# Patient Record
Sex: MALE | Race: WHITE | Employment: FULL TIME | URBAN - METROPOLITAN AREA
[De-identification: names, ages, dates, MRNs, and addresses within clinical notes are randomized per-mention and may not be internally consistent; named-entity substitution may affect disease eponyms.]

---

## 2020-01-16 ENCOUNTER — HOSPITAL ENCOUNTER (OUTPATIENT)
Dept: LAB | Age: 75
Discharge: HOME | End: 2020-01-16

## 2020-01-16 PROCEDURE — 88305 TISSUE EXAM BY PATHOLOGIST: CPT

## 2022-06-15 DIAGNOSIS — E78.5 HYPERLIPIDEMIA, UNSPECIFIED HYPERLIPIDEMIA TYPE: Primary | ICD-10-CM

## 2022-06-16 ENCOUNTER — TELEPHONE (OUTPATIENT)
Dept: ORTHOPEDIC SURGERY | Age: 77
End: 2022-06-16

## 2022-06-16 RX ORDER — ROSUVASTATIN CALCIUM 10 MG/1
10 TABLET, COATED ORAL EVERY OTHER DAY
Qty: 90 TABLET | Refills: 0 | Status: SHIPPED | OUTPATIENT
Start: 2022-06-16 | End: 2022-09-14 | Stop reason: SDUPTHER

## 2022-06-16 NOTE — TELEPHONE ENCOUNTER
This pt says he saw dr Ibeth Norris a long  Time  Ago and has called  Requesting  Him now. He  Felt a  Pop in his  Left shoulder. I have him scheduled for the end of July and on a wait list.  He will be  Out of town 6/25 thru 7/2.   Please let me know if you can see him sooner  Or you can add him to your  Wait list   Thank you

## 2022-06-20 ENCOUNTER — TELEPHONE (OUTPATIENT)
Dept: ORTHOPEDIC SURGERY | Age: 77
End: 2022-06-20

## 2022-07-05 ENCOUNTER — OFFICE VISIT (OUTPATIENT)
Dept: ORTHOPEDIC SURGERY | Age: 77
End: 2022-07-05
Payer: MEDICARE

## 2022-07-05 VITALS — BODY MASS INDEX: 24.92 KG/M2 | WEIGHT: 178 LBS | HEIGHT: 71 IN

## 2022-07-05 DIAGNOSIS — M25.512 LEFT SHOULDER PAIN, UNSPECIFIED CHRONICITY: Primary | ICD-10-CM

## 2022-07-05 DIAGNOSIS — M75.42 IMPINGEMENT SYNDROME OF LEFT SHOULDER: ICD-10-CM

## 2022-07-05 DIAGNOSIS — M19.012 ARTHRITIS OF LEFT ACROMIOCLAVICULAR JOINT: ICD-10-CM

## 2022-07-05 PROCEDURE — G8427 DOCREV CUR MEDS BY ELIG CLIN: HCPCS | Performed by: ORTHOPAEDIC SURGERY

## 2022-07-05 PROCEDURE — 99203 OFFICE O/P NEW LOW 30 MIN: CPT | Performed by: ORTHOPAEDIC SURGERY

## 2022-07-05 PROCEDURE — G8420 CALC BMI NORM PARAMETERS: HCPCS | Performed by: ORTHOPAEDIC SURGERY

## 2022-07-05 PROCEDURE — 1036F TOBACCO NON-USER: CPT | Performed by: ORTHOPAEDIC SURGERY

## 2022-07-05 PROCEDURE — 20610 DRAIN/INJ JOINT/BURSA W/O US: CPT | Performed by: ORTHOPAEDIC SURGERY

## 2022-07-05 PROCEDURE — 1123F ACP DISCUSS/DSCN MKR DOCD: CPT | Performed by: ORTHOPAEDIC SURGERY

## 2022-07-05 RX ORDER — METHYLPREDNISOLONE ACETATE 40 MG/ML
40 INJECTION, SUSPENSION INTRA-ARTICULAR; INTRALESIONAL; INTRAMUSCULAR; SOFT TISSUE ONCE
Status: COMPLETED | OUTPATIENT
Start: 2022-07-05 | End: 2022-07-05

## 2022-07-05 RX ORDER — OMEPRAZOLE 40 MG/1
40 CAPSULE, DELAYED RELEASE ORAL DAILY
COMMUNITY

## 2022-07-05 RX ORDER — LISINOPRIL 40 MG/1
40 TABLET ORAL DAILY
COMMUNITY
End: 2022-09-14 | Stop reason: SDUPTHER

## 2022-07-05 RX ADMIN — METHYLPREDNISOLONE ACETATE 120 MG: 40 INJECTION, SUSPENSION INTRA-ARTICULAR; INTRALESIONAL; INTRAMUSCULAR; SOFT TISSUE at 10:02

## 2022-07-05 NOTE — LETTER
[employer]  Arthritis oral choices: Individual Turmeric-Curcumin; Guanakito Speak; Boswellia                           -or-   Combination [employer] Turmeric strength for joints that includes all 3 listed above     Magne topical Sports:   Balm or liquid Spray with frankincense/myrrh      Nerve medication options:   CBD, Alpha Lipoic acid, Lion's brian and any other recommended neuropathic medication            Immune/healing possibilities:  Echinacea, Elderberry    As Needed: Dead sea bath salts, Essential Oils, scar cream, Gout and cramping medications    The above list of recommended medications is only a starting point. Please allow the experts at [facility] to make the final recommendations. Before using any of these medications, please make sure that you have no concerns, senstivities or allergies to the listed ingredients in each bottle/container. Also, please check with your pharmacist or your primary care physician regarding any and all possible interactions with your other daily medications. [employer] Locations:   [facility]  [address] [place] [address], [address] [place] [address]            Sincerely,      Ebony Trevino MD

## 2022-07-05 NOTE — PROGRESS NOTES
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 His shoulder pain is becoming more problematic because he uses crutches for polio and his shoulder pain has limited his ability to use crutches    ROS/Meds/PSH/PMH/FH/SH: reviewed today    Tobacco:  reports that he quit smoking about 32 years ago. He smoked 1.00 pack per day. He has quit using smokeless tobacco.     Physical Examination:  Patient appears to be alert and oriented with acceptable appearance.   No obvious distress or SOB  CV: appears to have acceptable vascular color and capillary refill  Neuro: appears to have mostly intact light touch sensation, but possibly some loss in his left hand  Skin: No soft tissue swelling  MS: Standing: Polio dependent use of crutches and brace  Left shoulder = impingement signs with active abduction and forward flexion  Left = hard to truly assess biceps tension and strength because he reports having changes in his left upper extremity after his tongue reconstruction 11 years ago    XR: Left side: AP Grashey and axillary views shoulder taken today with acromioclavicular arthritis; high riding humeral head; glenohumeral arthritis  XR Impression:  As above      Injection: We discussed the risks and complications of the procedure and the decision was made to proceed; after sterile prep, the left shoulder joint was injected with 3 cc Xylocaine: 120 mg Depo-Medrol; appeared to do well with better motion and less pain     Assessment:    Left shoulder pain, AC arthritis shoulder impingement, rotator cuff and possible biceps tear    Plan:   The patient and I discussed the above assessment. We explored treatment options. We discussed potential biceps tendon tear, but at this time, I feel like the majority of his pain relates to his rotator cuff and arthritis   He has an appointment to see Dr. Brain Thomas next week so I will hold ordering MRI scan today pending his response to the injection  Advanced medical imaging: Potential future: Left shoulder MRI scan    We discussed shoulder care and protection  PT: Hold PT at: [facility] per Dr. Brain Thomas   Orthotic/prosthetic: He has crutches and braces for his polio       Medication - OTC meds prn: Prescribed: Boswellia, Devil's claw, Turmeric-curcumin   Magne sports topical rub with frankincense and myrrh      Surgical discussion: Any surgery will be up to Dr. Brain Thomas  Follow up: Dr. Brain Thomas  Work status: Regular    This note was created using Dragon voice recognition software which may result in errors of speech and spelling recognition and word/phrase syntax errors.

## 2022-07-22 ENCOUNTER — NURSE ONLY (OUTPATIENT)
Dept: INTERNAL MEDICINE CLINIC | Facility: CLINIC | Age: 77
End: 2022-07-22

## 2022-07-22 DIAGNOSIS — R20.0 NUMBNESS IN FEET: ICD-10-CM

## 2022-07-22 DIAGNOSIS — I10 ESSENTIAL HYPERTENSION: Primary | ICD-10-CM

## 2022-07-22 LAB
BASOPHILS # BLD: 0.1 K/UL (ref 0–0.2)
BASOPHILS NFR BLD: 1 % (ref 0–2)
DIFFERENTIAL METHOD BLD: NORMAL
EOSINOPHIL # BLD: 0.1 K/UL (ref 0–0.8)
EOSINOPHIL NFR BLD: 2 % (ref 0.5–7.8)
ERYTHROCYTE [DISTWIDTH] IN BLOOD BY AUTOMATED COUNT: 13.2 % (ref 11.9–14.6)
HCT VFR BLD AUTO: 42.7 % (ref 41.1–50.3)
HGB BLD-MCNC: 13.7 G/DL (ref 13.6–17.2)
IMM GRANULOCYTES # BLD AUTO: 0 K/UL (ref 0–0.5)
IMM GRANULOCYTES NFR BLD AUTO: 0 % (ref 0–5)
LYMPHOCYTES # BLD: 1.1 K/UL (ref 0.5–4.6)
LYMPHOCYTES NFR BLD: 19 % (ref 13–44)
MCH RBC QN AUTO: 30.9 PG (ref 26.1–32.9)
MCHC RBC AUTO-ENTMCNC: 32.1 G/DL (ref 31.4–35)
MCV RBC AUTO: 96.4 FL (ref 79.6–97.8)
MONOCYTES # BLD: 0.7 K/UL (ref 0.1–1.3)
MONOCYTES NFR BLD: 11 % (ref 4–12)
NEUTS SEG # BLD: 4.2 K/UL (ref 1.7–8.2)
NEUTS SEG NFR BLD: 67 % (ref 43–78)
NRBC # BLD: 0 K/UL (ref 0–0.2)
PLATELET # BLD AUTO: 308 K/UL (ref 150–450)
PMV BLD AUTO: 10.6 FL (ref 9.4–12.3)
RBC # BLD AUTO: 4.43 M/UL (ref 4.23–5.6)
WBC # BLD AUTO: 6.1 K/UL (ref 4.3–11.1)

## 2022-07-23 LAB
ALBUMIN SERPL-MCNC: 3.9 G/DL (ref 3.2–4.6)
ALBUMIN/GLOB SERPL: 1.3 {RATIO} (ref 1.2–3.5)
ALP SERPL-CCNC: 59 U/L (ref 50–136)
ALT SERPL-CCNC: 44 U/L (ref 12–65)
ANION GAP SERPL CALC-SCNC: 9 MMOL/L (ref 7–16)
AST SERPL-CCNC: 28 U/L (ref 15–37)
BILIRUB SERPL-MCNC: 0.4 MG/DL (ref 0.2–1.1)
BUN SERPL-MCNC: 20 MG/DL (ref 8–23)
CALCIUM SERPL-MCNC: 9.1 MG/DL (ref 8.3–10.4)
CHLORIDE SERPL-SCNC: 106 MMOL/L (ref 98–107)
CHOLEST SERPL-MCNC: 172 MG/DL
CO2 SERPL-SCNC: 21 MMOL/L (ref 21–32)
CREAT SERPL-MCNC: 0.7 MG/DL (ref 0.8–1.5)
GLOBULIN SER CALC-MCNC: 3 G/DL (ref 2.3–3.5)
GLUCOSE SERPL-MCNC: 94 MG/DL (ref 65–100)
HDLC SERPL-MCNC: 57 MG/DL (ref 40–60)
HDLC SERPL: 3 {RATIO}
LDLC SERPL CALC-MCNC: 103.4 MG/DL
POTASSIUM SERPL-SCNC: 4.7 MMOL/L (ref 3.5–5.1)
PROT SERPL-MCNC: 6.9 G/DL (ref 6.3–8.2)
SODIUM SERPL-SCNC: 136 MMOL/L (ref 136–145)
TRIGL SERPL-MCNC: 58 MG/DL (ref 35–150)
TSH, 3RD GENERATION: 0.75 UIU/ML (ref 0.36–3.74)
VIT B12 SERPL-MCNC: 344 PG/ML (ref 193–986)
VLDLC SERPL CALC-MCNC: 11.6 MG/DL (ref 6–23)

## 2022-07-25 ENCOUNTER — APPOINTMENT (RX ONLY)
Dept: URBAN - METROPOLITAN AREA CLINIC 329 | Facility: CLINIC | Age: 77
Setting detail: DERMATOLOGY
End: 2022-07-25

## 2022-07-25 DIAGNOSIS — L57.0 ACTINIC KERATOSIS: ICD-10-CM

## 2022-07-25 DIAGNOSIS — L71.8 OTHER ROSACEA: ICD-10-CM | Status: INADEQUATELY CONTROLLED

## 2022-07-25 DIAGNOSIS — L20.89 OTHER ATOPIC DERMATITIS: ICD-10-CM | Status: INADEQUATELY CONTROLLED

## 2022-07-25 PROBLEM — L20.84 INTRINSIC (ALLERGIC) ECZEMA: Status: ACTIVE | Noted: 2022-07-25

## 2022-07-25 PROCEDURE — ? PRESCRIPTION

## 2022-07-25 PROCEDURE — 17000 DESTRUCT PREMALG LESION: CPT

## 2022-07-25 PROCEDURE — ? LIQUID NITROGEN

## 2022-07-25 PROCEDURE — ? PRESCRIPTION MEDICATION MANAGEMENT

## 2022-07-25 PROCEDURE — 99204 OFFICE O/P NEW MOD 45 MIN: CPT | Mod: 25

## 2022-07-25 PROCEDURE — ? COUNSELING

## 2022-07-25 PROCEDURE — ? ADDITIONAL NOTES

## 2022-07-25 PROCEDURE — ? FULL BODY SKIN EXAM - DECLINED

## 2022-07-25 PROCEDURE — 17003 DESTRUCT PREMALG LES 2-14: CPT

## 2022-07-25 RX ORDER — FLUTICASONE PROPIONATE 0.05 MG/G
OINTMENT TOPICAL
Qty: 60 | Refills: 5 | Status: ERX | COMMUNITY
Start: 2022-07-25

## 2022-07-25 RX ORDER — MINOCYCLINE HYDROCHLORIDE 100 MG/1
CAPSULE ORAL
Qty: 30 | Refills: 3 | Status: ERX | COMMUNITY
Start: 2022-07-25

## 2022-07-25 RX ORDER — METRONIDAZOLE 7.5 MG/G
CREAM TOPICAL
Qty: 45 | Refills: 3 | Status: ERX | COMMUNITY
Start: 2022-07-25

## 2022-07-25 RX ADMIN — FLUTICASONE PROPIONATE: 0.05 OINTMENT TOPICAL at 00:00

## 2022-07-25 RX ADMIN — MINOCYCLINE HYDROCHLORIDE: 100 CAPSULE ORAL at 00:00

## 2022-07-25 RX ADMIN — METRONIDAZOLE: 7.5 CREAM TOPICAL at 00:00

## 2022-07-25 ASSESSMENT — LOCATION DETAILED DESCRIPTION DERM
LOCATION DETAILED: RIGHT SUPERIOR PARIETAL SCALP
LOCATION DETAILED: LEFT FOREHEAD
LOCATION DETAILED: LEFT SUPERIOR PARIETAL SCALP
LOCATION DETAILED: NASAL DORSUM
LOCATION DETAILED: 1ST WEB SPACE RIGHT HAND

## 2022-07-25 ASSESSMENT — LOCATION ZONE DERM
LOCATION ZONE: FACE
LOCATION ZONE: NOSE
LOCATION ZONE: SCALP
LOCATION ZONE: FINGER

## 2022-07-25 ASSESSMENT — LOCATION SIMPLE DESCRIPTION DERM
LOCATION SIMPLE: LEFT FOREHEAD
LOCATION SIMPLE: RIGHT THUMB
LOCATION SIMPLE: SCALP
LOCATION SIMPLE: NOSE

## 2022-07-25 ASSESSMENT — SEVERITY ASSESSMENT OVERALL AMONG ALL PATIENTS
IN YOUR EXPERIENCE, AMONG ALL PATIENTS YOU HAVE SEEN WITH THIS CONDITION, HOW SEVERE IS THIS PATIENT'S CONDITION?: MILD TO MODERATE

## 2022-07-25 ASSESSMENT — SEVERITY ASSESSMENT 2020: SEVERITY 2020: MODERATE

## 2022-07-25 NOTE — PROCEDURE: MIPS QUALITY
Quality 47: Advance Care Plan: Advance Care Planning discussed and documented in the medical record; patient did not wish or was not able to name a surrogate decision maker or provide an advance care plan.
Quality 130: Documentation Of Current Medications In The Medical Record: Current Medications Documented
Quality 111:Pneumonia Vaccination Status For Older Adults: Pneumococcal vaccine administered on or after patient’s 60th birthday and before the end of the measurement period
Detail Level: Detailed

## 2022-09-11 NOTE — PROGRESS NOTES
Daya Sullivan (:  1945) is a 69 y.o. male,Established patient, here for evaluation of the following chief complaint(s):  Follow-up (3 mth fu), Hypertension, and Leg Pain         ASSESSMENT/PLAN:  1. Essential hypertension  BP levels consistently elevated in office but better controlled at home   Continue lisinopril and home BP monitoring  Follow-up in 2 weeks for nursing visit at which point patient is to bring his machine to the office so comparison can be made    - lisinopril (PRINIVIL;ZESTRIL) 40 MG tablet; Take 1 tablet by mouth daily  Dispense: 90 tablet; Refill: 2    2. Dyslipidemia  Lipid panel from 2022 reviewed  Continue rosuvastatin    - rosuvastatin (CRESTOR) 10 MG tablet; Take 1 tablet by mouth every other day  Dispense: 90 tablet; Refill: 0    3. Chronic pain of right lower extremity  4. Degenerative disc disease, lumbar  5. Right hip pain  Known degenerative disc disease with possible sciatic involvement in the past  Chronic right lower extremity pain differential diagnosis includes lumbar radiculopathy versus femoral acetabular osteoarthritis  Obtain radiograph of right hip as well as up-to-date radiographs of lumbar spine  Given chronicity and known degenerative disc disease with possible sciatic involvement obtain MRI of the lumbar spine given patient has failed to improve with conservative management  Patient requesting referral to orthopedic specialist for further evaluation, referral order placed  Stop over-the-counter anti-inflammatories, start trial of once a day meloxicam    - Willa Delacruz Dr  - XR HIP RIGHT (2-3 VIEWS); Future  - MRI LUMBAR SPINE WO CONTRAST; Future  - meloxicam (MOBIC) 15 MG tablet; Take 1/2-1 whole tablet once daily as needed for pain. Avoid other anti-inflammatories while on this medication. Acetaminophen is okay. Dispense: 30 tablet; Refill: 2  - meloxicam (MOBIC) 15 MG tablet;  Take 1/2-1 whole tablet once daily as needed for pain. Avoid other anti-inflammatories while on this medication. Acetaminophen is okay. Dispense: 30 tablet; Refill: 2    6. Erectile dysfunction, unspecified erectile dysfunction type  Chronic issue improved with Viagra in the past  Given erectile dysfunction and decreased muscle strength check testosterone level    - sildenafil (VIAGRA) 100 MG tablet; Take 1/2-1 whole tablet 30 to 60 minutes prior to intercourse. Maximum of 1 tablet/day. Dispense: 20 tablet; Refill: 5  - Testosterone, free, total; Future      Return in about 8 weeks (around 11/9/2022) for EOV; imaging studies ordered; needs fasting 8 am testosterone check prior . Subjective   SUBJECTIVE/OBJECTIVE:  Patient is a 81-year-old  male who presents to the office today for follow-up. Does check her blood pressure regularly at home with an automated arm cuff approximately 3years old with values typically in the 130s with rare excursions up to the 224 systolic. Remains compliant with his medication. Continues to have chronic low back pain described as pain originating in the right gluteal region extending down the hamstring. Of note patient does have history of polio since infancy with chronic weakness in the left leg. Does take 6 Advil a day which seems to help with the pain but overall has progressed over time. Does have tingling in the toes of both feet but no saddle anesthesia or bowel/bladder incontinence. No chest pain, shortness of breath, abdominal pain, nausea, vomiting. Requesting refill of Viagra for erectile dysfunction noting improvement in acquiring an erection with use. Denies history of major head trauma or use of anabolic steroids or exogenous testosterone. Does report loss of muscle strength generalized. Review of Systems   Respiratory:  Negative for shortness of breath. Cardiovascular:  Negative for chest pain and palpitations.    Gastrointestinal:  Negative for abdominal pain, nausea and vomiting. Genitourinary:         Positive for erectile dysfunction   Musculoskeletal:  Positive for back pain and myalgias. Neurological:  Positive for weakness (Chronic particularly in left leg). Positive for paresthesias of the toes  Denies saddle anesthesia or bowel/bladder incontinence        Objective   Physical Exam  Vitals reviewed. Constitutional:       General: He is not in acute distress. Appearance: Normal appearance. He is not ill-appearing, toxic-appearing or diaphoretic. HENT:      Head: Normocephalic and atraumatic. Eyes:      General:         Right eye: No discharge. Left eye: No discharge. Extraocular Movements: Extraocular movements intact. Neck:      Vascular: No carotid bruit. Cardiovascular:      Rate and Rhythm: Normal rate and regular rhythm. Heart sounds: No murmur heard. No friction rub. No gallop. Pulmonary:      Effort: Pulmonary effort is normal. No respiratory distress. Breath sounds: No wheezing, rhonchi or rales. Abdominal:      General: Bowel sounds are normal.      Palpations: Abdomen is soft. Tenderness: There is no abdominal tenderness. There is no guarding. Musculoskeletal:      Comments: Atrophy of bilateral leg muscles  Negative seated straight leg raise test  Passive range of motion of right hip preserved in flexion, internal and external rotation without pain  No tenderness to palpation over greater trochanteric bursa or along lumbar spinous processes   Skin:     General: Skin is warm and dry. Coloration: Skin is not jaundiced. Neurological:      Mental Status: He is alert. Mental status is at baseline. Psychiatric:         Attention and Perception: Attention normal.         Mood and Affect: Mood and affect normal. Mood is not anxious or depressed. Affect is not tearful. Speech: Speech normal. Speech is not rapid and pressured or slurred.          Behavior: Behavior normal. Behavior is not agitated, aggressive or combative. Behavior is cooperative. Thought Content: Thought content normal.                An electronic signature was used to authenticate this note.     --Lanie Crimes, DO

## 2022-09-14 ENCOUNTER — OFFICE VISIT (OUTPATIENT)
Dept: INTERNAL MEDICINE CLINIC | Facility: CLINIC | Age: 77
End: 2022-09-14
Payer: MEDICARE

## 2022-09-14 VITALS
OXYGEN SATURATION: 98 % | HEIGHT: 71 IN | HEART RATE: 82 BPM | BODY MASS INDEX: 24.83 KG/M2 | SYSTOLIC BLOOD PRESSURE: 170 MMHG | TEMPERATURE: 97.5 F | DIASTOLIC BLOOD PRESSURE: 80 MMHG

## 2022-09-14 DIAGNOSIS — G89.29 CHRONIC PAIN OF RIGHT LOWER EXTREMITY: ICD-10-CM

## 2022-09-14 DIAGNOSIS — I10 ESSENTIAL HYPERTENSION: Primary | ICD-10-CM

## 2022-09-14 DIAGNOSIS — E78.5 DYSLIPIDEMIA: ICD-10-CM

## 2022-09-14 DIAGNOSIS — M79.604 CHRONIC PAIN OF RIGHT LOWER EXTREMITY: ICD-10-CM

## 2022-09-14 DIAGNOSIS — M51.36 DEGENERATIVE DISC DISEASE, LUMBAR: ICD-10-CM

## 2022-09-14 DIAGNOSIS — M25.551 RIGHT HIP PAIN: ICD-10-CM

## 2022-09-14 DIAGNOSIS — N52.9 ERECTILE DYSFUNCTION, UNSPECIFIED ERECTILE DYSFUNCTION TYPE: ICD-10-CM

## 2022-09-14 PROCEDURE — G8420 CALC BMI NORM PARAMETERS: HCPCS | Performed by: STUDENT IN AN ORGANIZED HEALTH CARE EDUCATION/TRAINING PROGRAM

## 2022-09-14 PROCEDURE — 1036F TOBACCO NON-USER: CPT | Performed by: STUDENT IN AN ORGANIZED HEALTH CARE EDUCATION/TRAINING PROGRAM

## 2022-09-14 PROCEDURE — G8427 DOCREV CUR MEDS BY ELIG CLIN: HCPCS | Performed by: STUDENT IN AN ORGANIZED HEALTH CARE EDUCATION/TRAINING PROGRAM

## 2022-09-14 PROCEDURE — 99214 OFFICE O/P EST MOD 30 MIN: CPT | Performed by: STUDENT IN AN ORGANIZED HEALTH CARE EDUCATION/TRAINING PROGRAM

## 2022-09-14 PROCEDURE — 1123F ACP DISCUSS/DSCN MKR DOCD: CPT | Performed by: STUDENT IN AN ORGANIZED HEALTH CARE EDUCATION/TRAINING PROGRAM

## 2022-09-14 RX ORDER — ROSUVASTATIN CALCIUM 10 MG/1
10 TABLET, COATED ORAL EVERY OTHER DAY
Qty: 90 TABLET | Refills: 0 | Status: SHIPPED | OUTPATIENT
Start: 2022-09-14

## 2022-09-14 RX ORDER — SILDENAFIL 100 MG/1
TABLET, FILM COATED ORAL
Qty: 20 TABLET | Refills: 5 | Status: SHIPPED | OUTPATIENT
Start: 2022-09-14

## 2022-09-14 RX ORDER — LISINOPRIL 40 MG/1
40 TABLET ORAL DAILY
Qty: 90 TABLET | Refills: 2 | Status: SHIPPED | OUTPATIENT
Start: 2022-09-14

## 2022-09-14 RX ORDER — MELOXICAM 15 MG/1
TABLET ORAL
Qty: 30 TABLET | Refills: 2 | Status: SHIPPED | OUTPATIENT
Start: 2022-09-14

## 2022-09-14 ASSESSMENT — ENCOUNTER SYMPTOMS
NAUSEA: 0
SHORTNESS OF BREATH: 0
VOMITING: 0
BACK PAIN: 1
ABDOMINAL PAIN: 0

## 2022-09-14 ASSESSMENT — PATIENT HEALTH QUESTIONNAIRE - PHQ9
SUM OF ALL RESPONSES TO PHQ9 QUESTIONS 1 & 2: 0
2. FEELING DOWN, DEPRESSED OR HOPELESS: 0
1. LITTLE INTEREST OR PLEASURE IN DOING THINGS: 0
SUM OF ALL RESPONSES TO PHQ QUESTIONS 1-9: 0

## 2022-09-23 ENCOUNTER — OFFICE VISIT (OUTPATIENT)
Dept: ORTHOPEDIC SURGERY | Age: 77
End: 2022-09-23
Payer: MEDICARE

## 2022-09-23 VITALS — BODY MASS INDEX: 24.83 KG/M2 | WEIGHT: 178 LBS

## 2022-09-23 DIAGNOSIS — M54.31 SCIATICA OF RIGHT SIDE: ICD-10-CM

## 2022-09-23 DIAGNOSIS — M51.36 DDD (DEGENERATIVE DISC DISEASE), LUMBAR: ICD-10-CM

## 2022-09-23 DIAGNOSIS — M47.816 FACET ARTHROPATHY, LUMBAR: ICD-10-CM

## 2022-09-23 DIAGNOSIS — M54.50 LOW BACK PAIN, UNSPECIFIED BACK PAIN LATERALITY, UNSPECIFIED CHRONICITY, UNSPECIFIED WHETHER SCIATICA PRESENT: Primary | ICD-10-CM

## 2022-09-23 PROCEDURE — 1036F TOBACCO NON-USER: CPT | Performed by: PHYSICIAN ASSISTANT

## 2022-09-23 PROCEDURE — 1123F ACP DISCUSS/DSCN MKR DOCD: CPT | Performed by: PHYSICIAN ASSISTANT

## 2022-09-23 PROCEDURE — G8420 CALC BMI NORM PARAMETERS: HCPCS | Performed by: PHYSICIAN ASSISTANT

## 2022-09-23 PROCEDURE — 99204 OFFICE O/P NEW MOD 45 MIN: CPT | Performed by: PHYSICIAN ASSISTANT

## 2022-09-23 PROCEDURE — G8427 DOCREV CUR MEDS BY ELIG CLIN: HCPCS | Performed by: PHYSICIAN ASSISTANT

## 2022-09-23 RX ORDER — METHYLPREDNISOLONE 4 MG/1
TABLET ORAL
Qty: 1 KIT | Refills: 0 | Status: SHIPPED | OUTPATIENT
Start: 2022-09-23

## 2022-09-23 NOTE — LETTER
Gabriela Burrell                                                     MIKE AVILA  1945  MRN [id number]                                              ROOM NUMBER______      Radiographic Studies:    Cervical MRI      Thoracic MRI         Lumbar MRI          Pelvis MRI        CONTRAST    CT Myelogram: _______________   NCS/EMG ________________ ( UE  /  LE )     MRI of ___________________          Other: ____________________      Injections:    KNEE    HIP  Depomedrol _____ mg Euflexxa _____    _______________ TFESI/SNRB  _______________ SI Joint  _______________ TRENTON    _______________ Facet  _______________Piriformis/ Sciatica      Medications:    Oral Steroids _______________  NSAIDS _______________    Muscle Relaxers _______________  Neurontin/Lyrica _______________    Pain Medicine _______________  Other _______________                       Physical Therapy:    Lumbar     Thoracic      Cervical     Hip       Knee       Shoulder               Traction          Ultrasound          Dry Needling      Referral:    Pain referral:  [facility]   [facility]   Other: ______________________________    Follow-up/ Refer__________________________________________________    Authorization to hold blood thinners:___________________________________

## 2022-09-23 NOTE — PROGRESS NOTES
Name: Tra Faith  YOB: 1945  Gender: male  MRN: [id number]    CC: Back Pain (Low back pain into right buttock, thigh and behind right knee)       HPI: This is a 69y.o. year old male who has history of polio. 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 He has weakness of bilateral lower extremities from polio left leg has always worn a brace. We saw him in 2018 for lower back right buttock pain. At that time he had an MRI scan that showed some moderate stenosis at L2-3 and L3-4 but there was no significant foraminal stenosis of the lower levels or stenosis L4-5 or L5-S1. We referred him to physical therapy. At that time he said he was doing a lot of flexion extension twisting exercises and it seemed to aggravate the pain. He was doing okay for a while but over the past 3 months he has had more pain in the right posterior leg does not radiate below the knee is all in the right buttock right posterior leg. There is really not a lot of back pain. He does feel that his balance is is worse. He is tried meloxicam and Advil. He tries to ride his bike regularly, sitting aggravates it. History was obtained by patient    The patient denies any change in bowel or bladder function since the onset of the symptoms. he  has not had lumbar surgery in the past.     AMB PAIN ASSESSMENT 9/23/2022   Location of Pain Back   Location Modifiers Right   Severity of Pain 5   Quality of Pain Aching   Duration of Pain Persistent   Frequency of Pain Intermittent   Date Pain First Started 6/14/2022   Aggravating Factors Standing;Walking   Limiting Behavior Yes   Relieving Factors Other (Comment); Heat;Rest   Result of Injury No   Work-Related Injury No   Are there other pain locations you wish to document? No            ROS/Meds/PSH/PMH/FH/SH: I personally reviewed the patient's collected intake data.   Below are the pertinents:    Allergies   Allergen Reactions    Zolpidem Other (See Comments)     Hallucinations            Current Outpatient Medications:     methylPREDNISolone (MEDROL DOSEPACK) 4 MG tablet, Take by mouth as directed., Disp: 1 kit, Rfl: 0    sildenafil (VIAGRA) 100 MG tablet, Take 1/2-1 whole tablet 30 to 60 minutes prior to intercourse. Maximum of 1 tablet/day., Disp: 20 tablet, Rfl: 5    rosuvastatin (CRESTOR) 10 MG tablet, Take 1 tablet by mouth every other day, Disp: 90 tablet, Rfl: 0    lisinopril (PRINIVIL;ZESTRIL) 40 MG tablet, Take 1 tablet by mouth daily, Disp: 90 tablet, Rfl: 2    meloxicam (MOBIC) 15 MG tablet, Take 1/2-1 whole tablet once daily as needed for pain. Avoid other anti-inflammatories while on this medication. Acetaminophen is okay. , Disp: 30 tablet, Rfl: 2    omeprazole (PRILOSEC) 40 MG delayed release capsule, Take 40 mg by mouth daily, Disp: , Rfl:     Multiple Vitamin (MULTIVITAMIN PO), Take 1 tablet by mouth daily, Disp: , Rfl:     metroNIDAZOLE (METROCREAM) 0.75 % cream, APPLY TO NOSE ONE TO TWO TIMES PER DAY, Disp: , Rfl:     ascorbic acid (VITAMIN C) 500 MG tablet, Take 500 mg by mouth daily, Disp: , Rfl:     Coenzyme Q10 10 MG CAPS, Take 30 mg by mouth daily (Patient not taking: Reported on 9/23/2022), Disp: , Rfl:     Past Surgical History:   Procedure Laterality Date    CATARACT REMOVAL Bilateral     COLONOSCOPY      [facility]     HEENT      1/3rd tongue removed in 8/2010 and arterial graft from right wrist to tongue and second graft from right thigh to right wrist.    HEENT      precautionary trach placed in 8/2010 when having tongue removal surgery and closed one week later. HERNIA REPAIR      ORTHOPEDIC SURGERY Right     pins in right leg to prevent asymmetric growth    VASECTOMY         There is no problem list on file for this patient. Tobacco:  reports that he quit smoking about 32 years ago. His smoking use included cigarettes. He smoked an average of 1 pack per day.  He has quit using smokeless tobacco.  Alcohol: Social History     Substance and Sexual Activity   Alcohol Use Yes        Physical Exam:   BMI: Body mass index is 24.83 kg/m². GENERAL:  Adult in no acute distress, well developed, well nourished Patient is appropriately conversant  MSK:  Examination of the lumbar spine reveals normal alignment of the spine. Pelvis is unlevel. No viviana tenderness to palpation of the lumbar spine. No tenderness of the right buttock. He is able to single-leg stance on the right leg. Gait is certainly altered he uses crutches, he does have a left leg brace on. ROM of bilateral hip(s) reveals no irritability. NEURO:  Cranial nerves grossly intact. No change in motor deficits. He certainly has weakness of the right lower extremity with knee extension, dorsiflexion plantarflexion but these are unchanged. .    Straight leg testing is negative   sensory testing reveals intact sensation to light touch and in the distribution of the L3-S1 dermatomes bilaterally  PSYCH:  Alert and oriented X 3. Appropriate affect. Intact judgment and insight. Radiographic Studies:     AP, lateral and spot views of the lumbar spine:  9/23/22  AP of the lumbar spine shows normal alignment. There are multilevel degenerative disc changes and facet arthropathy and lateral osteophytes. Sagittal view of the lumbar spine multilevel degenerative disc changes. No anterior listhesis. Large bulky facet arthropathy is prominent. X-ray impression: Advanced degenerative changes lumbar spine no acute abnormality no significant change from x-rays 2018. I also independently reviewed the MRI scan from 2018. He did have some disc bulging L1-2, L2-3 and L34. There is moderate stenosis L2-3 and L34 but no significant stenosis at L4-5 or L5-S1 and there was no viviana compression on 5 or S1 nerve roots particularly on the right. I also independently reviewed the MRI scan from 2018      Assessment/Plan:         Diagnosis Orders   1.  Low back pain, unspecified back pain laterality, unspecified chronicity, unspecified whether sciatica present  XR LUMBAR SPINE (2-3 VIEWS)    Amb External Referral To Physical Therapy      2. Facet arthropathy, lumbar  Amb External Referral To Physical Therapy      3. DDD (degenerative disc disease), lumbar  Amb External Referral To Physical Therapy      4. Sciatica of right side  Amb External Referral To Physical Therapy          We reviewed his MRI scan from 2018 we reviewed his x-rays today. We did discuss that he does have stenosis at L2-3 and L3-4 however his symptoms do not really sound like neurogenic claudication at this time. It seems that he has more of a sciatica or piriformis syndrome and this may be due to his unlevel pelvis and could also be some sacroiliac pain. What I currently recommend is physical therapy for piriformis and SI joint with dry needling to see if we can calm down the pain in the right buttock and posterior leg. I do not want to therapy to do a lot of flexion extension or rotation of the spine as that may exacerbate stenosis and spondylitic back pain. If his symptoms do not improve, we may need new MRI scan of the lumbar spine to further evaluate. We will also prescribe oral steroids.    - Physical Therapy was prescribed and will include stretching, strengthening and modalities to promote blood flow, flexibility and core strengthening.  - Oral Steroids: A short course of oral steroids was prescribed in an attempt to bring the acute radicular symptoms under control. The patient understands the risks and side effects of oral steroids including immunosuppression, hypertension, mood swings, increased blood sugar, including glaucoma. The steroid taper can be followed by NSAIDs once completed. 4 This is a chronic illness/condition with exacerbation and progression    Orders Placed This Encounter   Medications    methylPREDNISolone (MEDROL DOSEPACK) 4 MG tablet     Sig: Take by mouth as directed. Dispense:  1 kit     Refill:  0        Orders Placed This Encounter   Procedures    XR LUMBAR SPINE (2-3 VIEWS)    Amb External Referral To Physical Therapy            Return for after PT with HARSHAD. Mary Madsen PA-C  09/23/22      Elements of this note were created using speech recognition software. As such, errors of speech recognition may be present.

## 2022-09-28 ENCOUNTER — NURSE ONLY (OUTPATIENT)
Dept: INTERNAL MEDICINE CLINIC | Facility: CLINIC | Age: 77
End: 2022-09-28

## 2022-09-28 VITALS — SYSTOLIC BLOOD PRESSURE: 210 MMHG | DIASTOLIC BLOOD PRESSURE: 90 MMHG

## 2022-09-28 NOTE — PROGRESS NOTES
Manual BP reading on L arm is 220/100 and manual on R arm is 210/90. Pt brought his automatic BP cuff from home and it read on L arm a BP of 198/95. His last home reading was on 9/15/22 of 134/76. He is currently on Prednisone for acute sciatica and has 2 days left. Pt did take his BP med this morning. Dr. Sabrina Dyson informed and he recommended administering 0.1 mg of Clonidine. He also advised that pt start checking his BP at home twice daily and keep records and also call us with his at home BP reading this afternoon. Pt notified and verbalized understanding. Medication administration- Clonidine Hydrochloride 0.1 mg tablet @ 0900.

## 2022-10-20 ENCOUNTER — PATIENT MESSAGE (OUTPATIENT)
Dept: ORTHOPEDIC SURGERY | Age: 77
End: 2022-10-20

## 2022-10-24 NOTE — TELEPHONE ENCOUNTER
From: Kerrie Izquierdo  To: Tom Ozuna  Sent: 10/20/2022 12:22 PM EDT  Subject: Prednesdone, physical therapy    I start PT next Wednesday,   The prednisone relived my pain for several weeks but has worn off and my pain is back.  Would it be possible to have one more round of prednisone to get me started with PT.

## 2022-10-24 NOTE — TELEPHONE ENCOUNTER
Mr Forest Mcleod,   I am glad to hear the steroids helped. Unfortunately, repeated oral steroid use is not advisable as it can damage your bone quality. If you are not able to get relief with PT, we may need to consider an new MRI of your back or refer you for a steroid injections.    Tom Ozuna PA-C

## 2022-11-02 DIAGNOSIS — N52.9 ERECTILE DYSFUNCTION, UNSPECIFIED ERECTILE DYSFUNCTION TYPE: ICD-10-CM

## 2022-11-04 ENCOUNTER — OFFICE VISIT (OUTPATIENT)
Dept: ORTHOPEDIC SURGERY | Age: 77
End: 2022-11-04
Payer: MEDICARE

## 2022-11-04 DIAGNOSIS — M54.16 LUMBAR RADICULOPATHY: ICD-10-CM

## 2022-11-04 DIAGNOSIS — M47.816 FACET ARTHROPATHY, LUMBAR: ICD-10-CM

## 2022-11-04 DIAGNOSIS — M51.36 DDD (DEGENERATIVE DISC DISEASE), LUMBAR: Primary | ICD-10-CM

## 2022-11-04 DIAGNOSIS — M48.062 LUMBAR STENOSIS WITH NEUROGENIC CLAUDICATION: ICD-10-CM

## 2022-11-04 LAB
TESTOST FREE SERPL-MCNC: 10.5 PG/ML (ref 6.6–18.1)
TESTOST SERPL-MCNC: 422 NG/DL (ref 264–916)

## 2022-11-04 PROCEDURE — 99213 OFFICE O/P EST LOW 20 MIN: CPT | Performed by: PHYSICIAN ASSISTANT

## 2022-11-04 PROCEDURE — 1123F ACP DISCUSS/DSCN MKR DOCD: CPT | Performed by: PHYSICIAN ASSISTANT

## 2022-11-04 PROCEDURE — 1036F TOBACCO NON-USER: CPT | Performed by: PHYSICIAN ASSISTANT

## 2022-11-04 PROCEDURE — G8420 CALC BMI NORM PARAMETERS: HCPCS | Performed by: PHYSICIAN ASSISTANT

## 2022-11-04 PROCEDURE — G8484 FLU IMMUNIZE NO ADMIN: HCPCS | Performed by: PHYSICIAN ASSISTANT

## 2022-11-04 PROCEDURE — G8428 CUR MEDS NOT DOCUMENT: HCPCS | Performed by: PHYSICIAN ASSISTANT

## 2022-11-04 NOTE — LETTER
Saul AVILA  1945  MRN [id number]                                              ROOM NUMBER______      Radiographic Studies:    Cervical MRI      Thoracic MRI         Lumbar MRI          Pelvis MRI        CONTRAST    CT Myelogram: _______________   NCS/EMG ________________ ( UE  /  LE )     MRI of ___________________          Other: ____________________      Injections:    KNEE    HIP  Depomedrol _____ mg Euflexxa _____    _______________ TFESI/SNRB  _______________ SI Joint  _______________ TRENTON    _______________ Facet  _______________Piriformis/ Sciatica      Medications:    Oral Steroids _______________  NSAIDS _______________    Muscle Relaxers _______________  Neurontin/Lyrica _______________    Pain Medicine _______________  Other _______________                       Physical Therapy:    Lumbar     Thoracic      Cervical     Hip       Knee       Shoulder               Traction          Ultrasound          Dry Needling      Referral:    Pain referral:  [facility]   [facility]   Other: ______________________________    Follow-up/ Refer__________________________________________________    Authorization to hold blood thinners:___________________________________

## 2022-11-04 NOTE — PROGRESS NOTES
Name: Miguel Bowman  YOB: 1945  Gender: male  MRN: [id number]    CC: Back Pain (Follow up after PT)       HPI: This is a 69y.o. year old male who has history of polio. 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 He has weakness of bilateral lower extremities from polio left leg has always worn a brace. We saw him in 2018 for lower back right buttock pain. At that time he had an MRI scan that showed some moderate stenosis at L2-3 and L3-4 but there was no significant foraminal stenosis of the lower levels or stenosis L4-5 or L5-S1. We referred him to physical therapy. At that time he said he was doing a lot of flexion extension twisting exercises and it seemed to aggravate the pain. He was doing okay for a while but over the past 3 -4 months he has had more pain in the right posterior leg does not radiate below the knee is all in the right buttock right posterior leg. There is really not a lot of back pain. He does feel that his balance is is worse. He is tried meloxicam and Advil. He tries to ride his bike regularly, sitting aggravates it. We prescribed oral steroids. The symptoms did improve with oral steroids for short period of time. He actually called back to have another course. We recommended further follow-up. Began physical therapy and the physical therapy is helping with his flexibility and the pain has improved approximately 40% but he is still having right radicular pain. This point he would be interested in injection to help improve the symptoms even more. He has had over 6 weeks of physical therapy. AMB PAIN ASSESSMENT 9/23/2022   Location of Pain Back   Location Modifiers Right   Severity of Pain 5   Quality of Pain Aching   Duration of Pain Persistent   Frequency of Pain Intermittent   Date Pain First Started 6/14/2022   Aggravating Factors Standing;Walking   Limiting Behavior Yes   Relieving Factors Other (Comment); Heat;Rest   Result of Injury No   Work-Related Injury No   Are there other pain locations you wish to document? No            ROS/Meds/PSH/PMH/FH/SH: I personally reviewed the patient's collected intake data. Below are the pertinents:    Allergies   Allergen Reactions    Zolpidem Other (See Comments)     Hallucinations            Current Outpatient Medications:     metroNIDAZOLE (METROCREAM) 0.75 % cream, APPLY TO NOSE ONE TO TWO TIMES PER DAY, Disp: , Rfl:     methylPREDNISolone (MEDROL DOSEPACK) 4 MG tablet, Take by mouth as directed., Disp: 1 kit, Rfl: 0    sildenafil (VIAGRA) 100 MG tablet, Take 1/2-1 whole tablet 30 to 60 minutes prior to intercourse. Maximum of 1 tablet/day., Disp: 20 tablet, Rfl: 5    rosuvastatin (CRESTOR) 10 MG tablet, Take 1 tablet by mouth every other day, Disp: 90 tablet, Rfl: 0    lisinopril (PRINIVIL;ZESTRIL) 40 MG tablet, Take 1 tablet by mouth daily, Disp: 90 tablet, Rfl: 2    meloxicam (MOBIC) 15 MG tablet, Take 1/2-1 whole tablet once daily as needed for pain. Avoid other anti-inflammatories while on this medication. Acetaminophen is okay. 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    HEENT      precautionary trach placed in 8/2010 when having tongue removal surgery and closed one week later.     HERNIA REPAIR      ORTHOPEDIC SURGERY Right     pins in right leg to prevent asymmetric growth    VASECTOMY         There is no problem list on file for this patient. Tobacco:  reports that he quit smoking about 32 years ago. His smoking use included cigarettes. He smoked an average of 1 pack per day. He has quit using smokeless tobacco.  Alcohol:   Social History     Substance and Sexual Activity   Alcohol Use Yes        Physical Exam:   BMI: There is no height or weight on file to calculate BMI. GENERAL:  Adult in no acute distress, well developed, well nourished Patient is appropriately conversant  MSK:  Examination of the lumbar spine reveals normal alignment of the spine. Pelvis is unlevel. No viviana tenderness to palpation of the lumbar spine. No tenderness of the right buttock. He is able to single-leg stance on the right leg. Gait is certainly altered he uses crutches, he does have a left leg brace on. ROM of bilateral hip(s) reveals no irritability. NEURO:  Cranial nerves grossly intact. No change in motor deficits. He certainly has weakness of the right lower extremity with knee extension, dorsiflexion plantarflexion but these are unchanged. .    Straight leg testing is negative   sensory testing reveals intact sensation to light touch and in the distribution of the L3-S1 dermatomes bilaterally  PSYCH:  Alert and oriented X 3. Appropriate affect. Intact judgment and insight. Radiographic Studies:     AP, lateral and spot views of the lumbar spine:  9/23/22  AP of the lumbar spine shows normal alignment. There are multilevel degenerative disc changes and facet arthropathy and lateral osteophytes. Sagittal view of the lumbar spine multilevel degenerative disc changes. No anterior listhesis. Large bulky facet arthropathy is prominent. X-ray impression: Advanced degenerative changes lumbar spine no acute abnormality no significant change from x-rays 2018. I also independently reviewed the MRI scan from 2018. He did have some disc bulging L1-2, L2-3 and L34. There is moderate stenosis L2-3 and L34 but no significant stenosis at L4-5 or L5-S1 and there was no viviana compression on 5 or S1 nerve roots particularly on the right. I also independently reviewed the MRI scan from 2018      Assessment/Plan:         Diagnosis Orders   1. DDD (degenerative disc disease), lumbar        2. Lumbar stenosis with neurogenic claudication        3. Facet arthropathy, lumbar        4. Lumbar radiculopathy              We reviewed his MRI scan from 2018 we reviewed his x-rays today. We did discuss that he does have stenosis at L2-3 and L3-4 however his symptoms do not really sound like neurogenic claudication at this time. He has more of a right L5 distribution radiculopathy or viviana sciatica. Physical therapy is improving however not completely alleviating symptoms. He still has pain when he walks and he can get up to 5-6 out of 10. He would be interested in injection. I recommend a new MRI scan to further delineate anatomy.     - A MRI was ordered to delineate anatomy, confirm the diagnosis and assess the severity. 4 This is a chronic illness/condition with exacerbation and progression    No orders of the defined types were placed in this encounter. No orders of the defined types were placed in this encounter. Return for MRI results [facility]. Lauro Wright PA-C  11/04/22      Elements of this note were created using speech recognition software. As such, errors of speech recognition may be present.

## 2022-11-05 NOTE — PROGRESS NOTES
Nafisa Yu (:  1945) is a 69 y.o. male,Established patient, here for evaluation of the following chief complaint(s):  Follow-up (8 week fu), Leg Pain, and Hypertension         ASSESSMENT/PLAN:  1. Essential hypertension  Continue lisinopril and home blood pressure monitoring  Patient to alert provider if home blood pressure readings consistently in the high 013Q or 411T systolic at which point we may need to consider adjusting antihypertensive therapy  Counseled on minimizing salt intake    2. Degenerative disc disease, lumbar  3. Spinal stenosis of lumbar region, unspecified whether neurogenic claudication present  MRI lumbar spine on 2022 with interval progression of disease at L2-3 now with significant subarticular reactive endplate change, larger disc bulge than seen previously resulting in severe central stenosis and severe bilateral neural foraminal narrowing; interval development of moderate central stenosis at L1-2; stable multilevel lumbar spondylosis  Currently undergoing physical therapy. Scheduled for follow-up with orthopedic specialist tomorrow for consideration of injections    4. Need for immunization against influenza  Up-to-date influenza vaccination administered today with potential side effects discussed    - Influenza, FLUAD, (age 72 y+), IM, Preservative Free, 0.5 mL      Return in about 3 months (around 2/10/2023) for EOV . Subjective   SUBJECTIVE/OBJECTIVE:  Patient is a 29-year-old  male who presents to the office today for follow-up. Patient still doing physical therapy for right leg pain due to follow-up with orthopedic spine specialist tomorrow given MRI showing evidence of spinal stenosis. Is considering doing epidural steroid injections. Still has occasional tingling in both feet transiently in the morning. Of note patient does have history of polio which has caused loss of muscle tone and strength in his left leg.   Does feel like for the past few years he has lost muscle tone and strength in his right leg. Does ambulate with crutches at baseline. Denies anorexia, chest pain, shortness of breath or abdominal pain, no saddle anesthesia or loss of bowel or bladder control. Has been checking blood pressure intermittently at home with systolic values in the 380T up to the 768 systolic and 95I to 91L diastolic with heart rate in the 70s up to 100. Does admit to added salt intake. Review of Systems   Constitutional:  Negative for appetite change. Respiratory:  Negative for cough and shortness of breath. Cardiovascular:  Negative for chest pain. Gastrointestinal:  Negative for abdominal pain. Musculoskeletal:  Positive for myalgias. Negative for back pain. Neurological:  Positive for weakness (Chronic involving both legs left greater than right from history of polio). Denies saddle anesthesia or loss of bowel/bladder control  Occasional paresthesias in the toes of both feet          Objective   Physical Exam  Vitals reviewed. Constitutional:       General: He is not in acute distress. Appearance: Normal appearance. He is not ill-appearing, toxic-appearing or diaphoretic. HENT:      Head: Normocephalic and atraumatic. Eyes:      General:         Right eye: No discharge. Left eye: No discharge. Extraocular Movements: Extraocular movements intact. Neck:      Vascular: No carotid bruit. Cardiovascular:      Rate and Rhythm: Normal rate and regular rhythm. Pulses:           Radial pulses are 2+ on the left side. Heart sounds: No murmur heard. No friction rub. No gallop. Comments: Capillary refill 2 to 3 seconds on right hand, 2 seconds on left hand  Pulmonary:      Effort: Pulmonary effort is normal. No respiratory distress. Breath sounds: Rales (Mild right lower lobe) present. No wheezing or rhonchi. Abdominal:      Tenderness: There is no abdominal tenderness.  There is no guarding. Skin:     General: Skin is warm and dry. Coloration: Skin is not jaundiced. Neurological:      Mental Status: He is alert. Mental status is at baseline. Comments: Muscle strength 2/5 in left hip flexion, 4/5 in right hip flexion, right knee flexion and extension  Chronic inability to dorsiflex left ankle with minimal ability in right ankle  Orthotic brace along left lower extremity  Patient ambulates with use of crutches   Psychiatric:         Attention and Perception: Attention normal.         Mood and Affect: Mood and affect normal. Mood is not anxious or depressed. Affect is not tearful. Speech: Speech normal. Speech is not rapid and pressured or slurred. Behavior: Behavior normal. Behavior is not agitated, aggressive or combative. Behavior is cooperative. Thought Content: Thought content normal.                An electronic signature was used to authenticate this note.     --Cricket Barcenas, DO

## 2022-11-10 ENCOUNTER — OFFICE VISIT (OUTPATIENT)
Dept: INTERNAL MEDICINE CLINIC | Facility: CLINIC | Age: 77
End: 2022-11-10
Payer: MEDICARE

## 2022-11-10 VITALS
TEMPERATURE: 97.8 F | HEART RATE: 99 BPM | WEIGHT: 173 LBS | BODY MASS INDEX: 24.22 KG/M2 | OXYGEN SATURATION: 98 % | DIASTOLIC BLOOD PRESSURE: 82 MMHG | HEIGHT: 71 IN | SYSTOLIC BLOOD PRESSURE: 150 MMHG

## 2022-11-10 DIAGNOSIS — M48.061 SPINAL STENOSIS OF LUMBAR REGION, UNSPECIFIED WHETHER NEUROGENIC CLAUDICATION PRESENT: ICD-10-CM

## 2022-11-10 DIAGNOSIS — I10 ESSENTIAL HYPERTENSION: Primary | ICD-10-CM

## 2022-11-10 DIAGNOSIS — M51.36 DEGENERATIVE DISC DISEASE, LUMBAR: ICD-10-CM

## 2022-11-10 DIAGNOSIS — Z23 NEED FOR IMMUNIZATION AGAINST INFLUENZA: ICD-10-CM

## 2022-11-10 PROCEDURE — 3079F DIAST BP 80-89 MM HG: CPT | Performed by: STUDENT IN AN ORGANIZED HEALTH CARE EDUCATION/TRAINING PROGRAM

## 2022-11-10 PROCEDURE — G8427 DOCREV CUR MEDS BY ELIG CLIN: HCPCS | Performed by: STUDENT IN AN ORGANIZED HEALTH CARE EDUCATION/TRAINING PROGRAM

## 2022-11-10 PROCEDURE — 90694 VACC AIIV4 NO PRSRV 0.5ML IM: CPT | Performed by: STUDENT IN AN ORGANIZED HEALTH CARE EDUCATION/TRAINING PROGRAM

## 2022-11-10 PROCEDURE — G8484 FLU IMMUNIZE NO ADMIN: HCPCS | Performed by: STUDENT IN AN ORGANIZED HEALTH CARE EDUCATION/TRAINING PROGRAM

## 2022-11-10 PROCEDURE — 1036F TOBACCO NON-USER: CPT | Performed by: STUDENT IN AN ORGANIZED HEALTH CARE EDUCATION/TRAINING PROGRAM

## 2022-11-10 PROCEDURE — 1123F ACP DISCUSS/DSCN MKR DOCD: CPT | Performed by: STUDENT IN AN ORGANIZED HEALTH CARE EDUCATION/TRAINING PROGRAM

## 2022-11-10 PROCEDURE — G8420 CALC BMI NORM PARAMETERS: HCPCS | Performed by: STUDENT IN AN ORGANIZED HEALTH CARE EDUCATION/TRAINING PROGRAM

## 2022-11-10 PROCEDURE — 99214 OFFICE O/P EST MOD 30 MIN: CPT | Performed by: STUDENT IN AN ORGANIZED HEALTH CARE EDUCATION/TRAINING PROGRAM

## 2022-11-10 PROCEDURE — 3077F SYST BP >= 140 MM HG: CPT | Performed by: STUDENT IN AN ORGANIZED HEALTH CARE EDUCATION/TRAINING PROGRAM

## 2022-11-10 PROCEDURE — G0008 ADMIN INFLUENZA VIRUS VAC: HCPCS | Performed by: STUDENT IN AN ORGANIZED HEALTH CARE EDUCATION/TRAINING PROGRAM

## 2022-11-10 ASSESSMENT — ENCOUNTER SYMPTOMS
ABDOMINAL PAIN: 0
BACK PAIN: 0
COUGH: 0
SHORTNESS OF BREATH: 0

## 2022-11-11 ENCOUNTER — OFFICE VISIT (OUTPATIENT)
Dept: ORTHOPEDIC SURGERY | Age: 77
End: 2022-11-11
Payer: MEDICARE

## 2022-11-11 DIAGNOSIS — M51.36 DDD (DEGENERATIVE DISC DISEASE), LUMBAR: ICD-10-CM

## 2022-11-11 DIAGNOSIS — M48.061 FORAMINAL STENOSIS OF LUMBAR REGION: ICD-10-CM

## 2022-11-11 DIAGNOSIS — M48.062 LUMBAR STENOSIS WITH NEUROGENIC CLAUDICATION: Primary | ICD-10-CM

## 2022-11-11 PROCEDURE — G8420 CALC BMI NORM PARAMETERS: HCPCS | Performed by: PHYSICIAN ASSISTANT

## 2022-11-11 PROCEDURE — 1036F TOBACCO NON-USER: CPT | Performed by: PHYSICIAN ASSISTANT

## 2022-11-11 PROCEDURE — G8428 CUR MEDS NOT DOCUMENT: HCPCS | Performed by: PHYSICIAN ASSISTANT

## 2022-11-11 PROCEDURE — 99214 OFFICE O/P EST MOD 30 MIN: CPT | Performed by: PHYSICIAN ASSISTANT

## 2022-11-11 PROCEDURE — G8484 FLU IMMUNIZE NO ADMIN: HCPCS | Performed by: PHYSICIAN ASSISTANT

## 2022-11-11 PROCEDURE — 1123F ACP DISCUSS/DSCN MKR DOCD: CPT | Performed by: PHYSICIAN ASSISTANT

## 2022-11-11 NOTE — PROGRESS NOTES
Name: Gi Kaur  YOB: 1945  Gender: male  MRN: [id number]    CC: Back Pain (MRI results)       HPI: This is a 69y.o. year old male who has history of polio. 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 He has weakness of bilateral lower extremities from polio left leg has always worn a brace. We saw him in 2018 for lower back right buttock pain. At that time he had an MRI scan that showed some moderate stenosis at L2-3 and L3-4 but there was no significant foraminal stenosis of the lower levels or stenosis L4-5 or L5-S1. We referred him to physical therapy. At that time he said he was doing a lot of flexion extension twisting exercises and it seemed to aggravate the pain. He was doing okay for a while but over the past 4 months he has had more pain in the right posterior leg does not radiate below the knee is all in the right buttock right posterior leg. There is really not a lot of back pain. He does feel that his balance is is worse. He is tried meloxicam and Advil. He tries to ride his bike regularly, sitting aggravates it. We prescribed oral steroids. The symptoms did improve with oral steroids for short period of time. He actually called back to have another course. We recommended further follow-up. He began physical therapy and the physical therapy is helping with his flexibility and the pain has improved approximately 40% but he is still having right radicular pain and weakness fo both legs. He certainly feels his legs are weaker than their norm. We ordered a new MRI scan to evaluate progression of stenosis. ROS/Meds/PSH/PMH/FH/SH: I personally reviewed the patient's collected intake data.   Below are the pertinents:    Allergies   Allergen Reactions    Zolpidem Other (See Comments)     Hallucinations            Current Outpatient Medications:     metroNIDAZOLE (METROCREAM) 0.75 % cream, APPLY TO NOSE ONE TO TWO TIMES PER DAY, Disp: , Rfl: methylPREDNISolone (MEDROL DOSEPACK) 4 MG tablet, Take by mouth as directed., Disp: 1 kit, Rfl: 0    sildenafil (VIAGRA) 100 MG tablet, Take 1/2-1 whole tablet 30 to 60 minutes prior to intercourse. Maximum of 1 tablet/day., Disp: 20 tablet, Rfl: 5    rosuvastatin (CRESTOR) 10 MG tablet, Take 1 tablet by mouth every other day, Disp: 90 tablet, Rfl: 0    lisinopril (PRINIVIL;ZESTRIL) 40 MG tablet, Take 1 tablet by mouth daily, Disp: 90 tablet, Rfl: 2    meloxicam (MOBIC) 15 MG tablet, Take 1/2-1 whole tablet once daily as needed for pain. Avoid other anti-inflammatories while on this medication. Acetaminophen is okay. 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    HEENT      precautionary trach placed in 8/2010 when having tongue removal surgery and closed one week later. HERNIA REPAIR      ORTHOPEDIC SURGERY Right     pins in right leg to prevent asymmetric growth    VASECTOMY         There is no problem list on file for this patient. Tobacco:  reports that he quit smoking about 32 years ago. His smoking use included cigarettes. He smoked an average of 1 pack per day. He has quit using smokeless tobacco.  Alcohol:   Social History     Substance and Sexual Activity   Alcohol Use Yes        Physical Exam:   BMI: There is no height or weight on file to calculate BMI.     GENERAL:  Adult in no acute distress, well developed, well nourished Patient is appropriately conversant  MSK:  Examination of the lumbar spine reveals normal alignment of the spine. Pelvis is unlevel. No viviana tenderness to palpation of the lumbar spine. No tenderness of the right buttock. He is able to single-leg stance on the right leg. Gait is certainly altered he uses crutches, he does have a left leg brace on. ROM of bilateral hip(s) reveals no irritability. NEURO:  Cranial nerves grossly intact. No change in motor deficits. He certainly has weakness of the right lower extremity with knee extension, dorsiflexion plantarflexion but these are unchanged. .    Straight leg testing is negative   sensory testing reveals intact sensation to light touch and in the distribution of the L3-S1 dermatomes bilaterally  PSYCH:  Alert and oriented X 3. Appropriate affect. Intact judgment and insight. Radiographic Studies:     AP, lateral and spot views of the lumbar spine:  9/23/22  AP of the lumbar spine shows normal alignment. There are multilevel degenerative disc changes and facet arthropathy and lateral osteophytes. Sagittal view of the lumbar spine multilevel degenerative disc changes. No anterior listhesis. Large bulky facet arthropathy is prominent. X-ray impression: Advanced degenerative changes lumbar spine no acute abnormality no significant change from x-rays 2018. MRI Result (most recent): MRI LUMBAR SPINE WO CONTRAST 11/07/2022    Narrative  History: Other intervertebral disc degeneration, lumbar region; Spinal stenosis,  lumbar region with neurogenic claudication; Spondylosis without myelopathy or  radiculopathy, lumbar region; Radiculopathy, lumbar region. Low back and right  leg pain, several months duration. History of polio    Exam: MRI lumbar spine without contrast    Technique: Axial and sagittal T1 and T2-weighted sequences are available for  review.   A sagittal STIR sequence is also available for review. Comparison: 9/26/2018    Findings: There is a remote L5 compression fracture versus prominent Schmorl's node  involving the superior endplate, stable when compared with the prior exam.  Degenerative disc signal present L1 to through L3-4 with varying degrees of disc  height loss. The conus is normal in configuration and signal intensity  throughout. Subarticular reactive endplate change present L2-3, increased when  compared with the prior study. L1-2: There is a disc bulge with bilateral facet arthropathy. There is moderate  central stenosis. No neural foraminal narrowing. L2-3: There is a disc bulge with bilateral facet arthropathy. There is severe  central stenosis, with interval progression compared to the prior exam. There is  severe bilateral neural foraminal narrowing, also increased in. L3-L4: There is a disc bulge with bilateral facet arthropathy. There is stable  mild right neural foraminal narrowing. No central stenosis or left neural  foraminal narrowing. L4-L5: There is bilateral facet arthropathy. There is no central or neural  foraminal stenosis. L5-S1: No central or neural foraminal narrowing. No change when compared with  the prior study. Impression  Impression:  1. Interval progression of disease at L2-3. There is now significant  subarticular reactive endplate change. There is also a larger disc bulge than  seen previously resulting in severe central stenosis and severe bilateral neural  foraminal narrowing. 2. Interval development of moderate central stenosis at L1-2.  3. At the remaining levels, there is multilevel lumbar spondylosis, stable. There has been significant progression of his degenerative change at L2-3 which now creates severe central stenosis and severe bilateral foraminal narrowing. There is also moderate stenosis at L1 to other levels without significant stenosis. Assessment/Plan:         Diagnosis Orders   1.  Lumbar stenosis with neurogenic claudication  Ambulatory referral to Orthopedic Surgery      2. Foraminal stenosis of lumbar region  Ambulatory referral to Orthopedic Surgery      3. DDD (degenerative disc disease), lumbar  Ambulatory referral to Orthopedic Surgery          Progression of stenosis at L2-3 is quite severe. I think this is contributing to the increased weakness and pain in bilateral legs. Due to the significance of the stenosis, I do not feel he would benefit long-term for lumbar injections. He is still quite active and would be interested in surgical treatment. I will refer him to Dr. Kelly Gallagher. - Referral to spine surgeon for surgical consultation. 5 This is a chronic illness with severe exacerbation and progression    No orders of the defined types were placed in this encounter. Orders Placed This Encounter   Procedures    Ambulatory referral to Orthopedic Surgery              Return for referral to spine surgeon, follow up Dr. Kelly Gallagher. Marcia Avila PA-C  11/11/22      Elements of this note were created using speech recognition software. As such, errors of speech recognition may be present.

## 2022-11-11 NOTE — LETTER
Yuan Hayward                                                     MIKE AVILA  1945  MRN [id number]                                              ROOM NUMBER______      Radiographic Studies:    Cervical MRI      Thoracic MRI         Lumbar MRI          Pelvis MRI        CONTRAST    CT Myelogram: _______________   NCS/EMG ________________ ( UE  /  LE )     MRI of ___________________          Other: ____________________      Injections:    KNEE    HIP  Depomedrol _____ mg Euflexxa _____    _______________ TFESI/SNRB  _______________ SI Joint  _______________ TRENTON    _______________ Facet  _______________Piriformis/ Sciatica      Medications:    Oral Steroids _______________  NSAIDS _______________    Muscle Relaxers _______________  Neurontin/Lyrica _______________    Pain Medicine _______________  Other _______________                       Physical Therapy:    Lumbar     Thoracic      Cervical     Hip       Knee       Shoulder               Traction          Ultrasound          Dry Needling      Referral:    Pain referral:  [facility]   [facility]   Other: ______________________________    Follow-up/ Refer__________________________________________________    Authorization to hold blood thinners:___________________________________

## 2022-11-11 NOTE — PATIENT INSTRUCTIONS
Lumbar Stenosis    What is lumbar stenosis? Stenosis is defined as the narrowing of the spinal canal. The term lumbar simply refers to the lowest 5 vertebrae in the spine. Externally this corresponds to the small of the back just above your buttocks. Each lumbar vertebra has 3 tunnels which can be affected by stenosis. The large tunnel in the middle of the vertebra is where the spinal cord and all of the spinal nerves are contained. Also, a much smaller tunnel is on each side of the vertebra. This is where the individual nerves exit the spine. Narrowing of any of these tunnels can result in pressure on the spinal cord or spinal nerves. Spinal stenosis may involve multiple levels of the spine or may be localized to a single level. What causes spinal stenosis? Typically the tunnels in vertebrae are quite spacious and much larger than the spinal cord and nerves that pass through them. However, some patients are genetically programmed to have smaller size tunnels in the spine, predisposing them to develop symptoms from spinal stenosis. There are several other potential causes of spinal stenosis. A single event, such as a disc herniation or a fracture can cause symptoms of stenosis. But more often, it is caused by arthritic changes, such as bone spurs, thickened ligaments, joint laxity, and disc bulges. This results in pinched spinal nerves which gives symptoms of buttock and leg pain and weakness. What are the symptoms of spinal stenosis? Patients will typically have low back pain along with pain in the buttocks and legs. This usually affects patients more when they are standing or walking, and their pain is often relieved with sitting or lying. Many patients report that the decrease in their ability to walk is the most bothersome part of the condition.   And, some have found that leaning forward (such as using a cart while shopping) has enabled them to go further with less pain. Are there other conditions that can cause similar symptoms? The condition which most closely mimics spinal stenosis is poor blood circulation to the legs (vascular claudication). Other conditions such as diabetic neuropathy and hip or knee arthritis also have very similar symptoms to spinal stenosis. What is the prognosis? The natural history of degenerative spinal stenosis is usually a slow progression, gradually reducing the ability to stand or walk for extended periods. What treatments are available? The use of anti-inflammatory medications may give partial relief of symptoms. Physical therapy is often prescribed initially, which may improve lumbar range of motion and strength. Chiropractic care may help ease early symptoms in some patients. A series of steroid injections into the spine may calm the inflammation of the nerves and give temporary relief of the buttock and leg symptoms. Though, these treatments may help the patient cope with the symptoms for several years, they have little effect on the natural history of the disease which is slow progression. When should I consider consulting a spine surgeon? When you are no longer able to tolerate the symptoms or it is interfering with your everyday activities despite the use of conservative treatments, you may be a good candidate for a decompression type of spine surgery. The procedure typically performed is called a laminectomy. Some patients may require a fusion in addition to the laminectomy if there is too much joint laxity from the arthritic changes in the spine. A decompression operation for spinal stenosis is about 80% effective in reducing your buttock and leg symptoms, including your ability to stand and walk. Though there may be some reduction in low back pain, a laminectomy is much less predictable for the treatment of isolated back pain without symptoms in the buttocks or legs.       [facility]    MD Jomar Allison MD Amy Fritz Posey, EARNEST Collins, JOHNNIE    Main Office  [address] [facility],[address], [address] [address] [address]        For Appointments at either location, please call [phone]

## 2022-12-02 ENCOUNTER — OFFICE VISIT (OUTPATIENT)
Dept: ORTHOPEDIC SURGERY | Age: 77
End: 2022-12-02
Payer: MEDICARE

## 2022-12-02 DIAGNOSIS — M51.26 LUMBAR DISC HERNIATION: ICD-10-CM

## 2022-12-02 DIAGNOSIS — M48.062 LUMBAR STENOSIS WITH NEUROGENIC CLAUDICATION: Primary | ICD-10-CM

## 2022-12-02 DIAGNOSIS — M51.36 DDD (DEGENERATIVE DISC DISEASE), LUMBAR: ICD-10-CM

## 2022-12-02 PROCEDURE — G8484 FLU IMMUNIZE NO ADMIN: HCPCS | Performed by: ORTHOPAEDIC SURGERY

## 2022-12-02 PROCEDURE — G8427 DOCREV CUR MEDS BY ELIG CLIN: HCPCS | Performed by: ORTHOPAEDIC SURGERY

## 2022-12-02 PROCEDURE — 1036F TOBACCO NON-USER: CPT | Performed by: ORTHOPAEDIC SURGERY

## 2022-12-02 PROCEDURE — 1123F ACP DISCUSS/DSCN MKR DOCD: CPT | Performed by: ORTHOPAEDIC SURGERY

## 2022-12-02 PROCEDURE — G8420 CALC BMI NORM PARAMETERS: HCPCS | Performed by: ORTHOPAEDIC SURGERY

## 2022-12-02 PROCEDURE — 99214 OFFICE O/P EST MOD 30 MIN: CPT | Performed by: ORTHOPAEDIC SURGERY

## 2022-12-02 NOTE — PROGRESS NOTES
Name: Yehuda Jones  YOB: 1945  Gender: male  MRN: [id number]  Age: 69 y.o. Chief Complaint:  Radiating back and leg pain    History of Present Illness: This is a 67-year-old gentleman who has a history of polio resulting in weakness of both lower extremities but worse on the left side. He has required a full leg hinged knee and ankle brace for most of his life. Now, he has persistent and increasing levels of low back pain particularly on the right side. He has tried regimens of NSAIDs and a home exercise program including a lot of stationary bike. He has tried oral steroids and physical therapy. Ultimately, it is becoming more more difficult to ambulate. He is here for follow-up on his more recent MRI. At this point  he is ready to proceed with surgical intervention. Medications:       Current Outpatient Medications:     metroNIDAZOLE (METROCREAM) 0.75 % cream, APPLY TO NOSE ONE TO TWO TIMES PER DAY, Disp: , Rfl:     methylPREDNISolone (MEDROL DOSEPACK) 4 MG tablet, Take by mouth as directed., Disp: 1 kit, Rfl: 0    sildenafil (VIAGRA) 100 MG tablet, Take 1/2-1 whole tablet 30 to 60 minutes prior to intercourse. Maximum of 1 tablet/day., Disp: 20 tablet, Rfl: 5    rosuvastatin (CRESTOR) 10 MG tablet, Take 1 tablet by mouth every other day, Disp: 90 tablet, Rfl: 0    lisinopril (PRINIVIL;ZESTRIL) 40 MG tablet, Take 1 tablet by mouth daily, Disp: 90 tablet, Rfl: 2    meloxicam (MOBIC) 15 MG tablet, Take 1/2-1 whole tablet once daily as needed for pain. Avoid other anti-inflammatories while on this medication. Acetaminophen is okay. , Disp: 30 tablet, Rfl: 2    omeprazole (PRILOSEC) 40 MG delayed release capsule, Take 40 mg by mouth daily, Disp: , Rfl:     Multiple Vitamin (MULTIVITAMIN PO), Take 1 tablet by mouth daily, Disp: , Rfl:     ascorbic acid (VITAMIN C) 500 MG tablet, Take 500 mg by mouth daily, Disp: , Rfl:     Coenzyme Q10 10 MG CAPS, Take 30 mg by mouth daily (Patient not taking: No sig reported), Disp: , Rfl:     Allergies: Allergies   Allergen Reactions    Zolpidem Other (See Comments)     Hallucinations            Physical Exam:     This is a well developed well nourished male adult. Mood and affect are appropriate. Oriented to person, place, and time. Chest is clear to auscultation. Heart is regular rate and rhythm. The patient ambulates with a full leg brace on the left side and crutches. Strength testing shows dramatic weakness in the left lower extremity consistent with his history of polio. He also has weakness in the right lower extremity but not as severe. Radiographic Studies:   MRI of the lumbar spine images were reviewed and interpreted: He has significant interval loss of disc height at L2-L3 resulting in severe foraminal and central stenosis in comparison to his previous MRI. Diagnosis:      ICD-10-CM    1. Lumbar stenosis with neurogenic claudication  M48.062       2. DDD (degenerative disc disease), lumbar  M51.36       3. Lumbar disc herniation  M51.26           Assessment/Plan: This patient's clinical history and physical exam is consistent with neurogenic claudication and bilateral L2-L3 radiculopathy. The imaging studies are concordant with the patient's symptoms. Conservative efforts have been reasonably exhausted and the patient feels like he cannot go on with the symptoms as they are. We have discussed surgical options and now he is ready to proceed. -Lumbar TLIF: We discussed the details of surgery including a midline incision in over the low back followed by dissection to the area of stenosis. The nerves would be freed up by trimming any impinging structures including ligaments and bone. Then any segments that are deemed to be unstable will be fused together with cadaver bone and screws and rods will supplement the fusion.   A drain may be inserted and the wound would be closed with suture and covered with sterile dressings. The patient would expect to stay in the hospital 1-2 days or until he can get about safely with minimal assistance. A short stay in a rehabilitation facility could also be considered depending on how quickly he recovers. Follow-up would be scheduled for 2-3 weeks and he would have restrictions including no driving, and no lifting greater than 15 lbs until follow up with me. He was encouraged to walk as much as possible before and after the operation to facilitate an expeditious recovery. We also discussed the potential risks of the surgery including, but not limited to infection, spinal fluid leak and potential headaches requiring him to remain supine post-operatively; injury to the cauda equina or peripheral nerve root resulting in weakness, numbness, or very rarely bowel or bladder dysfunction; persistent back or leg symptoms, recurrence of stenosis or the development of instability or hardware failure possibly needing additional surgery;  blood loss needing transfusion; postoperative hematoma; and the risks of anesthesia. The patient voiced an understanding of these issues as outlined. The procedure that may prove to be beneficial here is a L2-L3 laminectomy and fusion with allograft, and instrumentation, transforaminal lumbar interbody fusion.            Electronically Signed By Pravin Samuel MD     10:24 AM

## 2022-12-05 ENCOUNTER — PREP FOR PROCEDURE (OUTPATIENT)
Dept: ORTHOPEDIC SURGERY | Age: 77
End: 2022-12-05

## 2022-12-05 DIAGNOSIS — M51.36 DDD (DEGENERATIVE DISC DISEASE), LUMBAR: ICD-10-CM

## 2022-12-05 DIAGNOSIS — M48.062 LUMBAR STENOSIS WITH NEUROGENIC CLAUDICATION: Primary | ICD-10-CM

## 2022-12-05 DIAGNOSIS — M54.16 LUMBAR RADICULOPATHY: ICD-10-CM

## 2022-12-05 PROBLEM — M51.369 DDD (DEGENERATIVE DISC DISEASE), LUMBAR: Status: ACTIVE | Noted: 2022-12-05

## 2022-12-17 DIAGNOSIS — M25.551 RIGHT HIP PAIN: ICD-10-CM

## 2022-12-17 DIAGNOSIS — G89.29 CHRONIC PAIN OF RIGHT LOWER EXTREMITY: ICD-10-CM

## 2022-12-17 DIAGNOSIS — M79.604 CHRONIC PAIN OF RIGHT LOWER EXTREMITY: ICD-10-CM

## 2022-12-19 DIAGNOSIS — M25.551 RIGHT HIP PAIN: ICD-10-CM

## 2022-12-19 DIAGNOSIS — G89.29 CHRONIC PAIN OF RIGHT LOWER EXTREMITY: ICD-10-CM

## 2022-12-19 DIAGNOSIS — M79.604 CHRONIC PAIN OF RIGHT LOWER EXTREMITY: ICD-10-CM

## 2022-12-19 RX ORDER — MELOXICAM 15 MG/1
TABLET ORAL
Qty: 30 TABLET | Refills: 2 | Status: SHIPPED | OUTPATIENT
Start: 2022-12-19

## 2022-12-19 RX ORDER — MELOXICAM 15 MG/1
TABLET ORAL
Qty: 30 TABLET | Refills: 2 | OUTPATIENT
Start: 2022-12-19

## 2023-01-04 ENCOUNTER — TELEPHONE (OUTPATIENT)
Dept: ORTHOPEDIC SURGERY | Age: 78
End: 2023-01-04

## 2023-01-30 ENCOUNTER — TELEPHONE (OUTPATIENT)
Dept: INTERNAL MEDICINE CLINIC | Facility: CLINIC | Age: 78
End: 2023-01-30

## 2023-01-30 DIAGNOSIS — L85.3 DRY SKIN: Primary | ICD-10-CM

## 2023-01-30 DIAGNOSIS — Z12.83 SKIN CANCER SCREENING: ICD-10-CM

## 2023-01-30 NOTE — TELEPHONE ENCOUNTER
Per wife sts pt Derm had given him Hydro Cortisone 2.5% in a bottle for a dry patch off and on over his eyebrow and his Neche Downer has retired. Pt want's to know if you can give him cream for this until he can get in with another Derm.

## 2023-01-31 NOTE — TELEPHONE ENCOUNTER
Prescription for hydrocortisone cream and referral to dermatology placed    Orders Placed This Encounter    WILLARD Matthews MD, PA     Referral Priority:   Routine     Referral Type:   Eval and Treat     Referral Reason:   Specialty Services Required     Referred to Provider:   Diana Wu MD     Requested Specialty:   Dermatology     Number of Visits Requested:   1    hydrocortisone 2.5 % cream     Sig: Apply topically 2 times daily as needed for rash     Dispense:  30 g     Refill:  1

## 2023-02-10 ENCOUNTER — TELEPHONE (OUTPATIENT)
Dept: INTERNAL MEDICINE CLINIC | Facility: CLINIC | Age: 78
End: 2023-02-10

## 2023-04-24 DIAGNOSIS — N52.9 ERECTILE DYSFUNCTION, UNSPECIFIED ERECTILE DYSFUNCTION TYPE: ICD-10-CM

## 2023-04-24 DIAGNOSIS — E78.5 DYSLIPIDEMIA: ICD-10-CM

## 2023-04-24 RX ORDER — ROSUVASTATIN CALCIUM 10 MG/1
10 TABLET, COATED ORAL EVERY OTHER DAY
Qty: 90 TABLET | Refills: 2 | Status: SHIPPED | OUTPATIENT
Start: 2023-04-24

## 2023-04-24 RX ORDER — SILDENAFIL 100 MG/1
TABLET, FILM COATED ORAL
Qty: 20 TABLET | Refills: 5 | Status: SHIPPED | OUTPATIENT
Start: 2023-04-24 | End: 2023-06-09

## 2023-05-10 ENCOUNTER — TELEMEDICINE (OUTPATIENT)
Dept: INTERNAL MEDICINE CLINIC | Facility: CLINIC | Age: 78
End: 2023-05-10
Payer: MEDICARE

## 2023-05-10 DIAGNOSIS — J01.00 ACUTE NON-RECURRENT MAXILLARY SINUSITIS: Primary | ICD-10-CM

## 2023-05-10 DIAGNOSIS — H92.01 ACUTE OTALGIA, RIGHT: ICD-10-CM

## 2023-05-10 DIAGNOSIS — R05.1 ACUTE COUGH: ICD-10-CM

## 2023-05-10 PROCEDURE — 99213 OFFICE O/P EST LOW 20 MIN: CPT | Performed by: NURSE PRACTITIONER

## 2023-05-10 PROCEDURE — 1123F ACP DISCUSS/DSCN MKR DOCD: CPT | Performed by: NURSE PRACTITIONER

## 2023-05-10 PROCEDURE — G8420 CALC BMI NORM PARAMETERS: HCPCS | Performed by: NURSE PRACTITIONER

## 2023-05-10 PROCEDURE — G8428 CUR MEDS NOT DOCUMENT: HCPCS | Performed by: NURSE PRACTITIONER

## 2023-05-10 PROCEDURE — 1036F TOBACCO NON-USER: CPT | Performed by: NURSE PRACTITIONER

## 2023-05-10 RX ORDER — DOXYCYCLINE HYCLATE 100 MG
100 TABLET ORAL 2 TIMES DAILY
Qty: 20 TABLET | Refills: 0 | Status: SHIPPED | OUTPATIENT
Start: 2023-05-10 | End: 2023-05-20

## 2023-05-10 ASSESSMENT — ENCOUNTER SYMPTOMS
COLOR CHANGE: 0
ABDOMINAL PAIN: 0
EYE REDNESS: 0
EYE PAIN: 0
APNEA: 0
SINUS PAIN: 0
NAUSEA: 0
CONSTIPATION: 0
BACK PAIN: 0
COUGH: 1
EYE DISCHARGE: 0
EYE ITCHING: 0
DIARRHEA: 0
ABDOMINAL DISTENTION: 0
SHORTNESS OF BREATH: 0

## 2023-05-10 NOTE — PROGRESS NOTES
[unfilled]  [address] [place] [id number]      PROGRESS NOTE    SUBJECTIVE:   Yasmin Monsalve is a 69 y.o. male seen for a follow up visit regarding the following chief complaint:     Chief Complaint   Patient presents with    Sinus Problem       HPI  Patient presents with concerns of sinus pain and pressure for several days. He has associated right ear pain, dry cough and sore throat. Afebrile. Appetite fair. Current Outpatient Medications   Medication Sig Dispense Refill    doxycycline hyclate (VIBRA-TABS) 100 MG tablet Take 1 tablet by mouth 2 times daily for 10 days 20 tablet 0    sildenafil (VIAGRA) 100 MG tablet Take 1/2-1 whole tablet 30 to 60 minutes prior to intercourse. Maximum of 1 tablet/day. 20 tablet 5    rosuvastatin (CRESTOR) 10 MG tablet Take 1 tablet by mouth every other day 90 tablet 2    hydrocortisone 2.5 % cream Apply topically 2 times daily as needed for rash 30 g 1    meloxicam (MOBIC) 15 MG tablet Take 1/2-1 whole tablet once daily as needed for pain. Avoid other anti-inflammatories while on this medication. Acetaminophen is okay. 30 tablet 2    metroNIDAZOLE (METROCREAM) 0.75 % cream APPLY TO NOSE ONE TO TWO TIMES PER DAY      methylPREDNISolone (MEDROL DOSEPACK) 4 MG tablet Take by mouth as directed. 1 kit 0    lisinopril (PRINIVIL;ZESTRIL) 40 MG tablet Take 1 tablet by mouth daily 90 tablet 2    omeprazole (PRILOSEC) 40 MG delayed release capsule Take 40 mg by mouth daily      Multiple Vitamin (MULTIVITAMIN PO) Take 1 tablet by mouth daily      ascorbic acid (VITAMIN C) 500 MG tablet Take 500 mg by mouth daily      Coenzyme Q10 10 MG CAPS Take 30 mg by mouth daily (Patient not taking: No sig reported)       No current facility-administered medications for this visit.      Allergies   Allergen Reactions    Zolpidem Other (See Comments)     Hallucinations          Past Medical History:   Diagnosis Date    Back problem     Cancer ([address]     cancerous

## 2023-06-09 ENCOUNTER — OFFICE VISIT (OUTPATIENT)
Dept: INTERNAL MEDICINE CLINIC | Facility: CLINIC | Age: 78
End: 2023-06-09
Payer: MEDICARE

## 2023-06-09 VITALS
HEIGHT: 71 IN | HEART RATE: 98 BPM | OXYGEN SATURATION: 99 % | BODY MASS INDEX: 24.22 KG/M2 | DIASTOLIC BLOOD PRESSURE: 92 MMHG | TEMPERATURE: 98.4 F | SYSTOLIC BLOOD PRESSURE: 146 MMHG | WEIGHT: 173 LBS

## 2023-06-09 DIAGNOSIS — M48.061 SPINAL STENOSIS OF LUMBAR REGION, UNSPECIFIED WHETHER NEUROGENIC CLAUDICATION PRESENT: ICD-10-CM

## 2023-06-09 DIAGNOSIS — Z12.5 PROSTATE CANCER SCREENING: ICD-10-CM

## 2023-06-09 DIAGNOSIS — M51.36 DEGENERATIVE DISC DISEASE, LUMBAR: ICD-10-CM

## 2023-06-09 DIAGNOSIS — H69.81 DYSFUNCTION OF RIGHT EUSTACHIAN TUBE: ICD-10-CM

## 2023-06-09 DIAGNOSIS — E78.5 DYSLIPIDEMIA: ICD-10-CM

## 2023-06-09 DIAGNOSIS — Z00.00 MEDICARE ANNUAL WELLNESS VISIT, SUBSEQUENT: ICD-10-CM

## 2023-06-09 DIAGNOSIS — I10 ESSENTIAL HYPERTENSION: Primary | ICD-10-CM

## 2023-06-09 DIAGNOSIS — N52.9 ERECTILE DYSFUNCTION, UNSPECIFIED ERECTILE DYSFUNCTION TYPE: ICD-10-CM

## 2023-06-09 PROCEDURE — 3080F DIAST BP >= 90 MM HG: CPT | Performed by: STUDENT IN AN ORGANIZED HEALTH CARE EDUCATION/TRAINING PROGRAM

## 2023-06-09 PROCEDURE — 99213 OFFICE O/P EST LOW 20 MIN: CPT | Performed by: STUDENT IN AN ORGANIZED HEALTH CARE EDUCATION/TRAINING PROGRAM

## 2023-06-09 PROCEDURE — 3077F SYST BP >= 140 MM HG: CPT | Performed by: STUDENT IN AN ORGANIZED HEALTH CARE EDUCATION/TRAINING PROGRAM

## 2023-06-09 PROCEDURE — G0439 PPPS, SUBSEQ VISIT: HCPCS | Performed by: STUDENT IN AN ORGANIZED HEALTH CARE EDUCATION/TRAINING PROGRAM

## 2023-06-09 PROCEDURE — G8427 DOCREV CUR MEDS BY ELIG CLIN: HCPCS | Performed by: STUDENT IN AN ORGANIZED HEALTH CARE EDUCATION/TRAINING PROGRAM

## 2023-06-09 PROCEDURE — 1123F ACP DISCUSS/DSCN MKR DOCD: CPT | Performed by: STUDENT IN AN ORGANIZED HEALTH CARE EDUCATION/TRAINING PROGRAM

## 2023-06-09 PROCEDURE — G8420 CALC BMI NORM PARAMETERS: HCPCS | Performed by: STUDENT IN AN ORGANIZED HEALTH CARE EDUCATION/TRAINING PROGRAM

## 2023-06-09 PROCEDURE — 1036F TOBACCO NON-USER: CPT | Performed by: STUDENT IN AN ORGANIZED HEALTH CARE EDUCATION/TRAINING PROGRAM

## 2023-06-09 RX ORDER — TADALAFIL 20 MG/1
TABLET ORAL
Qty: 10 TABLET | Refills: 5 | Status: SHIPPED | OUTPATIENT
Start: 2023-06-09

## 2023-06-09 RX ORDER — FLUTICASONE PROPIONATE 50 MCG
2 SPRAY, SUSPENSION (ML) NASAL DAILY
Qty: 48 G | Refills: 1 | Status: SHIPPED | OUTPATIENT
Start: 2023-06-09

## 2023-06-09 ASSESSMENT — PATIENT HEALTH QUESTIONNAIRE - PHQ9
SUM OF ALL RESPONSES TO PHQ9 QUESTIONS 1 & 2: 0
2. FEELING DOWN, DEPRESSED OR HOPELESS: 0
SUM OF ALL RESPONSES TO PHQ QUESTIONS 1-9: 0
1. LITTLE INTEREST OR PLEASURE IN DOING THINGS: 0
SUM OF ALL RESPONSES TO PHQ QUESTIONS 1-9: 0

## 2023-06-09 ASSESSMENT — LIFESTYLE VARIABLES
HOW MANY STANDARD DRINKS CONTAINING ALCOHOL DO YOU HAVE ON A TYPICAL DAY: 1 OR 2
HOW OFTEN DO YOU HAVE A DRINK CONTAINING ALCOHOL: 2-3 TIMES A WEEK

## 2023-06-09 ASSESSMENT — ENCOUNTER SYMPTOMS
ANAL BLEEDING: 0
DIARRHEA: 0
NAUSEA: 0
ABDOMINAL PAIN: 0
CONSTIPATION: 0
BACK PAIN: 1
TROUBLE SWALLOWING: 0
SINUS PAIN: 0
SHORTNESS OF BREATH: 0
VOMITING: 0
SINUS PRESSURE: 0
BLOOD IN STOOL: 0

## 2023-06-09 NOTE — PATIENT INSTRUCTIONS
you.  It may help to think of an advance directive as a gift to the people who care for you. If you have one, they won't have to make tough decisions by themselves. For more information, including forms for your state, see the [address] website (www.caringinfo.org/planning/advance-directives/). Follow-up care is a key part of your treatment and safety. Be sure to make and go to all appointments, and call your doctor if you are having problems. It's also a good idea to know your test results and keep a list of the medicines you take. What should you include in an advance directive? Many states have a unique advance directive form. (It may ask you to address specific issues.) Or you might use a universal form that's approved by many states. If your form doesn't tell you what to address, it may be hard to know what to include in your advance directive. Use the questions below to help you get started. Who do you want to make decisions about your medical care if you are not able to? What life-support measures do you want if you have a serious illness that gets worse over time or can't be cured? What are you most afraid of that might happen? (Maybe you're afraid of having pain, losing your independence, or being kept alive by machines.)  Where would you prefer to die? (Your home? A hospital? A nursing home?)  Do you want to donate your organs when you die? Do you want certain Gnosticism practices performed before you die? When should you call for help? Be sure to contact your doctor if you have any questions. Where can you learn more? Go to http://www.sarah.com/ and enter R264 to learn more about \"Advance Directives: Care Instructions. \"  Current as of: June 16, 2022               Content Version: 13.6  © 9839-3515 [employer]wise, Incorporated. Care instructions adapted under license by [facility] ([facility]).  If you have questions about a medical condition or this instruction, always ask your healthcare

## 2023-06-10 DIAGNOSIS — I10 ESSENTIAL HYPERTENSION: ICD-10-CM

## 2023-06-12 RX ORDER — LISINOPRIL 40 MG/1
TABLET ORAL
Qty: 90 TABLET | Refills: 2 | OUTPATIENT
Start: 2023-06-12

## 2023-06-21 ENCOUNTER — TELEMEDICINE (OUTPATIENT)
Dept: INTERNAL MEDICINE CLINIC | Facility: CLINIC | Age: 78
End: 2023-06-21
Payer: MEDICARE

## 2023-06-21 DIAGNOSIS — J06.9 UPPER RESPIRATORY TRACT INFECTION, UNSPECIFIED TYPE: Primary | ICD-10-CM

## 2023-06-21 PROCEDURE — 99213 OFFICE O/P EST LOW 20 MIN: CPT | Performed by: STUDENT IN AN ORGANIZED HEALTH CARE EDUCATION/TRAINING PROGRAM

## 2023-06-21 PROCEDURE — G8427 DOCREV CUR MEDS BY ELIG CLIN: HCPCS | Performed by: STUDENT IN AN ORGANIZED HEALTH CARE EDUCATION/TRAINING PROGRAM

## 2023-06-21 PROCEDURE — 1123F ACP DISCUSS/DSCN MKR DOCD: CPT | Performed by: STUDENT IN AN ORGANIZED HEALTH CARE EDUCATION/TRAINING PROGRAM

## 2023-06-21 RX ORDER — AMOXICILLIN AND CLAVULANATE POTASSIUM 875; 125 MG/1; MG/1
1 TABLET, FILM COATED ORAL 2 TIMES DAILY
Qty: 20 TABLET | Refills: 0 | Status: SHIPPED | OUTPATIENT
Start: 2023-06-21 | End: 2023-07-01

## 2023-06-21 SDOH — ECONOMIC STABILITY: FOOD INSECURITY: WITHIN THE PAST 12 MONTHS, THE FOOD YOU BOUGHT JUST DIDN'T LAST AND YOU DIDN'T HAVE MONEY TO GET MORE.: NEVER TRUE

## 2023-06-21 SDOH — ECONOMIC STABILITY: INCOME INSECURITY: HOW HARD IS IT FOR YOU TO PAY FOR THE VERY BASICS LIKE FOOD, HOUSING, MEDICAL CARE, AND HEATING?: NOT HARD AT ALL

## 2023-06-21 SDOH — ECONOMIC STABILITY: FOOD INSECURITY: WITHIN THE PAST 12 MONTHS, YOU WORRIED THAT YOUR FOOD WOULD RUN OUT BEFORE YOU GOT MONEY TO BUY MORE.: NEVER TRUE

## 2023-06-21 SDOH — ECONOMIC STABILITY: HOUSING INSECURITY
IN THE LAST 12 MONTHS, WAS THERE A TIME WHEN YOU DID NOT HAVE A STEADY PLACE TO SLEEP OR SLEPT IN A SHELTER (INCLUDING NOW)?: NO

## 2023-06-21 ASSESSMENT — PATIENT HEALTH QUESTIONNAIRE - PHQ9
SUM OF ALL RESPONSES TO PHQ QUESTIONS 1-9: 0
1. LITTLE INTEREST OR PLEASURE IN DOING THINGS: 0
SUM OF ALL RESPONSES TO PHQ9 QUESTIONS 1 & 2: 0
SUM OF ALL RESPONSES TO PHQ QUESTIONS 1-9: 0
2. FEELING DOWN, DEPRESSED OR HOPELESS: 0

## 2023-06-21 ASSESSMENT — ENCOUNTER SYMPTOMS
SORE THROAT: 1
COUGH: 1
WHEEZING: 0
SINUS PRESSURE: 1
SINUS PAIN: 1
SHORTNESS OF BREATH: 0
RHINORRHEA: 1

## 2023-06-21 NOTE — PROGRESS NOTES
Positive for rhinorrhea, sinus pressure, sinus pain and sore throat. Negative for congestion. Positive for right ear fullness  Denies purulent nasal discharge   Respiratory:  Positive for cough (Nonproductive). Negative for shortness of breath and wheezing. Cardiovascular:  Negative for chest pain. Musculoskeletal:  Negative for myalgias.         Objective   Patient-Reported Vitals  No data recorded     Physical Exam  [INSTRUCTIONS:  \"[x]\" Indicates a positive item  \"[]\" Indicates a negative item  -- DELETE ALL ITEMS NOT EXAMINED]    Constitutional: [x] Appears well-developed and well-nourished [x] No apparent distress      [] Abnormal -     Mental status: [x] Alert and awake  [x] Oriented to person/place/time [x] Able to follow commands    [] Abnormal -     Eyes:   EOM    [x]  Normal    [] Abnormal -   Sclera  [x]  Normal    [] Abnormal -          Discharge []  None visible   [] Abnormal -     HENT: [x] Normocephalic, atraumatic  [] Abnormal -   [] Mouth/Throat: Mucous membranes are moist    External Ears [] Normal  [] Abnormal -    Neck: [] No visualized mass [] Abnormal -     Pulmonary/Chest: [x] Respiratory effort normal   [x] No visualized signs of difficulty breathing or respiratory distress        [] Abnormal -      Musculoskeletal:   [] Normal gait with no signs of ataxia         [] Normal range of motion of neck        [] Abnormal -     Neurological:        [x] No Facial Asymmetry (Cranial nerve 7 motor function) (limited exam due to video visit)          [x] No gaze palsy        [x] Abnormal -chronic slurring of speech secondary to prior oral cancer treatments        Skin:        [x] No significant exanthematous lesions or discoloration noted on facial skin         [] Abnormal -            Psychiatric:       [x] Normal Affect [] Abnormal -        [x] No Hallucinations    Other pertinent observable physical exam findings:-             Marsha Robins, was evaluated through a

## 2023-06-22 ENCOUNTER — TELEPHONE (OUTPATIENT)
Dept: INTERNAL MEDICINE CLINIC | Facility: CLINIC | Age: 78
End: 2023-06-22

## 2023-06-22 NOTE — TELEPHONE ENCOUNTER
----- Message from Rohith Perera sent at 6/22/2023  9:52 AM EDT -----  Subject: Message to Provider    QUESTIONS  Information for Provider? Pt needds to cancel his appt for lab work June 23,2023 . And wants to reshedule appt. Please contact PT.  ---------------------------------------------------------------------------  --------------  Enoc MENDOZA  [phone]; OK to leave message on voicemail  ---------------------------------------------------------------------------  --------------  SCRIPT ANSWERS  Relationship to Patient?  Self

## 2023-06-30 ENCOUNTER — HOSPITAL ENCOUNTER (OUTPATIENT)
Dept: LAB | Age: 78
Discharge: HOME | End: 2023-07-03

## 2023-06-30 DIAGNOSIS — Z12.5 PROSTATE CANCER SCREENING: ICD-10-CM

## 2023-06-30 DIAGNOSIS — E78.5 DYSLIPIDEMIA: ICD-10-CM

## 2023-06-30 DIAGNOSIS — I10 ESSENTIAL HYPERTENSION: ICD-10-CM

## 2023-06-30 LAB
ALBUMIN SERPL-MCNC: 3.4 G/DL (ref 3.2–4.6)
ALBUMIN/GLOB SERPL: 0.9 (ref 0.4–1.6)
ALP SERPL-CCNC: 78 U/L (ref 50–136)
ALT SERPL-CCNC: 22 U/L (ref 12–65)
ANION GAP SERPL CALC-SCNC: 7 MMOL/L (ref 2–11)
AST SERPL-CCNC: 17 U/L (ref 15–37)
BASOPHILS # BLD: 0 K/UL (ref 0–0.2)
BASOPHILS NFR BLD: 1 % (ref 0–2)
BILIRUB SERPL-MCNC: 0.6 MG/DL (ref 0.2–1.1)
BUN SERPL-MCNC: 13 MG/DL (ref 8–23)
CALCIUM SERPL-MCNC: 9.2 MG/DL (ref 8.3–10.4)
CHLORIDE SERPL-SCNC: 103 MMOL/L (ref 101–110)
CHOLEST SERPL-MCNC: 151 MG/DL
CO2 SERPL-SCNC: 23 MMOL/L (ref 21–32)
CREAT SERPL-MCNC: 0.6 MG/DL (ref 0.8–1.5)
DIFFERENTIAL METHOD BLD: NORMAL
EOSINOPHIL # BLD: 0.1 K/UL (ref 0–0.8)
EOSINOPHIL NFR BLD: 1 % (ref 0.5–7.8)
ERYTHROCYTE [DISTWIDTH] IN BLOOD BY AUTOMATED COUNT: 12.4 % (ref 11.9–14.6)
GLOBULIN SER CALC-MCNC: 3.7 G/DL (ref 2.8–4.5)
GLUCOSE SERPL-MCNC: 114 MG/DL (ref 65–100)
HCT VFR BLD AUTO: 41.8 % (ref 41.1–50.3)
HDLC SERPL-MCNC: 49 MG/DL (ref 40–60)
HDLC SERPL: 3.1
HGB BLD-MCNC: 13.8 G/DL (ref 13.6–17.2)
IMM GRANULOCYTES # BLD AUTO: 0 K/UL (ref 0–0.5)
IMM GRANULOCYTES NFR BLD AUTO: 1 % (ref 0–5)
LDLC SERPL CALC-MCNC: 87.6 MG/DL
LYMPHOCYTES # BLD: 1.4 K/UL (ref 0.5–4.6)
LYMPHOCYTES NFR BLD: 17 % (ref 13–44)
MCH RBC QN AUTO: 30.1 PG (ref 26.1–32.9)
MCHC RBC AUTO-ENTMCNC: 33 G/DL (ref 31.4–35)
MCV RBC AUTO: 91.1 FL (ref 82–102)
MONOCYTES # BLD: 0.8 K/UL (ref 0.1–1.3)
MONOCYTES NFR BLD: 10 % (ref 4–12)
NEUTS SEG # BLD: 5.9 K/UL (ref 1.7–8.2)
NEUTS SEG NFR BLD: 70 % (ref 43–78)
NRBC # BLD: 0 K/UL (ref 0–0.2)
PLATELET # BLD AUTO: 349 K/UL (ref 150–450)
PMV BLD AUTO: 10 FL (ref 9.4–12.3)
POTASSIUM SERPL-SCNC: 4.5 MMOL/L (ref 3.5–5.1)
PROT SERPL-MCNC: 7.1 G/DL (ref 6.3–8.2)
PSA SERPL-MCNC: 1.4 NG/ML
RBC # BLD AUTO: 4.59 M/UL (ref 4.23–5.6)
SODIUM SERPL-SCNC: 133 MMOL/L (ref 133–143)
T4 FREE SERPL-MCNC: 1 NG/DL (ref 0.78–1.46)
TRIGL SERPL-MCNC: 72 MG/DL (ref 35–150)
TSH, 3RD GENERATION: 1.21 UIU/ML (ref 0.36–3.74)
VLDLC SERPL CALC-MCNC: 14.4 MG/DL (ref 6–23)
WBC # BLD AUTO: 8.3 K/UL (ref 4.3–11.1)

## 2023-07-05 DIAGNOSIS — I10 ESSENTIAL HYPERTENSION: ICD-10-CM

## 2023-07-05 RX ORDER — LISINOPRIL 40 MG/1
40 TABLET ORAL DAILY
Qty: 90 TABLET | Refills: 2 | Status: SHIPPED | OUTPATIENT
Start: 2023-07-05

## 2023-07-07 ENCOUNTER — OFFICE VISIT (OUTPATIENT)
Dept: INTERNAL MEDICINE CLINIC | Facility: CLINIC | Age: 78
End: 2023-07-07
Payer: MEDICARE

## 2023-07-07 VITALS
DIASTOLIC BLOOD PRESSURE: 98 MMHG | HEART RATE: 94 BPM | BODY MASS INDEX: 23.52 KG/M2 | SYSTOLIC BLOOD PRESSURE: 148 MMHG | HEIGHT: 71 IN | WEIGHT: 168 LBS | OXYGEN SATURATION: 97 % | TEMPERATURE: 97.8 F

## 2023-07-07 DIAGNOSIS — H60.591 ACUTE IRRITANT OTITIS EXTERNA, RIGHT: ICD-10-CM

## 2023-07-07 DIAGNOSIS — K11.7 EXCESSIVE SALIVATION: ICD-10-CM

## 2023-07-07 DIAGNOSIS — Z85.810 HISTORY OF TONGUE CANCER: ICD-10-CM

## 2023-07-07 DIAGNOSIS — H69.83 EUSTACHIAN TUBE DYSFUNCTION, BILATERAL: Primary | ICD-10-CM

## 2023-07-07 PROCEDURE — 1123F ACP DISCUSS/DSCN MKR DOCD: CPT | Performed by: NURSE PRACTITIONER

## 2023-07-07 PROCEDURE — 1036F TOBACCO NON-USER: CPT | Performed by: NURSE PRACTITIONER

## 2023-07-07 PROCEDURE — G8420 CALC BMI NORM PARAMETERS: HCPCS | Performed by: NURSE PRACTITIONER

## 2023-07-07 PROCEDURE — G8427 DOCREV CUR MEDS BY ELIG CLIN: HCPCS | Performed by: NURSE PRACTITIONER

## 2023-07-07 PROCEDURE — 99214 OFFICE O/P EST MOD 30 MIN: CPT | Performed by: NURSE PRACTITIONER

## 2023-07-07 PROCEDURE — 4130F TOPICAL PREP RX AOE: CPT | Performed by: NURSE PRACTITIONER

## 2023-07-07 RX ORDER — FLUOCINOLONE ACETONIDE 0.11 MG/ML
4 OIL AURICULAR (OTIC) 2 TIMES DAILY
Qty: 20 ML | Refills: 0 | Status: SHIPPED | OUTPATIENT
Start: 2023-07-07

## 2023-07-07 RX ORDER — CHLORPHENIRAMINE MALEATE 4 MG/1
4 TABLET ORAL EVERY 8 HOURS PRN
Qty: 40 TABLET | Refills: 0 | Status: SHIPPED | OUTPATIENT
Start: 2023-07-07 | End: 2023-08-06

## 2023-07-07 ASSESSMENT — ENCOUNTER SYMPTOMS
APNEA: 0
BACK PAIN: 0
COLOR CHANGE: 0
ABDOMINAL PAIN: 0
ABDOMINAL DISTENTION: 0
SHORTNESS OF BREATH: 0
COUGH: 0
EYE DISCHARGE: 0
CONSTIPATION: 0
EYE REDNESS: 0
NAUSEA: 0
SINUS PAIN: 0
DIARRHEA: 0
SORE THROAT: 1
EYE ITCHING: 0
EYE PAIN: 0

## 2023-07-07 NOTE — PROGRESS NOTES
Rhythm: Normal rate. Pulmonary:      Effort: Pulmonary effort is normal. No respiratory distress. Skin:     General: Skin is warm and dry. Neurological:      Mental Status: He is alert. Mental status is at baseline. Psychiatric:         Mood and Affect: Mood normal.         Behavior: Behavior normal.         Thought Content: Thought content normal.         ASSESSMENT and PLAN  Susanna Christiansen was seen today for pharyngitis. Diagnoses and all orders for this visit:    Eustachian tube dysfunction, bilateral  -     chlorpheniramine (CHLOR-TRIMETON) 4 MG tablet; Take 1 tablet by mouth every 8 hours as needed for Allergies  -     External Referral To ENT  -     fluocinolone (DERMOTIC) 0.01 % OIL oil; Place 4 drops into the right ear 2 times daily    Excessive salivation  -     chlorpheniramine (CHLOR-TRIMETON) 4 MG tablet; Take 1 tablet by mouth every 8 hours as needed for Allergies  -     External Referral To ENT    Acute irritant otitis externa, right  -     fluocinolone (DERMOTIC) 0.01 % OIL oil; Place 4 drops into the right ear 2 times daily    History of tongue cancer      We discussed trial of chlorpheniramine since he has tried zyrtec and claritin without changes. He was instructed on SE including risk for fall and advised to take in the evening with precaution to assess how he tolerates. Start on dermotic drops to right ear. Follow with ENT for consult of ongoing eustachian tube dysfunction and excess saliva production given history of tongue cancer. Follow-up and Dispositions    Return for follow up after ENT with PCP.          Bernabe Gatica, PACO - CNP

## 2023-08-01 DIAGNOSIS — K21.9 GASTROESOPHAGEAL REFLUX DISEASE, UNSPECIFIED WHETHER ESOPHAGITIS PRESENT: Primary | ICD-10-CM

## 2023-08-01 RX ORDER — OMEPRAZOLE 40 MG/1
40 CAPSULE, DELAYED RELEASE ORAL DAILY
Qty: 90 CAPSULE | Refills: 2 | Status: SHIPPED | OUTPATIENT
Start: 2023-08-01

## 2023-08-02 DIAGNOSIS — K21.9 GASTROESOPHAGEAL REFLUX DISEASE, UNSPECIFIED WHETHER ESOPHAGITIS PRESENT: ICD-10-CM

## 2023-08-02 RX ORDER — OMEPRAZOLE 40 MG/1
40 CAPSULE, DELAYED RELEASE ORAL DAILY
Qty: 90 CAPSULE | Refills: 2 | OUTPATIENT
Start: 2023-08-02

## 2023-08-02 NOTE — TELEPHONE ENCOUNTER
At her appointment today patient's wife stated patient needed refill of omeprazole. New prescription sent to pharmacy.     Orders Placed This Encounter    omeprazole (PRILOSEC) 40 MG delayed release capsule     Sig: Take 1 capsule by mouth daily     Dispense:  90 capsule     Refill:  2

## 2023-08-09 ENCOUNTER — HOSPITAL ENCOUNTER (OUTPATIENT)
Dept: PET IMAGING | Age: 78
Discharge: HOME | End: 2023-08-12
Payer: MEDICARE

## 2023-08-09 DIAGNOSIS — C76.0 HEAD AND NECK CANCER (HCC): ICD-10-CM

## 2023-08-09 DIAGNOSIS — D49.0 NEOPLASM OF BASE OF TONGUE: ICD-10-CM

## 2023-08-09 LAB
GLUCOSE BLD STRIP.AUTO-MCNC: 124 MG/DL (ref 65–100)
SERVICE CMNT-IMP: ABNORMAL

## 2023-08-09 PROCEDURE — 2580000003 HC RX 258: Performed by: OTOLARYNGOLOGY

## 2023-08-09 PROCEDURE — 3430000000 HC RX DIAGNOSTIC RADIOPHARMACEUTICAL: Performed by: OTOLARYNGOLOGY

## 2023-08-09 PROCEDURE — 78815 PET IMAGE W/CT SKULL-THIGH: CPT

## 2023-08-09 PROCEDURE — A9552 F18 FDG: HCPCS | Performed by: OTOLARYNGOLOGY

## 2023-08-09 PROCEDURE — 82962 GLUCOSE BLOOD TEST: CPT

## 2023-08-09 PROCEDURE — 6360000004 HC RX CONTRAST MEDICATION: Performed by: OTOLARYNGOLOGY

## 2023-08-09 RX ORDER — SODIUM CHLORIDE 0.9 % (FLUSH) 0.9 %
20 SYRINGE (ML) INJECTION AS NEEDED
Status: DISCONTINUED | OUTPATIENT
Start: 2023-08-09 | End: 2023-08-13 | Stop reason: HOSPADM

## 2023-08-09 RX ORDER — FLUDEOXYGLUCOSE F 18 200 MCI/ML
13.1 INJECTION, SOLUTION INTRAVENOUS
Status: COMPLETED | OUTPATIENT
Start: 2023-08-09 | End: 2023-08-09

## 2023-08-09 RX ADMIN — DIATRIZOATE MEGLUMINE AND DIATRIZOATE SODIUM 10 ML: 660; 100 LIQUID ORAL; RECTAL at 10:50

## 2023-08-09 RX ADMIN — FLUDEOXYGLUCOSE F 18 13.1 MILLICURIE: 200 INJECTION, SOLUTION INTRAVENOUS at 10:50

## 2023-08-09 RX ADMIN — SODIUM CHLORIDE, PRESERVATIVE FREE 20 ML: 5 INJECTION INTRAVENOUS at 10:50

## 2023-08-10 ENCOUNTER — TELEPHONE (OUTPATIENT)
Dept: INTERNAL MEDICINE CLINIC | Facility: CLINIC | Age: 78
End: 2023-08-10

## 2023-08-10 NOTE — TELEPHONE ENCOUNTER
Patient's wife called requesting medication to assist with the patient's pain in their throat due to cancer. The patient's epiglottis is swollen and painful, will be scheduling a biopsy and an appt with Dr Artis after the biopsy results come in. Patient is requesting medication until he can be seen by Dr Artis.  Please send rx to [facility]

## 2023-08-11 ENCOUNTER — OFFICE VISIT (OUTPATIENT)
Dept: INTERNAL MEDICINE CLINIC | Facility: CLINIC | Age: 78
End: 2023-08-11

## 2023-08-11 VITALS
OXYGEN SATURATION: 99 % | BODY MASS INDEX: 23.43 KG/M2 | RESPIRATION RATE: 16 BRPM | SYSTOLIC BLOOD PRESSURE: 170 MMHG | DIASTOLIC BLOOD PRESSURE: 96 MMHG | HEIGHT: 71 IN | HEART RATE: 57 BPM

## 2023-08-11 DIAGNOSIS — Z85.810 HISTORY OF TONGUE CANCER: ICD-10-CM

## 2023-08-11 DIAGNOSIS — R07.0 THROAT PAIN: Primary | ICD-10-CM

## 2023-08-11 RX ORDER — NALOXONE HYDROCHLORIDE 4 MG/.1ML
SPRAY NASAL
Qty: 2 EACH | Refills: 5 | Status: SHIPPED | OUTPATIENT
Start: 2023-08-11

## 2023-08-11 RX ORDER — TRAMADOL HYDROCHLORIDE 50 MG/1
50 TABLET ORAL EVERY 6 HOURS PRN
Qty: 28 TABLET | Refills: 0 | Status: SHIPPED | OUTPATIENT
Start: 2023-08-11 | End: 2023-08-18

## 2023-08-11 ASSESSMENT — ENCOUNTER SYMPTOMS
COUGH: 1
SORE THROAT: 1
SHORTNESS OF BREATH: 0
TROUBLE SWALLOWING: 0

## 2023-08-11 NOTE — PROGRESS NOTES
Jasmyn Lazaro (:  1945) is a 69 y.o. male,Established patient, here for evaluation of the following chief complaint(s): Other (Throat pain - after he eats. Having a hard time swallowing due to swelling, then experiences pain )         ASSESSMENT/PLAN:  1. Throat pain  2. History of tongue cancer  Status post partial glossectomy with tonsillectomy, neck dissection and radial forearm free flap to left oral cavity on 8/3/2010  Completed postoperative radiation in late /early   [facility] imaging on 2023 with radiolucent area and #19 site, caries present throughout the oral cavity, bony destruction present and #19 site  Underwent recent PET/CT scan however not currently available  Per patient and wife evaluation by head and neck surgeon showed new throat cancer with reported swelling of epiglottis  Okay to use acetaminophen 1000 mg every 6 hours. Avoid NSAIDs given potential for blood thinning and upcoming surgery. Prescription for tramadol sent to pharmacy with counseling on potential sedating effects along with Narcan reversal agent with patient and wife instructed on how to use  PDMP reviewed    - traMADol (ULTRAM) 50 MG tablet; Take 1 tablet by mouth every 6 hours as needed for Pain for up to 7 days. Do not drink alcohol, drive or operate heavy machinery after use of this medication as it may cause drowsiness Max Daily Amount: 200 mg  Dispense: 28 tablet; Refill: 0  - naloxone (NARCAN) 4 MG/0.1ML LIQD nasal spray; Use 1 spray intranasally at onset of opioid overdose. May repeat dose using alternate nostril every 2 minutes as needed should symptoms persist or recur. Dispense: 2 each; Refill: 5          No follow-ups on file. Subjective   SUBJECTIVE/OBJECTIVE:  Patient is a 80-year-old  male who presents to the office today accompanied by his wife for increasing throat pain.   Patient was seen virtually in  of this year for suspected sinus infection with

## 2023-08-30 NOTE — PROGRESS NOTES
NEW PATIENT INTAKE    Referral Diagnosis: Squamous cell carcinoma of base of tongue    Referring Provider: Rosendo Mccollum, [address]    Primary Care Provider: Kelsie Casey DO    Presenting Symptoms: Sore throat with difficulty swallowing, weight loss, and dysphonia    Social/ Medical/ Surgical History: Updated in Epic    Family History of Cancer/ Hematology Disorders: Mother with breast cancer and father and brother with PE    Chronological History of Pertinent Events: Mr. 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 He underwent a partial glossectomy with tonsillectomy, neck dissection with a radial forearm free flap to left oral cavity on 8/3/2010. According to the referral the margins were close but negative with evidence of lymphovascular invasion as well as perineural invasion and extracapsular spread. He underwent adjuvant radiation treatment which was completed at the end of 2010/early 2011. Patient followed up with ENT, Dr. Sade Sandoval, at [facility], for oncologic surveillance. Patient developed osteoradionecrosis of the left jaw and was treated by Dr. Rubio Grace. He underwent HBO after oral surgery intervention. The last PET scan was December 2012 which demonstrated some uptake in the left jaw which was felt to be inflammatory. Patient was released from Dr. Enmanuel Blackmon clinic at the [facility] in 2015 with no signs of recurrence. Two years after his treatment he had several teeth removed on the left lower jaw. He underwent 10 treatments of HBO due to nonhealing wound at the surgical site where the teeth were removed. The mucosa healed after treatment. Patient did well and was asymptomatic until June of 2023 when he started experiencing increased congestion and hypersalivation. He saw his PCP and was started on a round of antibiotics for what was thought to be a sinus infection. No improvement with antibiotics.  Symptoms continued and he

## 2023-08-31 ENCOUNTER — OFFICE VISIT (OUTPATIENT)
Dept: ONCOLOGY | Age: 78
End: 2023-08-31
Payer: MEDICARE

## 2023-08-31 ENCOUNTER — HOSPITAL ENCOUNTER (OUTPATIENT)
Dept: LAB | Age: 78
Discharge: HOME | End: 2023-08-31
Payer: MEDICARE

## 2023-08-31 ENCOUNTER — OFFICE VISIT (OUTPATIENT)
Dept: ONCOLOGY | Age: 78
End: 2023-08-31

## 2023-08-31 VITALS
HEIGHT: 71 IN | OXYGEN SATURATION: 98 % | TEMPERATURE: 98 F | RESPIRATION RATE: 17 BRPM | WEIGHT: 154.8 LBS | HEART RATE: 100 BPM | SYSTOLIC BLOOD PRESSURE: 104 MMHG | BODY MASS INDEX: 21.67 KG/M2 | DIASTOLIC BLOOD PRESSURE: 53 MMHG

## 2023-08-31 DIAGNOSIS — Z00.8 NUTRITIONAL ASSESSMENT: Primary | ICD-10-CM

## 2023-08-31 DIAGNOSIS — C32.9 KERATINIZING SQUAMOUS CELL CARCINOMA OF LARYNX (HCC): Primary | ICD-10-CM

## 2023-08-31 DIAGNOSIS — R13.12 DYSPHAGIA, OROPHARYNGEAL PHASE: ICD-10-CM

## 2023-08-31 DIAGNOSIS — T17.908S ASPIRATION INTO AIRWAY, SEQUELA: ICD-10-CM

## 2023-08-31 DIAGNOSIS — C32.9 KERATINIZING SQUAMOUS CELL CARCINOMA OF LARYNX (HCC): ICD-10-CM

## 2023-08-31 LAB
ALBUMIN SERPL-MCNC: 3.2 G/DL (ref 3.2–4.6)
ALBUMIN/GLOB SERPL: 0.7 (ref 0.4–1.6)
ALP SERPL-CCNC: 67 U/L (ref 50–136)
ALT SERPL-CCNC: 26 U/L (ref 12–65)
ANION GAP SERPL CALC-SCNC: 5 MMOL/L (ref 2–11)
AST SERPL-CCNC: 17 U/L (ref 15–37)
BASOPHILS # BLD: 0 K/UL (ref 0–0.2)
BASOPHILS NFR BLD: 0 % (ref 0–2)
BILIRUB SERPL-MCNC: 0.4 MG/DL (ref 0.2–1.1)
BUN SERPL-MCNC: 19 MG/DL (ref 8–23)
CALCIUM SERPL-MCNC: 9.2 MG/DL (ref 8.3–10.4)
CHLORIDE SERPL-SCNC: 101 MMOL/L (ref 101–110)
CO2 SERPL-SCNC: 27 MMOL/L (ref 21–32)
CREAT SERPL-MCNC: 0.8 MG/DL (ref 0.8–1.5)
DIFFERENTIAL METHOD BLD: ABNORMAL
EOSINOPHIL # BLD: 0.1 K/UL (ref 0–0.8)
EOSINOPHIL NFR BLD: 1 % (ref 0.5–7.8)
ERYTHROCYTE [DISTWIDTH] IN BLOOD BY AUTOMATED COUNT: 13.2 % (ref 11.9–14.6)
GLOBULIN SER CALC-MCNC: 4.3 G/DL (ref 2.8–4.5)
GLUCOSE SERPL-MCNC: 114 MG/DL (ref 65–100)
HCT VFR BLD AUTO: 37.4 % (ref 41.1–50.3)
HGB BLD-MCNC: 12.4 G/DL (ref 13.6–17.2)
IMM GRANULOCYTES # BLD AUTO: 0.1 K/UL (ref 0–0.5)
IMM GRANULOCYTES NFR BLD AUTO: 1 % (ref 0–5)
LYMPHOCYTES # BLD: 0.9 K/UL (ref 0.5–4.6)
LYMPHOCYTES NFR BLD: 9 % (ref 13–44)
MCH RBC QN AUTO: 28.9 PG (ref 26.1–32.9)
MCHC RBC AUTO-ENTMCNC: 33.2 G/DL (ref 31.4–35)
MCV RBC AUTO: 87.2 FL (ref 82–102)
MONOCYTES # BLD: 1 K/UL (ref 0.1–1.3)
MONOCYTES NFR BLD: 9 % (ref 4–12)
NEUTS SEG # BLD: 8.6 K/UL (ref 1.7–8.2)
NEUTS SEG NFR BLD: 81 % (ref 43–78)
NRBC # BLD: 0 K/UL (ref 0–0.2)
PLATELET # BLD AUTO: 374 K/UL (ref 150–450)
PMV BLD AUTO: 9.2 FL (ref 9.4–12.3)
POTASSIUM SERPL-SCNC: 4.1 MMOL/L (ref 3.5–5.1)
PROT SERPL-MCNC: 7.5 G/DL (ref 6.3–8.2)
RBC # BLD AUTO: 4.29 M/UL (ref 4.23–5.6)
SODIUM SERPL-SCNC: 133 MMOL/L (ref 133–143)
WBC # BLD AUTO: 10.7 K/UL (ref 4.3–11.1)

## 2023-08-31 PROCEDURE — 1123F ACP DISCUSS/DSCN MKR DOCD: CPT | Performed by: INTERNAL MEDICINE

## 2023-08-31 PROCEDURE — G8427 DOCREV CUR MEDS BY ELIG CLIN: HCPCS | Performed by: INTERNAL MEDICINE

## 2023-08-31 PROCEDURE — 80053 COMPREHEN METABOLIC PANEL: CPT

## 2023-08-31 PROCEDURE — 36415 COLL VENOUS BLD VENIPUNCTURE: CPT

## 2023-08-31 PROCEDURE — 99205 OFFICE O/P NEW HI 60 MIN: CPT | Performed by: INTERNAL MEDICINE

## 2023-08-31 PROCEDURE — 85025 COMPLETE CBC W/AUTO DIFF WBC: CPT

## 2023-08-31 PROCEDURE — 1036F TOBACCO NON-USER: CPT | Performed by: INTERNAL MEDICINE

## 2023-08-31 PROCEDURE — G8420 CALC BMI NORM PARAMETERS: HCPCS | Performed by: INTERNAL MEDICINE

## 2023-08-31 RX ORDER — TRAMADOL HYDROCHLORIDE 50 MG/1
50 TABLET ORAL EVERY 6 HOURS PRN
Qty: 56 TABLET | Refills: 0 | Status: SHIPPED | OUTPATIENT
Start: 2023-08-31 | End: 2023-09-14

## 2023-08-31 RX ORDER — PROCHLORPERAZINE MALEATE 10 MG
10 TABLET ORAL EVERY 6 HOURS PRN
Qty: 120 TABLET | Refills: 3 | Status: SHIPPED | OUTPATIENT
Start: 2023-08-31

## 2023-08-31 RX ORDER — ONDANSETRON HYDROCHLORIDE 8 MG/1
8 TABLET, FILM COATED ORAL EVERY 8 HOURS PRN
Qty: 90 TABLET | Refills: 3 | Status: SHIPPED | OUTPATIENT
Start: 2023-08-31

## 2023-08-31 RX ORDER — TRAMADOL HYDROCHLORIDE 50 MG/1
TABLET ORAL
COMMUNITY
Start: 2023-08-23 | End: 2023-08-31 | Stop reason: SDUPTHER

## 2023-08-31 RX ORDER — SCOLOPAMINE TRANSDERMAL SYSTEM 1 MG/1
1 PATCH, EXTENDED RELEASE TRANSDERMAL
Qty: 4 PATCH | Refills: 3 | Status: SHIPPED | OUTPATIENT
Start: 2023-08-31

## 2023-08-31 RX ORDER — LIDOCAINE AND PRILOCAINE 25; 25 MG/G; MG/G
CREAM TOPICAL
Qty: 30 G | Refills: 3 | Status: SHIPPED | OUTPATIENT
Start: 2023-08-31

## 2023-08-31 ASSESSMENT — PATIENT HEALTH QUESTIONNAIRE - PHQ9
SUM OF ALL RESPONSES TO PHQ QUESTIONS 1-9: 0
1. LITTLE INTEREST OR PLEASURE IN DOING THINGS: 0
2. FEELING DOWN, DEPRESSED OR HOPELESS: 0
SUM OF ALL RESPONSES TO PHQ QUESTIONS 1-9: 0
SUM OF ALL RESPONSES TO PHQ9 QUESTIONS 1 & 2: 0

## 2023-08-31 NOTE — PROGRESS NOTES
Direct laryngoscopy with biopsy with pathology of larynx/epiglottis revealing invasive moderately differentiated squamous cell cancer. P16 stain for HPV was negative. Patient now referred to [facility] oncology service for consideration of systemic therapy. Patient seen today in company of wife. Sitting in chair with crutches on his side, reports movement limited related to polio as a child. 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 Given prolonged projected rehabilitation from extensive surgery, patient has made an educated decision to forego surgical options and will like to choose palliative systemic therapy moving forward. He is also open to radiation however unsure if prior XRT and radionecrosis rules him out. We will move forward as below:    PLAN:  - Routine labs. Tissue to be checked for PDL1 along with NGS. Will stratify with radiation oncology if region can be re-radiated. Otherwise systemic therapy alone with pembrolizumab with or without carboplatin/5-FU (pembrolizumab alone if tumor expresses PD-L1 with CPS more than equal to 1)  - Speech and swallow evaluation  - Palliative care evaluation. Continue using tramadol as needed  - Port placement  - Feeding tube placement    RTC in 2 weeks with above. Thank you Dr Artis for allowing us to paticipate in care of this pleasant patient. Please call w/ any questions    Total time 60 min 50% in direct consultation about the patient's diagnosis and management. All questions answered.   Whitney Nation MD  [id number]ematology Oncology  [address]  [address] [place] [address] Road  Office : [phone]  Fax : [phone]

## 2023-08-31 NOTE — PROGRESS NOTES
appointment (9/1). Referral to IR for PORT placement. 3. Referral to Palliative Care for pain/symptom management, referral to OP SLP and MBSS ordered. PDL-1 staining ordered - tissue in [place]; send for Caris testing once PDL-1 completed. Chemo Ed scheduled (9/12) - will have RN Navigator complete. 4. Zofran (8 mg) q 8 hrs, Compazine (10 mg) q 6 hrs prn, Emla Cream for port, Scopolamine patches (1 patch q 72 hrs), refill for Tramadol (50 mg) q 6 hrs, and MMW sent today. 5. RD to write TF orders and make referral to Prateek Chiu once PEG placed. Monitoring/Evaluation:  1. RD to follow up during next office visit - follow up wt status, tolerance/intake of po diet and TF, symptom management.       [address] [place] [address]

## 2023-08-31 NOTE — ONCOLOGY
START ON PATHWAY REGIMEN - Head and Neck    [id number]        Carboplatin       Fluorouracil       Pembrolizumab Graciela Matute)     **Always confirm dose/schedule in your pharmacy ordering system**    Patient Characteristics:  Oropharynx, HPV Negative/Unknown, Local Recurrence, Not a Candidate for Radiation Therapy, No Prior Platinum-Based Chemoradiation within 6 Months, PD-L1 Expression Negative (CPS < 1) / Unknown  Disease Classification: Oropharynx  HPV Status: Negative (-)  Therapeutic Status: Local Recurrence  Is patient a candidate for radiation therapy<= No  Prior Platinum Status: No Prior Platinum-Based Chemoradiation within 6 Months  PD-L1 Expression Status: Awaiting Test Results  Intent of Therapy:  Non-Curative / Palliative Intent, Discussed with Patient

## 2023-09-01 ENCOUNTER — TELEPHONE (OUTPATIENT)
Dept: ONCOLOGY | Age: 78
End: 2023-09-01

## 2023-09-01 NOTE — TELEPHONE ENCOUNTER
Physician provider: Bonnie Rico MD  Reason for today's call: Peg site concern  Last office visit: Arianna Leavitt w/[facility] called asking to rule out concern of \"metastasis of the peg site with a pull through peg.\" She can be reached on her cell at: [phone]. Pt in office now.

## 2023-09-01 NOTE — TELEPHONE ENCOUNTER
Marisol Harrison with [facility] called to make sure Dr. Junior Casper was okay with pull through PEG related to possible seeding of mets when done through EGD or if he would want it to be put in percutaneously with IR. Advised them to go ahead and set up PEG placement with them and will address with Dr. Junior Casper on Tuesday if he prefers different method. We will only contact [facility] if we need to cancel their PEG placement.

## 2023-09-05 ENCOUNTER — PREP FOR PROCEDURE (OUTPATIENT)
Dept: ENT CLINIC | Age: 78
End: 2023-09-05

## 2023-09-05 ENCOUNTER — CLINICAL DOCUMENTATION (OUTPATIENT)
Dept: ONCOLOGY | Age: 78
End: 2023-09-05

## 2023-09-05 ENCOUNTER — APPOINTMENT (OUTPATIENT)
Dept: GENERAL RADIOLOGY | Age: 78
DRG: 004 | End: 2023-09-05
Attending: PHYSICAL MEDICINE & REHABILITATION
Payer: MEDICARE

## 2023-09-05 ENCOUNTER — HOSPITAL ENCOUNTER (INPATIENT)
Age: 78
LOS: 15 days | Discharge: NURSING, REHAB OR LONG-TERM CARE | DRG: 004 | End: 2023-09-20
Attending: EMERGENCY MEDICINE | Admitting: INTERNAL MEDICINE
Payer: MEDICARE

## 2023-09-05 ENCOUNTER — OFFICE VISIT (OUTPATIENT)
Dept: ENT CLINIC | Age: 78
End: 2023-09-05
Payer: MEDICARE

## 2023-09-05 VITALS — BODY MASS INDEX: 21.56 KG/M2 | HEIGHT: 71 IN | HEART RATE: 121 BPM | OXYGEN SATURATION: 96 % | WEIGHT: 154 LBS

## 2023-09-05 DIAGNOSIS — C10.9 SQUAMOUS CELL CARCINOMA OF OROPHARYNX (HCC): Primary | ICD-10-CM

## 2023-09-05 DIAGNOSIS — T17.908S ASPIRATION INTO AIRWAY, SEQUELA: ICD-10-CM

## 2023-09-05 DIAGNOSIS — E86.0 DEHYDRATION: ICD-10-CM

## 2023-09-05 DIAGNOSIS — C15.9 MALIGNANT NEOPLASM OF ESOPHAGUS, UNSPECIFIED LOCATION (HCC): ICD-10-CM

## 2023-09-05 DIAGNOSIS — C10.9 SQUAMOUS CELL CARCINOMA OF OROPHARYNX (HCC): ICD-10-CM

## 2023-09-05 DIAGNOSIS — R13.12 DYSPHAGIA, OROPHARYNGEAL PHASE: ICD-10-CM

## 2023-09-05 DIAGNOSIS — R13.14 PHARYNGOESOPHAGEAL DYSPHAGIA: ICD-10-CM

## 2023-09-05 DIAGNOSIS — C32.9 KERATINIZING SQUAMOUS CELL CARCINOMA OF LARYNX (HCC): ICD-10-CM

## 2023-09-05 DIAGNOSIS — R79.89 ELEVATED TROPONIN: ICD-10-CM

## 2023-09-05 DIAGNOSIS — R55 SYNCOPE AND COLLAPSE: Primary | ICD-10-CM

## 2023-09-05 PROBLEM — E44.0 MODERATE PROTEIN-CALORIE MALNUTRITION (HCC): Status: ACTIVE | Noted: 2023-09-05

## 2023-09-05 PROBLEM — A41.9 SEPSIS (HCC): Status: ACTIVE | Noted: 2023-09-05

## 2023-09-05 LAB
ALBUMIN SERPL-MCNC: 3.1 G/DL (ref 3.2–4.6)
ALBUMIN/GLOB SERPL: 0.8 (ref 0.4–1.6)
ALP SERPL-CCNC: 64 U/L (ref 50–136)
ALT SERPL-CCNC: 27 U/L (ref 12–65)
ANION GAP SERPL CALC-SCNC: 7 MMOL/L (ref 2–11)
APPEARANCE UR: CLEAR
AST SERPL-CCNC: 27 U/L (ref 15–37)
BACTERIA URNS QL MICRO: 0 /HPF
BASOPHILS # BLD: 0 K/UL (ref 0–0.2)
BASOPHILS NFR BLD: 0 % (ref 0–2)
BILIRUB SERPL-MCNC: 0.6 MG/DL (ref 0.2–1.1)
BILIRUB UR QL: NEGATIVE
BUN SERPL-MCNC: 33 MG/DL (ref 8–23)
CALCIUM SERPL-MCNC: 8.9 MG/DL (ref 8.3–10.4)
CASTS URNS QL MICRO: ABNORMAL /LPF
CHLORIDE SERPL-SCNC: 108 MMOL/L (ref 101–110)
CO2 SERPL-SCNC: 23 MMOL/L (ref 21–32)
COLOR UR: ABNORMAL
CREAT SERPL-MCNC: 0.8 MG/DL (ref 0.8–1.5)
CRYSTALS URNS QL MICRO: 0 /LPF
DIFFERENTIAL METHOD BLD: ABNORMAL
EKG ATRIAL RATE: 111 BPM
EKG DIAGNOSIS: NORMAL
EKG P AXIS: 54 DEGREES
EKG P-R INTERVAL: 156 MS
EKG Q-T INTERVAL: 323 MS
EKG QRS DURATION: 93 MS
EKG QTC CALCULATION (BAZETT): 441 MS
EKG R AXIS: 26 DEGREES
EKG T AXIS: 42 DEGREES
EKG VENTRICULAR RATE: 112 BPM
EOSINOPHIL # BLD: 0 K/UL (ref 0–0.8)
EOSINOPHIL NFR BLD: 0 % (ref 0.5–7.8)
EPI CELLS #/AREA URNS HPF: 0 /HPF
ERYTHROCYTE [DISTWIDTH] IN BLOOD BY AUTOMATED COUNT: 13.2 % (ref 11.9–14.6)
GLOBULIN SER CALC-MCNC: 4 G/DL (ref 2.8–4.5)
GLUCOSE SERPL-MCNC: 104 MG/DL (ref 65–100)
GLUCOSE UR STRIP.AUTO-MCNC: NEGATIVE MG/DL
HCT VFR BLD AUTO: 33.3 % (ref 41.1–50.3)
HGB BLD-MCNC: 11.2 G/DL (ref 13.6–17.2)
HGB UR QL STRIP: NEGATIVE
IMM GRANULOCYTES # BLD AUTO: 0.1 K/UL (ref 0–0.5)
IMM GRANULOCYTES NFR BLD AUTO: 0 % (ref 0–5)
KETONES UR QL STRIP.AUTO: 15 MG/DL
LACTATE SERPL-SCNC: 1.1 MMOL/L (ref 0.4–2)
LEUKOCYTE ESTERASE UR QL STRIP.AUTO: NEGATIVE
LIPASE SERPL-CCNC: 98 U/L (ref 73–393)
LYMPHOCYTES # BLD: 0.5 K/UL (ref 0.5–4.6)
LYMPHOCYTES NFR BLD: 3 % (ref 13–44)
MCH RBC QN AUTO: 29.3 PG (ref 26.1–32.9)
MCHC RBC AUTO-ENTMCNC: 33.6 G/DL (ref 31.4–35)
MCV RBC AUTO: 87.2 FL (ref 82–102)
MONOCYTES # BLD: 1.1 K/UL (ref 0.1–1.3)
MONOCYTES NFR BLD: 7 % (ref 4–12)
MUCOUS THREADS URNS QL MICRO: ABNORMAL /LPF
NEUTS SEG # BLD: 13 K/UL (ref 1.7–8.2)
NEUTS SEG NFR BLD: 90 % (ref 43–78)
NITRITE UR QL STRIP.AUTO: NEGATIVE
NRBC # BLD: 0 K/UL (ref 0–0.2)
OTHER OBSERVATIONS: ABNORMAL
PH UR STRIP: 5.5 (ref 5–9)
PLATELET # BLD AUTO: 325 K/UL (ref 150–450)
PMV BLD AUTO: 9 FL (ref 9.4–12.3)
POTASSIUM SERPL-SCNC: 4 MMOL/L (ref 3.5–5.1)
PROCALCITONIN SERPL-MCNC: <0.05 NG/ML (ref 0–0.49)
PROT SERPL-MCNC: 7.1 G/DL (ref 6.3–8.2)
PROT UR STRIP-MCNC: ABNORMAL MG/DL
RBC # BLD AUTO: 3.82 M/UL (ref 4.23–5.6)
RBC #/AREA URNS HPF: ABNORMAL /HPF
SODIUM SERPL-SCNC: 138 MMOL/L (ref 133–143)
SP GR UR REFRACTOMETRY: 1.02 (ref 1–1.02)
TROPONIN I SERPL HS-MCNC: 139.4 PG/ML (ref 0–14)
TROPONIN I SERPL HS-MCNC: 163.2 PG/ML (ref 0–14)
TROPONIN I SERPL HS-MCNC: 79.8 PG/ML (ref 0–14)
URINE CULTURE IF INDICATED: ABNORMAL
UROBILINOGEN UR QL STRIP.AUTO: 0.2 EU/DL (ref 0.2–1)
WBC # BLD AUTO: 14.6 K/UL (ref 4.3–11.1)
WBC URNS QL MICRO: ABNORMAL /HPF

## 2023-09-05 PROCEDURE — 81001 URINALYSIS AUTO W/SCOPE: CPT

## 2023-09-05 PROCEDURE — 93010 ELECTROCARDIOGRAM REPORT: CPT | Performed by: INTERNAL MEDICINE

## 2023-09-05 PROCEDURE — 1100000000 HC RM PRIVATE

## 2023-09-05 PROCEDURE — 83690 ASSAY OF LIPASE: CPT

## 2023-09-05 PROCEDURE — 71045 X-RAY EXAM CHEST 1 VIEW: CPT

## 2023-09-05 PROCEDURE — 2580000003 HC RX 258: Performed by: INTERNAL MEDICINE

## 2023-09-05 PROCEDURE — 1036F TOBACCO NON-USER: CPT | Performed by: STUDENT IN AN ORGANIZED HEALTH CARE EDUCATION/TRAINING PROGRAM

## 2023-09-05 PROCEDURE — 99285 EMERGENCY DEPT VISIT HI MDM: CPT

## 2023-09-05 PROCEDURE — 31575 DIAGNOSTIC LARYNGOSCOPY: CPT | Performed by: STUDENT IN AN ORGANIZED HEALTH CARE EDUCATION/TRAINING PROGRAM

## 2023-09-05 PROCEDURE — 96360 HYDRATION IV INFUSION INIT: CPT

## 2023-09-05 PROCEDURE — 2580000003 HC RX 258: Performed by: EMERGENCY MEDICINE

## 2023-09-05 PROCEDURE — 84145 PROCALCITONIN (PCT): CPT

## 2023-09-05 PROCEDURE — 83605 ASSAY OF LACTIC ACID: CPT

## 2023-09-05 PROCEDURE — 99204 OFFICE O/P NEW MOD 45 MIN: CPT | Performed by: STUDENT IN AN ORGANIZED HEALTH CARE EDUCATION/TRAINING PROGRAM

## 2023-09-05 PROCEDURE — 84484 ASSAY OF TROPONIN QUANT: CPT

## 2023-09-05 PROCEDURE — 85025 COMPLETE CBC W/AUTO DIFF WBC: CPT

## 2023-09-05 PROCEDURE — 93005 ELECTROCARDIOGRAM TRACING: CPT | Performed by: EMERGENCY MEDICINE

## 2023-09-05 PROCEDURE — 96361 HYDRATE IV INFUSION ADD-ON: CPT

## 2023-09-05 PROCEDURE — 80053 COMPREHEN METABOLIC PANEL: CPT

## 2023-09-05 PROCEDURE — G8420 CALC BMI NORM PARAMETERS: HCPCS | Performed by: STUDENT IN AN ORGANIZED HEALTH CARE EDUCATION/TRAINING PROGRAM

## 2023-09-05 PROCEDURE — 1123F ACP DISCUSS/DSCN MKR DOCD: CPT | Performed by: STUDENT IN AN ORGANIZED HEALTH CARE EDUCATION/TRAINING PROGRAM

## 2023-09-05 PROCEDURE — G8427 DOCREV CUR MEDS BY ELIG CLIN: HCPCS | Performed by: STUDENT IN AN ORGANIZED HEALTH CARE EDUCATION/TRAINING PROGRAM

## 2023-09-05 RX ORDER — SODIUM CHLORIDE 9 MG/ML
5-250 INJECTION, SOLUTION INTRAVENOUS PRN
OUTPATIENT
Start: 2023-09-05

## 2023-09-05 RX ORDER — POTASSIUM CHLORIDE 7.45 MG/ML
10 INJECTION INTRAVENOUS PRN
Status: DISCONTINUED | OUTPATIENT
Start: 2023-09-05 | End: 2023-09-20 | Stop reason: HOSPADM

## 2023-09-05 RX ORDER — ONDANSETRON 2 MG/ML
4 INJECTION INTRAMUSCULAR; INTRAVENOUS EVERY 6 HOURS PRN
Status: DISCONTINUED | OUTPATIENT
Start: 2023-09-05 | End: 2023-09-20 | Stop reason: HOSPADM

## 2023-09-05 RX ORDER — 0.9 % SODIUM CHLORIDE 0.9 %
1000 INTRAVENOUS SOLUTION INTRAVENOUS ONCE
OUTPATIENT
Start: 2023-09-05 | End: 2023-09-05

## 2023-09-05 RX ORDER — POLYETHYLENE GLYCOL 3350 17 G/17G
17 POWDER, FOR SOLUTION ORAL DAILY PRN
Status: DISCONTINUED | OUTPATIENT
Start: 2023-09-05 | End: 2023-09-20 | Stop reason: HOSPADM

## 2023-09-05 RX ORDER — TRAMADOL HYDROCHLORIDE 50 MG/1
50 TABLET ORAL EVERY 6 HOURS PRN
Status: DISCONTINUED | OUTPATIENT
Start: 2023-09-05 | End: 2023-09-20 | Stop reason: HOSPADM

## 2023-09-05 RX ORDER — LISINOPRIL 20 MG/1
40 TABLET ORAL DAILY
Status: DISCONTINUED | OUTPATIENT
Start: 2023-09-06 | End: 2023-09-20 | Stop reason: HOSPADM

## 2023-09-05 RX ORDER — SODIUM CHLORIDE 0.9 % (FLUSH) 0.9 %
5-40 SYRINGE (ML) INJECTION PRN
Status: DISCONTINUED | OUTPATIENT
Start: 2023-09-05 | End: 2023-09-20 | Stop reason: HOSPADM

## 2023-09-05 RX ORDER — ONDANSETRON 4 MG/1
4 TABLET, ORALLY DISINTEGRATING ORAL EVERY 8 HOURS PRN
Status: DISCONTINUED | OUTPATIENT
Start: 2023-09-05 | End: 2023-09-20 | Stop reason: HOSPADM

## 2023-09-05 RX ORDER — SODIUM CHLORIDE 0.9 % (FLUSH) 0.9 %
5-40 SYRINGE (ML) INJECTION PRN
OUTPATIENT
Start: 2023-09-05

## 2023-09-05 RX ORDER — LIDOCAINE HYDROCHLORIDE 20 MG/ML
SOLUTION OROPHARYNGEAL
Status: ON HOLD | COMMUNITY
Start: 2023-08-31

## 2023-09-05 RX ORDER — SODIUM CHLORIDE 9 MG/ML
INJECTION, SOLUTION INTRAVENOUS PRN
Status: DISCONTINUED | OUTPATIENT
Start: 2023-09-05 | End: 2023-09-20 | Stop reason: HOSPADM

## 2023-09-05 RX ORDER — ACETAMINOPHEN 325 MG/1
650 TABLET ORAL EVERY 6 HOURS PRN
Status: DISCONTINUED | OUTPATIENT
Start: 2023-09-05 | End: 2023-09-20 | Stop reason: HOSPADM

## 2023-09-05 RX ORDER — MAGNESIUM HYDROXIDE/ALUMINUM HYDROXICE/SIMETHICONE 120; 1200; 1200 MG/30ML; MG/30ML; MG/30ML
30 SUSPENSION ORAL EVERY 6 HOURS PRN
Status: DISCONTINUED | OUTPATIENT
Start: 2023-09-05 | End: 2023-09-20 | Stop reason: HOSPADM

## 2023-09-05 RX ORDER — 0.9 % SODIUM CHLORIDE 0.9 %
1000 INTRAVENOUS SOLUTION INTRAVENOUS
Status: COMPLETED | OUTPATIENT
Start: 2023-09-05 | End: 2023-09-05

## 2023-09-05 RX ORDER — ROSUVASTATIN CALCIUM 10 MG/1
10 TABLET, COATED ORAL EVERY OTHER DAY
Status: DISCONTINUED | OUTPATIENT
Start: 2023-09-06 | End: 2023-09-09

## 2023-09-05 RX ORDER — SODIUM CHLORIDE 0.9 % (FLUSH) 0.9 %
5-40 SYRINGE (ML) INJECTION EVERY 12 HOURS SCHEDULED
Status: DISCONTINUED | OUTPATIENT
Start: 2023-09-06 | End: 2023-09-20 | Stop reason: HOSPADM

## 2023-09-05 RX ORDER — ENOXAPARIN SODIUM 100 MG/ML
40 INJECTION SUBCUTANEOUS DAILY
Status: DISCONTINUED | OUTPATIENT
Start: 2023-09-06 | End: 2023-09-20 | Stop reason: HOSPADM

## 2023-09-05 RX ORDER — BISACODYL 10 MG
10 SUPPOSITORY, RECTAL RECTAL DAILY PRN
Status: DISCONTINUED | OUTPATIENT
Start: 2023-09-05 | End: 2023-09-20 | Stop reason: HOSPADM

## 2023-09-05 RX ORDER — HEPARIN SODIUM (PORCINE) LOCK FLUSH IV SOLN 100 UNIT/ML 100 UNIT/ML
500 SOLUTION INTRAVENOUS PRN
OUTPATIENT
Start: 2023-09-05

## 2023-09-05 RX ORDER — LIDOCAINE HYDROCHLORIDE 20 MG/ML
5 SOLUTION OROPHARYNGEAL
Status: DISCONTINUED | OUTPATIENT
Start: 2023-09-05 | End: 2023-09-20 | Stop reason: HOSPADM

## 2023-09-05 RX ORDER — ONDANSETRON 2 MG/ML
4 INJECTION INTRAMUSCULAR; INTRAVENOUS
Status: ACTIVE | OUTPATIENT
Start: 2023-09-05 | End: 2023-09-06

## 2023-09-05 RX ORDER — POTASSIUM CHLORIDE 20 MEQ/1
40 TABLET, EXTENDED RELEASE ORAL PRN
Status: DISCONTINUED | OUTPATIENT
Start: 2023-09-05 | End: 2023-09-20 | Stop reason: HOSPADM

## 2023-09-05 RX ORDER — SODIUM CHLORIDE 9 MG/ML
INJECTION, SOLUTION INTRAVENOUS CONTINUOUS
Status: DISCONTINUED | OUTPATIENT
Start: 2023-09-05 | End: 2023-09-07

## 2023-09-05 RX ORDER — PANTOPRAZOLE SODIUM 40 MG/1
40 TABLET, DELAYED RELEASE ORAL
Status: DISCONTINUED | OUTPATIENT
Start: 2023-09-06 | End: 2023-09-11

## 2023-09-05 RX ORDER — FAMOTIDINE 20 MG/1
10 TABLET, FILM COATED ORAL DAILY PRN
Status: DISCONTINUED | OUTPATIENT
Start: 2023-09-05 | End: 2023-09-20 | Stop reason: HOSPADM

## 2023-09-05 RX ORDER — ACETAMINOPHEN 650 MG/1
650 SUPPOSITORY RECTAL EVERY 6 HOURS PRN
Status: DISCONTINUED | OUTPATIENT
Start: 2023-09-05 | End: 2023-09-20 | Stop reason: HOSPADM

## 2023-09-05 RX ORDER — MAGNESIUM SULFATE IN WATER 40 MG/ML
2000 INJECTION, SOLUTION INTRAVENOUS PRN
Status: DISCONTINUED | OUTPATIENT
Start: 2023-09-05 | End: 2023-09-20 | Stop reason: HOSPADM

## 2023-09-05 RX ADMIN — SODIUM CHLORIDE: 9 INJECTION, SOLUTION INTRAVENOUS at 21:55

## 2023-09-05 RX ADMIN — SODIUM CHLORIDE 1000 ML: 9 INJECTION, SOLUTION INTRAVENOUS at 17:04

## 2023-09-05 ASSESSMENT — PAIN - FUNCTIONAL ASSESSMENT: PAIN_FUNCTIONAL_ASSESSMENT: NONE - DENIES PAIN

## 2023-09-05 ASSESSMENT — ENCOUNTER SYMPTOMS
CONSTIPATION: 0
VOMITING: 0
COLOR CHANGE: 0
VOMITING: 0
ABDOMINAL PAIN: 0
EYE ITCHING: 0
SHORTNESS OF BREATH: 0
SORE THROAT: 0
VOICE CHANGE: 1
EYE REDNESS: 0
SHORTNESS OF BREATH: 0
RHINORRHEA: 0
DIARRHEA: 0
SORE THROAT: 1
NAUSEA: 0
COUGH: 0
BACK PAIN: 0
TROUBLE SWALLOWING: 1

## 2023-09-05 ASSESSMENT — LIFESTYLE VARIABLES
HOW OFTEN DO YOU HAVE A DRINK CONTAINING ALCOHOL: NEVER
HOW MANY STANDARD DRINKS CONTAINING ALCOHOL DO YOU HAVE ON A TYPICAL DAY: PATIENT DOES NOT DRINK

## 2023-09-05 NOTE — ED TRIAGE NOTES
Pt presents to the ED via [facility] c/o weakness, dizziness, and hypotension that started 2 days ago. Has had multiple falls. BP drops when patient stands up. To have port, trach, and Gtube placed this week. Was seen at ENT this morning and had a fall. Patient was discharged home. Wife believes patient is dehydrated and increased weakness. Denies LOC or hitting head during falls. Hx esophageal cancer. EMS placed 18G left forearm. 1L bolus given in route. HR initially 120s. After bolus was low 100s.

## 2023-09-05 NOTE — ED PROVIDER NOTES
Emergency Department Provider Note       PCP: Yady Panda DO   Age: 69 y.o. Sex: male     DISPOSITION Decision To Admit 09/05/2023 07:46:27 PM       ICD-10-CM    1. Syncope and collapse  R55       2. Dehydration  E86.0       3. Malignant neoplasm of esophagus, unspecified location ([address])  C15.9       4. Elevated troponin  R77.8           Medical Decision Making     Complexity of Problems Addressed:  1 or more acute illnesses that pose a threat to life or bodily function. Data Reviewed and Analyzed:   I independently ordered and reviewed each unique test.  I reviewed external records: provider visit note from outside specialist.   The patients assessment required an independent historian: EMS. The reason they were needed is important historical information not provided by the patient. I independently ordered and interpreted the ED EKG in the absence of a Cardiologist.    Rate: 112  EKG Interpretation: EKG Interpretation: sinus rhythm, no evidence of arrhythmia  ST Segments: Nonspecific ST segments - NO STEMI  I interpreted the X-rays no infiltrate. Discussion of management or test interpretation. Patient with esophageal cancer being followed by ENT for a G-tube placement. Having decreased p.o. intake with increased weakness dizziness and hypotension over the past couple days. Has had multiple near syncopal episodes with one today at the ENT office. Sent here for evaluation. Patient is tachycardic and weak. He has an elevated troponin of 79 trending up to 139. No ST changes on his EKG. Will admit for further treatment evaluation. The patient was admitted and I have discussed patient management with the admitting provider. Risk of Complications and/or Morbidity of Patient Management:  Prescription drug management performed. Shared medical decision making was utilized in creating the patients health plan today.          Is this patient to be included in the SEP-1 core measure due to severe

## 2023-09-05 NOTE — PROGRESS NOTES
for  attenuation correction and anatomical localization imaging; skull base to  mid-thigh. Blood glucose at the time of tracer administration is 124 mg/dl, which is  adequate for imaging. Approximately 60 minutes after administration of the  radiopharmaceutical imaging was initiated. SUV max calculated from patient body  wt. Noncaloric dilute oral contrast is given. For this CT scanner at least one of the following techniques is utilized to  decrease patient radiation exposure: Automatic exposure control, modulation of  MA and KVP based on patient weight, and iterative reconstruction. FINDINGS:    Head/Neck:  Hypermetabolic mass along the base of the tongue and vallecula (max SUV 18.5). Mild metabolic activity within a nonenlarged right level 2 cervical lymph node  (max SUV 2.1), measuring 1.1 x 0.7 cm. No abnormal radiotracer uptake within the brain, however the normal intense  uptake limits evaluation. Chest:  No hypermetabolic pulmonary nodule. Mild emphysematous changes. No  hypermetabolic hilar or mediastinal lymphadenopathy. Coronary artery  calcifications. Abdomen/Pelvis:  Moderate prostatomegaly without focal metabolic activity. No hypermetabolic  lymphadenopathy. Physiologic uptake within the gastrointestinal and  genitourinary tracts. Bones:  No hypermetabolic osseous focus to suggest osseous metastatic disease. Endplate  sclerosis and degenerative uptake along L2-L3. IMPRESSION:  1. Hypermetabolic mass along the base of the tongue, most compatible with  recurrent malignancy. 2.  Mildly metabolic nonenlarged right level 3 cervical lymph node, suspicious  for iveth metastatic disease. 3.  No evidence of distant metastatic disease.      Lab Results   Component Value Date    WBC 10.7 08/31/2023    HGB 12.4 (L) 08/31/2023    HCT 37.4 (L) 08/31/2023    MCV 87.2 08/31/2023     08/31/2023     Lab Results   Component Value Date/Time     08/31/2023 10:58 AM    K

## 2023-09-06 ENCOUNTER — APPOINTMENT (OUTPATIENT)
Dept: NON INVASIVE DIAGNOSTICS | Age: 78
DRG: 004 | End: 2023-09-06
Attending: PHYSICAL MEDICINE & REHABILITATION
Payer: MEDICARE

## 2023-09-06 ENCOUNTER — APPOINTMENT (OUTPATIENT)
Dept: NON INVASIVE DIAGNOSTICS | Age: 78
DRG: 004 | End: 2023-09-06
Payer: MEDICARE

## 2023-09-06 PROBLEM — Z01.810 PRE-OPERATIVE CARDIOVASCULAR EXAMINATION: Status: ACTIVE | Noted: 2023-09-06

## 2023-09-06 PROBLEM — R55 SYNCOPE AND COLLAPSE: Status: ACTIVE | Noted: 2023-09-06

## 2023-09-06 PROBLEM — C15.9 MALIGNANT NEOPLASM OF ESOPHAGUS (HCC): Status: ACTIVE | Noted: 2023-09-06

## 2023-09-06 PROBLEM — R00.0 SINUS TACHYCARDIA: Status: ACTIVE | Noted: 2023-09-06

## 2023-09-06 PROBLEM — R77.8 ELEVATED TROPONIN: Status: ACTIVE | Noted: 2023-09-06

## 2023-09-06 PROBLEM — R63.0 APPETITE LOSS: Status: ACTIVE | Noted: 2023-09-06

## 2023-09-06 PROBLEM — K11.7 HYPERSALIVATION: Status: ACTIVE | Noted: 2023-09-06

## 2023-09-06 PROBLEM — R79.89 ELEVATED TROPONIN: Status: ACTIVE | Noted: 2023-09-06

## 2023-09-06 LAB
25(OH)D3 SERPL-MCNC: 20.3 NG/ML (ref 30–100)
ANION GAP SERPL CALC-SCNC: 11 MMOL/L (ref 2–11)
BASOPHILS # BLD: 0 K/UL (ref 0–0.2)
BASOPHILS NFR BLD: 0 % (ref 0–2)
BUN SERPL-MCNC: 20 MG/DL (ref 8–23)
CALCIUM SERPL-MCNC: 8.7 MG/DL (ref 8.3–10.4)
CHLORIDE SERPL-SCNC: 111 MMOL/L (ref 101–110)
CO2 SERPL-SCNC: 19 MMOL/L (ref 21–32)
CREAT SERPL-MCNC: 0.5 MG/DL (ref 0.8–1.5)
DIFFERENTIAL METHOD BLD: ABNORMAL
ECHO AO ROOT DIAM: 3 CM
ECHO AO ROOT INDEX: 1.6 CM/M2
ECHO AV AREA PEAK VELOCITY: 2.9 CM2
ECHO AV AREA VTI: 2.7 CM2
ECHO AV AREA/BSA PEAK VELOCITY: 1.6 CM2/M2
ECHO AV AREA/BSA VTI: 1.4 CM2/M2
ECHO AV MEAN GRADIENT: 2 MMHG
ECHO AV MEAN VELOCITY: 0.7 M/S
ECHO AV PEAK GRADIENT: 4 MMHG
ECHO AV PEAK VELOCITY: 1.1 M/S
ECHO AV VELOCITY RATIO: 0.91
ECHO AV VTI: 21.7 CM
ECHO BSA: 1.85 M2
ECHO EST RA PRESSURE: 3 MMHG
ECHO LA AREA 2C: 18.3 CM2
ECHO LA AREA 4C: 18.3 CM2
ECHO LA MAJOR AXIS: 5.7 CM
ECHO LA MINOR AXIS: 5.1 CM
ECHO LA VOL 2C: 52 ML (ref 18–58)
ECHO LA VOL 4C: 47 ML (ref 18–58)
ECHO LA VOL BP: 52 ML (ref 18–58)
ECHO LA VOL/BSA BIPLANE: 28 ML/M2 (ref 16–34)
ECHO LA VOLUME INDEX A2C: 28 ML/M2 (ref 16–34)
ECHO LA VOLUME INDEX A4C: 25 ML/M2 (ref 16–34)
ECHO LV E' LATERAL VELOCITY: 9 CM/S
ECHO LV E' SEPTAL VELOCITY: 10 CM/S
ECHO LV EDV A2C: 86 ML
ECHO LV EDV A4C: 83 ML
ECHO LV EDV INDEX A4C: 44 ML/M2
ECHO LV EDV NDEX A2C: 46 ML/M2
ECHO LV EJECTION FRACTION A2C: 61 %
ECHO LV EJECTION FRACTION A4C: 65 %
ECHO LV EJECTION FRACTION BIPLANE: 63 % (ref 55–100)
ECHO LV ESV A2C: 34 ML
ECHO LV ESV A4C: 29 ML
ECHO LV ESV INDEX A2C: 18 ML/M2
ECHO LV ESV INDEX A4C: 16 ML/M2
ECHO LVOT AREA: 3.1 CM2
ECHO LVOT AV VTI INDEX: 0.86
ECHO LVOT DIAM: 2 CM
ECHO LVOT MEAN GRADIENT: 2 MMHG
ECHO LVOT PEAK GRADIENT: 4 MMHG
ECHO LVOT PEAK VELOCITY: 1 M/S
ECHO LVOT STROKE VOLUME INDEX: 31.2 ML/M2
ECHO LVOT SV: 58.4 ML
ECHO LVOT VTI: 18.6 CM
ECHO MV A VELOCITY: 0.92 M/S
ECHO MV E DECELERATION TIME (DT): 211 MS
ECHO MV E VELOCITY: 0.71 M/S
ECHO MV E/A RATIO: 0.77
ECHO MV E/E' LATERAL: 7.89
ECHO MV E/E' RATIO (AVERAGED): 7.49
ECHO MV E/E' SEPTAL: 7.1
ECHO RIGHT VENTRICULAR SYSTOLIC PRESSURE (RVSP): 17 MMHG
ECHO RV BASAL DIMENSION: 3.4 CM
ECHO RV FREE WALL PEAK S': 15 CM/S
ECHO RV TAPSE: 1.8 CM (ref 1.7–?)
ECHO TV REGURGITANT MAX VELOCITY: 1.89 M/S
ECHO TV REGURGITANT PEAK GRADIENT: 14 MMHG
EOSINOPHIL # BLD: 0 K/UL (ref 0–0.8)
EOSINOPHIL NFR BLD: 0 % (ref 0.5–7.8)
ERYTHROCYTE [DISTWIDTH] IN BLOOD BY AUTOMATED COUNT: 13.2 % (ref 11.9–14.6)
FERRITIN SERPL-MCNC: 436 NG/ML (ref 8–388)
FOLATE SERPL-MCNC: 17.3 NG/ML (ref 3.1–17.5)
GLUCOSE SERPL-MCNC: 89 MG/DL (ref 65–100)
HCT VFR BLD AUTO: 33.1 % (ref 41.1–50.3)
HGB BLD-MCNC: 10.9 G/DL (ref 13.6–17.2)
IMM GRANULOCYTES # BLD AUTO: 0.1 K/UL (ref 0–0.5)
IMM GRANULOCYTES NFR BLD AUTO: 1 % (ref 0–5)
IRON SATN MFR SERPL: 20 %
IRON SERPL-MCNC: 46 UG/DL (ref 35–150)
LYMPHOCYTES # BLD: 0.7 K/UL (ref 0.5–4.6)
LYMPHOCYTES NFR BLD: 7 % (ref 13–44)
MAGNESIUM SERPL-MCNC: 1.7 MG/DL (ref 1.8–2.4)
MCH RBC QN AUTO: 29.2 PG (ref 26.1–32.9)
MCHC RBC AUTO-ENTMCNC: 32.9 G/DL (ref 31.4–35)
MCV RBC AUTO: 88.7 FL (ref 82–102)
MONOCYTES # BLD: 1.1 K/UL (ref 0.1–1.3)
MONOCYTES NFR BLD: 11 % (ref 4–12)
NEUTS SEG # BLD: 8.2 K/UL (ref 1.7–8.2)
NEUTS SEG NFR BLD: 81 % (ref 43–78)
NRBC # BLD: 0 K/UL (ref 0–0.2)
PLATELET # BLD AUTO: 322 K/UL (ref 150–450)
PMV BLD AUTO: 9.6 FL (ref 9.4–12.3)
POTASSIUM SERPL-SCNC: 3.5 MMOL/L (ref 3.5–5.1)
RBC # BLD AUTO: 3.73 M/UL (ref 4.23–5.6)
SODIUM SERPL-SCNC: 141 MMOL/L (ref 133–143)
TIBC SERPL-MCNC: 230 UG/DL (ref 250–450)
TROPONIN I SERPL HS-MCNC: 70.6 PG/ML (ref 0–14)
TROPONIN I SERPL HS-MCNC: 72.7 PG/ML (ref 0–14)
TROPONIN I SERPL HS-MCNC: 87.2 PG/ML (ref 0–14)
VIT B12 SERPL-MCNC: 514 PG/ML (ref 193–986)
WBC # BLD AUTO: 10.2 K/UL (ref 4.3–11.1)

## 2023-09-06 PROCEDURE — 84484 ASSAY OF TROPONIN QUANT: CPT

## 2023-09-06 PROCEDURE — 99223 1ST HOSP IP/OBS HIGH 75: CPT | Performed by: INTERNAL MEDICINE

## 2023-09-06 PROCEDURE — 82746 ASSAY OF FOLIC ACID SERUM: CPT

## 2023-09-06 PROCEDURE — 6370000000 HC RX 637 (ALT 250 FOR IP): Performed by: INTERNAL MEDICINE

## 2023-09-06 PROCEDURE — 2580000003 HC RX 258: Performed by: INTERNAL MEDICINE

## 2023-09-06 PROCEDURE — 6370000000 HC RX 637 (ALT 250 FOR IP): Performed by: NURSE PRACTITIONER

## 2023-09-06 PROCEDURE — 83735 ASSAY OF MAGNESIUM: CPT

## 2023-09-06 PROCEDURE — 82728 ASSAY OF FERRITIN: CPT

## 2023-09-06 PROCEDURE — 99222 1ST HOSP IP/OBS MODERATE 55: CPT | Performed by: INTERNAL MEDICINE

## 2023-09-06 PROCEDURE — 93306 TTE W/DOPPLER COMPLETE: CPT

## 2023-09-06 PROCEDURE — 93306 TTE W/DOPPLER COMPLETE: CPT | Performed by: INTERNAL MEDICINE

## 2023-09-06 PROCEDURE — 36415 COLL VENOUS BLD VENIPUNCTURE: CPT

## 2023-09-06 PROCEDURE — 6370000000 HC RX 637 (ALT 250 FOR IP)

## 2023-09-06 PROCEDURE — 80048 BASIC METABOLIC PNL TOTAL CA: CPT

## 2023-09-06 PROCEDURE — 1100000003 HC PRIVATE W/ TELEMETRY

## 2023-09-06 PROCEDURE — 85025 COMPLETE CBC W/AUTO DIFF WBC: CPT

## 2023-09-06 PROCEDURE — 83550 IRON BINDING TEST: CPT

## 2023-09-06 PROCEDURE — 2580000003 HC RX 258: Performed by: FAMILY MEDICINE

## 2023-09-06 PROCEDURE — 6360000002 HC RX W HCPCS: Performed by: INTERNAL MEDICINE

## 2023-09-06 PROCEDURE — 82306 VITAMIN D 25 HYDROXY: CPT

## 2023-09-06 PROCEDURE — APPSS45 APP SPLIT SHARED TIME 31-45 MINUTES

## 2023-09-06 PROCEDURE — 6360000002 HC RX W HCPCS: Performed by: FAMILY MEDICINE

## 2023-09-06 PROCEDURE — 82607 VITAMIN B-12: CPT

## 2023-09-06 PROCEDURE — 83540 ASSAY OF IRON: CPT

## 2023-09-06 RX ORDER — TRAZODONE HYDROCHLORIDE 50 MG/1
50 TABLET ORAL NIGHTLY
Status: DISCONTINUED | OUTPATIENT
Start: 2023-09-06 | End: 2023-09-20 | Stop reason: HOSPADM

## 2023-09-06 RX ORDER — LOPERAMIDE HYDROCHLORIDE 2 MG/1
2 CAPSULE ORAL 4 TIMES DAILY PRN
Status: DISCONTINUED | OUTPATIENT
Start: 2023-09-06 | End: 2023-09-20 | Stop reason: HOSPADM

## 2023-09-06 RX ORDER — MIRTAZAPINE 15 MG/1
7.5 TABLET, FILM COATED ORAL NIGHTLY
Status: DISCONTINUED | OUTPATIENT
Start: 2023-09-06 | End: 2023-09-10

## 2023-09-06 RX ORDER — LANOLIN ALCOHOL/MO/W.PET/CERES
3 CREAM (GRAM) TOPICAL NIGHTLY PRN
Status: DISCONTINUED | OUTPATIENT
Start: 2023-09-06 | End: 2023-09-20 | Stop reason: HOSPADM

## 2023-09-06 RX ADMIN — LOPERAMIDE HYDROCHLORIDE 2 MG: 2 CAPSULE ORAL at 11:55

## 2023-09-06 RX ADMIN — SODIUM CHLORIDE: 9 INJECTION, SOLUTION INTRAVENOUS at 09:46

## 2023-09-06 RX ADMIN — PANTOPRAZOLE SODIUM 40 MG: 40 TABLET, DELAYED RELEASE ORAL at 06:36

## 2023-09-06 RX ADMIN — ENOXAPARIN SODIUM 40 MG: 100 INJECTION SUBCUTANEOUS at 09:36

## 2023-09-06 RX ADMIN — MIRTAZAPINE 7.5 MG: 15 TABLET, FILM COATED ORAL at 20:21

## 2023-09-06 RX ADMIN — SODIUM CHLORIDE, PRESERVATIVE FREE 10 ML: 5 INJECTION INTRAVENOUS at 20:22

## 2023-09-06 RX ADMIN — SODIUM CHLORIDE, PRESERVATIVE FREE 10 ML: 5 INJECTION INTRAVENOUS at 09:37

## 2023-09-06 RX ADMIN — SODIUM CHLORIDE: 9 INJECTION, SOLUTION INTRAVENOUS at 21:34

## 2023-09-06 RX ADMIN — Medication 3 MG: at 21:38

## 2023-09-06 RX ADMIN — ROSUVASTATIN CALCIUM 10 MG: 10 TABLET, FILM COATED ORAL at 09:36

## 2023-09-06 RX ADMIN — WATER 10 MG: 1 INJECTION INTRAMUSCULAR; INTRAVENOUS; SUBCUTANEOUS at 23:55

## 2023-09-06 RX ADMIN — TRAZODONE HYDROCHLORIDE 50 MG: 50 TABLET ORAL at 21:38

## 2023-09-06 ASSESSMENT — PAIN SCALES - GENERAL: PAINLEVEL_OUTOF10: 0

## 2023-09-06 NOTE — PROGRESS NOTES
Hourly rounds performed this shift. Bed lowered and locked. Call light within reach. All needs met at this time. Pt has periods of intermittent confusion but still compliant with care. Pt ambulates with crutches. Bedside shift report will be given to oncoming nurse.

## 2023-09-06 NOTE — PROGRESS NOTES
Hospitalist Progress Note   Admit Date:  2023  4:32 PM   Name:  Yumiko Amos   Age:  69 y.o. Sex:  male  :  1945   MRN:  [id number]   Room:  [facility]/    Presenting/Chief Complaint: Fatigue, Fall, and Dizziness     Reason(s) for Admission: Syncope and collapse [R55]  Dehydration [E86.0]  Elevated troponin [R77.8]  Sepsis ([address]) [A41.9]  Malignant neoplasm of esophagus, unspecified location [facility]) [C15.9]     Hospital Course: Yumiko Amos is a 69 y.o. 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 Patient was seen at his ENT office today and plan for tracheostomy as well as PEG placement. Patient has been following up with oncology with plan for immunotherapy and chemotherapy. Patient presents emergency room due to having a fall after leaving his in the office today. Per family, patient was walking with his crutches when he got his crutch tangled with the door and subsequently falling. Patient denies any head injury or dizziness prior to the fall. However per family, patient had had syncopal episode last week and has been dehydrated due to his poor p.o. intake from his esophageal cancer. Patient was noted to be tachycardic with low normal blood pressure per EMS. Heart rate improved after 1 L bolus. In the emergency room, patient remained tachycardic into the low 100s with low normal blood pressure. Laboratory work-up is remarkable for WBC of 14.6, hemoglobin 11.2, troponin of 79.8 with repeat of 139.4. EKG with sinus tachycardia without any ischemic changes. Subjective & 24hr Events:   Patient seen and examined at bedside. Apparent today he started having episodes of loose stool.   Wife present during exam is concerned about patient receiving his port and trach with PEG      Assessment & Plan:     Principal Problem:    Sepsis ([address])  Plan: Meets criteria with tachycardia, tachypnea and elevated white blood cell count. However this has been ruled out as patient has no fever or chills. Also his procalcitonin is not indicative of bacterial infection. Likely SIRS    Active Problems:    Elevated troponin  Plan: Cardiology has evaluated felt secondary to demand ischemia. Squamous cell carcinoma of oropharynx ([address])  Plan: Allergy and ENT consulted. Plan is for port placement with trach and PEG this admission. Moderate protein-calorie malnutrition ([address])  Plan: Supplements and increased p.o. intake. Syncope and collapse  Plan: Vaso-vagal due to dehydration. Pre-operative cardiovascular examination  Plan: Allergy has cleared for port and PEG with trach. Sinus tachycardia  Plan: Solved, due to dehydration      PT/OT evals and PPD needed/ordered? 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  *      Objective:   Patient Vitals for the past 24 hrs:   Temp Pulse

## 2023-09-06 NOTE — CONSULTS
[facility] Hematology & Oncology        Inpatient Hematology / Oncology Consult    Reason for Consult:  Syncope and collapse [R55]  Dehydration [E86.0]  Elevated troponin [R77.8]  Sepsis ([address]) [A41.9]  Malignant neoplasm of esophagus, unspecified location [facility]) [C15.9]  Referring Physician:  Brionna Saini MD    History of Present Illness:  Mr. Joana Harris is a 69 y.o. male admitted on 9/5/2023. The primary encounter diagnosis was Syncope and collapse. Diagnoses of Dehydration, Malignant neoplasm of esophagus, unspecified location ([address]), and Elevated troponin were also pertinent to this visit. Clint Number 60-year-old  male patient, stopped smoking about 25 years ago, history of polio as a baby (uses crutches), squamous cell cancer of left lateral tongue status post partial glossectomy with tonsillectomy, neck dissection with radial forearm free flap to left oral cavity 8/3/2010. Per records margins were close but negative with evidence of LVI as well as PNI and extracapsular spread. He completed adjuvant radiation therapy and end of 2010 to early 2011. He followed with Dr. Brigida Cortes ENT up till the 5-year carlee in 2015 with no evidence of recurrence. 2 years post therapy he had multiple teeth removed of his left lower jaw followed by HBO therapy x10 sessions due to poor healing. More recently patient presented with symptoms of congestion and hypersalivation, completed a couple courses of antibiotics without resolution. Had minimal weight loss. He was seen by Dr. Juan Akers ENT with the scope and abnormal CT neck which was abnormal.  He was thereafter referred to Dr. Casimiro douglas at ENT surgeons at [facility]. Patient underwent direct laryngoscopy 8/17/2023 as well as PET scan 8/9/2023. PET scan demonstrated hypermetabolic mass at base of tongue, mildly metabolic nonenlarged right level 3 cervical lymph node, and no evidence of distant metastasis.   Direct laryngoscopy neck dissection with radial forearm free flap to left oral cavity 8/3/2010. Per records margins were close but negative with evidence of LVI as well as PNI and extracapsular spread. He completed adjuvant radiation therapy and end of 2010 to early 2011. He followed with Dr. Santhosh Connell ENT up till the 5-year carlee in 2015 with no evidence of recurrence. 2 years post therapy he had multiple teeth removed of his left lower jaw followed by HBO therapy x10 sessions due to poor healing. More recently patient presented with symptoms of congestion and hypersalivation, completed a couple courses of antibiotics without resolution. Had minimal weight loss. He was seen by Dr. Lambert Meredith ENT with the scope and abnormal CT neck which was abnormal.  He was thereafter referred to Dr. Jorge douglas at ENT surgeons at [facility]. Patient underwent direct laryngoscopy 8/17/2023 as well as PET scan 8/9/2023. PET scan demonstrated hypermetabolic mass at base of tongue, mildly metabolic nonenlarged right level 3 cervical lymph node, and no evidence of distant metastasis. Direct laryngoscopy with biopsy with pathology of larynx and epiglottis revealing invasive moderately differentiated squamous cell cancer. P16 stain for HPV was negative. Patient now recently referred to [facility] and saw Dr. Moira Vance at the [address] [place] [address] 8/31. He is having increased salivation and dysphagia. He was admitted after a fall and c/o dizziness/weakness. He was initially tachycardic but this was improved with IVF. He was scheduled for port, trach and PEG OP but will now arranging to be done inpatient. Oncology was asked to see patient as he is a patient of Dr. Moira Vance with plans to start Carbo/5FU/Pembro ~10 days after trach/peg are placed.        RECOMMENDATIONS:  Squamous cell CA of tongue  -Direct laryngoscopy by ENT with invasive moderately differentiated squamous cell  -Saw Dr. Moira Vance OP 8/31 with plans for port

## 2023-09-06 NOTE — CONSULTS
09/06/2023    HCT 33.1 (L) 09/06/2023     09/06/2023    CHOL 151 06/30/2023    TRIG 72 06/30/2023    HDL 49 06/30/2023    ALT 27 09/05/2023    AST 27 09/05/2023     09/06/2023    K 3.5 09/06/2023     (H) 09/06/2023    CREATININE 0.50 (L) 09/06/2023    BUN 20 09/06/2023    CO2 19 (L) 09/06/2023    PSA 1.4 06/30/2023        Pt has been seen and examined by Dr. Baljit Mak. They agree with the following assessment and plan. Assessment/Plan:       Sepsis  - Pt w/ WBC 14.6, HR 110s, elevated RR on arrival, unclear source, UA w/ protein, CXR WNL. ? Of possible lagging aspiration on CXR per primary  - s/p abx  in ED, now primary monitoring off but continuing on abx. Mgmt per them      Elevated troponin  - Hstrop 79 then 139 then 163 yesterday and now 70.6 this AM. EKG in sinus tachycardia on arrival.   - Pt still in sinus tachycardia on telemetry, could be contributing  - Given his lack of CP, SOB, clear anginal sx or anginal equivalents feel this is likely related to acute fall and tachycardia however will check Echo for completeness      Squamous cell carcinoma of oropharynx/ Keratinizing squamous cell carcinoma of larynx ([address])  - Follows with Oncology and ENT. Planning on starting immunotherapy/chemo soon, port placement scheduled for 9/7 and tracheostomy with G-tube placement  - Oncology, ENT consulted by primary      Moderate protein-calorie malnutrition ([address])  - related to above, mgmt per primary      Thank you for requesting cardiac evaluation and allowing us to participate in the care of this patient. We will continue to follow along with you.       Kathia Underwood PA-C  Supervising Physician: Dr. Baljit Mak

## 2023-09-06 NOTE — PROGRESS NOTES
Occupational Therapy Note:    Attempted to see patient this PM for occupational therapy evaluation session. Discussed with pt about his PLOF for almost 10 minutes--lives w/ wife, independent all ADLs, uses crutches for ambulation due to polio, has all needed DME at home. After conversation, pt refused to get up despite encouragement then MD walked in room and requested deferred treatment until later. 2nd attempt for mobility: pt with Echo. Will check back tomorrow to complete formal evaluation.      Thank you,    Idalmis Hale

## 2023-09-06 NOTE — PROGRESS NOTES
Physical Therapy Note:    Attempted to see patient this PM for physical therapy evaluation session. Discussed with pt about his PLOF for almost 10 minutes--lives w/ wife, independent all ADLs, uses crutches for ambulation due to polio, has all needed DME at home. After conversation, pt refused to get up despite encouragement then MD walked in room and requested deferred treatment until later. 2nd attempt for mobility: pt with Echo. Will check back tomorrow to complete formal evaluation.  Thank you,    Mino Nagy, PT     Rehab Caseload Tracker

## 2023-09-06 NOTE — PROGRESS NOTES
Hourly rounds complete this shift, no new complaints at this time,  bed in low, locked position, call light and bedside table within reach,  all needs met. Report to day shift nurse.

## 2023-09-06 NOTE — H&P
Hospitalist History and Physical   Admit Date:  2023  4:32 PM   Name:  Estuardo Vogt   Age:  69 y.o. Sex:  male  :  1945   MRN:  [id number]   Room:  [facility]29/    Presenting/Chief Complaint: Fatigue, Fall, and Dizziness     Reason(s) for Admission: Sepsis ([address]) [A41.9]     History of Present Illness:   Estuardo Vogt is a 69 y.o. 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 Patient was seen at his ENT office today and plan for tracheostomy as well as PEG placement. Patient has been following up with oncology with plan for immunotherapy and chemotherapy. Patient presents emergency room due to having a fall after leaving his in the office today. Per family, patient was walking with his crutches when he got his crutch tangled with the door and subsequently falling. Patient denies any head injury or dizziness prior to the fall. However per family, patient had had syncopal episode last week and has been dehydrated due to his poor p.o. intake from his esophageal cancer. Patient was noted to be tachycardic with low normal blood pressure per EMS. Heart rate improved after 1 L bolus. In the emergency room, patient remained tachycardic into the low 100s with low normal blood pressure. Laboratory work-up is remarkable for WBC of 14.6, hemoglobin 11.2, troponin of 79.8 with repeat of 139.4. EKG with sinus tachycardia without any ischemic changes. Assessment & Plan:   Sepsis   Meets criteria for sepsis with tachycardia, tachypnea and elevated WBC. Source unclear at this time. Urine analysis negative and chest x-ray without any signs of infection.   Possible aspiration with lagging presentation in the chest

## 2023-09-06 NOTE — ED NOTES
TRANSFER - OUT REPORT:    Verbal report given to Valerio Poe on [address]  being transferred to [address] [place] [address] for routine progression of patient care       Report consisted of patient's Situation, Background, Assessment and   Recommendations(SBAR). Information from the following report(s) Nurse Handoff Report, ED Encounter Summary, ED SBAR, Adult Overview, MAR, Recent Results, Cardiac Rhythm SinusRhythm, Neuro Assessment, and Event Log was reviewed with the receiving nurse. [place] Fall Assessment:    Presents to emergency department  because of falls (Syncope, seizure, or loss of consciousness): Yes  Age > 79: Yes  Altered Mental Status, Intoxication with alcohol or substance confusion (Disorientation, impaired judgment, poor safety awaremess, or inability to follow instructions): No  Impaired Mobility: Ambulates or transfers with assistive devices or assistance; Unable to ambulate or transer.: Yes  Nursing Judgement: Yes          Lines:   Peripheral IV 09/05/23 Left;Proximal;Anterior Forearm (Active)       Peripheral IV 09/05/23 Right Antecubital (Active)   Site Assessment Clean, dry & intact 09/05/23 1710   Line Status Blood return noted;Normal saline locked; Flushed 09/05/23 1710   Phlebitis Assessment No symptoms 09/05/23 1710   Infiltration Assessment 0 09/05/23 1710   Dressing Status Clean, dry & intact 09/05/23 1710   Dressing Type Transparent 09/05/23 1710        Opportunity for questions and clarification was provided.       Patient transported with:  Severo Jenkins, RN  09/06/23 6940

## 2023-09-07 ENCOUNTER — ANESTHESIA EVENT (OUTPATIENT)
Dept: SURGERY | Age: 78
End: 2023-09-07
Payer: MEDICARE

## 2023-09-07 ENCOUNTER — APPOINTMENT (OUTPATIENT)
Dept: MRI IMAGING | Age: 78
DRG: 004 | End: 2023-09-07
Attending: PHYSICAL MEDICINE & REHABILITATION
Payer: MEDICARE

## 2023-09-07 ENCOUNTER — APPOINTMENT (OUTPATIENT)
Dept: CT IMAGING | Age: 78
DRG: 004 | End: 2023-09-07
Attending: PHYSICAL MEDICINE & REHABILITATION
Payer: MEDICARE

## 2023-09-07 LAB
ANION GAP SERPL CALC-SCNC: 9 MMOL/L (ref 2–11)
BASOPHILS # BLD: 0 K/UL (ref 0–0.2)
BASOPHILS NFR BLD: 1 % (ref 0–2)
BUN SERPL-MCNC: 10 MG/DL (ref 8–23)
CALCIUM SERPL-MCNC: 8.6 MG/DL (ref 8.3–10.4)
CHLORIDE SERPL-SCNC: 109 MMOL/L (ref 101–110)
CO2 SERPL-SCNC: 23 MMOL/L (ref 21–32)
CREAT SERPL-MCNC: 0.5 MG/DL (ref 0.8–1.5)
DIFFERENTIAL METHOD BLD: ABNORMAL
EOSINOPHIL # BLD: 0.1 K/UL (ref 0–0.8)
EOSINOPHIL NFR BLD: 1 % (ref 0.5–7.8)
ERYTHROCYTE [DISTWIDTH] IN BLOOD BY AUTOMATED COUNT: 13 % (ref 11.9–14.6)
GLUCOSE SERPL-MCNC: 99 MG/DL (ref 65–100)
HCT VFR BLD AUTO: 33.1 % (ref 41.1–50.3)
HGB BLD-MCNC: 10.9 G/DL (ref 13.6–17.2)
IMM GRANULOCYTES # BLD AUTO: 0 K/UL (ref 0–0.5)
IMM GRANULOCYTES NFR BLD AUTO: 1 % (ref 0–5)
LYMPHOCYTES # BLD: 0.7 K/UL (ref 0.5–4.6)
LYMPHOCYTES NFR BLD: 8 % (ref 13–44)
MAGNESIUM SERPL-MCNC: 1.7 MG/DL (ref 1.8–2.4)
MCH RBC QN AUTO: 29.1 PG (ref 26.1–32.9)
MCHC RBC AUTO-ENTMCNC: 32.9 G/DL (ref 31.4–35)
MCV RBC AUTO: 88.3 FL (ref 82–102)
MONOCYTES # BLD: 0.9 K/UL (ref 0.1–1.3)
MONOCYTES NFR BLD: 10 % (ref 4–12)
NEUTS SEG # BLD: 6.9 K/UL (ref 1.7–8.2)
NEUTS SEG NFR BLD: 79 % (ref 43–78)
NRBC # BLD: 0 K/UL (ref 0–0.2)
PLATELET # BLD AUTO: 281 K/UL (ref 150–450)
PMV BLD AUTO: 9.1 FL (ref 9.4–12.3)
POTASSIUM SERPL-SCNC: 2.9 MMOL/L (ref 3.5–5.1)
RBC # BLD AUTO: 3.75 M/UL (ref 4.23–5.6)
SODIUM SERPL-SCNC: 141 MMOL/L (ref 133–143)
WBC # BLD AUTO: 8.6 K/UL (ref 4.3–11.1)

## 2023-09-07 PROCEDURE — 6370000000 HC RX 637 (ALT 250 FOR IP): Performed by: INTERNAL MEDICINE

## 2023-09-07 PROCEDURE — 70498 CT ANGIOGRAPHY NECK: CPT

## 2023-09-07 PROCEDURE — 6360000004 HC RX CONTRAST MEDICATION: Performed by: INTERNAL MEDICINE

## 2023-09-07 PROCEDURE — 2580000003 HC RX 258: Performed by: INTERNAL MEDICINE

## 2023-09-07 PROCEDURE — 6360000002 HC RX W HCPCS: Performed by: INTERNAL MEDICINE

## 2023-09-07 PROCEDURE — 1100000003 HC PRIVATE W/ TELEMETRY

## 2023-09-07 PROCEDURE — 99232 SBSQ HOSP IP/OBS MODERATE 35: CPT

## 2023-09-07 PROCEDURE — 83735 ASSAY OF MAGNESIUM: CPT

## 2023-09-07 PROCEDURE — 80048 BASIC METABOLIC PNL TOTAL CA: CPT

## 2023-09-07 PROCEDURE — 70553 MRI BRAIN STEM W/O & W/DYE: CPT

## 2023-09-07 PROCEDURE — 36415 COLL VENOUS BLD VENIPUNCTURE: CPT

## 2023-09-07 PROCEDURE — 6360000004 HC RX CONTRAST MEDICATION

## 2023-09-07 PROCEDURE — 85025 COMPLETE CBC W/AUTO DIFF WBC: CPT

## 2023-09-07 PROCEDURE — 6370000000 HC RX 637 (ALT 250 FOR IP): Performed by: NURSE PRACTITIONER

## 2023-09-07 PROCEDURE — 2580000003 HC RX 258: Performed by: FAMILY MEDICINE

## 2023-09-07 PROCEDURE — A9579 GAD-BASE MR CONTRAST NOS,1ML: HCPCS

## 2023-09-07 PROCEDURE — 6360000002 HC RX W HCPCS: Performed by: FAMILY MEDICINE

## 2023-09-07 PROCEDURE — 97530 THERAPEUTIC ACTIVITIES: CPT

## 2023-09-07 PROCEDURE — 97162 PT EVAL MOD COMPLEX 30 MIN: CPT

## 2023-09-07 PROCEDURE — 6370000000 HC RX 637 (ALT 250 FOR IP)

## 2023-09-07 RX ORDER — DOXYCYCLINE HYCLATE 50 MG/1
324 CAPSULE, GELATIN COATED ORAL
Status: DISCONTINUED | OUTPATIENT
Start: 2023-09-08 | End: 2023-09-20 | Stop reason: HOSPADM

## 2023-09-07 RX ORDER — MAGNESIUM SULFATE IN WATER 40 MG/ML
4000 INJECTION, SOLUTION INTRAVENOUS ONCE
Status: COMPLETED | OUTPATIENT
Start: 2023-09-07 | End: 2023-09-07

## 2023-09-07 RX ORDER — MULTIVIT WITH MINERALS/LUTEIN
250 TABLET ORAL
Status: DISCONTINUED | OUTPATIENT
Start: 2023-09-08 | End: 2023-09-20 | Stop reason: HOSPADM

## 2023-09-07 RX ORDER — ASPIRIN 81 MG/1
81 TABLET, CHEWABLE ORAL DAILY
Status: DISCONTINUED | OUTPATIENT
Start: 2023-09-07 | End: 2023-09-20 | Stop reason: HOSPADM

## 2023-09-07 RX ORDER — SODIUM CHLORIDE 0.9 % (FLUSH) 0.9 %
10 SYRINGE (ML) INJECTION
Status: COMPLETED | OUTPATIENT
Start: 2023-09-07 | End: 2023-09-07

## 2023-09-07 RX ORDER — 0.9 % SODIUM CHLORIDE 0.9 %
100 INTRAVENOUS SOLUTION INTRAVENOUS
Status: COMPLETED | OUTPATIENT
Start: 2023-09-07 | End: 2023-09-07

## 2023-09-07 RX ADMIN — POTASSIUM BICARBONATE 20 MEQ: 391 TABLET, EFFERVESCENT ORAL at 20:26

## 2023-09-07 RX ADMIN — SODIUM CHLORIDE, PRESERVATIVE FREE 10 ML: 5 INJECTION INTRAVENOUS at 17:12

## 2023-09-07 RX ADMIN — POTASSIUM BICARBONATE 20 MEQ: 391 TABLET, EFFERVESCENT ORAL at 09:20

## 2023-09-07 RX ADMIN — GADOTERIDOL 14 ML: 279.3 INJECTION, SOLUTION INTRAVENOUS at 14:26

## 2023-09-07 RX ADMIN — SODIUM CHLORIDE 100 ML: 9 INJECTION, SOLUTION INTRAVENOUS at 17:12

## 2023-09-07 RX ADMIN — POTASSIUM CHLORIDE 10 MEQ: 7.46 INJECTION, SOLUTION INTRAVENOUS at 22:29

## 2023-09-07 RX ADMIN — ENOXAPARIN SODIUM 40 MG: 100 INJECTION SUBCUTANEOUS at 09:20

## 2023-09-07 RX ADMIN — SODIUM CHLORIDE, PRESERVATIVE FREE 10 ML: 5 INJECTION INTRAVENOUS at 09:21

## 2023-09-07 RX ADMIN — POTASSIUM CHLORIDE 10 MEQ: 7.46 INJECTION, SOLUTION INTRAVENOUS at 16:58

## 2023-09-07 RX ADMIN — POTASSIUM CHLORIDE 10 MEQ: 7.46 INJECTION, SOLUTION INTRAVENOUS at 21:01

## 2023-09-07 RX ADMIN — TRAZODONE HYDROCHLORIDE 50 MG: 50 TABLET ORAL at 20:26

## 2023-09-07 RX ADMIN — MIRTAZAPINE 7.5 MG: 15 TABLET, FILM COATED ORAL at 20:26

## 2023-09-07 RX ADMIN — ASPIRIN 81 MG 81 MG: 81 TABLET ORAL at 16:31

## 2023-09-07 RX ADMIN — SODIUM CHLORIDE, PRESERVATIVE FREE 10 ML: 5 INJECTION INTRAVENOUS at 20:27

## 2023-09-07 RX ADMIN — MAGNESIUM SULFATE HEPTAHYDRATE 4000 MG: 40 INJECTION, SOLUTION INTRAVENOUS at 09:20

## 2023-09-07 RX ADMIN — WATER 10 MG: 1 INJECTION INTRAMUSCULAR; INTRAVENOUS; SUBCUTANEOUS at 01:13

## 2023-09-07 RX ADMIN — LISINOPRIL 40 MG: 20 TABLET ORAL at 09:20

## 2023-09-07 RX ADMIN — POTASSIUM BICARBONATE 20 MEQ: 391 TABLET, EFFERVESCENT ORAL at 16:06

## 2023-09-07 RX ADMIN — POTASSIUM CHLORIDE 10 MEQ: 7.46 INJECTION, SOLUTION INTRAVENOUS at 19:56

## 2023-09-07 RX ADMIN — POTASSIUM CHLORIDE 10 MEQ: 7.46 INJECTION, SOLUTION INTRAVENOUS at 23:33

## 2023-09-07 RX ADMIN — IOHEXOL 60 ML: 350 INJECTION, SOLUTION INTRAVENOUS at 17:12

## 2023-09-07 RX ADMIN — POTASSIUM CHLORIDE 10 MEQ: 7.46 INJECTION, SOLUTION INTRAVENOUS at 18:38

## 2023-09-07 NOTE — PROGRESS NOTES
[facility]ology [facility]        Inpatient Hematology / Oncology Progress Note      24 Hour Events:  Afebrile, some tachycardia and HTN. Room air. MRI Brain + acute/aubacute lacunar infarct L cerebellar. Trach/PEG tomorrow. Port postponed so trach site can heal.    ROS:  Constitutional: Positive for weakness in legs (hx polio); negative for fever, chills, malaise, fatigue. CV: Negative for chest pain, palpitations, edema. Respiratory: Negative for dyspnea, cough, wheezing. GI: Negative for nausea, abdominal pain, diarrhea. 10 point review of systems is otherwise negative with the exception of the elements mentioned above in the HPI. 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 Followed with radiation treatment. DDD (degenerative disc disease), lumbar 12/05/2022    Dehydration 9/5/2023    Hearing difficulty of both ears     Hypercholesterolemia     Hypertension     pt. states only takes his BP med twice weekly due to decrease BP after weight loss. Polio     as a child, affected left leg/ wears brace    Tongue cancer ([address])      Past Surgical History:   Procedure Laterality Date    CATARACT REMOVAL Bilateral     COLONOSCOPY      [facility]     HEENT      1/3rd tongue removed in 8/2010 and arterial graft from right wrist to tongue and second graft from right thigh to right wrist.    HEENT      precautionary trach placed in 8/2010 when having tongue removal surgery and closed one week later.     HERNIA REPAIR      MOUTH SURGERY  08/03/2010    Partial glossectomy with tonsillectomy, neck dissection with a radial forearm free flat to left oral cavity    ORTHOPEDIC SURGERY Right     pins in right leg to prevent asymmetric growth    VASECTOMY Facility-Administered Medications   Medication Dose Route Frequency Provider Last Rate Last Admin    potassium bicarb-citric acid (EFFER-K) effervescent tablet 20 mEq  20 mEq Oral TID Tran Aguilar MD   20 mEq at 09/07/23 1606    aspirin chewable tablet 81 mg  81 mg Oral Daily Tran Aguilar MD   81 mg at 09/07/23 1631    sodium chloride 0.9 % bolus 100 mL  100 mL IntraVENous ONCE PRN Tran Aguilar MD 49.6 mL/hr at 09/07/23 1712 100 mL at 09/07/23 1712    [START ON 9/8/2023] ferrous gluconate (FERGON) tablet 324 mg  324 mg Oral Daily with breakfast Lonell Walnut, APRN - CNP        [START ON 9/8/2023] ascorbic acid (VITAMIN C) tablet 250 mg  250 mg Oral Daily Lonell Aubree, APRN - CNP        loperamide (IMODIUM) capsule 2 mg  2 mg Oral 4x Daily PRN Tran Aguilar MD   2 mg at 09/06/23 1155    bismuth subsalicylate (PEPTO BISMOL) 262 MG/15ML suspension 30 mL  30 mL Oral Q6H PRN Tran Aguilar MD        mirtazapine (REMERON) tablet 7.5 mg  7.5 mg Oral Nightly Lonell Aubree, APRN - CNP   7.5 mg at 09/06/23 2021    traZODone (DESYREL) tablet 50 mg  50 mg Oral Nightly PACO Mojica - CNP   50 mg at 09/06/23 2138    melatonin tablet 3 mg  3 mg Oral Nightly PRN Sam Sanford APRN - CNP   3 mg at 09/06/23 2138    sodium chloride flush 0.9 % injection 5-40 mL  5-40 mL IntraVENous 2 times per day Dakota Peña MD   10 mL at 09/07/23 0921    sodium chloride flush 0.9 % injection 5-40 mL  5-40 mL IntraVENous PRN Dakota Peña MD        0.9 % sodium chloride infusion   IntraVENous PRN Dakota Peña MD        potassium chloride (KLOR-CON M) extended release tablet 40 mEq  40 mEq Oral PRN Dakota Peña MD        Or    potassium bicarb-citric acid (EFFER-K) effervescent tablet 40 mEq  40 mEq Oral PRN Dakota Peña MD        Or    potassium chloride 10 mEq/100 mL IVPB (Peripheral Line)  10 mEq IntraVENous PRN Dakota Peña  mL/hr at 09/07/23 1658 10 mEq at 09/07/23 1658    potassium chloride 10 mEq/100 mL IVPB

## 2023-09-07 NOTE — PROGRESS NOTES
Great drug. Only covered for diabetes though. I am ordering an A1c to see if he has diabetes, but based on his latest blood sugars, he does not.      Occupational Therapy Note:    Orders received and chart reviewed. Attempted to see patient this AM for occupational therapy evaluation session. Per nursing, patient very confused and agitated this am, requested we try again later. Will follow and re-attempt as schedule permits/patient available.      Attempt x 2, PM, JEM MRI    Thank you,    Brandin German, OT, [address]

## 2023-09-07 NOTE — PROGRESS NOTES
Hospitalist Progress Note   Admit Date:  2023  4:32 PM   Name:  Salty Collier   Age:  69 y.o. Sex:  male  :  1945   MRN:  [id number]   Room:  [facility]    Presenting/Chief Complaint: Fatigue, Fall, and Dizziness     Reason(s) for Admission: Syncope and collapse [R55]  Dehydration [E86.0]  Elevated troponin [R77.8]  Sepsis ([address]) [A41.9]  Malignant neoplasm of esophagus, unspecified location [facility]) [C15.9]     Hospital Course: Salty Collier is a 69 y.o. 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 Patient was seen at his ENT office today and plan for tracheostomy as well as PEG placement. Patient has been following up with oncology with plan for immunotherapy and chemotherapy. Patient presents emergency room due to having a fall after leaving his in the office today. Per family, patient was walking with his crutches when he got his crutch tangled with the door and subsequently falling. Patient denies any head injury or dizziness prior to the fall. However per family, patient had had syncopal episode last week and has been dehydrated due to his poor p.o. intake from his esophageal cancer. Patient was noted to be tachycardic with low normal blood pressure per EMS. Heart rate improved after 1 L bolus. In the emergency room, patient remained tachycardic into the low 100s with low normal blood pressure. Laboratory work-up is remarkable for WBC of 14.6, hemoglobin 11.2, troponin of 79.8 with repeat of 139.4. EKG with sinus tachycardia without any ischemic changes. Subjective & 24hr Events:   Patient seen and examined at bedside. Overnight patient became agitated.   No other acute events potassium bicarb-citric acid (EFFER-K) effervescent tablet 40 mEq  40 mEq Oral PRN    Or    potassium chloride 10 mEq/100 mL IVPB (Peripheral Line)  10 mEq IntraVENous PRN    potassium chloride 10 mEq/100 mL IVPB (Peripheral Line)  10 mEq IntraVENous PRN    magnesium sulfate 2000 mg in 50 mL IVPB premix  2,000 mg IntraVENous PRN    ondansetron (ZOFRAN-ODT) disintegrating tablet 4 mg  4 mg Oral Q8H PRN    Or    ondansetron (ZOFRAN) injection 4 mg  4 mg IntraVENous Q6H PRN    polyethylene glycol (GLYCOLAX) packet 17 g  17 g Oral Daily PRN    bisacodyl (DULCOLAX) suppository 10 mg  10 mg Rectal Daily PRN    famotidine (PEPCID) tablet 10 mg  10 mg Oral Daily PRN    aluminum & magnesium hydroxide-simethicone (MAALOX) 200-200-20 MG/5ML suspension 30 mL  30 mL Oral Q6H PRN    acetaminophen (TYLENOL) tablet 650 mg  650 mg Oral Q6H PRN    Or    acetaminophen (TYLENOL) suppository 650 mg  650 mg Rectal Q6H PRN    enoxaparin (LOVENOX) injection 40 mg  40 mg SubCUTAneous Daily    lisinopril (PRINIVIL;ZESTRIL) tablet 40 mg  40 mg Oral Daily    pantoprazole (PROTONIX) tablet 40 mg  40 mg Oral QAM AC    lidocaine viscous hcl (XYLOCAINE) 2 % solution 5 mL  5 mL Mouth/Throat Q3H PRN    rosuvastatin (CRESTOR) tablet 10 mg  10 mg Oral Every Other Day    traMADol (ULTRAM) tablet 50 mg  50 mg Oral Q6H PRN       Signed:  Dawn England MD    Part of this note may have been written by using a voice dictation software. The note has been proof read but may still contain some grammatical/other typographical errors.

## 2023-09-07 NOTE — PROGRESS NOTES
Comprehensive Nutrition Assessment    Type and Reason for Visit: Initial, Positive Nutrition Screen  Malnutrition Screening Tool: Malnutrition Screen  Have you recently lost weight without trying?: 2 to 13 pounds (1 point)  Have you been eating poorly because of a decreased appetite?: Yes (1 point)  Malnutrition Screening Tool Score: 2    Nutrition Recommendations/Plan:   Meals and Snacks:  Diet: Continue current order  Nutrition Supplement Therapy:  Medical food supplement therapy:  Continue Ensure Enlive three times per day (this provides 350 kcal and 20 grams protein per bottle)  Consult RD for tube feeding management once enteral access established. Malnutrition Assessment:  Malnutrition Status: Insufficient data  11% wt loss since June based on IM office # and ENT office wt on day of admission 154#  Pt off unit at MRI, unable to complete NFPE     Nutrition Assessment:  Nutrition History: Brief hx per wife at bedside, unable to consume any significant amount of food secondary to increased salivation and dysphagia. Wt hx per EMR review as below. Do You Have Any Cultural, Muslim, or Ethnic Food Preferences?: No   Nutrition Background:       PMH remarkable for squamous cell caner of left lateral tongue s/p partial glossectomy w tonsillectomy, recurrence of squamous cell cancer, polio as a child. Presented from outpt office s/p fall - plan for trach, PEG and port were pending outpt. Admitted with sepsis, syncope and collapse, elevated troponin, squamous cell carcinoma of oropharynx, hypokalemia, moderate malnutrition. IR consulted for port and PEG placement. Nutrition Interval:  Pt off unit at MRI at attempted RD visit. Wife at bedside. Discussed with Donte Valderrama RN. Current Nutrition Therapies:  ADULT ORAL NUTRITION SUPPLEMENT; Breakfast, Lunch, Dinner; Standard High Calorie/High Protein Oral Supplement  ADULT DIET;  Full Liquid  ADULT ORAL NUTRITION SUPPLEMENT; Breakfast, Lunch, Dinner; Standard High Calorie/High Protein Oral Supplement    Current Intake:   Average Meal Intake:  (ate soup for lunch) Average Supplements Intake: Unable to assess      Anthropometric Measures:  Height: 5' 11\" (180.3 cm)  Current Body Wt: 150 lb (68 kg), Weight source: Stated  BMI: 20.9, Underweight (BMI less than 22) age over 72  Admission Body Weight: 150 lb (68 kg) (stated)  Ideal Body Weight (Kg) (Calculated): 78 kg (172 lbs), 87.2 %  Usual Body Wt: 173 lb (78.5 kg) (IM office records Nov 2022 and June 2023, 198# 7/7/23, 154# ENT office 9/5/23), Percent weight change: -13.3       BMI Category Underweight (BMI less than 22) age over 72  Estimated Daily Nutrient Needs:  Energy (kcal/day): 0264-7871 (25-30 kcal/kg) (Kcal/kg Weight used: 70 kg (wt at ENT office 9/5 prior to admisison) Other (Comment)  Protein (g/day):  (1.2-1.5 g/kg) Weight Used: (Other (Comment) (as above)) 70 kg  Fluid (ml/day):   (1 ml/kcal)    Nutrition Diagnosis:   Inadequate oral intake related to swallowing difficulty as evidenced by  (H&N squamous cell ca)  Nutrition Interventions:   Food and/or Nutrient Delivery: Continue Current Diet, Continue Oral Nutrition Supplement     Coordination of Nutrition Care: Continue to monitor while inpatient, Interdisciplinary Rounds      Goals: Active Goal: Meet at least 75% of estimated needs, by next RD assessment       Nutrition Monitoring and Evaluation:      Food/Nutrient Intake Outcomes: Food and Nutrient Intake, Supplement Intake, Other (Comment) (EN access)  Physical Signs/Symptoms Outcomes: Biochemical Data, GI Status, Fluid Status or Edema, Meal Time Behavior, Weight    Discharge Planning:     Too soon to determine    GIO JARVIS RD

## 2023-09-07 NOTE — PROGRESS NOTES
ACUTE PHYSICAL THERAPY GOALS:   (Developed with and agreed upon by patient and/or caregiver.)  Pt will perform supine to/from sit with mod I in 7 treatment days. Pt will perform sit to/from stand with min A and LRAD in 7 treatment days. Pt will ambulate 48' with min A and LRAD in 7 treatment days. Pt will negotiate 2 stairs with min A  and LRAD in 7 treatment days. Pt will be independent with HEP in 7 days. PHYSICAL THERAPY Initial Assessment, Daily Note, and AM  (Link to Caseload Tracking: PT Visit Days : 1  Acknowledge Orders  Time In/Out  PT Charge Capture  Rehab Caseload Tracker    Tigre Howard is a 69 y.o. male   PRIMARY DIAGNOSIS: Sepsis ([address])  Syncope and collapse [R55]  Dehydration [E86.0]  Elevated troponin [R77.8]  Sepsis ([address]) [A41.9]  Malignant neoplasm of esophagus, unspecified location ([address]) [C15.9]       Reason for Referral: Generalized Muscle Weakness (M62.81)  Other lack of cordination (R27.8)  Difficulty in walking, Not elsewhere classified (R26.2)  Other abnormalities of gait and mobility (R26.89)  History of falling (Z91.81)  Inpatient: Payor: MEDICARE / Plan: MEDICARE PART A AND B / Product Type: *No Product type* /     ASSESSMENT:     REHAB RECOMMENDATIONS:   Recommendation to date pending progress:  Setting:  Inpatient Rehab Facility    Equipment:     Manual WC     ASSESSMENT:  Mr. Nick Rodriges is a 69 y.o. male with hx of polio and residual LLE weakness admitted with sepsis and progressive weakness over past few days. Upon PT evaluation, pt exhibits significant weakness (LLE>RLE) and impaired sitting/standing balance resulting in limited independence with functional mobility. At baseline, pt is mod I for all mobility with crutches and KAFO. Pt is now requiring min A for bed mobility, mod A for squat pivot transfers, and is unable to stand with max A and RW. Pt is highly motivated to return to his mod I PLOF and was very active at his baseline 5 days ago.   He Oxygen      IV    RESTRICTIONS/PRECAUTIONS:  Restrictions/Precautions: Fall Risk                 GROSS EVALUATION:  Intact Impaired (Comments):   AROM []     PROM [x]    Strength []  L ankle/knee 0/5, L hip 2/5.   R ankle and knee 2/5, R hip flexor 3/5   Balance []  Sitting balance fair, unable to stand   Posture [] N/A  Forward Head  Rounded Shoulders   Sensation [x]     Coordination [x]      Tone []  LLE flaccid, RLE hypotonic   Edema [x]    Activity Tolerance []  Limited due to weakness    []      COGNITION/  PERCEPTION: Intact Impaired (Comments):   Orientation [x]  Oriented x 4   Vision [x]  Intact   Hearing [x]     Cognition  []  Poor safety awareness at times     MOBILITY: I Mod I S SBA CGA Min Mod Max Total  NT x2 Comments:   Bed Mobility    Rolling [] [] [] [x] [] [] [] [] [] [] []    Supine to Sit [] [] [] [] [] [x] [] [] [] [] []    Scooting [] [] [] [] [] [x] [] [] [] [] []    Sit to Supine [] [] [] [] [] [x] [] [] [] [] []    Transfers    Sit to Stand [] [] [] [] [] [x] [] [] [] [] []    Bed to Chair [] [] [] [] [] [x] [] [] [] [] []    Stand to Sit [] [] [] [] [] [x] [] [] [] [] []     [] [] [] [] [] [] [] [] [] [] []    I=Independent, Mod I=Modified Independent, S=Supervision, SBA=Standby Assistance, CGA=Contact Guard Assistance,   Min=Minimal Assistance, Mod=Moderate Assistance, Max=Maximal Assistance, Total=Total Assistance, NT=Not Tested    GAIT: I Mod I S SBA CGA Min Mod Max Total  NT x2 Comments:   Level of Assistance [] [] [] [] [] [] [] [] [] [x] []    Distance   feet    DME N/A    Gait Quality N/A    Weightbearing Status Restrictions/Precautions  Restrictions/Precautions: Fall Risk    Stairs      I=Independent, Mod I=Modified Independent, S=Supervision, SBA=Standby Assistance, CGA=Contact Guard Assistance,   Min=Minimal Assistance, Mod=Moderate Assistance, Max=Maximal Assistance, Total=Total Assistance, NT=Not Tested    PLAN:   FREQUENCY AND DURATION: 3 times/week for duration of hospital

## 2023-09-07 NOTE — PROGRESS NOTES
Physical Therapy Attempt Note    Attempted to see pt this AM for physical therapy session. RN states that pt is confused and agitated and to try later. Attempted again this afternoon, but pt off floor at MRI. Will attempt again as able.     Renetta Sanches, PT

## 2023-09-07 NOTE — PROGRESS NOTES
Hourly rounds performed this shift. Bed lowered and locked. Call light within reach. Pt receiving IV potassium at this time. MRI and CT scan performed today. Provider discussed results of MRI with family at the bedside. Pt will be NPO tonight at midnight for surgery scheduled for tomorrow around lunch. Redness noted to the pt's coccyx. Dressing applied to area. Pt has been alert and cooperative with care most of the shift starting around 0900 when family arrived. Telesitter at the bedside. Pt is aware of procedure scheduled for tomorrow and NPO status that starts at midnight. Lovenox to be held in the morning. All needs met at this time. Bedside shift report will be given to oncoming nurse.

## 2023-09-07 NOTE — PROGRESS NOTES
Interventional Radiology Inpatient Communication       Interventional Radiology has received a consult for G-tube placement. Any diagnostic labs to be performed on collected specimen must be entered into [facility] prior to transport to Interventional Radiology. We anticipate this procedure will be completed on 09/08/23. We will defer placing a port on this patient until the g-tube and trach placement are healing to reduce the risk of infection        Primary Care Nurse: Q, RN        Please ensure the following is completed:     Patient is NPO: Yes    Blood thinners held: Yes    Patient must have working IV: Yes       Patient must be in a hospital gown with snaps and be transported via stretcher to Interventional Radiology. Please send hospital chart and labels. If the patient is unable to provide consent for the procedure, please contact Interventional Radiology at [phone].

## 2023-09-07 NOTE — PLAN OF CARE
Problem: Discharge Planning  Goal: Discharge to home or other facility with appropriate resources  9/7/2023 0048 by Francis Maxwell RN  Outcome: Progressing  9/6/2023 1127 by Lina Isaacs RN  Outcome: Progressing     Problem: Pain  Goal: Verbalizes/displays adequate comfort level or baseline comfort level  9/7/2023 0048 by Francis Maxewll RN  Outcome: Progressing  9/6/2023 1127 by Lina Isaacs RN  Outcome: Progressing     Problem: ABCDS Injury Assessment  Goal: Absence of physical injury  9/7/2023 0048 by Francis Maxwell RN  Outcome: Progressing  9/6/2023 1127 by Lina Isaacs RN  Outcome: Progressing     Problem: Safety - Adult  Goal: Free from fall injury  9/7/2023 0048 by Francis Maxwell RN  Outcome: Progressing  9/6/2023 1127 by Lina Isaacs RN  Outcome: Progressing

## 2023-09-07 NOTE — PROGRESS NOTES
Pt is irritated and confused. He is refusing any nursing care at this time. Telesitter is in place. Pt is not combative or pulling at lines. Charge nurse and provider aware.

## 2023-09-08 ENCOUNTER — APPOINTMENT (OUTPATIENT)
Dept: INTERVENTIONAL RADIOLOGY/VASCULAR | Age: 78
DRG: 004 | End: 2023-09-08
Attending: INTERNAL MEDICINE
Payer: MEDICARE

## 2023-09-08 ENCOUNTER — ANESTHESIA (OUTPATIENT)
Dept: SURGERY | Age: 78
End: 2023-09-08
Payer: MEDICARE

## 2023-09-08 LAB
ABO + RH BLD: NORMAL
ANION GAP SERPL CALC-SCNC: 5 MMOL/L (ref 2–11)
BASOPHILS # BLD: 0 K/UL (ref 0–0.2)
BASOPHILS NFR BLD: 0 % (ref 0–2)
BLOOD GROUP ANTIBODIES SERPL: NORMAL
BUN SERPL-MCNC: 7 MG/DL (ref 8–23)
CALCIUM SERPL-MCNC: 8.2 MG/DL (ref 8.3–10.4)
CHLORIDE SERPL-SCNC: 110 MMOL/L (ref 101–110)
CHOLEST SERPL-MCNC: 111 MG/DL
CO2 SERPL-SCNC: 25 MMOL/L (ref 21–32)
CREAT SERPL-MCNC: 0.4 MG/DL (ref 0.8–1.5)
DIFFERENTIAL METHOD BLD: ABNORMAL
EOSINOPHIL # BLD: 0.1 K/UL (ref 0–0.8)
EOSINOPHIL NFR BLD: 2 % (ref 0.5–7.8)
ERYTHROCYTE [DISTWIDTH] IN BLOOD BY AUTOMATED COUNT: 13.1 % (ref 11.9–14.6)
GLUCOSE SERPL-MCNC: 104 MG/DL (ref 65–100)
HCT VFR BLD AUTO: 30.8 % (ref 41.1–50.3)
HDLC SERPL-MCNC: 30 MG/DL (ref 40–60)
HDLC SERPL: 3.7
HGB BLD-MCNC: 10.3 G/DL (ref 13.6–17.2)
IMM GRANULOCYTES # BLD AUTO: 0 K/UL (ref 0–0.5)
IMM GRANULOCYTES NFR BLD AUTO: 1 % (ref 0–5)
LDLC SERPL CALC-MCNC: 66.4 MG/DL
LYMPHOCYTES # BLD: 0.8 K/UL (ref 0.5–4.6)
LYMPHOCYTES NFR BLD: 9 % (ref 13–44)
MCH RBC QN AUTO: 29 PG (ref 26.1–32.9)
MCHC RBC AUTO-ENTMCNC: 33.4 G/DL (ref 31.4–35)
MCV RBC AUTO: 86.8 FL (ref 82–102)
MONOCYTES # BLD: 0.9 K/UL (ref 0.1–1.3)
MONOCYTES NFR BLD: 11 % (ref 4–12)
NEUTS SEG # BLD: 6.4 K/UL (ref 1.7–8.2)
NEUTS SEG NFR BLD: 77 % (ref 43–78)
NRBC # BLD: 0 K/UL (ref 0–0.2)
PLATELET # BLD AUTO: 283 K/UL (ref 150–450)
PMV BLD AUTO: 9.7 FL (ref 9.4–12.3)
POTASSIUM SERPL-SCNC: 3.9 MMOL/L (ref 3.5–5.1)
POTASSIUM SERPL-SCNC: 4.4 MMOL/L (ref 3.5–5.1)
RBC # BLD AUTO: 3.55 M/UL (ref 4.23–5.6)
SODIUM SERPL-SCNC: 140 MMOL/L (ref 133–143)
SPECIMEN EXP DATE BLD: NORMAL
TRIGL SERPL-MCNC: 73 MG/DL (ref 35–150)
VLDLC SERPL CALC-MCNC: 14.6 MG/DL (ref 6–23)
WBC # BLD AUTO: 8.3 K/UL (ref 4.3–11.1)

## 2023-09-08 PROCEDURE — 2709999900 HC NON-CHARGEABLE SUPPLY: Performed by: STUDENT IN AN ORGANIZED HEALTH CARE EDUCATION/TRAINING PROGRAM

## 2023-09-08 PROCEDURE — 6360000002 HC RX W HCPCS: Performed by: NURSE ANESTHETIST, CERTIFIED REGISTERED

## 2023-09-08 PROCEDURE — 85025 COMPLETE CBC W/AUTO DIFF WBC: CPT

## 2023-09-08 PROCEDURE — 2580000003 HC RX 258: Performed by: NURSE PRACTITIONER

## 2023-09-08 PROCEDURE — 2580000003 HC RX 258: Performed by: FAMILY MEDICINE

## 2023-09-08 PROCEDURE — 6360000002 HC RX W HCPCS: Performed by: RADIOLOGY

## 2023-09-08 PROCEDURE — C1769 GUIDE WIRE: HCPCS

## 2023-09-08 PROCEDURE — 86850 RBC ANTIBODY SCREEN: CPT

## 2023-09-08 PROCEDURE — 2700000000 HC OXYGEN THERAPY PER DAY

## 2023-09-08 PROCEDURE — 31600 PLANNED TRACHEOSTOMY: CPT | Performed by: STUDENT IN AN ORGANIZED HEALTH CARE EDUCATION/TRAINING PROGRAM

## 2023-09-08 PROCEDURE — 0DH63UZ INSERTION OF FEEDING DEVICE INTO STOMACH, PERCUTANEOUS APPROACH: ICD-10-PCS | Performed by: RADIOLOGY

## 2023-09-08 PROCEDURE — 36415 COLL VENOUS BLD VENIPUNCTURE: CPT

## 2023-09-08 PROCEDURE — 6370000000 HC RX 637 (ALT 250 FOR IP): Performed by: INTERNAL MEDICINE

## 2023-09-08 PROCEDURE — 86900 BLOOD TYPING SEROLOGIC ABO: CPT

## 2023-09-08 PROCEDURE — 3700000001 HC ADD 15 MINUTES (ANESTHESIA): Performed by: STUDENT IN AN ORGANIZED HEALTH CARE EDUCATION/TRAINING PROGRAM

## 2023-09-08 PROCEDURE — 2580000003 HC RX 258: Performed by: INTERNAL MEDICINE

## 2023-09-08 PROCEDURE — 2500000003 HC RX 250 WO HCPCS: Performed by: RADIOLOGY

## 2023-09-08 PROCEDURE — 2580000003 HC RX 258: Performed by: STUDENT IN AN ORGANIZED HEALTH CARE EDUCATION/TRAINING PROGRAM

## 2023-09-08 PROCEDURE — 6360000004 HC RX CONTRAST MEDICATION: Performed by: RADIOLOGY

## 2023-09-08 PROCEDURE — 0B110F4 BYPASS TRACHEA TO CUTANEOUS WITH TRACHEOSTOMY DEVICE, OPEN APPROACH: ICD-10-PCS | Performed by: STUDENT IN AN ORGANIZED HEALTH CARE EDUCATION/TRAINING PROGRAM

## 2023-09-08 PROCEDURE — 3600000012 HC SURGERY LEVEL 2 ADDTL 15MIN: Performed by: STUDENT IN AN ORGANIZED HEALTH CARE EDUCATION/TRAINING PROGRAM

## 2023-09-08 PROCEDURE — 2500000003 HC RX 250 WO HCPCS: Performed by: STUDENT IN AN ORGANIZED HEALTH CARE EDUCATION/TRAINING PROGRAM

## 2023-09-08 PROCEDURE — 99221 1ST HOSP IP/OBS SF/LOW 40: CPT | Performed by: PSYCHIATRY & NEUROLOGY

## 2023-09-08 PROCEDURE — 6370000000 HC RX 637 (ALT 250 FOR IP): Performed by: RADIOLOGY

## 2023-09-08 PROCEDURE — 2580000003 HC RX 258: Performed by: RADIOLOGY

## 2023-09-08 PROCEDURE — 84132 ASSAY OF SERUM POTASSIUM: CPT

## 2023-09-08 PROCEDURE — 80048 BASIC METABOLIC PNL TOTAL CA: CPT

## 2023-09-08 PROCEDURE — 80061 LIPID PANEL: CPT

## 2023-09-08 PROCEDURE — 86901 BLOOD TYPING SEROLOGIC RH(D): CPT

## 2023-09-08 PROCEDURE — 6370000000 HC RX 637 (ALT 250 FOR IP)

## 2023-09-08 PROCEDURE — 2500000003 HC RX 250 WO HCPCS: Performed by: NURSE ANESTHETIST, CERTIFIED REGISTERED

## 2023-09-08 PROCEDURE — 3600000002 HC SURGERY LEVEL 2 BASE: Performed by: STUDENT IN AN ORGANIZED HEALTH CARE EDUCATION/TRAINING PROGRAM

## 2023-09-08 PROCEDURE — 2500000003 HC RX 250 WO HCPCS: Performed by: NURSE PRACTITIONER

## 2023-09-08 PROCEDURE — 2580000003 HC RX 258: Performed by: NURSE ANESTHETIST, CERTIFIED REGISTERED

## 2023-09-08 PROCEDURE — 6370000000 HC RX 637 (ALT 250 FOR IP): Performed by: NURSE PRACTITIONER

## 2023-09-08 PROCEDURE — 2000000000 HC ICU R&B

## 2023-09-08 PROCEDURE — 3700000000 HC ANESTHESIA ATTENDED CARE: Performed by: STUDENT IN AN ORGANIZED HEALTH CARE EDUCATION/TRAINING PROGRAM

## 2023-09-08 PROCEDURE — 99152 MOD SED SAME PHYS/QHP 5/>YRS: CPT

## 2023-09-08 PROCEDURE — 2500000003 HC RX 250 WO HCPCS: Performed by: FAMILY MEDICINE

## 2023-09-08 RX ORDER — DEXMEDETOMIDINE HYDROCHLORIDE 4 UG/ML
.1-1.5 INJECTION, SOLUTION INTRAVENOUS CONTINUOUS
Status: DISCONTINUED | OUTPATIENT
Start: 2023-09-08 | End: 2023-09-09

## 2023-09-08 RX ORDER — KETAMINE HYDROCHLORIDE 50 MG/ML
INJECTION, SOLUTION, CONCENTRATE INTRAMUSCULAR; INTRAVENOUS PRN
Status: DISCONTINUED | OUTPATIENT
Start: 2023-09-08 | End: 2023-09-08 | Stop reason: SDUPTHER

## 2023-09-08 RX ORDER — SODIUM CHLORIDE 9 MG/ML
INJECTION, SOLUTION INTRAVENOUS PRN
Status: DISCONTINUED | OUTPATIENT
Start: 2023-09-08 | End: 2023-09-08 | Stop reason: HOSPADM

## 2023-09-08 RX ORDER — DEXMEDETOMIDINE HYDROCHLORIDE 4 UG/ML
INJECTION, SOLUTION INTRAVENOUS CONTINUOUS PRN
Status: DISCONTINUED | OUTPATIENT
Start: 2023-09-08 | End: 2023-09-08 | Stop reason: SDUPTHER

## 2023-09-08 RX ORDER — PROPOFOL 10 MG/ML
INJECTION, EMULSION INTRAVENOUS PRN
Status: DISCONTINUED | OUTPATIENT
Start: 2023-09-08 | End: 2023-09-08 | Stop reason: SDUPTHER

## 2023-09-08 RX ORDER — CEFAZOLIN SODIUM 1 G/3ML
INJECTION, POWDER, FOR SOLUTION INTRAMUSCULAR; INTRAVENOUS PRN
Status: DISCONTINUED | OUTPATIENT
Start: 2023-09-08 | End: 2023-09-08 | Stop reason: SDUPTHER

## 2023-09-08 RX ORDER — LIDOCAINE HYDROCHLORIDE 20 MG/ML
SOLUTION OROPHARYNGEAL PRN
Status: COMPLETED | OUTPATIENT
Start: 2023-09-08 | End: 2023-09-08

## 2023-09-08 RX ORDER — SODIUM CHLORIDE 0.9 % (FLUSH) 0.9 %
5-40 SYRINGE (ML) INJECTION PRN
Status: DISCONTINUED | OUTPATIENT
Start: 2023-09-08 | End: 2023-09-08 | Stop reason: HOSPADM

## 2023-09-08 RX ORDER — FENTANYL CITRATE 50 UG/ML
INJECTION, SOLUTION INTRAMUSCULAR; INTRAVENOUS PRN
Status: COMPLETED | OUTPATIENT
Start: 2023-09-08 | End: 2023-09-08

## 2023-09-08 RX ORDER — SODIUM CHLORIDE, SODIUM LACTATE, POTASSIUM CHLORIDE, CALCIUM CHLORIDE 600; 310; 30; 20 MG/100ML; MG/100ML; MG/100ML; MG/100ML
INJECTION, SOLUTION INTRAVENOUS CONTINUOUS
Status: DISCONTINUED | OUTPATIENT
Start: 2023-09-08 | End: 2023-09-11

## 2023-09-08 RX ORDER — SODIUM CHLORIDE 0.9 % (FLUSH) 0.9 %
5-40 SYRINGE (ML) INJECTION EVERY 12 HOURS SCHEDULED
Status: DISCONTINUED | OUTPATIENT
Start: 2023-09-08 | End: 2023-09-08 | Stop reason: HOSPADM

## 2023-09-08 RX ORDER — FENTANYL CITRATE 50 UG/ML
100 INJECTION, SOLUTION INTRAMUSCULAR; INTRAVENOUS
Status: DISCONTINUED | OUTPATIENT
Start: 2023-09-08 | End: 2023-09-08 | Stop reason: HOSPADM

## 2023-09-08 RX ORDER — LIDOCAINE HYDROCHLORIDE 20 MG/ML
INJECTION, SOLUTION INFILTRATION; PERINEURAL PRN
Status: COMPLETED | OUTPATIENT
Start: 2023-09-08 | End: 2023-09-08

## 2023-09-08 RX ORDER — MIDAZOLAM HYDROCHLORIDE 1 MG/ML
INJECTION INTRAMUSCULAR; INTRAVENOUS PRN
Status: COMPLETED | OUTPATIENT
Start: 2023-09-08 | End: 2023-09-08

## 2023-09-08 RX ORDER — MIDAZOLAM HYDROCHLORIDE 2 MG/2ML
2 INJECTION, SOLUTION INTRAMUSCULAR; INTRAVENOUS
Status: DISCONTINUED | OUTPATIENT
Start: 2023-09-08 | End: 2023-09-08 | Stop reason: HOSPADM

## 2023-09-08 RX ORDER — LIDOCAINE HYDROCHLORIDE 10 MG/ML
1 INJECTION, SOLUTION INFILTRATION; PERINEURAL
Status: DISCONTINUED | OUTPATIENT
Start: 2023-09-08 | End: 2023-09-08 | Stop reason: HOSPADM

## 2023-09-08 RX ADMIN — GLUCAGON HYDROCHLORIDE 1 MG: KIT at 15:02

## 2023-09-08 RX ADMIN — CEFAZOLIN SODIUM 2 G: 1 INJECTION, POWDER, FOR SOLUTION INTRAMUSCULAR; INTRAVENOUS at 17:59

## 2023-09-08 RX ADMIN — WATER 5 MG: 1 INJECTION INTRAMUSCULAR; INTRAVENOUS; SUBCUTANEOUS at 21:35

## 2023-09-08 RX ADMIN — WATER 2000 MG: 1 INJECTION INTRAMUSCULAR; INTRAVENOUS; SUBCUTANEOUS at 14:57

## 2023-09-08 RX ADMIN — FENTANYL CITRATE 25 MCG: 50 INJECTION INTRAMUSCULAR; INTRAVENOUS at 17:41

## 2023-09-08 RX ADMIN — PROPOFOL 20 MG: 10 INJECTION, EMULSION INTRAVENOUS at 17:43

## 2023-09-08 RX ADMIN — FENTANYL CITRATE 25 MCG: 50 INJECTION INTRAMUSCULAR; INTRAVENOUS at 18:26

## 2023-09-08 RX ADMIN — SODIUM CHLORIDE, PRESERVATIVE FREE 10 ML: 5 INJECTION INTRAVENOUS at 09:54

## 2023-09-08 RX ADMIN — PROPOFOL 40 MG: 10 INJECTION, EMULSION INTRAVENOUS at 17:50

## 2023-09-08 RX ADMIN — KETAMINE HYDROCHLORIDE 20 MG: 50 INJECTION, SOLUTION INTRAMUSCULAR; INTRAVENOUS at 17:45

## 2023-09-08 RX ADMIN — LIDOCAINE HYDROCHLORIDE 5 ML: 20 INJECTION, SOLUTION INFILTRATION; PERINEURAL at 14:58

## 2023-09-08 RX ADMIN — PANTOPRAZOLE SODIUM 40 MG: 40 TABLET, DELAYED RELEASE ORAL at 05:26

## 2023-09-08 RX ADMIN — PHENYLEPHRINE HYDROCHLORIDE 200 MCG: 10 INJECTION INTRAVENOUS at 18:01

## 2023-09-08 RX ADMIN — TRAZODONE HYDROCHLORIDE 50 MG: 50 TABLET ORAL at 20:50

## 2023-09-08 RX ADMIN — SODIUM CHLORIDE, POTASSIUM CHLORIDE, SODIUM LACTATE AND CALCIUM CHLORIDE: 600; 310; 30; 20 INJECTION, SOLUTION INTRAVENOUS at 16:34

## 2023-09-08 RX ADMIN — LIDOCAINE HYDROCHLORIDE 10 ML: 20 SOLUTION ORAL at 14:49

## 2023-09-08 RX ADMIN — BENZOCAINE 0.5 PACKAGE: 220 SPRAY, METERED PERIODONTAL at 14:49

## 2023-09-08 RX ADMIN — PHENYLEPHRINE HYDROCHLORIDE 200 MCG: 10 INJECTION INTRAVENOUS at 18:05

## 2023-09-08 RX ADMIN — MIDAZOLAM 1 MG: 1 INJECTION INTRAMUSCULAR; INTRAVENOUS at 14:53

## 2023-09-08 RX ADMIN — FENTANYL CITRATE 50 MCG: 50 INJECTION, SOLUTION INTRAMUSCULAR; INTRAVENOUS at 14:53

## 2023-09-08 RX ADMIN — FENTANYL CITRATE 50 MCG: 50 INJECTION INTRAMUSCULAR; INTRAVENOUS at 18:22

## 2023-09-08 RX ADMIN — SODIUM CHLORIDE, POTASSIUM CHLORIDE, SODIUM LACTATE AND CALCIUM CHLORIDE: 600; 310; 30; 20 INJECTION, SOLUTION INTRAVENOUS at 18:57

## 2023-09-08 RX ADMIN — PHENYLEPHRINE HYDROCHLORIDE 200 MCG: 10 INJECTION INTRAVENOUS at 18:26

## 2023-09-08 RX ADMIN — MIRTAZAPINE 7.5 MG: 15 TABLET, FILM COATED ORAL at 20:50

## 2023-09-08 RX ADMIN — IOHEXOL 10 ML: 300 INJECTION, SOLUTION INTRAVENOUS at 14:59

## 2023-09-08 RX ADMIN — POTASSIUM BICARBONATE 20 MEQ: 391 TABLET, EFFERVESCENT ORAL at 20:49

## 2023-09-08 RX ADMIN — PROPOFOL 20 MG: 10 INJECTION, EMULSION INTRAVENOUS at 17:47

## 2023-09-08 RX ADMIN — KETAMINE HYDROCHLORIDE 20 MG: 50 INJECTION, SOLUTION INTRAMUSCULAR; INTRAVENOUS at 17:42

## 2023-09-08 RX ADMIN — DEXMEDETOMIDINE HYDROCHLORIDE 0.5 MCG/KG/HR: 4 INJECTION, SOLUTION INTRAVENOUS at 17:39

## 2023-09-08 RX ADMIN — SODIUM CHLORIDE, PRESERVATIVE FREE 10 ML: 5 INJECTION INTRAVENOUS at 21:02

## 2023-09-08 RX ADMIN — DEXMEDETOMIDINE 0.2 MCG/KG/HR: 100 INJECTION, SOLUTION INTRAVENOUS at 23:37

## 2023-09-08 ASSESSMENT — PAIN - FUNCTIONAL ASSESSMENT
PAIN_FUNCTIONAL_ASSESSMENT: NONE - DENIES PAIN
PAIN_FUNCTIONAL_ASSESSMENT: 0-10

## 2023-09-08 ASSESSMENT — PAIN SCALES - GENERAL: PAINLEVEL_OUTOF10: 0

## 2023-09-08 NOTE — BRIEF OP NOTE
Surgical Assist Brief Postoperative Note      Patient: Jacklyn Rico  YOB: 1945  MRN: [id number]    Date of Procedure: 9/8/2023    Pre-Op Diagnosis Codes:     * Squamous cell carcinoma of oropharynx ([address]) [C10.9]    Post-Op Diagnosis: Same       Procedure(s):  TRACHEOTOMY    Surgeon(s):  Meghan Carrera MD    Assistant:  Mathieu Abarca PA-C    Anesthesia: General    Estimated Blood Loss (mL): Minimal    Complications: None    Specimens:   * No specimens in log *    Implants:  Shiley 6 trach with cuff      Drains:   Gastrostomy/Enterostomy/Jejunostomy Tube LUQ 1 18 fr (Active)       My assistance was required in the identification and protection of the airway during the tracheotomy. Furthermore I assisted in securing the trach in place with sutures and applied dressing to the surgical site at conclusion of the case.        Electronically signed by Mathieu Abarca [address] [place] [address] on 9/8/2023 at 6:29 PM

## 2023-09-08 NOTE — PERIOP NOTE
Pt. To go to IR for placement of G-tube and then will  come to preop. Tiffany Hdez at [facility] notified.

## 2023-09-08 NOTE — PROGRESS NOTES
Occupational Therapy Hold Note    Pt currently on hold for occupational therapy per RN due to awaiting transport to OR for trach/PEG placement. Will plan to provide OT treatment once pt is medically appropriate to resume OT services.     Cecelia Garvin, OT

## 2023-09-08 NOTE — CONSULTS
[facility] [place] [id number] [address]            Meeta New is a 69 y.o. male who presents on referral from the in-hospital service. We are asked to see him with reference to syncope and falls. History obtained from his wife today it is quite clear that the patient did not have syncope he tripped and fell. The patient was being actively prepared for a tracheostomy and surgical procedure at the time consultation was received. Extensive review of the medical records reveals that this gentleman had squamous cell carcinoma of the tongue 15 years ago which was treated with surgery neck dissection and radial forearm free flap subsequently had radiation therapy. He was followed until 2015 at which time he was discharged from routine ENT care and did well until recent symptomatology initially felt to be sinusitis and subsequently seen by ENT oncology in [place] with invasive squamous cell malignancy to be started with chemotherapy by Dr. Sydnee Ling  The patient did have polio as a child. During his high school years he regained functional usage of the lower extremities he did not develop so-called postpolio syndrome but has experienced as is not uncommon some secondary deterioration in late life and has required a walker for the past 5 or 6 years and his balance is clearly deteriorated in the last year  MRI brain demonstrates presence of an acute cerebellar punctate lesion-it is sufficiently tiny that I would not frankly call with an infarction      Past Medical History:   Diagnosis Date    Back problem     Cancer ([address])     cancerous tumor to tongue, 1/3rd tongue removed in Aug. 2010. Followed with radiation treatment.     DDD (degenerative disc disease), lumbar 12/05/2022    Dehydration 9/5/2023    Hearing difficulty of both ears     Hypercholesterolemia     Hypertension     pt. states only takes his BP med twice weekly due to decrease BP after weight hemisphere. 2. Cerebral volume loss. 3. White matter findings compatible with mild chronic small vessel ischemic  disease. Most recent MRA   No results found for this or any previous visit. Most recent CTA  Results for orders placed during the hospital encounter of 09/05/23    CTA HEAD NECK W CONTRAST    Narrative  CTA HEAD WITH CONTRAST. CTA NECK WITH CONTRAST. DATE: 9/7/2023 4:15 PM    INDICATION: CVA left cerebellar infarct. COMPARISON: MRI brain from earlier today. TECHNIQUE:   Thin section axial images through the head and neck after IV  contrast. Two dimensional and three dimensional reformations. NASCET criteria  used to determine stenosis. 50 mL Isovue 370 given IV. Low-dose CT acquisition  technique included one of the following options: 1. Automated exposure control,  2. Adjustment of mA and/or KV according to patient's size and/or 3. Use of  iterative reconstruction. FINDINGS:        CTA NECK:    Aortic arch: Normal triple vessel anatomy. Atherosclerosis. Right common carotid artery: Normal origin. Calcified and noncalcified plaque  distally. No significant stenosis, occlusion. Right internal carotid artery: Calcified and noncalcified plaque proximally. Retropharyngeal course. Pseudoaneurysms in the proximal and mid cervical segment  ranging up to 2 x 4 mm. Calcifications distally. No significant stenosis,  occlusion. Right vertebral artery: Normal origin. No significant stenosis, occlusion. Left common carotid artery: Normal origin. Calcified and noncalcified plaque  distally. No significant stenosis, occlusion. Left internal carotid artery: Calcified and noncalcified plaque proximally. Calcifications distally. No significant stenosis, occlusion. Left vertebral artery: Calcified and noncalcified plaque at the origin with  moderate to severe stenosis . Demarco Running No  other significant stenosis, occlusion. Parotid  glands are intact.  Posterior nasopharynx,

## 2023-09-08 NOTE — PROGRESS NOTES
Physician Progress Note      PATIENT:               Earvin Lanes  CSN #:                  [id number]  :                       1945  ADMIT DATE:       2023 4:32 PM  [facility] DATE:  RESPONDING  PROVIDER #:        Tran Aguilar MD          QUERY TEXT:    Patient admitted s/p fall, noted to have weakness, falls and syncopal episode. 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 - Weakness & falls  -MRI brain ordered   Brain MRI completed and showed acute to early subacute lacunar infarct in   the L cerebellar hemisphere. Hospitalist has consulted Neurology & CTA   head/neck. 81 ASA started. Overnight patient confused/agitated. Treatment: Neuro/cards/onc consults, MRI/CTA  Options provided:  -- Syncope due to Stroke  -- Syncope due to dehydration  -- Syncope due to Vaso-vagal  -- Other - I will add my own diagnosis  -- Disagree - Not applicable / Not valid  -- Disagree - Clinically unable to determine / Unknown  -- Refer to Clinical Documentation Reviewer    PROVIDER RESPONSE TEXT:    The syncope is due to dehydration.     Query created by: Karina Gilliam on 2023 2:25 PM      Electronically signed by:  Tran Aguilar MD 2023 3:07 PM

## 2023-09-08 NOTE — OP NOTE
Department of Interventional Radiology  [phone]        Interventional Radiology Brief Procedure Note    Patient: Kay Jay MRN: [id number]  SSN: [id number]    YOB: 1945  Age: 69 y.o.   Sex: male      Date of Procedure: 9/8/2023    Pre-Procedure Diagnosis: malnutrition    Post-Procedure Diagnosis: SAME    Procedure(s): Gastrostomy    Brief Description of Procedure: as above    Performed By: Kim Lentz MD     Assistants: None    Anesthesia:Moderate Sedation    Estimated Blood Loss: Less than 10ml    Specimens:  None    Implants:  18 F g tube    Findings: tube into the body of the stomach      Complications: None    Recommendations: ok to use      Follow Up: 2 weeks for pexy removal    Signed By: Kim Lentz MD     September 8, 2023

## 2023-09-08 NOTE — PLAN OF CARE
Problem: Discharge Planning  Goal: Discharge to home or other facility with appropriate resources  9/8/2023 0735 by Sue Corrales RN  Outcome: Progressing  9/8/2023 0107 by Ebenezer Murray RN  Outcome: Progressing     Problem: Pain  Goal: Verbalizes/displays adequate comfort level or baseline comfort level  9/8/2023 0735 by Sue Corrales RN  Outcome: Progressing  9/8/2023 0107 by Ebenezer Murray RN  Outcome: Progressing

## 2023-09-08 NOTE — PROGRESS NOTES
TRANSFER - OUT REPORT:    Verbal report given to [facility] on Jasmyn Lazaro  being transferred to [place] for ordered procedure       Report consisted of patient's Situation, Background, Assessment and   Recommendations(SBAR). Information from the following report(s) Index, Intake/Output, MAR, Recent Results, and Neuro Assessment was reviewed with the receiving nurse. Lines:   Peripheral IV 09/05/23 Right Antecubital (Active)   Site Assessment Clean, dry & intact 09/08/23 0730   Line Status Infusing 09/08/23 0730   Line Care Connections checked and tightened 09/08/23 0730   Phlebitis Assessment No symptoms 09/08/23 0730   Infiltration Assessment 0 09/08/23 0730   Alcohol Cap Used Yes 09/08/23 0730   Dressing Status Clean, dry & intact 09/08/23 0730   Dressing Type Transparent 09/08/23 0730        Opportunity for questions and clarification was provided.       Patient transported with:  Monitor and Tech

## 2023-09-08 NOTE — PROGRESS NOTES
TRANSFER - OUT REPORT:    Verbal report given to Benitez Cuenca RN on Aleksandra Monroy being transferred to  Recovery 1 for routine progression of patient care       Report consisted of patient's Situation, Background, Assessment and   Recommendations(SBAR). Information from the following report(s) Nurse Handoff Report was reviewed with the receiving nurse. Opportunity for questions and clarification was provided.       Patient transported with:   Registered Nurse

## 2023-09-08 NOTE — CARE COORDINATION
09/08/23 1775   Service Assessment   Patient Orientation Alert and Oriented   Cognition Alert   History Provided By Patient;Significant Other   Primary Caregiver Spouse   Accompanied By/Relationship spouse and family   Support Systems Children;Spouse/Significant Other   Patient's [address] is: Legal Next of [address] [facility]   PCP Verified by CM Yes   Last Visit to PCP Within last 3 months   Prior Functional Level Assistance with the following:   Current Functional Level Assistance with the following:   Can patient return to prior living arrangement Yes   Ability to make needs known: Good   Family able to assist with home care needs: Yes   Would you like for me to discuss the discharge plan with any other family members/significant others, and if so, who? Yes  (spouse)   Financial Resources Medicare   Community Resources None   Social/Functional History   Lives With Spouse   Type of [address] [place] [address]  One level   Home Access Stairs to enter with rails   Entrance Stairs - Number of Steps 2   Home Equipment Crutches;Walker, rolling   Receives Help From Family   ADL Assistance Independent   Ambulation Assistance Independent   Transfer Assistance Independent   Active  No   Condition of Participation: Discharge Planning   The Plan for Transition of Care is related to the following treatment goals: return home   The Patient and/or Patient Representative was provided with a Choice of Provider? Patient   The Patient and/Or Patient Representative agree with the Discharge Plan? Yes   Freedom of Choice list was provided with basic dialogue that supports the patient's individualized plan of care/goals, treatment preferences, and shares the quality data associated with the providers? Yes     Assessment completed with patient at bedside. Patient alert and oriented and accompanied by children and spouse patient lives with spouse. Patient reports he uses crutches at baseline for mobility.  Demographics and pcp

## 2023-09-08 NOTE — OP NOTE
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 Informed consent was obtained. The patient did not the operative suite laid supine on the operating table. Upper lower extremity pressure points were padded. Anesthesia was induced and the patient was intubated orally with a glide scope. Patient's planned incision site was cleaned, marked and then injected with 1% lidocaine with 1 200,000 epinephrine. Jayashree JASSO was present and assisted with retraction, trach placement, and suturing. Next a preoperative timeout was performed. The patient was prepped and draped in the usual sterile fashion. Next Bovie was used to make a 2 cm horizontal skin incision below the cricoid and then the Bovie was used to dissect down to the strap muscles. The strap muscles were retracted and then bipolar was used to dissect down onto the trachea. Next the cuff was let down and a 15 blade was used to enter the trachea between the second and third tracheal rings. A trach  was used. Next a 6 Shiley cuffed trach was inserted, the cuff was inflated, and it was hooked up to the anesthesia circuit. There was return of CO2.   The trach was then secured with 2-0 silk sutures of

## 2023-09-08 NOTE — ACP (ADVANCE CARE PLANNING)
Advance Care Planning   Healthcare Decision Maker:    Primary Decision Maker: Grover Anger - [phone]    Today we documented Decision Maker(s) consistent with Legal Next of Kin hierarchy.          Joby SANTIAGO, ACM  Cristofer Forte

## 2023-09-08 NOTE — PROGRESS NOTES
g/dL    Hematocrit 30.8 (L) 41.1 - 50.3 %    MCV 86.8 82 - 102 FL    MCH 29.0 26.1 - 32.9 PG    MCHC 33.4 31.4 - 35.0 g/dL    RDW 13.1 11.9 - 14.6 %    Platelets 605 394 - 193 K/uL    MPV 9.7 9.4 - 12.3 FL    nRBC 0.00 0.0 - 0.2 K/uL    Differential Type AUTOMATED      Neutrophils % 77 43 - 78 %    Lymphocytes % 9 (L) 13 - 44 %    Monocytes % 11 4.0 - 12.0 %    Eosinophils % 2 0.5 - 7.8 %    Basophils % 0 0.0 - 2.0 %    Immature Granulocytes 1 0.0 - 5.0 %    Neutrophils Absolute 6.4 1.7 - 8.2 K/UL    Lymphocytes Absolute 0.8 0.5 - 4.6 K/UL    Monocytes Absolute 0.9 0.1 - 1.3 K/UL    Eosinophils Absolute 0.1 0.0 - 0.8 K/UL    Basophils Absolute 0.0 0.0 - 0.2 K/UL    Absolute Immature Granulocyte 0.0 0.0 - 0.5 K/UL   Lipid Panel    Collection Time: 09/08/23  7:09 AM   Result Value Ref Range    Cholesterol, Total 111 <200 MG/DL    Triglycerides 73 35 - 150 MG/DL    HDL 30 (L) 40 - 60 MG/DL    LDL Calculated 66.4 <100 MG/DL    VLDL Cholesterol Calculated 14.6 6.0 - 23.0 MG/DL    Chol/HDL Ratio 3.7         Current Meds:  Current Facility-Administered Medications   Medication Dose Route Frequency    lidocaine viscous hcl (XYLOCAINE) 2 % solution    PRN    benzocaine (HURRICAINE) 20 % oral spray    PRN    fentaNYL (SUBLIMAZE) injection    PRN    midazolam (VERSED) injection    PRN    ceFAZolin (ANCEF) 2,000 mg in sterile water 10 mL IV syringe    PRN    lidocaine 2 % injection    PRN    iohexol (OMNIPAQUE) injection    PRN    glucagon (rDNA) injection    PRN    potassium bicarb-citric acid (EFFER-K) effervescent tablet 20 mEq  20 mEq Oral TID    aspirin chewable tablet 81 mg  81 mg Oral Daily    ferrous gluconate (FERGON) tablet 324 mg  324 mg Oral Daily with breakfast    ascorbic acid (VITAMIN C) tablet 250 mg  250 mg Oral Daily with breakfast    loperamide (IMODIUM) capsule 2 mg  2 mg Oral 4x Daily PRN    bismuth subsalicylate (PEPTO BISMOL) 262 MG/15ML suspension 30 mL  30 mL Oral Q6H PRN    mirtazapine (REMERON) tablet 7.5 mg  7.5 mg Oral Nightly    traZODone (DESYREL) tablet 50 mg  50 mg Oral Nightly    melatonin tablet 3 mg  3 mg Oral Nightly PRN    sodium chloride flush 0.9 % injection 5-40 mL  5-40 mL IntraVENous 2 times per day    sodium chloride flush 0.9 % injection 5-40 mL  5-40 mL IntraVENous PRN    0.9 % sodium chloride infusion   IntraVENous PRN    potassium chloride (KLOR-CON M) extended release tablet 40 mEq  40 mEq Oral PRN    Or    potassium bicarb-citric acid (EFFER-K) effervescent tablet 40 mEq  40 mEq Oral PRN    Or    potassium chloride 10 mEq/100 mL IVPB (Peripheral Line)  10 mEq IntraVENous PRN    potassium chloride 10 mEq/100 mL IVPB (Peripheral Line)  10 mEq IntraVENous PRN    magnesium sulfate 2000 mg in 50 mL IVPB premix  2,000 mg IntraVENous PRN    ondansetron (ZOFRAN-ODT) disintegrating tablet 4 mg  4 mg Oral Q8H PRN    Or    ondansetron (ZOFRAN) injection 4 mg  4 mg IntraVENous Q6H PRN    polyethylene glycol (GLYCOLAX) packet 17 g  17 g Oral Daily PRN    bisacodyl (DULCOLAX) suppository 10 mg  10 mg Rectal Daily PRN    famotidine (PEPCID) tablet 10 mg  10 mg Oral Daily PRN    aluminum & magnesium hydroxide-simethicone (MAALOX) 200-200-20 MG/5ML suspension 30 mL  30 mL Oral Q6H PRN    acetaminophen (TYLENOL) tablet 650 mg  650 mg Oral Q6H PRN    Or    acetaminophen (TYLENOL) suppository 650 mg  650 mg Rectal Q6H PRN    enoxaparin (LOVENOX) injection 40 mg  40 mg SubCUTAneous Daily    lisinopril (PRINIVIL;ZESTRIL) tablet 40 mg  40 mg Oral Daily    pantoprazole (PROTONIX) tablet 40 mg  40 mg Oral QAM AC    lidocaine viscous hcl (XYLOCAINE) 2 % solution 5 mL  5 mL Mouth/Throat Q3H PRN    rosuvastatin (CRESTOR) tablet 10 mg  10 mg Oral Every Other Day    traMADol (ULTRAM) tablet 50 mg  50 mg Oral Q6H PRN       Signed:  Evangelina Chamberlain MD    Part of this note may have been written by using a voice dictation software.   The note has been proof read but may still contain some grammatical/other typographical

## 2023-09-08 NOTE — ANESTHESIA PROCEDURE NOTES
Airway  Date/Time: 9/8/2023 5:50 PM  Urgency: elective    Difficult airway    General Information and Staff    Patient location during procedure: OR  Resident/CRNA: PACO Cespedes - CRNA  Performed: resident/CRNA     Indications and Patient Condition  Indications for airway management: anesthesia  Spontaneous ventilation: present  Sedation level: deep  Preoxygenated: yes  Patient position: sniffing  MILS not maintained throughout      Final Airway Details  Final airway type: endotracheal airway      Successful airway: ETT  Cuffed: yes   Successful intubation technique: video laryngoscopy  Facilitating devices/methods: intubating stylet  Endotracheal tube insertion site: oral  Blade: Yana  Blade size: #3  ETT size (mm): 7.0  Cormack-Lehane Classification: grade IIb - view of arytenoids or posterior of glottis only  Placement verified by: chest auscultation and capnometry   Measured from: lips  ETT to lips (cm): 24  Number of attempts at approach: 1  Ventilation between attempts: bag mask  Number of other approaches attempted: 0

## 2023-09-08 NOTE — MANAGEMENT PLAN
Attempted to see patient; gone for procedure. Initially consulted for elevated troponin; no chest discomfort. Mildly elevated and downtrending and likely demand related. Not consistent with ACS. Echo with preserved EF and no noted wall motion abnormalities. No further cardiac work-up needed at this time. We will sign off. Please call with questions.

## 2023-09-08 NOTE — PROGRESS NOTES
Physical Therapy Hold Note    Pt currently on hold for physical therapy per RN due to awaiting transport to OR for trach/PEG placement. Will plan to provide PT treatment once pt is medically appropriate to resume PT services.     Talita Ro, PT

## 2023-09-09 ENCOUNTER — APPOINTMENT (OUTPATIENT)
Dept: GENERAL RADIOLOGY | Age: 78
DRG: 004 | End: 2023-09-09
Attending: PHYSICAL MEDICINE & REHABILITATION
Payer: MEDICARE

## 2023-09-09 PROBLEM — Z43.0 TRACHEOSTOMY CARE (HCC): Status: ACTIVE | Noted: 2023-09-09

## 2023-09-09 LAB
ANION GAP SERPL CALC-SCNC: 9 MMOL/L (ref 2–11)
APPEARANCE UR: CLEAR
B PERT DNA SPEC QL NAA+PROBE: NOT DETECTED
BACTERIA URNS QL MICRO: NEGATIVE /HPF
BASOPHILS # BLD: 0 K/UL (ref 0–0.2)
BASOPHILS NFR BLD: 0 % (ref 0–2)
BILIRUB UR QL: NEGATIVE
BORDETELLA PARAPERTUSSIS BY PCR: NOT DETECTED
BUN SERPL-MCNC: 9 MG/DL (ref 8–23)
C PNEUM DNA SPEC QL NAA+PROBE: NOT DETECTED
CALCIUM SERPL-MCNC: 8 MG/DL (ref 8.3–10.4)
CASTS URNS QL MICRO: ABNORMAL /LPF
CHLORIDE SERPL-SCNC: 106 MMOL/L (ref 101–110)
CO2 SERPL-SCNC: 23 MMOL/L (ref 21–32)
COLOR UR: ABNORMAL
CREAT SERPL-MCNC: 0.5 MG/DL (ref 0.8–1.5)
DIFFERENTIAL METHOD BLD: ABNORMAL
EOSINOPHIL # BLD: 0 K/UL (ref 0–0.8)
EOSINOPHIL NFR BLD: 0 % (ref 0.5–7.8)
EPI CELLS #/AREA URNS HPF: ABNORMAL /HPF
ERYTHROCYTE [DISTWIDTH] IN BLOOD BY AUTOMATED COUNT: 12.9 % (ref 11.9–14.6)
FLUAV SUBTYP SPEC NAA+PROBE: NOT DETECTED
FLUBV RNA SPEC QL NAA+PROBE: NOT DETECTED
GLUCOSE SERPL-MCNC: 110 MG/DL (ref 65–100)
GLUCOSE UR STRIP.AUTO-MCNC: NEGATIVE MG/DL
HADV DNA SPEC QL NAA+PROBE: NOT DETECTED
HCOV 229E RNA SPEC QL NAA+PROBE: NOT DETECTED
HCOV HKU1 RNA SPEC QL NAA+PROBE: NOT DETECTED
HCOV NL63 RNA SPEC QL NAA+PROBE: NOT DETECTED
HCOV OC43 RNA SPEC QL NAA+PROBE: NOT DETECTED
HCT VFR BLD AUTO: 28.3 % (ref 41.1–50.3)
HGB BLD-MCNC: 9.4 G/DL (ref 13.6–17.2)
HGB UR QL STRIP: ABNORMAL
HMPV RNA SPEC QL NAA+PROBE: NOT DETECTED
HPIV1 RNA SPEC QL NAA+PROBE: NOT DETECTED
HPIV2 RNA SPEC QL NAA+PROBE: NOT DETECTED
HPIV3 RNA SPEC QL NAA+PROBE: NOT DETECTED
HPIV4 RNA SPEC QL NAA+PROBE: NOT DETECTED
IMM GRANULOCYTES # BLD AUTO: 0.1 K/UL (ref 0–0.5)
IMM GRANULOCYTES NFR BLD AUTO: 0 % (ref 0–5)
KETONES UR QL STRIP.AUTO: 80 MG/DL
LACTATE SERPL-SCNC: 0.9 MMOL/L (ref 0.4–2)
LEUKOCYTE ESTERASE UR QL STRIP.AUTO: ABNORMAL
LYMPHOCYTES # BLD: 0.5 K/UL (ref 0.5–4.6)
LYMPHOCYTES NFR BLD: 3 % (ref 13–44)
M PNEUMO DNA SPEC QL NAA+PROBE: NOT DETECTED
MCH RBC QN AUTO: 29.1 PG (ref 26.1–32.9)
MCHC RBC AUTO-ENTMCNC: 33.2 G/DL (ref 31.4–35)
MCV RBC AUTO: 87.6 FL (ref 82–102)
MONOCYTES # BLD: 0.8 K/UL (ref 0.1–1.3)
MONOCYTES NFR BLD: 6 % (ref 4–12)
NEUTS SEG # BLD: 13 K/UL (ref 1.7–8.2)
NEUTS SEG NFR BLD: 91 % (ref 43–78)
NITRITE UR QL STRIP.AUTO: NEGATIVE
NRBC # BLD: 0 K/UL (ref 0–0.2)
PH UR STRIP: 6 (ref 5–9)
PLATELET # BLD AUTO: 260 K/UL (ref 150–450)
PMV BLD AUTO: 9.7 FL (ref 9.4–12.3)
POTASSIUM SERPL-SCNC: 3.6 MMOL/L (ref 3.5–5.1)
PROCALCITONIN SERPL-MCNC: 0.43 NG/ML (ref 0–0.49)
PROT UR STRIP-MCNC: NEGATIVE MG/DL
RBC # BLD AUTO: 3.23 M/UL (ref 4.23–5.6)
RBC #/AREA URNS HPF: ABNORMAL /HPF
RSV RNA SPEC QL NAA+PROBE: NOT DETECTED
RV+EV RNA SPEC QL NAA+PROBE: DETECTED
SARS-COV-2 RNA RESP QL NAA+PROBE: NOT DETECTED
SODIUM SERPL-SCNC: 138 MMOL/L (ref 133–143)
SP GR UR REFRACTOMETRY: 1.01 (ref 1–1.02)
UROBILINOGEN UR QL STRIP.AUTO: 0.2 EU/DL (ref 0.2–1)
WBC # BLD AUTO: 14.3 K/UL (ref 4.3–11.1)
WBC URNS QL MICRO: ABNORMAL /HPF

## 2023-09-09 PROCEDURE — 97535 SELF CARE MNGMENT TRAINING: CPT

## 2023-09-09 PROCEDURE — 2580000003 HC RX 258: Performed by: INTERNAL MEDICINE

## 2023-09-09 PROCEDURE — 84145 PROCALCITONIN (PCT): CPT

## 2023-09-09 PROCEDURE — 71045 X-RAY EXAM CHEST 1 VIEW: CPT

## 2023-09-09 PROCEDURE — 6370000000 HC RX 637 (ALT 250 FOR IP): Performed by: INTERNAL MEDICINE

## 2023-09-09 PROCEDURE — 80048 BASIC METABOLIC PNL TOTAL CA: CPT

## 2023-09-09 PROCEDURE — 83605 ASSAY OF LACTIC ACID: CPT

## 2023-09-09 PROCEDURE — 51798 US URINE CAPACITY MEASURE: CPT

## 2023-09-09 PROCEDURE — 87040 BLOOD CULTURE FOR BACTERIA: CPT

## 2023-09-09 PROCEDURE — 87086 URINE CULTURE/COLONY COUNT: CPT

## 2023-09-09 PROCEDURE — 6360000002 HC RX W HCPCS: Performed by: INTERNAL MEDICINE

## 2023-09-09 PROCEDURE — 94761 N-INVAS EAR/PLS OXIMETRY MLT: CPT

## 2023-09-09 PROCEDURE — 0202U NFCT DS 22 TRGT SARS-COV-2: CPT

## 2023-09-09 PROCEDURE — 2500000003 HC RX 250 WO HCPCS: Performed by: INTERNAL MEDICINE

## 2023-09-09 PROCEDURE — 81001 URINALYSIS AUTO W/SCOPE: CPT

## 2023-09-09 PROCEDURE — 6370000000 HC RX 637 (ALT 250 FOR IP)

## 2023-09-09 PROCEDURE — 97112 NEUROMUSCULAR REEDUCATION: CPT

## 2023-09-09 PROCEDURE — 2580000003 HC RX 258: Performed by: STUDENT IN AN ORGANIZED HEALTH CARE EDUCATION/TRAINING PROGRAM

## 2023-09-09 PROCEDURE — 97166 OT EVAL MOD COMPLEX 45 MIN: CPT

## 2023-09-09 PROCEDURE — 36415 COLL VENOUS BLD VENIPUNCTURE: CPT

## 2023-09-09 PROCEDURE — 87205 SMEAR GRAM STAIN: CPT

## 2023-09-09 PROCEDURE — 6370000000 HC RX 637 (ALT 250 FOR IP): Performed by: NURSE PRACTITIONER

## 2023-09-09 PROCEDURE — 2580000003 HC RX 258: Performed by: FAMILY MEDICINE

## 2023-09-09 PROCEDURE — 99233 SBSQ HOSP IP/OBS HIGH 50: CPT | Performed by: STUDENT IN AN ORGANIZED HEALTH CARE EDUCATION/TRAINING PROGRAM

## 2023-09-09 PROCEDURE — 1100000000 HC RM PRIVATE

## 2023-09-09 PROCEDURE — 85025 COMPLETE CBC W/AUTO DIFF WBC: CPT

## 2023-09-09 PROCEDURE — 2700000000 HC OXYGEN THERAPY PER DAY

## 2023-09-09 PROCEDURE — 87070 CULTURE OTHR SPECIMN AEROBIC: CPT

## 2023-09-09 RX ORDER — ROSUVASTATIN CALCIUM 20 MG/1
20 TABLET, COATED ORAL NIGHTLY
Status: DISCONTINUED | OUTPATIENT
Start: 2023-09-09 | End: 2023-09-20 | Stop reason: HOSPADM

## 2023-09-09 RX ORDER — QUETIAPINE FUMARATE 25 MG/1
25 TABLET, FILM COATED ORAL NIGHTLY PRN
Status: DISCONTINUED | OUTPATIENT
Start: 2023-09-09 | End: 2023-09-20 | Stop reason: HOSPADM

## 2023-09-09 RX ORDER — 0.9 % SODIUM CHLORIDE 0.9 %
1000 INTRAVENOUS SOLUTION INTRAVENOUS ONCE
Status: COMPLETED | OUTPATIENT
Start: 2023-09-09 | End: 2023-09-09

## 2023-09-09 RX ORDER — LANOLIN ALCOHOL/MO/W.PET/CERES
100 CREAM (GRAM) TOPICAL DAILY
Status: COMPLETED | OUTPATIENT
Start: 2023-09-09 | End: 2023-09-15

## 2023-09-09 RX ORDER — SODIUM CHLORIDE, SODIUM LACTATE, POTASSIUM CHLORIDE, AND CALCIUM CHLORIDE .6; .31; .03; .02 G/100ML; G/100ML; G/100ML; G/100ML
500 INJECTION, SOLUTION INTRAVENOUS ONCE
Status: COMPLETED | OUTPATIENT
Start: 2023-09-09 | End: 2023-09-09

## 2023-09-09 RX ADMIN — SODIUM CHLORIDE, PRESERVATIVE FREE 10 ML: 5 INJECTION INTRAVENOUS at 08:44

## 2023-09-09 RX ADMIN — Medication 250 MG: at 08:43

## 2023-09-09 RX ADMIN — ACETAMINOPHEN 650 MG: 325 TABLET ORAL at 16:32

## 2023-09-09 RX ADMIN — MIRTAZAPINE 7.5 MG: 15 TABLET, FILM COATED ORAL at 22:16

## 2023-09-09 RX ADMIN — Medication 100 MG: at 16:32

## 2023-09-09 RX ADMIN — SODIUM CHLORIDE, POTASSIUM CHLORIDE, SODIUM LACTATE AND CALCIUM CHLORIDE: 600; 310; 30; 20 INJECTION, SOLUTION INTRAVENOUS at 10:08

## 2023-09-09 RX ADMIN — POTASSIUM BICARBONATE 20 MEQ: 391 TABLET, EFFERVESCENT ORAL at 08:44

## 2023-09-09 RX ADMIN — FERROUS GLUCONATE 324 MG: 324 TABLET ORAL at 08:43

## 2023-09-09 RX ADMIN — SODIUM CHLORIDE, POTASSIUM CHLORIDE, SODIUM LACTATE AND CALCIUM CHLORIDE 500 ML: 600; 310; 30; 20 INJECTION, SOLUTION INTRAVENOUS at 02:00

## 2023-09-09 RX ADMIN — POTASSIUM BICARBONATE 20 MEQ: 391 TABLET, EFFERVESCENT ORAL at 16:00

## 2023-09-09 RX ADMIN — SODIUM CHLORIDE 1000 ML: 9 INJECTION, SOLUTION INTRAVENOUS at 10:10

## 2023-09-09 RX ADMIN — SODIUM CHLORIDE, PRESERVATIVE FREE 10 ML: 5 INJECTION INTRAVENOUS at 22:15

## 2023-09-09 RX ADMIN — TUBERCULIN PURIFIED PROTEIN DERIVATIVE 5 UNITS: 5 INJECTION, SOLUTION INTRADERMAL at 10:08

## 2023-09-09 RX ADMIN — ROSUVASTATIN CALCIUM 20 MG: 20 TABLET, FILM COATED ORAL at 22:16

## 2023-09-09 RX ADMIN — POTASSIUM BICARBONATE 20 MEQ: 391 TABLET, EFFERVESCENT ORAL at 22:16

## 2023-09-09 RX ADMIN — ENOXAPARIN SODIUM 40 MG: 100 INJECTION SUBCUTANEOUS at 08:43

## 2023-09-09 RX ADMIN — ASPIRIN 81 MG 81 MG: 81 TABLET ORAL at 08:43

## 2023-09-09 RX ADMIN — ACETAMINOPHEN 650 MG: 325 TABLET ORAL at 00:55

## 2023-09-09 RX ADMIN — PANTOPRAZOLE SODIUM 40 MG: 40 TABLET, DELAYED RELEASE ORAL at 07:06

## 2023-09-09 RX ADMIN — LISINOPRIL 40 MG: 20 TABLET ORAL at 08:43

## 2023-09-09 RX ADMIN — TRAZODONE HYDROCHLORIDE 50 MG: 50 TABLET ORAL at 22:16

## 2023-09-09 ASSESSMENT — PAIN SCALES - GENERAL
PAINLEVEL_OUTOF10: 0

## 2023-09-09 ASSESSMENT — ENCOUNTER SYMPTOMS
EYE REDNESS: 0
SINUS PAIN: 0
EYE DISCHARGE: 0
ABDOMINAL DISTENTION: 0
COLOR CHANGE: 0
COUGH: 0
CHOKING: 0
EYE PAIN: 0
STRIDOR: 0
CONSTIPATION: 0
ABDOMINAL PAIN: 0

## 2023-09-09 NOTE — PROGRESS NOTES
Hospitalist Progress Note   Admit Date:  2023  4:32 PM   Name:  Mariano Garcia   Age:  69 y.o. Sex:  male  :  1945   MRN:  [id number]   Room:  [facility]/    Presenting/Chief Complaint: Fatigue, Fall, and Dizziness     Reason(s) for Admission: Syncope and collapse [R55]  Dehydration [E86.0]  Elevated troponin [R77.8]  Sepsis ([address]) [A41.9]  Malignant neoplasm of esophagus, unspecified location [facility]) [C15.9]     Hospital Course: Mariano Garcia is a 69 y.o. 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 He met criteria with tachycardia, tachypnea and leukocytosis. UA and CXR were clear. There was suspicion for possible aspiration, however abx were held initially. Cardiology was consulted for elevated troponin which was felt secondary to demand ischemia. Oncology and ENT were also consulted. Brain MRI  showed acute to early subacute lacunar infarct L cerebellar hemisphere; cerebral volume loss; chronic small vessel disease. 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 G-tube was placed by IR on  and tracheostomy was done by ENT same day. Subjective & 24hr Events:   Resting comfortably in bed, wakes up easily, NAD. Trach, PEG noted. Thin and weak appearing. Tm 102. 6F overnight. No cough or SOB. Moved to ICU overnight for Precedex due to agitation after Zyprexa x1 dose did not help. Assessment & Plan:   # SIRS criteria met vs sepsis   - Initially met criteria with tachycardia + leukocytosis on admission. CXR and UA were clear. CTA head and neck didn't show any suspicious appearing areas. -  -- Tm 102. 6F overnight; leukocytosis with neutrophilia.  Check lactic acid, procal, blood cultures x2 sets, mL Oral Q6H PRN    mirtazapine (REMERON) tablet 7.5 mg  7.5 mg Oral Nightly    traZODone (DESYREL) tablet 50 mg  50 mg Oral Nightly    melatonin tablet 3 mg  3 mg Oral Nightly PRN    sodium chloride flush 0.9 % injection 5-40 mL  5-40 mL IntraVENous 2 times per day    sodium chloride flush 0.9 % injection 5-40 mL  5-40 mL IntraVENous PRN    0.9 % sodium chloride infusion   IntraVENous PRN    potassium chloride (KLOR-CON M) extended release tablet 40 mEq  40 mEq Oral PRN    Or    potassium bicarb-citric acid (EFFER-K) effervescent tablet 40 mEq  40 mEq Oral PRN    Or    potassium chloride 10 mEq/100 mL IVPB (Peripheral Line)  10 mEq IntraVENous PRN    potassium chloride 10 mEq/100 mL IVPB (Peripheral Line)  10 mEq IntraVENous PRN    magnesium sulfate 2000 mg in 50 mL IVPB premix  2,000 mg IntraVENous PRN    ondansetron (ZOFRAN-ODT) disintegrating tablet 4 mg  4 mg Oral Q8H PRN    Or    ondansetron (ZOFRAN) injection 4 mg  4 mg IntraVENous Q6H PRN    polyethylene glycol (GLYCOLAX) packet 17 g  17 g Oral Daily PRN    bisacodyl (DULCOLAX) suppository 10 mg  10 mg Rectal Daily PRN    famotidine (PEPCID) tablet 10 mg  10 mg Oral Daily PRN    aluminum & magnesium hydroxide-simethicone (MAALOX) 200-200-20 MG/5ML suspension 30 mL  30 mL Oral Q6H PRN    acetaminophen (TYLENOL) tablet 650 mg  650 mg Oral Q6H PRN    Or    acetaminophen (TYLENOL) suppository 650 mg  650 mg Rectal Q6H PRN    enoxaparin (LOVENOX) injection 40 mg  40 mg SubCUTAneous Daily    lisinopril (PRINIVIL;ZESTRIL) tablet 40 mg  40 mg Oral Daily    pantoprazole (PROTONIX) tablet 40 mg  40 mg Oral QAM AC    lidocaine viscous hcl (XYLOCAINE) 2 % solution 5 mL  5 mL Mouth/Throat Q3H PRN    rosuvastatin (CRESTOR) tablet 10 mg  10 mg Oral Every Other Day    traMADol (ULTRAM) tablet 50 mg  50 mg Oral Q6H PRN       Signed:  Tricia Carvalho MD    Part of this note may have been written by using a voice dictation software.   The note has been proof read but may

## 2023-09-09 NOTE — PROGRESS NOTES
ACUTE OCCUPATIONAL THERAPY GOALS:   (Developed with and agreed upon by patient and/or caregiver.)  1. Patient will complete lower body bathing and dressing with Min A and adaptive equipment as   needed. 2. Patient will completed upper body bathing and dressing with Mod I and adaptive equipment as needed. 3. Patient will complete grooming seated at EOB with SBA and adaptive equipment as needed. 4. Patient will complete toileting with Min A and adaptive equipment as needed. 5. Patient will tolerate 30 minutes of OT treatment with 1-2 rest breaks to increase activity tolerance for ADLs. 6. Patient will complete functional transfers with SBA and adaptive equipment as needed. Timeframe: 7 visits       OCCUPATIONAL THERAPY Initial Assessment, Daily Note, and PM       OT Visit Days: 1  Acknowledge Orders  Time  OT Charge Capture  Rehab Caseload Tracker      Juanjose Eden is a 69 y.o. male   PRIMARY DIAGNOSIS: Sepsis ([address])  Syncope and collapse [R55]  Dehydration [E86.0]  Elevated troponin [R77.8]  Sepsis ([address]) [A41.9]  Malignant neoplasm of esophagus, unspecified location ([address]) [C15.9]  Procedure(s) (LRB):  TRACHEOTOMY (N/A)  1 Day Post-Op  Reason for Referral: Generalized Muscle Weakness (M62.81)  Other abnormalities of gait and mobility (R26.89)  History of falling (Z91.81)  Inpatient: Payor: MEDICARE / Plan: MEDICARE PART A AND B / Product Type: *No Product type* /     ASSESSMENT:     REHAB RECOMMENDATIONS:   Recommendation to date pending progress:  Setting:  Inpatient Rehab Facility     Equipment:    To Be Determined     ASSESSMENT:  Mr. Venessa Vera is a 68 y/o M presenting with sepsis s/p PEG and trach 9/8. Pt supine on entry on RA A/O x 3. Applicable PMHx: Polio, supraglottic squamous cell carcinoma. Pt denied light headedness, dizziness or SOB. Pt reports 1 fall(s) in past 6 months. PLOF Mod I living with spouse. DME present in home; w/c.  Pt currently Min A - CGA with UB ADLs, Max A with

## 2023-09-09 NOTE — CONSULTS
Comprehensive Nutrition Assessment    Type and Reason for Visit: Consult  Malnutrition Screening Tool: Malnutrition Screen  Have you recently lost weight without trying?: 2 to 13 pounds (1 point)  Have you been eating poorly because of a decreased appetite?: Yes (1 point)  Malnutrition Screening Tool Score: 2    Nutrition Recommendations/Plan:   Enteral Nutrition:   Enteral Access: PEG  Initiate  Formula: Standard with Fiber (Jevity 1.5 Kilo)  Goal Rate: Continuous 60 ml/hr  Initiate  Water flush  55 ml every hour  Modulars: None not indicated at this time   Enteral regimen at above goal to provide per 24 hours:  1980 calories, 84 grams protein and 2213 ml free fluid. Above regimen: Intended to meet macronutrient goals  IV Fluids:  Decrease to half rate with TF start and discontinue once TF infusing at 30 ml/hr  Labs:   EN labs: BMP daily, Mg daily x 3 days at initiation then MWF and Phos daily x 3 days at initiation then MWF. POC Glucoses/SSI Not indicated  Nutrition Related Medication Management:  Electrolyte Replacement Protocol PRN Initiate for Potassium, Phosphorus, and Magnesium  Thiamine 100 mg daily x 7 days (EOT 9/15)  Bowel Regimen Active prn  Meals and Snacks:  Continue current diet. NPO     Malnutrition Assessment:  Malnutrition Status: Insufficient data  11% wt loss since June based on IM office # and ENT office wt on day of admission 154#  Pt off unit at MRI, unable to complete NFPE 9/7  Working with OT 9/9     Nutrition Assessment:  Nutrition History: Brief hx per wife at bedside, unable to consume any significant amount of food secondary to increased salivation and dysphagia. Wt hx per EMR review as below. Do You Have Any Cultural, Bahai, or Ethnic Food Preferences?: No   Nutrition Background:       PMH remarkable for squamous cell caner of left lateral tongue s/p partial glossectomy w tonsillectomy, recurrence of squamous cell cancer, polio as a child.   Presented from outpt office s/p fall - plan for trach, PEG and port were pending outpt. Admitted with sepsis, syncope and collapse, elevated troponin, squamous cell carcinoma of oropharynx, hypokalemia, moderate malnutrition. IR consulted for port and PEG placement. Nutrition Interval:  RD met w/pt at bedside. Explained start of TF via PEG today. Pt cannot speak but nods his head when asked if he still eats po. Pt writes on note pad that he currently is not eating at all po. RD explained plan to meet ~100% of estimated nutrition needs via TF for now. Discussed start of TF with GENI Howard and educated on need to cut IVF in half with start of TF and fully stop IVF when TF reaches 30 ml/hr. Slow TF progression d/t risk of refeeding.       Abdominal Status (last documented by nursing):   Last BM (including prior to admit): 09/07/23,     Pertinent Medications: vitamin C, FERGON, remeron, protonix, EFFER-K 20 mEq TID  Continuous: none  IVF: LR @ 100 ml/hr  Electrolyte Replacement:  prn per protocol 9/7: 10 mEq Kcl x6  Pertinent administered PRN: imodium (9/6)  Pertinent Labs:   Lab Results   Component Value Date/Time     09/09/2023 03:35 AM    K 3.6 09/09/2023 03:35 AM     09/09/2023 03:35 AM    CO2 23 09/09/2023 03:35 AM    BUN 9 09/09/2023 03:35 AM    CREATININE 0.50 09/09/2023 03:35 AM    GLUCOSE 110 09/09/2023 03:35 AM    CALCIUM 8.0 09/09/2023 03:35 AM    MG 1.7 09/07/2023 07:01 AM      Lab Results   Component Value Date/Time    POCGLU 124 08/09/2023 10:48 AM     Remarkable for: Largely WNL   Mild hyperglycemia - no SSI warranted, will monitor    Current Nutrition Therapies:  Diet NPO Exceptions are: Sips of Water with Meds    Current Intake:   Average Meal Intake: NPO Average Supplements Intake: Unable to assess      Anthropometric Measures:  Height: 5' 11\" (180.3 cm)  Current Body Wt: 150 lb (68 kg), Weight source: Stated  BMI: 20.9, Underweight (BMI less than 22) age over 72  Admission Body Weight: 150 lb (68 kg)

## 2023-09-09 NOTE — PROGRESS NOTES
TRANSFER - OUT REPORT:    Verbal report given to [facility] RN  on Luisa Evans  being transferred to [address] [place] [address] for routine progression of patient care       Report consisted of patient's Situation, Background, Assessment and   Recommendations(SBAR). Information from the following report(s) Nurse Handoff Report, Index, ED Encounter Summary, ED SBAR, Adult Overview, Surgery Report, Intake/Output, MAR, Recent Results, Med Rec Status, Cardiac Rhythm nsr, Alarm Parameters, Quality Measures, and Neuro Assessment was reviewed with the receiving nurse. Lines:   Peripheral IV 09/08/23 Posterior;Right Hand (Active)   Site Assessment Clean, dry & intact 09/09/23 0700   Line Status Infusing 09/09/23 0700   Line Care Connections checked and tightened 09/09/23 0700   Phlebitis Assessment No symptoms 09/09/23 0700   Infiltration Assessment 0 09/09/23 0700   Alcohol Cap Used Yes 09/09/23 0700   Dressing Status Clean, dry & intact 09/09/23 0700   Dressing Type Transparent 09/09/23 0700        Opportunity for questions and clarification was provided.       Patient transported with:    PERSONAL BELONGINGS

## 2023-09-09 NOTE — SIGNIFICANT EVENT
Message from nursing, patient agitated, pulling leads, attempting to get OOB. Chart reviewed, surgery today for tracheotomy. 1 dose Zyprexa ordered.

## 2023-09-09 NOTE — PROGRESS NOTES
SPEECH PATHOLOGY NOTE:    Speech therapy consult received and appreciated. Patient with new Tracheostomy placed yesterday (9/8/23) PM. Jamee Aver placement recommended when the following criterion are met: \"Minimum 48 hours post-tracheostomy placement, Patient is alert and responsive, Patient has stable vital signs. (HR, RR, BP, SpO2), Patient is able to tolerate cuff deflation. \" Will defer to treatment team on Monday to assess for Jamee Aver Valve tolerance and possible PO trials. Per documentation, G-tube was placed yesterday (9/8/23) to support nutrition. Nilay Cuba.  Betty Levy MS, CCC-SLP         Speech Language Pathologist          Acute Rehabilitation Services                   Contact: Rosanna

## 2023-09-10 PROBLEM — R27.0 ATAXIA: Status: ACTIVE | Noted: 2023-09-10

## 2023-09-10 LAB
ANION GAP SERPL CALC-SCNC: 10 MMOL/L (ref 2–11)
BASOPHILS # BLD: 0 K/UL (ref 0–0.2)
BASOPHILS NFR BLD: 0 % (ref 0–2)
BUN SERPL-MCNC: 6 MG/DL (ref 8–23)
CALCIUM SERPL-MCNC: 8.5 MG/DL (ref 8.3–10.4)
CHLORIDE SERPL-SCNC: 102 MMOL/L (ref 101–110)
CO2 SERPL-SCNC: 24 MMOL/L (ref 21–32)
CREAT SERPL-MCNC: 0.4 MG/DL (ref 0.8–1.5)
DIFFERENTIAL METHOD BLD: ABNORMAL
EOSINOPHIL # BLD: 0 K/UL (ref 0–0.8)
EOSINOPHIL NFR BLD: 0 % (ref 0.5–7.8)
ERYTHROCYTE [DISTWIDTH] IN BLOOD BY AUTOMATED COUNT: 13.4 % (ref 11.9–14.6)
GLUCOSE SERPL-MCNC: 130 MG/DL (ref 65–100)
HCT VFR BLD AUTO: 33 % (ref 41.1–50.3)
HGB BLD-MCNC: 10.8 G/DL (ref 13.6–17.2)
IMM GRANULOCYTES # BLD AUTO: 0.1 K/UL (ref 0–0.5)
IMM GRANULOCYTES NFR BLD AUTO: 0 % (ref 0–5)
LYMPHOCYTES # BLD: 0.4 K/UL (ref 0.5–4.6)
LYMPHOCYTES NFR BLD: 3 % (ref 13–44)
MAGNESIUM SERPL-MCNC: 1.5 MG/DL (ref 1.8–2.4)
MCH RBC QN AUTO: 29 PG (ref 26.1–32.9)
MCHC RBC AUTO-ENTMCNC: 32.7 G/DL (ref 31.4–35)
MCV RBC AUTO: 88.7 FL (ref 82–102)
MM INDURATION, POC: 0 MM (ref 0–5)
MONOCYTES # BLD: 0.6 K/UL (ref 0.1–1.3)
MONOCYTES NFR BLD: 5 % (ref 4–12)
NEUTS SEG # BLD: 11.8 K/UL (ref 1.7–8.2)
NEUTS SEG NFR BLD: 92 % (ref 43–78)
NRBC # BLD: 0 K/UL (ref 0–0.2)
PHOSPHATE SERPL-MCNC: 2.2 MG/DL (ref 2.3–3.7)
PLATELET # BLD AUTO: 285 K/UL (ref 150–450)
PMV BLD AUTO: 10 FL (ref 9.4–12.3)
POTASSIUM SERPL-SCNC: 3.7 MMOL/L (ref 3.5–5.1)
PPD, POC: NEGATIVE
RBC # BLD AUTO: 3.72 M/UL (ref 4.23–5.6)
SODIUM SERPL-SCNC: 136 MMOL/L (ref 133–143)
WBC # BLD AUTO: 12.9 K/UL (ref 4.3–11.1)

## 2023-09-10 PROCEDURE — 83735 ASSAY OF MAGNESIUM: CPT

## 2023-09-10 PROCEDURE — 2700000000 HC OXYGEN THERAPY PER DAY

## 2023-09-10 PROCEDURE — 6370000000 HC RX 637 (ALT 250 FOR IP): Performed by: STUDENT IN AN ORGANIZED HEALTH CARE EDUCATION/TRAINING PROGRAM

## 2023-09-10 PROCEDURE — 36415 COLL VENOUS BLD VENIPUNCTURE: CPT

## 2023-09-10 PROCEDURE — 2580000003 HC RX 258: Performed by: INTERNAL MEDICINE

## 2023-09-10 PROCEDURE — 99231 SBSQ HOSP IP/OBS SF/LOW 25: CPT | Performed by: PSYCHIATRY & NEUROLOGY

## 2023-09-10 PROCEDURE — 6360000002 HC RX W HCPCS: Performed by: STUDENT IN AN ORGANIZED HEALTH CARE EDUCATION/TRAINING PROGRAM

## 2023-09-10 PROCEDURE — 6370000000 HC RX 637 (ALT 250 FOR IP): Performed by: FAMILY MEDICINE

## 2023-09-10 PROCEDURE — 6370000000 HC RX 637 (ALT 250 FOR IP): Performed by: INTERNAL MEDICINE

## 2023-09-10 PROCEDURE — 2580000003 HC RX 258: Performed by: STUDENT IN AN ORGANIZED HEALTH CARE EDUCATION/TRAINING PROGRAM

## 2023-09-10 PROCEDURE — 80048 BASIC METABOLIC PNL TOTAL CA: CPT

## 2023-09-10 PROCEDURE — 6360000002 HC RX W HCPCS: Performed by: INTERNAL MEDICINE

## 2023-09-10 PROCEDURE — 92610 EVALUATE SWALLOWING FUNCTION: CPT

## 2023-09-10 PROCEDURE — 85025 COMPLETE CBC W/AUTO DIFF WBC: CPT

## 2023-09-10 PROCEDURE — 6370000000 HC RX 637 (ALT 250 FOR IP)

## 2023-09-10 PROCEDURE — 2500000003 HC RX 250 WO HCPCS: Performed by: STUDENT IN AN ORGANIZED HEALTH CARE EDUCATION/TRAINING PROGRAM

## 2023-09-10 PROCEDURE — 99233 SBSQ HOSP IP/OBS HIGH 50: CPT | Performed by: STUDENT IN AN ORGANIZED HEALTH CARE EDUCATION/TRAINING PROGRAM

## 2023-09-10 PROCEDURE — 94761 N-INVAS EAR/PLS OXIMETRY MLT: CPT

## 2023-09-10 PROCEDURE — 1100000000 HC RM PRIVATE

## 2023-09-10 PROCEDURE — 84100 ASSAY OF PHOSPHORUS: CPT

## 2023-09-10 RX ORDER — MAGNESIUM SULFATE IN WATER 40 MG/ML
2000 INJECTION, SOLUTION INTRAVENOUS ONCE
Status: COMPLETED | OUTPATIENT
Start: 2023-09-10 | End: 2023-09-10

## 2023-09-10 RX ORDER — SCOLOPAMINE TRANSDERMAL SYSTEM 1 MG/1
1 PATCH, EXTENDED RELEASE TRANSDERMAL
Status: DISCONTINUED | OUTPATIENT
Start: 2023-09-10 | End: 2023-09-20 | Stop reason: HOSPADM

## 2023-09-10 RX ORDER — MIRTAZAPINE 15 MG/1
7.5 TABLET, FILM COATED ORAL NIGHTLY
Status: DISCONTINUED | OUTPATIENT
Start: 2023-09-10 | End: 2023-09-20 | Stop reason: HOSPADM

## 2023-09-10 RX ADMIN — LISINOPRIL 40 MG: 20 TABLET ORAL at 08:59

## 2023-09-10 RX ADMIN — AZITHROMYCIN MONOHYDRATE 500 MG: 500 INJECTION, POWDER, LYOPHILIZED, FOR SOLUTION INTRAVENOUS at 10:07

## 2023-09-10 RX ADMIN — SODIUM CHLORIDE, PRESERVATIVE FREE 10 ML: 5 INJECTION INTRAVENOUS at 09:01

## 2023-09-10 RX ADMIN — Medication 100 MG: at 08:59

## 2023-09-10 RX ADMIN — ROSUVASTATIN CALCIUM 20 MG: 20 TABLET, FILM COATED ORAL at 22:23

## 2023-09-10 RX ADMIN — MIRTAZAPINE 7.5 MG: 15 TABLET, FILM COATED ORAL at 22:22

## 2023-09-10 RX ADMIN — Medication 250 MG: at 08:58

## 2023-09-10 RX ADMIN — SODIUM CHLORIDE, PRESERVATIVE FREE 10 ML: 5 INJECTION INTRAVENOUS at 22:23

## 2023-09-10 RX ADMIN — POTASSIUM PHOSPHATE, MONOBASIC POTASSIUM PHOSPHATE, DIBASIC 10 MMOL: 224; 236 INJECTION, SOLUTION, CONCENTRATE INTRAVENOUS at 12:34

## 2023-09-10 RX ADMIN — ENOXAPARIN SODIUM 40 MG: 100 INJECTION SUBCUTANEOUS at 08:58

## 2023-09-10 RX ADMIN — SODIUM CHLORIDE, POTASSIUM CHLORIDE, SODIUM LACTATE AND CALCIUM CHLORIDE: 600; 310; 30; 20 INJECTION, SOLUTION INTRAVENOUS at 06:22

## 2023-09-10 RX ADMIN — MAGNESIUM SULFATE IN WATER 2000 MG: 40 INJECTION, SOLUTION INTRAVENOUS at 08:58

## 2023-09-10 RX ADMIN — ASPIRIN 81 MG 81 MG: 81 TABLET ORAL at 08:59

## 2023-09-10 RX ADMIN — FERROUS GLUCONATE 324 MG: 324 TABLET ORAL at 08:59

## 2023-09-10 ASSESSMENT — ENCOUNTER SYMPTOMS
STRIDOR: 0
SINUS PAIN: 0
EYE PAIN: 0
CHOKING: 0
EYE REDNESS: 0
COLOR CHANGE: 0
ABDOMINAL DISTENTION: 0
CONSTIPATION: 0
EYE DISCHARGE: 0
COUGH: 0
ABDOMINAL PAIN: 0

## 2023-09-10 ASSESSMENT — PAIN SCALES - GENERAL
PAINLEVEL_OUTOF10: 0
PAINLEVEL_OUTOF10: 0

## 2023-09-10 NOTE — ICUWATCH
RRT Clinical Rounding Nurse Progress Report      SUBJECTIVE: Patient assessed secondary to transfer from critical care. Vitals:    09/09/23 1730 09/09/23 1745 09/09/23 1900 09/09/23 2000   BP: (!) 126/58  (!) 140/63 139/66   Pulse: 93 85 93 78   Resp: 20 18 18 20   Temp:       TempSrc:       SpO2: 100% 98% 97% 100%   Weight:       Height:            DETERIORATION INDEX SCORE: 40    ASSESSMENT:  Pt is alert and nodding appropriately to questions. Pt calm and pleasant, sitting up in bed. He appears to be in NAD at this time. O2 sat 100% on 10L NRB for transport to 8th floor. Pt transferred to 802 in bed by 2 RN's. All belongings sent with pt. Pt has cell phone and  in bed with him. Pt denies any pain and voices no complaints. Notified RT that pt needs to be placed back on trach collar. Primary RN at bedside. PLAN:  Will follow per RRT Clinical Rounding Program protocol.     Sonja Doshi, [address]: [address] [place] [address] [address]: [phone]

## 2023-09-10 NOTE — PROGRESS NOTES
forearm free flap to left oral cavity on 8/3/2010. \" \"Patient has done well and has been asymptomatic until about 7 weeks ago when he started experiencing increased congestion and hypersalivation. He saw his PCP and was started on a round of antibiotics for what was thought to be a sinus infection. No improvement with antibiotics. Symptoms continued and he was put on a 2nd course of antibiotics with no improvement in symptoms. On July 12th patient woke up with left facial swelling and went to his dentist who thought he had a tooth abscess and was put on Flagyl improvement in facial swelling. Patient reports a sore throat with swallowing that has continue to progress and now is eating only soft foods. He reports a 5-6 lb weight loss over the past month. Patient also endorses dysphonia. Patient saw Dr. Al Pozo at [facility] who scoped the patient and ordered a CT neck with contrast to Dr. Mady Joiner with concerns of a new of a base of tongue mass verses ORN of the left mandible. 8/17/23: Underwent a direct laryngoscopy with biopsy by Dr. Mady Joiner. 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 P 16 negative. 8/28/23: Presents with his wife to discuss treatment options related to new diagnosis of squamous cell carcinoma. He is accompanied by his wife. He reports that the pain with swallowing has progressively getting worse and has transition to a soft/full liquid diet. He has been having progressively worsening dysphagia.    Prior instrumental assessment: none noted    Current Diet : NPO  Current Liquid Diet : NPO  Patient Complaint: initially stating he wanted to eat    Respiratory Status: Trach cuff (cuff deflated)    O2 Device: T-Piece        Pain: Patient does not c/o pain                  OBJECTIVE           Patient Positioning: Upright in bed   Oral Motor   Labial: Decreased rate  Dentition: Limited  Oral Hygiene: Moist  Lingual: Partial glossectomy (flap reconstruction)  Dentition: Adequate     Volitional Cough: Strong  Baseline Vocal Quality: Aphonic (trach)    Oropharyngeal Phase: Ice Chips  Assessment Method(s): Observation  Vocal Quality: Aphonic; Wet (trach in place)  Consistency Presented: Ice Chips  How Presented: SLP-fed/Presented;Spoon  How much presented: 3  Bolus Acceptance: No impairment  Bolus Formation/Control: Impaired  Type of Impairment: Oral holding  Propulsion: Delayed (# of seconds)  Aspiration Signs/Symptoms: Strong cough (with each presentation)  Pharyngeal Phase Characteristics: Painful swallow; Poor endurance; Suspected pharyngeal residue        Oral Phase - Comment: little assessment d/t +s/sx and high risk  Pharyngeal Phase: Exceptions    Dysphagia Outcome and Severity Scale (LIUDMILA)  Dysphagia Outcome Severity Scale: Level 2: Moderate Severe dysphagia- Maximum assistance or maximum use of strategies with partial PO only  Interpretation of Tool: The Dysphagia Outcome and Severity Scale (LIUDMILA) is a simple, easy-to-use, 7-point scale developed to systematically rate the functional severity of dysphagia based on objective assessment and make recommendations for diet level, independence level, and type of nutrition. Normal(7), Functional(6), Mild(5), Mild-Moderate(4), Moderate(3), Moderate-Severe(2), Severe(1)    PLAN    Duration/Frequency: Continue to follow patient 3x/week for duration of hospitalization and/or until goals met    Education: Patient, Family member, RN  Patient Education: SLP role, passey jason valve use/function  Patient Education Response: Verbalizes understanding    PRECAUTIONS/ALLERGIES: Zolpidem and Sulfamethoxazole-trimethoprim        Therapy Time  SLP Individual Minutes  Time In: 9734  Time Out: 1108  Minutes: [facility].  Daniel Bond MS, CCC-SLP         Speech Language Pathologist          Acute Rehabilitation Services                   Contact: Rosanna Alvarez, [facility]  9/10/2023 11:46 AM

## 2023-09-10 NOTE — PROGRESS NOTES
Patient resting in bed, alert and oriented, cooperative with care. Tracheostomy in place,patent dry and clean. Mariano catheter in place, patent and draining yellow color urine. IV infusing LR at 50 ml/hr. PEG in place infusing 10 ml/hr of Jevity, and water flush at 55 ml/hr. Patient denies pain or distress, safety measures in place, call light within reach.

## 2023-09-10 NOTE — PROGRESS NOTES
Neurology follow-up  Patient seen in follow-up  Continues to be drowsy but no focal abnormalities  Lengthy discussion this morning with reference to multiple issues with his spouse in terms of issues related to balance coordination etc. with regards current illness and remote polio.   Will need extensive rehabilitation  Call neurology as needed  Time spent 25 minutes

## 2023-09-10 NOTE — PROGRESS NOTES
Hospitalist Progress Note   Admit Date:  2023  4:32 PM   Name:  Gissel Ding   Age:  69 y.o. Sex:  male  :  1945   MRN:  [id number]   Room:  802/    Presenting/Chief Complaint: Fatigue, Fall, and Dizziness     Reason(s) for Admission: Syncope and collapse [R55]  Dehydration [E86.0]  Elevated troponin [R77.8]  Sepsis ([address]) [A41.9]  Malignant neoplasm of esophagus, unspecified location [facility]) [C15.9]     Hospital Course: Gissel Ding is a 69 y.o. 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 He met criteria with tachycardia, tachypnea and leukocytosis. UA and CXR were clear. There was suspicion for possible aspiration, however abx were held initially. Cardiology was consulted for elevated troponin which was felt secondary to demand ischemia. Oncology and ENT were also consulted. Brain MRI  showed acute to early subacute lacunar infarct L cerebellar hemisphere; cerebral volume loss; chronic small vessel disease. 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 G-tube was placed by IR on  and tracheostomy was done by ENT same day. Initially met criteria with tachycardia + leukocytosis on admission. CXR and UA were clear. CTA head and neck didn't show any suspicious appearing areas. - Brain MRI and CTA reviewed. Echo showed preserved EF, normal diastolic function, normal RV function, no valvular abnormalities, no shunt. Subjective & 24hr Events:   Resting comfortably in bed, hard of hearing. Trach, PEG noted. Febrile 100.8F overnight. Patient is saturating well on 6 LNC. Family was at the bedside. No cough or SOB.      Assessment & Plan:   # SIRS criteria met vs sepsis   -leukocytosis of chloride 0.9 % 250 mL IVPB ([id number])  500 mg IntraVENous Q24H    scopolamine (TRANSDERM-SCOP) transdermal patch 1 patch  1 patch TransDERmal Q72H    rosuvastatin (CRESTOR) tablet 20 mg  20 mg Per G Tube Nightly    sodium phosphate 10 mmol in sodium chloride 0.9 % 250 mL IVPB  10 mmol IntraVENous PRN    Or    sodium phosphate 15 mmol in sodium chloride 0.9 % 250 mL IVPB  15 mmol IntraVENous PRN    Or    sodium phosphate 20 mmol in sodium chloride 0.9 % 500 mL IVPB  20 mmol IntraVENous PRN    thiamine tablet 100 mg  100 mg Per G Tube Daily    QUEtiapine (SEROQUEL) tablet 25 mg  25 mg Oral Nightly PRN    lactated ringers IV soln infusion   IntraVENous Continuous    aspirin chewable tablet 81 mg  81 mg Oral Daily    ferrous gluconate (FERGON) tablet 324 mg  324 mg Oral Daily with breakfast    ascorbic acid (VITAMIN C) tablet 250 mg  250 mg Oral Daily with breakfast    loperamide (IMODIUM) capsule 2 mg  2 mg Oral 4x Daily PRN    bismuth subsalicylate (PEPTO BISMOL) 262 MG/15ML suspension 30 mL  30 mL Oral Q6H PRN    mirtazapine (REMERON) tablet 7.5 mg  7.5 mg Oral Nightly    traZODone (DESYREL) tablet 50 mg  50 mg Oral Nightly    melatonin tablet 3 mg  3 mg Oral Nightly PRN    sodium chloride flush 0.9 % injection 5-40 mL  5-40 mL IntraVENous 2 times per day    sodium chloride flush 0.9 % injection 5-40 mL  5-40 mL IntraVENous PRN    0.9 % sodium chloride infusion   IntraVENous PRN    potassium chloride (KLOR-CON M) extended release tablet 40 mEq  40 mEq Oral PRN    Or    potassium bicarb-citric acid (EFFER-K) effervescent tablet 40 mEq  40 mEq Oral PRN    Or    potassium chloride 10 mEq/100 mL IVPB (Peripheral Line)  10 mEq IntraVENous PRN    potassium chloride 10 mEq/100 mL IVPB (Peripheral Line)  10 mEq IntraVENous PRN    magnesium sulfate 2000 mg in 50 mL IVPB premix  2,000 mg IntraVENous PRN    ondansetron (ZOFRAN-ODT) disintegrating tablet 4 mg  4 mg Oral Q8H PRN    Or    ondansetron (ZOFRAN) injection 4 mg  4 mg

## 2023-09-11 ENCOUNTER — TELEPHONE (OUTPATIENT)
Dept: ONCOLOGY | Age: 78
End: 2023-09-11

## 2023-09-11 LAB
ANION GAP SERPL CALC-SCNC: 5 MMOL/L (ref 2–11)
BASOPHILS # BLD: 0 K/UL (ref 0–0.2)
BASOPHILS NFR BLD: 0 % (ref 0–2)
BUN SERPL-MCNC: 7 MG/DL (ref 8–23)
CALCIUM SERPL-MCNC: 8.2 MG/DL (ref 8.3–10.4)
CHLORIDE SERPL-SCNC: 102 MMOL/L (ref 101–110)
CO2 SERPL-SCNC: 30 MMOL/L (ref 21–32)
CREAT SERPL-MCNC: 0.4 MG/DL (ref 0.8–1.5)
DIFFERENTIAL METHOD BLD: ABNORMAL
EOSINOPHIL # BLD: 0.1 K/UL (ref 0–0.8)
EOSINOPHIL NFR BLD: 1 % (ref 0.5–7.8)
ERYTHROCYTE [DISTWIDTH] IN BLOOD BY AUTOMATED COUNT: 13.3 % (ref 11.9–14.6)
GLUCOSE SERPL-MCNC: 153 MG/DL (ref 65–100)
HCT VFR BLD AUTO: 26.9 % (ref 41.1–50.3)
HGB BLD-MCNC: 8.9 G/DL (ref 13.6–17.2)
IMM GRANULOCYTES # BLD AUTO: 0 K/UL (ref 0–0.5)
IMM GRANULOCYTES NFR BLD AUTO: 0 % (ref 0–5)
LYMPHOCYTES # BLD: 0.6 K/UL (ref 0.5–4.6)
LYMPHOCYTES NFR BLD: 6 % (ref 13–44)
MAGNESIUM SERPL-MCNC: 1.4 MG/DL (ref 1.8–2.4)
MCH RBC QN AUTO: 28.8 PG (ref 26.1–32.9)
MCHC RBC AUTO-ENTMCNC: 33.1 G/DL (ref 31.4–35)
MCV RBC AUTO: 87.1 FL (ref 82–102)
MM INDURATION, POC: 0 MM (ref 0–5)
MONOCYTES # BLD: 0.8 K/UL (ref 0.1–1.3)
MONOCYTES NFR BLD: 8 % (ref 4–12)
NEUTS SEG # BLD: 8.2 K/UL (ref 1.7–8.2)
NEUTS SEG NFR BLD: 85 % (ref 43–78)
NRBC # BLD: 0 K/UL (ref 0–0.2)
PHOSPHATE SERPL-MCNC: 2.2 MG/DL (ref 2.3–3.7)
PLATELET # BLD AUTO: 266 K/UL (ref 150–450)
PMV BLD AUTO: 10.1 FL (ref 9.4–12.3)
POTASSIUM SERPL-SCNC: 3.4 MMOL/L (ref 3.5–5.1)
PPD, POC: NEGATIVE
RBC # BLD AUTO: 3.09 M/UL (ref 4.23–5.6)
SODIUM SERPL-SCNC: 137 MMOL/L (ref 133–143)
WBC # BLD AUTO: 9.6 K/UL (ref 4.3–11.1)

## 2023-09-11 PROCEDURE — 97535 SELF CARE MNGMENT TRAINING: CPT

## 2023-09-11 PROCEDURE — 2700000000 HC OXYGEN THERAPY PER DAY

## 2023-09-11 PROCEDURE — 97112 NEUROMUSCULAR REEDUCATION: CPT

## 2023-09-11 PROCEDURE — 92526 ORAL FUNCTION THERAPY: CPT

## 2023-09-11 PROCEDURE — 6370000000 HC RX 637 (ALT 250 FOR IP): Performed by: STUDENT IN AN ORGANIZED HEALTH CARE EDUCATION/TRAINING PROGRAM

## 2023-09-11 PROCEDURE — 6360000002 HC RX W HCPCS: Performed by: FAMILY MEDICINE

## 2023-09-11 PROCEDURE — A4216 STERILE WATER/SALINE, 10 ML: HCPCS | Performed by: FAMILY MEDICINE

## 2023-09-11 PROCEDURE — 99223 1ST HOSP IP/OBS HIGH 75: CPT | Performed by: INTERNAL MEDICINE

## 2023-09-11 PROCEDURE — C9113 INJ PANTOPRAZOLE SODIUM, VIA: HCPCS | Performed by: FAMILY MEDICINE

## 2023-09-11 PROCEDURE — 83735 ASSAY OF MAGNESIUM: CPT

## 2023-09-11 PROCEDURE — 94761 N-INVAS EAR/PLS OXIMETRY MLT: CPT

## 2023-09-11 PROCEDURE — 6370000000 HC RX 637 (ALT 250 FOR IP)

## 2023-09-11 PROCEDURE — 97530 THERAPEUTIC ACTIVITIES: CPT

## 2023-09-11 PROCEDURE — 6370000000 HC RX 637 (ALT 250 FOR IP): Performed by: INTERNAL MEDICINE

## 2023-09-11 PROCEDURE — 2580000003 HC RX 258: Performed by: FAMILY MEDICINE

## 2023-09-11 PROCEDURE — 36415 COLL VENOUS BLD VENIPUNCTURE: CPT

## 2023-09-11 PROCEDURE — 2580000003 HC RX 258: Performed by: STUDENT IN AN ORGANIZED HEALTH CARE EDUCATION/TRAINING PROGRAM

## 2023-09-11 PROCEDURE — 99233 SBSQ HOSP IP/OBS HIGH 50: CPT | Performed by: STUDENT IN AN ORGANIZED HEALTH CARE EDUCATION/TRAINING PROGRAM

## 2023-09-11 PROCEDURE — 2580000003 HC RX 258: Performed by: INTERNAL MEDICINE

## 2023-09-11 PROCEDURE — 31502 CHANGE OF WINDPIPE AIRWAY: CPT | Performed by: STUDENT IN AN ORGANIZED HEALTH CARE EDUCATION/TRAINING PROGRAM

## 2023-09-11 PROCEDURE — 2500000003 HC RX 250 WO HCPCS: Performed by: INTERNAL MEDICINE

## 2023-09-11 PROCEDURE — 6360000002 HC RX W HCPCS: Performed by: STUDENT IN AN ORGANIZED HEALTH CARE EDUCATION/TRAINING PROGRAM

## 2023-09-11 PROCEDURE — 1100000000 HC RM PRIVATE

## 2023-09-11 PROCEDURE — 6360000002 HC RX W HCPCS: Performed by: INTERNAL MEDICINE

## 2023-09-11 PROCEDURE — 80048 BASIC METABOLIC PNL TOTAL CA: CPT

## 2023-09-11 PROCEDURE — 94640 AIRWAY INHALATION TREATMENT: CPT

## 2023-09-11 PROCEDURE — 85025 COMPLETE CBC W/AUTO DIFF WBC: CPT

## 2023-09-11 PROCEDURE — 92597 ORAL SPEECH DEVICE EVAL: CPT

## 2023-09-11 PROCEDURE — 84100 ASSAY OF PHOSPHORUS: CPT

## 2023-09-11 RX ORDER — SODIUM CHLORIDE FOR INHALATION 3 %
4 VIAL, NEBULIZER (ML) INHALATION PRN
Status: DISCONTINUED | OUTPATIENT
Start: 2023-09-11 | End: 2023-09-20 | Stop reason: HOSPADM

## 2023-09-11 RX ORDER — POTASSIUM CHLORIDE 20 MEQ/1
40 TABLET, EXTENDED RELEASE ORAL 2 TIMES DAILY WITH MEALS
Status: COMPLETED | OUTPATIENT
Start: 2023-09-11 | End: 2023-09-11

## 2023-09-11 RX ORDER — LANOLIN ALCOHOL/MO/W.PET/CERES
400 CREAM (GRAM) TOPICAL DAILY
Status: COMPLETED | OUTPATIENT
Start: 2023-09-11 | End: 2023-09-13

## 2023-09-11 RX ORDER — ALBUTEROL SULFATE 2.5 MG/3ML
2.5 SOLUTION RESPIRATORY (INHALATION)
Status: DISCONTINUED | OUTPATIENT
Start: 2023-09-11 | End: 2023-09-20 | Stop reason: HOSPADM

## 2023-09-11 RX ORDER — MAGNESIUM SULFATE IN WATER 40 MG/ML
2000 INJECTION, SOLUTION INTRAVENOUS ONCE
Status: COMPLETED | OUTPATIENT
Start: 2023-09-11 | End: 2023-09-11

## 2023-09-11 RX ORDER — SODIUM CHLORIDE FOR INHALATION 3 %
4 VIAL, NEBULIZER (ML) INHALATION
Status: DISCONTINUED | OUTPATIENT
Start: 2023-09-11 | End: 2023-09-20 | Stop reason: HOSPADM

## 2023-09-11 RX ADMIN — AZITHROMYCIN MONOHYDRATE 500 MG: 500 INJECTION, POWDER, LYOPHILIZED, FOR SOLUTION INTRAVENOUS at 09:15

## 2023-09-11 RX ADMIN — Medication 250 MG: at 09:17

## 2023-09-11 RX ADMIN — ROSUVASTATIN CALCIUM 20 MG: 20 TABLET, FILM COATED ORAL at 21:50

## 2023-09-11 RX ADMIN — Medication 4 ML: at 20:21

## 2023-09-11 RX ADMIN — SODIUM PHOSPHATE, MONOBASIC, MONOHYDRATE AND SODIUM PHOSPHATE, DIBASIC, ANHYDROUS 15 MMOL: 142; 276 INJECTION, SOLUTION INTRAVENOUS at 13:35

## 2023-09-11 RX ADMIN — POTASSIUM CHLORIDE 40 MEQ: 1500 TABLET, EXTENDED RELEASE ORAL at 09:17

## 2023-09-11 RX ADMIN — WATER 40 MG: 1 INJECTION INTRAMUSCULAR; INTRAVENOUS; SUBCUTANEOUS at 12:47

## 2023-09-11 RX ADMIN — MAGNESIUM GLUCONATE 500 MG ORAL TABLET 400 MG: 500 TABLET ORAL at 09:17

## 2023-09-11 RX ADMIN — Medication 4 ML: at 14:34

## 2023-09-11 RX ADMIN — SODIUM CHLORIDE, PRESERVATIVE FREE 10 ML: 5 INJECTION INTRAVENOUS at 21:50

## 2023-09-11 RX ADMIN — CEFTRIAXONE 1000 MG: 1 INJECTION, POWDER, FOR SOLUTION INTRAMUSCULAR; INTRAVENOUS at 09:16

## 2023-09-11 RX ADMIN — LISINOPRIL 40 MG: 20 TABLET ORAL at 09:17

## 2023-09-11 RX ADMIN — MAGNESIUM SULFATE HEPTAHYDRATE 2000 MG: 40 INJECTION, SOLUTION INTRAVENOUS at 10:18

## 2023-09-11 RX ADMIN — FERROUS GLUCONATE 324 MG: 324 TABLET ORAL at 09:17

## 2023-09-11 RX ADMIN — ALBUTEROL SULFATE 2.5 MG: 2.5 SOLUTION RESPIRATORY (INHALATION) at 14:34

## 2023-09-11 RX ADMIN — Medication 100 MG: at 09:17

## 2023-09-11 RX ADMIN — ASPIRIN 81 MG 81 MG: 81 TABLET ORAL at 09:17

## 2023-09-11 RX ADMIN — ALBUTEROL SULFATE 2.5 MG: 2.5 SOLUTION RESPIRATORY (INHALATION) at 20:20

## 2023-09-11 RX ADMIN — ENOXAPARIN SODIUM 40 MG: 100 INJECTION SUBCUTANEOUS at 09:18

## 2023-09-11 RX ADMIN — POTASSIUM CHLORIDE 40 MEQ: 1500 TABLET, EXTENDED RELEASE ORAL at 17:26

## 2023-09-11 RX ADMIN — SODIUM CHLORIDE, PRESERVATIVE FREE 5 ML: 5 INJECTION INTRAVENOUS at 10:50

## 2023-09-11 RX ADMIN — SODIUM CHLORIDE, PRESERVATIVE FREE 40 MG: 5 INJECTION INTRAVENOUS at 09:16

## 2023-09-11 ASSESSMENT — ENCOUNTER SYMPTOMS
SINUS PAIN: 0
EYE REDNESS: 0
EYE DISCHARGE: 0
ABDOMINAL DISTENTION: 0
CONSTIPATION: 0
STRIDOR: 0
EYE PAIN: 0
ABDOMINAL PAIN: 0
CHOKING: 0
COUGH: 0
COLOR CHANGE: 0

## 2023-09-11 ASSESSMENT — PAIN SCALES - GENERAL
PAINLEVEL_OUTOF10: 0
PAINLEVEL_OUTOF10: 0

## 2023-09-11 NOTE — PROGRESS NOTES
Patient is asleep, Respirations present. Trach intact, sating at 98%. Bed in lowest position, call light and bedside table within reach, will continue to monitor.

## 2023-09-11 NOTE — PROGRESS NOTES
Patient's continuous IV infusion of LR stopped at this time due to tube feeding reaching 30 mL/hour per protocol.

## 2023-09-11 NOTE — CONSULTS
History and Physical Initial Visit NOTE           9/11/2023    Kelsie Taylor                        Date of Admission:  9/5/2023    The patient's chart is reviewed and the patient is discussed with the staff. Subjective:     Patient is a 69 y.o.  male seen and evaluated at the request of Dr. Norman Shaffer for increased secretions. 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 Former smoker quit 50 years ago. Patient presented after syncopal episode. Per review patient was walking with crutches when crutch got tangled and patient fell. Patient was admitted for SIRS with tachycardia, WBC 14.6 and low normal BP. Sepsis was ruled out. Cardiology evaluated patient due to elevated trop and ?syncope. Trop felt to be demand, syncope felt to be dehydration and more vaso-vagal. Underwent trach and PEG placement on 9/8. Began having fevers, resp viral panel + for rhino virus, mildly positive UA. Sputum culture NRF so far. Review of Systems: Comprehensive ROS negative except in HPI    Current Outpatient Medications   Medication Instructions    lidocaine viscous hcl (XYLOCAINE) 2 % SOLN solution No dose, route, or frequency recorded. lidocaine-prilocaine (EMLA) 2.5-2.5 % cream Apply dime size amount to port site ~30 min prior to accessing, DO NOT RUB IN, cover in plastic wrap to protect clothes    lisinopril (PRINIVIL;ZESTRIL) 40 mg, Oral, DAILY    Magic Mouthwash (MIRACLE MOUTHWASH) 5 mLs, Swish & Spit, 4 TIMES DAILY PRN, Pharmacy to mix equal parts Benadryl, Maalox, Viscous Lidocaine. Take 5 mL 4 times daily as needed, gargle/swish/spit. naloxone (NARCAN) 4 MG/0.1ML LIQD nasal spray Use 1 spray intranasally at onset of opioid overdose. May repeat dose using alternate nostril every 2 minutes as needed should symptoms persist or recur.     omeprazole (PRILOSEC) 40 mg, Oral, DAILY    ondansetron (ZOFRAN) 8 mg, Oral, EVERY 8 HOURS PRN, Take every 8 CALCIUM 8.2 09/11/2023 03:37 AM      No results found for: \"BNP\"    ECHO: 09/05/23    ECHO (TTE) COMPLETE (CONTRAST/BUBBLE/3D PRN) 09/06/2023  4:37 PM (Final)    Interpretation Summary    Left Ventricle: Normal left ventricular systolic function with a visually estimated EF of 65 - 70%. Left ventricle size is normal. Normal wall thickness. Normal wall motion. Normal diastolic function. Average E/e' ratio is 7.49. Pericardium: No pericardial effusion. 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 Presented to ER after syncopal event- felt to be related to dehydration. Admitted for SIRS. Underwent Trach/PEG on 9/8. Began having fevers, +rhinovirus and mild positive UA. Consulted for increased secretions. Principal Problem:    Sepsis ([address])  Plan: +SIRS, cultures NGTD. 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 Oncology and ENT following. Moderate protein-calorie malnutrition ([address])  Plan: PEG, TF    Syncope and collapse  Plan: felt to be dehydration. Neuro has seen     Tracheostomy care [facility])  Plan: increased secretions likely related to rhinovirus, sputum culture NRF so far. Continue suctioning PRN, can add 3% saline nebs PRN. Has scopolamine patch. Secretions will take time to decrease. Already on abx. Full Code    Thank you very much for this referral.  We appreciate the opportunity to participate in this patient's care. Will follow along with above stated plan.     In this split/shared evaluation I performed performed a

## 2023-09-11 NOTE — PROGRESS NOTES
ACUTE OCCUPATIONAL THERAPY GOALS:   (Developed with and agreed upon by patient and/or caregiver.)  1. Patient will complete lower body bathing and dressing with Min A and adaptive equipment as   needed. 2. Patient will completed upper body bathing and dressing with Mod I and adaptive equipment as needed. 3. Patient will complete grooming seated at EOB with SBA and adaptive equipment as needed. 4. Patient will complete toileting with Min A and adaptive equipment as needed. 5. Patient will tolerate 30 minutes of OT treatment with 1-2 rest breaks to increase activity tolerance for ADLs. 6. Patient will complete functional transfers with SBA and adaptive equipment as needed. Timeframe: 7 visits     OCCUPATIONAL THERAPY: Daily Note AM   OT Visit Days: 2   Time In/Out  OT Charge Capture  Rehab Caseload Tracker  OT Orders    Danyel Freitas is a 69 y.o. male   PRIMARY DIAGNOSIS: Sepsis ([address])  Syncope and collapse [R55]  Dehydration [E86.0]  Elevated troponin [R77.8]  Sepsis ([address]) [A41.9]  Malignant neoplasm of esophagus, unspecified location ([address]) [C15.9]  Procedure(s) (LRB):  TRACHEOTOMY (N/A)  3 Days Post-Op  Inpatient: Payor: [address] [place] [address] Avenue / Plan: MEDICARE PART A AND B / Product Type: *No Product type* /     ASSESSMENT:     REHAB RECOMMENDATIONS: CURRENT LEVEL OF FUNCTION:  (Most Recently Demonstrated)   Recommendation to date pending progress:  Setting:  Inpatient Rehab Facility     Equipment:    To Be Determined--pt has crutches, BSC, RW and TTB at home Bathing:  Not Tested  Dressing:  Minimal Assist  Feeding/Grooming:  Supervision/Setup  Toileting:  Not Tested  Functional Mobility:  Moderate Assist x2     ASSESSMENT:  Mr. Mona Blunt is a 68 y/o male presenting with sepsis s/p PEG and trach 9/8. Pt with hx of polio and supraglottic squamous cell carcinoma. Pt wears full leg brace on LLE and uses crutches for ambulation--crutches not present today, wife stated she would bring them next session.

## 2023-09-11 NOTE — PROGRESS NOTES
Patient asleep in bed, trach on 6 L, respirations present. No signs of distress present, no signs of pain present. Bed in lowest position, call light and bedside table within reach. Will begin preparing for bedside shift report.

## 2023-09-11 NOTE — PROGRESS NOTES
ACUTE PHYSICAL THERAPY GOALS:   (Developed with and agreed upon by patient and/or caregiver.)  Pt will perform supine to/from sit with mod I in 7 treatment days. Pt will perform sit to/from stand with min A and LRAD in 7 treatment days. Pt will ambulate 48' with min A and LRAD in 7 treatment days. Pt will negotiate 2 stairs with min A  and LRAD in 7 treatment days. Pt will be independent with HEP in 7 days. PHYSICAL THERAPY: Daily Note AM   (Link to Caseload Tracking: PT Visit Days : 2  Time In/Out PT Charge Capture  Rehab Caseload Tracker  Orders    Sindi Fleming is a 69 y.o. male   PRIMARY DIAGNOSIS: Sepsis ([address])  Syncope and collapse [R55]  Dehydration [E86.0]  Elevated troponin [R77.8]  Sepsis ([address]) [A41.9]  Malignant neoplasm of esophagus, unspecified location ([address]) [C15.9]  Procedure(s) (LRB):  TRACHEOTOMY (N/A)  3 Days Post-Op  Inpatient: Payor: Rekha Briones / Plan: MEDICARE PART A AND B / Product Type: *No Product type* /     ASSESSMENT:     REHAB RECOMMENDATIONS:   Recommendation to date pending progress:  Setting:  Inpatient Rehab Facility    Equipment:    To Be Determined     ASSESSMENT:  Mr. Joana Harris was supine upon contact and agreeable to PT. Patient presents with t-piece via trach, PEG tube, IV, and pulse oximeter. Of note, patient also has a history of polio with brace at bedside. Patient typically utilizes standard crutches for mobility; wife to bring crutches in. Patient is impulsive throughout session with no regards to lines requiring frequent redirection and cues for safety. Patient performed supine to sit with min assist and cues for technique. Once seated EOB patient demonstrated good static sitting balance. Patient impulsively attempted to transfer into recliner chair requiring cues for improved technique. Patient performed squat pivot transfer to recliner chair with mod assist x 2. Patient was then positioned for comfort in recliner chair with needs in reach.  Slow progress towards PT goals. Will continue PT Efforts.      SUBJECTIVE:   Mr. Ana Robertson states, \"What?\"     Social/Functional Lives With: Spouse  Type of Home: House  Home Layout: One level  Home Access: Stairs to enter with rails  Entrance Stairs - Number of Steps: 2  Home Equipment: Vanessa Lorton, rolling  Has the patient had two or more falls in the past year or any fall with injury in the past year?: Yes  Receives Help From: Family  ADL Assistance: Independent  Ambulation Assistance: Independent  Transfer Assistance: Independent  Active : No  Occupation: Part time employment  OBJECTIVE:     PAIN: VITALS / O2: PRECAUTION / Denece Liming / Isis Lipps:   Pre Treatment:   Pain Assessment: None - Denies Pain      Post Treatment: 0 Vitals        Oxygen    Continuous Pulse Oximetry, IV, PEG, and Tracheostomy    RESTRICTIONS/PRECAUTIONS:  Restrictions/Precautions  Restrictions/Precautions: Fall Risk  Restrictions/Precautions: Fall Risk     MOBILITY: I Mod I S SBA CGA Min Mod Max Total  NT x2 Comments:   Bed Mobility    Rolling [] [] [] [] [] [] [] [] [] [] []    Supine to Sit [] [] [] [] [] [x] [] [] [] [] []    Scooting [] [] [] [] [] [] [] [] [] [] []    Sit to Supine [] [] [] [] [] [] [] [] [] [] []    Transfers    Sit to Stand [] [] [] [] [] [] [] [] [] [] []    Bed to Chair [] [] [] [] [] [] [x] [] [] [] [x]    Stand to Sit [] [] [] [] [] [] [] [] [] [] []     [] [] [] [] [] [] [] [] [] [] []    I=Independent, Mod I=Modified Independent, S=Supervision, SBA=Standby Assistance, CGA=Contact Guard Assistance,   Min=Minimal Assistance, Mod=Moderate Assistance, Max=Maximal Assistance, Total=Total Assistance, NT=Not Tested    BALANCE: Good Fair+ Fair Fair- Poor NT Comments   Sitting Static [x] [] [] [] [] []    Sitting Dynamic [] [x] [] [] [] []              Standing Static [] [] [] [] [] []    Standing Dynamic [] [] [] [] [] []      GAIT: I Mod I S SBA CGA Min Mod Max Total  NT x2 Comments:   Level of Assistance [] [] [] [] []

## 2023-09-11 NOTE — CARE COORDINATION
MSN, CM:  patient has been referred to [facility] for rehab services. Patient continues on 6L NC and Mariano. Patient also with new PEG and Trach. Case Management will continue to follow.

## 2023-09-11 NOTE — PROGRESS NOTES
GOALS: LTG: patient will tolerate safest, least restrictive oral diet without s/sx airway compromise  STG: Patient will participate in passey jason valve tolerance assessment. STG: Patient will tolerate ongoing po trials in efforts to advance diet  STG: Patient will participate in instrumental swallowing assessment to objectively assess oropharyngeal swallow if clinically indicated  STG: Patient will tolerate a PMV for a minimum of 60 minutes without changes in vitals. SPEECH LANGUAGE PATHOLOGY: PMV Initial Assessment and Dysphagia Treatment Note    MRN: [id number]    ADMISSION DATE: 9/5/2023  PRIMARY DIAGNOSIS: Sepsis ([address])  Syncope and collapse [R55]  Dehydration [E86.0]  Elevated troponin [R77.8]  Sepsis ([address]) [A41.9]  Malignant neoplasm of esophagus, unspecified location ([address]) [C15.9]    ICD-10: Treatment Diagnosis: R13.12 Dysphagia, Oropharyngeal Phase  R49.0 Dysphonia; Hoarseness    RECOMMENDATIONS:   Recommendations: NPO; PMV for communication. Doff PMV while sleeping. Patient continues to require skilled intervention: Yes. Recommend ongoing speech therapy services during this hospitalization and next level of care        ASSESSMENT      Impressions  Diagnosis: Pt tolerated PMV without changes in vitals. Moderate dysphonia characterized by moderate hoarseness and breathiness. Pt reported he feels his speech is baseline. His spouse reported she feels it is \"a little hoarse\" compared to baseline. Pt given one trial ice and one trial thin liquids via cup with PMV donned.  + s/sx aspiration observed with both consistencies evidenced by immediate coughing. Significant coughing episode with thin via cup. Therefore, no further trials administered. Recommend strict NPO until MBS can be completed. Do not feel a FEES is indicated given presence of tumor which may impede visualization. GENERAL   Subjective: Pt seated upright in chair. Comments: Pt with hx of SCC in 2010.   He had a partial 1  Interpretation of Tool: The Modified Parker Scale is a scale used to quantify level of disability as it relates to a patient's functional abilities. No Symptoms(0); No significant disability despite symptoms; able to carry out all usual duties and activities(1); Slight disability; unable to carry out all previous activities but able to look after own affairs without assistance(2); Moderate disability; requiring some help but able to walk without assistance(3); Moderately severe disability; unable to walk without assistance and unable to attend to own bodily needs without assistance(4);  Severe disability; bedridden, incontinent, and requiring constant nursing care and attention(5)      Education: Patient, Family member, RN  Patient Education: PMV use, dysphagia, s/sx aspiration; role of SLP  Patient Education Response: Verbalizes understanding    PRECAUTIONS/ALLERGIES: Zolpidem and Sulfamethoxazole-trimethoprim     Safety Devices in place: Yes  Type of devices: Call light within reach  Restraints Initially in Place: No    Therapy Time  SLP Individual Minutes  Time In: 1354  Time Out: 5937  Minutes: [address]IS[address] Vogt  9/11/2023 2:40 PM

## 2023-09-11 NOTE — PROGRESS NOTES
Patient suctioned with in-line suction at this time. No signs of distress noted. Patient tolerated well and had no further needs or wants expressed at this time.

## 2023-09-11 NOTE — PROGRESS NOTES
Physician Progress Note      PATIENT:               Gina Montes De Oca  CSN #:                  [id number]  :                       1945  ADMIT DATE:       2023 4:32 PM  [address] [facility] DATE:  RESPONDING  PROVIDER #:        Jose Pinto MD          QUERY TEXT:    Patient admitted with dehydration. Noted documentation of SIRS and sepsis. If possible, please document in progress notes and discharge summary if you   are evaluating and /or treating any of the following: The medical record reflects the following:  Risk Factors: rhinovirus  Clinical Indicators:  Consult- Patient was admitted for SIRS with   tachycardia, WBC 14.6 and low normal BP. Sepsis was ruled out.     PN- Admitted with sepsis, syncope and collapse, elevated troponin,   squamous cell carcinoma of oropharynx, hypokalemia, moderate malnutrition. Sepsis ([address])  Plan:  Meets criteria with tachycardia, tachypnea and elevated white blood   cell count. However this has been ruled out as patient has no fever or   chills. Also his procalcitonin is not indicative of bacterial infection. Likely SIRS  Treatment: UA, CXR, blood cultures, abx  Options provided:  -- SIRS confirmed and sepsis ruled out  -- sepsis confirmed and SIRS ruled out  -- SIRS and sepsis confirmed  -- SIRS and sepsis ruled out  -- Other - I will add my own diagnosis  -- Disagree - Not applicable / Not valid  -- Disagree - Clinically unable to determine / Unknown  -- Refer to Clinical Documentation Reviewer    PROVIDER RESPONSE TEXT:    After study, sepsis confirmed and SIRS ruled out.     Query created by: Jael Rodrigues on 2023 1:39 PM      Electronically signed by:  Jose Pinto MD 2023 1:41 PM

## 2023-09-12 ENCOUNTER — APPOINTMENT (OUTPATIENT)
Dept: GENERAL RADIOLOGY | Age: 78
DRG: 004 | End: 2023-09-12
Attending: PHYSICAL MEDICINE & REHABILITATION
Payer: MEDICARE

## 2023-09-12 PROBLEM — J39.8 INCREASED TRACHEAL SECRETIONS: Status: ACTIVE | Noted: 2023-09-12

## 2023-09-12 LAB
ANION GAP SERPL CALC-SCNC: 6 MMOL/L (ref 2–11)
BACTERIA SPEC CULT: NORMAL
BACTERIA SPEC CULT: NORMAL
BASOPHILS # BLD: 0 K/UL (ref 0–0.2)
BASOPHILS NFR BLD: 0 % (ref 0–2)
BUN SERPL-MCNC: 8 MG/DL (ref 8–23)
CALCIUM SERPL-MCNC: 8.8 MG/DL (ref 8.3–10.4)
CHLORIDE SERPL-SCNC: 103 MMOL/L (ref 101–110)
CO2 SERPL-SCNC: 28 MMOL/L (ref 21–32)
CREAT SERPL-MCNC: 0.5 MG/DL (ref 0.8–1.5)
DIFFERENTIAL METHOD BLD: ABNORMAL
EOSINOPHIL # BLD: 0 K/UL (ref 0–0.8)
EOSINOPHIL NFR BLD: 0 % (ref 0.5–7.8)
ERYTHROCYTE [DISTWIDTH] IN BLOOD BY AUTOMATED COUNT: 13.5 % (ref 11.9–14.6)
GLUCOSE SERPL-MCNC: 210 MG/DL (ref 65–100)
GRAM STN SPEC: NORMAL
HCT VFR BLD AUTO: 30.6 % (ref 41.1–50.3)
HGB BLD-MCNC: 10.1 G/DL (ref 13.6–17.2)
IMM GRANULOCYTES # BLD AUTO: 0 K/UL (ref 0–0.5)
IMM GRANULOCYTES NFR BLD AUTO: 0 % (ref 0–5)
LYMPHOCYTES # BLD: 0.4 K/UL (ref 0.5–4.6)
LYMPHOCYTES NFR BLD: 4 % (ref 13–44)
MAGNESIUM SERPL-MCNC: 2.1 MG/DL (ref 1.8–2.4)
MCH RBC QN AUTO: 28.9 PG (ref 26.1–32.9)
MCHC RBC AUTO-ENTMCNC: 33 G/DL (ref 31.4–35)
MCV RBC AUTO: 87.4 FL (ref 82–102)
MONOCYTES # BLD: 0.8 K/UL (ref 0.1–1.3)
MONOCYTES NFR BLD: 9 % (ref 4–12)
NEUTS SEG # BLD: 7.6 K/UL (ref 1.7–8.2)
NEUTS SEG NFR BLD: 87 % (ref 43–78)
NRBC # BLD: 0 K/UL (ref 0–0.2)
PLATELET # BLD AUTO: 302 K/UL (ref 150–450)
PMV BLD AUTO: 10.1 FL (ref 9.4–12.3)
POTASSIUM SERPL-SCNC: 3.5 MMOL/L (ref 3.5–5.1)
RBC # BLD AUTO: 3.5 M/UL (ref 4.23–5.6)
SERVICE CMNT-IMP: NORMAL
SERVICE CMNT-IMP: NORMAL
SODIUM SERPL-SCNC: 137 MMOL/L (ref 133–143)
WBC # BLD AUTO: 8.7 K/UL (ref 4.3–11.1)

## 2023-09-12 PROCEDURE — 6360000002 HC RX W HCPCS: Performed by: INTERNAL MEDICINE

## 2023-09-12 PROCEDURE — 6370000000 HC RX 637 (ALT 250 FOR IP): Performed by: NURSE PRACTITIONER

## 2023-09-12 PROCEDURE — 6370000000 HC RX 637 (ALT 250 FOR IP): Performed by: STUDENT IN AN ORGANIZED HEALTH CARE EDUCATION/TRAINING PROGRAM

## 2023-09-12 PROCEDURE — 83735 ASSAY OF MAGNESIUM: CPT

## 2023-09-12 PROCEDURE — 2700000000 HC OXYGEN THERAPY PER DAY

## 2023-09-12 PROCEDURE — 36415 COLL VENOUS BLD VENIPUNCTURE: CPT

## 2023-09-12 PROCEDURE — 92507 TX SP LANG VOICE COMM INDIV: CPT

## 2023-09-12 PROCEDURE — 6360000002 HC RX W HCPCS: Performed by: FAMILY MEDICINE

## 2023-09-12 PROCEDURE — 80048 BASIC METABOLIC PNL TOTAL CA: CPT

## 2023-09-12 PROCEDURE — 2580000003 HC RX 258: Performed by: STUDENT IN AN ORGANIZED HEALTH CARE EDUCATION/TRAINING PROGRAM

## 2023-09-12 PROCEDURE — 6370000000 HC RX 637 (ALT 250 FOR IP): Performed by: INTERNAL MEDICINE

## 2023-09-12 PROCEDURE — 94761 N-INVAS EAR/PLS OXIMETRY MLT: CPT

## 2023-09-12 PROCEDURE — 85025 COMPLETE CBC W/AUTO DIFF WBC: CPT

## 2023-09-12 PROCEDURE — 2580000003 HC RX 258: Performed by: INTERNAL MEDICINE

## 2023-09-12 PROCEDURE — 99232 SBSQ HOSP IP/OBS MODERATE 35: CPT | Performed by: INTERNAL MEDICINE

## 2023-09-12 PROCEDURE — 6370000000 HC RX 637 (ALT 250 FOR IP)

## 2023-09-12 PROCEDURE — 94640 AIRWAY INHALATION TREATMENT: CPT

## 2023-09-12 PROCEDURE — A4216 STERILE WATER/SALINE, 10 ML: HCPCS | Performed by: FAMILY MEDICINE

## 2023-09-12 PROCEDURE — 97530 THERAPEUTIC ACTIVITIES: CPT

## 2023-09-12 PROCEDURE — 6370000000 HC RX 637 (ALT 250 FOR IP): Performed by: FAMILY MEDICINE

## 2023-09-12 PROCEDURE — 74230 X-RAY XM SWLNG FUNCJ C+: CPT

## 2023-09-12 PROCEDURE — 6360000002 HC RX W HCPCS: Performed by: STUDENT IN AN ORGANIZED HEALTH CARE EDUCATION/TRAINING PROGRAM

## 2023-09-12 PROCEDURE — 2500000003 HC RX 250 WO HCPCS: Performed by: STUDENT IN AN ORGANIZED HEALTH CARE EDUCATION/TRAINING PROGRAM

## 2023-09-12 PROCEDURE — 2580000003 HC RX 258: Performed by: FAMILY MEDICINE

## 2023-09-12 PROCEDURE — 71045 X-RAY EXAM CHEST 1 VIEW: CPT

## 2023-09-12 PROCEDURE — 1100000000 HC RM PRIVATE

## 2023-09-12 PROCEDURE — 2500000003 HC RX 250 WO HCPCS: Performed by: INTERNAL MEDICINE

## 2023-09-12 PROCEDURE — C9113 INJ PANTOPRAZOLE SODIUM, VIA: HCPCS | Performed by: FAMILY MEDICINE

## 2023-09-12 PROCEDURE — 92611 MOTION FLUOROSCOPY/SWALLOW: CPT

## 2023-09-12 RX ORDER — GLYCOPYRROLATE 0.2 MG/ML
0.1 INJECTION INTRAMUSCULAR; INTRAVENOUS EVERY 4 HOURS PRN
Status: DISCONTINUED | OUTPATIENT
Start: 2023-09-12 | End: 2023-09-20 | Stop reason: HOSPADM

## 2023-09-12 RX ADMIN — ENOXAPARIN SODIUM 40 MG: 100 INJECTION SUBCUTANEOUS at 08:12

## 2023-09-12 RX ADMIN — Medication 4 ML: at 19:25

## 2023-09-12 RX ADMIN — SODIUM CHLORIDE, PRESERVATIVE FREE 10 ML: 5 INJECTION INTRAVENOUS at 22:23

## 2023-09-12 RX ADMIN — ROSUVASTATIN CALCIUM 20 MG: 20 TABLET, FILM COATED ORAL at 22:22

## 2023-09-12 RX ADMIN — ALBUTEROL SULFATE 2.5 MG: 2.5 SOLUTION RESPIRATORY (INHALATION) at 13:07

## 2023-09-12 RX ADMIN — LISINOPRIL 40 MG: 20 TABLET ORAL at 08:10

## 2023-09-12 RX ADMIN — ASPIRIN 81 MG 81 MG: 81 TABLET ORAL at 08:10

## 2023-09-12 RX ADMIN — TRAMADOL HYDROCHLORIDE 50 MG: 50 TABLET ORAL at 17:42

## 2023-09-12 RX ADMIN — CEFTRIAXONE 1000 MG: 1 INJECTION, POWDER, FOR SOLUTION INTRAMUSCULAR; INTRAVENOUS at 08:11

## 2023-09-12 RX ADMIN — TRAZODONE HYDROCHLORIDE 50 MG: 50 TABLET ORAL at 22:22

## 2023-09-12 RX ADMIN — WATER 40 MG: 1 INJECTION INTRAMUSCULAR; INTRAVENOUS; SUBCUTANEOUS at 05:35

## 2023-09-12 RX ADMIN — SODIUM CHLORIDE, PRESERVATIVE FREE 40 MG: 5 INJECTION INTRAVENOUS at 08:11

## 2023-09-12 RX ADMIN — MIRTAZAPINE 7.5 MG: 15 TABLET, FILM COATED ORAL at 22:21

## 2023-09-12 RX ADMIN — BARIUM SULFATE 15 ML: 0.81 POWDER, FOR SUSPENSION ORAL at 11:01

## 2023-09-12 RX ADMIN — SODIUM CHLORIDE, PRESERVATIVE FREE 10 ML: 5 INJECTION INTRAVENOUS at 08:13

## 2023-09-12 RX ADMIN — BARIUM SULFATE 15 ML: 400 SUSPENSION ORAL at 11:03

## 2023-09-12 RX ADMIN — TRAMADOL HYDROCHLORIDE 50 MG: 50 TABLET ORAL at 12:04

## 2023-09-12 RX ADMIN — FERROUS GLUCONATE 324 MG: 324 TABLET ORAL at 08:10

## 2023-09-12 RX ADMIN — ALBUTEROL SULFATE 2.5 MG: 2.5 SOLUTION RESPIRATORY (INHALATION) at 07:20

## 2023-09-12 RX ADMIN — Medication 250 MG: at 08:09

## 2023-09-12 RX ADMIN — MAGNESIUM GLUCONATE 500 MG ORAL TABLET 400 MG: 500 TABLET ORAL at 08:09

## 2023-09-12 RX ADMIN — WATER 40 MG: 1 INJECTION INTRAMUSCULAR; INTRAVENOUS; SUBCUTANEOUS at 17:38

## 2023-09-12 RX ADMIN — Medication 100 MG: at 08:09

## 2023-09-12 RX ADMIN — AZITHROMYCIN MONOHYDRATE 500 MG: 500 INJECTION, POWDER, LYOPHILIZED, FOR SOLUTION INTRAVENOUS at 08:12

## 2023-09-12 RX ADMIN — Medication 4 ML: at 07:21

## 2023-09-12 RX ADMIN — ALBUTEROL SULFATE 2.5 MG: 2.5 SOLUTION RESPIRATORY (INHALATION) at 19:25

## 2023-09-12 RX ADMIN — BARIUM SULFATE 15 ML: 400 SUSPENSION ORAL at 11:02

## 2023-09-12 RX ADMIN — GLYCOPYRROLATE 0.1 MG: 0.2 INJECTION INTRAMUSCULAR; INTRAVENOUS at 12:05

## 2023-09-12 RX ADMIN — ALBUTEROL SULFATE 2.5 MG: 2.5 SOLUTION RESPIRATORY (INHALATION) at 03:18

## 2023-09-12 ASSESSMENT — PAIN SCALES - GENERAL
PAINLEVEL_OUTOF10: 6
PAINLEVEL_OUTOF10: 4

## 2023-09-12 ASSESSMENT — PAIN DESCRIPTION - LOCATION: LOCATION: GENERALIZED

## 2023-09-12 NOTE — PROGRESS NOTES
GOALS: LTG: patient will tolerate safest, least restrictive oral diet without s/sx airway compromise  STG: Patient will participate in passey jason valve tolerance assessment. Met 9/11/23  STG: Patient will tolerate ongoing po trials in efforts to advance diet  STG: Patient will participate in instrumental swallowing assessment to objectively assess oropharyngeal swallow if clinically indicated. Met 9/12/23  STG: Patient will perform laryngeal exercises x10 each with 80% accuracy. Added 9/12/23  STG: Patient will tolerate a PMV for a minimum of 60 minutes without changes in vitals. SPEECH LANGUAGE PATHOLOGY: PMV Daily Note #1  and Dysphagia Treatment Note    MRN: [id number]    ADMISSION DATE: 9/5/2023  PRIMARY DIAGNOSIS: Sepsis ([address])  Syncope and collapse [R55]  Dehydration [E86.0]  Elevated troponin [R77.8]  Sepsis ([address]) [A41.9]  Malignant neoplasm of esophagus, unspecified location [facility]) [C15.9]    ICD-10: Treatment Diagnosis: R13.12 Dysphagia, Oropharyngeal Phase  R49.0 Dysphonia; Hoarseness    RECOMMENDATIONS:   Recommendations:   Diet:   Strict NPO/PEG  2-3 ice chips per hour after aggressive oral care onlyk    PMV placed for all waking hours when staff/visitors present for communication. Doff PMV while sleeping. Compensatory Swallowing Strategies/Modifications:  Fully awake/alert  Upright for all PO  Meticulous oral care  Suction set-up   Interventions/Recommendations/Referrals:  Further assessment of esophageal phase of swallow  Training in laryngeal strengthening and coordination exercises  Patient/family education  Follow-up MBS as deemed necessary following therapy      Patient continues to require skilled intervention: Yes. Recommend ongoing speech therapy services during this hospitalization and next level of care        ASSESSMENT         Patient tolerating placement of PMV for 30 min. Patient/wife/RN trained in donning/doffing and safe wearing and cleaning/storage.    Recommendations written in significant disability despite symptoms; able to carry out all usual duties and activities(1); Slight disability; unable to carry out all previous activities but able to look after own affairs without assistance(2); Moderate disability; requiring some help but able to walk without assistance(3); Moderately severe disability; unable to walk without assistance and unable to attend to own bodily needs without assistance(4);  Severe disability; bedridden, incontinent, and requiring constant nursing care and attention(5)      Education: Patient, Family member, RN  Patient Education: PMV use, dysphagia, s/sx aspiration; role of SLP  Patient Education Response: Verbalizes understanding    PRECAUTIONS/ALLERGIES: Zolpidem and Sulfamethoxazole-trimethoprim     Safety Devices in place: Yes  Type of devices: Call light within reach  Restraints Initially in Place: No    Therapy Time  SLP Individual Minutes  Time In: 0783  Time Out: [phone] [id number]  Minutes: [address], [place] [facility]

## 2023-09-12 NOTE — PROGRESS NOTES
09/12/23 1311   Additional Respiratoray Assessments   Ambu Bag With Mask At Bedside Yes   Backup Trachs Available (Size) 4.0;6.0   Airway Clearance   Suction Trach   Subglottic Suction Done No   Suction Device Suction catheter   Suction Catheter Size 14 Fr   Sputum Method Obtained Tracheal   Sputum Amount Small   Sputum Color/Odor Tan   Sputum Consistency Thick   Surgical Airway (Trach) 09/11/23 Tank Uncuffed   Placement Date/Time: 09/11/23 1727   Placed By: (c) Other (comment)  Surgical Airway Type: Tracheostomy  Brand: Tank  Style: Uncuffed  Size (mm): 6   Status Secured;Speaking valve   Site Assessment Clean;Dry; No drainage; No Bleeding   Site Care Dressing applied;Cleansed   Inner Cannula Care Changed/new   Ties Assessment Intact; Secure;Clean;Dry   [place] Overall at Bedside Yes   Spare Trach Tube One Size Smaller at Bedside Yes   Ambu Bag With Mask at Bedside Yes

## 2023-09-12 NOTE — PROGRESS NOTES
ACUTE PHYSICAL THERAPY GOALS:   (Developed with and agreed upon by patient and/or caregiver.)  Pt will perform supine to/from sit with mod I in 7 treatment days. Pt will perform sit to/from stand with min A and LRAD in 7 treatment days. Pt will ambulate 48' with min A and LRAD in 7 treatment days. Pt will negotiate 2 stairs with min A  and LRAD in 7 treatment days. Pt will be independent with HEP in 7 days. PHYSICAL THERAPY: Daily Note AM   (Link to Caseload Tracking: PT Visit Days : 3  Time In/Out PT Charge Capture  Rehab Caseload Tracker  Orders    Estuardo Vogt is a 69 y.o. male   PRIMARY DIAGNOSIS: Sepsis ([address])  Syncope and collapse [R55]  Dehydration [E86.0]  Elevated troponin [R77.8]  Sepsis ([address]) [A41.9]  Malignant neoplasm of esophagus, unspecified location ([address]) [C15.9]  Procedure(s) (LRB):  TRACHEOTOMY (N/A)  4 Days Post-Op  Inpatient: Payor: Dyan Burns / Plan: MEDICARE PART A AND B / Product Type: *No Product type* /     ASSESSMENT:     REHAB RECOMMENDATIONS:   Recommendation to date pending progress:  Setting:  Inpatient Rehab Facility    Equipment:    To Be Determined     ASSESSMENT:  Mr. Gemini Zuniga was supine upon contact and agreeable to PT. Patient presents with O2 via trach collar, PEG, continuous pulse ox, and IV. Of note, patient also has a history of polio with brace and crutches at bedside. Patient donned brace to LLE supine with assistance from therapist. Patient then performed supine to sit with SBA. Once seated EOB patient had difficulty with sit to stand requiring several trails before achieving upright posture. Patient required mod assist x 2 for sit to stand. Once standing patient ambulated 4' to recliner chair with crutches, min-mod assist x 2 and cues for sequencing.g Patient declined to attempt further ambulation due to feeling \"wobbly\". Slow progress towards PT goals. Will continue PT Efforts.      SUBJECTIVE:   Mr. Gemini Zuniga states, \"I feel wobbly\"

## 2023-09-12 NOTE — PROGRESS NOTES
Patient handoff received from [address]. Patient is in bed, A&Ox4. Mendez Older is on 6L via TC, in place, patent, and clean. Continuous pulse ox ongoing. Patient o2 sat 95% at rest. Mariano catheter in place and intact. PEG tube is infusing at 50 ml/hr, water flush at 55 ml/hr . No signs of distress noted at this time. No needs expressed. Standard safety measures in place. Call light is within reach.

## 2023-09-12 NOTE — PROGRESS NOTES
Brendan Talavera  Admission Date: 9/5/2023         Daily Progress Note: 9/12/2023    The patient's chart is reviewed and the patient is discussed with the staff. Background:    Patient is a 69 y.o.  male seen and evaluated at the request of Dr. Yolanda Middleton for increased secretions. 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 Former smoker quit 50 years ago. Patient presented after syncopal episode. Per review patient was walking with crutches when crutch got tangled and patient fell. Patient was admitted for SIRS with tachycardia, WBC 14.6 and low normal BP. Sepsis was ruled out. Cardiology evaluated patient due to elevated trop and ?syncope. Trop felt to be demand, syncope felt to be dehydration and more vaso-vagal. Underwent trach and PEG placement on 9/8. Began having fevers, resp viral panel + for rhino virus, mildly positive UA. Sputum culture NRF so far. Subjective:     Cough improving per patient. Still has scopolamine patch. Denies any SOB.   On TC 28%    Current Facility-Administered Medications   Medication Dose Route Frequency    pantoprazole (PROTONIX) 40 mg in sodium chloride (PF) 0.9 % 10 mL injection  40 mg IntraVENous Daily    magnesium oxide (MAG-OX) tablet 400 mg  400 mg Oral Daily    sodium chloride (Inhalant) 3 % nebulizer solution 4 mL  4 mL Nebulization PRN    albuterol (PROVENTIL) (2.5 MG/3ML) 0.083% nebulizer solution 2.5 mg  2.5 mg Nebulization Q6H RT    sodium chloride (Inhalant) 3 % nebulizer solution 4 mL  4 mL Nebulization BID RT    methylPREDNISolone sodium succ (SOLU-MEDROL) 40 mg in sterile water 1 mL injection  40 mg IntraVENous 2 times per day    cefTRIAXone (ROCEPHIN) 1,000 mg in sodium chloride 0.9 % 50 mL IVPB (mini-bag)  1,000 mg IntraVENous Q24H    azithromycin (ZITHROMAX) 500 mg in sodium chloride 0.9 % 250 mL IVPB ([id number])  500 mg IntraVENous Q24H    scopolamine (TRANSDERM-SCOP) transdermal patch

## 2023-09-12 NOTE — PROGRESS NOTES
GOALS: LTG: patient will tolerate safest, least restrictive oral diet without s/sx airway compromise  STG: Patient will participate in passey jason valve tolerance assessment. STG: Patient will tolerate ongoing po trials in efforts to advance diet  STG: Patient will participate in instrumental swallowing assessment to objectively assess oropharyngeal swallow if clinically indicated. Met 23  STG: Patient will perform laryngeal exercises x10 each with 80% accuracy. Added 23  STG: Patient will tolerate a PMV for a minimum of 60 minutes without changes in vitals. SPEECH LANGUAGE PATHOLOGY: MODIFIED BARIUM SWALLOW STUDY  Initial Study    NAME: Elder Samuel  : 1945  MRN: [id number]    ADMISSION DATE: 2023  PRIMARY DIAGNOSIS: Sepsis ([address])  Syncope and collapse [R55]  Dehydration [E86.0]  Elevated troponin [R77.8]  Sepsis ([address]) [A41.9]  Malignant neoplasm of esophagus, unspecified location [facility]) [C15.9]  Date of Eval: 2023    ICD-10: Treatment Diagnosis: R13.12 Dysphagia, Oropharyngeal Phase    RECOMMENDATIONS   Diet:  Strict NPO/PEG  2-3 ice chips per hour after aggressive oral care only      Medications: non-oral     Compensatory Swallowing Strategies/Modifications:  Fully awake/alert  Upright for all PO  Meticulous oral care  Suction set-up   Interventions/Recommendations/Referrals:  Further assessment of esophageal phase of swallow  Training in laryngeal strengthening and coordination exercises  Patient/family education  Follow-up MBS as deemed necessary following therapy   Patient continues to require skilled intervention:   Yes. Recommend ongoing speech therapy services during this hospitalization and next level of care      ASSESSMENT    Patient presents with severe oropharyngeal dysphagia.  There was no hyolaryngeal or epiglottal movement and minimal opening of upper esophageal sphincter resulting in silent aspiration before during and after the swallow with tsp trial

## 2023-09-13 LAB
ANION GAP SERPL CALC-SCNC: 8 MMOL/L (ref 2–11)
BASOPHILS # BLD: 0 K/UL (ref 0–0.2)
BASOPHILS NFR BLD: 0 % (ref 0–2)
BUN SERPL-MCNC: 13 MG/DL (ref 8–23)
CALCIUM SERPL-MCNC: 8.4 MG/DL (ref 8.3–10.4)
CHLORIDE SERPL-SCNC: 106 MMOL/L (ref 101–110)
CO2 SERPL-SCNC: 27 MMOL/L (ref 21–32)
CREAT SERPL-MCNC: 0.5 MG/DL (ref 0.8–1.5)
DIFFERENTIAL METHOD BLD: ABNORMAL
EOSINOPHIL # BLD: 0 K/UL (ref 0–0.8)
EOSINOPHIL NFR BLD: 0 % (ref 0.5–7.8)
ERYTHROCYTE [DISTWIDTH] IN BLOOD BY AUTOMATED COUNT: 13.5 % (ref 11.9–14.6)
GLUCOSE BLD STRIP.AUTO-MCNC: 136 MG/DL (ref 65–100)
GLUCOSE BLD STRIP.AUTO-MCNC: 172 MG/DL (ref 65–100)
GLUCOSE SERPL-MCNC: 219 MG/DL (ref 65–100)
HCT VFR BLD AUTO: 32.1 % (ref 41.1–50.3)
HGB BLD-MCNC: 10.3 G/DL (ref 13.6–17.2)
IMM GRANULOCYTES # BLD AUTO: 0.1 K/UL (ref 0–0.5)
IMM GRANULOCYTES NFR BLD AUTO: 1 % (ref 0–5)
LYMPHOCYTES # BLD: 0.5 K/UL (ref 0.5–4.6)
LYMPHOCYTES NFR BLD: 4 % (ref 13–44)
MAGNESIUM SERPL-MCNC: 2.1 MG/DL (ref 1.8–2.4)
MCH RBC QN AUTO: 28.6 PG (ref 26.1–32.9)
MCHC RBC AUTO-ENTMCNC: 32.1 G/DL (ref 31.4–35)
MCV RBC AUTO: 89.2 FL (ref 82–102)
MONOCYTES # BLD: 1 K/UL (ref 0.1–1.3)
MONOCYTES NFR BLD: 8 % (ref 4–12)
NEUTS SEG # BLD: 10 K/UL (ref 1.7–8.2)
NEUTS SEG NFR BLD: 87 % (ref 43–78)
NRBC # BLD: 0 K/UL (ref 0–0.2)
PHOSPHATE SERPL-MCNC: 3 MG/DL (ref 2.3–3.7)
PLATELET # BLD AUTO: 376 K/UL (ref 150–450)
PMV BLD AUTO: 10.1 FL (ref 9.4–12.3)
POTASSIUM SERPL-SCNC: 3.9 MMOL/L (ref 3.5–5.1)
RBC # BLD AUTO: 3.6 M/UL (ref 4.23–5.6)
SERVICE CMNT-IMP: ABNORMAL
SERVICE CMNT-IMP: ABNORMAL
SODIUM SERPL-SCNC: 141 MMOL/L (ref 133–143)
WBC # BLD AUTO: 11.5 K/UL (ref 4.3–11.1)

## 2023-09-13 PROCEDURE — 84100 ASSAY OF PHOSPHORUS: CPT

## 2023-09-13 PROCEDURE — 2580000003 HC RX 258: Performed by: FAMILY MEDICINE

## 2023-09-13 PROCEDURE — 2580000003 HC RX 258: Performed by: INTERNAL MEDICINE

## 2023-09-13 PROCEDURE — 6360000002 HC RX W HCPCS: Performed by: INTERNAL MEDICINE

## 2023-09-13 PROCEDURE — 6360000002 HC RX W HCPCS: Performed by: FAMILY MEDICINE

## 2023-09-13 PROCEDURE — 6370000000 HC RX 637 (ALT 250 FOR IP): Performed by: INTERNAL MEDICINE

## 2023-09-13 PROCEDURE — 80048 BASIC METABOLIC PNL TOTAL CA: CPT

## 2023-09-13 PROCEDURE — 92507 TX SP LANG VOICE COMM INDIV: CPT

## 2023-09-13 PROCEDURE — C9113 INJ PANTOPRAZOLE SODIUM, VIA: HCPCS | Performed by: FAMILY MEDICINE

## 2023-09-13 PROCEDURE — 94640 AIRWAY INHALATION TREATMENT: CPT

## 2023-09-13 PROCEDURE — 6360000002 HC RX W HCPCS: Performed by: STUDENT IN AN ORGANIZED HEALTH CARE EDUCATION/TRAINING PROGRAM

## 2023-09-13 PROCEDURE — 82962 GLUCOSE BLOOD TEST: CPT

## 2023-09-13 PROCEDURE — 36415 COLL VENOUS BLD VENIPUNCTURE: CPT

## 2023-09-13 PROCEDURE — 94761 N-INVAS EAR/PLS OXIMETRY MLT: CPT

## 2023-09-13 PROCEDURE — 97112 NEUROMUSCULAR REEDUCATION: CPT

## 2023-09-13 PROCEDURE — 6370000000 HC RX 637 (ALT 250 FOR IP): Performed by: STUDENT IN AN ORGANIZED HEALTH CARE EDUCATION/TRAINING PROGRAM

## 2023-09-13 PROCEDURE — 83735 ASSAY OF MAGNESIUM: CPT

## 2023-09-13 PROCEDURE — 97530 THERAPEUTIC ACTIVITIES: CPT

## 2023-09-13 PROCEDURE — 1100000000 HC RM PRIVATE

## 2023-09-13 PROCEDURE — 6370000000 HC RX 637 (ALT 250 FOR IP)

## 2023-09-13 PROCEDURE — 2580000003 HC RX 258: Performed by: STUDENT IN AN ORGANIZED HEALTH CARE EDUCATION/TRAINING PROGRAM

## 2023-09-13 PROCEDURE — 85025 COMPLETE CBC W/AUTO DIFF WBC: CPT

## 2023-09-13 PROCEDURE — 6370000000 HC RX 637 (ALT 250 FOR IP): Performed by: FAMILY MEDICINE

## 2023-09-13 PROCEDURE — 2700000000 HC OXYGEN THERAPY PER DAY

## 2023-09-13 PROCEDURE — 6370000000 HC RX 637 (ALT 250 FOR IP): Performed by: NURSE PRACTITIONER

## 2023-09-13 RX ORDER — INSULIN LISPRO 100 [IU]/ML
0-4 INJECTION, SOLUTION INTRAVENOUS; SUBCUTANEOUS NIGHTLY
Status: DISCONTINUED | OUTPATIENT
Start: 2023-09-13 | End: 2023-09-20 | Stop reason: HOSPADM

## 2023-09-13 RX ORDER — INSULIN LISPRO 100 [IU]/ML
0-4 INJECTION, SOLUTION INTRAVENOUS; SUBCUTANEOUS
Status: DISCONTINUED | OUTPATIENT
Start: 2023-09-13 | End: 2023-09-20 | Stop reason: HOSPADM

## 2023-09-13 RX ADMIN — ALBUTEROL SULFATE 2.5 MG: 2.5 SOLUTION RESPIRATORY (INHALATION) at 07:37

## 2023-09-13 RX ADMIN — AZITHROMYCIN MONOHYDRATE 500 MG: 500 INJECTION, POWDER, LYOPHILIZED, FOR SOLUTION INTRAVENOUS at 07:51

## 2023-09-13 RX ADMIN — MIRTAZAPINE 7.5 MG: 15 TABLET, FILM COATED ORAL at 21:29

## 2023-09-13 RX ADMIN — LISINOPRIL 40 MG: 20 TABLET ORAL at 07:52

## 2023-09-13 RX ADMIN — ENOXAPARIN SODIUM 40 MG: 100 INJECTION SUBCUTANEOUS at 07:49

## 2023-09-13 RX ADMIN — TRAZODONE HYDROCHLORIDE 50 MG: 50 TABLET ORAL at 21:29

## 2023-09-13 RX ADMIN — SODIUM CHLORIDE, PRESERVATIVE FREE 10 ML: 5 INJECTION INTRAVENOUS at 23:15

## 2023-09-13 RX ADMIN — SODIUM CHLORIDE, PRESERVATIVE FREE 10 ML: 5 INJECTION INTRAVENOUS at 08:59

## 2023-09-13 RX ADMIN — ALBUTEROL SULFATE 2.5 MG: 2.5 SOLUTION RESPIRATORY (INHALATION) at 13:27

## 2023-09-13 RX ADMIN — WATER 40 MG: 1 INJECTION INTRAMUSCULAR; INTRAVENOUS; SUBCUTANEOUS at 17:57

## 2023-09-13 RX ADMIN — SODIUM CHLORIDE, PRESERVATIVE FREE 40 MG: 5 INJECTION INTRAVENOUS at 07:50

## 2023-09-13 RX ADMIN — CEFTRIAXONE 1000 MG: 1 INJECTION, POWDER, FOR SOLUTION INTRAMUSCULAR; INTRAVENOUS at 07:50

## 2023-09-13 RX ADMIN — TRAMADOL HYDROCHLORIDE 50 MG: 50 TABLET ORAL at 17:55

## 2023-09-13 RX ADMIN — FERROUS GLUCONATE 324 MG: 324 TABLET ORAL at 07:52

## 2023-09-13 RX ADMIN — Medication 100 MG: at 07:51

## 2023-09-13 RX ADMIN — ROSUVASTATIN CALCIUM 20 MG: 20 TABLET, FILM COATED ORAL at 21:29

## 2023-09-13 RX ADMIN — ALBUTEROL SULFATE 2.5 MG: 2.5 SOLUTION RESPIRATORY (INHALATION) at 01:01

## 2023-09-13 RX ADMIN — TRAMADOL HYDROCHLORIDE 50 MG: 50 TABLET ORAL at 07:49

## 2023-09-13 RX ADMIN — ASPIRIN 81 MG 81 MG: 81 TABLET ORAL at 07:51

## 2023-09-13 RX ADMIN — WATER 40 MG: 1 INJECTION INTRAMUSCULAR; INTRAVENOUS; SUBCUTANEOUS at 06:03

## 2023-09-13 RX ADMIN — Medication 4 ML: at 07:36

## 2023-09-13 RX ADMIN — MAGNESIUM GLUCONATE 500 MG ORAL TABLET 400 MG: 500 TABLET ORAL at 07:52

## 2023-09-13 RX ADMIN — Medication 4 ML: at 20:02

## 2023-09-13 RX ADMIN — ALBUTEROL SULFATE 2.5 MG: 2.5 SOLUTION RESPIRATORY (INHALATION) at 20:02

## 2023-09-13 RX ADMIN — Medication 250 MG: at 07:51

## 2023-09-13 ASSESSMENT — PAIN DESCRIPTION - LOCATION
LOCATION: PENIS
LOCATION: BACK
LOCATION: LEG

## 2023-09-13 ASSESSMENT — PAIN SCALES - GENERAL
PAINLEVEL_OUTOF10: 4
PAINLEVEL_OUTOF10: 0
PAINLEVEL_OUTOF10: 4
PAINLEVEL_OUTOF10: 1

## 2023-09-13 ASSESSMENT — PAIN SCALES - WONG BAKER: WONGBAKER_NUMERICALRESPONSE: 0

## 2023-09-13 ASSESSMENT — PAIN DESCRIPTION - DESCRIPTORS: DESCRIPTORS: SORE

## 2023-09-13 NOTE — PROGRESS NOTES
ACUTE PHYSICAL THERAPY GOALS:   (Developed with and agreed upon by patient and/or caregiver.)  Pt will perform supine to/from sit with mod I in 7 treatment days. Pt will perform sit to/from stand with min A and LRAD in 7 treatment days. Pt will ambulate 48' with min A and LRAD in 7 treatment days. Pt will negotiate 2 stairs with min A  and LRAD in 7 treatment days. Pt will be independent with HEP in 7 days. PHYSICAL THERAPY: Daily Note AM   (Link to Caseload Tracking: PT Visit Days : 4  Time In/Out PT Charge Capture  Rehab Caseload Tracker  Orders    Melani Kemp is a 69 y.o. male   PRIMARY DIAGNOSIS: Sepsis ([address])  Syncope and collapse [R55]  Dehydration [E86.0]  Elevated troponin [R77.8]  Sepsis ([address]) [A41.9]  Malignant neoplasm of esophagus, unspecified location ([address]) [C15.9]  Procedure(s) (LRB):  TRACHEOTOMY (N/A)  5 Days Post-Op  Inpatient: Payor: Alexandrea Dears / Plan: MEDICARE PART A AND B / Product Type: *No Product type* /     ASSESSMENT:     REHAB RECOMMENDATIONS:   Recommendation to date pending progress:  Setting:  Inpatient Rehab Facility    Equipment:    To Be Determined     ASSESSMENT:  Mr. Blanca Owens was supine upon contact and agreeable to PT. Patient presents with O2 via trach collar, PEG, continuous pulse ox, pearson catheter, and IV. Of note, patient also has a history of polio with brace to LLE and crutches at bedside. Patient verbalized frustration due to catheter causing discomfort. RN explained reasoning for leaving catheter in. Patient performed supine to sit with SBA and transfer to recliner chair via squat pivot transfer with mod assist x 2. Patient then agreed to attempt sit to stand into crutches. Patient \"Locked\" LLE brace into knee extension in preparation for sit to stand. Patient pulled on grab bar in room transferring to standing with mod assist x 2. Once standing patient demonstrated fair standing balance.  Patient declined to attempt ambulation but did statically stand for approximately 1 minute. Patient returned to recliner chair where he was left sitting up with needs in reach. Slow progress towards PT goals. Will continue PT Efforts.      SUBJECTIVE:   Mr. Jamey Tinoco states, \"I'm not trying to be rude but I'm frustrated\"     Social/Functional Lives With: Spouse  Type of Home: House  Home Layout: One level  Home Access: Stairs to enter with rails  Entrance Stairs - Number of Steps: 2  Home Equipment: VideoStepse, rolling  Has the patient had two or more falls in the past year or any fall with injury in the past year?: Yes  Receives Help From: Family  ADL Assistance: Independent  Ambulation Assistance: Independent  Transfer Assistance: Independent  Active : No  Occupation: Part time employment  OBJECTIVE:     PAIN: VITALS / O2: PRECAUTION / Nader Little / Adrienne Claude:   Pre Treatment:   Pain Assessment: None - Denies Pain      Post Treatment: 0 Vitals        Oxygen    Continuous Pulse Oximetry, Mariano Catheter, IV, PEG, and Tracheostomy    RESTRICTIONS/PRECAUTIONS:  Restrictions/Precautions  Restrictions/Precautions: Fall Risk  Restrictions/Precautions: Fall Risk     MOBILITY: I Mod I S SBA CGA Min Mod Max Total  NT x2 Comments:   Bed Mobility    Rolling [] [] [] [] [] [] [] [] [] [] []    Supine to Sit [] [] [] [x] [] [] [] [] [] [] []    Scooting [] [] [] [] [] [] [] [] [] [] []    Sit to Supine [] [] [] [] [] [] [] [] [] [] []    Transfers    Sit to Stand [] [] [] [] [] [] [x] [] [] [] [x]    Bed to Chair [] [] [] [] [] [] [x] [] [] [] [x]    Stand to Sit [] [] [] [] [] [] [x] [] [] [] [x]     [] [] [] [] [] [] [] [] [] [] []    I=Independent, Mod I=Modified Independent, S=Supervision, SBA=Standby Assistance, CGA=Contact Guard Assistance,   Min=Minimal Assistance, Mod=Moderate Assistance, Max=Maximal Assistance, Total=Total Assistance, NT=Not Tested    BALANCE: Good Fair+ Fair Fair- Poor NT Comments   Sitting Static [x] [] [] [] [] []    Sitting Dynamic [] [x] [] [] [] []

## 2023-09-13 NOTE — CARE COORDINATION
MSN, CM;  patient continues on 5L NC via trach. Patient is being evaluated by [facility] for acceptance. Case Management will continue to follow.

## 2023-09-13 NOTE — CARE COORDINATION
MSN, CM:  patient remains on PEG and Trach. Patient continues to require 5L via trach. PT/OT recommend IRC. Awaiting acceptance to 9th floor. Case Management will continue to follow.

## 2023-09-13 NOTE — PROGRESS NOTES
Pt is wanting his pearson out and is wanting to go up stairs to the 9th floor to tour. This nurse made MD aware.

## 2023-09-13 NOTE — PROGRESS NOTES
bed, wearing speaking valve and talking with visitors. 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 \" \"Patient has done well and has been asymptomatic until about 7 weeks ago when he started experiencing increased congestion and hypersalivation. He saw his PCP and was started on a round of antibiotics for what was thought to be a sinus infection. No improvement with antibiotics. Symptoms continued and he was put on a 2nd course of antibiotics with no improvement in symptoms. On July 12th patient woke up with left facial swelling and went to his dentist who thought he had a tooth abscess and was put on Flagyl improvement in facial swelling. Patient reports a sore throat with swallowing that has continue to progress and now is eating only soft foods. He reports a 5-6 lb weight loss over the past month. Patient also endorses dysphonia. Patient saw Dr. Ga Jade at [facility] who scoped the patient and ordered a CT neck with contrast to Dr. Osmar Redd with concerns of a new of a base of tongue mass verses ORN of the left mandible. 8/17/23: Underwent a direct laryngoscopy with biopsy by Dr. Osmar Redd. 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 P 16 negative. 8/28/23: Presents with his wife to discuss treatment options related to new diagnosis of squamous cell carcinoma. He is accompanied by his wife. He reports that the pain with swallowing has progressively getting worse and has transition to a soft/full liquid diet. He has been having progressively worsening dysphagia.   Patient Complaint: initially stating he wanted to eat    Pain:   Patient does not c/o pain

## 2023-09-13 NOTE — PROGRESS NOTES
Comprehensive Nutrition Assessment    Type and Reason for Visit: Reassess  Malnutrition Screening Tool: Malnutrition Screen  Have you recently lost weight without trying?: 2 to 13 pounds (1 point)  Have you been eating poorly because of a decreased appetite?: Yes (1 point)  Malnutrition Screening Tool Score: 2    Nutrition Recommendations/Plan:   Enteral Nutrition:   Enteral Access: PEG  Continue  Formula: Standard with Fiber (Jevity 1.5 Kilo)  Goal Rate: Continuous 60 ml/hr  Continue  Water flush  55 ml every hour  Modulars: None not indicated at this time   Enteral regimen at above goal to provide per 24 hours:  1980 calories, 84 grams protein and 2213 ml free fluid. Above regimen: Intended to meet macronutrient goals  Labs:   EN labs: BMP daily, Mg daily  x 1 week and Phos daily x 1 week d/t electrolyte abnormalities. POC Glucoses/SSI Not indicated  Nutrition Related Medication Management:  Electrolyte Replacement Protocol PRN Continue for Potassium, Phosphorus, and Magnesium  Thiamine 100 mg daily x 7 days (EOT 9/15)  Bowel Regimen Active prn  Meals and Snacks:  Continue current diet. NPO     Malnutrition Assessment:  Malnutrition Status: No malnutrition  11% wt loss since June based on IM office # and ENT office wt on day of admission 154#  Pt off unit at MRI, unable to complete NFPE 9/7  Working with OT 9/9     Nutrition Assessment:  Nutrition History: Brief hx per wife at bedside, unable to consume any significant amount of food secondary to increased salivation and dysphagia. Wt hx per EMR review as below. Do You Have Any Cultural, Cheondoism, or Ethnic Food Preferences?: No   Nutrition Background:       PMH remarkable for squamous cell caner of left lateral tongue s/p partial glossectomy w tonsillectomy, recurrence of squamous cell cancer, polio as a child. Presented from outpt office s/p fall - plan for trach, PEG and port were pending outpt.   Admitted with sepsis, syncope and collapse,

## 2023-09-13 NOTE — PROGRESS NOTES
K/UL    Eosinophils Absolute 0.0 0.0 - 0.8 K/UL    Basophils Absolute 0.0 0.0 - 0.2 K/UL    Absolute Immature Granulocyte 0.0 0.0 - 0.5 K/UL   Basic Metabolic Panel    Collection Time: 09/13/23  4:32 AM   Result Value Ref Range    Sodium 141 133 - 143 mmol/L    Potassium 3.9 3.5 - 5.1 mmol/L    Chloride 106 101 - 110 mmol/L    CO2 27 21 - 32 mmol/L    Anion Gap 8 2 - 11 mmol/L    Glucose 219 (H) 65 - 100 mg/dL    BUN 13 8 - 23 MG/DL    Creatinine 0.50 (L) 0.8 - 1.5 MG/DL    Est, Glom Filt Rate >60 >60 ml/min/1.73m2    Calcium 8.4 8.3 - 10.4 MG/DL   Phosphorus    Collection Time: 09/13/23  4:32 AM   Result Value Ref Range    Phosphorus 3.0 2.3 - 3.7 MG/DL   CBC with Auto Differential    Collection Time: 09/13/23  4:32 AM   Result Value Ref Range    WBC 11.5 (H) 4.3 - 11.1 K/uL    RBC 3.60 (L) 4.23 - 5.6 M/uL    Hemoglobin 10.3 (L) 13.6 - 17.2 g/dL    Hematocrit 32.1 (L) 41.1 - 50.3 %    MCV 89.2 82 - 102 FL    MCH 28.6 26.1 - 32.9 PG    MCHC 32.1 31.4 - 35.0 g/dL    RDW 13.5 11.9 - 14.6 %    Platelets 667 033 - 900 K/uL    MPV 10.1 9.4 - 12.3 FL    nRBC 0.00 0.0 - 0.2 K/uL    Differential Type AUTOMATED      Neutrophils % 87 (H) 43 - 78 %    Lymphocytes % 4 (L) 13 - 44 %    Monocytes % 8 4.0 - 12.0 %    Eosinophils % 0 (L) 0.5 - 7.8 %    Basophils % 0 0.0 - 2.0 %    Immature Granulocytes 1 0.0 - 5.0 %    Neutrophils Absolute 10.0 (H) 1.7 - 8.2 K/UL    Lymphocytes Absolute 0.5 0.5 - 4.6 K/UL    Monocytes Absolute 1.0 0.1 - 1.3 K/UL    Eosinophils Absolute 0.0 0.0 - 0.8 K/UL    Basophils Absolute 0.0 0.0 - 0.2 K/UL    Absolute Immature Granulocyte 0.1 0.0 - 0.5 K/UL   Magnesium    Collection Time: 09/13/23  4:32 AM   Result Value Ref Range    Magnesium 2.1 1.8 - 2.4 mg/dL       Current Meds:  Current Facility-Administered Medications   Medication Dose Route Frequency    insulin lispro (HUMALOG) injection vial 0-4 Units  0-4 Units SubCUTAneous TID WC    insulin lispro (HUMALOG) injection vial 0-4 Units  0-4 Units SubCUTAneous Nightly    glycopyrrolate (ROBINUL) injection 0.1 mg  0.1 mg IntraVENous Q4H PRN    pantoprazole (PROTONIX) 40 mg in sodium chloride (PF) 0.9 % 10 mL injection  40 mg IntraVENous Daily    sodium chloride (Inhalant) 3 % nebulizer solution 4 mL  4 mL Nebulization PRN    albuterol (PROVENTIL) (2.5 MG/3ML) 0.083% nebulizer solution 2.5 mg  2.5 mg Nebulization Q6H RT    sodium chloride (Inhalant) 3 % nebulizer solution 4 mL  4 mL Nebulization BID RT    methylPREDNISolone sodium succ (SOLU-MEDROL) 40 mg in sterile water 1 mL injection  40 mg IntraVENous 2 times per day    cefTRIAXone (ROCEPHIN) 1,000 mg in sodium chloride 0.9 % 50 mL IVPB (mini-bag)  1,000 mg IntraVENous Q24H    azithromycin (ZITHROMAX) 500 mg in sodium chloride 0.9 % 250 mL IVPB (Hzif5Bdf)  500 mg IntraVENous Q24H    scopolamine (TRANSDERM-SCOP) transdermal patch 1 patch  1 patch TransDERmal Q72H    mirtazapine (REMERON) tablet 7.5 mg  7.5 mg Per G Tube Nightly    rosuvastatin (CRESTOR) tablet 20 mg  20 mg Per G Tube Nightly    sodium phosphate 10 mmol in sodium chloride 0.9 % 250 mL IVPB  10 mmol IntraVENous PRN    Or    sodium phosphate 15 mmol in sodium chloride 0.9 % 250 mL IVPB  15 mmol IntraVENous PRN    Or    sodium phosphate 20 mmol in sodium chloride 0.9 % 500 mL IVPB  20 mmol IntraVENous PRN    thiamine tablet 100 mg  100 mg Per G Tube Daily    QUEtiapine (SEROQUEL) tablet 25 mg  25 mg Oral Nightly PRN    aspirin chewable tablet 81 mg  81 mg Oral Daily    ferrous gluconate (FERGON) tablet 324 mg  324 mg Oral Daily with breakfast    ascorbic acid (VITAMIN C) tablet 250 mg  250 mg Oral Daily with breakfast    loperamide (IMODIUM) capsule 2 mg  2 mg Oral 4x Daily PRN    bismuth subsalicylate (PEPTO BISMOL) 262 MG/15ML suspension 30 mL  30 mL Oral Q6H PRN    traZODone (DESYREL) tablet 50 mg  50 mg Oral Nightly    melatonin tablet 3 mg  3 mg Oral Nightly PRN    sodium chloride flush 0.9 % injection 5-40 mL  5-40 mL IntraVENous 2 times

## 2023-09-13 NOTE — PROGRESS NOTES
Patient handoff received from Com[facility]. Patient is in bed, A&Ox4. Nicole To is on 5L via [employer]. TC in place, clean, and patent. Continuous pulse ox ongoing. Patient o2 sat on 96% at rest. PEG tube infusing at 60 ml/hr. No signs of distress noted at this time. No needs expressed. Call light and patient belongings within reach. Standard safety precautions in place.

## 2023-09-13 NOTE — PROGRESS NOTES
Orders received to remove pearson. Pearson removed , instructed pt to call nursing staff once he voids. MD also verbalized that he didn't feel comfortable with pt leaving the floor with a fresh trach. Pt and wife both verbalized understnading.

## 2023-09-13 NOTE — PROGRESS NOTES
ACUTE OCCUPATIONAL THERAPY GOALS:   (Developed with and agreed upon by patient and/or caregiver.)  1. Patient will complete lower body bathing and dressing with Min A and adaptive equipment as   needed. 2. Patient will completed upper body bathing and dressing with Mod I and adaptive equipment as needed. 3. Patient will complete grooming seated at EOB with SBA and adaptive equipment as needed. 4. Patient will complete toileting with Min A and adaptive equipment as needed. 5. Patient will tolerate 30 minutes of OT treatment with 1-2 rest breaks to increase activity tolerance for ADLs. 6. Patient will complete functional transfers with SBA and adaptive equipment as needed. Timeframe: 7 visits     OCCUPATIONAL THERAPY: Daily Note AM   OT Visit Days: 3   Time In/Out  OT Charge Capture  Rehab Caseload Tracker  OT Orders    Candace Graham is a 69 y.o. male   PRIMARY DIAGNOSIS: Sepsis ([address])  Syncope and collapse [R55]  Dehydration [E86.0]  Elevated troponin [R77.8]  Sepsis ([address]) [A41.9]  Malignant neoplasm of esophagus, unspecified location ([address]) [C15.9]  Procedure(s) (LRB):  TRACHEOTOMY (N/A)  5 Days Post-Op  Inpatient: Payor: Medardo Pacheco / Plan: MEDICARE PART A AND B / Product Type: *No Product type* /     ASSESSMENT:     REHAB RECOMMENDATIONS: CURRENT LEVEL OF FUNCTION:  (Most Recently Demonstrated)   Recommendation to date pending progress:  Setting:  Inpatient Rehab Facility     Equipment:    To Be Determined--pt has crutches, BSC, RW and TTB at home Bathing:  Not Tested  Dressing:  Not Tested  Feeding/Grooming:  Not Tested  Toileting:  Not Tested  Functional Mobility:  Moderate Assist x2     ASSESSMENT:  Mr. Rocael Murcia is a 70 y/o male s/p PEG and trach 9/8. Pt with hx of polio and supraglottic squamous cell carcinoma. Pt wears full leg brace on LLE and uses crutches for ambulation--crutches. Pt supine on entry on 6L o2 via T-piece. Pt frustrated over catheter.  With education and minimal levels, as well as need for high level skilled assistance to complete functional transfers/mobility and functional tasks  Neuromuscular Re-education (16 Minutes): Patient participated in neuromuscular re-education including functional reaching, weight shifting, postural training, midline training, standing tolerance activity , and sitting balance activity   with moderate assistance, verbal cues, tactile cues, and adaptive equipment to improve sitting balance, standing balance, proprioception, posture, coordination, static balance, and dynamic balance in order to prepare for functional task, prepare for seated ADLs, prepare for standing ADLs, prepare for functional transfer, increase safety awareness, and prepare for self care. .     TREATMENT GRID:  N/A    AFTER TREATMENT PRECAUTIONS: Alarm Activated, Call light within reach, Chair, Needs within reach, RN notified, and Visitors at bedside    INTERDISCIPLINARY COLLABORATION:  RN/ PCT, PT/ PTA, and OT/ ALLEN    EDUCATION:       TOTAL TREATMENT DURATION AND TIME:  Time In: 1406  Time Out: 2200 [facility]  Minutes: OSVALDO De La Garza

## 2023-09-14 PROBLEM — E83.42 HYPOMAGNESEMIA: Status: ACTIVE | Noted: 2023-09-14

## 2023-09-14 LAB
ANION GAP SERPL CALC-SCNC: 6 MMOL/L (ref 2–11)
BUN SERPL-MCNC: 14 MG/DL (ref 8–23)
CALCIUM SERPL-MCNC: 8.4 MG/DL (ref 8.3–10.4)
CHLORIDE SERPL-SCNC: 105 MMOL/L (ref 101–110)
CO2 SERPL-SCNC: 29 MMOL/L (ref 21–32)
CREAT SERPL-MCNC: 0.4 MG/DL (ref 0.8–1.5)
EST. AVERAGE GLUCOSE BLD GHB EST-MCNC: 134 MG/DL
GLUCOSE BLD STRIP.AUTO-MCNC: 165 MG/DL (ref 65–100)
GLUCOSE BLD STRIP.AUTO-MCNC: 174 MG/DL (ref 65–100)
GLUCOSE SERPL-MCNC: 149 MG/DL (ref 65–100)
HBA1C MFR BLD: 6.3 % (ref 4.8–5.6)
MAGNESIUM SERPL-MCNC: 1.7 MG/DL (ref 1.8–2.4)
POTASSIUM SERPL-SCNC: 3.5 MMOL/L (ref 3.5–5.1)
SERVICE CMNT-IMP: ABNORMAL
SERVICE CMNT-IMP: ABNORMAL
SODIUM SERPL-SCNC: 140 MMOL/L (ref 133–143)

## 2023-09-14 PROCEDURE — 6360000002 HC RX W HCPCS: Performed by: INTERNAL MEDICINE

## 2023-09-14 PROCEDURE — 6360000002 HC RX W HCPCS: Performed by: STUDENT IN AN ORGANIZED HEALTH CARE EDUCATION/TRAINING PROGRAM

## 2023-09-14 PROCEDURE — 02HV33Z INSERTION OF INFUSION DEVICE INTO SUPERIOR VENA CAVA, PERCUTANEOUS APPROACH: ICD-10-PCS | Performed by: INTERNAL MEDICINE

## 2023-09-14 PROCEDURE — 2580000003 HC RX 258: Performed by: INTERNAL MEDICINE

## 2023-09-14 PROCEDURE — 36573 INSJ PICC RS&I 5 YR+: CPT

## 2023-09-14 PROCEDURE — 6370000000 HC RX 637 (ALT 250 FOR IP): Performed by: INTERNAL MEDICINE

## 2023-09-14 PROCEDURE — 2700000000 HC OXYGEN THERAPY PER DAY

## 2023-09-14 PROCEDURE — 6370000000 HC RX 637 (ALT 250 FOR IP): Performed by: FAMILY MEDICINE

## 2023-09-14 PROCEDURE — 2580000003 HC RX 258: Performed by: FAMILY MEDICINE

## 2023-09-14 PROCEDURE — 97530 THERAPEUTIC ACTIVITIES: CPT

## 2023-09-14 PROCEDURE — 2580000003 HC RX 258: Performed by: STUDENT IN AN ORGANIZED HEALTH CARE EDUCATION/TRAINING PROGRAM

## 2023-09-14 PROCEDURE — 36415 COLL VENOUS BLD VENIPUNCTURE: CPT

## 2023-09-14 PROCEDURE — 83036 HEMOGLOBIN GLYCOSYLATED A1C: CPT

## 2023-09-14 PROCEDURE — 6370000000 HC RX 637 (ALT 250 FOR IP): Performed by: NURSE PRACTITIONER

## 2023-09-14 PROCEDURE — 1100000000 HC RM PRIVATE

## 2023-09-14 PROCEDURE — 94640 AIRWAY INHALATION TREATMENT: CPT

## 2023-09-14 PROCEDURE — 94761 N-INVAS EAR/PLS OXIMETRY MLT: CPT

## 2023-09-14 PROCEDURE — A4216 STERILE WATER/SALINE, 10 ML: HCPCS | Performed by: FAMILY MEDICINE

## 2023-09-14 PROCEDURE — 82962 GLUCOSE BLOOD TEST: CPT

## 2023-09-14 PROCEDURE — 2580000003 HC RX 258: Performed by: NURSE PRACTITIONER

## 2023-09-14 PROCEDURE — 83735 ASSAY OF MAGNESIUM: CPT

## 2023-09-14 PROCEDURE — APPSS30 APP SPLIT SHARED TIME 16-30 MINUTES: Performed by: NURSE PRACTITIONER

## 2023-09-14 PROCEDURE — 6370000000 HC RX 637 (ALT 250 FOR IP)

## 2023-09-14 PROCEDURE — 80048 BASIC METABOLIC PNL TOTAL CA: CPT

## 2023-09-14 PROCEDURE — 6360000002 HC RX W HCPCS: Performed by: FAMILY MEDICINE

## 2023-09-14 PROCEDURE — 99233 SBSQ HOSP IP/OBS HIGH 50: CPT | Performed by: INTERNAL MEDICINE

## 2023-09-14 PROCEDURE — C9113 INJ PANTOPRAZOLE SODIUM, VIA: HCPCS | Performed by: FAMILY MEDICINE

## 2023-09-14 PROCEDURE — C1751 CATH, INF, PER/CENT/MIDLINE: HCPCS

## 2023-09-14 RX ORDER — MAGNESIUM SULFATE IN WATER 40 MG/ML
2000 INJECTION, SOLUTION INTRAVENOUS ONCE
Status: COMPLETED | OUTPATIENT
Start: 2023-09-14 | End: 2023-09-14

## 2023-09-14 RX ORDER — SODIUM CHLORIDE 0.9 % (FLUSH) 0.9 %
5-40 SYRINGE (ML) INJECTION EVERY 12 HOURS SCHEDULED
Status: DISCONTINUED | OUTPATIENT
Start: 2023-09-14 | End: 2023-09-20 | Stop reason: HOSPADM

## 2023-09-14 RX ORDER — SODIUM CHLORIDE 9 MG/ML
25 INJECTION, SOLUTION INTRAVENOUS PRN
Status: DISCONTINUED | OUTPATIENT
Start: 2023-09-14 | End: 2023-09-20 | Stop reason: HOSPADM

## 2023-09-14 RX ORDER — LIDOCAINE HYDROCHLORIDE 10 MG/ML
5 INJECTION, SOLUTION EPIDURAL; INFILTRATION; INTRACAUDAL; PERINEURAL ONCE
Status: DISCONTINUED | OUTPATIENT
Start: 2023-09-14 | End: 2023-09-20 | Stop reason: HOSPADM

## 2023-09-14 RX ORDER — SODIUM CHLORIDE 0.9 % (FLUSH) 0.9 %
5-40 SYRINGE (ML) INJECTION PRN
Status: DISCONTINUED | OUTPATIENT
Start: 2023-09-14 | End: 2023-09-20 | Stop reason: HOSPADM

## 2023-09-14 RX ADMIN — ALBUTEROL SULFATE 2.5 MG: 2.5 SOLUTION RESPIRATORY (INHALATION) at 01:01

## 2023-09-14 RX ADMIN — MIRTAZAPINE 7.5 MG: 15 TABLET, FILM COATED ORAL at 21:03

## 2023-09-14 RX ADMIN — TRAZODONE HYDROCHLORIDE 50 MG: 50 TABLET ORAL at 21:03

## 2023-09-14 RX ADMIN — LISINOPRIL 40 MG: 20 TABLET ORAL at 08:47

## 2023-09-14 RX ADMIN — Medication 4 ML: at 19:15

## 2023-09-14 RX ADMIN — POLYETHYLENE GLYCOL 3350 17 G: 17 POWDER, FOR SOLUTION ORAL at 09:10

## 2023-09-14 RX ADMIN — SODIUM CHLORIDE, PRESERVATIVE FREE 10 ML: 5 INJECTION INTRAVENOUS at 21:12

## 2023-09-14 RX ADMIN — ALBUTEROL SULFATE 2.5 MG: 2.5 SOLUTION RESPIRATORY (INHALATION) at 19:14

## 2023-09-14 RX ADMIN — ROSUVASTATIN CALCIUM 20 MG: 20 TABLET, FILM COATED ORAL at 21:04

## 2023-09-14 RX ADMIN — Medication 100 MG: at 08:47

## 2023-09-14 RX ADMIN — Medication 4 ML: at 08:30

## 2023-09-14 RX ADMIN — SODIUM CHLORIDE, PRESERVATIVE FREE 40 MG: 5 INJECTION INTRAVENOUS at 08:47

## 2023-09-14 RX ADMIN — WATER 40 MG: 1 INJECTION INTRAMUSCULAR; INTRAVENOUS; SUBCUTANEOUS at 06:16

## 2023-09-14 RX ADMIN — CEFTRIAXONE 1000 MG: 1 INJECTION, POWDER, FOR SOLUTION INTRAMUSCULAR; INTRAVENOUS at 08:47

## 2023-09-14 RX ADMIN — Medication 250 MG: at 08:47

## 2023-09-14 RX ADMIN — AZITHROMYCIN MONOHYDRATE 500 MG: 500 INJECTION, POWDER, LYOPHILIZED, FOR SOLUTION INTRAVENOUS at 08:47

## 2023-09-14 RX ADMIN — SODIUM CHLORIDE, PRESERVATIVE FREE 10 ML: 5 INJECTION INTRAVENOUS at 09:21

## 2023-09-14 RX ADMIN — ASPIRIN 81 MG 81 MG: 81 TABLET ORAL at 08:47

## 2023-09-14 RX ADMIN — POTASSIUM BICARBONATE 40 MEQ: 391 TABLET, EFFERVESCENT ORAL at 08:47

## 2023-09-14 RX ADMIN — WATER 40 MG: 1 INJECTION INTRAMUSCULAR; INTRAVENOUS; SUBCUTANEOUS at 17:19

## 2023-09-14 RX ADMIN — ALBUTEROL SULFATE 2.5 MG: 2.5 SOLUTION RESPIRATORY (INHALATION) at 14:18

## 2023-09-14 RX ADMIN — MAGNESIUM SULFATE HEPTAHYDRATE 2000 MG: 40 INJECTION, SOLUTION INTRAVENOUS at 08:48

## 2023-09-14 RX ADMIN — ALBUTEROL SULFATE 2.5 MG: 2.5 SOLUTION RESPIRATORY (INHALATION) at 08:30

## 2023-09-14 RX ADMIN — ENOXAPARIN SODIUM 40 MG: 100 INJECTION SUBCUTANEOUS at 08:46

## 2023-09-14 RX ADMIN — FERROUS GLUCONATE 324 MG: 324 TABLET ORAL at 08:47

## 2023-09-14 ASSESSMENT — PAIN SCALES - GENERAL: PAINLEVEL_OUTOF10: 0

## 2023-09-14 NOTE — PROGRESS NOTES
TRANSFER - OUT REPORT:    Verbal report given to 5th floor on Real Terri  being transferred to [phone] for routine progression of patient care       Report consisted of patient's Situation, Background, Assessment and   Recommendations(SBAR). Information from the following report(s) Nurse Handoff Report and Intake/Output was reviewed with the receiving nurse. Lines:   Peripheral IV 09/08/23 Posterior;Right Hand (Active)   Site Assessment Clean, dry & intact 09/14/23 0715   Line Status Capped;Flushed 09/14/23 0715   Line Care Cap changed 09/14/23 0715   Phlebitis Assessment No symptoms 09/14/23 0715   Infiltration Assessment 0 09/14/23 0715   Alcohol Cap Used Yes 09/14/23 0715   Dressing Status Clean, dry & intact 09/14/23 0715   Dressing Type Transparent 09/14/23 0715       Peripheral IV 09/11/23 Posterior;Right Forearm (Active)   Site Assessment Clean, dry & intact 09/14/23 0715   Line Status Capped;Flushed 09/14/23 0715   Line Care Cap changed 09/14/23 0715   Phlebitis Assessment No symptoms 09/14/23 0715   Infiltration Assessment 0 09/14/23 0715   Alcohol Cap Used Yes 09/14/23 0715   Dressing Status Clean, dry & intact 09/14/23 0715   Dressing Type Transparent 09/14/23 0715   Dressing Intervention New 09/13/23 0109        Opportunity for questions and clarification was provided.       Patient transported with:  O2 @ 5lpm

## 2023-09-14 NOTE — PROGRESS NOTES
[facility] Hematology & Oncology        Inpatient Hematology / Oncology Progress Note      Interval history:  VSS, afebrile. On 5L NC  S/p trach and PEG on 9/8  Recent RVP +rhinovirus  Completing course of abx for pneumonia    ROS:  Constitutional: Positive for weakness in legs (hx polio); negative for fever, chills, malaise, fatigue. CV: Negative for chest pain, palpitations, edema. Respiratory: Negative for dyspnea, cough, wheezing. GI: Negative for nausea, abdominal pain, diarrhea. 10 point review of systems is otherwise negative with the exception of the elements mentioned above in the HPI. 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 Followed with radiation treatment. DDD (degenerative disc disease), lumbar 12/05/2022    Dehydration 9/5/2023    Hearing difficulty of both ears     Hypercholesterolemia     Hypertension     pt. states only takes his BP med twice weekly due to decrease BP after weight loss. Polio     as a child, affected left leg/ wears brace    Tongue cancer ([address])      Past Surgical History:   Procedure Laterality Date    CATARACT REMOVAL Bilateral     COLONOSCOPY      [facility]     HEENT      1/3rd tongue removed in 8/2010 and arterial graft from right wrist to tongue and second graft from right thigh to right wrist.    HEENT      precautionary trach placed in 8/2010 when having tongue removal surgery and closed one week later.     HERNIA REPAIR      IR GASTROSTOMY TUBE PLACEMENT PERCUTANEOUS  9/8/2023    IR GASTROSTOMY TUBE PLACEMENT PERCUTANEOUS 9/8/2023 [facility]CI[facility]    MOUTH SURGERY  08/03/2010    Partial glossectomy with tonsillectomy, neck dissection with a radial forearm free flat to left oral cavity    [place] [facility] [place] is having increased salivation and dysphagia. He was admitted after a fall and c/o dizziness/weakness. He was initially tachycardic but this was improved with IVF. He was scheduled for port, trach and PEG OP but will now arranging to be done inpatient. Oncology was asked to see patient as he is a patient of Dr. Gris Brooks with plans to start Carbo/5FU/Pembro ~10 days after trach/peg are placed. RECOMMENDATIONS:  Squamous cell CA of tongue  -Direct laryngoscopy by ENT with invasive moderately differentiated squamous cell  -Saw Dr. Gris Brooks OP 8/31 with plans for port placement, trach and peg. Will hopefully have this done OP to start carbo/5FU/pembro. 9/7 Port will have to be postponed since close proximity to trach site. 9/14 Dr Gris Brooks discussed with patient. Given need for rehab and overdue to start treatment, will proceed with carbo/5FU while IP. Will get Keytruda as OP. Discussed with Dr Brandy Welch and Dr Debra Boland. Ok to proceed with chemo from trach/ENT standpoint per Dr Karlo Isabel. Patient has completed course of abx for pneumonia. Will have PICC placed and proceed with chemotherapy tomorrow. Weakness & falls  -MRI brain ordered  9/7 Brain MRI completed and showed acut to early subacute lacunar infarct in the L cerebellar hemisphere. Hospitalist has consulted Neuorology & CTA head/neck. 81 ASA started. Overnight patient confused/agitated. 9/14 Has been seen by neurology - very small infarct. PT/OT recommending STR. On hold for above. Tracheostomy status / respiratory failure / rhinovirus  9/14 Completed 9/8 by Dr Brandy Welch. On 5L trach collar. Has completed course of abx for pneumonia. On solumedrol 40mg BID. Supportive care for rhinovirus. Low appetite/Dysphagia  -Will order Remeron. 9/7 Trach/PEG tomorrow. 9/14 s/p PEG. On continuous TF. Anemia  9/7 Tsat 20; will start PO Iron/Vitamin C.      Goals and plan of care reviewed with the patient.   All questions

## 2023-09-14 NOTE — PROGRESS NOTES
Hospitalist Progress Note   Admit Date:  2023  4:32 PM   Name:  Yumiko Amos   Age:  69 y.o. Sex:  male  :  1945   MRN:  [id number]   Room:  802/    Presenting/Chief Complaint: Fatigue, Fall, and Dizziness     Reason(s) for Admission: Syncope and collapse [R55]  Dehydration [E86.0]  Elevated troponin [R77.8]  Sepsis ([address]) [A41.9]  Malignant neoplasm of esophagus, unspecified location [facility]) [C15.9]     Hospital Course: Yumiko Amos is a 69 y.o. 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 He met criteria with tachycardia, tachypnea and leukocytosis. UA and CXR were clear. There was suspicion for possible aspiration, however abx were held initially. Cardiology was consulted for elevated troponin which was felt secondary to demand ischemia. Oncology and ENT were also consulted. Brain MRI  showed acute to early subacute lacunar infarct L cerebellar hemisphere; cerebral volume loss; chronic small vessel disease. 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 G-tube was placed by IR on  and tracheostomy was done by ENT same day. Initially met criteria with tachycardia + leukocytosis on admission. CXR and UA were clear. CTA head and neck didn't show any suspicious appearing areas. - Brain MRI and CTA reviewed. Echo showed preserved EF, normal diastolic function, normal RV function, no valvular abnormalities, no shunt. chest x-ray showed no pneumonia. Rhinovirus positive, COVID negative. -Leukocytosis resolved  Pulmonary consulted. : Reviewed case at multidisciplinary rounds. Patient not yet ready to leave. Case management working on a plan for the ninth floor IRC.       Subjective & 24hr

## 2023-09-14 NOTE — PROGRESS NOTES
ACUTE PHYSICAL THERAPY GOALS:   (Developed with and agreed upon by patient and/or caregiver.)  Pt will perform supine to/from sit with mod I in 7 treatment days. Pt will perform sit to/from stand with min A and LRAD in 7 treatment days. Pt will ambulate 48' with min A and LRAD in 7 treatment days. Pt will negotiate 2 stairs with min A  and LRAD in 7 treatment days. Pt will be independent with HEP in 7 days. PHYSICAL THERAPY: Daily Note AM   (Link to Caseload Tracking: PT Visit Days : 5  Time In/Out PT Charge Capture  Rehab Caseload Tracker  Orders    Meeta New is a 69 y.o. male   PRIMARY DIAGNOSIS: Sepsis ([address])  Syncope and collapse [R55]  Dehydration [E86.0]  Elevated troponin [R77.8]  Sepsis ([address]) [A41.9]  Malignant neoplasm of esophagus, unspecified location ([address]) [C15.9]  Procedure(s) (LRB):  TRACHEOTOMY (N/A)  6 Days Post-Op  Inpatient: Payor: Hailey Postal / Plan: MEDICARE PART A AND B / Product Type: *No Product type* /     ASSESSMENT:     REHAB RECOMMENDATIONS:   Recommendation to date pending progress:  Setting:  Inpatient Rehab Facility    Equipment:    To Be Determined     ASSESSMENT:  Mr. Ángel Helton was supine upon contact and agreeable to PT with encouragement. Patient admits to being frustrated over waiting for chemo to start and transfer to the 9th floor. Discussed with patient the importance of participating with PT/OT every opportunity to stay strong for chemo and to prepare for the 9th floor. Patient and wife verbalized understanding. Patient is somewhat impulsive throughout session but responds well to redirection. Patient presents with O2 via trach collar, PEG, continuous pulse ox, pearson catheter, and IV. Of note, patient also has a history of polio with brace to LLE and crutches at bedside.  Patient performed supine to sit with SBA and transfer to recliner chair via low squat pivot transfer with min assist. Patient then \"Locked\" LLE brace into knee extension in preparation

## 2023-09-15 PROBLEM — D84.9 IMMUNOCOMPROMISED (HCC): Status: ACTIVE | Noted: 2023-09-15

## 2023-09-15 LAB
ALBUMIN SERPL-MCNC: 2.1 G/DL (ref 3.2–4.6)
ALBUMIN/GLOB SERPL: 0.6 (ref 0.4–1.6)
ALP SERPL-CCNC: 78 U/L (ref 50–136)
ALT SERPL-CCNC: 87 U/L (ref 12–65)
ANION GAP SERPL CALC-SCNC: 6 MMOL/L (ref 2–11)
ANION GAP SERPL CALC-SCNC: 8 MMOL/L (ref 2–11)
AST SERPL-CCNC: 38 U/L (ref 15–37)
BACTERIA SPEC CULT: NORMAL
BACTERIA SPEC CULT: NORMAL
BASOPHILS # BLD: 0 K/UL (ref 0–0.2)
BASOPHILS NFR BLD: 0 % (ref 0–2)
BILIRUB SERPL-MCNC: 0.2 MG/DL (ref 0.2–1.1)
BUN SERPL-MCNC: 15 MG/DL (ref 8–23)
BUN SERPL-MCNC: 16 MG/DL (ref 8–23)
CALCIUM SERPL-MCNC: 8.3 MG/DL (ref 8.3–10.4)
CALCIUM SERPL-MCNC: 8.5 MG/DL (ref 8.3–10.4)
CHLORIDE SERPL-SCNC: 103 MMOL/L (ref 101–110)
CHLORIDE SERPL-SCNC: 104 MMOL/L (ref 101–110)
CO2 SERPL-SCNC: 27 MMOL/L (ref 21–32)
CO2 SERPL-SCNC: 29 MMOL/L (ref 21–32)
CREAT SERPL-MCNC: 0.5 MG/DL (ref 0.8–1.5)
CREAT SERPL-MCNC: 0.5 MG/DL (ref 0.8–1.5)
DIFFERENTIAL METHOD BLD: ABNORMAL
EOSINOPHIL # BLD: 0 K/UL (ref 0–0.8)
EOSINOPHIL NFR BLD: 0 % (ref 0.5–7.8)
ERYTHROCYTE [DISTWIDTH] IN BLOOD BY AUTOMATED COUNT: 13.7 % (ref 11.9–14.6)
GLOBULIN SER CALC-MCNC: 3.7 G/DL (ref 2.8–4.5)
GLUCOSE BLD STRIP.AUTO-MCNC: 127 MG/DL (ref 65–100)
GLUCOSE BLD STRIP.AUTO-MCNC: 149 MG/DL (ref 65–100)
GLUCOSE BLD STRIP.AUTO-MCNC: 167 MG/DL (ref 65–100)
GLUCOSE BLD STRIP.AUTO-MCNC: 173 MG/DL (ref 65–100)
GLUCOSE SERPL-MCNC: 159 MG/DL (ref 65–100)
GLUCOSE SERPL-MCNC: 166 MG/DL (ref 65–100)
HCT VFR BLD AUTO: 34.1 % (ref 41.1–50.3)
HGB BLD-MCNC: 10.9 G/DL (ref 13.6–17.2)
IMM GRANULOCYTES # BLD AUTO: 0.3 K/UL (ref 0–0.5)
IMM GRANULOCYTES NFR BLD AUTO: 2 % (ref 0–5)
LYMPHOCYTES # BLD: 0.9 K/UL (ref 0.5–4.6)
LYMPHOCYTES NFR BLD: 5 % (ref 13–44)
MAGNESIUM SERPL-MCNC: 2.3 MG/DL (ref 1.8–2.4)
MAGNESIUM SERPL-MCNC: 2.3 MG/DL (ref 1.8–2.4)
MCH RBC QN AUTO: 28.8 PG (ref 26.1–32.9)
MCHC RBC AUTO-ENTMCNC: 32 G/DL (ref 31.4–35)
MCV RBC AUTO: 90.2 FL (ref 82–102)
MM INDURATION, POC: 0 MM (ref 0–5)
MONOCYTES # BLD: 1.4 K/UL (ref 0.1–1.3)
MONOCYTES NFR BLD: 8 % (ref 4–12)
NEUTS SEG # BLD: 14.8 K/UL (ref 1.7–8.2)
NEUTS SEG NFR BLD: 85 % (ref 43–78)
NRBC # BLD: 0 K/UL (ref 0–0.2)
PHOSPHATE SERPL-MCNC: 4.4 MG/DL (ref 2.3–3.7)
PLATELET # BLD AUTO: 455 K/UL (ref 150–450)
PMV BLD AUTO: 9.7 FL (ref 9.4–12.3)
POTASSIUM SERPL-SCNC: 4.6 MMOL/L (ref 3.5–5.1)
POTASSIUM SERPL-SCNC: 4.7 MMOL/L (ref 3.5–5.1)
PPD, POC: NEGATIVE
PROT SERPL-MCNC: 5.8 G/DL (ref 6.3–8.2)
RBC # BLD AUTO: 3.78 M/UL (ref 4.23–5.6)
SERVICE CMNT-IMP: ABNORMAL
SERVICE CMNT-IMP: NORMAL
SERVICE CMNT-IMP: NORMAL
SODIUM SERPL-SCNC: 138 MMOL/L (ref 133–143)
SODIUM SERPL-SCNC: 139 MMOL/L (ref 133–143)
WBC # BLD AUTO: 17.4 K/UL (ref 4.3–11.1)

## 2023-09-15 PROCEDURE — 82962 GLUCOSE BLOOD TEST: CPT

## 2023-09-15 PROCEDURE — 94761 N-INVAS EAR/PLS OXIMETRY MLT: CPT

## 2023-09-15 PROCEDURE — 97530 THERAPEUTIC ACTIVITIES: CPT

## 2023-09-15 PROCEDURE — 6370000000 HC RX 637 (ALT 250 FOR IP)

## 2023-09-15 PROCEDURE — 94640 AIRWAY INHALATION TREATMENT: CPT

## 2023-09-15 PROCEDURE — 84100 ASSAY OF PHOSPHORUS: CPT

## 2023-09-15 PROCEDURE — 2580000003 HC RX 258: Performed by: FAMILY MEDICINE

## 2023-09-15 PROCEDURE — 6360000002 HC RX W HCPCS: Performed by: INTERNAL MEDICINE

## 2023-09-15 PROCEDURE — 2580000003 HC RX 258: Performed by: INTERNAL MEDICINE

## 2023-09-15 PROCEDURE — A4216 STERILE WATER/SALINE, 10 ML: HCPCS | Performed by: NURSE PRACTITIONER

## 2023-09-15 PROCEDURE — A4216 STERILE WATER/SALINE, 10 ML: HCPCS | Performed by: INTERNAL MEDICINE

## 2023-09-15 PROCEDURE — 6370000000 HC RX 637 (ALT 250 FOR IP): Performed by: INTERNAL MEDICINE

## 2023-09-15 PROCEDURE — 31502 CHANGE OF WINDPIPE AIRWAY: CPT

## 2023-09-15 PROCEDURE — 80053 COMPREHEN METABOLIC PANEL: CPT

## 2023-09-15 PROCEDURE — 2700000000 HC OXYGEN THERAPY PER DAY

## 2023-09-15 PROCEDURE — 85025 COMPLETE CBC W/AUTO DIFF WBC: CPT

## 2023-09-15 PROCEDURE — 2580000003 HC RX 258: Performed by: NURSE PRACTITIONER

## 2023-09-15 PROCEDURE — A4216 STERILE WATER/SALINE, 10 ML: HCPCS | Performed by: FAMILY MEDICINE

## 2023-09-15 PROCEDURE — APPSS30 APP SPLIT SHARED TIME 16-30 MINUTES: Performed by: NURSE PRACTITIONER

## 2023-09-15 PROCEDURE — 6360000002 HC RX W HCPCS: Performed by: FAMILY MEDICINE

## 2023-09-15 PROCEDURE — 97110 THERAPEUTIC EXERCISES: CPT

## 2023-09-15 PROCEDURE — 1100000000 HC RM PRIVATE

## 2023-09-15 PROCEDURE — C9113 INJ PANTOPRAZOLE SODIUM, VIA: HCPCS | Performed by: FAMILY MEDICINE

## 2023-09-15 PROCEDURE — 99233 SBSQ HOSP IP/OBS HIGH 50: CPT | Performed by: INTERNAL MEDICINE

## 2023-09-15 PROCEDURE — 6370000000 HC RX 637 (ALT 250 FOR IP): Performed by: NURSE PRACTITIONER

## 2023-09-15 PROCEDURE — 36592 COLLECT BLOOD FROM PICC: CPT

## 2023-09-15 PROCEDURE — 83735 ASSAY OF MAGNESIUM: CPT

## 2023-09-15 PROCEDURE — 6370000000 HC RX 637 (ALT 250 FOR IP): Performed by: FAMILY MEDICINE

## 2023-09-15 RX ORDER — SODIUM CHLORIDE 9 MG/ML
5-250 INJECTION, SOLUTION INTRAVENOUS PRN
Status: CANCELLED | OUTPATIENT
Start: 2023-09-15

## 2023-09-15 RX ORDER — HEPARIN 100 UNIT/ML
500 SYRINGE INTRAVENOUS PRN
Status: CANCELLED | OUTPATIENT
Start: 2023-09-19

## 2023-09-15 RX ORDER — DIPHENHYDRAMINE HYDROCHLORIDE 50 MG/ML
50 INJECTION INTRAMUSCULAR; INTRAVENOUS
Status: CANCELLED | OUTPATIENT
Start: 2023-09-15

## 2023-09-15 RX ORDER — SODIUM CHLORIDE 9 MG/ML
5-250 INJECTION, SOLUTION INTRAVENOUS PRN
Status: CANCELLED | OUTPATIENT
Start: 2023-09-19

## 2023-09-15 RX ORDER — MEPERIDINE HYDROCHLORIDE 25 MG/ML
12.5 INJECTION INTRAMUSCULAR; INTRAVENOUS; SUBCUTANEOUS PRN
Status: CANCELLED | OUTPATIENT
Start: 2023-09-15

## 2023-09-15 RX ORDER — SODIUM CHLORIDE 9 MG/ML
INJECTION, SOLUTION INTRAVENOUS CONTINUOUS
Status: CANCELLED | OUTPATIENT
Start: 2023-09-15

## 2023-09-15 RX ORDER — HEPARIN 100 UNIT/ML
500 SYRINGE INTRAVENOUS PRN
Status: CANCELLED | OUTPATIENT
Start: 2023-09-15

## 2023-09-15 RX ORDER — ONDANSETRON 2 MG/ML
8 INJECTION INTRAMUSCULAR; INTRAVENOUS ONCE
Status: COMPLETED | OUTPATIENT
Start: 2023-09-15 | End: 2023-09-15

## 2023-09-15 RX ORDER — OLANZAPINE 5 MG/1
5 TABLET ORAL NIGHTLY
Status: COMPLETED | OUTPATIENT
Start: 2023-09-15 | End: 2023-09-18

## 2023-09-15 RX ORDER — PREDNISONE 20 MG/1
40 TABLET ORAL DAILY
Status: COMPLETED | OUTPATIENT
Start: 2023-09-16 | End: 2023-09-20

## 2023-09-15 RX ORDER — SODIUM CHLORIDE 0.9 % (FLUSH) 0.9 %
5-40 SYRINGE (ML) INJECTION PRN
Status: CANCELLED | OUTPATIENT
Start: 2023-09-19

## 2023-09-15 RX ORDER — ONDANSETRON 2 MG/ML
8 INJECTION INTRAMUSCULAR; INTRAVENOUS
Status: CANCELLED | OUTPATIENT
Start: 2023-09-15

## 2023-09-15 RX ORDER — SODIUM CHLORIDE 0.9 % (FLUSH) 0.9 %
5-40 SYRINGE (ML) INJECTION PRN
Status: CANCELLED | OUTPATIENT
Start: 2023-09-15

## 2023-09-15 RX ORDER — ACETAMINOPHEN 325 MG/1
650 TABLET ORAL
Status: CANCELLED | OUTPATIENT
Start: 2023-09-15

## 2023-09-15 RX ORDER — ALBUTEROL SULFATE 90 UG/1
4 AEROSOL, METERED RESPIRATORY (INHALATION) PRN
Status: CANCELLED | OUTPATIENT
Start: 2023-09-15

## 2023-09-15 RX ORDER — EPINEPHRINE 1 MG/ML
0.3 INJECTION, SOLUTION, CONCENTRATE INTRAVENOUS PRN
Status: CANCELLED | OUTPATIENT
Start: 2023-09-15

## 2023-09-15 RX ADMIN — CARBOPLATIN 573 MG: 10 INJECTION, SOLUTION INTRAVENOUS at 15:29

## 2023-09-15 RX ADMIN — DEXAMETHASONE SODIUM PHOSPHATE 12 MG: 4 INJECTION, SOLUTION INTRAMUSCULAR; INTRAVENOUS at 14:37

## 2023-09-15 RX ADMIN — TRAZODONE HYDROCHLORIDE 50 MG: 50 TABLET ORAL at 20:28

## 2023-09-15 RX ADMIN — FLUOROURACIL 1875 MG: 50 INJECTION, SOLUTION INTRAVENOUS at 16:18

## 2023-09-15 RX ADMIN — SODIUM CHLORIDE, PRESERVATIVE FREE 10 ML: 5 INJECTION INTRAVENOUS at 09:07

## 2023-09-15 RX ADMIN — SODIUM CHLORIDE, PRESERVATIVE FREE 10 ML: 5 INJECTION INTRAVENOUS at 20:30

## 2023-09-15 RX ADMIN — ONDANSETRON 8 MG: 2 INJECTION INTRAMUSCULAR; INTRAVENOUS at 14:35

## 2023-09-15 RX ADMIN — OLANZAPINE 5 MG: 5 TABLET, FILM COATED ORAL at 20:29

## 2023-09-15 RX ADMIN — Medication 250 MG: at 09:07

## 2023-09-15 RX ADMIN — Medication 100 MG: at 09:07

## 2023-09-15 RX ADMIN — ROSUVASTATIN CALCIUM 20 MG: 20 TABLET, FILM COATED ORAL at 20:29

## 2023-09-15 RX ADMIN — WATER 40 MG: 1 INJECTION INTRAMUSCULAR; INTRAVENOUS; SUBCUTANEOUS at 05:22

## 2023-09-15 RX ADMIN — ENOXAPARIN SODIUM 40 MG: 100 INJECTION SUBCUTANEOUS at 09:06

## 2023-09-15 RX ADMIN — ALBUTEROL SULFATE 2.5 MG: 2.5 SOLUTION RESPIRATORY (INHALATION) at 15:30

## 2023-09-15 RX ADMIN — Medication 4 ML: at 19:54

## 2023-09-15 RX ADMIN — ALBUTEROL SULFATE 2.5 MG: 2.5 SOLUTION RESPIRATORY (INHALATION) at 19:54

## 2023-09-15 RX ADMIN — FERROUS GLUCONATE 324 MG: 324 TABLET ORAL at 09:07

## 2023-09-15 RX ADMIN — ALBUTEROL SULFATE 2.5 MG: 2.5 SOLUTION RESPIRATORY (INHALATION) at 01:33

## 2023-09-15 RX ADMIN — SODIUM CHLORIDE, PRESERVATIVE FREE 40 MG: 5 INJECTION INTRAVENOUS at 09:06

## 2023-09-15 RX ADMIN — ALBUTEROL SULFATE 2.5 MG: 2.5 SOLUTION RESPIRATORY (INHALATION) at 07:20

## 2023-09-15 RX ADMIN — LISINOPRIL 40 MG: 20 TABLET ORAL at 09:07

## 2023-09-15 RX ADMIN — ASPIRIN 81 MG 81 MG: 81 TABLET ORAL at 09:07

## 2023-09-15 RX ADMIN — MIRTAZAPINE 7.5 MG: 15 TABLET, FILM COATED ORAL at 20:29

## 2023-09-15 RX ADMIN — FOSAPREPITANT 150 MG: 150 INJECTION, POWDER, LYOPHILIZED, FOR SOLUTION INTRAVENOUS at 14:37

## 2023-09-15 RX ADMIN — Medication 4 ML: at 07:20

## 2023-09-15 ASSESSMENT — PAIN SCALES - GENERAL
PAINLEVEL_OUTOF10: 0

## 2023-09-15 NOTE — PROGRESS NOTES
[facility] Hematology & Oncology        Inpatient Hematology / Oncology Progress Note      Interval history:  VSS, afebrile. On 5L NC  S/p trach and PEG on 9/8  Recent RVP +rhinovirus  Proceeding with chemo today - 5FU/carboplatin  Wife at the bedside    ROS:  Constitutional: Positive for weakness in legs (hx polio); negative for fever, chills, malaise, fatigue. CV: Negative for chest pain, palpitations, edema. Respiratory: Negative for dyspnea, cough, wheezing. GI: Negative for nausea, abdominal pain, diarrhea. 10 point review of systems is otherwise negative with the exception of the elements mentioned above in the HPI. 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 Followed with radiation treatment. DDD (degenerative disc disease), lumbar 12/05/2022    Dehydration 9/5/2023    Hearing difficulty of both ears     Hypercholesterolemia     Hypertension     pt. states only takes his BP med twice weekly due to decrease BP after weight loss. Polio     as a child, affected left leg/ wears brace    Tongue cancer ([address])      Past Surgical History:   Procedure Laterality Date    CATARACT REMOVAL Bilateral     [facility]OSCOPY      [facility]     HEENT      1/3rd tongue removed in 8/2010 and arterial graft from right wrist to tongue and second graft from right thigh to right wrist.    HEENT      precautionary trach placed in 8/2010 when having tongue removal surgery and closed one week later.     HERNIA REPAIR      IR GASTROSTOMY TUBE PLACEMENT PERCUTANEOUS  9/8/2023    IR GASTROSTOMY TUBE PLACEMENT PERCUTANEOUS 9/8/2023 [facility]IOLOGY SPECIALS    MOUTH SURGERY  08/03/2010    Partial glossectomy with tonsillectomy, neck dissection with a radial forearm free flat to

## 2023-09-15 NOTE — CARE COORDINATION
Chart screened by CM for d/c planning. On 8/14 pt was transferred from room 802 to 518 for progression of care. Pt starting C1 carbo/5FU today. PT/OT recommend IRC at d/c. This is on hold until completion of chemo. Referral to [facility] was made on 9/11. CM will continue to follow.   LOS = 10 days

## 2023-09-15 NOTE — PROGRESS NOTES
SPEECH PATHOLOGY NOTE:  Attempted to see patient for therapy session, but he declined at this time. Visitors present and chemo initiated today. Patient tolerating PMV. Will continue to follow.   Marina Ramírez MA, CCC-SLP

## 2023-09-15 NOTE — PROGRESS NOTES
ACUTE OCCUPATIONAL THERAPY GOALS:   (Developed with and agreed upon by patient and/or caregiver.)  1. Patient will complete lower body bathing and dressing with Min A and adaptive equipment as   needed. 2. Patient will completed upper body bathing and dressing with Mod I and adaptive equipment as needed. 3. Patient will complete grooming seated at EOB with SBA and adaptive equipment as needed. 4. Patient will complete toileting with Min A and adaptive equipment as needed. 5. Patient will tolerate 30 minutes of OT treatment with 1-2 rest breaks to increase activity tolerance for ADLs. 6. Patient will complete functional transfers with SBA and adaptive equipment as needed. Timeframe: 7 visits     OCCUPATIONAL THERAPY: Daily Note AM   OT Visit Days: 4   Time In/Out  OT Charge Capture  Rehab Caseload Tracker  OT Orders    Kelsie Taylor is a 69 y.o. male   PRIMARY DIAGNOSIS: Sepsis ([address])  Syncope and collapse [R55]  Dehydration [E86.0]  Elevated troponin [R77.8]  Sepsis ([address]) [A41.9]  Malignant neoplasm of esophagus, unspecified location ([address]) [C15.9]  Procedure(s) (LRB):  TRACHEOTOMY (N/A)  7 Days Post-Op  Inpatient: Payor: Lindsey Marcos / Plan: MEDICARE PART A AND B / Product Type: *No Product type* /     ASSESSMENT:     REHAB RECOMMENDATIONS: CURRENT LEVEL OF FUNCTION:  (Most Recently Demonstrated)   Recommendation to date pending progress:  Setting:  Inpatient Rehab Facility     Equipment:    To Be Determined Bathing:  Not Tested  Dressing:  Minimal Assist for shoes and brace  Feeding/Grooming:  Not Tested  Toileting:  Not Tested  Functional Mobility:  Contact Guard Assist      ASSESSMENT:  Mr. James Velasquez is doing well today. Pt presents supine upon arrival. Pt was able to don brace and shoes with min A. Pt performed bed mobility with SBA. Fair sitting balance noted at edge of bed. Pt was able to stand and transfer over to chair with CGA.  Pt instructed in UE exercises to increase strength and Upper Body   Bathing [] [] [] [] [] [] [] [] [] [x]     Lower Body Bathing [] [] [] [] [] [] [] [] [] [x]     Toileting [] [] [] [] [] [] [] [] [] [x]    Upper Body Dressing [] [] [] [] [] [] [] [] [] [x]    Lower Body Dressing [] [] [] [] [] [x] [] [] [] []    Feeding [] [] [] [] [] [] [] [] [] [x]    Grooming [] [] [] [] [] [] [] [] [] [x]    Personal Device Care [] [] [] [] [] [] [] [] [] [x]    Functional Mobility [] [] [] [] [x] [] [] [] [] []    I=Independent, Mod I=Modified Independent, S=Supervision/Setup, SBA=Standby Assistance, CGA=Contact Guard Assistance, Min=Minimal Assistance, Mod=Moderate Assistance, Max=Maximal Assistance, Total=Total Assistance, NT=Not Tested    BALANCE: Good Fair+ Fair Fair- Poor NT Comments   Sitting Static [x] [] [] [] [] []    Sitting Dynamic [] [x] [] [] [] []              Standing Static [] [x] [] [] [] []    Standing Dynamic [] [] [x] [] [] []        PLAN:     FREQUENCY/DURATION   OT Plan of Care: 3 times/week for duration of hospital stay or until stated goals are met, whichever comes first.    TREATMENT:     TREATMENT:   Therapeutic Activity (15 Minutes): Patient participated in therapeutic activities including bed mobility training, functional transfer training, functional mobility of household distances, sitting tolerance activity, and standing tolerance activity with minimal assistance, verbal cues, and tactile cues in order to increase independence, increase safety awareness, increase activity tolerance, and increase coordination. Therapeutic Exercise (12 Minutes): Therapeutic exercises noted below to improve functional activity tolerance, strength, and mobility.      TREATMENT GRID:   Date:  9/15/23 Date:   Date:     Activity/Exercise Parameters Parameters Parameters   Shoulder Abd/Adduction 10 reps     Elbow Flexion  10 reps     Punches 10 reps     Shoulder Flexion 7 reps                           AFTER TREATMENT PRECAUTIONS: Bed/Chair Locked, Call light within

## 2023-09-15 NOTE — PROGRESS NOTES
EOS summary: 7p-7a  Pt A&O x4. No c/o pain.  VSS.  5 L via trach collar  Pt c/o intermittent cough  Continuous tube feed via PEG tube  Pt to start chemo today    Bedside shift report given to oncoming GENI Kellogg, RN

## 2023-09-15 NOTE — PROGRESS NOTES
If you need to see a   -  just ask the nurse to call us. We look forward to serving your family!!         Sally Mccord

## 2023-09-16 LAB
ALBUMIN SERPL-MCNC: 2.1 G/DL (ref 3.2–4.6)
ALBUMIN/GLOB SERPL: 0.5 (ref 0.4–1.6)
ALP SERPL-CCNC: 80 U/L (ref 50–136)
ALT SERPL-CCNC: 78 U/L (ref 12–65)
ANION GAP SERPL CALC-SCNC: 5 MMOL/L (ref 2–11)
AST SERPL-CCNC: 27 U/L (ref 15–37)
BASOPHILS # BLD: 0 K/UL (ref 0–0.2)
BASOPHILS NFR BLD: 0 % (ref 0–2)
BILIRUB SERPL-MCNC: <0.1 MG/DL (ref 0.2–1.1)
BUN SERPL-MCNC: 18 MG/DL (ref 8–23)
CALCIUM SERPL-MCNC: 8.2 MG/DL (ref 8.3–10.4)
CHLORIDE SERPL-SCNC: 104 MMOL/L (ref 101–110)
CO2 SERPL-SCNC: 28 MMOL/L (ref 21–32)
CREAT SERPL-MCNC: 0.5 MG/DL (ref 0.8–1.5)
DIFFERENTIAL METHOD BLD: ABNORMAL
EOSINOPHIL # BLD: 0 K/UL (ref 0–0.8)
EOSINOPHIL NFR BLD: 0 % (ref 0.5–7.8)
ERYTHROCYTE [DISTWIDTH] IN BLOOD BY AUTOMATED COUNT: 13.7 % (ref 11.9–14.6)
GLOBULIN SER CALC-MCNC: 3.9 G/DL (ref 2.8–4.5)
GLUCOSE BLD STRIP.AUTO-MCNC: 159 MG/DL (ref 65–100)
GLUCOSE BLD STRIP.AUTO-MCNC: 187 MG/DL (ref 65–100)
GLUCOSE BLD STRIP.AUTO-MCNC: 195 MG/DL (ref 65–100)
GLUCOSE BLD STRIP.AUTO-MCNC: 204 MG/DL (ref 65–100)
GLUCOSE SERPL-MCNC: 221 MG/DL (ref 65–100)
HCT VFR BLD AUTO: 32.5 % (ref 41.1–50.3)
HGB BLD-MCNC: 10.5 G/DL (ref 13.6–17.2)
IMM GRANULOCYTES # BLD AUTO: 0.3 K/UL (ref 0–0.5)
IMM GRANULOCYTES NFR BLD AUTO: 2 % (ref 0–5)
LYMPHOCYTES # BLD: 0.6 K/UL (ref 0.5–4.6)
LYMPHOCYTES NFR BLD: 4 % (ref 13–44)
MAGNESIUM SERPL-MCNC: 2 MG/DL (ref 1.8–2.4)
MCH RBC QN AUTO: 29.4 PG (ref 26.1–32.9)
MCHC RBC AUTO-ENTMCNC: 32.3 G/DL (ref 31.4–35)
MCV RBC AUTO: 91 FL (ref 82–102)
MONOCYTES # BLD: 0.6 K/UL (ref 0.1–1.3)
MONOCYTES NFR BLD: 4 % (ref 4–12)
NEUTS SEG # BLD: 12.2 K/UL (ref 1.7–8.2)
NEUTS SEG NFR BLD: 90 % (ref 43–78)
NRBC # BLD: 0 K/UL (ref 0–0.2)
PLATELET # BLD AUTO: 413 K/UL (ref 150–450)
PMV BLD AUTO: 9.7 FL (ref 9.4–12.3)
POTASSIUM SERPL-SCNC: 4.4 MMOL/L (ref 3.5–5.1)
PROT SERPL-MCNC: 6 G/DL (ref 6.3–8.2)
RBC # BLD AUTO: 3.57 M/UL (ref 4.23–5.6)
SERVICE CMNT-IMP: ABNORMAL
SODIUM SERPL-SCNC: 137 MMOL/L (ref 133–143)
WBC # BLD AUTO: 13.6 K/UL (ref 4.3–11.1)

## 2023-09-16 PROCEDURE — 6370000000 HC RX 637 (ALT 250 FOR IP): Performed by: INTERNAL MEDICINE

## 2023-09-16 PROCEDURE — 94640 AIRWAY INHALATION TREATMENT: CPT

## 2023-09-16 PROCEDURE — 2580000003 HC RX 258: Performed by: INTERNAL MEDICINE

## 2023-09-16 PROCEDURE — 2580000003 HC RX 258: Performed by: FAMILY MEDICINE

## 2023-09-16 PROCEDURE — 6360000002 HC RX W HCPCS: Performed by: INTERNAL MEDICINE

## 2023-09-16 PROCEDURE — 83735 ASSAY OF MAGNESIUM: CPT

## 2023-09-16 PROCEDURE — 6370000000 HC RX 637 (ALT 250 FOR IP): Performed by: NURSE PRACTITIONER

## 2023-09-16 PROCEDURE — C9113 INJ PANTOPRAZOLE SODIUM, VIA: HCPCS | Performed by: FAMILY MEDICINE

## 2023-09-16 PROCEDURE — 6370000000 HC RX 637 (ALT 250 FOR IP): Performed by: STUDENT IN AN ORGANIZED HEALTH CARE EDUCATION/TRAINING PROGRAM

## 2023-09-16 PROCEDURE — 31502 CHANGE OF WINDPIPE AIRWAY: CPT

## 2023-09-16 PROCEDURE — 82962 GLUCOSE BLOOD TEST: CPT

## 2023-09-16 PROCEDURE — 80053 COMPREHEN METABOLIC PANEL: CPT

## 2023-09-16 PROCEDURE — 6370000000 HC RX 637 (ALT 250 FOR IP): Performed by: HOSPITALIST

## 2023-09-16 PROCEDURE — 6370000000 HC RX 637 (ALT 250 FOR IP)

## 2023-09-16 PROCEDURE — 2580000003 HC RX 258: Performed by: NURSE PRACTITIONER

## 2023-09-16 PROCEDURE — 6370000000 HC RX 637 (ALT 250 FOR IP): Performed by: FAMILY MEDICINE

## 2023-09-16 PROCEDURE — 94761 N-INVAS EAR/PLS OXIMETRY MLT: CPT

## 2023-09-16 PROCEDURE — 6360000002 HC RX W HCPCS: Performed by: FAMILY MEDICINE

## 2023-09-16 PROCEDURE — 1100000000 HC RM PRIVATE

## 2023-09-16 PROCEDURE — 99233 SBSQ HOSP IP/OBS HIGH 50: CPT | Performed by: INTERNAL MEDICINE

## 2023-09-16 PROCEDURE — 85025 COMPLETE CBC W/AUTO DIFF WBC: CPT

## 2023-09-16 PROCEDURE — A4216 STERILE WATER/SALINE, 10 ML: HCPCS | Performed by: FAMILY MEDICINE

## 2023-09-16 PROCEDURE — 2700000000 HC OXYGEN THERAPY PER DAY

## 2023-09-16 PROCEDURE — 36592 COLLECT BLOOD FROM PICC: CPT

## 2023-09-16 RX ADMIN — ALBUTEROL SULFATE 2.5 MG: 2.5 SOLUTION RESPIRATORY (INHALATION) at 19:09

## 2023-09-16 RX ADMIN — SODIUM CHLORIDE, PRESERVATIVE FREE 10 ML: 5 INJECTION INTRAVENOUS at 08:14

## 2023-09-16 RX ADMIN — TRAZODONE HYDROCHLORIDE 50 MG: 50 TABLET ORAL at 20:28

## 2023-09-16 RX ADMIN — FLUOROURACIL 1875 MG: 50 INJECTION, SOLUTION INTRAVENOUS at 16:01

## 2023-09-16 RX ADMIN — PREDNISONE 40 MG: 20 TABLET ORAL at 07:59

## 2023-09-16 RX ADMIN — SODIUM CHLORIDE, PRESERVATIVE FREE 20 ML: 5 INJECTION INTRAVENOUS at 20:30

## 2023-09-16 RX ADMIN — ALBUTEROL SULFATE 2.5 MG: 2.5 SOLUTION RESPIRATORY (INHALATION) at 02:40

## 2023-09-16 RX ADMIN — ALBUTEROL SULFATE 2.5 MG: 2.5 SOLUTION RESPIRATORY (INHALATION) at 15:36

## 2023-09-16 RX ADMIN — LISINOPRIL 40 MG: 20 TABLET ORAL at 07:59

## 2023-09-16 RX ADMIN — ENOXAPARIN SODIUM 40 MG: 100 INJECTION SUBCUTANEOUS at 08:13

## 2023-09-16 RX ADMIN — Medication 4 ML: at 07:29

## 2023-09-16 RX ADMIN — Medication 250 MG: at 07:59

## 2023-09-16 RX ADMIN — INSULIN LISPRO 1 UNITS: 100 INJECTION, SOLUTION INTRAVENOUS; SUBCUTANEOUS at 18:03

## 2023-09-16 RX ADMIN — SODIUM CHLORIDE, PRESERVATIVE FREE 10 ML: 5 INJECTION INTRAVENOUS at 20:29

## 2023-09-16 RX ADMIN — ROSUVASTATIN CALCIUM 20 MG: 20 TABLET, FILM COATED ORAL at 20:28

## 2023-09-16 RX ADMIN — OLANZAPINE 5 MG: 5 TABLET, FILM COATED ORAL at 20:28

## 2023-09-16 RX ADMIN — Medication 4 ML: at 19:09

## 2023-09-16 RX ADMIN — ASPIRIN 81 MG 81 MG: 81 TABLET ORAL at 07:59

## 2023-09-16 RX ADMIN — FERROUS GLUCONATE 324 MG: 324 TABLET ORAL at 07:59

## 2023-09-16 RX ADMIN — SODIUM CHLORIDE, PRESERVATIVE FREE 40 MG: 5 INJECTION INTRAVENOUS at 08:13

## 2023-09-16 RX ADMIN — ALBUTEROL SULFATE 2.5 MG: 2.5 SOLUTION RESPIRATORY (INHALATION) at 07:28

## 2023-09-16 RX ADMIN — MIRTAZAPINE 7.5 MG: 15 TABLET, FILM COATED ORAL at 20:28

## 2023-09-16 ASSESSMENT — PAIN SCALES - GENERAL: PAINLEVEL_OUTOF10: 0

## 2023-09-16 NOTE — PROGRESS NOTES
flat to left oral cavity    ORTHOPEDIC SURGERY Right     pins in right leg to prevent asymmetric growth    TRACHEOSTOMY N/A 2023    TRACHEOTOMY performed by Dora Traore MD at [facility] MAIN OR    VASECTOMY       Family History   Problem Relation Age of Onset    Malig Hypertherm Neg Hx     Pseudochol. Deficiency Neg Hx     Delayed Awakening Neg Hx     Post-op Nausea/Vomiting Neg Hx     Emergence Delirium Neg Hx     Deep Vein Thrombosis Brother         PE     Other Neg Hx     Cancer Mother         breast    Deep Vein Thrombosis Father         with PE; provoked     Lupus Sister     Post-op Cognitive Dysfunction Neg Hx      Social History     Socioeconomic History    Marital status:      Spouse name: Not on file    Number of children: Not on file    Years of education: Not on file    Highest education level: Not on file   Occupational History    Not on file   Tobacco Use    Smoking status: Former     Packs/day: 1.00     Years: 15.00     Additional pack years: 0.00     Total pack years: 15.00     Types: Cigarettes     Quit date: 1990     Years since quittin.7    Smokeless tobacco: Former    Tobacco comments:     Quit smoking: age 26 to 37   Substance and Sexual Activity    Alcohol use: Not Currently    Drug use: Never    Sexual activity: Not on file   Other Topics Concern    Not on file   Social History Narrative    Not on file     Social Determinants of Health     Financial Resource Strain: Low Risk  (2023)    Overall Financial Resource Strain (CARDIA)     Difficulty of Paying Living Expenses: Not hard at all   Food Insecurity: No Food Insecurity (2023)    Hunger Vital Sign     Worried About Running Out of Food in the Last Year: Never true     Ran Out of Food in the Last Year: Never true   Transportation Needs: Unknown (2023)    PRAPARE - Transportation     Lack of Transportation (Medical): Not on file     Lack of Transportation (Non-Medical):  No   Physical Activity: Insufficiently 4 mL Nebulization PRN PACO Bowman CNP        albuterol (PROVENTIL) (2.5 MG/3ML) 0.083% nebulizer solution 2.5 mg  2.5 mg Nebulization Q6H RT Jr Caban MD   2.5 mg at 09/16/23 0728    sodium chloride (Inhalant) 3 % nebulizer solution 4 mL  4 mL Nebulization BID RT Jr Caban MD   4 mL at 09/16/23 0729    scopolamine (TRANSDERM-SCOP) transdermal patch 1 patch  1 patch TransDERmal Q72H Dana Cuevas MD   1 patch at 09/16/23 1148    mirtazapine (REMERON) tablet 7.5 mg  7.5 mg Per G Tube Nightly Erika Duran MD   7.5 mg at 09/15/23 2029    rosuvastatin (CRESTOR) tablet 20 mg  20 mg Per G Tube Nightly Libbie Boeck, MD   20 mg at 09/15/23 2029    sodium phosphate 10 mmol in sodium chloride 0.9 % 250 mL IVPB  10 mmol IntraVENous PRN Libbie Boeck, MD        Or    sodium phosphate 15 mmol in sodium chloride 0.9 % 250 mL IVPB  15 mmol IntraVENous PRN Libbie Boeck, MD   Stopped at 09/11/23 1750    Or    sodium phosphate 20 mmol in sodium chloride 0.9 % 500 mL IVPB  20 mmol IntraVENous PRN Libbie Boeck, MD        QUEtiapine (SEROQUEL) tablet 25 mg  25 mg Oral Nightly PRN Libbie Boeck, MD        aspirin chewable tablet 81 mg  81 mg Oral Daily Guillermo Alfred MD   81 mg at 09/16/23 0759    ferrous gluconate (FERGON) tablet 324 mg  324 mg Oral Daily with breakfast PACO Mejias CNP   324 mg at 09/16/23 0759    vitamin C tablet 250 mg  250 mg Oral Daily with breakfast PACO Mejias CNP   250 mg at 09/16/23 0759    loperamide (IMODIUM) capsule 2 mg  2 mg Oral 4x Daily PRN Guillermo Alfred MD   2 mg at 09/06/23 1155    bismuth subsalicylate (PEPTO BISMOL) 262 MG/15ML suspension 30 mL  30 mL Oral Q6H PRN Guillermo Alfred MD        traZODone (DESYREL) tablet 50 mg  50 mg Oral Nightly PACO Ochoa CNP   50 mg at 09/15/23 2028    melatonin tablet 3 mg  3 mg Oral Nightly PRN PACO Ochoa CNP   3 mg at 09/06/23 2138    sodium chloride flush 0.9 %

## 2023-09-16 NOTE — PROGRESS NOTES
END OF SHIFT SUMMARY:    Additional events this shift:   -Tube feeds at goal rate of 60 mL/hr  -No complaints of pain or nausea  -Chemo running     Martin Chauhan RN

## 2023-09-17 LAB
ALBUMIN SERPL-MCNC: 2.1 G/DL (ref 3.2–4.6)
ALBUMIN/GLOB SERPL: 0.6 (ref 0.4–1.6)
ALP SERPL-CCNC: 75 U/L (ref 50–136)
ALT SERPL-CCNC: 63 U/L (ref 12–65)
ANION GAP SERPL CALC-SCNC: 6 MMOL/L (ref 2–11)
AST SERPL-CCNC: 20 U/L (ref 15–37)
BASOPHILS # BLD: 0 K/UL (ref 0–0.2)
BASOPHILS NFR BLD: 0 % (ref 0–2)
BILIRUB SERPL-MCNC: 0.2 MG/DL (ref 0.2–1.1)
BUN SERPL-MCNC: 21 MG/DL (ref 8–23)
CALCIUM SERPL-MCNC: 8.5 MG/DL (ref 8.3–10.4)
CHLORIDE SERPL-SCNC: 105 MMOL/L (ref 101–110)
CO2 SERPL-SCNC: 29 MMOL/L (ref 21–32)
CREAT SERPL-MCNC: 0.5 MG/DL (ref 0.8–1.5)
DIFFERENTIAL METHOD BLD: ABNORMAL
EOSINOPHIL # BLD: 0 K/UL (ref 0–0.8)
EOSINOPHIL NFR BLD: 0 % (ref 0.5–7.8)
ERYTHROCYTE [DISTWIDTH] IN BLOOD BY AUTOMATED COUNT: 13.9 % (ref 11.9–14.6)
GLOBULIN SER CALC-MCNC: 3.8 G/DL (ref 2.8–4.5)
GLUCOSE BLD STRIP.AUTO-MCNC: 127 MG/DL (ref 65–100)
GLUCOSE BLD STRIP.AUTO-MCNC: 149 MG/DL (ref 65–100)
GLUCOSE BLD STRIP.AUTO-MCNC: 169 MG/DL (ref 65–100)
GLUCOSE BLD STRIP.AUTO-MCNC: 183 MG/DL (ref 65–100)
GLUCOSE SERPL-MCNC: 134 MG/DL (ref 65–100)
HCT VFR BLD AUTO: 35.2 % (ref 41.1–50.3)
HGB BLD-MCNC: 11.2 G/DL (ref 13.6–17.2)
IMM GRANULOCYTES # BLD AUTO: 0.1 K/UL (ref 0–0.5)
IMM GRANULOCYTES NFR BLD AUTO: 1 % (ref 0–5)
LYMPHOCYTES # BLD: 1.3 K/UL (ref 0.5–4.6)
LYMPHOCYTES NFR BLD: 12 % (ref 13–44)
MAGNESIUM SERPL-MCNC: 2.3 MG/DL (ref 1.8–2.4)
MCH RBC QN AUTO: 28.6 PG (ref 26.1–32.9)
MCHC RBC AUTO-ENTMCNC: 31.8 G/DL (ref 31.4–35)
MCV RBC AUTO: 89.8 FL (ref 82–102)
MONOCYTES # BLD: 0.9 K/UL (ref 0.1–1.3)
MONOCYTES NFR BLD: 8 % (ref 4–12)
NEUTS SEG # BLD: 8.5 K/UL (ref 1.7–8.2)
NEUTS SEG NFR BLD: 79 % (ref 43–78)
NRBC # BLD: 0 K/UL (ref 0–0.2)
PLATELET # BLD AUTO: 441 K/UL (ref 150–450)
PMV BLD AUTO: 9.1 FL (ref 9.4–12.3)
POTASSIUM SERPL-SCNC: 4 MMOL/L (ref 3.5–5.1)
PROT SERPL-MCNC: 5.9 G/DL (ref 6.3–8.2)
RBC # BLD AUTO: 3.92 M/UL (ref 4.23–5.6)
SERVICE CMNT-IMP: ABNORMAL
SODIUM SERPL-SCNC: 140 MMOL/L (ref 133–143)
WBC # BLD AUTO: 10.8 K/UL (ref 4.3–11.1)

## 2023-09-17 PROCEDURE — 6360000002 HC RX W HCPCS: Performed by: INTERNAL MEDICINE

## 2023-09-17 PROCEDURE — 6370000000 HC RX 637 (ALT 250 FOR IP): Performed by: NURSE PRACTITIONER

## 2023-09-17 PROCEDURE — 82962 GLUCOSE BLOOD TEST: CPT

## 2023-09-17 PROCEDURE — 2580000003 HC RX 258: Performed by: INTERNAL MEDICINE

## 2023-09-17 PROCEDURE — 6370000000 HC RX 637 (ALT 250 FOR IP): Performed by: INTERNAL MEDICINE

## 2023-09-17 PROCEDURE — 2580000003 HC RX 258: Performed by: NURSE PRACTITIONER

## 2023-09-17 PROCEDURE — 85025 COMPLETE CBC W/AUTO DIFF WBC: CPT

## 2023-09-17 PROCEDURE — 6360000002 HC RX W HCPCS: Performed by: FAMILY MEDICINE

## 2023-09-17 PROCEDURE — 83735 ASSAY OF MAGNESIUM: CPT

## 2023-09-17 PROCEDURE — A4216 STERILE WATER/SALINE, 10 ML: HCPCS | Performed by: FAMILY MEDICINE

## 2023-09-17 PROCEDURE — C9113 INJ PANTOPRAZOLE SODIUM, VIA: HCPCS | Performed by: FAMILY MEDICINE

## 2023-09-17 PROCEDURE — 2580000003 HC RX 258: Performed by: FAMILY MEDICINE

## 2023-09-17 PROCEDURE — 2700000000 HC OXYGEN THERAPY PER DAY

## 2023-09-17 PROCEDURE — 1100000000 HC RM PRIVATE

## 2023-09-17 PROCEDURE — 6370000000 HC RX 637 (ALT 250 FOR IP): Performed by: FAMILY MEDICINE

## 2023-09-17 PROCEDURE — 94761 N-INVAS EAR/PLS OXIMETRY MLT: CPT

## 2023-09-17 PROCEDURE — 80053 COMPREHEN METABOLIC PANEL: CPT

## 2023-09-17 PROCEDURE — 99233 SBSQ HOSP IP/OBS HIGH 50: CPT | Performed by: INTERNAL MEDICINE

## 2023-09-17 PROCEDURE — 94640 AIRWAY INHALATION TREATMENT: CPT

## 2023-09-17 PROCEDURE — 6370000000 HC RX 637 (ALT 250 FOR IP)

## 2023-09-17 RX ADMIN — LISINOPRIL 40 MG: 20 TABLET ORAL at 09:01

## 2023-09-17 RX ADMIN — MIRTAZAPINE 7.5 MG: 15 TABLET, FILM COATED ORAL at 20:38

## 2023-09-17 RX ADMIN — Medication 4 ML: at 20:02

## 2023-09-17 RX ADMIN — ALBUTEROL SULFATE 2.5 MG: 2.5 SOLUTION RESPIRATORY (INHALATION) at 20:02

## 2023-09-17 RX ADMIN — TRAZODONE HYDROCHLORIDE 50 MG: 50 TABLET ORAL at 20:38

## 2023-09-17 RX ADMIN — SODIUM CHLORIDE, PRESERVATIVE FREE 10 ML: 5 INJECTION INTRAVENOUS at 09:03

## 2023-09-17 RX ADMIN — Medication 4 ML: at 08:03

## 2023-09-17 RX ADMIN — OLANZAPINE 5 MG: 5 TABLET, FILM COATED ORAL at 20:38

## 2023-09-17 RX ADMIN — FLUOROURACIL 1875 MG: 50 INJECTION, SOLUTION INTRAVENOUS at 15:09

## 2023-09-17 RX ADMIN — ASPIRIN 81 MG 81 MG: 81 TABLET ORAL at 09:02

## 2023-09-17 RX ADMIN — ALBUTEROL SULFATE 2.5 MG: 2.5 SOLUTION RESPIRATORY (INHALATION) at 13:52

## 2023-09-17 RX ADMIN — Medication 250 MG: at 09:01

## 2023-09-17 RX ADMIN — ROSUVASTATIN CALCIUM 20 MG: 20 TABLET, FILM COATED ORAL at 20:38

## 2023-09-17 RX ADMIN — POLYETHYLENE GLYCOL 3350 17 G: 17 POWDER, FOR SOLUTION ORAL at 09:08

## 2023-09-17 RX ADMIN — ALBUTEROL SULFATE 2.5 MG: 2.5 SOLUTION RESPIRATORY (INHALATION) at 03:16

## 2023-09-17 RX ADMIN — SODIUM CHLORIDE, PRESERVATIVE FREE 40 MG: 5 INJECTION INTRAVENOUS at 09:02

## 2023-09-17 RX ADMIN — ENOXAPARIN SODIUM 40 MG: 100 INJECTION SUBCUTANEOUS at 09:01

## 2023-09-17 RX ADMIN — SODIUM CHLORIDE, PRESERVATIVE FREE 10 ML: 5 INJECTION INTRAVENOUS at 20:50

## 2023-09-17 RX ADMIN — PREDNISONE 40 MG: 20 TABLET ORAL at 09:01

## 2023-09-17 RX ADMIN — ALBUTEROL SULFATE 2.5 MG: 2.5 SOLUTION RESPIRATORY (INHALATION) at 08:03

## 2023-09-17 RX ADMIN — FERROUS GLUCONATE 324 MG: 324 TABLET ORAL at 09:02

## 2023-09-17 ASSESSMENT — PAIN SCALES - GENERAL
PAINLEVEL_OUTOF10: 0

## 2023-09-17 NOTE — PROGRESS NOTES
END OF SHIFT SUMMARY:    Significant vitals this shift:  vss  Significant labs this shift:  no  Tests performed this shift:  no  Orders to be followed up on:  no  Blood products given this shift:  no  Additional events this shift:   Vss pt A&O X's 4 1 small BM during shift no complaints of pain or discomfort resting in bed call light in reach pt called rN into room to suction throat RN  performed suction pt ok in bed     I/Os:  +/- this shift: yes  09/17 0701 - 09/17 1900  In: 6117 [I.V.:1436]  Out: 400 [Urine:400]  Unmeasured Occurrences this Shift:  Urine yes, BM yes, Emesis no      Bedside shift report given to GENI Douglass

## 2023-09-17 NOTE — PROGRESS NOTES
[facility] Hematology & Oncology        Inpatient Hematology / Oncology Progress Note      Interval history:  VSS, afebrile. On 5L NC  S/p trach and PEG on 9/8  Tolerating chemo very well D3/4 5FU  Wife at the bedside    ROS:  Constitutional: Positive for weakness in legs (hx polio); negative for fever, chills, malaise, fatigue. CV: Negative for chest pain, palpitations, edema. Respiratory: Negative for dyspnea, cough, wheezing. GI: Negative for nausea, abdominal pain, diarrhea. 10 point review of systems is otherwise negative with the exception of the elements mentioned above in the HPI. 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 Followed with radiation treatment. DDD (degenerative disc disease), lumbar 12/05/2022    Dehydration 9/5/2023    Hearing difficulty of both ears     Hypercholesterolemia     Hypertension     pt. states only takes his BP med twice weekly due to decrease BP after weight loss. Polio     as a child, affected left leg/ wears brace    Tongue cancer ([address])      Past Surgical History:   Procedure Laterality Date    CATARACT REMOVAL Bilateral     COLONOSCOPY      [facility]     HEENT      1/3rd tongue removed in 8/2010 and arterial graft from right wrist to tongue and second graft from right thigh to right wrist.    HEENT      precautionary trach placed in 8/2010 when having tongue removal surgery and closed one week later.     HERNIA REPAIR      IR GASTROSTOMY TUBE PLACEMENT PERCUTANEOUS  9/8/2023    IR GASTROSTOMY TUBE PLACEMENT PERCUTANEOUS 9/8/2023 [facility]    MOUTH SURGERY  08/03/2010    Partial glossectomy with tonsillectomy, neck dissection with a radial forearm free flat to left oral cavity    ORTHOPEDIC

## 2023-09-17 NOTE — PLAN OF CARE
Problem: Discharge Planning  Goal: Discharge to home or other facility with appropriate resources  9/17/2023 0956 by Jeannie Goodpasture, RN  Outcome: Progressing  9/16/2023 1959 by Elaine Gaspar RN  Outcome: Progressing  Flowsheets (Taken 9/16/2023 1903)  Discharge to home or other facility with appropriate resources: Identify barriers to discharge with patient and caregiver     Problem: Pain  Goal: Verbalizes/displays adequate comfort level or baseline comfort level  9/17/2023 0956 by Jeannie Goodpasture, RN  Outcome: Progressing  9/16/2023 1959 by Elaine Gaspar RN  Outcome: Progressing  Flowsheets (Taken 9/16/2023 1909)  Verbalizes/displays adequate comfort level or baseline comfort level: Encourage patient to monitor pain and request assistance     Problem: ABCDS Injury Assessment  Goal: Absence of physical injury  9/17/2023 0956 by Jeannie Goodpasture, RN  Outcome: Progressing  Flowsheets (Taken 9/17/2023 0316 by Elaine Gaspar RN)  Absence of Physical Injury: Implement safety measures based on patient assessment  9/16/2023 1959 by Elaine Gaspar RN  Outcome: Progressing  Flowsheets (Taken 9/16/2023 1903)  Absence of Physical Injury: Implement safety measures based on patient assessment     Problem: Safety - Adult  Goal: Free from fall injury  Recent Flowsheet Documentation  Taken 9/17/2023 0316 by Elaine Gaspar RN  Free From Fall Injury: Instruct family/caregiver on patient safety  9/16/2023 1959 by Elaine Gaspar RN  Outcome: Progressing  Flowsheets (Taken 9/16/2023 1903)  Free From Fall Injury: Instruct family/caregiver on patient safety

## 2023-09-18 ENCOUNTER — HOSPITAL ENCOUNTER (OUTPATIENT)
Dept: INFUSION THERAPY | Age: 78
End: 2023-09-18

## 2023-09-18 LAB
ALBUMIN SERPL-MCNC: 2.2 G/DL (ref 3.2–4.6)
ALBUMIN/GLOB SERPL: 0.6 (ref 0.4–1.6)
ALP SERPL-CCNC: 73 U/L (ref 50–136)
ALT SERPL-CCNC: 49 U/L (ref 12–65)
ANION GAP SERPL CALC-SCNC: 7 MMOL/L (ref 2–11)
AST SERPL-CCNC: 17 U/L (ref 15–37)
BASOPHILS # BLD: 0 K/UL (ref 0–0.2)
BASOPHILS NFR BLD: 0 % (ref 0–2)
BILIRUB SERPL-MCNC: 0.2 MG/DL (ref 0.2–1.1)
BUN SERPL-MCNC: 24 MG/DL (ref 8–23)
CALCIUM SERPL-MCNC: 8.3 MG/DL (ref 8.3–10.4)
CHLORIDE SERPL-SCNC: 102 MMOL/L (ref 101–110)
CO2 SERPL-SCNC: 29 MMOL/L (ref 21–32)
CREAT SERPL-MCNC: 0.6 MG/DL (ref 0.8–1.5)
DIFFERENTIAL METHOD BLD: ABNORMAL
EOSINOPHIL # BLD: 0 K/UL (ref 0–0.8)
EOSINOPHIL NFR BLD: 0 % (ref 0.5–7.8)
ERYTHROCYTE [DISTWIDTH] IN BLOOD BY AUTOMATED COUNT: 13.8 % (ref 11.9–14.6)
GLOBULIN SER CALC-MCNC: 3.6 G/DL (ref 2.8–4.5)
GLUCOSE BLD STRIP.AUTO-MCNC: 124 MG/DL (ref 65–100)
GLUCOSE BLD STRIP.AUTO-MCNC: 137 MG/DL (ref 65–100)
GLUCOSE BLD STRIP.AUTO-MCNC: 160 MG/DL (ref 65–100)
GLUCOSE BLD STRIP.AUTO-MCNC: 179 MG/DL (ref 65–100)
GLUCOSE SERPL-MCNC: 149 MG/DL (ref 65–100)
HCT VFR BLD AUTO: 34.3 % (ref 41.1–50.3)
HGB BLD-MCNC: 11.4 G/DL (ref 13.6–17.2)
IMM GRANULOCYTES # BLD AUTO: 0.1 K/UL (ref 0–0.5)
IMM GRANULOCYTES NFR BLD AUTO: 1 % (ref 0–5)
LYMPHOCYTES # BLD: 1 K/UL (ref 0.5–4.6)
LYMPHOCYTES NFR BLD: 10 % (ref 13–44)
MAGNESIUM SERPL-MCNC: 2 MG/DL (ref 1.8–2.4)
MCH RBC QN AUTO: 29.2 PG (ref 26.1–32.9)
MCHC RBC AUTO-ENTMCNC: 33.2 G/DL (ref 31.4–35)
MCV RBC AUTO: 87.7 FL (ref 82–102)
MONOCYTES # BLD: 0.4 K/UL (ref 0.1–1.3)
MONOCYTES NFR BLD: 4 % (ref 4–12)
NEUTS SEG # BLD: 8.8 K/UL (ref 1.7–8.2)
NEUTS SEG NFR BLD: 85 % (ref 43–78)
NRBC # BLD: 0 K/UL (ref 0–0.2)
PHOSPHATE SERPL-MCNC: 4.2 MG/DL (ref 2.3–3.7)
PLATELET # BLD AUTO: 356 K/UL (ref 150–450)
PMV BLD AUTO: 9.3 FL (ref 9.4–12.3)
POTASSIUM SERPL-SCNC: 3.9 MMOL/L (ref 3.5–5.1)
PROT SERPL-MCNC: 5.8 G/DL (ref 6.3–8.2)
RBC # BLD AUTO: 3.91 M/UL (ref 4.23–5.6)
SERVICE CMNT-IMP: ABNORMAL
SODIUM SERPL-SCNC: 138 MMOL/L (ref 133–143)
WBC # BLD AUTO: 10.2 K/UL (ref 4.3–11.1)

## 2023-09-18 PROCEDURE — 2580000003 HC RX 258: Performed by: FAMILY MEDICINE

## 2023-09-18 PROCEDURE — 6360000002 HC RX W HCPCS: Performed by: FAMILY MEDICINE

## 2023-09-18 PROCEDURE — 1100000000 HC RM PRIVATE

## 2023-09-18 PROCEDURE — 97530 THERAPEUTIC ACTIVITIES: CPT

## 2023-09-18 PROCEDURE — 80053 COMPREHEN METABOLIC PANEL: CPT

## 2023-09-18 PROCEDURE — 6370000000 HC RX 637 (ALT 250 FOR IP): Performed by: INTERNAL MEDICINE

## 2023-09-18 PROCEDURE — 94640 AIRWAY INHALATION TREATMENT: CPT

## 2023-09-18 PROCEDURE — A4216 STERILE WATER/SALINE, 10 ML: HCPCS | Performed by: FAMILY MEDICINE

## 2023-09-18 PROCEDURE — 2580000003 HC RX 258: Performed by: INTERNAL MEDICINE

## 2023-09-18 PROCEDURE — 6370000000 HC RX 637 (ALT 250 FOR IP): Performed by: NURSE PRACTITIONER

## 2023-09-18 PROCEDURE — 6370000000 HC RX 637 (ALT 250 FOR IP): Performed by: FAMILY MEDICINE

## 2023-09-18 PROCEDURE — 6370000000 HC RX 637 (ALT 250 FOR IP)

## 2023-09-18 PROCEDURE — 6360000002 HC RX W HCPCS: Performed by: INTERNAL MEDICINE

## 2023-09-18 PROCEDURE — 99233 SBSQ HOSP IP/OBS HIGH 50: CPT | Performed by: INTERNAL MEDICINE

## 2023-09-18 PROCEDURE — 82962 GLUCOSE BLOOD TEST: CPT

## 2023-09-18 PROCEDURE — 94761 N-INVAS EAR/PLS OXIMETRY MLT: CPT

## 2023-09-18 PROCEDURE — 2700000000 HC OXYGEN THERAPY PER DAY

## 2023-09-18 PROCEDURE — C9113 INJ PANTOPRAZOLE SODIUM, VIA: HCPCS | Performed by: FAMILY MEDICINE

## 2023-09-18 PROCEDURE — 85025 COMPLETE CBC W/AUTO DIFF WBC: CPT

## 2023-09-18 PROCEDURE — 2580000003 HC RX 258: Performed by: NURSE PRACTITIONER

## 2023-09-18 PROCEDURE — 97112 NEUROMUSCULAR REEDUCATION: CPT

## 2023-09-18 PROCEDURE — 83735 ASSAY OF MAGNESIUM: CPT

## 2023-09-18 PROCEDURE — APPSS45 APP SPLIT SHARED TIME 31-45 MINUTES

## 2023-09-18 PROCEDURE — 2580000003 HC RX 258

## 2023-09-18 PROCEDURE — 84100 ASSAY OF PHOSPHORUS: CPT

## 2023-09-18 RX ORDER — 0.9 % SODIUM CHLORIDE 0.9 %
500 INTRAVENOUS SOLUTION INTRAVENOUS ONCE
Status: COMPLETED | OUTPATIENT
Start: 2023-09-19 | End: 2023-09-19

## 2023-09-18 RX ADMIN — ALBUTEROL SULFATE 2.5 MG: 2.5 SOLUTION RESPIRATORY (INHALATION) at 02:31

## 2023-09-18 RX ADMIN — ENOXAPARIN SODIUM 40 MG: 100 INJECTION SUBCUTANEOUS at 08:52

## 2023-09-18 RX ADMIN — Medication 4 ML: at 20:47

## 2023-09-18 RX ADMIN — SODIUM CHLORIDE 500 ML: 9 INJECTION, SOLUTION INTRAVENOUS at 23:42

## 2023-09-18 RX ADMIN — PREDNISONE 40 MG: 20 TABLET ORAL at 08:51

## 2023-09-18 RX ADMIN — OLANZAPINE 5 MG: 5 TABLET, FILM COATED ORAL at 20:55

## 2023-09-18 RX ADMIN — SODIUM CHLORIDE, PRESERVATIVE FREE 10 ML: 5 INJECTION INTRAVENOUS at 09:10

## 2023-09-18 RX ADMIN — FLUOROURACIL 1875 MG: 50 INJECTION, SOLUTION INTRAVENOUS at 13:49

## 2023-09-18 RX ADMIN — Medication 4 ML: at 07:42

## 2023-09-18 RX ADMIN — SODIUM CHLORIDE, PRESERVATIVE FREE 10 ML: 5 INJECTION INTRAVENOUS at 21:07

## 2023-09-18 RX ADMIN — TRAZODONE HYDROCHLORIDE 50 MG: 50 TABLET ORAL at 20:55

## 2023-09-18 RX ADMIN — Medication 250 MG: at 08:52

## 2023-09-18 RX ADMIN — SODIUM CHLORIDE, PRESERVATIVE FREE 40 MG: 5 INJECTION INTRAVENOUS at 08:52

## 2023-09-18 RX ADMIN — ASPIRIN 81 MG 81 MG: 81 TABLET ORAL at 08:52

## 2023-09-18 RX ADMIN — MIRTAZAPINE 7.5 MG: 15 TABLET, FILM COATED ORAL at 20:55

## 2023-09-18 RX ADMIN — ROSUVASTATIN CALCIUM 20 MG: 20 TABLET, FILM COATED ORAL at 20:55

## 2023-09-18 RX ADMIN — ALBUTEROL SULFATE 2.5 MG: 2.5 SOLUTION RESPIRATORY (INHALATION) at 20:47

## 2023-09-18 RX ADMIN — ALBUTEROL SULFATE 2.5 MG: 2.5 SOLUTION RESPIRATORY (INHALATION) at 07:43

## 2023-09-18 RX ADMIN — ALBUTEROL SULFATE 2.5 MG: 2.5 SOLUTION RESPIRATORY (INHALATION) at 13:02

## 2023-09-18 RX ADMIN — FERROUS GLUCONATE 324 MG: 324 TABLET ORAL at 08:52

## 2023-09-18 ASSESSMENT — PAIN SCALES - GENERAL
PAINLEVEL_OUTOF10: 0

## 2023-09-18 NOTE — PROGRESS NOTES
END OF SHIFT SUMMARY:    Significant vitals this shift:  low BP  Significant labs this shift:  elevated phosphorus   Additional events this shift:   -tube feed set and tubing changed  -No complaints of pain  -Pt one BM 9/17    I/Os:  09/17 1901 - 09/18 0700  In: 2324   Out: [id number] Rissa Zhou RN

## 2023-09-18 NOTE — PROGRESS NOTES
oropharynx, hypokalemia, moderate malnutrition. S/p trach and 18 fr PEG 9/8. Transferred to 5th floor for inpatient chemo, initiated 9/15. Nutrition Interval:  TF initiated 9/9 with slow advance due to refeeding risk. TF achieved goal 9/12. Patient seen reclined in bed with wife at bedside. When asked he does report occasional hunger. Noted weight has been trending down. Discussed increasing TF rate for more kcal and protein. Patient agrees. Discussed with RN, Elaine Byrne. Also discussed with Cat, JOEY, regarding  d/c plan as today is last day of chemo. Likely d/c to [facility]. Can transition to bolus feeds prior to d/c home. Abdominal Status (last documented by nursing):   Last BM (including prior to admit): 09/17/23, GI Symptoms: Constipation   Pertinent Medications: ferrous gluconate, SSI and HS insulin (minimal coverage required),  vitamin C, remeron, protonix, pepcid, deltasone, scopolamine  Continuous: none  IVF: none  Electrolyte Replacement:  9/7: 10 mEq Kcl x6 9/10: 10 mmol Kphos, 2000 mg mag sulfate 9/11: 2000 mg mag sulfate x1, 15 mmol NaPhos  Pertinent administered PRN: imodium (9/6), glycolax (last admin 9/17)  Pertinent Labs:   Lab Results   Component Value Date/Time     09/18/2023 03:06 AM    K 3.9 09/18/2023 03:06 AM     09/18/2023 03:06 AM    CO2 29 09/18/2023 03:06 AM    BUN 24 09/18/2023 03:06 AM    CREATININE 0.60 09/18/2023 03:06 AM    GLUCOSE 149 09/18/2023 03:06 AM    CALCIUM 8.3 09/18/2023 03:06 AM    PHOS 4.2 09/18/2023 03:06 AM    MG 2.0 09/18/2023 03:06 AM      Lab Results   Component Value Date/Time    POCGLU 179 09/18/2023 11:25 AM    POCGLU 137 09/18/2023 08:21 AM    POCGLU 149 09/17/2023 09:02 PM    POCGLU 183 09/17/2023 04:36 PM    POCGLU 169 09/17/2023 11:29 AM    POCGLU 127 09/17/2023 07:05 AM     Remarkable for:  mildly elevated glucose - on steroid and minimal coverage required, mildly elevated Phos ?  Related to constipation    Current Nutrition Biochemical Data, GI Status, Fluid Status or Edema, Weight    Discharge Planning:    Enteral Nutrition    FRANNIE MICHELLE, RD

## 2023-09-18 NOTE — PROGRESS NOTES
Speech Pathology  ST attempted. However, pt working with OT. Will continue to follow.     Kelly Mancia, [facility], CCC-SLP

## 2023-09-18 NOTE — PROGRESS NOTES
ACUTE OCCUPATIONAL THERAPY GOALS:   (Developed with and agreed upon by patient and/or caregiver.)  1. Patient will complete lower body bathing and dressing with Min A and adaptive equipment as   needed. 2. Patient will completed upper body bathing and dressing with Mod I and adaptive equipment as needed. 3. Patient will complete grooming seated at EOB with SBA and adaptive equipment as needed. 4. Patient will complete toileting with Min A and adaptive equipment as needed. 5. Patient will tolerate 30 minutes of OT treatment with 1-2 rest breaks to increase activity tolerance for ADLs. 6. Patient will complete functional transfers with SBA and adaptive equipment as needed. Timeframe: 7 visits     OCCUPATIONAL THERAPY: Daily Note PM   OT Visit Days: 5   Time In/Out  OT Charge Capture  Rehab Caseload Tracker  OT Orders    Opal Dowell is a 69 y.o. male   PRIMARY DIAGNOSIS: Sepsis ([address])  Syncope and collapse [R55]  Dehydration [E86.0]  Elevated troponin [R77.8]  Sepsis ([address]) [A41.9]  Malignant neoplasm of esophagus, unspecified location ([address]) [C15.9]  Procedure(s) (LRB):  TRACHEOTOMY (N/A)  10 Days Post-Op  Inpatient: Payor: Dorothy Locket / Plan: MEDICARE PART A AND B / Product Type: *No Product type* /     ASSESSMENT:     REHAB RECOMMENDATIONS: CURRENT LEVEL OF FUNCTION:  (Most Recently Demonstrated)   Recommendation to date pending progress:  Setting:  Inpatient Rehab Facility     Equipment:    To Be Determined Bathing:  Not Tested  Dressing:  Not Tested for shoes and brace  Feeding/Grooming:  Not Tested  Toileting:  Not Tested  Functional Mobility:  Minimal Assist x2      ASSESSMENT:  Mr. Evy Reyna is doing fair today. Pt presents supine upon arrival. Pt noted to be a little weaker today. Pt was able to perform bed to chair transfer with min Ax2. Later attempted to perform functional mobility in hallway with crutches. Pt was only able to take a few steps and needed to sit.  Stated he would do [] [] [] [] [] [] [] [] [x]     Toileting [] [] [] [] [] [] [] [] [] [x]    Upper Body Dressing [] [] [] [] [] [] [] [] [] [x]    Lower Body Dressing [] [] [] [] [] [] [] [] [] [x]    Feeding [] [] [] [] [] [] [] [] [] [x]    Grooming [] [] [] [] [] [] [] [] [] [x]    Personal Device Care [] [] [] [] [] [] [] [] [] [x]    Functional Mobility [] [] [] [] [] [x] [] [] [] [] x2   I=Independent, Mod I=Modified Independent, S=Supervision/Setup, SBA=Standby Assistance, CGA=Contact Guard Assistance, Min=Minimal Assistance, Mod=Moderate Assistance, Max=Maximal Assistance, Total=Total Assistance, NT=Not Tested    BALANCE: Good Fair+ Fair Fair- Poor NT Comments   Sitting Static [x] [] [] [] [] []    Sitting Dynamic [] [x] [] [] [] []              Standing Static [] [] [] [x] [] []    Standing Dynamic [] [] [] [] [x] []        PLAN:     FREQUENCY/DURATION   OT Plan of Care: 3 times/week for duration of hospital stay or until stated goals are met, whichever comes first.    TREATMENT:     TREATMENT:   Co-Treatment PT/OT necessary due to patient's decreased overall endurance/tolerance levels, as well as need for high level skilled assistance to complete functional transfers/mobility and functional tasks  Neuromuscular Re-education (23 Minutes): Patient participated in neuromuscular re-education including functional reaching, weight shifting, postural training, midline training, standing tolerance activity , and sitting balance activity   with minimal assistance, verbal cues, and tactile cues to improve sitting balance, standing balance, posture, coordination, static balance, and dynamic balance in order to prepare for functional task, prepare for seated ADLs, prepare for standing ADLs, prepare for functional transfer, and increase safety awareness.      TREATMENT GRID:   Date:  9/15/23 Date:   Date:     Activity/Exercise Parameters Parameters Parameters   Shoulder Abd/Adduction 10 reps     Elbow Flexion  10 reps     Punches 10

## 2023-09-18 NOTE — PROGRESS NOTES
ACUTE PHYSICAL THERAPY GOALS:   (Developed with and agreed upon by patient and/or caregiver.)  Pt will perform supine to/from sit with mod I in 7 treatment days. Pt will perform sit to/from stand with min A and LRAD in 7 treatment days. Pt will ambulate 48' with min A and LRAD in 7 treatment days. Pt will negotiate 2 stairs with min A  and LRAD in 7 treatment days. Pt will be independent with HEP in 7 days. PHYSICAL THERAPY: Daily Note AM   (Link to Caseload Tracking: PT Visit Days : 6  Time In/Out PT Charge Capture  Rehab Caseload Tracker  Orders    Nai Colvin is a 69 y.o. male   PRIMARY DIAGNOSIS: Sepsis ([address])  Syncope and collapse [R55]  Dehydration [E86.0]  Elevated troponin [R77.8]  Sepsis ([address]) [A41.9]  Malignant neoplasm of esophagus, unspecified location ([address]) [C15.9]  Procedure(s) (LRB):  TRACHEOTOMY (N/A)  10 Days Post-Op  Inpatient: Payor: Sudha Fraser / Plan: MEDICARE PART A AND B / Product Type: *No Product type* /     ASSESSMENT:     REHAB RECOMMENDATIONS:   Recommendation to date pending progress:  Setting:  Inpatient Rehab Facility    Equipment:    To Be Determined     ASSESSMENT:  Mr. Dennis Ham was sitting at EOB with OT present, agreeable to therapy. Pt is somewhat impulsive throughout session but responds well to redirection, cueing for safety. Patient presents with O2 via trach collar, PEG, continuous pulse ox, pearson catheter, and IV. Of note, patient also has a history of polio with brace to LLE and crutches at bedside. Pt locks LLE brace into knee extension in preparation for sit to stand. Patient pulled on grab bar in room transferring to standing with Haim of 2, practiced this transfer x 4 today. He was also able to perform stand pivot transfers to chair x 2 with Haim of 2. Pt more fatigued today and requests to defer ambulation until tomorrow. Slow progress towards PT goals. Will continue PT Efforts.      SUBJECTIVE:   Mr. Dennis Ham states, \"let's try to walk tomorrow\"

## 2023-09-18 NOTE — PROGRESS NOTES
[facility] Hematology & Oncology        Inpatient Hematology / Oncology Progress Note      Interval history:  Afebrile, soft Bps. 5L to trach collar. S/p trach and PEG on 9/8  Tolerating chemo very well D4/4 5FU  Wife at the bedside    ROS:  Constitutional: Positive for weakness in legs (hx polio); negative for fever, chills, malaise, fatigue. CV: Negative for chest pain, palpitations, edema. Respiratory: Negative for dyspnea, cough, wheezing. GI: Negative for nausea, abdominal pain, diarrhea. 10 point review of systems is otherwise negative with the exception of the elements mentioned above in the HPI. 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 Followed with radiation treatment. DDD (degenerative disc disease), lumbar 12/05/2022    Dehydration 9/5/2023    Hearing difficulty of both ears     Hypercholesterolemia     Hypertension     pt. states only takes his BP med twice weekly due to decrease BP after weight loss. Polio     as a child, affected left leg/ wears brace    Tongue cancer ([address])      Past Surgical History:   Procedure Laterality Date    CATARACT REMOVAL Bilateral     [facility]OSCOPY      [facility]     HEENT      1/3rd tongue removed in 8/2010 and arterial graft from right wrist to tongue and second graft from right thigh to right wrist.    HEENT      precautionary trach placed in 8/2010 when having tongue removal surgery and closed one week later.     HERNIA REPAIR      IR GASTROSTOMY TUBE PLACEMENT PERCUTANEOUS  9/8/2023    IR GASTROSTOMY TUBE PLACEMENT PERCUTANEOUS 9/8/2023 [facility]    MOUTH SURGERY  08/03/2010    Partial glossectomy with tonsillectomy, neck dissection with a radial forearm free flat to left oral cavity fall and c/o dizziness/weakness. He was initially tachycardic but this was improved with IVF. He was scheduled for port, trach and PEG OP but will now arranging to be done inpatient. Oncology was asked to see patient as he is a patient of Dr. Akin Shoemaker with plans to start Carbo/5FU/Pembro ~10 days after trach/peg are placed. RECOMMENDATIONS:  Squamous cell CA of tongue  -Direct laryngoscopy by ENT with invasive moderately differentiated squamous cell  -Saw Dr. Akin Shoemaker OP 8/31 with plans for port placement, trach and peg. Will hopefully have this done OP to start carbo/5FU/pembro. 9/7 Port will have to be postponed since close proximity to trach site. 9/14 Dr Akin Shoemaker discussed with patient. Given need for rehab and overdue to start treatment, will proceed with carbo/5FU while IP. Will get Keytruda as OP. Discussed with Dr Flavio Rios and Dr Cynthia Parker. Ok to proceed with chemo from trach/ENT standpoint per Dr Sarita Berrios. Patient has completed course of abx for pneumonia. Will have PICC placed and proceed with chemotherapy tomorrow. 9/15 PICC placed. Proceed with C1 carbo/5FU (4 day infusion). Will get Mulugeta Santos as OP.  9/16 D2/4 5FU, tolerated carbo well yesterday  9/17 Continues to tolerate tx, D3/4 5FU  9/18 D4/4. Counts stable. Weakness & falls  -MRI brain ordered  9/7 Brain MRI completed and showed acut to early subacute lacunar infarct in the L cerebellar hemisphere. Hospitalist has consulted Neuorology & CTA head/neck. 81 ASA started. Overnight patient confused/agitated. 9/14 Has been seen by neurology - very small infarct. PT/OT recommending STR. On hold for above. Tracheostomy status / respiratory failure / rhinovirus  9/14 Completed 9/8 by Dr Flavio Rios. On 5L trach collar. Has completed course of abx for pneumonia. On solumedrol 40mg BID. Supportive care for rhinovirus. 9/15 On steroids with some leukocytosis. Discussing with pulmonology - ok for prednisone x5 days.    9/16 WBC 13.6 r/t

## 2023-09-18 NOTE — CARE COORDINATION
Chart screened by CM for d/c planning. Pt to complete C1 carbo/5FU today. PT/OT recommend IRC at d/c. This is on hold until completion of chemo. CM spoke with Cindy Monsalve at [facility] and she stated that PT/OT will have to see pt the day after completion of chemo to ensure he can withstand IRC. CM will continue to follow.   LOS = 13 days

## 2023-09-18 NOTE — PROGRESS NOTES
Patient with #6.0 cuffless trach. Secure clean and dry. Trach collar with 28% FiO2. Patient being monitored with continuous monitor plugged into call bell system.

## 2023-09-19 PROBLEM — J95.00 COMPLICATION OF TRACHEOSTOMY TUBE (HCC): Status: ACTIVE | Noted: 2023-09-19

## 2023-09-19 PROBLEM — C32.1 PRIMARY SQUAMOUS CELL CARCINOMA OF SUPRAGLOTTIS (HCC): Status: ACTIVE | Noted: 2023-09-19

## 2023-09-19 LAB
ALBUMIN SERPL-MCNC: 2.1 G/DL (ref 3.2–4.6)
ALBUMIN/GLOB SERPL: 0.6 (ref 0.4–1.6)
ALP SERPL-CCNC: 65 U/L (ref 50–136)
ALT SERPL-CCNC: 37 U/L (ref 12–65)
ANION GAP SERPL CALC-SCNC: 8 MMOL/L (ref 2–11)
AST SERPL-CCNC: 17 U/L (ref 15–37)
BASOPHILS # BLD: 0 K/UL (ref 0–0.2)
BASOPHILS NFR BLD: 0 % (ref 0–2)
BILIRUB SERPL-MCNC: 0.2 MG/DL (ref 0.2–1.1)
BUN SERPL-MCNC: 28 MG/DL (ref 8–23)
CALCIUM SERPL-MCNC: 8.2 MG/DL (ref 8.3–10.4)
CHLORIDE SERPL-SCNC: 102 MMOL/L (ref 101–110)
CO2 SERPL-SCNC: 26 MMOL/L (ref 21–32)
CREAT SERPL-MCNC: 0.6 MG/DL (ref 0.8–1.5)
DIFFERENTIAL METHOD BLD: ABNORMAL
EOSINOPHIL # BLD: 0 K/UL (ref 0–0.8)
EOSINOPHIL NFR BLD: 0 % (ref 0.5–7.8)
ERYTHROCYTE [DISTWIDTH] IN BLOOD BY AUTOMATED COUNT: 13.5 % (ref 11.9–14.6)
GLOBULIN SER CALC-MCNC: 3.4 G/DL (ref 2.8–4.5)
GLUCOSE BLD STRIP.AUTO-MCNC: 130 MG/DL (ref 65–100)
GLUCOSE BLD STRIP.AUTO-MCNC: 155 MG/DL (ref 65–100)
GLUCOSE BLD STRIP.AUTO-MCNC: 175 MG/DL (ref 65–100)
GLUCOSE BLD STRIP.AUTO-MCNC: 191 MG/DL (ref 65–100)
GLUCOSE SERPL-MCNC: 157 MG/DL (ref 65–100)
HCT VFR BLD AUTO: 32.3 % (ref 41.1–50.3)
HGB BLD-MCNC: 10.7 G/DL (ref 13.6–17.2)
IMM GRANULOCYTES # BLD AUTO: 0 K/UL (ref 0–0.5)
IMM GRANULOCYTES NFR BLD AUTO: 0 % (ref 0–5)
LYMPHOCYTES # BLD: 0.9 K/UL (ref 0.5–4.6)
LYMPHOCYTES NFR BLD: 9 % (ref 13–44)
MAGNESIUM SERPL-MCNC: 2.2 MG/DL (ref 1.8–2.4)
MCH RBC QN AUTO: 28.6 PG (ref 26.1–32.9)
MCHC RBC AUTO-ENTMCNC: 33.1 G/DL (ref 31.4–35)
MCV RBC AUTO: 86.4 FL (ref 82–102)
MONOCYTES # BLD: 0.3 K/UL (ref 0.1–1.3)
MONOCYTES NFR BLD: 3 % (ref 4–12)
NEUTS SEG # BLD: 8.4 K/UL (ref 1.7–8.2)
NEUTS SEG NFR BLD: 88 % (ref 43–78)
NRBC # BLD: 0 K/UL (ref 0–0.2)
PLATELET # BLD AUTO: 309 K/UL (ref 150–450)
PMV BLD AUTO: 9.1 FL (ref 9.4–12.3)
POTASSIUM SERPL-SCNC: 3.5 MMOL/L (ref 3.5–5.1)
PROT SERPL-MCNC: 5.5 G/DL (ref 6.3–8.2)
RBC # BLD AUTO: 3.74 M/UL (ref 4.23–5.6)
SERVICE CMNT-IMP: ABNORMAL
SODIUM SERPL-SCNC: 136 MMOL/L (ref 133–143)
WBC # BLD AUTO: 9.6 K/UL (ref 4.3–11.1)

## 2023-09-19 PROCEDURE — 6360000002 HC RX W HCPCS: Performed by: FAMILY MEDICINE

## 2023-09-19 PROCEDURE — 6370000000 HC RX 637 (ALT 250 FOR IP): Performed by: NURSE PRACTITIONER

## 2023-09-19 PROCEDURE — 31502 CHANGE OF WINDPIPE AIRWAY: CPT

## 2023-09-19 PROCEDURE — 36592 COLLECT BLOOD FROM PICC: CPT

## 2023-09-19 PROCEDURE — 99232 SBSQ HOSP IP/OBS MODERATE 35: CPT | Performed by: INTERNAL MEDICINE

## 2023-09-19 PROCEDURE — 6370000000 HC RX 637 (ALT 250 FOR IP)

## 2023-09-19 PROCEDURE — 82962 GLUCOSE BLOOD TEST: CPT

## 2023-09-19 PROCEDURE — A4216 STERILE WATER/SALINE, 10 ML: HCPCS | Performed by: NURSE PRACTITIONER

## 2023-09-19 PROCEDURE — 97530 THERAPEUTIC ACTIVITIES: CPT

## 2023-09-19 PROCEDURE — 85025 COMPLETE CBC W/AUTO DIFF WBC: CPT

## 2023-09-19 PROCEDURE — 94640 AIRWAY INHALATION TREATMENT: CPT

## 2023-09-19 PROCEDURE — 6360000002 HC RX W HCPCS: Performed by: INTERNAL MEDICINE

## 2023-09-19 PROCEDURE — 6370000000 HC RX 637 (ALT 250 FOR IP): Performed by: INTERNAL MEDICINE

## 2023-09-19 PROCEDURE — 2580000003 HC RX 258

## 2023-09-19 PROCEDURE — 6370000000 HC RX 637 (ALT 250 FOR IP): Performed by: STUDENT IN AN ORGANIZED HEALTH CARE EDUCATION/TRAINING PROGRAM

## 2023-09-19 PROCEDURE — 2580000003 HC RX 258: Performed by: NURSE PRACTITIONER

## 2023-09-19 PROCEDURE — 2580000003 HC RX 258: Performed by: INTERNAL MEDICINE

## 2023-09-19 PROCEDURE — 1100000000 HC RM PRIVATE

## 2023-09-19 PROCEDURE — 31615 TRCHEOBRNCHSC EST TRACHS INC: CPT | Performed by: STUDENT IN AN ORGANIZED HEALTH CARE EDUCATION/TRAINING PROGRAM

## 2023-09-19 PROCEDURE — 6370000000 HC RX 637 (ALT 250 FOR IP): Performed by: FAMILY MEDICINE

## 2023-09-19 PROCEDURE — C9113 INJ PANTOPRAZOLE SODIUM, VIA: HCPCS | Performed by: FAMILY MEDICINE

## 2023-09-19 PROCEDURE — 2700000000 HC OXYGEN THERAPY PER DAY

## 2023-09-19 PROCEDURE — 99232 SBSQ HOSP IP/OBS MODERATE 35: CPT | Performed by: STUDENT IN AN ORGANIZED HEALTH CARE EDUCATION/TRAINING PROGRAM

## 2023-09-19 PROCEDURE — 97112 NEUROMUSCULAR REEDUCATION: CPT

## 2023-09-19 PROCEDURE — APPSS45 APP SPLIT SHARED TIME 31-45 MINUTES

## 2023-09-19 PROCEDURE — 83735 ASSAY OF MAGNESIUM: CPT

## 2023-09-19 PROCEDURE — 2580000003 HC RX 258: Performed by: FAMILY MEDICINE

## 2023-09-19 PROCEDURE — 80053 COMPREHEN METABOLIC PANEL: CPT

## 2023-09-19 PROCEDURE — 97164 PT RE-EVAL EST PLAN CARE: CPT

## 2023-09-19 PROCEDURE — A4216 STERILE WATER/SALINE, 10 ML: HCPCS | Performed by: FAMILY MEDICINE

## 2023-09-19 RX ORDER — 0.9 % SODIUM CHLORIDE 0.9 %
500 INTRAVENOUS SOLUTION INTRAVENOUS ONCE
Status: COMPLETED | OUTPATIENT
Start: 2023-09-19 | End: 2023-09-19

## 2023-09-19 RX ADMIN — FERROUS GLUCONATE 324 MG: 324 TABLET ORAL at 08:33

## 2023-09-19 RX ADMIN — TRAZODONE HYDROCHLORIDE 50 MG: 50 TABLET ORAL at 19:42

## 2023-09-19 RX ADMIN — PREDNISONE 40 MG: 20 TABLET ORAL at 08:33

## 2023-09-19 RX ADMIN — Medication 4 ML: at 07:38

## 2023-09-19 RX ADMIN — ALBUTEROL SULFATE 2.5 MG: 2.5 SOLUTION RESPIRATORY (INHALATION) at 07:38

## 2023-09-19 RX ADMIN — ALBUTEROL SULFATE 2.5 MG: 2.5 SOLUTION RESPIRATORY (INHALATION) at 14:50

## 2023-09-19 RX ADMIN — Medication 4 ML: at 19:58

## 2023-09-19 RX ADMIN — Medication 250 MG: at 08:33

## 2023-09-19 RX ADMIN — ASPIRIN 81 MG 81 MG: 81 TABLET ORAL at 08:33

## 2023-09-19 RX ADMIN — SODIUM CHLORIDE 500 ML: 9 INJECTION, SOLUTION INTRAVENOUS at 11:57

## 2023-09-19 RX ADMIN — ALBUTEROL SULFATE 2.5 MG: 2.5 SOLUTION RESPIRATORY (INHALATION) at 03:11

## 2023-09-19 RX ADMIN — ALBUTEROL SULFATE 2.5 MG: 2.5 SOLUTION RESPIRATORY (INHALATION) at 19:58

## 2023-09-19 RX ADMIN — ROSUVASTATIN CALCIUM 20 MG: 20 TABLET, FILM COATED ORAL at 19:42

## 2023-09-19 RX ADMIN — MIRTAZAPINE 7.5 MG: 15 TABLET, FILM COATED ORAL at 19:42

## 2023-09-19 RX ADMIN — SODIUM CHLORIDE, PRESERVATIVE FREE 10 ML: 5 INJECTION INTRAVENOUS at 19:43

## 2023-09-19 RX ADMIN — SODIUM CHLORIDE, PRESERVATIVE FREE 10 ML: 5 INJECTION INTRAVENOUS at 08:40

## 2023-09-19 RX ADMIN — SODIUM CHLORIDE, PRESERVATIVE FREE 40 MG: 5 INJECTION INTRAVENOUS at 08:37

## 2023-09-19 RX ADMIN — ENOXAPARIN SODIUM 40 MG: 100 INJECTION SUBCUTANEOUS at 08:41

## 2023-09-19 ASSESSMENT — PAIN SCALES - GENERAL
PAINLEVEL_OUTOF10: 0

## 2023-09-19 ASSESSMENT — ENCOUNTER SYMPTOMS
NAUSEA: 0
DIARRHEA: 0
COUGH: 0
FACIAL SWELLING: 0
WHEEZING: 0
SINUS PRESSURE: 0
STRIDOR: 0
APNEA: 0
CHOKING: 0
SINUS PAIN: 0
SHORTNESS OF BREATH: 0
EYE DISCHARGE: 0
EYE ITCHING: 0
CONSTIPATION: 0
EYE PAIN: 0

## 2023-09-19 NOTE — PROGRESS NOTES
She    [facility] Hematology & Oncology        Inpatient Hematology / Oncology Progress Note      Interval history:  Afebrile, soft Bps. 5L to trach collar. Hypotension responds to 500cc bolus. S/p trach and PEG on 9/8; inner cannula not able to pass today. ENT to see. Completing chemo today. Tolerating well. Wife at the bedside    ROS:  Constitutional: Positive for weakness in legs (hx polio); negative for fever, chills, malaise, fatigue. CV: Negative for chest pain, palpitations, edema. Respiratory: Negative for dyspnea, cough, wheezing. GI: Negative for nausea, abdominal pain, diarrhea. 10 point review of systems is otherwise negative with the exception of the elements mentioned above in the HPI. 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 Followed with radiation treatment. DDD (degenerative disc disease), lumbar 12/05/2022    Dehydration 9/5/2023    Hearing difficulty of both ears     Hypercholesterolemia     Hypertension     pt. states only takes his BP med twice weekly due to decrease BP after weight loss. Polio     as a child, affected left leg/ wears brace    Tongue cancer ([address])      Past Surgical History:   Procedure Laterality Date    CATARACT REMOVAL Bilateral     COLONOSCOPY      [facility]     HEENT      1/3rd tongue removed in 8/2010 and arterial graft from right wrist to tongue and second graft from right thigh to right wrist.    HEENT      precautionary trach placed in 8/2010 when having tongue removal surgery and closed one week later.     HERNIA REPAIR      IR GASTROSTOMY TUBE PLACEMENT PERCUTANEOUS  9/8/2023    IR GASTROSTOMY TUBE PLACEMENT PERCUTANEOUS 9/8/2023 [facility]    MOUTH SURGERY  08/03/2010    Partial see patient. Patient denies SOB. Low appetite/Dysphagia  -Will order Remeron. 9/7 Trach/PEG tomorrow. 9/14 s/p PEG. On continuous TF - at goal of 60 ml/hr. 9/16 continues to tolerate TH at 60 ml/hr    Anemia  9/7 Tsat 20; will start PO Iron/Vitamin C. Hypotension  9/19 Hypotension responded to 500cc bolus. Goals and plan of care reviewed with the patient. All questions answered to the best of our ability. Lab studies and imaging studies were personally reviewed. Disposition: Referral to [facility] made 9/11 per  note. PACO [facility] Hematology and Oncology  [facility], [address]  Office : [phone]  Fax : [phone]    I personally saw, exammed and counselled the patient, and discussed with NP, agree with above history/assessment/plan. Exam: No acute distress, normal breathing effort and breath sounds, regular heart rate and heart sound, benign abd, normal ROM of limbs. 69 y. o.male history of tongue squamous cell carcinoma in 2010 status post surgery and radiation not developed relapse, we will plan treatment he was admitted for dizziness and fall, MRI reported brain infarct, neurological care ongoing pending rehab, started 1 cycle of carboplatin 5-FU in-house, completed chemo without complication, hypotension likely due to hypovolemia and adjust tube feeding, check to be evaluated by ENT pending, will follow. Thony Dominguez M.D.   [facility], [address]  Office : [phone]  Fax : [phone]

## 2023-09-19 NOTE — PROGRESS NOTES
RT unable to pass suction catheter. Lorre Argue changed, still unable to pass catheter. RN and team aware.

## 2023-09-19 NOTE — PROGRESS NOTES
ACUTE OCCUPATIONAL THERAPY GOALS:   (Developed with and agreed upon by patient and/or caregiver.)  1. Patient will complete lower body bathing and dressing with Min A and adaptive equipment as   needed. 2. Patient will completed upper body bathing and dressing with Mod I and adaptive equipment as needed. 3. Patient will complete grooming seated at EOB with SBA and adaptive equipment as needed. 4. Patient will complete toileting with Min A and adaptive equipment as needed. 5. Patient will tolerate 30 minutes of OT treatment with 1-2 rest breaks to increase activity tolerance for ADLs. 6. Patient will complete functional transfers with SBA and adaptive equipment as needed. Timeframe: 7 visits     OCCUPATIONAL THERAPY: Daily Note PM   OT Visit Days: 6   Time In/Out  OT Charge Capture  Rehab Caseload Tracker  OT Orders    Melani Kemp is a 69 y.o. male   PRIMARY DIAGNOSIS: Sepsis ([address])  Syncope and collapse [R55]  Dehydration [E86.0]  Elevated troponin [R77.8]  Sepsis ([address]) [A41.9]  Malignant neoplasm of esophagus, unspecified location ([address]) [C15.9]  Procedure(s) (LRB):  TRACHEOTOMY (N/A)  11 Days Post-Op  Inpatient: Payor: Alexandrea Dears / Plan: MEDICARE PART A AND B / Product Type: *No Product type* /     ASSESSMENT:     REHAB RECOMMENDATIONS: CURRENT LEVEL OF FUNCTION:  (Most Recently Demonstrated)   Recommendation to date pending progress:  Setting:  Inpatient Rehab Facility     Equipment:    To Be Determined Bathing:  Not Tested  Dressing:  Not Tested   Feeding/Grooming:  Not Tested  Toileting:  Not Tested  Functional Mobility:  Moderate Assist/Max A x2      ASSESSMENT:  Mr. Blanca Owens is doing fair today. Pt presents supine upon arrival. Pt continue to be very motivated to get back on his feet. Pt does well with bed mobility. Fair sitting balance noted at edge of bed. Pt was able to perform bed to chair transfer with mod/max Ax2.  Pt took out in the hallway to use railing to assist with standing. Had his 3 wheel rollator present today for mobility. Pt did complete a lot of sit to stand transfers but could not get steps in due to being unsteady/weak/anxious. Returned to room and back to supine. Max A x2 for chair to bed transfer. Pt would benefit from intense therapy and the equipment they may have at [facility]. Will continue to benefit from skilled OT during stay.        SUBJECTIVE:     Mr. Noemi Brennan states, \"One more time\"     Social/Functional Lives With: Spouse  Type of Home: House  Home Layout: One level  Home Access: Stairs to enter with rails  Entrance Stairs - Number of Steps: 2  Home Equipment: [employer]  Has the patient had two or more falls in the past year or any fall with injury in the past year?: Yes  Receives Help From: Family  ADL Assistance: Independent  Ambulation Assistance: Independent  Transfer Assistance: Independent  Active : No  Occupation: Part time employment    OBJECTIVE:     JOSE / Bill Caller / AIRWAY: PEG    RESTRICTIONS/PRECAUTIONS:  Restrictions/Precautions  Restrictions/Precautions: Fall Risk        PAIN: VITALS / O2:   Pre Treatment:    0        Post Treatment: None Vitals          Oxygen   Stable on 6L via T-piece     MOBILITY: I Mod I S SBA CGA Min Mod Max Total  NT x2 Comments:   Bed Mobility    Rolling [] [] [] [] [] [] [] [] [] [x] []    Supine to Sit [] [] [] [x] [] [] [] [] [] [] []    Scooting [] [] [] [x] [] [] [] [] [] [] []    Sit to Supine [] [] [] [] [] [x] [x] [] [] [] []    Transfers    Sit to Stand [] [] [] [] [] [] [x] [x] [] [] [x]    Bed to Chair [] [] [] [] [] [] [x] [x] [] [] [x]    Stand to Sit [] [] [] [] [x] [] [] [] [] [] [x]    Tub/Shower [] [] [] [] [] [] [] [] [] [x] []     Toilet [] [] [] [] [] [] [] [] [] [x] []      [] [] [] [] [] [] [] [] [] [] []    I=Independent, Mod I=Modified Independent, S=Supervision/Setup, SBA=Standby Assistance, CGA=Contact Guard Assistance, Min=Minimal Assistance, Mod=Moderate Assistance,

## 2023-09-19 NOTE — PLAN OF CARE
Patient has remained A&O x 4. Patient denies pain, N/V/D.     -BP 82/56 with 1100 VS, pt up to 110/76 after 500 cc bolus  -respiratory having trouble suctioning(hitting a wall/mass) evn after exchanging trach. ENT made aware and came to bedside and adjusted trach as it was not in proper position. -PICC line dressing changed  -sat up in chair 30-45 mins after working with PT  -Kumar Camper reapplied to open area on lower back/sacrum and red area on mid back.   -1 soft medium BM    Patient rounded on hourly by myself or patient assistant and encouraged to call with any needs. No signs of distress noted. Bed low, locked, call bell within reach. BSSR given to [address] [place] [address]  Zoey Jay RN    Problem: Discharge Planning  Goal: Discharge to home or other facility with appropriate resources  Outcome: Progressing     Problem: Pain  Goal: Verbalizes/displays adequate comfort level or baseline comfort level  Outcome: Progressing     Problem: ABCDS Injury Assessment  Goal: Absence of physical injury  Outcome: Progressing     Problem: Safety - Adult  Goal: Free from fall injury  Outcome: Progressing     Problem: Skin/Tissue Integrity  Goal: Absence of new skin breakdown  Description: 1. Monitor for areas of redness and/or skin breakdown  2. Assess vascular access sites hourly  3. Every 4-6 hours minimum:  Change oxygen saturation probe site  4. Every 4-6 hours:  If on nasal continuous positive airway pressure, respiratory therapy assess nares and determine need for appliance change or resting period.   Outcome: Progressing     Problem: Skin/Tissue Integrity - Adult  Goal: Skin integrity remains intact  Outcome: Progressing     Problem: Gastrointestinal - Adult  Goal: Minimal or absence of nausea and vomiting  Outcome: Progressing     Problem: Infection - Adult  Goal: Absence of infection during hospitalization  Outcome: Progressing  Goal: Absence of fever/infection during anticipated neutropenic period  Outcome: Progressing

## 2023-09-19 NOTE — PROGRESS NOTES
ACUTE PHYSICAL THERAPY GOALS:   (Developed with and agreed upon by patient and/or caregiver.) Assessed 9/19/23    Pt will perform supine to/from sit with mod I in 7 treatment days. Pt will perform sit to/from stand with min A and LRAD in 7 treatment days. Pt will ambulate 48' with min A and LRAD in 7 treatment days. Pt will be independent with HEP in 7 days. New Goal:  Pt will perform standing static and dynamic balance activities x 10 mins with Haim to improve safety in 7 treatment days    PHYSICAL THERAPY Daily Note, Re-evaluation, and PM  (Link to Caseload Tracking: PT Visit Days : 1  Acknowledge Orders  Time In/Out  PT Charge Capture  Rehab Caseload Tracker    Veto Choi is a 69 y.o. male   PRIMARY DIAGNOSIS: Sepsis ([address])  Syncope and collapse [R55]  Dehydration [E86.0]  Elevated troponin [R77.8]  Sepsis ([address]) [A41.9]  Malignant neoplasm of esophagus, unspecified location ([address]) [C15.9]  Procedure(s) (LRB):  TRACHEOTOMY (N/A)  11 Days Post-Op  Reason for Referral: Generalized Muscle Weakness (M62.81)  Other lack of cordination (R27.8)  Difficulty in walking, Not elsewhere classified (R26.2)  Other abnormalities of gait and mobility (R26.89)  History of falling (Z91.81)  Inpatient: Payor: MEDICARE / Plan: MEDICARE PART A AND B / Product Type: *No Product type* /     ASSESSMENT:     REHAB RECOMMENDATIONS:   Recommendation to date pending progress:  Setting:  Inpatient Rehab Facility    Equipment:     Manual WC     ASSESSMENT:  Mr. Brenda Garcia is a 69 y.o. male with hx of polio and residual LLE weakness admitted with sepsis and progressive weakness. Seen for PT re-evaluation today due to seventh visit. At baseline, pt is mod I for all mobility with crutches and KAFO. Pt was able to perform bed mobility today with SBA. Sit to stand transfers practiced x 5 and squat pivot transfers practiced x 2- pt required mod-maxA of 2 today.  Unable to take any steps today due to weakness as well as

## 2023-09-19 NOTE — CARE COORDINATION
CM met with PT/OT to discuss pt's progress. He completed chemo today. Pt is weak but extremely motivated to work with therapy. He very much wants to d/c to [facility]. TITI spoke with Treva Vernon who stated she will have Dr. Grijalva Silence review pt's records and speak with pt either this afternoon or in the morning. CM gave pt's spouse a brochure of [facility] and explained that if [facility] declines pt this would be an excellent back up plan. CM sent the referral to [facility] for review. CM will continue to follow.   LOS = 14 days

## 2023-09-19 NOTE — PROGRESS NOTES
END OF SHIFT SUMMARY:    Significant vitals this shift:  low BP  Additional events this shift:   -Pt BP 86/55 Nash NP notified, 500 mL bolus ordered  -Post bolus /70    I/Os:  09/18 1901 - 09/19 0700  In: 1937   Out: [address],

## 2023-09-20 ENCOUNTER — HOSPITAL ENCOUNTER (INPATIENT)
Age: 78
LOS: 9 days | Discharge: HOME | DRG: 065 | End: 2023-09-29
Attending: PHYSICAL MEDICINE & REHABILITATION | Admitting: PHYSICAL MEDICINE & REHABILITATION
Payer: MEDICARE

## 2023-09-20 VITALS
HEIGHT: 71 IN | HEART RATE: 106 BPM | OXYGEN SATURATION: 98 % | RESPIRATION RATE: 18 BRPM | WEIGHT: 151 LBS | DIASTOLIC BLOOD PRESSURE: 67 MMHG | TEMPERATURE: 97.7 F | SYSTOLIC BLOOD PRESSURE: 92 MMHG | BODY MASS INDEX: 21.14 KG/M2

## 2023-09-20 DIAGNOSIS — R05.9 COUGH, UNSPECIFIED TYPE: ICD-10-CM

## 2023-09-20 DIAGNOSIS — C10.9 SQUAMOUS CELL CARCINOMA OF OROPHARYNX (HCC): ICD-10-CM

## 2023-09-20 LAB
ALBUMIN SERPL-MCNC: 2 G/DL (ref 3.2–4.6)
ALBUMIN/GLOB SERPL: 0.5 (ref 0.4–1.6)
ALP SERPL-CCNC: 62 U/L (ref 50–136)
ALT SERPL-CCNC: 33 U/L (ref 12–65)
ANION GAP SERPL CALC-SCNC: 9 MMOL/L (ref 2–11)
AST SERPL-CCNC: 15 U/L (ref 15–37)
BASOPHILS # BLD: 0 K/UL (ref 0–0.2)
BASOPHILS NFR BLD: 0 % (ref 0–2)
BILIRUB SERPL-MCNC: 0.3 MG/DL (ref 0.2–1.1)
BUN SERPL-MCNC: 26 MG/DL (ref 8–23)
CALCIUM SERPL-MCNC: 7.9 MG/DL (ref 8.3–10.4)
CHLORIDE SERPL-SCNC: 100 MMOL/L (ref 101–110)
CO2 SERPL-SCNC: 24 MMOL/L (ref 21–32)
CREAT SERPL-MCNC: 0.7 MG/DL (ref 0.8–1.5)
DIFFERENTIAL METHOD BLD: ABNORMAL
EKG ATRIAL RATE: 90 BPM
EKG DIAGNOSIS: NORMAL
EKG P AXIS: 83 DEGREES
EKG P-R INTERVAL: 164 MS
EKG Q-T INTERVAL: 322 MS
EKG QRS DURATION: 70 MS
EKG QTC CALCULATION (BAZETT): 393 MS
EKG R AXIS: 18 DEGREES
EKG T AXIS: 66 DEGREES
EKG VENTRICULAR RATE: 90 BPM
EOSINOPHIL # BLD: 0 K/UL (ref 0–0.8)
EOSINOPHIL NFR BLD: 0 % (ref 0.5–7.8)
ERYTHROCYTE [DISTWIDTH] IN BLOOD BY AUTOMATED COUNT: 13.5 % (ref 11.9–14.6)
GLOBULIN SER CALC-MCNC: 4 G/DL (ref 2.8–4.5)
GLUCOSE BLD STRIP.AUTO-MCNC: 122 MG/DL (ref 65–100)
GLUCOSE BLD STRIP.AUTO-MCNC: 147 MG/DL (ref 65–100)
GLUCOSE BLD STRIP.AUTO-MCNC: 151 MG/DL (ref 65–100)
GLUCOSE SERPL-MCNC: 136 MG/DL (ref 65–100)
HCT VFR BLD AUTO: 32.3 % (ref 41.1–50.3)
HGB BLD-MCNC: 10.9 G/DL (ref 13.6–17.2)
IMM GRANULOCYTES # BLD AUTO: 0.1 K/UL (ref 0–0.5)
IMM GRANULOCYTES NFR BLD AUTO: 1 % (ref 0–5)
LYMPHOCYTES # BLD: 1.2 K/UL (ref 0.5–4.6)
LYMPHOCYTES NFR BLD: 11 % (ref 13–44)
MAGNESIUM SERPL-MCNC: 2.2 MG/DL (ref 1.8–2.4)
MCH RBC QN AUTO: 29 PG (ref 26.1–32.9)
MCHC RBC AUTO-ENTMCNC: 33.7 G/DL (ref 31.4–35)
MCV RBC AUTO: 85.9 FL (ref 82–102)
MONOCYTES # BLD: 0.2 K/UL (ref 0.1–1.3)
MONOCYTES NFR BLD: 2 % (ref 4–12)
NEUTS SEG # BLD: 9.6 K/UL (ref 1.7–8.2)
NEUTS SEG NFR BLD: 86 % (ref 43–78)
NRBC # BLD: 0 K/UL (ref 0–0.2)
PHOSPHATE SERPL-MCNC: 3.4 MG/DL (ref 2.3–3.7)
PLATELET # BLD AUTO: 307 K/UL (ref 150–450)
PMV BLD AUTO: 9.4 FL (ref 9.4–12.3)
POTASSIUM SERPL-SCNC: 3.3 MMOL/L (ref 3.5–5.1)
PROT SERPL-MCNC: 6 G/DL (ref 6.3–8.2)
RBC # BLD AUTO: 3.76 M/UL (ref 4.23–5.6)
SERVICE CMNT-IMP: ABNORMAL
SODIUM SERPL-SCNC: 133 MMOL/L (ref 133–143)
WBC # BLD AUTO: 11.1 K/UL (ref 4.3–11.1)

## 2023-09-20 PROCEDURE — 83735 ASSAY OF MAGNESIUM: CPT

## 2023-09-20 PROCEDURE — 2580000003 HC RX 258: Performed by: FAMILY MEDICINE

## 2023-09-20 PROCEDURE — 6370000000 HC RX 637 (ALT 250 FOR IP): Performed by: INTERNAL MEDICINE

## 2023-09-20 PROCEDURE — 93005 ELECTROCARDIOGRAM TRACING: CPT

## 2023-09-20 PROCEDURE — 93010 ELECTROCARDIOGRAM REPORT: CPT | Performed by: INTERNAL MEDICINE

## 2023-09-20 PROCEDURE — 2580000003 HC RX 258: Performed by: NURSE PRACTITIONER

## 2023-09-20 PROCEDURE — C9113 INJ PANTOPRAZOLE SODIUM, VIA: HCPCS | Performed by: FAMILY MEDICINE

## 2023-09-20 PROCEDURE — 82962 GLUCOSE BLOOD TEST: CPT

## 2023-09-20 PROCEDURE — 1180000000 HC REHAB R&B

## 2023-09-20 PROCEDURE — 6360000002 HC RX W HCPCS: Performed by: INTERNAL MEDICINE

## 2023-09-20 PROCEDURE — 92526 ORAL FUNCTION THERAPY: CPT

## 2023-09-20 PROCEDURE — APPSS45 APP SPLIT SHARED TIME 31-45 MINUTES

## 2023-09-20 PROCEDURE — 6370000000 HC RX 637 (ALT 250 FOR IP): Performed by: PHYSICAL MEDICINE & REHABILITATION

## 2023-09-20 PROCEDURE — 94761 N-INVAS EAR/PLS OXIMETRY MLT: CPT

## 2023-09-20 PROCEDURE — 84100 ASSAY OF PHOSPHORUS: CPT

## 2023-09-20 PROCEDURE — 36592 COLLECT BLOOD FROM PICC: CPT

## 2023-09-20 PROCEDURE — A4216 STERILE WATER/SALINE, 10 ML: HCPCS | Performed by: FAMILY MEDICINE

## 2023-09-20 PROCEDURE — 0B21XFZ CHANGE TRACHEOSTOMY DEVICE IN TRACHEA, EXTERNAL APPROACH: ICD-10-PCS | Performed by: STUDENT IN AN ORGANIZED HEALTH CARE EDUCATION/TRAINING PROGRAM

## 2023-09-20 PROCEDURE — 94640 AIRWAY INHALATION TREATMENT: CPT

## 2023-09-20 PROCEDURE — 80053 COMPREHEN METABOLIC PANEL: CPT

## 2023-09-20 PROCEDURE — 92507 TX SP LANG VOICE COMM INDIV: CPT

## 2023-09-20 PROCEDURE — 2580000003 HC RX 258: Performed by: PHYSICAL MEDICINE & REHABILITATION

## 2023-09-20 PROCEDURE — 2580000003 HC RX 258: Performed by: INTERNAL MEDICINE

## 2023-09-20 PROCEDURE — 85025 COMPLETE CBC W/AUTO DIFF WBC: CPT

## 2023-09-20 PROCEDURE — 6370000000 HC RX 637 (ALT 250 FOR IP): Performed by: NURSE PRACTITIONER

## 2023-09-20 PROCEDURE — 6360000002 HC RX W HCPCS: Performed by: PHYSICAL MEDICINE & REHABILITATION

## 2023-09-20 PROCEDURE — 6360000002 HC RX W HCPCS: Performed by: FAMILY MEDICINE

## 2023-09-20 PROCEDURE — 0BP1XFZ REMOVAL OF TRACHEOSTOMY DEVICE FROM TRACHEA, EXTERNAL APPROACH: ICD-10-PCS | Performed by: STUDENT IN AN ORGANIZED HEALTH CARE EDUCATION/TRAINING PROGRAM

## 2023-09-20 PROCEDURE — 2700000000 HC OXYGEN THERAPY PER DAY

## 2023-09-20 PROCEDURE — 2580000003 HC RX 258

## 2023-09-20 PROCEDURE — 6370000000 HC RX 637 (ALT 250 FOR IP)

## 2023-09-20 PROCEDURE — 99239 HOSP IP/OBS DSCHRG MGMT >30: CPT | Performed by: INTERNAL MEDICINE

## 2023-09-20 RX ORDER — ACETAMINOPHEN 650 MG/1
650 SUPPOSITORY RECTAL EVERY 6 HOURS PRN
Status: CANCELLED | OUTPATIENT
Start: 2023-09-20

## 2023-09-20 RX ORDER — SODIUM CHLORIDE 1 G/1
1 TABLET ORAL
Qty: 90 TABLET | Refills: 3 | Status: ON HOLD | OUTPATIENT
Start: 2023-09-20 | End: 2023-09-28 | Stop reason: HOSPADM

## 2023-09-20 RX ORDER — SODIUM CHLORIDE FOR INHALATION 3 %
4 VIAL, NEBULIZER (ML) INHALATION PRN
Qty: 4 ML | Refills: 0 | Status: ON HOLD | OUTPATIENT
Start: 2023-09-20 | End: 2023-09-28 | Stop reason: HOSPADM

## 2023-09-20 RX ORDER — POTASSIUM CHLORIDE 7.45 MG/ML
10 INJECTION INTRAVENOUS PRN
Status: CANCELLED | OUTPATIENT
Start: 2023-09-20

## 2023-09-20 RX ORDER — INSULIN LISPRO 100 [IU]/ML
0-4 INJECTION, SOLUTION INTRAVENOUS; SUBCUTANEOUS NIGHTLY
Status: CANCELLED | OUTPATIENT
Start: 2023-09-20

## 2023-09-20 RX ORDER — SODIUM CHLORIDE FOR INHALATION 3 %
4 VIAL, NEBULIZER (ML) INHALATION
Qty: 240 ML | Refills: 0 | Status: ON HOLD | OUTPATIENT
Start: 2023-09-20 | End: 2023-09-21

## 2023-09-20 RX ORDER — SCOLOPAMINE TRANSDERMAL SYSTEM 1 MG/1
1 PATCH, EXTENDED RELEASE TRANSDERMAL
Status: CANCELLED | OUTPATIENT
Start: 2023-09-22

## 2023-09-20 RX ORDER — 0.9 % SODIUM CHLORIDE 0.9 %
500 INTRAVENOUS SOLUTION INTRAVENOUS ONCE
Status: COMPLETED | OUTPATIENT
Start: 2023-09-20 | End: 2023-09-20

## 2023-09-20 RX ORDER — SODIUM CHLORIDE 1 G/1
1 TABLET ORAL
Status: DISCONTINUED | OUTPATIENT
Start: 2023-09-20 | End: 2023-09-20 | Stop reason: HOSPADM

## 2023-09-20 RX ORDER — POTASSIUM CHLORIDE 20 MEQ/1
40 TABLET, EXTENDED RELEASE ORAL PRN
Status: CANCELLED | OUTPATIENT
Start: 2023-09-20

## 2023-09-20 RX ORDER — SODIUM CHLORIDE FOR INHALATION 3 %
4 VIAL, NEBULIZER (ML) INHALATION
Status: CANCELLED | OUTPATIENT
Start: 2023-09-20

## 2023-09-20 RX ORDER — TRAMADOL HYDROCHLORIDE 50 MG/1
50 TABLET ORAL EVERY 6 HOURS PRN
Qty: 20 TABLET | Refills: 0 | Status: ON HOLD | OUTPATIENT
Start: 2023-09-20 | End: 2023-09-28 | Stop reason: HOSPADM

## 2023-09-20 RX ORDER — MIRTAZAPINE 7.5 MG/1
7.5 TABLET, FILM COATED ORAL NIGHTLY
Qty: 30 TABLET | Refills: 3 | Status: SHIPPED | OUTPATIENT
Start: 2023-09-20

## 2023-09-20 RX ORDER — ASPIRIN 81 MG/1
81 TABLET, CHEWABLE ORAL DAILY
Status: CANCELLED | OUTPATIENT
Start: 2023-09-21

## 2023-09-20 RX ORDER — SODIUM CHLORIDE 0.9 % (FLUSH) 0.9 %
5-40 SYRINGE (ML) INJECTION EVERY 12 HOURS SCHEDULED
Status: DISCONTINUED | OUTPATIENT
Start: 2023-09-20 | End: 2023-09-26

## 2023-09-20 RX ORDER — MULTIVIT WITH MINERALS/LUTEIN
250 TABLET ORAL
Status: CANCELLED | OUTPATIENT
Start: 2023-09-21

## 2023-09-20 RX ORDER — POLYETHYLENE GLYCOL 3350 17 G/17G
17 POWDER, FOR SOLUTION ORAL DAILY PRN
Status: DISCONTINUED | OUTPATIENT
Start: 2023-09-20 | End: 2023-09-28

## 2023-09-20 RX ORDER — LOPERAMIDE HYDROCHLORIDE 2 MG/1
2 CAPSULE ORAL 4 TIMES DAILY PRN
Status: DISCONTINUED | OUTPATIENT
Start: 2023-09-20 | End: 2023-09-28

## 2023-09-20 RX ORDER — ASPIRIN 81 MG/1
81 TABLET, CHEWABLE ORAL DAILY
Qty: 30 TABLET | Refills: 3 | Status: SHIPPED | OUTPATIENT
Start: 2023-09-21

## 2023-09-20 RX ORDER — GLYCOPYRROLATE 0.2 MG/ML
0.1 INJECTION INTRAMUSCULAR; INTRAVENOUS EVERY 4 HOURS PRN
Status: DISCONTINUED | OUTPATIENT
Start: 2023-09-20 | End: 2023-09-29 | Stop reason: HOSPADM

## 2023-09-20 RX ORDER — BISACODYL 10 MG
10 SUPPOSITORY, RECTAL RECTAL DAILY PRN
Status: CANCELLED | OUTPATIENT
Start: 2023-09-20

## 2023-09-20 RX ORDER — ROSUVASTATIN CALCIUM 20 MG/1
20 TABLET, COATED ORAL NIGHTLY
Status: CANCELLED | OUTPATIENT
Start: 2023-09-20

## 2023-09-20 RX ORDER — ENOXAPARIN SODIUM 100 MG/ML
40 INJECTION SUBCUTANEOUS DAILY
Status: CANCELLED | OUTPATIENT
Start: 2023-09-21

## 2023-09-20 RX ORDER — MIRTAZAPINE 15 MG/1
7.5 TABLET, FILM COATED ORAL NIGHTLY
Status: CANCELLED | OUTPATIENT
Start: 2023-09-20

## 2023-09-20 RX ORDER — LANOLIN ALCOHOL/MO/W.PET/CERES
3 CREAM (GRAM) TOPICAL NIGHTLY PRN
Refills: 3 | COMMUNITY
Start: 2023-09-20

## 2023-09-20 RX ORDER — QUETIAPINE FUMARATE 25 MG/1
25 TABLET, FILM COATED ORAL NIGHTLY PRN
Status: CANCELLED | OUTPATIENT
Start: 2023-09-20

## 2023-09-20 RX ORDER — GLYCOPYRROLATE 0.2 MG/ML
0.1 INJECTION INTRAMUSCULAR; INTRAVENOUS EVERY 4 HOURS PRN
Status: CANCELLED | OUTPATIENT
Start: 2023-09-20

## 2023-09-20 RX ORDER — DOXYCYCLINE HYCLATE 50 MG/1
324 CAPSULE, GELATIN COATED ORAL
Status: DISCONTINUED | OUTPATIENT
Start: 2023-09-21 | End: 2023-09-23

## 2023-09-20 RX ORDER — TRAZODONE HYDROCHLORIDE 50 MG/1
50 TABLET ORAL NIGHTLY
Qty: 30 TABLET | Refills: 0 | Status: SHIPPED | OUTPATIENT
Start: 2023-09-20 | End: 2023-10-20

## 2023-09-20 RX ORDER — SODIUM CHLORIDE FOR INHALATION 3 %
4 VIAL, NEBULIZER (ML) INHALATION PRN
Status: CANCELLED | OUTPATIENT
Start: 2023-09-20

## 2023-09-20 RX ORDER — ASCORBIC ACID 250 MG
250 TABLET ORAL
Qty: 30 TABLET | Refills: 3 | Status: SHIPPED | OUTPATIENT
Start: 2023-09-21

## 2023-09-20 RX ORDER — POLYETHYLENE GLYCOL 3350 17 G/17G
17 POWDER, FOR SOLUTION ORAL DAILY PRN
Qty: 527 G | Refills: 1 | COMMUNITY
Start: 2023-09-20 | End: 2023-10-20

## 2023-09-20 RX ORDER — TRAMADOL HYDROCHLORIDE 50 MG/1
50 TABLET ORAL EVERY 6 HOURS PRN
Qty: 56 TABLET | Refills: 0 | Status: SHIPPED | OUTPATIENT
Start: 2023-09-20 | End: 2023-09-20 | Stop reason: SDUPTHER

## 2023-09-20 RX ORDER — ROSUVASTATIN CALCIUM 20 MG/1
20 TABLET, COATED ORAL NIGHTLY
Status: DISCONTINUED | OUTPATIENT
Start: 2023-09-20 | End: 2023-09-29 | Stop reason: HOSPADM

## 2023-09-20 RX ORDER — ACETAMINOPHEN 325 MG/1
650 TABLET ORAL EVERY 6 HOURS PRN
Status: CANCELLED | OUTPATIENT
Start: 2023-09-20

## 2023-09-20 RX ORDER — TRAMADOL HYDROCHLORIDE 50 MG/1
50 TABLET ORAL EVERY 6 HOURS PRN
Status: DISCONTINUED | OUTPATIENT
Start: 2023-09-20 | End: 2023-09-29 | Stop reason: HOSPADM

## 2023-09-20 RX ORDER — ONDANSETRON 4 MG/1
4 TABLET, ORALLY DISINTEGRATING ORAL EVERY 8 HOURS PRN
Status: CANCELLED | OUTPATIENT
Start: 2023-09-20

## 2023-09-20 RX ORDER — POLYETHYLENE GLYCOL 3350 17 G/17G
17 POWDER, FOR SOLUTION ORAL DAILY PRN
Status: CANCELLED | OUTPATIENT
Start: 2023-09-20

## 2023-09-20 RX ORDER — ALBUTEROL SULFATE 2.5 MG/3ML
2.5 SOLUTION RESPIRATORY (INHALATION)
Status: CANCELLED | OUTPATIENT
Start: 2023-09-20

## 2023-09-20 RX ORDER — LOPERAMIDE HYDROCHLORIDE 2 MG/1
2 CAPSULE ORAL 4 TIMES DAILY PRN
COMMUNITY
Start: 2023-09-20 | End: 2023-09-30

## 2023-09-20 RX ORDER — ONDANSETRON 2 MG/ML
4 INJECTION INTRAMUSCULAR; INTRAVENOUS EVERY 6 HOURS PRN
Status: CANCELLED | OUTPATIENT
Start: 2023-09-20

## 2023-09-20 RX ORDER — TRAZODONE HYDROCHLORIDE 50 MG/1
50 TABLET ORAL NIGHTLY
Status: CANCELLED | OUTPATIENT
Start: 2023-09-20

## 2023-09-20 RX ORDER — FAMOTIDINE 20 MG/1
10 TABLET, FILM COATED ORAL DAILY PRN
Status: DISCONTINUED | OUTPATIENT
Start: 2023-09-20 | End: 2023-09-29 | Stop reason: HOSPADM

## 2023-09-20 RX ORDER — ALBUTEROL SULFATE 2.5 MG/3ML
2.5 SOLUTION RESPIRATORY (INHALATION)
Qty: 120 EACH | Refills: 3 | Status: SHIPPED | OUTPATIENT
Start: 2023-09-20

## 2023-09-20 RX ORDER — MAGNESIUM HYDROXIDE/ALUMINUM HYDROXICE/SIMETHICONE 120; 1200; 1200 MG/30ML; MG/30ML; MG/30ML
30 SUSPENSION ORAL EVERY 6 HOURS PRN
Status: DISCONTINUED | OUTPATIENT
Start: 2023-09-20 | End: 2023-09-28

## 2023-09-20 RX ORDER — LIDOCAINE HYDROCHLORIDE 20 MG/ML
5 SOLUTION OROPHARYNGEAL
Status: DISCONTINUED | OUTPATIENT
Start: 2023-09-20 | End: 2023-09-28

## 2023-09-20 RX ORDER — MULTIVIT WITH MINERALS/LUTEIN
250 TABLET ORAL
Status: DISCONTINUED | OUTPATIENT
Start: 2023-09-21 | End: 2023-09-23

## 2023-09-20 RX ORDER — ENOXAPARIN SODIUM 100 MG/ML
40 INJECTION SUBCUTANEOUS DAILY
Status: DISCONTINUED | OUTPATIENT
Start: 2023-09-21 | End: 2023-09-28

## 2023-09-20 RX ORDER — MIRTAZAPINE 15 MG/1
7.5 TABLET, FILM COATED ORAL NIGHTLY
Status: DISCONTINUED | OUTPATIENT
Start: 2023-09-20 | End: 2023-09-29 | Stop reason: HOSPADM

## 2023-09-20 RX ORDER — INSULIN LISPRO 100 [IU]/ML
0-4 INJECTION, SOLUTION INTRAVENOUS; SUBCUTANEOUS
Status: CANCELLED | OUTPATIENT
Start: 2023-09-20

## 2023-09-20 RX ORDER — LOPERAMIDE HYDROCHLORIDE 2 MG/1
2 CAPSULE ORAL 4 TIMES DAILY PRN
Status: CANCELLED | OUTPATIENT
Start: 2023-09-20

## 2023-09-20 RX ORDER — TRAZODONE HYDROCHLORIDE 50 MG/1
50 TABLET ORAL NIGHTLY
Status: DISCONTINUED | OUTPATIENT
Start: 2023-09-20 | End: 2023-09-29 | Stop reason: HOSPADM

## 2023-09-20 RX ORDER — BISACODYL 10 MG
10 SUPPOSITORY, RECTAL RECTAL DAILY PRN
Status: DISCONTINUED | OUTPATIENT
Start: 2023-09-20 | End: 2023-09-28

## 2023-09-20 RX ORDER — ACETAMINOPHEN 650 MG/1
650 SUPPOSITORY RECTAL EVERY 6 HOURS PRN
Status: DISCONTINUED | OUTPATIENT
Start: 2023-09-20 | End: 2023-09-26

## 2023-09-20 RX ORDER — ONDANSETRON 4 MG/1
4 TABLET, ORALLY DISINTEGRATING ORAL EVERY 8 HOURS PRN
Status: DISCONTINUED | OUTPATIENT
Start: 2023-09-20 | End: 2023-09-28

## 2023-09-20 RX ORDER — FAMOTIDINE 20 MG/1
10 TABLET, FILM COATED ORAL DAILY PRN
Status: CANCELLED | OUTPATIENT
Start: 2023-09-20

## 2023-09-20 RX ORDER — SCOLOPAMINE TRANSDERMAL SYSTEM 1 MG/1
1 PATCH, EXTENDED RELEASE TRANSDERMAL
Status: DISCONTINUED | OUTPATIENT
Start: 2023-09-22 | End: 2023-09-29 | Stop reason: HOSPADM

## 2023-09-20 RX ORDER — DOXYCYCLINE HYCLATE 50 MG/1
324 CAPSULE, GELATIN COATED ORAL
Qty: 30 TABLET | Refills: 3 | Status: SHIPPED | OUTPATIENT
Start: 2023-09-21

## 2023-09-20 RX ORDER — ACETAMINOPHEN 325 MG/1
650 TABLET ORAL EVERY 6 HOURS PRN
Status: DISCONTINUED | OUTPATIENT
Start: 2023-09-20 | End: 2023-09-29 | Stop reason: HOSPADM

## 2023-09-20 RX ORDER — SODIUM CHLORIDE FOR INHALATION 3 %
4 VIAL, NEBULIZER (ML) INHALATION PRN
Status: DISCONTINUED | OUTPATIENT
Start: 2023-09-20 | End: 2023-09-29 | Stop reason: HOSPADM

## 2023-09-20 RX ORDER — SODIUM CHLORIDE 0.9 % (FLUSH) 0.9 %
5-40 SYRINGE (ML) INJECTION PRN
Status: DISCONTINUED | OUTPATIENT
Start: 2023-09-20 | End: 2023-09-29 | Stop reason: HOSPADM

## 2023-09-20 RX ORDER — SODIUM CHLORIDE 0.9 % (FLUSH) 0.9 %
5-40 SYRINGE (ML) INJECTION EVERY 12 HOURS SCHEDULED
Status: CANCELLED | OUTPATIENT
Start: 2023-09-20

## 2023-09-20 RX ORDER — POTASSIUM CHLORIDE 20 MEQ/1
40 TABLET, EXTENDED RELEASE ORAL PRN
Status: DISCONTINUED | OUTPATIENT
Start: 2023-09-20 | End: 2023-09-21

## 2023-09-20 RX ORDER — SODIUM CHLORIDE 0.9 % (FLUSH) 0.9 %
5-40 SYRINGE (ML) INJECTION PRN
Status: CANCELLED | OUTPATIENT
Start: 2023-09-20

## 2023-09-20 RX ORDER — INSULIN LISPRO 100 [IU]/ML
0-4 INJECTION, SOLUTION INTRAVENOUS; SUBCUTANEOUS NIGHTLY
Status: DISCONTINUED | OUTPATIENT
Start: 2023-09-20 | End: 2023-09-22

## 2023-09-20 RX ORDER — LIDOCAINE HYDROCHLORIDE 20 MG/ML
5 SOLUTION OROPHARYNGEAL
Status: CANCELLED | OUTPATIENT
Start: 2023-09-20

## 2023-09-20 RX ORDER — DOXYCYCLINE HYCLATE 50 MG/1
324 CAPSULE, GELATIN COATED ORAL
Status: CANCELLED | OUTPATIENT
Start: 2023-09-21

## 2023-09-20 RX ORDER — ASPIRIN 81 MG/1
81 TABLET, CHEWABLE ORAL DAILY
Status: DISCONTINUED | OUTPATIENT
Start: 2023-09-21 | End: 2023-09-29 | Stop reason: HOSPADM

## 2023-09-20 RX ORDER — INSULIN LISPRO 100 [IU]/ML
0-4 INJECTION, SOLUTION INTRAVENOUS; SUBCUTANEOUS
Status: DISCONTINUED | OUTPATIENT
Start: 2023-09-20 | End: 2023-09-26

## 2023-09-20 RX ORDER — SODIUM CHLORIDE FOR INHALATION 3 %
4 VIAL, NEBULIZER (ML) INHALATION
Status: DISCONTINUED | OUTPATIENT
Start: 2023-09-20 | End: 2023-09-29 | Stop reason: HOSPADM

## 2023-09-20 RX ORDER — ONDANSETRON 2 MG/ML
4 INJECTION INTRAMUSCULAR; INTRAVENOUS EVERY 6 HOURS PRN
Status: DISCONTINUED | OUTPATIENT
Start: 2023-09-20 | End: 2023-09-26

## 2023-09-20 RX ORDER — MAGNESIUM HYDROXIDE/ALUMINUM HYDROXICE/SIMETHICONE 120; 1200; 1200 MG/30ML; MG/30ML; MG/30ML
30 SUSPENSION ORAL EVERY 6 HOURS PRN
Status: CANCELLED | OUTPATIENT
Start: 2023-09-20

## 2023-09-20 RX ORDER — QUETIAPINE FUMARATE 25 MG/1
25 TABLET, FILM COATED ORAL NIGHTLY PRN
Status: DISCONTINUED | OUTPATIENT
Start: 2023-09-20 | End: 2023-09-28

## 2023-09-20 RX ORDER — TRAMADOL HYDROCHLORIDE 50 MG/1
50 TABLET ORAL EVERY 6 HOURS PRN
Status: CANCELLED | OUTPATIENT
Start: 2023-09-20

## 2023-09-20 RX ORDER — ALBUTEROL SULFATE 2.5 MG/3ML
2.5 SOLUTION RESPIRATORY (INHALATION)
Status: DISCONTINUED | OUTPATIENT
Start: 2023-09-20 | End: 2023-09-23

## 2023-09-20 RX ORDER — POTASSIUM CHLORIDE 7.45 MG/ML
10 INJECTION INTRAVENOUS PRN
Status: DISCONTINUED | OUTPATIENT
Start: 2023-09-20 | End: 2023-09-21

## 2023-09-20 RX ORDER — ROSUVASTATIN CALCIUM 20 MG/1
20 TABLET, COATED ORAL NIGHTLY
Qty: 30 TABLET | Refills: 3 | Status: SHIPPED | OUTPATIENT
Start: 2023-09-20

## 2023-09-20 RX ADMIN — ASPIRIN 81 MG 81 MG: 81 TABLET ORAL at 09:05

## 2023-09-20 RX ADMIN — PREDNISONE 40 MG: 20 TABLET ORAL at 09:05

## 2023-09-20 RX ADMIN — ALBUTEROL SULFATE 2.5 MG: 2.5 SOLUTION RESPIRATORY (INHALATION) at 03:06

## 2023-09-20 RX ADMIN — SODIUM CHLORIDE 500 ML: 9 INJECTION, SOLUTION INTRAVENOUS at 11:59

## 2023-09-20 RX ADMIN — Medication 250 MG: at 09:04

## 2023-09-20 RX ADMIN — SODIUM CHLORIDE, PRESERVATIVE FREE 10 ML: 5 INJECTION INTRAVENOUS at 09:05

## 2023-09-20 RX ADMIN — Medication 1 G: at 14:55

## 2023-09-20 RX ADMIN — ALBUTEROL SULFATE 2.5 MG: 2.5 SOLUTION RESPIRATORY (INHALATION) at 13:34

## 2023-09-20 RX ADMIN — ENOXAPARIN SODIUM 40 MG: 100 INJECTION SUBCUTANEOUS at 09:05

## 2023-09-20 RX ADMIN — Medication 4 ML: at 20:34

## 2023-09-20 RX ADMIN — ROSUVASTATIN CALCIUM 20 MG: 20 TABLET, FILM COATED ORAL at 20:27

## 2023-09-20 RX ADMIN — ALBUTEROL SULFATE 2.5 MG: 2.5 SOLUTION RESPIRATORY (INHALATION) at 20:33

## 2023-09-20 RX ADMIN — FERROUS GLUCONATE 324 MG: 324 TABLET ORAL at 09:04

## 2023-09-20 RX ADMIN — POTASSIUM BICARBONATE 40 MEQ: 782 TABLET, EFFERVESCENT ORAL at 09:04

## 2023-09-20 RX ADMIN — MIRTAZAPINE 7.5 MG: 15 TABLET, FILM COATED ORAL at 20:27

## 2023-09-20 RX ADMIN — SODIUM CHLORIDE, PRESERVATIVE FREE 40 MG: 5 INJECTION INTRAVENOUS at 09:05

## 2023-09-20 RX ADMIN — SODIUM CHLORIDE, PRESERVATIVE FREE 10 ML: 5 INJECTION INTRAVENOUS at 20:28

## 2023-09-20 RX ADMIN — Medication 4 ML: at 07:19

## 2023-09-20 RX ADMIN — TRAZODONE HYDROCHLORIDE 50 MG: 50 TABLET ORAL at 20:27

## 2023-09-20 RX ADMIN — ALBUTEROL SULFATE 2.5 MG: 2.5 SOLUTION RESPIRATORY (INHALATION) at 07:19

## 2023-09-20 ASSESSMENT — PAIN SCALES - WONG BAKER: WONGBAKER_NUMERICALRESPONSE: 0

## 2023-09-20 ASSESSMENT — PAIN SCALES - GENERAL: PAINLEVEL_OUTOF10: 0

## 2023-09-20 NOTE — PROGRESS NOTES
EOS 7p-7a    BSSR received from off going GENI Mccarty     No complaints of pain or nausea this shift. Tolerating tube feeds at a rate of 65 ml/hr per order. Soft BP's - 97/61 - 100/67 - 99/49 with a recheck of 100/57     Labs:   K 3.3 prn replacements in MAR  Ca 7.9 - corrected 9.5    Patient rounded on hourly by myself and patient assistant. Bed locked, low, and call light within reach. No new needs voiced at this time.      BSSR given to oncoming GENI Powers

## 2023-09-20 NOTE — PLAN OF CARE
Problem: Safety - Adult  Goal: Free from fall injury  Outcome: Progressing Orientation to room/Bed in low position, brakes on/Side rails x 2 or 4 up, assess large gaps, such that a patient could get extremity or other body part entrapped, use additional safety procedures

## 2023-09-20 NOTE — CARE COORDINATION
CM is awaiting notification from [facility] whether pt has been accepted for rehab. CM received an update from [facility] that pt has been approved for their rehab if [facility] denies pt. CM will continue to follow. Update: 1115 - pt has been accepted to [facility] and can be admitted today if cleared by oncology.   CM notified Shaan Curiel NP.

## 2023-09-20 NOTE — PROGRESS NOTES
TRANSFER - OUT REPORT:    Verbal report given to Kit Mejia RN on Nai Colvin  being transferred to 9th floor/IRC for routine progression of patient care/Rehab    Report consisted of patient's Situation, Background, Assessment and   Recommendations(SBAR). Information from the following report(s) Nurse Handoff Report, MAR, Recent Results, and Neuro Assessment was reviewed with the receiving nurse. Lines:   PICC Double Lumen 76/89/78 Right Basilic (Active)   Central Line Being Utilized Yes 09/20/23 0731   Criteria for Appropriate Use Irritant/vesicant medication 09/20/23 0731   Site Assessment Clean, dry & intact 09/20/23 0731   Phlebitis Assessment No symptoms 09/20/23 0731   Infiltration Assessment 0 09/20/23 0731   Extremity Circumference (cm) 26.5 cm 09/19/23 1630   External Catheter Length (cm) 2.5 cm 09/19/23 1630   Proximal Lumen Color/Status Capped;Normal saline locked 09/20/23 0731   Distal Lumen Color/Status Purple; Alcohol cap applied;Brisk blood return;Flushed; Intermittent infusions 09/20/23 0731   Line Care Connections checked and tightened 09/20/23 0731   Alcohol Cap Used Yes 09/20/23 0731   Date of Last Dressing Change 09/19/23 09/20/23 0731   Dressing Type Transparent w/CHG gel 09/20/23 0731   Dressing Status Clean, dry & intact 09/20/23 0731        Opportunity for questions and clarification was provided.       Patient to be transported with:  O2 @ 10lpm

## 2023-09-20 NOTE — PROGRESS NOTES
HPI:  Eloise Shafer is a 69 y.o. male seen Established   Chief Complaint   Patient presents with    Fatigue    Fall    Dizziness         80-year-old male history of supraglottic squamous cell carcinoma undergoing tracheostomy tube placement by Dr. Karlo Isabel 9/8/2023. Has had somewhat complication of ongoing pneumonia and purulent tracheal secretions. Asked for a reconsult visit due to tracheostomy tube dysfunction as the respiratory therapist was having difficulty with passing flexible suction this morning. Patient's spouse is present and states that the secretions have been less intense over the last 24 to 48 hours. He has had no shortness of breath. Past Medical History, Past Surgical History, Family history, Social History, and Medications were all reviewed with the patient today and updated as necessary.      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)    Squamous cell carcinoma of oropharynx (HCC)    Dehydration    Sepsis (HCC)    Moderate protein-calorie malnutrition (HCC)    Elevated troponin    Syncope and collapse    Pre-operative cardiovascular examination    Malignant neoplasm of esophagus (HCC)    Sinus tachycardia    Appetite loss    Hypersalivation    Tracheostomy care (HCC)    Ataxia    Increased tracheal secretions    Hypomagnesemia    Immunocompromised (HCC)       Current Facility-Administered Medications   Medication Dose Route Frequency    predniSONE (DELTASONE) tablet 40 mg  40 mg Oral Daily    lidocaine PF 1 % injection 5 mL  5 mL IntraDERmal Once    sodium chloride flush 0.9 % injection 5-40 mL  5-40 mL IntraVENous 2 times per day    sodium chloride DR EMANUEL

## 2023-09-20 NOTE — DISCHARGE SUMMARY
[facility] Hematology & Oncology: Inpatient Hematology / Oncology Discharge Summary Note    Patient ID:  Cheryl Rabago  [id number]  12 y.o.  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)    Squamous cell carcinoma of oropharynx (HCC)    Moderate protein-calorie malnutrition ([address])    Syncope and collapse    Pre-operative cardiovascular examination    Malignant neoplasm of esophagus ([address])    Sinus tachycardia    Appetite loss    Hypersalivation    Tracheostomy care ([address])    Ataxia    Increased tracheal secretions    Hypomagnesemia    Immunocompromised ([address])    Complication of tracheostomy tube ([address])    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 *      Hospital Course:  76-year-old  male patient, stopped smoking about 25 years ago, history of polio as a baby (uses crutches), squamous cell cancer of left lateral tongue status post partial glossectomy with tonsillectomy, neck dissection with radial forearm free flap to left oral cavity 8/3/2010. Per records margins were close but negative with evidence of LVI as well as PNI and extracapsular spread. He completed adjuvant radiation therapy and end of 2010 to early 2011. He followed with Dr. Deyanira Donohue ENT up till the 5-year carlee in 2015 with no evidence of recurrence. 2 years post therapy he had multiple teeth removed of his left lower jaw followed by HBO therapy x10 sessions due to poor healing.   More recently patient presented with symptoms of congestion and hypersalivation, lidocaine viscous hcl 2 % Soln solution  Commonly known as: XYLOCAINE     Magic Mouthwash  Commonly known as: Miracle Mouthwash  Swish and spit 5 mLs 4 times daily as needed for Irritation Pharmacy to mix equal parts Benadryl, Maalox, Viscous Lidocaine. Take 5 mL 4 times daily as needed, gargle/swish/spit. prochlorperazine 10 MG tablet  Commonly known as: COMPAZINE  Take 1 tablet by mouth every 6 hours as needed (N/V) Take as needed if Zofran is not controlling nausea     scopolamine transdermal patch  Commonly known as: TRANSDERM-SCOP  Place 1 patch onto the skin every 72 hours     traMADol 50 MG tablet  Commonly known as: ULTRAM  Take 1 tablet by mouth every 6 hours as needed for Pain for up to 5 days. Max Daily Amount: 200 mg            STOP taking these medications      lisinopril 40 MG tablet  Commonly known as: PRINIVIL;ZESTRIL     tadalafil 20 MG tablet  Commonly known as: CIALIS            ASK your doctor about these medications      lidocaine-prilocaine 2.5-2.5 % cream  Commonly known as: EMLA  Apply dime size amount to port site ~30 min prior to accessing, DO NOT RUB IN, cover in plastic wrap to protect clothes     naloxone 4 MG/0.1ML Liqd nasal spray  Commonly known as: Narcan  Use 1 spray intranasally at onset of opioid overdose. May repeat dose using alternate nostril every 2 minutes as needed should symptoms persist or recur.      omeprazole 40 MG delayed release capsule  Commonly known as: PRILOSEC  Take 1 capsule by mouth daily     ondansetron 8 MG tablet  Commonly known as: Zofran  Take 1 tablet by mouth every 8 hours as needed for Nausea or Vomiting Take every 8 hours for 3-5 days post chemo               Where to Get Your Medications        These medications were sent to [id number] [place] - [address] [address]      Phone: [phone]   albuterol (2.5 MG/3ML) 0.083% nebulizer solution  ascorbic acid 250 MG

## 2023-09-20 NOTE — CARE COORDINATION
Pt to d/c today to [facility]. Transport via in-house pt transport. Report: ext [phone]. Room number will be provided to pt's nurse upon calling report. CM asked for room number but this information was not provided. No other supportive care needs identified. Pt and spouse agree with d/c plan. Milestones met. LOS = 15 days       09/08/23 1457   Service Assessment   Patient Orientation Alert and Oriented;Person;Place; Self   Cognition Alert   History Provided By Patient;Significant Other;Medical Record   Primary Caregiver Spouse   Accompanied By/Relationship spouse and family   Support Systems Children;Spouse/Significant Other;Family Members   Patient's Healthcare Decision Maker is: Legal Next of [facility]   PCP Verified by CM Yes  Mega Bryant DO)   Last Visit to PCP Within last 3 months   Prior Functional Level Assistance with the following:;Mobility   Current Functional Level Assistance with the following:;Mobility   Can patient return to prior living arrangement Yes   Ability to make needs known: Good   Family able to assist with home care needs: Yes   Would you like for me to discuss the discharge plan with any other family members/significant others, and if so, who? Yes  (spouse)   Financial Resources Medicare; Other (Comment)  (Secondary: BCBS)   Community Resources None   CM/SW Referral Other (see comment)  (N/A)   Social/Functional History   Lives With Spouse   Type of [facility] One level   Home Access Stairs to enter with rails   Entrance Stairs - Number of Steps 2   [address]   Receives Help From Family   ADL Assistance Independent   Ambulation Assistance Needs assistance   Transfer Assistance Independent   Active  No   Patient's  Info Family   Mode of Transportation Family   Occupation Retired   Discharge Planning   Type of Residence Acute Rehab  [facility])   Living Arrangements Spouse/Significant Other   Current Services

## 2023-09-20 NOTE — PROGRESS NOTES
Patient has remained A&O x 4. Patient denies pain, N/V/D.     -BP soft with 1100 vitals- pt given 500 cc NS bolus  -started on 1Gram salt tablets     Patient educated on new medication. Patient rounded on hourly by myself or patient assistant and encouraged to call with any needs. No signs of distress noted. Bed low, locked, call bell within reach.      Patient is be discharged shortly to [facility]/9th floor  Rocky Gill RN

## 2023-09-21 ENCOUNTER — CARE COORDINATION (OUTPATIENT)
Dept: CARE COORDINATION | Facility: CLINIC | Age: 78
End: 2023-09-21

## 2023-09-21 DIAGNOSIS — R05.9 COUGH, UNSPECIFIED TYPE: Primary | ICD-10-CM

## 2023-09-21 PROBLEM — I61.9 CVA (CEREBROVASCULAR ACCIDENT DUE TO INTRACEREBRAL HEMORRHAGE) (HCC): Status: ACTIVE | Noted: 2023-09-21

## 2023-09-21 LAB
ALBUMIN SERPL-MCNC: 2.2 G/DL (ref 3.2–4.6)
ALBUMIN/GLOB SERPL: 0.6 (ref 0.4–1.6)
ALP SERPL-CCNC: 63 U/L (ref 50–136)
ALT SERPL-CCNC: 26 U/L (ref 12–65)
ANION GAP SERPL CALC-SCNC: 7 MMOL/L (ref 2–11)
AST SERPL-CCNC: 15 U/L (ref 15–37)
BASOPHILS # BLD: 0 K/UL (ref 0–0.2)
BASOPHILS NFR BLD: 0 % (ref 0–2)
BILIRUB SERPL-MCNC: 0.3 MG/DL (ref 0.2–1.1)
BUN SERPL-MCNC: 28 MG/DL (ref 8–23)
CALCIUM SERPL-MCNC: 8.1 MG/DL (ref 8.3–10.4)
CHLORIDE SERPL-SCNC: 101 MMOL/L (ref 101–110)
CO2 SERPL-SCNC: 27 MMOL/L (ref 21–32)
CREAT SERPL-MCNC: 0.6 MG/DL (ref 0.8–1.5)
DIFFERENTIAL METHOD BLD: ABNORMAL
EOSINOPHIL # BLD: 0.1 K/UL (ref 0–0.8)
EOSINOPHIL NFR BLD: 1 % (ref 0.5–7.8)
ERYTHROCYTE [DISTWIDTH] IN BLOOD BY AUTOMATED COUNT: 13.2 % (ref 11.9–14.6)
GLOBULIN SER CALC-MCNC: 3.5 G/DL (ref 2.8–4.5)
GLUCOSE BLD STRIP.AUTO-MCNC: 106 MG/DL (ref 65–100)
GLUCOSE BLD STRIP.AUTO-MCNC: 125 MG/DL (ref 65–100)
GLUCOSE BLD STRIP.AUTO-MCNC: 146 MG/DL (ref 65–100)
GLUCOSE SERPL-MCNC: 140 MG/DL (ref 65–100)
HCT VFR BLD AUTO: 30.5 % (ref 41.1–50.3)
HGB BLD-MCNC: 10.2 G/DL (ref 13.6–17.2)
IMM GRANULOCYTES # BLD AUTO: 0 K/UL (ref 0–0.5)
IMM GRANULOCYTES NFR BLD AUTO: 1 % (ref 0–5)
LYMPHOCYTES # BLD: 1.2 K/UL (ref 0.5–4.6)
LYMPHOCYTES NFR BLD: 17 % (ref 13–44)
MAGNESIUM SERPL-MCNC: 2.1 MG/DL (ref 1.8–2.4)
MCH RBC QN AUTO: 28.8 PG (ref 26.1–32.9)
MCHC RBC AUTO-ENTMCNC: 33.4 G/DL (ref 31.4–35)
MCV RBC AUTO: 86.2 FL (ref 82–102)
MONOCYTES # BLD: 0.2 K/UL (ref 0.1–1.3)
MONOCYTES NFR BLD: 3 % (ref 4–12)
NEUTS SEG # BLD: 5.7 K/UL (ref 1.7–8.2)
NEUTS SEG NFR BLD: 78 % (ref 43–78)
NRBC # BLD: 0 K/UL (ref 0–0.2)
PLATELET # BLD AUTO: 264 K/UL (ref 150–450)
PMV BLD AUTO: 9.2 FL (ref 9.4–12.3)
POTASSIUM SERPL-SCNC: 3.6 MMOL/L (ref 3.5–5.1)
PROT SERPL-MCNC: 5.7 G/DL (ref 6.3–8.2)
RBC # BLD AUTO: 3.54 M/UL (ref 4.23–5.6)
SERVICE CMNT-IMP: ABNORMAL
SODIUM SERPL-SCNC: 135 MMOL/L (ref 133–143)
WBC # BLD AUTO: 7.2 K/UL (ref 4.3–11.1)

## 2023-09-21 PROCEDURE — 6360000002 HC RX W HCPCS: Performed by: PHYSICAL MEDICINE & REHABILITATION

## 2023-09-21 PROCEDURE — 97116 GAIT TRAINING THERAPY: CPT

## 2023-09-21 PROCEDURE — A4216 STERILE WATER/SALINE, 10 ML: HCPCS | Performed by: PHYSICAL MEDICINE & REHABILITATION

## 2023-09-21 PROCEDURE — 99223 1ST HOSP IP/OBS HIGH 75: CPT | Performed by: PHYSICAL MEDICINE & REHABILITATION

## 2023-09-21 PROCEDURE — 97530 THERAPEUTIC ACTIVITIES: CPT

## 2023-09-21 PROCEDURE — 97163 PT EVAL HIGH COMPLEX 45 MIN: CPT

## 2023-09-21 PROCEDURE — 80053 COMPREHEN METABOLIC PANEL: CPT

## 2023-09-21 PROCEDURE — 1180000000 HC REHAB R&B

## 2023-09-21 PROCEDURE — 83735 ASSAY OF MAGNESIUM: CPT

## 2023-09-21 PROCEDURE — 85025 COMPLETE CBC W/AUTO DIFF WBC: CPT

## 2023-09-21 PROCEDURE — 92523 SPEECH SOUND LANG COMPREHEN: CPT

## 2023-09-21 PROCEDURE — 2580000003 HC RX 258: Performed by: PHYSICAL MEDICINE & REHABILITATION

## 2023-09-21 PROCEDURE — 97166 OT EVAL MOD COMPLEX 45 MIN: CPT

## 2023-09-21 PROCEDURE — 36592 COLLECT BLOOD FROM PICC: CPT

## 2023-09-21 PROCEDURE — 92610 EVALUATE SWALLOWING FUNCTION: CPT

## 2023-09-21 PROCEDURE — 82962 GLUCOSE BLOOD TEST: CPT

## 2023-09-21 PROCEDURE — 94640 AIRWAY INHALATION TREATMENT: CPT

## 2023-09-21 PROCEDURE — C9113 INJ PANTOPRAZOLE SODIUM, VIA: HCPCS | Performed by: PHYSICAL MEDICINE & REHABILITATION

## 2023-09-21 PROCEDURE — 6370000000 HC RX 637 (ALT 250 FOR IP): Performed by: PHYSICAL MEDICINE & REHABILITATION

## 2023-09-21 RX ORDER — SODIUM CHLORIDE FOR INHALATION 3 %
4 VIAL, NEBULIZER (ML) INHALATION 3 TIMES DAILY
Qty: 240 ML | Refills: 0 | Status: SHIPPED | OUTPATIENT
Start: 2023-09-21

## 2023-09-21 RX ORDER — POTASSIUM CHLORIDE 7.45 MG/ML
10 INJECTION INTRAVENOUS PRN
Status: DISCONTINUED | OUTPATIENT
Start: 2023-09-21 | End: 2023-09-26

## 2023-09-21 RX ORDER — MAGNESIUM SULFATE IN WATER 40 MG/ML
2000 INJECTION, SOLUTION INTRAVENOUS PRN
Status: DISCONTINUED | OUTPATIENT
Start: 2023-09-21 | End: 2023-09-26

## 2023-09-21 RX ADMIN — ALBUTEROL SULFATE 2.5 MG: 2.5 SOLUTION RESPIRATORY (INHALATION) at 14:35

## 2023-09-21 RX ADMIN — MIRTAZAPINE 7.5 MG: 15 TABLET, FILM COATED ORAL at 21:24

## 2023-09-21 RX ADMIN — ROSUVASTATIN CALCIUM 20 MG: 20 TABLET, FILM COATED ORAL at 21:24

## 2023-09-21 RX ADMIN — ENOXAPARIN SODIUM 40 MG: 100 INJECTION SUBCUTANEOUS at 08:21

## 2023-09-21 RX ADMIN — ALBUTEROL SULFATE 2.5 MG: 2.5 SOLUTION RESPIRATORY (INHALATION) at 07:05

## 2023-09-21 RX ADMIN — TRAZODONE HYDROCHLORIDE 50 MG: 50 TABLET ORAL at 21:24

## 2023-09-21 RX ADMIN — ALBUTEROL SULFATE 2.5 MG: 2.5 SOLUTION RESPIRATORY (INHALATION) at 01:55

## 2023-09-21 RX ADMIN — SODIUM CHLORIDE, PRESERVATIVE FREE 10 ML: 5 INJECTION INTRAVENOUS at 21:24

## 2023-09-21 RX ADMIN — FERROUS GLUCONATE 324 MG: 324 TABLET ORAL at 08:20

## 2023-09-21 RX ADMIN — ALBUTEROL SULFATE 2.5 MG: 2.5 SOLUTION RESPIRATORY (INHALATION) at 21:00

## 2023-09-21 RX ADMIN — SODIUM CHLORIDE, PRESERVATIVE FREE 10 ML: 5 INJECTION INTRAVENOUS at 08:21

## 2023-09-21 RX ADMIN — Medication 4 ML: at 21:00

## 2023-09-21 RX ADMIN — SODIUM CHLORIDE 40 MG: 9 INJECTION, SOLUTION INTRAMUSCULAR; INTRAVENOUS; SUBCUTANEOUS at 08:20

## 2023-09-21 RX ADMIN — ASPIRIN 81 MG: 81 TABLET, CHEWABLE ORAL at 08:20

## 2023-09-21 RX ADMIN — Medication 250 MG: at 08:20

## 2023-09-21 ASSESSMENT — PAIN SCALES - GENERAL: PAINLEVEL_OUTOF10: 0

## 2023-09-21 NOTE — PROGRESS NOTES
PHYSICAL THERAPY DAILY NOTE  Time In:  8004  Time Out:  26 cotreat with OT  Total Treatment Time:  54 min  Pt. Seen for: PM, Balance Training, Patient Education, Transfer Training, gait and Wheelchair mobility     Subjective: Pt reports that he uses crutches to walk at baseline, denies fall but noted in chart that he had fall PTA, Pt wanting to transfer and amb his own way. Demanding to use crutches or 3WW         Objective:  Precautions: Falls, PEG, Poor Safety Awareness, Impulsive , and KAFO L LE , Contact plus precautions    GROSS ASSESSMENT Daily Assessment    Pt resting in bed. Assisted with donning PM valve but air leaking and not very helpful. Pt also writing on clip board to communicate. ST insured PM valve was placed correctly. Corset placed on pt to protect PEG and gait belt donned. Pt on 5L O2 via t-collar, 4L for portable O2. Sats % and HR in mid 90s t/o. COGNITION Daily Assessment    Orientation:A&O x 3  Communication:able to express needs via clipboard, limited ability with following one step commands due to resistance and impulsivity  Social Interaction:cooperative but frustrated at times, appropriate with PT, participating, motivated to improve        BED/MAT MOBILITY Daily Assessment   From bed Rolling Right: NT  Rolling Left: NT  Supine to Sit: Supervision/Standby Assist  Sit to Supine: Supervision/Standby Assist       TRANSFERS Daily Assessment   Cues to slow down but pt still impulsive. Wanted to demonstrate how he would prefer to get up. Used bed rail on L with L knee brace locked in ext and twisted upon rising to L side with minimal pivot of R foot. Pt instructed to sit back onto EOB for safety due to tubing. Pt demonstrated unsafe sit/stand with crutches--sitting back into w/c with crutches still under his arm. Inc assist with standing with RW but pt more safe Sit to Stand:  Mod A of 2  Stand to Sit: Mod A of 2  Transfer Type: lateral Pivot  Transfer Assistance: Min A of 2 Continue with POC and progress as tolerated.       KATERINA POSADAS, PT  9/21/2023

## 2023-09-21 NOTE — CONSULTS
Comprehensive Nutrition Assessment    Type and Reason for Visit: Initial, Consult  Tube Feeding Management (rehab physician)    Nutrition Recommendations/Plan:   Enteral Nutrition:   Enteral Access: PEG  Initiate  Formula: Standard with Fiber (Jevity 1.5 Kilo)  Goal Rate: Bolus 237 ml times 6/day 0600, 0900, 1200, 1500, 1800, 2100  Initiate  Water flush  85 ml before and after each bolus  Modulars: None not indicated at this time   Enteral regimen at above goal to provide per 24 hours:  2133 calories, 91 grams protein and 2101ml free fluid. Above regimen: Intended to meet macronutrient goals  Labs:   EN labs: BMP daily, Mg daily x 3 days at initiation then MWF and Phos daily x 3 days at initiation then MWF. POC Glucoses/SSI Active  Nutrition Related Medication Management:  Electrolyte Replacement Protocol PRN Initiate for Phosphorus and Magnesium  K replacement protocol active  Thiamine Not indicated  Bowel Regimen Active prn  Meals and Snacks:  Continue current diet. NPO per SLP     Malnutrition Assessment:  Malnutrition Status: No malnutrition     Nutrition Assessment:  Nutrition History: history per previous RD note 9/7 \"Brief hx per wife at bedside, unable to consume any significant amount of food secondary to increased salivation and dysphagia. \" EMR wt history review 11/10/202 173lb, 3/2 171lb, 6/9 173lb, 8/31 155lb. 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 Pt was previously admitted to [facility] 9/6-9/20 for sepsis, syncope and colaspse, elevated troponin, squamous cell carcinoma of oropharynx, hypokalemia, and moderate malnutrition. During this admission he underwent a procedure for trach and peg on 9/8. He was started on TF 9/9 and was advanced to goal 9/12.  He was admitted to [facility] inpatient rehab on 9/20. Nutrition Interval:  Pt seen sitting in bed with wife and son present. TF infusing at goal. He reports no issues thus far. RD discussed bolus feeds with pt and family. Pt is eager for bolus feeds. All of their questions answered. Discussed with Sandra Gaspar. Abdominal Status (last documented by nursing):   Last BM (including prior to admit): 09/19/23,     Pertinent Medications: fergon, SSI (none given thus far), remeron, protonix, vitamin C  Continuous: none  IVF: none  Electrolyte Replacement:  none  Pertinent administered PRN: none  Pertinent Labs:   Lab Results   Component Value Date/Time     09/21/2023 04:05 AM    K 3.6 09/21/2023 04:05 AM     09/21/2023 04:05 AM    CO2 27 09/21/2023 04:05 AM    BUN 28 09/21/2023 04:05 AM    CREATININE 0.60 09/21/2023 04:05 AM    GLUCOSE 140 09/21/2023 04:05 AM    CALCIUM 8.1 09/21/2023 04:05 AM    PHOS 3.4 09/20/2023 03:13 AM    MG 2.1 09/21/2023 04:05 AM      Lab Results   Component Value Date/Time    POCGLU 146 09/21/2023 12:42 PM    POCGLU 122 09/20/2023 08:24 PM    POCGLU 151 09/20/2023 11:33 AM    POCGLU 147 09/20/2023 07:15 AM    POCGLU 130 09/19/2023 09:15 PM    POCGLU 191 09/19/2023 04:11 PM     Remarkable for: Labs WNL, BG mildly elevated    Current Nutrition Therapies:  ADULT TUBE FEEDING; PEG; Standard with Fiber; Continuous; 65; No; 50; Q 1 hour  Current Tube Feeding (TF) Orders:  Feeding Route: PEG  Formula: Standard with Fiber  Schedule: Continuous  Feeding Regimen: 65ml/hr  Additives/Modulars: None  Water Flushes: 50 q1hr  Current TF & Flush Orders Provides: 2145 calories, 91 grams protein and 2186 ml free fluid.     Current Intake:   Average Meal Intake: NPO Average Supplements Intake: NPO      Anthropometric Measures:  Height:    Current Body Wt: 151 lb (68.5 kg) (9/18), Weight source: Bed Scale  BMI: 21.1, Normal Weight (BMI 22.0 to 24.9) age over 72  Admission Body Weight: 151 lb (68.5 kg) (9/18)    ( ),    Usual Body

## 2023-09-21 NOTE — H&P
Physical Medicine and Rehabilitation History and Physical       Admit Date: 9/20/2023  Primary Care Physician: Ayla Haq DO  Chief Complaint:   Admission Diagnosis: Acute left cerebellar CVA  Recurrent squamous cell tongue CA  Post polio syndrome  PEG  Trach  Anemia  Remote h/o of smoking, quit 25 years ago  Pain  DVT prophylaxis    Rehabilitation Diagnosis:  Mobility Impairments  Self Care Impairments  Dysphagia    History of Present Illness/ Hospital course: This is a 69year old with past medical history of polio as a child, mobility with crutches, squamous cell carcinoma of the tongue 15 years ago with left lateral tongue resection,  partial glossectomy with tonsillectomy, neck dissection with radial forearm free flap to left oral cavity in 2010. S/p radiation therapy, multiple teeth removed from his left lower jaw followed by HBO therapy. Recently, PET scan was   positive for mass at base of tongue with lymph node involvement and biopsy of larynx and epiglottis positive for   squamous cell cancer. He is now admitted on 9/5 after a fall with dizziness, weakness and septic. MRI was positive for an acute left cerebellar CVA. Trach and PEG placed on 9/7. Completed chemotherapy on 9/19. Acute therapies were initiated and the patient was starting to mobilize. He has significant mobility and self care impairments. It was felt he would benefit from comprehensive acute inpatient rehabilitation, continued close medical supervision and management prior to returning home. The medical stability and these recent medical events are not expected to interfere with the patient's ability to tolerate the intensity of services in acute rehab.     Current Level of Function: (evaluated by acute therapy staff, with bed mobility, balance, personally observed post-admission in the IRF setting minutes prior to submission of document) eating - dependent, bathing, LB dressing - max A, bed mobility - min A, transfers - max A, Interval 322 ms    QTc Calculation (Bazett) 393 ms    P Axis 83 degrees    R Axis 18 degrees    T Axis 66 degrees    Diagnosis       Normal sinus rhythm  Normal ECG    Confirmed by Prem Emerson ([id number]) on 9/20/2023 1:17:34 PM     POCT Glucose    Collection Time: 09/20/23  8:24 PM   Result Value Ref Range    POC Glucose 122 (H) 65 - 100 mg/dL    Performed by: Obi    CBC with Auto Differential    Collection Time: 09/21/23  4:05 AM   Result Value Ref Range    WBC 7.2 4.3 - 11.1 K/uL    RBC 3.54 (L) 4.23 - 5.6 M/uL    Hemoglobin 10.2 (L) 13.6 - 17.2 g/dL    Hematocrit 30.5 (L) 41.1 - 50.3 %    MCV 86.2 82 - 102 FL    MCH 28.8 26.1 - 32.9 PG    MCHC 33.4 31.4 - 35.0 g/dL    RDW 13.2 11.9 - 14.6 %    Platelets 809 272 - 535 K/uL    MPV 9.2 (L) 9.4 - 12.3 FL    nRBC 0.00 0.0 - 0.2 K/uL    Differential Type AUTOMATED      Neutrophils % 78 43 - 78 %    Lymphocytes % 17 13 - 44 %    Monocytes % 3 (L) 4.0 - 12.0 %    Eosinophils % 1 0.5 - 7.8 %    Basophils % 0 0.0 - 2.0 %    Immature Granulocytes 1 0.0 - 5.0 %    Neutrophils Absolute 5.7 1.7 - 8.2 K/UL    Lymphocytes Absolute 1.2 0.5 - 4.6 K/UL    Monocytes Absolute 0.2 0.1 - 1.3 K/UL    Eosinophils Absolute 0.1 0.0 - 0.8 K/UL    Basophils Absolute 0.0 0.0 - 0.2 K/UL    Absolute Immature Granulocyte 0.0 0.0 - 0.5 K/UL   Comprehensive Metabolic Panel    Collection Time: 09/21/23  4:05 AM   Result Value Ref Range    Sodium 135 133 - 143 mmol/L    Potassium 3.6 3.5 - 5.1 mmol/L    Chloride 101 101 - 110 mmol/L    CO2 27 21 - 32 mmol/L    Anion Gap 7 2 - 11 mmol/L    Glucose 140 (H) 65 - 100 mg/dL    BUN 28 (H) 8 - 23 MG/DL    Creatinine 0.60 (L) 0.8 - 1.5 MG/DL    Est, Glom Filt Rate >60 >60 ml/min/1.73m2    Calcium 8.1 (L) 8.3 - 10.4 MG/DL    Total Bilirubin 0.3 0.2 - 1.1 MG/DL    ALT 26 12 - 65 U/L    AST 15 15 - 37 U/L    Alk Phosphatase 63 50 - 136 U/L    Total Protein 5.7 (L) 6.3 - 8.2 g/dL    Albumin 2.2 (L) 3.2 - 4.6 g/dL    Globulin 3.5 2.8 - 4.5 g/dL and he is expected to return home with spouse and continued rehabilitation with home health therapy. Given the patient's complex neurologic/medical condition and the risk of further medical complications including: DVT, PE, skin breakdown, pneumonia due to decreased mobility and hypoxia secondary to laryngeal / epiglottic CA. Increased energy expenditure in a patient with known   head / neck CA , now s/p trach, PEG, chemo and CVA could potentially impact the IRF program with decreased cardiovascular efficiency, postural hypotension, cardiac arrhythmias, aspiration pneumonia and respiratory failure. For these ongoing medical issues,rehabilitation services could not be safely provided at a lower level of care such as a skilled nursing facility or nursing home.     Signed By: José Peterson MD     September 21, 2023

## 2023-09-21 NOTE — THERAPY EVALUATION
PHYSICAL THERAPY EXAMINATION    Time In: 0832  Time Out: 3183 cotreat with OT  Total Treatment Time:  46         HPI:  This is a 69year old with past medical history of polio as a child, mobility with crutches, squamous cell carcinoma of the tongue 15 years ago with left lateral tongue resection,  partial glossectomy with tonsillectomy, neck dissection with radial forearm free flap to left oral cavity in 2010. S/p radiation therapy, multiple teeth removed from his left lower jaw followed by HBO therapy. Recently, PET scan was   positive for mass at base of tongue with lymph node involvement and biopsy of larynx and epiglottis positive for   squamous cell cancer. He is now admitted on 9/5 after a fall with dizziness, weakness and septic. MRI was positive for an acute left cerebellar CVA. Trach and PEG placed on 9/7. Completed chemotherapy on 9/19. Acute therapies were initiated and the patient was starting to mobilize. He has significant mobility and self care impairments. It was felt he would benefit from comprehensive acute inpatient rehabilitation, continued close medical supervision and management prior to returning home. The medical stability and these recent medical events are not expected to interfere with the patient's ability to tolerate the intensity of services in acute rehab. Past Medical History:   Past Medical History:   Diagnosis Date    Back problem     Cancer ([address])     cancerous tumor to tongue, 1/3rd tongue removed in Aug. 2010. Followed with radiation treatment. DDD (degenerative disc disease), lumbar 12/05/2022    Dehydration 9/5/2023    Hearing difficulty of both ears     Hypercholesterolemia     Hypertension     pt. states only takes his BP med twice weekly due to decrease BP after weight loss.     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

## 2023-09-21 NOTE — PROGRESS NOTES
LTG: patient will tolerate safest, least restrictive oral diet without s/sx airway compromise  STG: Patient will perform laryngeal exercises x10 each with 80% accuracy. STG: Patient will perform diaphragmatic breathing exercises with 75% accuracy with moderate verbal cues. STG: Patient will communicate at the single word level using breath support strategies with 60% intelligibility. [facility]   SPEECH LANGUAGE PATHOLOGY: COMBINED Voice AND Dysphagia Initial Assessment    MRN: [id number]    ADMISSION DATE: 9/20/2023  ADMITTING DIAGNOSIS: CVA (cerebrovascular accident due to intracerebral hemorrhage) ([address])  Debility [R53.81]  CVA (cerebrovascular accident due to intracerebral hemorrhage) ([address]) [I61.9]  Date of Eval: 9/21/2023    ICD-10: Treatment Diagnosis: R13.12 Dysphagia, Oropharyngeal Phase  R49.0 Dysphonia; Hoarseness    RECOMMENDATIONS   Diet Recommendation: NPO     Medications: non-oral     Additional Recommendations: All po via PEG. Frequent oral care. Patient continues to require skilled intervention: to address dysphagia and speech production  Recommend speech therapy services during acute inpatient rehab stay and at next level of care       ASSESSMENT       Dysphagia: Severe dysphagia. Recommend strict NPO with frequent oral care including suctioning. Dysphagia linked to squamous cell carcinoma recurrence. Plan to introduce dysphagia exercises in attempt preserve/improve swallow function. Voice: Severe impairment in expressive communication. He is tolerating speaking valve well, but significant air loss from around trach. Impaired breath support also impacting functional communication. Will address breath support and speech production strategies are part of treatment plan. GENERAL   Subjective:  Patient cooperative with evaluation. Wife at bedside during session    Prior level of functioning: Patient s/p trach and PEG.   Dysphagia History: Per ENT H&P \"squamous cell carcinoma of the left skin every 72 hours 4 patch 3    Magic Mouthwash (MIRACLE MOUTHWASH) Swish and spit 5 mLs 4 times daily as needed for [facility] to mix equal parts Benadryl, Maalox, Viscous Lidocaine. Take 5 mL 4 times daily as needed, gargle/swish/spit. 480 mL 5    naloxone (NARCAN) 4 MG/0.1ML LIQD nasal spray Use 1 spray intranasally at onset of opioid overdose. May repeat dose using alternate nostril every 2 minutes as needed should symptoms persist or recur.  (Patient not taking: Reported on 9/6/2023) 2 each 5    omeprazole (PRILOSEC) 40 MG delayed release capsule Take 1 capsule by mouth daily (Patient not taking: Reported on 9/6/2023) 90 capsule 2       PRECAUTIONS/ALLERGIES: Zolpidem and Sulfamethoxazole-trimethoprim           Therapy Time  SLP Individual Minutes  Time In: 1218  Time Out: 6324  Minutes: [address] AISHA Prince  9/21/2023 2:12 PM

## 2023-09-21 NOTE — CARE COORDINATION
Transition of care outreach postponed for 7 days due to patient's discharge to 9th floor/[facility] inpatient rehab facility.

## 2023-09-21 NOTE — PROGRESS NOTES
[facility] OCCUPATIONAL THERAPY INITIAL EVALUATION    Time In    9917; 5780   Time Out    7590; 1008     HPI (per MD report): \"This is a 69year old with past medical history of polio as a child, mobility with crutches, squamous cell carcinoma of the tongue 15 years ago with left lateral tongue resection,  partial glossectomy with tonsillectomy, neck dissection with radial forearm free flap to left oral cavity in 2010. S/p radiation therapy, multiple teeth removed from his left lower jaw followed by HBO therapy. Recently, PET scan was   positive for mass at base of tongue with lymph node involvement and biopsy of larynx and epiglottis positive for   squamous cell cancer. He is now admitted on 9/5 after a fall with dizziness, weakness and septic. MRI was positive for an acute left cerebellar CVA. Trach and PEG placed on 9/7. Completed chemotherapy on 9/19. Acute therapies were initiated and the patient was starting to mobilize. He has significant mobility and self care impairments. It was felt he would benefit from comprehensive acute inpatient rehabilitation, continued close medical supervision and management prior to returning home. The medical stability and these recent medical events are not expected to interfere with the patient's ability to tolerate the intensity of services in acute rehab. \"  Past Medical History:   Diagnosis Date    Back problem     Cancer ([address])     cancerous tumor to tongue, 1/3rd tongue removed in Aug. 2010. Followed with radiation treatment. DDD (degenerative disc disease), lumbar 12/05/2022    Dehydration 9/5/2023    Hearing difficulty of both ears     Hypercholesterolemia     Hypertension     pt. states only takes his BP med twice weekly due to decrease BP after weight loss. Polio     as a child, affected left leg/ wears brace    Tongue cancer ([address])      Patient's Goal: Pt writes, \"To get back to where I was. \"  Pain: No pain expressed.   Precautions: Falls, Impulsive, Poor Safety Awareness, and Trach, PEG , NPO, Droplet, Bed/Chair Alarm    Prior Level of Function: Pt reports he was R[place] with ambulation, ADLs, and some IADLs using L KAFO and a pair of crutches. 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 Added 9/22/23: Pt reports he does not shower at home, he only takes tub baths. Pt reports he sits on the wall of the tub and lowers himself down with BUE, then climbs up into sitting on the wall to get back out. LIVING SITUATION:    Environment   Living Alone No   Support System Spouse/Significant Other    Home Set Up 3 Level Home with needs on ground level   Home Entrance 2 Step(s) to Enter   Current DME Crutches, 3 wheeled rollator   Bathroom S/U Pt takes tub baths. Denies grab bars.       UPPER EXTREMITY ASSESSMENT:   RUE LUE   General Evaluation Generally Decreased, Functional Generally Decreased, Functional   FMC Generally Decreased, Functional Generally Decreased, Functional   GMC Generally Decreased, Functional Generally Decreased, Functional   Light Touch NT NT   Proprioception Intact Intact   Muscle Tone Normal Normal     STRENGTH: ANA MISHRA   Elbow Extension  4-/5 Completes full range of motion against gravity with minimal-moderate resistance 3/5 Able to complete full range of motion against gravity     BALANCE:   Static Dynamic   Sitting Good-/Fair+: Accepts minimal resistance  Good-/Fair+: Able to sit unsupported and weight shift across midline minimally   Standing Fair-: Requires min A or UE support in order to stand without a LOB Poor +: Able to stand with min A and reach ipsilaterally, unable to weight shift      FUNCTIONAL MOBILITY:    Score Comments   Rolling Supervision or touching assistance HOB raised >30 degrees   Supine to Sit Supervision or touching assistance Cues for safety, assist to manage lines   Sit to Supine Supervision or touching assistance HOB raised   Sit to Stand Dependent Assist x2 for safety   Transfer Assist Dependent ModA x2 for safety

## 2023-09-22 LAB
ANION GAP SERPL CALC-SCNC: 11 MMOL/L (ref 2–11)
BUN SERPL-MCNC: 27 MG/DL (ref 8–23)
CALCIUM SERPL-MCNC: 8.4 MG/DL (ref 8.3–10.4)
CHLORIDE SERPL-SCNC: 101 MMOL/L (ref 101–110)
CO2 SERPL-SCNC: 21 MMOL/L (ref 21–32)
CREAT SERPL-MCNC: 0.6 MG/DL (ref 0.8–1.5)
GLUCOSE BLD STRIP.AUTO-MCNC: 111 MG/DL (ref 65–100)
GLUCOSE BLD STRIP.AUTO-MCNC: 115 MG/DL (ref 65–100)
GLUCOSE BLD STRIP.AUTO-MCNC: 116 MG/DL (ref 65–100)
GLUCOSE BLD STRIP.AUTO-MCNC: 143 MG/DL (ref 65–100)
GLUCOSE SERPL-MCNC: 110 MG/DL (ref 65–100)
MAGNESIUM SERPL-MCNC: 2 MG/DL (ref 1.8–2.4)
PHOSPHATE SERPL-MCNC: 3.4 MG/DL (ref 2.3–3.7)
POTASSIUM SERPL-SCNC: 3.5 MMOL/L (ref 3.5–5.1)
SERVICE CMNT-IMP: ABNORMAL
SODIUM SERPL-SCNC: 133 MMOL/L (ref 133–143)

## 2023-09-22 PROCEDURE — 2580000003 HC RX 258: Performed by: PHYSICAL MEDICINE & REHABILITATION

## 2023-09-22 PROCEDURE — 97530 THERAPEUTIC ACTIVITIES: CPT

## 2023-09-22 PROCEDURE — 84100 ASSAY OF PHOSPHORUS: CPT

## 2023-09-22 PROCEDURE — 6370000000 HC RX 637 (ALT 250 FOR IP): Performed by: PHYSICAL MEDICINE & REHABILITATION

## 2023-09-22 PROCEDURE — C9113 INJ PANTOPRAZOLE SODIUM, VIA: HCPCS | Performed by: PHYSICAL MEDICINE & REHABILITATION

## 2023-09-22 PROCEDURE — 97116 GAIT TRAINING THERAPY: CPT

## 2023-09-22 PROCEDURE — 97110 THERAPEUTIC EXERCISES: CPT

## 2023-09-22 PROCEDURE — 6360000002 HC RX W HCPCS: Performed by: PHYSICAL MEDICINE & REHABILITATION

## 2023-09-22 PROCEDURE — 80048 BASIC METABOLIC PNL TOTAL CA: CPT

## 2023-09-22 PROCEDURE — 99232 SBSQ HOSP IP/OBS MODERATE 35: CPT | Performed by: PHYSICAL MEDICINE & REHABILITATION

## 2023-09-22 PROCEDURE — 83735 ASSAY OF MAGNESIUM: CPT

## 2023-09-22 PROCEDURE — 94760 N-INVAS EAR/PLS OXIMETRY 1: CPT

## 2023-09-22 PROCEDURE — 2700000000 HC OXYGEN THERAPY PER DAY

## 2023-09-22 PROCEDURE — 82962 GLUCOSE BLOOD TEST: CPT

## 2023-09-22 PROCEDURE — 94640 AIRWAY INHALATION TREATMENT: CPT

## 2023-09-22 PROCEDURE — 94761 N-INVAS EAR/PLS OXIMETRY MLT: CPT

## 2023-09-22 PROCEDURE — A4216 STERILE WATER/SALINE, 10 ML: HCPCS | Performed by: PHYSICAL MEDICINE & REHABILITATION

## 2023-09-22 PROCEDURE — 92507 TX SP LANG VOICE COMM INDIV: CPT

## 2023-09-22 PROCEDURE — 1180000000 HC REHAB R&B

## 2023-09-22 RX ADMIN — Medication 4 ML: at 08:36

## 2023-09-22 RX ADMIN — ENOXAPARIN SODIUM 40 MG: 100 INJECTION SUBCUTANEOUS at 08:26

## 2023-09-22 RX ADMIN — SODIUM CHLORIDE 40 MG: 9 INJECTION, SOLUTION INTRAMUSCULAR; INTRAVENOUS; SUBCUTANEOUS at 08:39

## 2023-09-22 RX ADMIN — ASPIRIN 81 MG: 81 TABLET, CHEWABLE ORAL at 08:26

## 2023-09-22 RX ADMIN — SODIUM CHLORIDE 500 ML: 4.5 INJECTION, SOLUTION INTRAVENOUS at 17:12

## 2023-09-22 RX ADMIN — ALBUTEROL SULFATE 2.5 MG: 2.5 SOLUTION RESPIRATORY (INHALATION) at 15:03

## 2023-09-22 RX ADMIN — ALBUTEROL SULFATE 2.5 MG: 2.5 SOLUTION RESPIRATORY (INHALATION) at 19:08

## 2023-09-22 RX ADMIN — ALBUTEROL SULFATE 2.5 MG: 2.5 SOLUTION RESPIRATORY (INHALATION) at 08:36

## 2023-09-22 RX ADMIN — ROSUVASTATIN CALCIUM 20 MG: 20 TABLET, FILM COATED ORAL at 21:48

## 2023-09-22 RX ADMIN — TRAZODONE HYDROCHLORIDE 50 MG: 50 TABLET ORAL at 21:48

## 2023-09-22 RX ADMIN — ALBUTEROL SULFATE 2.5 MG: 2.5 SOLUTION RESPIRATORY (INHALATION) at 01:23

## 2023-09-22 RX ADMIN — FERROUS GLUCONATE 324 MG: 324 TABLET ORAL at 08:25

## 2023-09-22 RX ADMIN — Medication 250 MG: at 08:26

## 2023-09-22 RX ADMIN — MIRTAZAPINE 7.5 MG: 15 TABLET, FILM COATED ORAL at 21:48

## 2023-09-22 RX ADMIN — Medication 4 ML: at 19:08

## 2023-09-22 NOTE — PROGRESS NOTES
If you need to see a   -  just ask the nurse to call us. We look forward to serving your family!!         Betty Brown

## 2023-09-22 NOTE — PROGRESS NOTES
Occupational Therapy  OT Daily Note  Time In 1301   Time Out 1349     Subjective: \"That really worked! \" [Regarding arm exercises]  Pain: No complaint of pain. Education: transfer training, benefits of rehab, importance of being open to new ways of doing things such as bathing in shower/tub   Interdisciplinary Communication: with primary treating OT regarding functional status and home bathroom situation    Mobility   Score Comments   Rolling       Supine to Sit       Sit to Supine       Sit to Stand       Transfer Assist  CGA  CGA for LPT from edge of bed to WC to R      Activity Tolerance   Patient completed BUE strengthening and activity tolerance task using BUE to transfer rings x 12 rings x 10 sets with 4# clothespin. Required short rest breaks between sets and reported activity was a good challenge for shoulder strength. Coordination   Patient completed fine motor coordination task using medium density theraputty to remove 10 small beads with BUE. Completed with good quality and within appropriate timeframe. Utilized rolling pin to flatten putty into approx 1/4\" depth [place] and then replaced beads with good quality. WC management   Patient propelled self in  from room to gym with SBA. Patient maneuvered self in [facility] in room with extra time. Assessment: Making steady gains. Patient tolerated session well and was left seated up in [facility] in room with call light/all needs in reach. Wife present and instructed to notify staff when she left so chair alarm could be placed. Wife verbalized understanding. Plan: Continue OT POC with focus on ongoing deficits.      MALLORY Zarco/KAREN   9/22/2023

## 2023-09-22 NOTE — PROGRESS NOTES
LTG: patient will tolerate safest, least restrictive oral diet without s/sx airway compromise  STG: Patient will perform laryngeal exercises x10 each with 80% accuracy. STG: Patient will perform diaphragmatic breathing exercises with 75% accuracy with moderate verbal cues. STG: Patient will communicate at the single word level using breath support strategies with 60% intelligibility. [facility]   SPEECH LANGUAGE PATHOLOGY: COMBINED Voice AND Dysphagia Daily Note #1    MRN: [id number]    ADMISSION DATE: 9/20/2023  ADMITTING DIAGNOSIS: CVA (cerebrovascular accident due to intracerebral hemorrhage) ([address])  Debility [R53.81]  CVA (cerebrovascular accident due to intracerebral hemorrhage) ([address]) [I61.9]  Date of Eval: 9/22/2023    ICD-10: Treatment Diagnosis: R13.12 Dysphagia, Oropharyngeal Phase  R49.0 Dysphonia; Hoarseness    RECOMMENDATIONS   Diet Recommendation: NPO     Medications: non-oral     Additional Recommendations: All po via PEG. Frequent oral care. Speech Strategies;  - Use diaphragmatic breathing strategies  - Over articulation- especially with lingual movements  - Speak loudly     Patient continues to require skilled intervention: to address dysphagia and speech production  Recommend speech therapy services during acute inpatient rehab stay and at next level of care       ASSESSMENT       Dysphagia: Not addressed today    Voice: Slight improvement speech intelligibility at the single word level today. He implements strategies for breath support, volume, and over articulation with minimal-moderate assistance to communicate single syllable words with 80% intelligibility. Increased difficulty with 2 syllable words or short phrases due to decreased breath support. Prior glossectomy also impacted articulation. Air loss from around trach continues to limit breath support and intelligibility. Will address breath support and speech production strategies are part of treatment plan.      GENERAL   Subjective: of upper esophageal sphincter resulting in silent aspiration before during and after the swallow with tsp trial of thin liquids and deep nonclearing laryngeal penetration of tsp of mildly thick liquid prior to the swallow with silent aspiration during the swallow. While there was no penetration of tsp trial of moderately thick liquid, there only trace amount entering esophagus with the remainder pooled in valleculae and pyriform sinuses that patient was unable to clear and will eventually enter airway. No further trials attempted and patient was orally suctioned. Pain:   Patient does not c/o pain (Frequent repositioning in bed due to wound)                     OBJECTIVE   Increased intelligibility with spontaneous speech today- judged at 50-60% intelligibility. He independently reduces speech to single word utterances and takes additional breath before speaking additional words. Speech intelligibility strategies introduced. Discussed using large labial movements to assess with articulation, especially given prior glossectomy. Also reviewed breath support strategies from prior session. He implemented strategies with min-mod assistance and demonstration count 1-10 with 85% intelligibility. Stated days of week with 60% intelligibility. He often needed additional breath to state second syllable due to poor breath support. Attempted to use strategies in unknown context (reading scripture). Speech 0% intelligible in unknown context. Dysphagia: Not addressed     PLAN    Duration/Frequency: Continue to follow patient 2-3 x/week for duration of hospitalization to address goals listed or until goals met. Medical Necessity/Other Comments:   Patient is expected to demonstrate progress in swallow strength to decrease aspiration risk.   Patient demonstrates fair rehab potential due to higher previous functional level., Patient is expected to demonstrate progress in phonation/respiration coordination to improve

## 2023-09-22 NOTE — PROGRESS NOTES
Inpatient [address] [place], [address]  [facility]  Tel: [phone]     Physical Medicine and Rehabilitation Progress Note      Mariano Garcia  Admit Date: 9/20/2023  Admit Diagnosis: Debility [R53.81]  CVA (cerebrovascular accident due to intracerebral hemorrhage) ([address]) [I61.9]    Subjective     Patient seen face-to-face. Reports loose stools and lightheadedness this am. BP - 97/54. Denies nausea or SOB. No O2 desats during therapy.     Objective:     Current Facility-Administered Medications   Medication Dose Route Frequency    sodium phosphate 10 mmol in sodium chloride 0.9 % 250 mL IVPB  10 mmol IntraVENous PRN    Or    sodium phosphate 15 mmol in sodium chloride 0.9 % 250 mL IVPB  15 mmol IntraVENous PRN    Or    sodium phosphate 20 mmol in sodium chloride 0.9 % 500 mL IVPB  20 mmol IntraVENous PRN    magnesium sulfate 2000 mg in 50 mL IVPB premix  2,000 mg IntraVENous PRN    potassium bicarb-citric acid (EFFER-K) effervescent tablet 40 mEq  40 mEq Per G Tube PRN    Or    potassium chloride 10 mEq/100 mL IVPB (Peripheral Line)  10 mEq IntraVENous PRN    acetaminophen (TYLENOL) tablet 650 mg  650 mg Oral Q6H PRN    Or    acetaminophen (TYLENOL) suppository 650 mg  650 mg Rectal Q6H PRN    albuterol (PROVENTIL) (2.5 MG/3ML) 0.083% nebulizer solution 2.5 mg  2.5 mg Nebulization Q6H RT    aluminum & magnesium hydroxide-simethicone (MAALOX) 200-200-20 MG/5ML suspension 30 mL  30 mL Oral Q6H PRN    aspirin chewable tablet 81 mg  81 mg Oral Daily    bisacodyl (DULCOLAX) suppository 10 mg  10 mg Rectal Daily PRN    bismuth subsalicylate (PEPTO BISMOL) 262 MG/15ML suspension 30 mL  30 mL Oral Q6H PRN    enoxaparin (LOVENOX) injection 40 mg  40 mg SubCUTAneous Daily    famotidine (PEPCID) tablet 10 mg  10 mg Oral Daily PRN    ferrous gluconate (FERGON) tablet 324 mg  324 mg Oral Daily with breakfast    glycopyrrolate (ROBINUL) injection 0.1 mg  0.1 mg IntraVENous Q4H PRN - 0.2 K/uL    Differential Type AUTOMATED      Neutrophils % 78 43 - 78 %    Lymphocytes % 17 13 - 44 %    Monocytes % 3 (L) 4.0 - 12.0 %    Eosinophils % 1 0.5 - 7.8 %    Basophils % 0 0.0 - 2.0 %    Immature Granulocytes 1 0.0 - 5.0 %    Neutrophils Absolute 5.7 1.7 - 8.2 K/UL    Lymphocytes Absolute 1.2 0.5 - 4.6 K/UL    Monocytes Absolute 0.2 0.1 - 1.3 K/UL    Eosinophils Absolute 0.1 0.0 - 0.8 K/UL    Basophils Absolute 0.0 0.0 - 0.2 K/UL    Absolute Immature Granulocyte 0.0 0.0 - 0.5 K/UL   Comprehensive Metabolic Panel    Collection Time: 09/21/23  4:05 AM   Result Value Ref Range    Sodium 135 133 - 143 mmol/L    Potassium 3.6 3.5 - 5.1 mmol/L    Chloride 101 101 - 110 mmol/L    CO2 27 21 - 32 mmol/L    Anion Gap 7 2 - 11 mmol/L    Glucose 140 (H) 65 - 100 mg/dL    BUN 28 (H) 8 - 23 MG/DL    Creatinine 0.60 (L) 0.8 - 1.5 MG/DL    Est, Glom Filt Rate >60 >60 ml/min/1.73m2    Calcium 8.1 (L) 8.3 - 10.4 MG/DL    Total Bilirubin 0.3 0.2 - 1.1 MG/DL    ALT 26 12 - 65 U/L    AST 15 15 - 37 U/L    Alk Phosphatase 63 50 - 136 U/L    Total Protein 5.7 (L) 6.3 - 8.2 g/dL    Albumin 2.2 (L) 3.2 - 4.6 g/dL    Globulin 3.5 2.8 - 4.5 g/dL    Albumin/Globulin Ratio 0.6 0.4 - 1.6     Magnesium    Collection Time: 09/21/23  4:05 AM   Result Value Ref Range    Magnesium 2.1 1.8 - 2.4 mg/dL   POCT Glucose    Collection Time: 09/21/23 12:42 PM   Result Value Ref Range    POC Glucose 146 (H) 65 - 100 mg/dL    Performed by: Reji    POCT Glucose    Collection Time: 09/21/23  4:40 PM   Result Value Ref Range    POC Glucose 125 (H) 65 - 100 mg/dL    Performed by: Reji    POCT Glucose    Collection Time: 09/21/23  8:29 PM   Result Value Ref Range    POC Glucose 106 (H) 65 - 100 mg/dL    Performed by: Tori Lee    Basic Metabolic Panel    Collection Time: 09/22/23  5:51 AM   Result Value Ref Range    Sodium 133 133 - 143 mmol/L    Potassium 3.5 3.5 - 5.1 mmol/L    Chloride 101 101 - 110 mmol/L    CO2 21

## 2023-09-22 NOTE — CARE COORDINATION
CM met with patient / spouse at bedside and discussed discharge plan and needs. Patient spouse completed  the CMA. Spouse verified demographic / 09/22/23 [id number]   Service Assessment   Patient Orientation Alert and Oriented;Person;Place;Situation;Self   Cognition Alert   History Provided By Patient;Significant Other;Medical Record   Primary Caregiver Spouse   Support Systems Spouse/Significant Other;Children;Family Members   Patient's Healthcare Decision Maker is: Legal Next of Kin   PCP Verified by CM Yes   Last Visit to PCP Within last 3 months   Prior Functional Level Assistance with the following:;Mobility   Current Functional Level Assistance with the following:;Mobility   Can patient return to prior living arrangement Yes   Ability to make needs known: Good   Family able to assist with home care needs: Yes   Would you like for me to discuss the discharge plan with any other family members/significant others, and if so, who? Yes   Financial Resources  Resources None   Social/Functional History   Lives With Spouse   Type of [address] [place] [address] One level   Home Access Stairs to enter with rails   Entrance Stairs - Number of Steps 2   Entrance Stairs - Rails Both   Bathroom Shower/Tub None   Bathroom Toilet Standard   Bathroom Equipment None   Bathroom Accessibility Accessible   Home Equipment Crutches;Walker, rolling   Receives Help From Family   ADL Assistance Needs assistance   Homemaking Responsibilities No   Ambulation Assistance Needs assistance   Transfer Assistance Needs assistance   Active  No   Patient's  Info Family   Mode of Transportation Family   Occupation Retired   Discharge Planning   Living Arrangements Spouse/Significant Other   Current Services Prior To Admission Durable Medical Equipment   Current DME Prior to [address]   DME Ordered?  No   Potential Assistance Purchasing Medications No   Type of

## 2023-09-22 NOTE — PROGRESS NOTES
PHYSICAL THERAPY DAILY NOTE  Time In:  2195; 1115  Time Out:  6143;7350  Total Treatment Time: 41 min; 39 Minutes co-treat with OT both sessions  Pt. Seen for: AM, PM, Balance Training, Gait Training, Patient Education, Therapeutic Exercise, Transfer Training, and Wheelchair mobility     Subjective: Pt reports that he has a confession. He got up to the bathroom as he had diarrhea. Unsure if pt went alone or with nursing staff during the night. Pt asking why his stamina is so low. Objective:  Precautions: Falls, PEG, Impulsive , and hinged KAFO on L, trach    GROSS ASSESSMENT Daily Assessment    Pt resting in bed--prefers to w/c. Agreeable to PT/OT and accepting ideas for treatment. Pt only on tcollar for humidified air, removed tcollar for therapy. Sats 98-99% t/o, HR in first session was 106-120 t/o and noted to have BP of 89/59 upon returning to room at end of session. Denies any dizziness. --RN notified. BP assessed t/o 2nd session and was 96/63 initially and 109/73 with activity.  after activity.   MD notified       COGNITION Daily Assessment    Orientation:A&O x 3  Communication:able to express needs--improved communication with note pad, able to follow one step commands without assistance  Social Interaction:cooperative, appropriate with PT, participating, motivated to improve, cues for impulsivity  Memory:appears intact        BED/MAT MOBILITY Daily Assessment   Sup for all bed mob        TRANSFERS Daily Assessment   Shower transfer with vertical grab bar were easier for pt--he has 2 on each side of him at home for toileting, stand pivot to shower seat but pt says he only baths in tub at home  Pt had difficulty rising to stand with rail, used table to pull himself up with less assistance, sit/stand with 1 hand on RW and one on armrest of w/c with increased assistance needed Sit to Stand: Min A and Mod A-2 for safety  Stand to Sit: Min A and Mod A-2 for safety  Transfer Type: Stand Pivot  Transfer Assistance: Min A--2 for safety  Car Transfers: NT         GAIT Daily Assessment   Pt asking why he gets tired so quickly Amount of Assistance: Min A-2 for safety + w/c follow  Distance (ft): 2 x 40ft  Assistive Device: RW  Surface: Level Surface       STEPS/STAIRS Daily Assessment   N/a        BALANCE Daily Assessment   Stood at tall table for reaching activities. Initially, balancing self with trunk against table, able to maintain balance with staggered stance and 1 hand on table for support Static Sitting: Normal:  Pt. able to maintain balance w/o UE support  Dynamic Sitting: Fair - accepts minimal challenge;  can maintain balance while turning head/trunk  Static Standing: Fair:  Pt. requires UE support;  may need occasional min A  Dynamic Standing: Poor - unable to accept challenge or move without LOB        WHEELCHAIR MOBILITY Daily Assessment   Instruction provided in w/c brakes, legrests Able to Propel (ft): 2 x 150  Assistance: Supervision/Standby Assist  Surface: Level Surface  Wheelchair Set-up: Dependent       LOWER EXTREMITY EXERCISES Daily Assessment    Motomed for arms/legs x 5 min with several short rest breaks on level 3     Pain level: none reported      Vital Signs:  as above    Education:  Pt instructed in bed mobility, transfers, gait, balance training,  w/c mobility including parts management. Interdisciplinary Communication: Collaborated w/ OT and care team regarding pt's progress. Pt returned to bed at end of session with call light and tray table in reach. Assessment: Pt demonstrates improved cooperation with therapy. Recognizing endurance deficit. Will likely need a w/c at d/c but have not addressed yet. Plan of Care: Continue with POC and progress as tolerated.       KATERINA POSADAS, PT  9/22/2023

## 2023-09-22 NOTE — CARE COORDINATION
Patient needs are continue to be followed by Dr. Layla Oliveira.  Patient has no discharge date / plan at this time scheduled. Patient new to the floor. CM will continue to follow / monitor for any needs, concerns or questions that may arise.

## 2023-09-23 LAB
ANION GAP SERPL CALC-SCNC: 7 MMOL/L (ref 2–11)
BUN SERPL-MCNC: 24 MG/DL (ref 8–23)
CALCIUM SERPL-MCNC: 8 MG/DL (ref 8.3–10.4)
CHLORIDE SERPL-SCNC: 103 MMOL/L (ref 101–110)
CO2 SERPL-SCNC: 26 MMOL/L (ref 21–32)
CREAT SERPL-MCNC: 0.5 MG/DL (ref 0.8–1.5)
GLUCOSE BLD STRIP.AUTO-MCNC: 126 MG/DL (ref 65–100)
GLUCOSE BLD STRIP.AUTO-MCNC: 137 MG/DL (ref 65–100)
GLUCOSE BLD STRIP.AUTO-MCNC: 156 MG/DL (ref 65–100)
GLUCOSE BLD STRIP.AUTO-MCNC: 176 MG/DL (ref 65–100)
GLUCOSE SERPL-MCNC: 114 MG/DL (ref 65–100)
MAGNESIUM SERPL-MCNC: 1.9 MG/DL (ref 1.8–2.4)
PHOSPHATE SERPL-MCNC: 2.7 MG/DL (ref 2.3–3.7)
POTASSIUM SERPL-SCNC: 3.9 MMOL/L (ref 3.5–5.1)
SERVICE CMNT-IMP: ABNORMAL
SODIUM SERPL-SCNC: 136 MMOL/L (ref 133–143)

## 2023-09-23 PROCEDURE — A4216 STERILE WATER/SALINE, 10 ML: HCPCS | Performed by: PHYSICAL MEDICINE & REHABILITATION

## 2023-09-23 PROCEDURE — 2580000003 HC RX 258: Performed by: PHYSICAL MEDICINE & REHABILITATION

## 2023-09-23 PROCEDURE — 99232 SBSQ HOSP IP/OBS MODERATE 35: CPT | Performed by: PHYSICAL MEDICINE & REHABILITATION

## 2023-09-23 PROCEDURE — C9113 INJ PANTOPRAZOLE SODIUM, VIA: HCPCS | Performed by: PHYSICAL MEDICINE & REHABILITATION

## 2023-09-23 PROCEDURE — 84100 ASSAY OF PHOSPHORUS: CPT

## 2023-09-23 PROCEDURE — 2700000000 HC OXYGEN THERAPY PER DAY

## 2023-09-23 PROCEDURE — 6360000002 HC RX W HCPCS

## 2023-09-23 PROCEDURE — 94640 AIRWAY INHALATION TREATMENT: CPT

## 2023-09-23 PROCEDURE — 80048 BASIC METABOLIC PNL TOTAL CA: CPT

## 2023-09-23 PROCEDURE — 97530 THERAPEUTIC ACTIVITIES: CPT

## 2023-09-23 PROCEDURE — 97116 GAIT TRAINING THERAPY: CPT

## 2023-09-23 PROCEDURE — 6360000002 HC RX W HCPCS: Performed by: PHYSICAL MEDICINE & REHABILITATION

## 2023-09-23 PROCEDURE — 83735 ASSAY OF MAGNESIUM: CPT

## 2023-09-23 PROCEDURE — 94760 N-INVAS EAR/PLS OXIMETRY 1: CPT

## 2023-09-23 PROCEDURE — 82962 GLUCOSE BLOOD TEST: CPT

## 2023-09-23 PROCEDURE — 6370000000 HC RX 637 (ALT 250 FOR IP): Performed by: PHYSICAL MEDICINE & REHABILITATION

## 2023-09-23 PROCEDURE — 94761 N-INVAS EAR/PLS OXIMETRY MLT: CPT

## 2023-09-23 PROCEDURE — 36592 COLLECT BLOOD FROM PICC: CPT

## 2023-09-23 PROCEDURE — 2500000003 HC RX 250 WO HCPCS: Performed by: PHYSICAL MEDICINE & REHABILITATION

## 2023-09-23 PROCEDURE — 1180000000 HC REHAB R&B

## 2023-09-23 RX ORDER — ALBUTEROL SULFATE 2.5 MG/3ML
SOLUTION RESPIRATORY (INHALATION)
Status: COMPLETED
Start: 2023-09-23 | End: 2023-09-23

## 2023-09-23 RX ORDER — FERROUS SULFATE 300 MG/5ML
300 LIQUID (ML) ORAL DAILY
Status: DISCONTINUED | OUTPATIENT
Start: 2023-09-23 | End: 2023-09-29 | Stop reason: HOSPADM

## 2023-09-23 RX ORDER — ALBUTEROL SULFATE 2.5 MG/3ML
2.5 SOLUTION RESPIRATORY (INHALATION) EVERY 4 HOURS PRN
Status: DISCONTINUED | OUTPATIENT
Start: 2023-09-23 | End: 2023-09-29 | Stop reason: HOSPADM

## 2023-09-23 RX ORDER — ALBUTEROL SULFATE 2.5 MG/3ML
2.5 SOLUTION RESPIRATORY (INHALATION)
Status: DISCONTINUED | OUTPATIENT
Start: 2023-09-23 | End: 2023-09-29 | Stop reason: HOSPADM

## 2023-09-23 RX ADMIN — ASPIRIN 81 MG: 81 TABLET, CHEWABLE ORAL at 09:32

## 2023-09-23 RX ADMIN — Medication 250 MG: at 09:32

## 2023-09-23 RX ADMIN — SODIUM CHLORIDE, PRESERVATIVE FREE 40 ML: 5 INJECTION INTRAVENOUS at 10:05

## 2023-09-23 RX ADMIN — MINERAL SUPPLEMENT IRON 300 MG / 5 ML STRENGTH LIQUID 100 PER BOX UNFLAVORED 300 MG: at 16:15

## 2023-09-23 RX ADMIN — ALBUTEROL SULFATE 2.5 MG: 2.5 SOLUTION RESPIRATORY (INHALATION) at 02:11

## 2023-09-23 RX ADMIN — SODIUM CHLORIDE, PRESERVATIVE FREE 20 ML: 5 INJECTION INTRAVENOUS at 20:00

## 2023-09-23 RX ADMIN — MIRTAZAPINE 7.5 MG: 15 TABLET, FILM COATED ORAL at 22:29

## 2023-09-23 RX ADMIN — TRAZODONE HYDROCHLORIDE 50 MG: 50 TABLET ORAL at 22:29

## 2023-09-23 RX ADMIN — SODIUM CHLORIDE 40 MG: 9 INJECTION, SOLUTION INTRAMUSCULAR; INTRAVENOUS; SUBCUTANEOUS at 09:32

## 2023-09-23 RX ADMIN — ALBUTEROL SULFATE 2.5 MG: 2.5 SOLUTION RESPIRATORY (INHALATION) at 16:55

## 2023-09-23 RX ADMIN — ALBUTEROL SULFATE 2.5 MG: 2.5 SOLUTION RESPIRATORY (INHALATION) at 19:06

## 2023-09-23 RX ADMIN — ALBUTEROL SULFATE 2.5 MG: 2.5 SOLUTION RESPIRATORY (INHALATION) at 08:17

## 2023-09-23 RX ADMIN — Medication 4 ML: at 19:07

## 2023-09-23 RX ADMIN — GLYCOPYRROLATE 0.1 MG: 0.2 INJECTION INTRAMUSCULAR; INTRAVENOUS at 16:05

## 2023-09-23 RX ADMIN — ROSUVASTATIN CALCIUM 20 MG: 20 TABLET, FILM COATED ORAL at 22:29

## 2023-09-23 RX ADMIN — Medication 4 ML: at 08:17

## 2023-09-23 RX ADMIN — ENOXAPARIN SODIUM 40 MG: 100 INJECTION SUBCUTANEOUS at 09:33

## 2023-09-23 NOTE — PROGRESS NOTES
Occupational Therapy  OT Daily Note  Time In 0838   Time Out 3274     Subjective: \"I'm going to walk with the crutches. \" [When asked what mobility device he intended to use at discharge]  Pain: No complaint of pain. Education: benefits of rehab, safety training   Interdisciplinary Communication: co-treat with PT     Mobility   Score Comments   Rolling       Supine to Sit       Sit to Supine       Sit to Stand       Transfer Assist  CGA LPT edge of bed to [facility]  Setup assist for managing armrests on WC; steadying assist      Activity Tolerance   Patient completed functional mobility task ambulating in gym with min A x 2 to CGA x 2 (total of assist x 3 with two therapists and chair follow) x 40 feet x 4 times with rest breaks between each set. First two sets of ambulation were using RW and second two were using crutches, after patient told therapist regardless of recommendation he intended to use crutches at home. However, patient then also stated that he would do whatever therapists asked of him. During rest breaks, monitored patient's vital signs using RW vs crutches; after ambulation using RW,  and oxygen saturation 99% on room air. Following ambulation with crutches, patient visibly winded, , and oxygen saturation 97%. Discussed energy conservation and energy requirements required for different mobility devices, and pointed out to patient that the walker was both more stable, he had less trunk sway, and that it required less energy output as evidenced by vital signs. Patient acknowledged difference but continues to prefer crutches. Noted increased trunk sway when using crutches in reciprocal pattern vs step through pattern; patient reported prior to admit that he used them \"however I could to get around. \" Patient demonstrates poor insight into deficits and decreased mental flexibility.  Acknowledged that patient has utilized crutches for mobility for his entire life, but continued to educate patient that the goal of therapists is to ensure he is able to safely mobilize and decrease fall risks during functional mobility. Patient uses heavy reliance on grab bars to transfer from sit to stand, and states that when he is in the community he pulls up on whatever he has to in order to achieve upright standing. Patient would benefit from further education and practice with both PT and OT in terms of functional mobility, least restrictive assistive device, and safety. Assessment: Decreased insight into deficits remains a barrier. Using walker for functional mobility required less energy and patient demonstrated less trunk sway. Remains high fall risk and would benefit from further therapy to address. Patient tolerated session well and was left semi-lyman's in bed with call light/all needs in reach. Plan: Continue OT POC with focus on ongoing deficits.      Tim Fam OTR/L   9/23/2023

## 2023-09-23 NOTE — PROGRESS NOTES
RT unable to pass suction catheter while performing trach care this morning. Pt does not appear to be in distress at this time, SpO2 100% on trach collar of 5 lpm and 28%.

## 2023-09-23 NOTE — PROGRESS NOTES
Physical Therapy  Facility/Department: [facility] INPATIENT REHAB UNIT  Daily Treatment Note  NAME: Niyah Lamb  : 1945  MRN: [id number]    Date of Service: 2023    Discharge Recommendations:           Patient Diagnosis(es): The encounter diagnosis was Squamous cell carcinoma of oropharynx ([address]). Assessment         Plan          Restrictions        Subjective          Objective   Vitals                         Goals       Education       Therapy Time   Individual Concurrent Group Co-treatment   Time In [address] Chelsey Holcomb         Time Out 0923         Minutes 44         Timed Code Treatment Minutes: 40 Minutes       Sonia Baumann PTA         PHYSICAL THERAPY DAILY NOTE  Time In:  8:39 am  Time Out:  9:23 am  Total Treatment Time:  40 Minutes  Pt. Seen for: AM, Gait Training, and Transfer Training     Subjective: \"I want to use crutches that's what I always do\"         Objective:  Precautions: Falls    GROSS ASSESSMENT Daily Assessment           COGNITION Daily Assessment    impulsive       BED/MAT MOBILITY Daily Assessment    Rolling Right: Supervision/Standby Assist  Rolling Left: Supervision/Standby Assist  Supine to Sit: Supervision/Standby Assist  Sit to Supine: Supervision/Standby Assist       TRANSFERS Daily Assessment   Pt uses HR at wall in gym to assist pull up with sit to stand transition with OT and PT assist Sit to Stand: Mod A  Stand to Sit: Mod A  Transfer Type: Stand Pivot  Transfer Assistance: Mod A  Car Transfers: NT         GAIT Daily Assessment   40ft x4 trials min a x2 OT and PT.  KAFO donned    x22 trials using FWW and x2 trials crutches Amount of Assistance: Min A  Distance (ft): 40  Assistive Device: RW  Surface: Level Surface       STEPS/STAIRS Daily Assessment    Steps Ambulated:  0  Level of Assistance:  NT  Railing:No Rails  Assistive Device:  NT       BALANCE Daily Assessment    Static Sitting: Normal:  Pt. able to maintain balance w/o UE support  Dynamic Sitting: Fair - accepts

## 2023-09-24 LAB
ANION GAP SERPL CALC-SCNC: 7 MMOL/L (ref 2–11)
BUN SERPL-MCNC: 19 MG/DL (ref 8–23)
CALCIUM SERPL-MCNC: 8.2 MG/DL (ref 8.3–10.4)
CHLORIDE SERPL-SCNC: 101 MMOL/L (ref 101–110)
CO2 SERPL-SCNC: 24 MMOL/L (ref 21–32)
CREAT SERPL-MCNC: 0.6 MG/DL (ref 0.8–1.5)
GLUCOSE BLD STRIP.AUTO-MCNC: 158 MG/DL (ref 65–100)
GLUCOSE SERPL-MCNC: 117 MG/DL (ref 65–100)
MAGNESIUM SERPL-MCNC: 1.8 MG/DL (ref 1.8–2.4)
PHOSPHATE SERPL-MCNC: 2.4 MG/DL (ref 2.3–3.7)
POTASSIUM SERPL-SCNC: 4 MMOL/L (ref 3.5–5.1)
SERVICE CMNT-IMP: ABNORMAL
SODIUM SERPL-SCNC: 132 MMOL/L (ref 133–143)

## 2023-09-24 PROCEDURE — 2700000000 HC OXYGEN THERAPY PER DAY

## 2023-09-24 PROCEDURE — 94761 N-INVAS EAR/PLS OXIMETRY MLT: CPT

## 2023-09-24 PROCEDURE — 6370000000 HC RX 637 (ALT 250 FOR IP): Performed by: PHYSICAL MEDICINE & REHABILITATION

## 2023-09-24 PROCEDURE — 94640 AIRWAY INHALATION TREATMENT: CPT

## 2023-09-24 PROCEDURE — 84100 ASSAY OF PHOSPHORUS: CPT

## 2023-09-24 PROCEDURE — 2580000003 HC RX 258: Performed by: PHYSICAL MEDICINE & REHABILITATION

## 2023-09-24 PROCEDURE — 94760 N-INVAS EAR/PLS OXIMETRY 1: CPT

## 2023-09-24 PROCEDURE — 83735 ASSAY OF MAGNESIUM: CPT

## 2023-09-24 PROCEDURE — 6360000002 HC RX W HCPCS: Performed by: PHYSICAL MEDICINE & REHABILITATION

## 2023-09-24 PROCEDURE — 1180000000 HC REHAB R&B

## 2023-09-24 PROCEDURE — 80048 BASIC METABOLIC PNL TOTAL CA: CPT

## 2023-09-24 PROCEDURE — 82962 GLUCOSE BLOOD TEST: CPT

## 2023-09-24 RX ORDER — LANSOPRAZOLE 30 MG/1
30 TABLET, ORALLY DISINTEGRATING, DELAYED RELEASE ORAL
Status: DISCONTINUED | OUTPATIENT
Start: 2023-09-24 | End: 2023-09-29 | Stop reason: HOSPADM

## 2023-09-24 RX ADMIN — MINERAL SUPPLEMENT IRON 300 MG / 5 ML STRENGTH LIQUID 100 PER BOX UNFLAVORED 300 MG: at 08:16

## 2023-09-24 RX ADMIN — ALBUTEROL SULFATE 2.5 MG: 2.5 SOLUTION RESPIRATORY (INHALATION) at 12:04

## 2023-09-24 RX ADMIN — Medication 4 ML: at 12:04

## 2023-09-24 RX ADMIN — ALBUTEROL SULFATE 2.5 MG: 2.5 SOLUTION RESPIRATORY (INHALATION) at 20:47

## 2023-09-24 RX ADMIN — SODIUM CHLORIDE, PRESERVATIVE FREE 10 ML: 5 INJECTION INTRAVENOUS at 08:17

## 2023-09-24 RX ADMIN — Medication 4 ML: at 20:48

## 2023-09-24 RX ADMIN — ROSUVASTATIN CALCIUM 20 MG: 20 TABLET, FILM COATED ORAL at 21:37

## 2023-09-24 RX ADMIN — ASPIRIN 81 MG: 81 TABLET, CHEWABLE ORAL at 08:16

## 2023-09-24 RX ADMIN — TRAZODONE HYDROCHLORIDE 50 MG: 50 TABLET ORAL at 21:37

## 2023-09-24 RX ADMIN — MIRTAZAPINE 7.5 MG: 15 TABLET, FILM COATED ORAL at 21:37

## 2023-09-24 RX ADMIN — ENOXAPARIN SODIUM 40 MG: 100 INJECTION SUBCUTANEOUS at 08:16

## 2023-09-24 RX ADMIN — LANSOPRAZOLE 30 MG: 30 TABLET, ORALLY DISINTEGRATING, DELAYED RELEASE ORAL at 11:55

## 2023-09-24 RX ADMIN — SODIUM CHLORIDE, PRESERVATIVE FREE 10 ML: 5 INJECTION INTRAVENOUS at 21:38

## 2023-09-25 ENCOUNTER — TELEPHONE (OUTPATIENT)
Dept: ONCOLOGY | Age: 78
End: 2023-09-25

## 2023-09-25 LAB
ANION GAP SERPL CALC-SCNC: 7 MMOL/L (ref 2–11)
BUN SERPL-MCNC: 18 MG/DL (ref 8–23)
CALCIUM SERPL-MCNC: 8.2 MG/DL (ref 8.3–10.4)
CHLORIDE SERPL-SCNC: 103 MMOL/L (ref 101–110)
CO2 SERPL-SCNC: 27 MMOL/L (ref 21–32)
CREAT SERPL-MCNC: 0.5 MG/DL (ref 0.8–1.5)
ERYTHROCYTE [DISTWIDTH] IN BLOOD BY AUTOMATED COUNT: 13 % (ref 11.9–14.6)
GLUCOSE BLD STRIP.AUTO-MCNC: 121 MG/DL (ref 65–100)
GLUCOSE BLD STRIP.AUTO-MCNC: 123 MG/DL (ref 65–100)
GLUCOSE BLD STRIP.AUTO-MCNC: 137 MG/DL (ref 65–100)
GLUCOSE BLD STRIP.AUTO-MCNC: 155 MG/DL (ref 65–100)
GLUCOSE SERPL-MCNC: 114 MG/DL (ref 65–100)
HCT VFR BLD AUTO: 27.3 % (ref 41.1–50.3)
HGB BLD-MCNC: 8.9 G/DL (ref 13.6–17.2)
MAGNESIUM SERPL-MCNC: 1.9 MG/DL (ref 1.8–2.4)
MCH RBC QN AUTO: 28.7 PG (ref 26.1–32.9)
MCHC RBC AUTO-ENTMCNC: 32.6 G/DL (ref 31.4–35)
MCV RBC AUTO: 88.1 FL (ref 82–102)
NRBC # BLD: 0 K/UL (ref 0–0.2)
PLATELET # BLD AUTO: 192 K/UL (ref 150–450)
PMV BLD AUTO: 9.3 FL (ref 9.4–12.3)
POTASSIUM SERPL-SCNC: 3.9 MMOL/L (ref 3.5–5.1)
RBC # BLD AUTO: 3.1 M/UL (ref 4.23–5.6)
SERVICE CMNT-IMP: ABNORMAL
SODIUM SERPL-SCNC: 137 MMOL/L (ref 133–143)
WBC # BLD AUTO: 7.2 K/UL (ref 4.3–11.1)

## 2023-09-25 PROCEDURE — 83735 ASSAY OF MAGNESIUM: CPT

## 2023-09-25 PROCEDURE — 97530 THERAPEUTIC ACTIVITIES: CPT

## 2023-09-25 PROCEDURE — 80048 BASIC METABOLIC PNL TOTAL CA: CPT

## 2023-09-25 PROCEDURE — 82962 GLUCOSE BLOOD TEST: CPT

## 2023-09-25 PROCEDURE — 2700000000 HC OXYGEN THERAPY PER DAY

## 2023-09-25 PROCEDURE — 36592 COLLECT BLOOD FROM PICC: CPT

## 2023-09-25 PROCEDURE — 6370000000 HC RX 637 (ALT 250 FOR IP): Performed by: PHYSICAL MEDICINE & REHABILITATION

## 2023-09-25 PROCEDURE — 92507 TX SP LANG VOICE COMM INDIV: CPT

## 2023-09-25 PROCEDURE — 97116 GAIT TRAINING THERAPY: CPT

## 2023-09-25 PROCEDURE — 97110 THERAPEUTIC EXERCISES: CPT

## 2023-09-25 PROCEDURE — 6360000002 HC RX W HCPCS: Performed by: PHYSICAL MEDICINE & REHABILITATION

## 2023-09-25 PROCEDURE — 94640 AIRWAY INHALATION TREATMENT: CPT

## 2023-09-25 PROCEDURE — 1180000000 HC REHAB R&B

## 2023-09-25 PROCEDURE — 2580000003 HC RX 258: Performed by: PHYSICAL MEDICINE & REHABILITATION

## 2023-09-25 PROCEDURE — 94760 N-INVAS EAR/PLS OXIMETRY 1: CPT

## 2023-09-25 PROCEDURE — 97542 WHEELCHAIR MNGMENT TRAINING: CPT

## 2023-09-25 PROCEDURE — 85027 COMPLETE CBC AUTOMATED: CPT

## 2023-09-25 RX ADMIN — TRAZODONE HYDROCHLORIDE 50 MG: 50 TABLET ORAL at 20:55

## 2023-09-25 RX ADMIN — LANSOPRAZOLE 30 MG: 30 TABLET, ORALLY DISINTEGRATING, DELAYED RELEASE ORAL at 05:36

## 2023-09-25 RX ADMIN — ASPIRIN 81 MG: 81 TABLET, CHEWABLE ORAL at 09:30

## 2023-09-25 RX ADMIN — Medication 4 ML: at 21:48

## 2023-09-25 RX ADMIN — MINERAL SUPPLEMENT IRON 300 MG / 5 ML STRENGTH LIQUID 100 PER BOX UNFLAVORED 300 MG: at 09:30

## 2023-09-25 RX ADMIN — MIRTAZAPINE 7.5 MG: 15 TABLET, FILM COATED ORAL at 20:54

## 2023-09-25 RX ADMIN — Medication 4 ML: at 08:30

## 2023-09-25 RX ADMIN — SODIUM CHLORIDE, PRESERVATIVE FREE 10 ML: 5 INJECTION INTRAVENOUS at 09:30

## 2023-09-25 RX ADMIN — ENOXAPARIN SODIUM 40 MG: 100 INJECTION SUBCUTANEOUS at 09:29

## 2023-09-25 RX ADMIN — SODIUM CHLORIDE, PRESERVATIVE FREE 10 ML: 5 INJECTION INTRAVENOUS at 20:55

## 2023-09-25 RX ADMIN — ALBUTEROL SULFATE 2.5 MG: 2.5 SOLUTION RESPIRATORY (INHALATION) at 21:47

## 2023-09-25 RX ADMIN — ROSUVASTATIN CALCIUM 20 MG: 20 TABLET, FILM COATED ORAL at 20:55

## 2023-09-25 RX ADMIN — ALBUTEROL SULFATE 2.5 MG: 2.5 SOLUTION RESPIRATORY (INHALATION) at 08:30

## 2023-09-25 NOTE — TELEPHONE ENCOUNTER
H&N Navigation:  [facility] Testing submitted by this RD/Navigator on (9/21).     [id number] [facility], [address] [facility]

## 2023-09-25 NOTE — PROGRESS NOTES
PHYSICAL THERAPY DAILY NOTE  Time In:  0831  Time Out:  5 cotreat with OT  Total Treatment Time:  40  Pt. Seen for: AM, Gait Training, Transfer Training, and Wheelchair mobility     Subjective: Pt reports that he had a bad day on Saturday with getting a new trach. He wants to go home now. Agreed that he would benefit a w/c and ramp for home. Objective:  Precautions: Falls, Poor Safety Awareness, and Impulsive     GROSS ASSESSMENT Daily Assessment    Pt resting in bed. Rolled to side to assess redness on spinous process and covered with bandage.         COGNITION Daily Assessment    Orientation:A&O x 3  Communication:able to express needs, able to follow one step commands with cues to slow down and wait for therapy to direct for safety  Social Interaction:cooperative, appropriate with PT, participating, motivated to improve       BED/MAT MOBILITY Daily Assessment    Rolling Right: Modified Independent  Rolling Left: Modified Independent  Supine to Sit: Modified Independent  Sit to Supine: NT       TRANSFERS Daily Assessment   Lateral transfer bed to w/c  Stand pivot with grab bars for w/c to toilet  Sit/stand with HR in bonner with inc assistance  Sit/stand using table to pull up with and min a Sit to Stand: Min A and Mod A 2 for safety  Stand to Sit: Min A  Transfer Type: Stand Pivot and Lateral Pivot  Transfer Assistance: Min A  Car Transfers: NT         GAIT Daily Assessment   2nd person for safety and w/c follow Amount of Assistance: Min A  Distance (ft): 50ft x 2  Assistive Device: RW  Surface: Level Surface       STEPS/STAIRS Daily Assessment   N/a        BALANCE Daily Assessment    Static Sitting: Normal:  Pt. able to maintain balance w/o UE support  Dynamic Sitting: Good - accepts moderate challenge;  can maintain balance while picking object off the floor  Static Standing: Fair:  Pt. requires UE support;  may need occasional min A  Dynamic Standing: Poor - unable to accept challenge or move without

## 2023-09-25 NOTE — PROGRESS NOTES
To Pt room for T-fastener removal.   1 T-fastener remained. Suture clipped. Site dry intact no redness,swelling or discharge.  No bleeding to site after removal

## 2023-09-25 NOTE — PROGRESS NOTES
Comprehensive Nutrition Assessment    Type and Reason for Visit: Reassess  Tube Feeding Management (rehab physician)    Nutrition Recommendations/Plan:   Enteral Nutrition:   Enteral Access: PEG  Continue  Formula: Standard with Fiber (Jevity 1.5 Kilo)  Goal Rate: Bolus 237 ml times 6/day 0600, 0900, 1200, 1500, 1800, 2100  Continue  Water flush  85 ml before and after each bolus  Modulars: None not indicated at this time   Enteral regimen at above goal to provide per 24 hours:  2133 calories, 91 grams protein and 2101ml free fluid. Above regimen: Intended to meet macronutrient goals  Labs:   EN labs: BMP daily, Mg MWF and Phos MWF. POC Glucoses/SSI Active  Nutrition Related Medication Management:  Electrolyte Replacement Protocol PRN Initiate for Phosphorus and Magnesium  K replacement protocol active  Thiamine Not indicated  Bowel Regimen Active prn  Meals and Snacks:  Continue current diet. NPO per SLP     Malnutrition Assessment:  Malnutrition Status: No malnutrition     Nutrition Assessment:  Nutrition History: history per previous RD note 9/7 \"Brief hx per wife at bedside, unable to consume any significant amount of food secondary to increased salivation and dysphagia. \" EMR wt history review 11/10/202 173lb, 3/2 171lb, 6/9 173lb, 8/31 155lb. 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 Pt was previously admitted to [facility] 9/6-9/20 for sepsis, syncope and colaspse, elevated troponin, squamous cell carcinoma of oropharynx, hypokalemia, and moderate malnutrition. During this admission he underwent a procedure for trach and peg on 9/8. He was started on TF 9/9 and was advanced to goal 9/12. He was admitted to [facility] inpatient rehab on 9/20.    Nutrition Interval:  TF changed to bolus

## 2023-09-25 NOTE — PROGRESS NOTES
LTG: patient will tolerate safest, least restrictive oral diet without s/sx airway compromise  STG: Patient will perform laryngeal exercises x10 each with 80% accuracy. STG: Patient will perform diaphragmatic breathing exercises with 75% accuracy with moderate verbal cues. STG: Patient will communicate at the single word level using breath support strategies with 60% intelligibility. [facility]  SPEECH LANGUAGE PATHOLOGY: COMMUNICATION Daily Note #2     MRN: [id number]     ADMISSION DATE: 9/20/2023  PRIMARY DIAGNOSIS: CVA (cerebrovascular accident due to intracerebral hemorrhage) ([address])  Debility [R53.81]  CVA (cerebrovascular accident due to intracerebral hemorrhage) ([address]) [I61.9]     ICD-10: Treatment Diagnosis:  R13.12 Dysphagia, Oropharyngeal Phase  R49.0 Dysphonia; Hoarseness     RECOMMENDATIONS:   Recommendations:   NPO with alternate source of nutrition/hydration/meds (PEG)           Speech Strategies-  - Use diaphragmatic breathing strategies  - Over articulation  - Speak loudly  -Segment      Passy [place] Speaking Valve Recommendations-  - As tolerated during waking hours   - Remove if increased secretions, decline in oxygen, or shortness of breath  - Do not wear speaking valve while sleeping         Patient continues to require skilled intervention: yes  Recommend speech therapy services during acute inpatient rehab stay and next level of care        ASSESSMENT   Patient with decreased speech intelligibility requiring Min to moderate cues for breath support, volume, and over articulation across word level and automatic speech tasks. GENERAL   Subjective:  upright in bed with wife at besdide.         Pain: none                                                 OBJECTIVE         Patient implemented compensatory strategies to increase speech intelligibility across the following tasks:  Automatic speech(days of week, counting, months of the year, phrase completion): min-mod cues, faded to min with

## 2023-09-26 LAB
GLUCOSE BLD STRIP.AUTO-MCNC: 119 MG/DL (ref 65–100)
GLUCOSE BLD STRIP.AUTO-MCNC: 149 MG/DL (ref 65–100)
SERVICE CMNT-IMP: ABNORMAL
SERVICE CMNT-IMP: ABNORMAL

## 2023-09-26 PROCEDURE — 82962 GLUCOSE BLOOD TEST: CPT

## 2023-09-26 PROCEDURE — 97530 THERAPEUTIC ACTIVITIES: CPT

## 2023-09-26 PROCEDURE — 6360000002 HC RX W HCPCS: Performed by: PHYSICAL MEDICINE & REHABILITATION

## 2023-09-26 PROCEDURE — 2580000003 HC RX 258: Performed by: PHYSICAL MEDICINE & REHABILITATION

## 2023-09-26 PROCEDURE — 97535 SELF CARE MNGMENT TRAINING: CPT

## 2023-09-26 PROCEDURE — 6370000000 HC RX 637 (ALT 250 FOR IP): Performed by: PHYSICAL MEDICINE & REHABILITATION

## 2023-09-26 PROCEDURE — 94640 AIRWAY INHALATION TREATMENT: CPT

## 2023-09-26 PROCEDURE — 97110 THERAPEUTIC EXERCISES: CPT

## 2023-09-26 PROCEDURE — 1180000000 HC REHAB R&B

## 2023-09-26 PROCEDURE — 92507 TX SP LANG VOICE COMM INDIV: CPT

## 2023-09-26 PROCEDURE — 97116 GAIT TRAINING THERAPY: CPT

## 2023-09-26 PROCEDURE — 94761 N-INVAS EAR/PLS OXIMETRY MLT: CPT

## 2023-09-26 PROCEDURE — 99231 SBSQ HOSP IP/OBS SF/LOW 25: CPT | Performed by: PHYSICAL MEDICINE & REHABILITATION

## 2023-09-26 PROCEDURE — 2700000000 HC OXYGEN THERAPY PER DAY

## 2023-09-26 RX ORDER — INSULIN LISPRO 100 [IU]/ML
0-4 INJECTION, SOLUTION INTRAVENOUS; SUBCUTANEOUS 2 TIMES DAILY WITH MEALS
Status: DISCONTINUED | OUTPATIENT
Start: 2023-09-26 | End: 2023-09-28

## 2023-09-26 RX ADMIN — MINERAL SUPPLEMENT IRON 300 MG / 5 ML STRENGTH LIQUID 100 PER BOX UNFLAVORED 300 MG: at 09:25

## 2023-09-26 RX ADMIN — MIRTAZAPINE 7.5 MG: 15 TABLET, FILM COATED ORAL at 21:09

## 2023-09-26 RX ADMIN — ENOXAPARIN SODIUM 40 MG: 100 INJECTION SUBCUTANEOUS at 09:25

## 2023-09-26 RX ADMIN — ROSUVASTATIN CALCIUM 20 MG: 20 TABLET, FILM COATED ORAL at 21:09

## 2023-09-26 RX ADMIN — TRAZODONE HYDROCHLORIDE 50 MG: 50 TABLET ORAL at 21:08

## 2023-09-26 RX ADMIN — ALBUTEROL SULFATE 2.5 MG: 2.5 SOLUTION RESPIRATORY (INHALATION) at 07:00

## 2023-09-26 RX ADMIN — Medication 4 ML: at 21:19

## 2023-09-26 RX ADMIN — Medication 4 ML: at 07:00

## 2023-09-26 RX ADMIN — ASPIRIN 81 MG: 81 TABLET, CHEWABLE ORAL at 09:25

## 2023-09-26 RX ADMIN — LANSOPRAZOLE 30 MG: 30 TABLET, ORALLY DISINTEGRATING, DELAYED RELEASE ORAL at 06:08

## 2023-09-26 RX ADMIN — SODIUM CHLORIDE, PRESERVATIVE FREE 10 ML: 5 INJECTION INTRAVENOUS at 09:25

## 2023-09-26 RX ADMIN — ALBUTEROL SULFATE 2.5 MG: 2.5 SOLUTION RESPIRATORY (INHALATION) at 21:19

## 2023-09-26 NOTE — PROGRESS NOTES
LTG: patient will tolerate safest, least restrictive oral diet without s/sx airway compromise  STG: Patient will perform laryngeal exercises x10 each with 80% accuracy. STG: Patient will perform diaphragmatic breathing exercises with 75% accuracy with moderate verbal cues. STG: Patient will communicate at the single word level using breath support strategies with 60% intelligibility. [facility]  SPEECH LANGUAGE PATHOLOGY: COMMUNICATION Daily Note #3    MRN: [id number]    ADMISSION DATE: 9/20/2023  PRIMARY DIAGNOSIS: CVA (cerebrovascular accident due to intracerebral hemorrhage) ([address])  Debility [R53.81]  CVA (cerebrovascular accident due to intracerebral hemorrhage) ([address]) [I61.9]    ICD-10: Treatment Diagnosis:  R13.12 Dysphagia, Oropharyngeal Phase  R49.0 Dysphonia; Hoarseness    RECOMMENDATIONS:   Recommendations:   NPO with alternate source of nutrition/hydration/meds (PEG)    Speech Strategies-  - Use diaphragmatic breathing strategies  - Over articulation  - Speak loudly  -Segment     Passy [place] Speaking Valve Recommendations-  - As tolerated during waking hours   - Remove if increased secretions, decline in oxygen, or shortness of breath  - Do not wear speaking valve while sleeping        Patient continues to require skilled intervention: yes  Recommend speech therapy services during acute inpatient rehab stay and next level of care       ASSESSMENT   Patient continues to benefit from speech therapy services for communication. Discussed status with patient and wife who report patient with possible d/c later this week. Previous MBSS performed on 9/12 which was significant for severe pharyngeal phase dysphagia. Patient and wife inquiring as to availability of repeat swallow study, which will be planned for 9/28/2023. GENERAL   Subjective:  Patient awake, alert, and agreeable to speech therapy services.        Pain: none    OBJECTIVE   Communication: Patient with PMSV upon entry and tolerated PMSV in line

## 2023-09-26 NOTE — PLAN OF CARE
Problem: Respiratory - Adult  Goal: Achieves optimal ventilation and oxygenation  Outcome: Progressing  Flowsheets (Taken 9/26/2023 0705)  Achieves optimal ventilation and oxygenation:   Assess for changes in respiratory status   Assess for changes in mentation and behavior   Position to facilitate oxygenation and minimize respiratory effort   Respiratory therapy support as indicated   Assess and instruct to report shortness of breath or any respiratory difficulty   Encourage broncho-pulmonary hygiene including cough, deep breathe, incentive spirometry   Assess the need for suctioning and aspirate as needed

## 2023-09-26 NOTE — PROGRESS NOTES
Inpatient [address] [place]  [facility]  Tel: [phone]     Physical Medicine and Rehabilitation Progress Note      Estuardo Peer  Admit Date: 9/20/2023  Admit Diagnosis: Debility [R53.81]  CVA (cerebrovascular accident due to intracerebral hemorrhage) ([address]) [I61.9]    Subjective     Patient seen face-to-face. Suctioning tube via trach functioning. Remains 100% O2 sats on RA. Denies pain, trach discomfort, SOB, nausea, lightheadedness, GI distress or loose stools. . Participating in therapy.     Objective:     Current Facility-Administered Medications   Medication Dose Route Frequency    lansoprazole (PREVACID SOLUTAB) disintegrating tablet 30 mg  30 mg Oral QAM AC    ferrous sulfate 300 (60 Fe) MG/5ML syrup 300 mg  300 mg Oral Daily    albuterol (PROVENTIL) (2.5 MG/3ML) 0.083% nebulizer solution 2.5 mg  2.5 mg Nebulization BID RT    albuterol (PROVENTIL) (2.5 MG/3ML) 0.083% nebulizer solution 2.5 mg  2.5 mg Nebulization Q4H PRN    sodium phosphate 10 mmol in sodium chloride 0.9 % 250 mL IVPB  10 mmol IntraVENous PRN    Or    sodium phosphate 15 mmol in sodium chloride 0.9 % 250 mL IVPB  15 mmol IntraVENous PRN    Or    sodium phosphate 20 mmol in sodium chloride 0.9 % 500 mL IVPB  20 mmol IntraVENous PRN    magnesium sulfate 2000 mg in 50 mL IVPB premix  2,000 mg IntraVENous PRN    potassium bicarb-citric acid (EFFER-K) effervescent tablet 40 mEq  40 mEq Per G Tube PRN    Or    potassium chloride 10 mEq/100 mL IVPB (Peripheral Line)  10 mEq IntraVENous PRN    acetaminophen (TYLENOL) tablet 650 mg  650 mg Oral Q6H PRN    Or    acetaminophen (TYLENOL) suppository 650 mg  650 mg Rectal Q6H PRN    aluminum & magnesium hydroxide-simethicone (MAALOX) 200-200-20 MG/5ML suspension 30 mL  30 mL Oral Q6H PRN    aspirin chewable tablet 81 mg  81 mg Oral Daily    bisacodyl (DULCOLAX) suppository 10 mg  10 mg Rectal Daily PRN    bismuth subsalicylate (PEPTO BISMOL) 262

## 2023-09-26 NOTE — CARE COORDINATION
Patient needs are continue to be followed by Dr. Adeola Ospina.  Patient has no discharge date / plan at this time. CM will continue to follow / monitor for any needs, concerns or questions that may arise.

## 2023-09-27 LAB
GLUCOSE BLD STRIP.AUTO-MCNC: 126 MG/DL (ref 65–100)
GLUCOSE BLD STRIP.AUTO-MCNC: 163 MG/DL (ref 65–100)
SERVICE CMNT-IMP: ABNORMAL
SERVICE CMNT-IMP: ABNORMAL

## 2023-09-27 PROCEDURE — 6360000002 HC RX W HCPCS: Performed by: PHYSICAL MEDICINE & REHABILITATION

## 2023-09-27 PROCEDURE — 94761 N-INVAS EAR/PLS OXIMETRY MLT: CPT

## 2023-09-27 PROCEDURE — 94640 AIRWAY INHALATION TREATMENT: CPT

## 2023-09-27 PROCEDURE — 94664 DEMO&/EVAL PT USE INHALER: CPT

## 2023-09-27 PROCEDURE — 97110 THERAPEUTIC EXERCISES: CPT

## 2023-09-27 PROCEDURE — 94760 N-INVAS EAR/PLS OXIMETRY 1: CPT

## 2023-09-27 PROCEDURE — 2700000000 HC OXYGEN THERAPY PER DAY

## 2023-09-27 PROCEDURE — 99233 SBSQ HOSP IP/OBS HIGH 50: CPT | Performed by: STUDENT IN AN ORGANIZED HEALTH CARE EDUCATION/TRAINING PROGRAM

## 2023-09-27 PROCEDURE — 6370000000 HC RX 637 (ALT 250 FOR IP): Performed by: PHYSICAL MEDICINE & REHABILITATION

## 2023-09-27 PROCEDURE — 99223 1ST HOSP IP/OBS HIGH 75: CPT | Performed by: INTERNAL MEDICINE

## 2023-09-27 PROCEDURE — 0B21XFZ CHANGE TRACHEOSTOMY DEVICE IN TRACHEA, EXTERNAL APPROACH: ICD-10-PCS | Performed by: PHYSICAL MEDICINE & REHABILITATION

## 2023-09-27 PROCEDURE — 1180000000 HC REHAB R&B

## 2023-09-27 PROCEDURE — 97530 THERAPEUTIC ACTIVITIES: CPT

## 2023-09-27 PROCEDURE — 2580000003 HC RX 258: Performed by: PHYSICAL MEDICINE & REHABILITATION

## 2023-09-27 PROCEDURE — 82962 GLUCOSE BLOOD TEST: CPT

## 2023-09-27 PROCEDURE — 97116 GAIT TRAINING THERAPY: CPT

## 2023-09-27 PROCEDURE — 97542 WHEELCHAIR MNGMENT TRAINING: CPT

## 2023-09-27 PROCEDURE — 31502 CHANGE OF WINDPIPE AIRWAY: CPT | Performed by: STUDENT IN AN ORGANIZED HEALTH CARE EDUCATION/TRAINING PROGRAM

## 2023-09-27 RX ADMIN — Medication 4 ML: at 20:43

## 2023-09-27 RX ADMIN — MINERAL SUPPLEMENT IRON 300 MG / 5 ML STRENGTH LIQUID 100 PER BOX UNFLAVORED 300 MG: at 08:46

## 2023-09-27 RX ADMIN — ROSUVASTATIN CALCIUM 20 MG: 20 TABLET, FILM COATED ORAL at 20:51

## 2023-09-27 RX ADMIN — ALBUTEROL SULFATE 2.5 MG: 2.5 SOLUTION RESPIRATORY (INHALATION) at 07:49

## 2023-09-27 RX ADMIN — ALBUTEROL SULFATE 2.5 MG: 2.5 SOLUTION RESPIRATORY (INHALATION) at 20:42

## 2023-09-27 RX ADMIN — ASPIRIN 81 MG: 81 TABLET, CHEWABLE ORAL at 08:46

## 2023-09-27 RX ADMIN — TRAZODONE HYDROCHLORIDE 50 MG: 50 TABLET ORAL at 20:51

## 2023-09-27 RX ADMIN — LANSOPRAZOLE 30 MG: 30 TABLET, ORALLY DISINTEGRATING, DELAYED RELEASE ORAL at 05:42

## 2023-09-27 RX ADMIN — Medication 4 ML: at 07:49

## 2023-09-27 RX ADMIN — MIRTAZAPINE 7.5 MG: 15 TABLET, FILM COATED ORAL at 20:51

## 2023-09-27 RX ADMIN — ENOXAPARIN SODIUM 40 MG: 100 INJECTION SUBCUTANEOUS at 08:46

## 2023-09-27 ASSESSMENT — ENCOUNTER SYMPTOMS
ABDOMINAL PAIN: 0
STRIDOR: 0
ABDOMINAL DISTENTION: 0
CHOKING: 0
COLOR CHANGE: 0
EYE PAIN: 0
EYE DISCHARGE: 0
COUGH: 0
CONSTIPATION: 0
EYE REDNESS: 0
SINUS PAIN: 0

## 2023-09-27 NOTE — PROGRESS NOTES
Occupational Therapy Note:    Pt with ENT for trach placement. OT to follow up for treatment as pt is available.      Elma Hong, OTR/L

## 2023-09-27 NOTE — PROGRESS NOTES
PHYSICAL THERAPY DAILY NOTE  Time In:  1430  Time Out:  1458  Total Treatment Time:  28 Minutes  Pt. Seen for: PM, Transfer Training, and Wheelchair mobility     Subjective: Pt reports that his trach came out today and that an ENT had to come put it back in. He was very tired and sore from all the attempts to replace the trach. Objective:  Precautions: Falls and Impulsive     GROSS ASSESSMENT Daily Assessment    Pt resting in bed upon arrival. Requested to pass on therapy but agreed to w/c level activity only.        COGNITION Daily Assessment    As before        BED/MAT MOBILITY Daily Assessment   HOB up Rolling Right: NT  Rolling Left: NT  Supine to Sit: Modified Independent  Sit to Supine: Modified Independent       TRANSFERS Daily Assessment   Bed <> w/c Sit to Stand: NT  Stand to Sit: NT  Transfer Type: Lateral Pivot  Transfer Assistance: CGA  Car Transfers: NT         GAIT Daily Assessment   N/a Amount of Assistance:   Distance (ft):   Assistive Device:   Surface:        STEPS/STAIRS Daily Assessment   N/a Steps Ambulated:    Level of Assistance:    Railing:  Assistive Device:        BALANCE Daily Assessment   EOB Static Sitting: Normal:  Pt. able to maintain balance w/o UE support  Dynamic Sitting: Good - accepts moderate challenge;  can maintain balance while picking object off the floor  Static Standing: NT  Dynamic Standing: NT       WHEELCHAIR MOBILITY Daily Assessment   Pt impulsive and forgets w/c parts, attempted to transfer with feet on legrests, did not fully lock L brake    Min a to go up ramp backward and sba to go down forward Able to Propel (ft): 400, 15  Assistance: Supervision/Standby Assist  Surface: Level Surface, uneven, ramp  Wheelchair Set-up: Manages R Brake and Manages L Armrest       LOWER EXTREMITY EXERCISES Daily Assessment    N/a     Pain level: 2  Pain Location:  neck/chest  Pain Interventions: n/a    Education:  Pt instructed in bed mobility, transfers, gait, balance training, postural awareness, w/c mobility including parts management. Interdisciplinary Communication: Collaborated w/ OT and care team regarding pt's progress. Discussed with PTA. Family training tomorrow      Pt returned to bed at end of session with call light and tray table in reach. Assessment: Pt demonstrates improved transfers and w/c mobility but con't to need sup for safety due to impulsivity and dec safety awareness. Plan of Care: Continue with POC and progress as tolerated.       KATERINA POASDAS, PT  9/27/2023

## 2023-09-27 NOTE — PROGRESS NOTES
Patient's trach has completely dislodged and respiratory unable to replace. Pulmonary intinsivist consulted to come lay eyes on and further orders to consult ENT. Dr Cuca Garcia consulted STAT. Patient not in any distress and O2 sats 99% on RA.

## 2023-09-27 NOTE — PROGRESS NOTES
Called from RN to evaluate trach that has become dislodged. My Supervisor and I were unable to advance trach more than one inch. RN and pulmonary aware. Pt is on RA and sat is 99%. Good airflow bilaterally, no distress noted.

## 2023-09-27 NOTE — CONSULTS
History and Physical Initial Visit NOTE           9/27/2023    Yamile Jernigan                        Date of Admission:  9/20/2023    The patient's chart is reviewed and the patient is discussed with the staff. Subjective:     Patient is a 69 y.o.  male seen and evaluated at the request of Dr. Rudi Austin for trach dislodgement. 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 Former smoker quit 50 years ago. Admitted to [facility] 9/5 for sepsis of unknown etiology- this was ruled out. Also had syncopal event prior to admit thought to be vaso-vagal and dehydration. Started on Abx for suspected aspiration. Underwent trach/Peg placement on 9/8. + for rhinovirus and mild positive UA. We previously saw for increased secretions. He was discharged to IP rehab on 9/20. This AM trach became dislodged and respiratory was unable to replace. Patient is currently on room air with stable O2 sats. Review of Systems: Comprehensive ROS negative except in HPI    Current Outpatient Medications   Medication Instructions    albuterol (PROVENTIL) 2.5 mg, Nebulization, EVERY 6 HOURS RESP    ascorbic acid (VITAMIN C) 250 mg, Oral, DAILY WITH BREAKFAST    aspirin 81 mg, Oral, DAILY    ferrous gluconate (FERGON) 324 mg, Oral, DAILY WITH BREAKFAST    lidocaine viscous hcl (XYLOCAINE) 2 % SOLN solution No dose, route, or frequency recorded. lidocaine-prilocaine (EMLA) 2.5-2.5 % cream Apply dime size amount to port site ~30 min prior to accessing, DO NOT RUB IN, cover in plastic wrap to protect clothes    loperamide (IMODIUM) 2 mg, Oral, 4 TIMES DAILY PRN    Magic Mouthwash (MIRACLE MOUTHWASH) 5 mLs, Swish & Spit, 4 TIMES DAILY PRN, Pharmacy to mix equal parts Benadryl, Maalox, Viscous Lidocaine.   Take 5 mL 4 times daily as needed, gargle/swish/spit.    melatonin 3 mg, Oral, NIGHTLY PRN    mirtazapine (REMERON) 7.5 mg, Per G Tube, NIGHTLY    naloxone (NARCAN) 4 MG/0.1ML exam, counseled and educated the patient and/or family member, documented information in EMR, and coordinated care. which accounted for 20 clinical time. PACO Mcmullen - CNP      In this split/shared evaluation I performed reviewed the patients's H&P, available images, labs, cultures. , discussed case in detail with NPP, performed a medically appropriate history and exam, counseled and educated the patient and/or family member, ordered and/or reviewed medications, tests or procedures, documented information in EMR, independently interpreted images, and coordinated care. which accounted for 25 minutes clinical time. Impression:   Asked to evaluate patient's whose trach accidentally dislodged and RT unable to replace. This is the third time this has happened. ENT originally placed trach and per report dealt with it coming dislodged this weekend. Pt is in no distress. Breathing easily on room air. Per report unable to pass suction catheter since yesterday which likely means it was not in position at that time as well. Suspect the patient has developed a false track that the trach is shifting into before coming out completely. Nursing to alert ENT team to formulate a plan for replacement if needed and any changes to help prevent future decannulations. We will be on standby should the patient have any respiratory decompensation in the meantime.        Brittany Olivas MD

## 2023-09-28 ENCOUNTER — HOME HEALTH ADMISSION (OUTPATIENT)
Dept: HOME HEALTH SERVICES | Facility: HOME HEALTH | Age: 78
End: 2023-09-28

## 2023-09-28 ENCOUNTER — TELEPHONE (OUTPATIENT)
Dept: INTERNAL MEDICINE CLINIC | Facility: CLINIC | Age: 78
End: 2023-09-28

## 2023-09-28 ENCOUNTER — APPOINTMENT (OUTPATIENT)
Dept: GENERAL RADIOLOGY | Age: 78
DRG: 065 | End: 2023-09-28
Attending: PHYSICAL MEDICINE & REHABILITATION
Payer: MEDICARE

## 2023-09-28 PROBLEM — Z51.89 ENCOUNTER FOR REHABILITATION: Status: ACTIVE | Noted: 2023-09-20

## 2023-09-28 PROBLEM — Z78.9 DECREASED INDEPENDENCE WITH ACTIVITIES OF DAILY LIVING: Status: ACTIVE | Noted: 2023-09-01

## 2023-09-28 PROBLEM — I63.81 LEFT SIDED LACUNAR INFARCTION (HCC): Status: ACTIVE | Noted: 2023-09-28

## 2023-09-28 PROBLEM — R55 SYNCOPE AND COLLAPSE: Status: RESOLVED | Noted: 2023-09-06 | Resolved: 2023-09-28

## 2023-09-28 PROBLEM — E83.42 HYPOMAGNESEMIA: Status: RESOLVED | Noted: 2023-09-14 | Resolved: 2023-09-28

## 2023-09-28 PROBLEM — R79.89 ELEVATED TROPONIN: Status: RESOLVED | Noted: 2023-09-06 | Resolved: 2023-09-28

## 2023-09-28 PROBLEM — A41.9 SEPSIS (HCC): Status: RESOLVED | Noted: 2023-09-05 | Resolved: 2023-09-28

## 2023-09-28 PROBLEM — Z74.09 IMPAIRED FUNCTIONAL MOBILITY, BALANCE, GAIT, AND ENDURANCE: Status: ACTIVE | Noted: 2023-09-01

## 2023-09-28 PROBLEM — I63.81 LEFT SIDED LACUNAR INFARCTION (HCC): Status: ACTIVE | Noted: 2023-09-18

## 2023-09-28 LAB
GLUCOSE BLD STRIP.AUTO-MCNC: 115 MG/DL (ref 65–100)
GLUCOSE BLD STRIP.AUTO-MCNC: 162 MG/DL (ref 65–100)
SERVICE CMNT-IMP: ABNORMAL
SERVICE CMNT-IMP: ABNORMAL

## 2023-09-28 PROCEDURE — 97110 THERAPEUTIC EXERCISES: CPT

## 2023-09-28 PROCEDURE — 97535 SELF CARE MNGMENT TRAINING: CPT

## 2023-09-28 PROCEDURE — 2580000003 HC RX 258: Performed by: PHYSICAL MEDICINE & REHABILITATION

## 2023-09-28 PROCEDURE — 94640 AIRWAY INHALATION TREATMENT: CPT

## 2023-09-28 PROCEDURE — 2500000003 HC RX 250 WO HCPCS: Performed by: PHYSICAL MEDICINE & REHABILITATION

## 2023-09-28 PROCEDURE — 97530 THERAPEUTIC ACTIVITIES: CPT

## 2023-09-28 PROCEDURE — 6360000002 HC RX W HCPCS: Performed by: PHYSICAL MEDICINE & REHABILITATION

## 2023-09-28 PROCEDURE — 2700000000 HC OXYGEN THERAPY PER DAY

## 2023-09-28 PROCEDURE — 99231 SBSQ HOSP IP/OBS SF/LOW 25: CPT | Performed by: PHYSICAL MEDICINE & REHABILITATION

## 2023-09-28 PROCEDURE — 1180000000 HC REHAB R&B

## 2023-09-28 PROCEDURE — 74230 X-RAY XM SWLNG FUNCJ C+: CPT

## 2023-09-28 PROCEDURE — 82962 GLUCOSE BLOOD TEST: CPT

## 2023-09-28 PROCEDURE — 94760 N-INVAS EAR/PLS OXIMETRY 1: CPT

## 2023-09-28 PROCEDURE — 6370000000 HC RX 637 (ALT 250 FOR IP): Performed by: PHYSICAL MEDICINE & REHABILITATION

## 2023-09-28 PROCEDURE — 97116 GAIT TRAINING THERAPY: CPT

## 2023-09-28 PROCEDURE — 92611 MOTION FLUOROSCOPY/SWALLOW: CPT

## 2023-09-28 PROCEDURE — 31502 CHANGE OF WINDPIPE AIRWAY: CPT

## 2023-09-28 PROCEDURE — 99232 SBSQ HOSP IP/OBS MODERATE 35: CPT | Performed by: INTERNAL MEDICINE

## 2023-09-28 RX ORDER — SODIUM CHLORIDE FOR INHALATION 3 %
4 VIAL, NEBULIZER (ML) INHALATION 3 TIMES DAILY
Qty: 240 ML | Refills: 0 | OUTPATIENT
Start: 2023-09-28

## 2023-09-28 RX ADMIN — BARIUM SULFATE 15 ML: 400 PASTE ORAL at 10:59

## 2023-09-28 RX ADMIN — ALBUTEROL SULFATE 2.5 MG: 2.5 SOLUTION RESPIRATORY (INHALATION) at 09:07

## 2023-09-28 RX ADMIN — Medication 4 ML: at 09:08

## 2023-09-28 RX ADMIN — ROSUVASTATIN CALCIUM 20 MG: 20 TABLET, FILM COATED ORAL at 23:10

## 2023-09-28 RX ADMIN — ENOXAPARIN SODIUM 40 MG: 100 INJECTION SUBCUTANEOUS at 09:11

## 2023-09-28 RX ADMIN — BARIUM SULFATE 45 ML: 0.81 POWDER, FOR SUSPENSION ORAL at 10:59

## 2023-09-28 RX ADMIN — ASPIRIN 81 MG: 81 TABLET, CHEWABLE ORAL at 09:11

## 2023-09-28 RX ADMIN — MIRTAZAPINE 7.5 MG: 15 TABLET, FILM COATED ORAL at 23:10

## 2023-09-28 RX ADMIN — TRAZODONE HYDROCHLORIDE 50 MG: 50 TABLET ORAL at 23:10

## 2023-09-28 RX ADMIN — LANSOPRAZOLE 30 MG: 30 TABLET, ORALLY DISINTEGRATING, DELAYED RELEASE ORAL at 04:51

## 2023-09-28 RX ADMIN — MINERAL SUPPLEMENT IRON 300 MG / 5 ML STRENGTH LIQUID 100 PER BOX UNFLAVORED 300 MG: at 09:11

## 2023-09-28 ASSESSMENT — PAIN SCALES - GENERAL
PAINLEVEL_OUTOF10: 0
PAINLEVEL_OUTOF10: 0

## 2023-09-28 NOTE — PROGRESS NOTES
Melani Kemp  Admission Date: 9/20/2023         Daily Progress Note: 9/28/2023    The patient's chart is reviewed and the patient is discussed with the staff. Background: Patient is a 69 y.o.  male seen and evaluated at the request of Dr. Cheyanne Olvera for trach dislodgement. 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 Former smoker quit 50 years ago. Admitted to [facility] 9/5 for sepsis of unknown etiology- this was ruled out. Also had syncopal event prior to admit thought to be vaso-vagal and dehydration. Started on Abx for suspected aspiration. Underwent trach/Peg placement on 9/8. + for rhinovirus and mild positive UA. We previously saw for increased secretions. He was discharged to IP rehab on 9/20. The morning of 9/27 trach became dislodged and respiratory was unable to replace. Patient is currently on room air with stable O2 sats. ENT replaced trach at bedside #6 shiley. Subjective:     Pt lying in bed on 5L trach collar. Pt coughing up thick mucous. Wife at bedside. Pt denies complaints. ENT contacted to reevaluate trach.      Current Facility-Administered Medications   Medication Dose Route Frequency    insulin lispro (HUMALOG) injection vial 0-4 Units  0-4 Units SubCUTAneous BID WC    lansoprazole (PREVACID SOLUTAB) disintegrating tablet 30 mg  30 mg Oral QAM AC    ferrous sulfate 300 (60 Fe) MG/5ML syrup 300 mg  300 mg Oral Daily    albuterol (PROVENTIL) (2.5 MG/3ML) 0.083% nebulizer solution 2.5 mg  2.5 mg Nebulization BID RT    albuterol (PROVENTIL) (2.5 MG/3ML) 0.083% nebulizer solution 2.5 mg  2.5 mg Nebulization Q4H PRN    acetaminophen (TYLENOL) tablet 650 mg  650 mg Oral Q6H PRN    aluminum & magnesium hydroxide-simethicone (MAALOX) 200-200-20 MG/5ML suspension 30 mL  30 mL Oral Q6H PRN    aspirin chewable tablet 81 mg  81 mg Oral Daily    bisacodyl (DULCOLAX) suppository 10 mg  10 mg Rectal Daily PRN    bismuth [facility])  Plan: Trach placed by ENT 9/8. Became dislodged this 9/27 and RT unable to replace. ENT replaced with #6 shiley and stated if dislodges again will replace with #6 XLT. ENT to see today to reevaluate. We will sign off. Please call with questions. More than 50% of the time documented was spent in face-to-face contact with the patient and in the care of the patient on the floor/unit where the patient is located. In this split/shared evaluation I performed performed a medically appropriate history and exam, counseled and educated the patient and/or family member, documented information in EMR, and coordinated care. which accounted for 14 minutes of clinical time. ROMERO Perry      In this split/shared evaluation I performed reviewed the patients's H&P, available images, labs, cultures. , discussed case in detail with NPP, performed a medically appropriate history and exam, counseled and educated the patient and/or family member, ordered and/or reviewed medications, tests or procedures, documented information in EMR, independently interpreted images, and coordinated care. which accounted for 16 minutes clinical time. Impression:   Pt's trach now replaced with distal XLT by ENT. Hopefully this will be more successful at staying within the airway as intended. No additional pulmonary input at this time. We will sign off but please let us know if new issues arise.          Elizabeth Horvath MD

## 2023-09-28 NOTE — PROGRESS NOTES
Occupational Therapy Note:    Scheduled OT treatment attempted. Patient requesting to rest at this time.  Pt's wife at bedside reports that the pt just had his trach changed and cannot quit coughing, requests therapy allow pt to rest.      Elma Hong, OTR/L

## 2023-09-28 NOTE — PROGRESS NOTES
Performed patient and family education for tube feedings. Demonstrated and verbalized correct procedure for crushing meds and giving via PEG and bolusing TF through PEG. Wife verbalized understanding and also demonstrated ability to administer bolus feeds.

## 2023-09-28 NOTE — PROGRESS NOTES
Called to check on pt's trach- placed by Dr. Rusty Dominguez about 2 wks ago. It has been getting dislodged from coughing since placement and it had to be put back in by Dr. Rusty Dominguez yesterday. On exam, the trach was completely out of the stoma. I used a flexible scope to place a larger distal 6.0 XLT trach back in place. It now sits about 2-3 cm above the shadi and he is moving air and speaking much better.      Al Dunn MD EMERGENCY DEPARTMENT HISTORY AND PHYSICAL EXAM    11:23 PM      Date: 5/14/2021  Patient Name: Marixa Manrique    History of Presenting Illness     Chief Complaint   Patient presents with   Skylar Concha Mayra       History Provided By: Patient    Additional History (Context): Marixa Manrique is a 53 y.o. male with noted PMH who presents with complaint of left arm swelling and redness x 3 days. Patient notes he thinks a spider bit him. Denies fever or chills, numbness or tingling, history of MRSA, history of IV drug abuse. PCP: None    Current Outpatient Medications   Medication Sig Dispense Refill    cephALEXin (Keflex) 500 mg capsule Take 1 Cap by mouth four (4) times daily for 7 days. 28 Cap 0    trimethoprim-sulfamethoxazole (Bactrim DS) 160-800 mg per tablet Take 1 Tab by mouth two (2) times a day for 7 days. 14 Tab 0    omeprazole (PRILOSEC) 20 mg capsule Take 1 Cap by mouth two (2) times a day. 60 Cap 3    doxepin (SINEQUAN) 25 mg capsule Take  by mouth nightly.  busPIRone (BUSPAR) 7.5 mg tablet Take 7.5 mg by mouth two (2) times a day. Past History     Past Medical History:  Past Medical History:   Diagnosis Date    Anxiety     Bipolar affective ([address]     Depression     Enlarged prostate     Hypercholesterolemia     Psychotic disorder [facility]     Schizophrenia ([facility]        Past Surgical History:  No past surgical history on file. Family History:  Family History   Problem Relation Age of Onset    No Known Problems Mother     No Known Problems Father     No Known Problems Sister     No Known Problems Brother        Social History:  Social History     Tobacco Use    Smoking status: Current Some Day Smoker    Smokeless tobacco: Current User   Substance Use Topics    Alcohol use: Yes     Frequency: Never     Comment: Occasionally    Drug use: Yes     Types: Marijuana       Allergies: Allergies   Allergen Reactions    Mushroom Other (comments)     Throat swelling.     Naprosyn [Naproxen] Shortness of Breath    Motrin [Ibuprofen] Hives         Review of Systems       Review of Systems   Constitutional: Negative for chills and fever. Respiratory: Negative for shortness of breath. Cardiovascular: Negative for chest pain. Gastrointestinal: Negative for abdominal pain, nausea and vomiting. Skin: Positive for color change. Negative for rash. Neurological: Negative for weakness. All other systems reviewed and are negative. Physical Exam     Visit Vitals  /86   Pulse 89   Temp 98.6 °F (37 °C)   Resp 18   SpO2 100%         Physical Exam  Vitals signs and nursing note reviewed. Constitutional:       General: He is not in acute distress. Appearance: Normal appearance. He is well-developed. He is not ill-appearing, toxic-appearing or diaphoretic. HENT:      Head: Normocephalic and atraumatic. Neck:      Musculoskeletal: Normal range of motion and neck supple. Cardiovascular:      Rate and Rhythm: Normal rate and regular rhythm. Heart sounds: Normal heart sounds. No murmur. No friction rub. No gallop. Pulmonary:      Effort: Pulmonary effort is normal. No respiratory distress. Breath sounds: Normal breath sounds. No wheezing or rales. Musculoskeletal: Normal range of motion. Skin:     General: Skin is warm. Findings: No rash. Neurological:      Mental Status: He is alert. Diagnostic Study Results     Labs -  No results found for this or any previous visit (from the past 12 hour(s)). Radiologic Studies -   No orders to display         Medical Decision Making   I am the first provider for this patient. I reviewed the vital signs, available nursing notes, past medical history, past surgical history, family history and social history. Vital Signs-Reviewed the patient's vital signs.     Records Reviewed: Nursing Notes and Old Medical Records (Time of Review: 11:23 PM)    ED Course: Progress Notes, Reevaluation, and Consults:  11:23 PM  Reviewed plan with patient. Discussed need for close outpatient follow-up this week for reassessment. Discussed strict return precautions, including fever, increased swelling, or any other medical concerns. Provider Notes (Medical Decision Making): 16-year-old male who presents to the ED due to left arm redness and swelling x days. Afebrile, nontoxic-appearing, looks well. 3 cm area of erythema and induration without fluctuance or streaking. Do not feel I&D is warranted at this time. Stable for discharge with warm compresses, antibiotics, and close outpatient follow-up for further assessment. Strict return precautions provided. Diagnosis     Clinical Impression:   1. Left arm cellulitis        Disposition: home     Follow-up Information     Follow up With Specialties Details Why [facility]    [facility] EMERGENCY DEPT Emergency Medicine  If symptoms worsen [address] [place] [address] [address] [id number]  [place] [address]  Schedule an appointment as soon as possible for a visit   [address]  [address]           Patient's Medications   Start Taking    CEPHALEXIN (KEFLEX) 500 MG CAPSULE    Take 1 Cap by mouth four (4) times daily for 7 days. TRIMETHOPRIM-SULFAMETHOXAZOLE (BACTRIM DS) 160-800 MG PER TABLET    Take 1 Tab by mouth two (2) times a day for 7 days. Continue Taking    BUSPIRONE (BUSPAR) 7.5 MG TABLET    Take 7.5 mg by mouth two (2) times a day. DOXEPIN (SINEQUAN) 25 MG CAPSULE    Take  by mouth nightly. OMEPRAZOLE (PRILOSEC) 20 MG CAPSULE    Take 1 Cap by mouth two (2) times a day. These Medications have changed    No medications on file   Stop Taking    METHOCARBAMOL (ROBAXIN) 500 MG TABLET    Take 1 Tab by mouth four (4) times daily. ONDANSETRON (ZOFRAN ODT) 4 MG DISINTEGRATING TABLET    Take 1 Tab by mouth every eight (8) hours as needed for Nausea or Vomiting for up to 12 doses.     SUCRALFATE (CARAFATE) 1 GRAM TABLET    Take 1 Tab by mouth four (4) times daily for 30 days. Dictation disclaimer:  Please note that this dictation was completed with [employer], the computer voice recognition software. Quite often unanticipated grammatical, syntax, homophones, and other interpretive errors are inadvertently transcribed by the computer software. Please disregard these errors. Please excuse any errors that have escaped final proofreading.

## 2023-09-28 NOTE — PROGRESS NOTES
Inpatient [address]  [address] [facility]  [facility]  Tel: [phone]     Physical Medicine and Rehabilitation Progress Note      Aleksandra Monroy  Admit Date: 9/20/2023  Admit Diagnosis: Debility [R53.81]  CVA (cerebrovascular accident due to intracerebral hemorrhage) ([address] [facility]) [I61.9]    Subjective     Patient seen face and examined. No pain complaints. Denies trach discomfort, SOB, nausea, lightheadedness,  or loose stools. Participating in therapy.     Objective:     Current Facility-Administered Medications   Medication Dose Route Frequency    insulin lispro (HUMALOG) injection vial 0-4 Units  0-4 Units SubCUTAneous BID WC    lansoprazole (PREVACID SOLUTAB) disintegrating tablet 30 mg  30 mg Oral QAM AC    ferrous sulfate 300 (60 Fe) MG/5ML syrup 300 mg  300 mg Oral Daily    albuterol (PROVENTIL) (2.5 MG/3ML) 0.083% nebulizer solution 2.5 mg  2.5 mg Nebulization BID RT    albuterol (PROVENTIL) (2.5 MG/3ML) 0.083% nebulizer solution 2.5 mg  2.5 mg Nebulization Q4H PRN    acetaminophen (TYLENOL) tablet 650 mg  650 mg Oral Q6H PRN    aluminum & magnesium hydroxide-simethicone (MAALOX) 200-200-20 MG/5ML suspension 30 mL  30 mL Oral Q6H PRN    aspirin chewable tablet 81 mg  81 mg Oral Daily    bisacodyl (DULCOLAX) suppository 10 mg  10 mg Rectal Daily PRN    bismuth subsalicylate (PEPTO BISMOL) 262 MG/15ML suspension 30 mL  30 mL Oral Q6H PRN    enoxaparin (LOVENOX) injection 40 mg  40 mg SubCUTAneous Daily    famotidine (PEPCID) tablet 10 mg  10 mg Oral Daily PRN    glycopyrrolate (ROBINUL) injection 0.1 mg  0.1 mg IntraVENous Q4H PRN    lidocaine viscous hcl (XYLOCAINE) 2 % solution 5 mL  5 mL Mouth/Throat Q3H PRN    loperamide (IMODIUM) capsule 2 mg  2 mg Oral 4x Daily PRN    mirtazapine (REMERON) tablet 7.5 mg  7.5 mg Per G Tube Nightly    ondansetron (ZOFRAN-ODT) disintegrating tablet 4 mg  4 mg Oral Q8H PRN    polyethylene glycol (GLYCOLAX) packet 17 g  17 g Oral Daily higher-level cognitive function, impaired communication / language skills, dysphagia with compensatory strategies as indicated. Continue 24-hour skilled rehabilitation nursing for bowel and bladder management, skin care for decubitus ulcer prevention, pain management and ongoing medication administration     Plan / Recommendations / Medical Decision Making:     Below are the active medical comorbidities / hospital conditions which will affect rehab course with plan for mitigation. 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    Dysphagia / PEG - tolerating bolus tube feeds, electrolytes wnml, BG levels consistently < 140, will d/c ISS    Trach - respiratory following, trach collar O2 PRN, albuterol nebs q6, secretion management - scopalomine patch, prn robinul  - 9/23  RT reports unable to pass suction tube via trach, patient feels trach has changed postitoned, ENT consulted for dysfunctioning trach tube, stoma has matured, ok for RT to reposition if necessary  - 9/26 trach removed and replaced on 9/23, now functioning well  - 9/28 ENT following, maintain velcro collar snug to prevent trach from coming dislodged, 6 distal XLT cuffless Shiley trach and a 6 proximal XLT cuffless Shiley trach at bedside to hopefully stay in better. Will contact ENT on call to place. Anemia - Hgb - 10.2 > 8.9 on 9/25, low, yet stable    DVT prophylaxis -  ambulating sufficiently, will d/c lovenox    Hypotension - ivf 500cc bolused, on no anti-HTN meds,   - 9/28 /73, BUN - 18 on 9/25, now wnml    Bowel management - on no scheduled meds, prn immodium    Current level of function: bed mobility - mod I, transfers - CGA, ambulation up to 100' with RW and min A, wc mobility 400' with SBA, fair standing balance    The patient is continuing to make functional gains with mobility and ADLs.  Family training in place. Anticipate d/c home tomorrow. Time spent was 25 minutes with over 1/2 in direct patient care / examination, consultation with nursing, therapist and coordination of care.     Signed By: Nereyda Treadwell MD     September 28, 2023

## 2023-09-28 NOTE — CARE COORDINATION
Interdisciplinary Team Conference Meeting Notes    Interdisciplinary Team Conference Meeting on Wednesday to completed and discuss plan of care. Estimated D/C Date: 9/29/23    Recommended Follow-Up Therapy: Staff has recommended ([facility] [place],[address]) PT/OT/RN / will need tube feeding / DME's (wheelchair, drop arm BSC). Communication with family/caregivers: Patient needs are continue to be followed by Dr. Modesta Alarcon.  Patient has discharge date / plan at this time scheduled for 9/29/23. CM met with patient / spouse and made them aware fo the recommendations that were discussed in the Team Conference. CM discussed ([facility] [place]) and patient / spouse agreeable and requested [facility]. Referral will be completed. CM made them aware of the tube feeding / training. Patient / spouse was agreeable. CM will complete a referral to [place] for tube feeding. DME's referral completed for (wheelchair/ drop arm BSC). CM will continue to follow patient:        Name of Ins: MEDICARE   Secondary Ins: BCBS/[employer] MEDICARE SUPP  Policy#: [id number]  Auth#: no auth needed  Admitting Date: 9/20/23  LOS: 8 days  D/C Date: 9/29/23  Contact: no contact# needed  Fax: no fax # needed  Updated Clinicals Faxed: no updated clinicals needed    CM met with patient / spouse at bedside and made them aware of the recommendations and they were agreeable to all recommendations. CM will completed referral that are needed. CM will continue to follow and remain available for any needs, concerns or questions that may arise.

## 2023-09-28 NOTE — TELEPHONE ENCOUNTER
Rhoda from [facility]. States pt is currently in the hospital. Asks if PCP can sign home health orders.

## 2023-09-28 NOTE — PROGRESS NOTES
Inpatient Rehab Program  [address] [place] [address]  Tel: [phone]     9/28/2023      Cheryl Rabago  Admit Date: 9/20/2023  Admit Diagnosis:   Physical debility [R53.81]  Left sided lacunar infarction [facility]) [I61.81]  Ataxia [R27.0]  Syncope and collapse [R55]  Impaired functional mobility, balance, gait, and endurance [Z74.09]  Decreased independence with activities of daily living [Z78.9]    Statement of Medical Necessity - Drop-Arm Bedside Commode  Cheryl Rabago has been evaluated face-to-face on a daily basis by the Physiatry MD / PA for the above-noted diagnoses since admission to our rehabilitation program. It is medically necessary for this patient to have a drop-arm bedside commode at discharge from inpatient rehab. He is unable to ambulate due to new physical debility following stroke and dehydration with syncope and collapse superimposed on deficits from childhood polio. He is essentially room-confined / unable to get to toileting facilities, as he cannot ambulate and his wheelchair will not fit through the bathroom door. He requires a drop-arm BSC to facilitate transfers.        Ying Mena PA-C  Physician Assistant with [facility]

## 2023-09-28 NOTE — PROGRESS NOTES
SPEECH LANGUAGE PATHOLOGY: MODIFIED BARIUM SWALLOW STUDY Re-evaluation    NAME: Cesar Murray  : 1945  MRN: [id number]    ADMISSION DATE: 2023  PRIMARY DIAGNOSIS: CVA (cerebrovascular accident due to intracerebral hemorrhage) ([address])  Debility [R53.81]  CVA (cerebrovascular accident due to intracerebral hemorrhage) ([address]) [I61.9]  Date of Eval: 2023    ICD-10: Treatment Diagnosis: R13.12 Dysphagia, Oropharyngeal Phase    RECOMMENDATIONS   Diet: NPO - utilize PEG for all nutrition, hydration, and medication needs. Patient OK for limited ice chips - can consume 1-2 ice chips at a time after oral care for comfort. Medications:  via PEG     Patient continues to require skilled intervention:  Patient planned for d/c tomorrow. Planned for home health speech therapy post acute care for dysphagia. ASSESSMENT    Patient presents with severe pharyngeal phase dysphagia. Pharyngeal phase of swallow limited by pharyngeal cancer which alters function of epiglottis (observed to be fixed in a lateral position), kyphosis, along with osteophytes noted in the cervical spine. During the swallow, patient with limited tongue based retraction and pharyngeal constriction. With all trials, penetration noted during the swallow and severe residual observed in the vallecula and pyriform sinus. Patient required to repeatedly swallow to clear contrast in the pharynx though eventual aspiration from contrast residing in the pyriform sinus observed. Cough noted in response to aspiration though given severity of residual/repeated entry of contrast back into the larynx - aspiration eventually observed throughout study. Additional PO trials of nectar and honey deferred due to severity of dysphagia though concern for aspiration noted with nectar and honey consistencies on 2023. Head turn attempted to be utilized as well though positioning strategies unfortunately did not improve swallowing function. tablet 3    lidocaine-prilocaine (EMLA) 2.5-2.5 % cream Apply dime size amount to port site ~30 min prior to accessing, DO NOT RUB IN, cover in plastic wrap to protect clothes (Patient not taking: Reported on 9/6/2023) 30 g 3    scopolamine (TRANSDERM-SCOP) transdermal patch Place 1 patch onto the skin every 72 hours 4 patch 3    Magic Mouthwash (MIRACLE MOUTHWASH) Swish and spit 5 mLs 4 times daily as needed for Irritation Pharmacy to mix equal parts Benadryl, Maalox, Viscous Lidocaine. Take 5 mL 4 times daily as needed, gargle/swish/spit. 480 mL 5    naloxone (NARCAN) 4 MG/0.1ML LIQD nasal spray Use 1 spray intranasally at onset of opioid overdose. May repeat dose using alternate nostril every 2 minutes as needed should symptoms persist or recur. (Patient not taking: Reported on 9/6/2023) 2 each 5    omeprazole (PRILOSEC) 40 MG delayed release capsule Take 1 capsule by mouth daily (Patient not taking: Reported on 9/6/2023) 90 capsule 2       PRECAUTIONS/ALLERGIES: Zolpidem and Sulfamethoxazole-trimethoprim     SAFETY:  Left with transport.        Therapy Time  SLP Individual Minutes  Time In: 2149  Time Out: 1100  Minutes: [facility]  9/28/2023 12:30 PM

## 2023-09-28 NOTE — PLAN OF CARE
Problem: Respiratory - Adult  Goal: Achieves optimal ventilation and oxygenation  Outcome: Progressing  Flowsheets (Taken 9/27/2023 1941 by Suellen Huang RN)  Achieves optimal ventilation and oxygenation: Assess for changes in respiratory status

## 2023-09-29 ENCOUNTER — TELEPHONE (OUTPATIENT)
Dept: INTERNAL MEDICINE CLINIC | Facility: CLINIC | Age: 78
End: 2023-09-29

## 2023-09-29 VITALS
HEART RATE: 91 BPM | DIASTOLIC BLOOD PRESSURE: 62 MMHG | SYSTOLIC BLOOD PRESSURE: 103 MMHG | WEIGHT: 148.2 LBS | OXYGEN SATURATION: 98 % | BODY MASS INDEX: 20.67 KG/M2 | RESPIRATION RATE: 16 BRPM | TEMPERATURE: 97.7 F

## 2023-09-29 PROCEDURE — 94760 N-INVAS EAR/PLS OXIMETRY 1: CPT

## 2023-09-29 PROCEDURE — 99239 HOSP IP/OBS DSCHRG MGMT >30: CPT | Performed by: PHYSICAL MEDICINE & REHABILITATION

## 2023-09-29 PROCEDURE — 6360000002 HC RX W HCPCS: Performed by: PHYSICAL MEDICINE & REHABILITATION

## 2023-09-29 PROCEDURE — 6370000000 HC RX 637 (ALT 250 FOR IP): Performed by: PHYSICAL MEDICINE & REHABILITATION

## 2023-09-29 PROCEDURE — 2580000003 HC RX 258: Performed by: PHYSICAL MEDICINE & REHABILITATION

## 2023-09-29 RX ADMIN — ASPIRIN 81 MG: 81 TABLET, CHEWABLE ORAL at 07:42

## 2023-09-29 RX ADMIN — Medication 4 ML: at 05:38

## 2023-09-29 RX ADMIN — ALBUTEROL SULFATE 2.5 MG: 2.5 SOLUTION RESPIRATORY (INHALATION) at 05:37

## 2023-09-29 RX ADMIN — LANSOPRAZOLE 30 MG: 30 TABLET, ORALLY DISINTEGRATING, DELAYED RELEASE ORAL at 07:42

## 2023-09-29 RX ADMIN — MINERAL SUPPLEMENT IRON 300 MG / 5 ML STRENGTH LIQUID 100 PER BOX UNFLAVORED 300 MG: at 07:42

## 2023-09-29 NOTE — DISCHARGE INSTR - DIET

## 2023-09-29 NOTE — TELEPHONE ENCOUNTER
Pt wife called to [facility] appt. Offered next avail w/PCP (was 10/6 @ 8 am). Pt wife declined. Offered next avail appts w/NP (appts were on 10/10). Pt wife declined, states pt only wants to see PCP, she will ask pt if he is ok seeing provider other than PCP.

## 2023-09-29 NOTE — CARE COORDINATION
Patient has discharge orders for today. Patient / spouse has scheduled training with [facility] for tube feeding. Training is scheduled for 10 AM this morning. Referral has been completed for [facility] TERM ACUTE CARE [facility] AT [facility]. Patient has been accepted and approved at [facility]. DME's (wheelchair / drop arm BSC) will be delivered to patient room this morning from [facility]. Spouse at bedside will transport patient home. Patient has met all treatment goals / milestones. CM will continue to follow and remain available for any needs, concerns or questions that may arise. 09/22/23 8339   Service Assessment   Patient Orientation Alert and Oriented;Person;Place;Situation;Self   Cognition Alert   History Provided By Patient;Significant Other;Medical Record   Primary Caregiver Spouse   Support Systems Spouse/Significant Other;Children;Family Members   Patient's Healthcare Decision Maker is: Legal Next of Kin   PCP Verified by CM Yes   Last Visit to PCP Within last 3 months   Prior Functional Level Assistance with the following:;Mobility   Current Functional Level Assistance with the following:;Mobility   Can patient return to prior living arrangement Yes   Ability to make needs known: Good   Family able to assist with home care needs: Yes   Would you like for me to discuss the discharge plan with any other family members/significant others, and if so, who?  Yes   Financial Resources  Resources None   Social/Functional History   Lives With Spouse   Type of [facility]  One level   Home Access Stairs to enter with rails   Entrance Stairs - Number of Steps 2   Entrance Stairs - Rails Both   Bathroom Shower/Tub None   Bathroom Toilet Standard   Bathroom Equipment None   Bathroom Accessibility Accessible   Home Equipment Crutches;Walker, rolling   Receives Help From Family   ADL Assistance Needs assistance   Homemaking Responsibilities No   Ambulation Assistance Needs assistance   Transfer

## 2023-10-02 ENCOUNTER — TELEPHONE (OUTPATIENT)
Dept: INTERNAL MEDICINE CLINIC | Facility: CLINIC | Age: 78
End: 2023-10-02

## 2023-10-02 ENCOUNTER — CARE COORDINATION (OUTPATIENT)
Dept: CARE COORDINATION | Facility: CLINIC | Age: 78
End: 2023-10-02

## 2023-10-02 DIAGNOSIS — K21.9 GASTROESOPHAGEAL REFLUX DISEASE, UNSPECIFIED WHETHER ESOPHAGITIS PRESENT: ICD-10-CM

## 2023-10-02 DIAGNOSIS — D64.9 NORMOCYTIC ANEMIA: ICD-10-CM

## 2023-10-02 DIAGNOSIS — R05.9 COUGH, UNSPECIFIED TYPE: ICD-10-CM

## 2023-10-02 DIAGNOSIS — Z93.0 TRACHEOSTOMY IN PLACE (HCC): Primary | ICD-10-CM

## 2023-10-02 RX ORDER — FERROUS SULFATE 300 MG/5ML
300 LIQUID (ML) ORAL DAILY
Qty: 300 ML | Refills: 2 | Status: SHIPPED | OUTPATIENT
Start: 2023-10-02

## 2023-10-02 NOTE — TELEPHONE ENCOUNTER
----- Message from Chantel Mohan sent at 10/2/2023  9:51 AM EDT -----  Subject: Message to Provider    QUESTIONS  Information for Provider? Ok to continue Home health care with [facility]. The hospital was supposed to be sending in a nebulizer but never did. He   has the medication but not the machine. Will need it sent to [facility]. He is doing everything through a feeding tube. He is currently   on omeprazole extended release capsules which is hard to go through the   feeding tube, wondering about sending in the packets. Also has a hard time   with iron tablets, wondering about trying the liquid iron. ---------------------------------------------------------------------------  --------------  Cassie MENDOZA  [phone]; OK to leave message on voicemail  ---------------------------------------------------------------------------  --------------  SCRIPT ANSWERS  Relationship to Patient? Covered Entity  Covered Entity Type? Home Health Care? Representative Name?  Joselin Garnica

## 2023-10-02 NOTE — TELEPHONE ENCOUNTER
Demetrice Casanova from [facility] pharmacy is calling to inform Dr. Tamika Hercules that they do not carry the omeprazole 2 mg/ml susp oral suspension. Pt would need to have either the capsules called in or the pharmacy offers a package for the pt to mix the concentration. Please advise.

## 2023-10-02 NOTE — TELEPHONE ENCOUNTER
Nebulizer order faxed to [employer] on 10/2/23. Pt's home health nurse notified that order has been faxed, okay for pt to continue home health with [facility], and liquid forms of pt's ferrous gluconate and omeprazole were sent to pt's pharmacy. Home health nurse verbalized understanding.

## 2023-10-02 NOTE — TELEPHONE ENCOUNTER
Care Transitions Initial Follow Up Call    Outreach made within 2 business days of discharge: Yes    Patient: Yumiko Amos Patient : 1945   MRN: [id number]  Reason for Admission: There are no discharge diagnoses documented for the most recent discharge. Discharge Date: 23       Spoke with: Patients wife    Discharge department/facility: [facility] Interactive Patient Contact:  Was patient able to fill all prescriptions: Yes  Was patient instructed to bring all medications to the follow-up visit: Yes  Is patient taking all medications as directed in the discharge summary?  Yes  Does patient understand their discharge instructions: Yes  Does patient have questions or concerns that need addressed prior to 7-14 day follow up office visit: no    Scheduled appointment with PCP within 7-14 days    Follow Up  Future Appointments   Date Time Provider [address] [place] [address]   10/5/2023 10:15 AM [facility]   10/5/2023 11:30 AM Odette Yarbrough APRN - CNP [facility]   10/5/2023 11:30 AM Loraine Simmons RD [facility]   10/5/2023 12:00 PM PACO Dominique CNP [facility] AMB   10/6/2023 10:45 AM INFUSION [address] [place] [address]   10/10/2023  4:45 PM INFUSION [address]   10/12/2023 10:30 AM PACO Alas - CNP [facility] AMB   [facility]   10/26/2023 11:00 AM PACO Dominique - CNP [facility] AMB   10/26/2023 11:30 AM PACO Chambers - CNP [facility]   10/26/2023 11:30 AM Loraine Simmons RD [facility]   10/27/2023 10:30 AM INFUSION [facility]   10/31/2023  4:45 PM INFUSION [address] [place] [address]   2023 11:00 AM S[facility] IR UNIT 1 [facility]       MONICA Weber MA

## 2023-10-02 NOTE — TELEPHONE ENCOUNTER
Contacted wife after scheduling error with nurse from [address] [place] [address]. Patient was admitted to [facility] from 9/20-9/29  For a left sided lacunar infarction and various other issues according to nurse from [address] [place] [address]. Hosp f/u scheduled for October 12.

## 2023-10-02 NOTE — TELEPHONE ENCOUNTER
Printed prescription for nebulizer generated to be sent to [facility]. Spoke with LPN who states that patient already has nebulizer medications. Prescriptions for liquid form of omeprazole and iron sent to pharmacy. Orders Placed This Encounter    Nebulizers (COMPRESSOR/NEBULIZER) MISC     Sig: Use as directed.  Dx: Z93.0, R05.9     Dispense:  1 each     Refill:  0    omeprazole (PRILOSEC) 2 MG/ML SUSP 2 mg/mL oral suspension     Si mLs by Per G Tube route Daily     Dispense:  600 mL     Refill:  2    ferrous sulfate 300 (60 Fe) MG/5ML syrup     Si mLs by Per G Tube route daily     Dispense:  300 mL     Refill:  2

## 2023-10-02 NOTE — TELEPHONE ENCOUNTER
Pt was supposed to receive an order for a nebulizer machine and supplies when discharged from hospital, but didn't receive. Can you please print order and sign and I can fax to [employer]? Pt is also using a feeding tube and wants to know if there is a liquid alternative you can send to the pharmacy for his omeprazole ER 40 mg and ferrous gluconate 324 mg.

## 2023-10-03 ENCOUNTER — TELEPHONE (OUTPATIENT)
Dept: ENT CLINIC | Age: 78
End: 2023-10-03

## 2023-10-04 ENCOUNTER — TELEPHONE (OUTPATIENT)
Dept: ONCOLOGY | Age: 78
End: 2023-10-04

## 2023-10-04 DIAGNOSIS — Z79.899 HIGH RISK MEDICATION USE: ICD-10-CM

## 2023-10-04 DIAGNOSIS — C15.9 MALIGNANT NEOPLASM OF ESOPHAGUS, UNSPECIFIED LOCATION (HCC): Primary | ICD-10-CM

## 2023-10-04 NOTE — TELEPHONE ENCOUNTER
Physician provider: Babatunde Espinal MD  Reason for today's call: [facility]  Last office visit: 25 [facility] RN w/[facility] called asking if there is any care they are required to handle for Pt regarding his port.  She can be reached at: [phone]

## 2023-10-05 ENCOUNTER — HOSPITAL ENCOUNTER (OUTPATIENT)
Dept: LAB | Age: 78
Discharge: HOME | End: 2023-10-05
Payer: MEDICARE

## 2023-10-05 ENCOUNTER — OFFICE VISIT (OUTPATIENT)
Dept: ONCOLOGY | Age: 78
End: 2023-10-05
Payer: MEDICARE

## 2023-10-05 ENCOUNTER — OFFICE VISIT (OUTPATIENT)
Dept: ONCOLOGY | Age: 78
End: 2023-10-05

## 2023-10-05 ENCOUNTER — OFFICE VISIT (OUTPATIENT)
Dept: PALLATIVE CARE | Age: 78
End: 2023-10-05

## 2023-10-05 VITALS
TEMPERATURE: 97.8 F | HEIGHT: 71 IN | OXYGEN SATURATION: 99 % | SYSTOLIC BLOOD PRESSURE: 102 MMHG | HEART RATE: 80 BPM | DIASTOLIC BLOOD PRESSURE: 64 MMHG | WEIGHT: 154 LBS | BODY MASS INDEX: 21.56 KG/M2 | RESPIRATION RATE: 16 BRPM

## 2023-10-05 DIAGNOSIS — R53.83 FATIGUE DUE TO TREATMENT: ICD-10-CM

## 2023-10-05 DIAGNOSIS — Z00.8 NUTRITIONAL ASSESSMENT: Primary | ICD-10-CM

## 2023-10-05 DIAGNOSIS — Z79.899 HIGH RISK MEDICATION USE: ICD-10-CM

## 2023-10-05 DIAGNOSIS — Z78.9 ON ENTERAL NUTRITION: ICD-10-CM

## 2023-10-05 DIAGNOSIS — Z51.5 ENCOUNTER FOR PALLIATIVE CARE: ICD-10-CM

## 2023-10-05 DIAGNOSIS — C15.9 MALIGNANT NEOPLASM OF ESOPHAGUS, UNSPECIFIED LOCATION (HCC): ICD-10-CM

## 2023-10-05 DIAGNOSIS — C32.9 KERATINIZING SQUAMOUS CELL CARCINOMA OF LARYNX (HCC): ICD-10-CM

## 2023-10-05 DIAGNOSIS — C32.9 KERATINIZING SQUAMOUS CELL CARCINOMA OF LARYNX (HCC): Primary | ICD-10-CM

## 2023-10-05 DIAGNOSIS — R53.0 NEOPLASTIC (MALIGNANT) RELATED FATIGUE: Primary | ICD-10-CM

## 2023-10-05 DIAGNOSIS — E03.2 HYPOTHYROIDISM DUE TO MEDICAMENTS AND OTHER EXOGENOUS SUBSTANCES: ICD-10-CM

## 2023-10-05 LAB
ALBUMIN SERPL-MCNC: 2.2 G/DL (ref 3.2–4.6)
ALBUMIN/GLOB SERPL: 0.6 (ref 0.4–1.6)
ALP SERPL-CCNC: 86 U/L (ref 50–136)
ALT SERPL-CCNC: 87 U/L (ref 12–65)
ANION GAP SERPL CALC-SCNC: 7 MMOL/L (ref 2–11)
AST SERPL-CCNC: 41 U/L (ref 15–37)
BASOPHILS # BLD: 0 K/UL (ref 0–0.2)
BASOPHILS NFR BLD: 1 % (ref 0–2)
BILIRUB SERPL-MCNC: 0.2 MG/DL (ref 0.2–1.1)
BUN SERPL-MCNC: 20 MG/DL (ref 8–23)
CALCIUM SERPL-MCNC: 8.4 MG/DL (ref 8.3–10.4)
CHLORIDE SERPL-SCNC: 102 MMOL/L (ref 101–110)
CO2 SERPL-SCNC: 27 MMOL/L (ref 21–32)
CREAT SERPL-MCNC: 0.5 MG/DL (ref 0.8–1.5)
DIFFERENTIAL METHOD BLD: ABNORMAL
EOSINOPHIL # BLD: 0 K/UL (ref 0–0.8)
EOSINOPHIL NFR BLD: 1 % (ref 0.5–7.8)
ERYTHROCYTE [DISTWIDTH] IN BLOOD BY AUTOMATED COUNT: 14.7 % (ref 11.9–14.6)
GLOBULIN SER CALC-MCNC: 4 G/DL (ref 2.8–4.5)
GLUCOSE SERPL-MCNC: 116 MG/DL (ref 65–100)
HCT VFR BLD AUTO: 26.2 % (ref 41.1–50.3)
HGB BLD-MCNC: 8.5 G/DL (ref 13.6–17.2)
IMM GRANULOCYTES # BLD AUTO: 0.1 K/UL (ref 0–0.5)
IMM GRANULOCYTES NFR BLD AUTO: 2 % (ref 0–5)
LYMPHOCYTES # BLD: 0.6 K/UL (ref 0.5–4.6)
LYMPHOCYTES NFR BLD: 17 % (ref 13–44)
MAGNESIUM SERPL-MCNC: 2.2 MG/DL (ref 1.8–2.4)
MCH RBC QN AUTO: 29.3 PG (ref 26.1–32.9)
MCHC RBC AUTO-ENTMCNC: 32.4 G/DL (ref 31.4–35)
MCV RBC AUTO: 90.3 FL (ref 82–102)
MONOCYTES # BLD: 0.6 K/UL (ref 0.1–1.3)
MONOCYTES NFR BLD: 17 % (ref 4–12)
NEUTS SEG # BLD: 2 K/UL (ref 1.7–8.2)
NEUTS SEG NFR BLD: 62 % (ref 43–78)
NRBC # BLD: 0 K/UL (ref 0–0.2)
PHOSPHATE SERPL-MCNC: 3.1 MG/DL (ref 2.3–3.7)
PLATELET # BLD AUTO: 281 K/UL (ref 150–450)
PMV BLD AUTO: 9.3 FL (ref 9.4–12.3)
POTASSIUM SERPL-SCNC: 3.9 MMOL/L (ref 3.5–5.1)
PROT SERPL-MCNC: 6.2 G/DL (ref 6.3–8.2)
RBC # BLD AUTO: 2.9 M/UL (ref 4.23–5.6)
SODIUM SERPL-SCNC: 136 MMOL/L (ref 133–143)
WBC # BLD AUTO: 3.3 K/UL (ref 4.3–11.1)

## 2023-10-05 PROCEDURE — 85025 COMPLETE CBC W/AUTO DIFF WBC: CPT

## 2023-10-05 PROCEDURE — G8420 CALC BMI NORM PARAMETERS: HCPCS | Performed by: NURSE PRACTITIONER

## 2023-10-05 PROCEDURE — 36592 COLLECT BLOOD FROM PICC: CPT

## 2023-10-05 PROCEDURE — 83735 ASSAY OF MAGNESIUM: CPT

## 2023-10-05 PROCEDURE — G8427 DOCREV CUR MEDS BY ELIG CLIN: HCPCS | Performed by: NURSE PRACTITIONER

## 2023-10-05 PROCEDURE — G8484 FLU IMMUNIZE NO ADMIN: HCPCS | Performed by: NURSE PRACTITIONER

## 2023-10-05 PROCEDURE — 1111F DSCHRG MED/CURRENT MED MERGE: CPT | Performed by: NURSE PRACTITIONER

## 2023-10-05 PROCEDURE — 2580000003 HC RX 258: Performed by: INTERNAL MEDICINE

## 2023-10-05 PROCEDURE — 1036F TOBACCO NON-USER: CPT | Performed by: NURSE PRACTITIONER

## 2023-10-05 PROCEDURE — 84100 ASSAY OF PHOSPHORUS: CPT

## 2023-10-05 PROCEDURE — 1123F ACP DISCUSS/DSCN MKR DOCD: CPT | Performed by: NURSE PRACTITIONER

## 2023-10-05 PROCEDURE — 99214 OFFICE O/P EST MOD 30 MIN: CPT | Performed by: NURSE PRACTITIONER

## 2023-10-05 PROCEDURE — 80053 COMPREHEN METABOLIC PANEL: CPT

## 2023-10-05 RX ORDER — HEPARIN SODIUM (PORCINE) LOCK FLUSH IV SOLN 100 UNIT/ML 100 UNIT/ML
500 SOLUTION INTRAVENOUS PRN
OUTPATIENT
Start: 2023-10-10

## 2023-10-05 RX ORDER — ONDANSETRON 2 MG/ML
8 INJECTION INTRAMUSCULAR; INTRAVENOUS
Status: CANCELLED | OUTPATIENT
Start: 2023-10-06

## 2023-10-05 RX ORDER — ACETAMINOPHEN 325 MG/1
650 TABLET ORAL
Status: CANCELLED | OUTPATIENT
Start: 2023-10-06

## 2023-10-05 RX ORDER — SODIUM CHLORIDE 9 MG/ML
5-250 INJECTION, SOLUTION INTRAVENOUS PRN
Status: CANCELLED | OUTPATIENT
Start: 2023-10-06

## 2023-10-05 RX ORDER — ALBUTEROL SULFATE 90 UG/1
4 AEROSOL, METERED RESPIRATORY (INHALATION) PRN
Status: CANCELLED | OUTPATIENT
Start: 2023-10-06

## 2023-10-05 RX ORDER — DIPHENHYDRAMINE HYDROCHLORIDE 50 MG/ML
50 INJECTION INTRAMUSCULAR; INTRAVENOUS
Status: CANCELLED | OUTPATIENT
Start: 2023-10-06

## 2023-10-05 RX ORDER — HEPARIN SODIUM (PORCINE) LOCK FLUSH IV SOLN 100 UNIT/ML 100 UNIT/ML
500 SOLUTION INTRAVENOUS PRN
Status: CANCELLED | OUTPATIENT
Start: 2023-10-06

## 2023-10-05 RX ORDER — SODIUM CHLORIDE 9 MG/ML
INJECTION, SOLUTION INTRAVENOUS CONTINUOUS
Status: CANCELLED | OUTPATIENT
Start: 2023-10-06

## 2023-10-05 RX ORDER — CEPHALEXIN 250 MG/5ML
POWDER, FOR SUSPENSION ORAL
COMMUNITY
Start: 2023-10-02

## 2023-10-05 RX ORDER — EPINEPHRINE 1 MG/ML
0.3 INJECTION, SOLUTION, CONCENTRATE INTRAVENOUS PRN
Status: CANCELLED | OUTPATIENT
Start: 2023-10-06

## 2023-10-05 RX ORDER — FAMOTIDINE 10 MG/ML
20 INJECTION, SOLUTION INTRAVENOUS
Status: CANCELLED | OUTPATIENT
Start: 2023-10-06

## 2023-10-05 RX ORDER — FAMOTIDINE 40 MG/5ML
40 POWDER, FOR SUSPENSION ORAL DAILY
Qty: 150 ML | Refills: 3 | Status: SHIPPED | OUTPATIENT
Start: 2023-10-05

## 2023-10-05 RX ORDER — MEPERIDINE HYDROCHLORIDE 50 MG/ML
12.5 INJECTION INTRAMUSCULAR; INTRAVENOUS; SUBCUTANEOUS PRN
Status: CANCELLED | OUTPATIENT
Start: 2023-10-06

## 2023-10-05 RX ORDER — ONDANSETRON 2 MG/ML
8 INJECTION INTRAMUSCULAR; INTRAVENOUS ONCE
Status: CANCELLED | OUTPATIENT
Start: 2023-10-06 | End: 2023-10-06

## 2023-10-05 RX ORDER — SODIUM CHLORIDE 0.9 % (FLUSH) 0.9 %
5-40 SYRINGE (ML) INJECTION PRN
OUTPATIENT
Start: 2023-10-10

## 2023-10-05 RX ORDER — SODIUM CHLORIDE 0.9 % (FLUSH) 0.9 %
5-40 SYRINGE (ML) INJECTION PRN
Status: CANCELLED | OUTPATIENT
Start: 2023-10-06

## 2023-10-05 RX ORDER — SODIUM CHLORIDE 9 MG/ML
5-250 INJECTION, SOLUTION INTRAVENOUS PRN
OUTPATIENT
Start: 2023-10-10

## 2023-10-05 RX ORDER — SODIUM CHLORIDE 0.9 % (FLUSH) 0.9 %
5-40 SYRINGE (ML) INJECTION PRN
Status: DISCONTINUED | OUTPATIENT
Start: 2023-10-05 | End: 2023-10-09 | Stop reason: HOSPADM

## 2023-10-05 RX ADMIN — SODIUM CHLORIDE, PRESERVATIVE FREE 10 ML: 5 INJECTION INTRAVENOUS at 10:47

## 2023-10-05 ASSESSMENT — PATIENT HEALTH QUESTIONNAIRE - PHQ9
SUM OF ALL RESPONSES TO PHQ QUESTIONS 1-9: 0
SUM OF ALL RESPONSES TO PHQ QUESTIONS 1-9: 0
2. FEELING DOWN, DEPRESSED OR HOPELESS: 0
SUM OF ALL RESPONSES TO PHQ QUESTIONS 1-9: 0
SUM OF ALL RESPONSES TO PHQ QUESTIONS 1-9: 0
SUM OF ALL RESPONSES TO PHQ9 QUESTIONS 1 & 2: 0
1. LITTLE INTEREST OR PLEASURE IN DOING THINGS: 0

## 2023-10-05 ASSESSMENT — ENCOUNTER SYMPTOMS: COUGH: 1

## 2023-10-05 NOTE — PROGRESS NOTES
Patient arrived to port lab for lab draw from PICC. Labs drawn from red lumen of PICC line. Dressing change due tomorrow, patient aware. Patient discharged from port lab via wheelchair with family.

## 2023-10-05 NOTE — PROGRESS NOTES
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 Buddy Merino Oncologist: N/A    Navigator: Oumar Duran RD      Chief Complaint:    Chief Complaint   Patient presents with    New Patient    Fatigue       History of Present Illness:  Mr. Evy Reyna is a 69 y.o. male who presents today for evaluation regarding introduction to palliative care in the setting of base of tongue cancer. Pt was originally diagnosed in 2010 and had recurrence in 2023. Pt underwent glossectomy and tonsillectomy, neck dissection in 2010. Pt completed adjuvant radiation in early 2011. Pt also has a history of polio as a baby, and uses crutches at baseline. Pt developed symptoms of congestion and hypersalivation and PET 8/9/2023 revealed hypermetabolic mass along the base of the tongue, most compatible with recurrent malignancy and mildly metabolic nonenlarged R level 3 cervical lymph node, suspicious for iveth metastatic disease. Pt was admitted to [facility] after a fall from 9/5/2023-9/20/2023 and was discharged to rehab. During that admission, MRI brain was positive for acute L cerebellar CVA. Trach and PEG were placed 9/7. Pt received his first dose of Carbo/5FU while inpatient. Pt is seen today in clinic in coordination with his visit with Trisha Shaikh NP and Oumar Duran RD. Pt's wife of 48 years, Nedra Benitez, is with him. She has been caring for him at home as well as home health RN and PT. Pt has no c/o nausea, diarrhea, or constipation. Pt is receiving tube feeds through his PEG and is unable to take anything PO currently; a recent MBS revealed aspiration. He is gaining weight, up 6 lbs in the past week. Pt has a Passy Aurelia valve on his trach, so he is able to communicate. He is Solomon. He has no c/o pain and isn't taking any pain medication.

## 2023-10-05 NOTE — PATIENT INSTRUCTIONS
10/05/2023 0.1  0.0 - 0.5 K/UL Final    Magnesium 10/05/2023 2.2  1.8 - 2.4 mg/dL Final    Phosphorus 10/05/2023 3.1  2.3 - 3.7 MG/DL Final         Treatment Summary has been discussed and given to patient: NA        -------------------------------------------------------------------------------------------------------------------  Please call our office at [phone] if you have any  of the following symptoms:   Fever of 100.5 or greater  Chills  Shortness of breath  Swelling or pain in one leg    After office hours an answering service is available and will contact a provider for emergencies or if you are experiencing any of the above symptoms. Patient has My Chart. My Chart log in information explained on the after visit summary printout at the [address] desk.     [id number] [facility]   Outpatient Oncology Dietitian  Head and Neck Tumor Navigator  [phone]  [email or web address]

## 2023-10-05 NOTE — PROGRESS NOTES
ECO  HEENT:  Neck is supple with no thyromegaly or JVD noted. +trach  Lungs/Thorax: Clear to auscultation, no accessory muscles of respiration being used. Heart: Regular rate and rhythm, normal S1, S2, no appreciable murmurs, rubs, gallops  Abdomen: Soft, nontender, bowel sounds present +PEG  Extremeties: No peripheral edema. Leg braces in place. Skin: Normal skin tone with no rash, petechiae, ecchymosis noted. Musculoskeletal: No pain on palpation over bony prominence, no edema, no evidence of gout, no joint or bony deformity  Neurologic: Grossly intact    LABS/IMAGING:    Lab Results   Component Value Date/Time    WBC 3.3 10/05/2023 10:43 AM    HGB 8.5 10/05/2023 10:43 AM    HCT 26.2 10/05/2023 10:43 AM     10/05/2023 10:43 AM    MCV 90.3 10/05/2023 10:43 AM       Lab Results   Component Value Date/Time     10/05/2023 10:43 AM    K 3.9 10/05/2023 10:43 AM     10/05/2023 10:43 AM    CO2 27 10/05/2023 10:43 AM    BUN 20 10/05/2023 10:43 AM    GFRAA >60 2022 03:13 PM    GLOB 4.0 10/05/2023 10:43 AM    ALT 87 10/05/2023 10:43 AM           Above results reviewed with patient     ASSESSMENT:   67-year-old  male patient, stopped smoking about 25 years ago, history of polio as a baby (uses crutches), squamous cell cancer of left lateral tongue status post partial glossectomy with tonsillectomy, neck dissection with radial forearm free flap to left oral cavity 8/3/2010. Per records margins were close but negative with evidence of LVI as well as PNI and extracapsular spread. He completed adjuvant radiation therapy and end of  to early . He followed with Dr. Elena Jones ENT up till the 5-year carlee in  with no evidence of recurrence. 2 years post therapy he had multiple teeth removed of his left lower jaw followed by HBO therapy x10 sessions due to poor healing.   More recently patient presented with symptoms of congestion and hypersalivation, completed a couple

## 2023-10-06 ENCOUNTER — HOSPITAL ENCOUNTER (OUTPATIENT)
Dept: INFUSION THERAPY | Age: 78
Discharge: HOME | End: 2023-10-06
Payer: MEDICARE

## 2023-10-06 VITALS
HEART RATE: 95 BPM | WEIGHT: 156 LBS | OXYGEN SATURATION: 99 % | DIASTOLIC BLOOD PRESSURE: 64 MMHG | TEMPERATURE: 97.8 F | RESPIRATION RATE: 16 BRPM | BODY MASS INDEX: 21.76 KG/M2 | SYSTOLIC BLOOD PRESSURE: 109 MMHG

## 2023-10-06 DIAGNOSIS — E03.2 HYPOTHYROIDISM DUE TO MEDICAMENTS AND OTHER EXOGENOUS SUBSTANCES: Primary | ICD-10-CM

## 2023-10-06 DIAGNOSIS — C32.9 KERATINIZING SQUAMOUS CELL CARCINOMA OF LARYNX (HCC): ICD-10-CM

## 2023-10-06 PROBLEM — Z01.810 PRE-OPERATIVE CARDIOVASCULAR EXAMINATION: Status: RESOLVED | Noted: 2023-09-06 | Resolved: 2023-10-06

## 2023-10-06 PROCEDURE — 96413 CHEMO IV INFUSION 1 HR: CPT

## 2023-10-06 PROCEDURE — 96367 TX/PROPH/DG ADDL SEQ IV INF: CPT

## 2023-10-06 PROCEDURE — 2580000003 HC RX 258: Performed by: NURSE PRACTITIONER

## 2023-10-06 PROCEDURE — 96417 CHEMO IV INFUS EACH ADDL SEQ: CPT

## 2023-10-06 PROCEDURE — G0498 CHEMO EXTEND IV INFUS W/PUMP: HCPCS

## 2023-10-06 PROCEDURE — 96375 TX/PRO/DX INJ NEW DRUG ADDON: CPT

## 2023-10-06 PROCEDURE — 6360000002 HC RX W HCPCS: Performed by: NURSE PRACTITIONER

## 2023-10-06 RX ORDER — ALBUTEROL SULFATE 90 UG/1
4 AEROSOL, METERED RESPIRATORY (INHALATION) PRN
Status: DISCONTINUED | OUTPATIENT
Start: 2023-10-06 | End: 2023-10-07 | Stop reason: HOSPADM

## 2023-10-06 RX ORDER — EPINEPHRINE 1 MG/ML
0.3 INJECTION, SOLUTION, CONCENTRATE INTRAVENOUS PRN
Status: DISCONTINUED | OUTPATIENT
Start: 2023-10-06 | End: 2023-10-07 | Stop reason: HOSPADM

## 2023-10-06 RX ORDER — ACETAMINOPHEN 325 MG/1
650 TABLET ORAL
Status: DISCONTINUED | OUTPATIENT
Start: 2023-10-06 | End: 2023-10-07 | Stop reason: HOSPADM

## 2023-10-06 RX ORDER — DIPHENHYDRAMINE HYDROCHLORIDE 50 MG/ML
50 INJECTION INTRAMUSCULAR; INTRAVENOUS
Status: DISCONTINUED | OUTPATIENT
Start: 2023-10-06 | End: 2023-10-07 | Stop reason: HOSPADM

## 2023-10-06 RX ORDER — SODIUM CHLORIDE 0.9 % (FLUSH) 0.9 %
5-40 SYRINGE (ML) INJECTION PRN
Status: DISCONTINUED | OUTPATIENT
Start: 2023-10-06 | End: 2023-10-07 | Stop reason: HOSPADM

## 2023-10-06 RX ORDER — ONDANSETRON 2 MG/ML
8 INJECTION INTRAMUSCULAR; INTRAVENOUS
Status: DISCONTINUED | OUTPATIENT
Start: 2023-10-06 | End: 2023-10-07 | Stop reason: HOSPADM

## 2023-10-06 RX ORDER — MEPERIDINE HYDROCHLORIDE 25 MG/ML
12.5 INJECTION INTRAMUSCULAR; INTRAVENOUS; SUBCUTANEOUS PRN
Status: DISCONTINUED | OUTPATIENT
Start: 2023-10-06 | End: 2023-10-07 | Stop reason: HOSPADM

## 2023-10-06 RX ORDER — SODIUM CHLORIDE 9 MG/ML
5-250 INJECTION, SOLUTION INTRAVENOUS PRN
Status: DISCONTINUED | OUTPATIENT
Start: 2023-10-06 | End: 2023-10-07 | Stop reason: HOSPADM

## 2023-10-06 RX ORDER — ONDANSETRON 2 MG/ML
8 INJECTION INTRAMUSCULAR; INTRAVENOUS ONCE
Status: COMPLETED | OUTPATIENT
Start: 2023-10-06 | End: 2023-10-06

## 2023-10-06 RX ADMIN — SODIUM CHLORIDE 200 MG: 9 INJECTION, SOLUTION INTRAVENOUS at 11:38

## 2023-10-06 RX ADMIN — SODIUM CHLORIDE, PRESERVATIVE FREE 10 ML: 5 INJECTION INTRAVENOUS at 11:20

## 2023-10-06 RX ADMIN — ONDANSETRON 8 MG: 2 INJECTION INTRAMUSCULAR; INTRAVENOUS at 12:09

## 2023-10-06 RX ADMIN — SODIUM CHLORIDE 100 ML/HR: 9 INJECTION, SOLUTION INTRAVENOUS at 11:27

## 2023-10-06 RX ADMIN — FOSAPREPITANT 150 MG: 150 INJECTION, POWDER, LYOPHILIZED, FOR SOLUTION INTRAVENOUS at 12:33

## 2023-10-06 RX ADMIN — FLUOROURACIL 7500 MG: 50 INJECTION, SOLUTION INTRAVENOUS at 13:41

## 2023-10-06 RX ADMIN — CARBOPLATIN 567.5 MG: 10 INJECTION INTRAVENOUS at 13:07

## 2023-10-06 RX ADMIN — DEXAMETHASONE SODIUM PHOSPHATE 12 MG: 4 INJECTION INTRA-ARTICULAR; INTRALESIONAL; INTRAMUSCULAR; INTRAVENOUS; SOFT TISSUE at 12:13

## 2023-10-06 NOTE — PROGRESS NOTES
Chemo completed, 5FU pump connected and unclamped, pt discharged via wheelchair, aware of appt 10/10/23

## 2023-10-09 ENCOUNTER — TELEPHONE (OUTPATIENT)
Dept: INTERNAL MEDICINE CLINIC | Facility: CLINIC | Age: 78
End: 2023-10-09

## 2023-10-09 NOTE — PROGRESS NOTES
Nutrition F/U:  Assessment:  Pt seen during office visit w/ NP, receiving his 2nd dose of Carbo + 5-FU (4 day pump) + Keytruda (cycle 1), pt received his first cycle of chemo inpatient. Pt has trach in place, PMV in place and pt able to speak. Pt denies pain, remains NPO and TF dependent for estimated nutrition needs, bolus feeding Isosource 1.5, 6 cartons/day - has tried 1 1/2 cartons QID to decrease times of feeding daily, tolerating w/ no issues. Pt working w/ PT and SLP through home health - discussed transitioning to OP therapy services once discharged from home health. Current BW: 154#, stable over the past month. Intervention:  1. NPO, can have a few ice chips at a time to moisten mouth per SLP  2. Continue w/ bolus TF of Isosource 1.5, goal 6 cartons/day. 1 1/2 cartons QID   3. Can transition to OP SLP and PT services once discharged from home health     Monitoring/Evaluation:  1. RD to follow up during next office visit - follow up wt status, tolerance/intake of TF, symptom management, diet recommendations per SLP.       [id number] [address]

## 2023-10-09 NOTE — TELEPHONE ENCOUNTER
----- Message from Lynn Collado sent at 10/2/2023  9:47 AM EDT -----  Subject: Hospital Follow Up    QUESTIONS  What hospital was the Patient Discharged from[place] [address] [place]  Date of Discharge? 2023-09-29  Discharge Location? Home  Reason for hospitalization as patient stated? A new peg and trach. Last   side infraction. What question does the patient have, if applicable?   ---------------------------------------------------------------------------  --------------  CALL BACK INFO  What is the best way for the office to contact you? OK to leave message on   voicemail  Preferred Call Back Phone Number? [phone]  ---------------------------------------------------------------------------  --------------  SCRIPT ANSWERS  Relationship to Patient? Covered Entity  Covered Entity Type? Home Health Care? Representative Name?  Dominique Francis

## 2023-10-10 ENCOUNTER — HOSPITAL ENCOUNTER (OUTPATIENT)
Dept: INFUSION THERAPY | Age: 78
Discharge: HOME | End: 2023-10-10
Payer: MEDICARE

## 2023-10-10 VITALS
HEART RATE: 106 BPM | DIASTOLIC BLOOD PRESSURE: 63 MMHG | OXYGEN SATURATION: 97 % | RESPIRATION RATE: 16 BRPM | TEMPERATURE: 98.8 F | SYSTOLIC BLOOD PRESSURE: 90 MMHG

## 2023-10-10 DIAGNOSIS — E03.2 HYPOTHYROIDISM DUE TO MEDICAMENTS AND OTHER EXOGENOUS SUBSTANCES: Primary | ICD-10-CM

## 2023-10-10 DIAGNOSIS — C32.9 KERATINIZING SQUAMOUS CELL CARCINOMA OF LARYNX (HCC): ICD-10-CM

## 2023-10-10 PROCEDURE — 96523 IRRIG DRUG DELIVERY DEVICE: CPT

## 2023-10-10 PROCEDURE — 2580000003 HC RX 258: Performed by: NURSE PRACTITIONER

## 2023-10-10 RX ORDER — SODIUM CHLORIDE 0.9 % (FLUSH) 0.9 %
5-40 SYRINGE (ML) INJECTION PRN
Status: DISCONTINUED | OUTPATIENT
Start: 2023-10-10 | End: 2023-10-11 | Stop reason: HOSPADM

## 2023-10-10 RX ADMIN — SODIUM CHLORIDE, PRESERVATIVE FREE 10 ML: 5 INJECTION INTRAVENOUS at 15:10

## 2023-10-10 NOTE — PROGRESS NOTES
[facility] for pump removal. Assessment completed. No needs voiced at this time. Pump removed, PICC line flushed and recapped. Patient discharged via wheelchair with spouse.

## 2023-10-11 ENCOUNTER — OFFICE VISIT (OUTPATIENT)
Dept: ENT CLINIC | Age: 78
End: 2023-10-11
Payer: MEDICARE

## 2023-10-11 ENCOUNTER — TELEPHONE (OUTPATIENT)
Dept: INTERNAL MEDICINE CLINIC | Facility: CLINIC | Age: 78
End: 2023-10-11

## 2023-10-11 VITALS — HEIGHT: 71 IN | HEART RATE: 87 BPM | OXYGEN SATURATION: 98 % | WEIGHT: 155 LBS | BODY MASS INDEX: 21.7 KG/M2

## 2023-10-11 DIAGNOSIS — Z43.0 TRACHEOSTOMY CARE (HCC): ICD-10-CM

## 2023-10-11 DIAGNOSIS — C10.9 SQUAMOUS CELL CARCINOMA OF OROPHARYNX (HCC): Primary | ICD-10-CM

## 2023-10-11 PROCEDURE — 99214 OFFICE O/P EST MOD 30 MIN: CPT | Performed by: STUDENT IN AN ORGANIZED HEALTH CARE EDUCATION/TRAINING PROGRAM

## 2023-10-11 PROCEDURE — 31575 DIAGNOSTIC LARYNGOSCOPY: CPT | Performed by: STUDENT IN AN ORGANIZED HEALTH CARE EDUCATION/TRAINING PROGRAM

## 2023-10-11 PROCEDURE — G8427 DOCREV CUR MEDS BY ELIG CLIN: HCPCS | Performed by: STUDENT IN AN ORGANIZED HEALTH CARE EDUCATION/TRAINING PROGRAM

## 2023-10-11 PROCEDURE — 1123F ACP DISCUSS/DSCN MKR DOCD: CPT | Performed by: STUDENT IN AN ORGANIZED HEALTH CARE EDUCATION/TRAINING PROGRAM

## 2023-10-11 PROCEDURE — 1111F DSCHRG MED/CURRENT MED MERGE: CPT | Performed by: STUDENT IN AN ORGANIZED HEALTH CARE EDUCATION/TRAINING PROGRAM

## 2023-10-11 PROCEDURE — G8420 CALC BMI NORM PARAMETERS: HCPCS | Performed by: STUDENT IN AN ORGANIZED HEALTH CARE EDUCATION/TRAINING PROGRAM

## 2023-10-11 PROCEDURE — 1036F TOBACCO NON-USER: CPT | Performed by: STUDENT IN AN ORGANIZED HEALTH CARE EDUCATION/TRAINING PROGRAM

## 2023-10-11 PROCEDURE — G8484 FLU IMMUNIZE NO ADMIN: HCPCS | Performed by: STUDENT IN AN ORGANIZED HEALTH CARE EDUCATION/TRAINING PROGRAM

## 2023-10-11 ASSESSMENT — ENCOUNTER SYMPTOMS
COLOR CHANGE: 0
EYE PAIN: 0
CONSTIPATION: 0
ABDOMINAL PAIN: 0
EYE DISCHARGE: 0
STRIDOR: 0
EYE REDNESS: 0
CHOKING: 0
ABDOMINAL DISTENTION: 0
SINUS PAIN: 0
COUGH: 0

## 2023-10-11 NOTE — TELEPHONE ENCOUNTER
Spoke with pt's wife in regards to where to send pt's nebulizer order to. Pt's spouse states that the hospital did place an order and they have received the new nebulizer, so order from our office is no longer needed.

## 2023-10-12 ENCOUNTER — CARE COORDINATION (OUTPATIENT)
Dept: CARE COORDINATION | Facility: CLINIC | Age: 78
End: 2023-10-12

## 2023-10-12 ENCOUNTER — OFFICE VISIT (OUTPATIENT)
Dept: INTERNAL MEDICINE CLINIC | Facility: CLINIC | Age: 78
End: 2023-10-12

## 2023-10-12 VITALS
HEART RATE: 103 BPM | BODY MASS INDEX: 21.62 KG/M2 | DIASTOLIC BLOOD PRESSURE: 60 MMHG | HEIGHT: 71 IN | SYSTOLIC BLOOD PRESSURE: 98 MMHG | OXYGEN SATURATION: 100 % | TEMPERATURE: 97.2 F

## 2023-10-12 DIAGNOSIS — C32.9 KERATINIZING SQUAMOUS CELL CARCINOMA OF LARYNX (HCC): ICD-10-CM

## 2023-10-12 DIAGNOSIS — I95.9 HYPOTENSION, UNSPECIFIED HYPOTENSION TYPE: ICD-10-CM

## 2023-10-12 DIAGNOSIS — Z09 HOSPITAL DISCHARGE FOLLOW-UP: Primary | ICD-10-CM

## 2023-10-12 DIAGNOSIS — I63.81 LEFT SIDED LACUNAR INFARCTION (HCC): ICD-10-CM

## 2023-10-12 ASSESSMENT — ENCOUNTER SYMPTOMS
SORE THROAT: 0
CONSTIPATION: 0
NAUSEA: 0
VOMITING: 0
COUGH: 1
ABDOMINAL PAIN: 0
WHEEZING: 0
DIARRHEA: 0
SHORTNESS OF BREATH: 0

## 2023-10-12 NOTE — PROGRESS NOTES
1 tablet by mouth every 6 hours as needed (N/V) Take as needed if Zofran is not controlling nausea     rosuvastatin 20 MG tablet  Commonly known as: CRESTOR  1 tablet by Per G Tube route nightly     scopolamine transdermal patch  Commonly known as: TRANSDERM-SCOP  Place 1 patch onto the skin every 72 hours     sodium chloride (Inhalant) 3 % nebulizer solution  Take 4 mLs by nebulization in the morning, at noon, and at bedtime     traZODone 50 MG tablet  Commonly known as: DESYREL  Take 1 tablet by mouth nightly           * This list has 2 medication(s) that are the same as other medications prescribed for you. Read the directions carefully, and ask your doctor or other care provider to review them with you. Medications marked \"taking\" at this time  Outpatient Medications Marked as Taking for the 10/12/23 encounter (Office Visit) with PACO Saba - CNP   Medication Sig Dispense Refill    famotidine (PEPCID) 40 MG/5ML suspension 5 mLs by Per G Tube route daily 150 mL 3    Nebulizers (COMPRESSOR/NEBULIZER) MISC Use as directed.  Dx: Z93.0, R05.9 1 each 0    sodium chloride, Inhalant, 3 % nebulizer solution Take 4 mLs by nebulization in the morning, at noon, and at bedtime 240 mL 0    aspirin 81 MG chewable tablet Take 1 tablet by mouth daily 30 tablet 3    albuterol (PROVENTIL) (2.5 MG/3ML) 0.083% nebulizer solution Take 3 mLs by nebulization every 6 hours 120 each 3    mirtazapine (REMERON) 7.5 MG tablet 1 tablet by Per G Tube route nightly 30 tablet 3    traZODone (DESYREL) 50 MG tablet Take 1 tablet by mouth nightly 30 tablet 0    rosuvastatin (CRESTOR) 20 MG tablet 1 tablet by Per G Tube route nightly 30 tablet 3    ferrous gluconate (FERGON) 324 (38 Fe) MG tablet Take 1 tablet by mouth daily (with breakfast) 30 tablet 3    vitamin C (VITAMIN C) 250 MG tablet Take 1 tablet by mouth daily (with breakfast) 30 tablet 3    prochlorperazine (COMPAZINE) 10 MG tablet Take 1 tablet by mouth every

## 2023-10-12 NOTE — CARE COORDINATION
12:30 PM PORT [facility]   12/28/2023  1:00 PM ZINA Krishnamurthy [facility] AMB   12/28/2023  1:00 PM Loraine Honeycutt RD [facility] AMB   12/29/2023 10:30 AM INFUSION 102 [facility]   1/2/2024  4:00 PM INFUSION 102 [facility]   1/19/2024 11:20 AM DO SHANA Garcia [facility] AMB     External follow up appointment(s): dania    LPN Care Coordinator reviewed medical action plan with family and discussed any barriers to care and/or understanding of plan of care after discharge. Discussed appropriate site of care based on symptoms and resources available to patient including: PCP  Specialist  Home health. The family agrees to contact the PCP office for questions related to their healthcare. Advance Care Planning:   Decision maker verified . Patients top risk factors for readmission:  comorbidities and/or treatment  Interventions to address risk factors:  continued compliance with plan of care    Offered patient enrollment in the Remote Patient Monitoring (RPM) program for in-home monitoring: NA.     Care Transitions Subsequent and Final Call    Schedule Follow Up Appointment with PCP: Completed  Subsequent and Final Calls  Do you have any ongoing symptoms?: No  Have your medications changed?: No  Do you have any questions related to your medications?: No  Do you currently have any active services?: Yes  Are you currently active with any services?: Home Health  Do you have any needs or concerns that I can assist you with?: No  Identified Barriers: None  Care Transitions Interventions  No Identified Needs     Other Services: Completed (Comment: [place] [facility])      DME Assistance: Completed                           Other Interventions:             LPN Care Coordinator provided contact information for future needs. Plan for follow-up call in 10-14 days based on severity of symptoms and risk factors. Patient is followed closely by oncology.   Plan for next call:  compliance with plan of care    [facility]

## 2023-10-13 ENCOUNTER — HOSPITAL ENCOUNTER (OUTPATIENT)
Dept: INFUSION THERAPY | Age: 78
Discharge: HOME | End: 2023-10-13
Payer: MEDICARE

## 2023-10-13 ENCOUNTER — TELEPHONE (OUTPATIENT)
Dept: INTERVENTIONAL RADIOLOGY/VASCULAR | Age: 78
End: 2023-10-13

## 2023-10-13 ENCOUNTER — TELEPHONE (OUTPATIENT)
Dept: RADIATION ONCOLOGY | Age: 78
End: 2023-10-13

## 2023-10-13 VITALS — RESPIRATION RATE: 19 BRPM | HEART RATE: 95 BPM | DIASTOLIC BLOOD PRESSURE: 67 MMHG | SYSTOLIC BLOOD PRESSURE: 123 MMHG

## 2023-10-13 DIAGNOSIS — C10.9 SQUAMOUS CELL CARCINOMA OF OROPHARYNX (HCC): ICD-10-CM

## 2023-10-13 DIAGNOSIS — C32.1 PRIMARY SQUAMOUS CELL CARCINOMA OF SUPRAGLOTTIS (HCC): Primary | ICD-10-CM

## 2023-10-13 PROCEDURE — 96523 IRRIG DRUG DELIVERY DEVICE: CPT

## 2023-10-13 PROCEDURE — 2580000003 HC RX 258: Performed by: INTERNAL MEDICINE

## 2023-10-13 RX ORDER — SODIUM CHLORIDE 0.9 % (FLUSH) 0.9 %
5-40 SYRINGE (ML) INJECTION PRN
Status: DISCONTINUED | OUTPATIENT
Start: 2023-10-13 | End: 2023-10-14 | Stop reason: HOSPADM

## 2023-10-13 RX ORDER — SODIUM CHLORIDE 9 MG/ML
25 INJECTION, SOLUTION INTRAVENOUS PRN
OUTPATIENT
Start: 2023-10-20

## 2023-10-13 RX ORDER — HEPARIN SODIUM (PORCINE) LOCK FLUSH IV SOLN 100 UNIT/ML 100 UNIT/ML
500 SOLUTION INTRAVENOUS PRN
Status: CANCELLED | OUTPATIENT
Start: 2023-10-13

## 2023-10-13 RX ORDER — SODIUM CHLORIDE 0.9 % (FLUSH) 0.9 %
5-40 SYRINGE (ML) INJECTION PRN
Status: CANCELLED | OUTPATIENT
Start: 2023-10-13

## 2023-10-13 RX ORDER — SODIUM CHLORIDE 0.9 % (FLUSH) 0.9 %
5-40 SYRINGE (ML) INJECTION PRN
OUTPATIENT
Start: 2023-10-20

## 2023-10-13 RX ORDER — SODIUM CHLORIDE 9 MG/ML
25 INJECTION, SOLUTION INTRAVENOUS PRN
Status: CANCELLED | OUTPATIENT
Start: 2023-10-13

## 2023-10-13 RX ORDER — HEPARIN 100 UNIT/ML
500 SYRINGE INTRAVENOUS PRN
OUTPATIENT
Start: 2023-10-20

## 2023-10-13 RX ADMIN — SODIUM CHLORIDE, PRESERVATIVE FREE 10 ML: 5 INJECTION INTRAVENOUS at 16:32

## 2023-10-13 RX ADMIN — SODIUM CHLORIDE, PRESERVATIVE FREE 10 ML: 5 INJECTION INTRAVENOUS at 16:30

## 2023-10-13 NOTE — PROGRESS NOTES
Arrived to the [address] [place] [address]. [facility]. PICC dressing change completed with sterile technique. completed. Patient instructed to report any side affects to ordering provider. Patient tolerated well. Any issues or concerns during appointment: none. Patient aware of next infusion appointment on 10/27/2023 (date) at 56 (time). Discharged via wheelchair, accompanied by wife.

## 2023-10-13 NOTE — TELEPHONE ENCOUNTER
[] Phone call to: Spouse after confirming 2 pt identifiers    [] Number used to reach this person: [phone]    [] Voicemail reached: N/A     [] Appointment date:  10/19    [] Arrival time:  1200    [] Location given: yes    [] AM Medicine with a small sip of water: Yes    [] Patient is NPO: Yes    [] Need for : Yes    [] Anesthetic Moderate Sedation    [] Blood thinners held: No    [] Education on Hold requirements prior to procedure: only blood thinner is 81 mg asa     [] Allergies: OTHER:      [x] Reviewed    [] Latex Allergy: No    [] Lidocaine Allergy: No    [] CPAP at night:     [] Any recent infections: Pt finished antibiotic treatment 10/11/23.     [] Diabetes:     [] Additional information:        [] Time taken to answer all questions    [] Call back phone number of [phone] given

## 2023-10-13 NOTE — TELEPHONE ENCOUNTER
Nutrition/H&N Navigation:  Call placed to pt's wife and she is agreeable to bring pt to the cancer center this afternoon for PICC line dressing change. Plan to hopefully get port placed by the end of next week to avoid further PICC line changes; call placed to IR to schedule port placement. If port is unable to be placed next week we will arrange for home health to change PICC line dressings at home. Called and spoke with Dieudonne Garcia from [facility] and she is aware of plan.      [address] [place] [address], [address] [place] [address]
Spoke to Christie at [address] [facility] regardin PICC line. She states they received a call from wife stating his dressing needs to be changed on his pICC line today. Christie states that at the time of admission that we were going to manage his PICC line. At this time they have no orders as such, they dont have any flushes for th line. Noted patient is not seen here weekly.  Routing to navigator
Psychiatric

## 2023-10-13 NOTE — PLAN OF CARE
Problem: Chronic Conditions and Co-morbidities  Goal: Patient's chronic conditions and co-morbidity symptoms are monitored and maintained or improved  Outcome: Progressing
Information could not be obtained

## 2023-10-19 ENCOUNTER — HOSPITAL ENCOUNTER (OUTPATIENT)
Dept: INTERVENTIONAL RADIOLOGY/VASCULAR | Age: 78
Discharge: HOME | End: 2023-10-19
Attending: INTERNAL MEDICINE
Payer: MEDICARE

## 2023-10-19 VITALS
OXYGEN SATURATION: 98 % | DIASTOLIC BLOOD PRESSURE: 58 MMHG | HEART RATE: 88 BPM | WEIGHT: 155 LBS | HEIGHT: 71 IN | BODY MASS INDEX: 21.7 KG/M2 | TEMPERATURE: 97.3 F | SYSTOLIC BLOOD PRESSURE: 117 MMHG | RESPIRATION RATE: 16 BRPM

## 2023-10-19 DIAGNOSIS — C32.9 KERATINIZING SQUAMOUS CELL CARCINOMA OF LARYNX (HCC): ICD-10-CM

## 2023-10-19 DIAGNOSIS — G89.18 POSTOPERATIVE PAIN: Primary | ICD-10-CM

## 2023-10-19 PROCEDURE — 2500000003 HC RX 250 WO HCPCS: Performed by: RADIOLOGY

## 2023-10-19 PROCEDURE — 6360000002 HC RX W HCPCS: Performed by: RADIOLOGY

## 2023-10-19 PROCEDURE — 36561 INSERT TUNNELED CV CATH: CPT

## 2023-10-19 RX ORDER — LIDOCAINE HYDROCHLORIDE AND EPINEPHRINE 10; 10 MG/ML; UG/ML
INJECTION, SOLUTION INFILTRATION; PERINEURAL PRN
Status: COMPLETED | OUTPATIENT
Start: 2023-10-19 | End: 2023-10-19

## 2023-10-19 RX ORDER — OXYCODONE HYDROCHLORIDE 5 MG/1
5 TABLET ORAL EVERY 4 HOURS PRN
OUTPATIENT
Start: 2023-10-19

## 2023-10-19 RX ORDER — MIDAZOLAM HYDROCHLORIDE 2 MG/2ML
INJECTION, SOLUTION INTRAMUSCULAR; INTRAVENOUS PRN
Status: COMPLETED | OUTPATIENT
Start: 2023-10-19 | End: 2023-10-19

## 2023-10-19 RX ORDER — FENTANYL CITRATE 50 UG/ML
INJECTION, SOLUTION INTRAMUSCULAR; INTRAVENOUS PRN
Status: COMPLETED | OUTPATIENT
Start: 2023-10-19 | End: 2023-10-19

## 2023-10-19 RX ORDER — LIDOCAINE HYDROCHLORIDE 20 MG/ML
INJECTION, SOLUTION INFILTRATION; PERINEURAL PRN
Status: COMPLETED | OUTPATIENT
Start: 2023-10-19 | End: 2023-10-19

## 2023-10-19 RX ORDER — OXYCODONE HCL 5 MG/5 ML
5 SOLUTION, ORAL ORAL EVERY 4 HOURS PRN
OUTPATIENT
Start: 2023-10-19

## 2023-10-19 RX ORDER — SODIUM CHLORIDE 9 MG/ML
INJECTION, SOLUTION INTRAVENOUS CONTINUOUS
OUTPATIENT
Start: 2023-10-19

## 2023-10-19 RX ORDER — HEPARIN 100 UNIT/ML
SYRINGE INTRAVENOUS PRN
Status: COMPLETED | OUTPATIENT
Start: 2023-10-19 | End: 2023-10-19

## 2023-10-19 RX ORDER — OXYCODONE HCL 5 MG/5 ML
5 SOLUTION, ORAL ORAL EVERY 4 HOURS PRN
Qty: 90 ML | Refills: 0 | Status: SHIPPED | OUTPATIENT
Start: 2023-10-19 | End: 2023-10-22

## 2023-10-19 RX ADMIN — Medication 2000 MG: at 15:16

## 2023-10-19 RX ADMIN — LIDOCAINE HYDROCHLORIDE,EPINEPHRINE BITARTRATE 10 ML: 10; .01 INJECTION, SOLUTION INFILTRATION; PERINEURAL at 15:24

## 2023-10-19 RX ADMIN — HEPARIN 5 ML: 100 SYRINGE at 15:37

## 2023-10-19 RX ADMIN — FENTANYL CITRATE 25 MCG: 50 INJECTION, SOLUTION INTRAMUSCULAR; INTRAVENOUS at 15:34

## 2023-10-19 RX ADMIN — FENTANYL CITRATE 50 MCG: 50 INJECTION, SOLUTION INTRAMUSCULAR; INTRAVENOUS at 15:16

## 2023-10-19 RX ADMIN — LIDOCAINE HYDROCHLORIDE 10 ML: 20 INJECTION, SOLUTION INFILTRATION; PERINEURAL at 15:27

## 2023-10-19 RX ADMIN — MIDAZOLAM HYDROCHLORIDE 1 MG: 1 INJECTION, SOLUTION INTRAMUSCULAR; INTRAVENOUS at 15:16

## 2023-10-19 RX ADMIN — FENTANYL CITRATE 25 MCG: 50 INJECTION, SOLUTION INTRAMUSCULAR; INTRAVENOUS at 15:48

## 2023-10-19 RX ADMIN — MIDAZOLAM HYDROCHLORIDE 0.5 MG: 1 INJECTION, SOLUTION INTRAMUSCULAR; INTRAVENOUS at 15:34

## 2023-10-19 ASSESSMENT — PAIN - FUNCTIONAL ASSESSMENT: PAIN_FUNCTIONAL_ASSESSMENT: NONE - DENIES PAIN

## 2023-10-19 NOTE — BRIEF OP NOTE
Department of Interventional Radiology  [phone]        Interventional Radiology Brief Procedure Note    Patient: Jon Pallas MRN: [id number]  SSN: [id number]    YOB: 1945  Age: 69 y.o. Sex: male      Date of Procedure: 10/19/2023    Pre-Procedure Diagnosis: Head and Neck Cancer. Chemotherapy. Post-Procedure Diagnosis: SAME    Procedure(s):  Chest Port Placement    Brief Description of Procedure: RIJV    Performed By: Tim Fonseca MD     Assistants: None    Anesthesia:Moderate Sedation    Estimated Blood Loss: Less than 10ml    Specimens:  None    Implants:  Subcutaneous Port    Findings: Tip in RA. Complications: None    Recommendations: 1 hour bedrest.  Port is ready to use.        Follow Up: PRN    Signed By: Tim Fonseca MD     October 19, 2023

## 2023-10-19 NOTE — OR NURSING
Patient has tracheostomy.  Unable to monitor ETCO2 during case due to tracheostomy and no conversion tool to use to monitor ETCO2 when patient has trach

## 2023-10-19 NOTE — OR NURSING
PICC line removed without difficulty. Pressure held to the right upper arm approx 5 min. No bleeding/ oozing noted at the site. Patient tolerated well. Pressure dressing with transparent film on top in place.

## 2023-10-19 NOTE — OR NURSING
Recovery period without difficulty. Pt alert and oriented and denies pain. Dressing is clean, dry, and intact. Reviewed discharge instructions with patient and spouse, both verbalized understanding. Pt escorted to lobby discharge area via wheelchair. Vital signs and Elida score completed.

## 2023-10-19 NOTE — H&P
Department of Interventional Radiology  [phone]    History and Physical    Patient:  Krystal Polk MRN:  [id number]  SSN:  [id number]    YOB: 1945  Age:  69 y.o. Sex:  male      Primary Care Provider:  Vadim Yi DO  Referring Physician:  Delia Quick MD    Subjective:     Chief Complaint: Squamous cell carcinoma of larynx    History of the Present Illness: The patient is a 69 y.o. male who presents for IR guided chest port placement under moderate sedation. Patient has history of squamous cell carcinoma of the larynx. Previous surgery to remove one third of the tongue in 2010 and subsequent tracheostomy placement. Patient is n.p.o. Denies taking any blood thinners      Past Medical History:   Diagnosis Date    Back problem     Cancer ([address])     cancerous tumor to tongue, 1/3rd tongue removed in Aug. 2010. Followed with radiation treatment. DDD (degenerative disc disease), lumbar 12/05/2022    Dehydration 9/5/2023    Hearing difficulty of both ears     Hypercholesterolemia     Hypertension     pt. states only takes his BP med twice weekly due to decrease BP after weight loss. 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    HEENT      precautionary trach placed in 8/2010 when having tongue removal surgery and closed one week later.     HERNIA REPAIR      IR GASTROSTOMY TUBE PLACEMENT PERCUTANEOUS  9/8/2023    IR GASTROSTOMY TUBE PLACEMENT PERCUTANEOUS 9/8/2023 [facility]    MOUTH SURGERY  08/03/2010    Partial glossectomy with tonsillectomy, neck dissection with a radial forearm free flat to left oral cavity    ORTHOPEDIC SURGERY Right     pins in right leg to prevent asymmetric growth    TRACHEOSTOMY N/A 9/8/2023    TRACHEOTOMY performed by Chata Dong MD at [facility] MAIN OR    VASECTOMY          Review of Systems:    Pertinent items are noted in HPI. Prior to Admission medications    Medication Sig Start Date End Date Taking? Authorizing Provider   lidocaine (SOLARCAINE) 0.5 % topical spray lidocaine hydrochloride 20 MG/ML Injectable Solution   Quantity: 1    Sofiya Davis MD Start: 17-Aug-2023  Patient not taking: Reported on 10/12/2023 8/17/23   Provider, MD Aysha   cephALEXin (KEFLEX) 250 MG/5ML suspension  10/2/23   Provider, MD Aysha   famotidine (PEPCID) 40 MG/5ML suspension 5 mLs by Per G Tube route daily 10/5/23   Bert Baumgarten, APRN - CNP   Nebulizers (COMPRESSOR/NEBULIZER) MISC Use as directed.  Dx: Z93.0, R05.9 10/2/23   Ramos Dill, DO   omeprazole (PRILOSEC) 2 MG/ML SUSP 2 mg/mL oral suspension 20 mLs by Per G Tube route Daily  Patient not taking: Reported on 10/5/2023 10/2/23   Ramos Dill, DO   ferrous sulfate 300 (60 Fe) MG/5ML syrup 5 mLs by Per G Tube route daily  Patient not taking: Reported on 10/5/2023 10/2/23   Ramos Dill, DO   sodium chloride, Inhalant, 3 % nebulizer solution Take 4 mLs by nebulization in the morning, at noon, and at bedtime 9/21/23   Frank Bennington, APRN - CNP   aspirin 81 MG chewable tablet Take 1 tablet by mouth daily 9/21/23   Frank Bennington, APRN - CNP   albuterol (PROVENTIL) (2.5 MG/3ML) 0.083% nebulizer solution Take 3 mLs by nebulization every 6 hours 9/20/23   Frank Bennington, APRN - CNP   mirtazapine (REMERON) 7.5 MG tablet 1 tablet by Per G Tube route nightly 9/20/23   Frank Bennington, APRN - CNP   traZODone (DESYREL) 50 MG tablet Take 1 tablet by mouth nightly 9/20/23 10/20/23  Frank Bennington, APRN - CNP   rosuvastatin (CRESTOR) 20 MG tablet 1 tablet by Per G Tube route nightly 9/20/23   Lisa Barone,

## 2023-10-21 NOTE — CARE COORDINATION
[address] [address] [facility] 250 Care Transitions Initial Follow Up Call    Call within 2 business days of discharge: Yes    Patient Current Location:  Home: [address] [place] [address] contacted the family by telephone to perform post hospital discharge assessment. Verified name and  with family as identifiers. Provided introduction to self, and explanation of the LPN Care Coordinator role. Patient: Tigre oHward Patient : 1945   MRN: [id number]  Reason for Admission: Left sided lacunar infarction; Discharge Date: 23 RARS: Readmission Risk Score: 17.9      Last Discharge [address] [facility] [facility]       Date Complaint Diagnosis Description Type Department Provider    23  Squamous cell carcinoma of oropharynx ([address]) . .. Admission (Discharged) [id number] Jhony Yanez MD            Was this an external facility discharge? No Discharge Facility: [facility]    Challenges to be reviewed by the provider   Additional needs identified to be addressed with provider: No  none               Method of communication with provider: none. Mrs. Romero Degree states patient is progressing well. She has had to reach back out to providers and hospital regarding nebulizer and various other equipment they were not supplied with at discharge, she will be picking up when available. [place] will be delivering supplies Tuesday. [facility] has started care and they are happy with those services. No new needs or concerns at this time. LPN Care Coordinator reviewed discharge instructions, medical action plan, and red flags with family who verbalized understanding. The family was given an opportunity to ask questions and does not have any further questions or concerns at this time. Were discharge instructions available to patient? Yes. Reviewed appropriate site of care based on symptoms and resources available to patient including: PCP  Specialist  Home health  When to call [address]  The family
Warm

## 2023-10-25 DIAGNOSIS — Z00.8 NUTRITIONAL ASSESSMENT: ICD-10-CM

## 2023-10-25 DIAGNOSIS — C32.9 KERATINIZING SQUAMOUS CELL CARCINOMA OF LARYNX (HCC): Primary | ICD-10-CM

## 2023-10-26 ENCOUNTER — HOSPITAL ENCOUNTER (OUTPATIENT)
Dept: LAB | Age: 78
Discharge: HOME | End: 2023-10-26
Payer: MEDICARE

## 2023-10-26 ENCOUNTER — OFFICE VISIT (OUTPATIENT)
Dept: ONCOLOGY | Age: 78
End: 2023-10-26
Payer: MEDICARE

## 2023-10-26 ENCOUNTER — CARE COORDINATION (OUTPATIENT)
Dept: CARE COORDINATION | Facility: CLINIC | Age: 78
End: 2023-10-26

## 2023-10-26 ENCOUNTER — OFFICE VISIT (OUTPATIENT)
Dept: ONCOLOGY | Age: 78
End: 2023-10-26

## 2023-10-26 VITALS
HEIGHT: 71 IN | HEART RATE: 90 BPM | SYSTOLIC BLOOD PRESSURE: 109 MMHG | RESPIRATION RATE: 16 BRPM | TEMPERATURE: 98.1 F | DIASTOLIC BLOOD PRESSURE: 58 MMHG | OXYGEN SATURATION: 98 % | BODY MASS INDEX: 22.4 KG/M2 | WEIGHT: 160 LBS

## 2023-10-26 DIAGNOSIS — Z00.8 NUTRITIONAL ASSESSMENT: ICD-10-CM

## 2023-10-26 DIAGNOSIS — C32.9 KERATINIZING SQUAMOUS CELL CARCINOMA OF LARYNX (HCC): ICD-10-CM

## 2023-10-26 DIAGNOSIS — Z78.9 ON ENTERAL NUTRITION: ICD-10-CM

## 2023-10-26 DIAGNOSIS — R53.83 FATIGUE DUE TO TREATMENT: ICD-10-CM

## 2023-10-26 DIAGNOSIS — Z00.8 NUTRITIONAL ASSESSMENT: Primary | ICD-10-CM

## 2023-10-26 DIAGNOSIS — E03.2 HYPOTHYROIDISM DUE TO MEDICAMENTS AND OTHER EXOGENOUS SUBSTANCES: ICD-10-CM

## 2023-10-26 DIAGNOSIS — C32.9 KERATINIZING SQUAMOUS CELL CARCINOMA OF LARYNX (HCC): Primary | ICD-10-CM

## 2023-10-26 LAB
ALBUMIN SERPL-MCNC: 2.9 G/DL (ref 3.2–4.6)
ALBUMIN/GLOB SERPL: 0.7 (ref 0.4–1.6)
ALP SERPL-CCNC: 80 U/L (ref 50–136)
ALT SERPL-CCNC: 20 U/L (ref 12–65)
ANION GAP SERPL CALC-SCNC: 5 MMOL/L (ref 2–11)
AST SERPL-CCNC: 20 U/L (ref 15–37)
BASOPHILS # BLD: 0 K/UL (ref 0–0.2)
BASOPHILS NFR BLD: 0 % (ref 0–2)
BILIRUB SERPL-MCNC: 0.3 MG/DL (ref 0.2–1.1)
BUN SERPL-MCNC: 24 MG/DL (ref 8–23)
CALCIUM SERPL-MCNC: 8.7 MG/DL (ref 8.3–10.4)
CHLORIDE SERPL-SCNC: 104 MMOL/L (ref 101–110)
CO2 SERPL-SCNC: 29 MMOL/L (ref 21–32)
CREAT SERPL-MCNC: 0.7 MG/DL (ref 0.8–1.5)
DIFFERENTIAL METHOD BLD: ABNORMAL
EOSINOPHIL # BLD: 0.1 K/UL (ref 0–0.8)
EOSINOPHIL NFR BLD: 1 % (ref 0.5–7.8)
ERYTHROCYTE [DISTWIDTH] IN BLOOD BY AUTOMATED COUNT: 21 % (ref 11.9–14.6)
GLOBULIN SER CALC-MCNC: 4 G/DL (ref 2.8–4.5)
GLUCOSE SERPL-MCNC: 138 MG/DL (ref 65–100)
HCT VFR BLD AUTO: 29 % (ref 41.1–50.3)
HGB BLD-MCNC: 9.3 G/DL (ref 13.6–17.2)
IMM GRANULOCYTES # BLD AUTO: 0 K/UL (ref 0–0.5)
IMM GRANULOCYTES NFR BLD AUTO: 1 % (ref 0–5)
LYMPHOCYTES # BLD: 0.7 K/UL (ref 0.5–4.6)
LYMPHOCYTES NFR BLD: 19 % (ref 13–44)
MAGNESIUM SERPL-MCNC: 2.2 MG/DL (ref 1.8–2.4)
MCH RBC QN AUTO: 31.4 PG (ref 26.1–32.9)
MCHC RBC AUTO-ENTMCNC: 32.1 G/DL (ref 31.4–35)
MCV RBC AUTO: 98 FL (ref 82–102)
MONOCYTES # BLD: 0.8 K/UL (ref 0.1–1.3)
MONOCYTES NFR BLD: 21 % (ref 4–12)
NEUTS SEG # BLD: 2.1 K/UL (ref 1.7–8.2)
NEUTS SEG NFR BLD: 58 % (ref 43–78)
NRBC # BLD: 0 K/UL (ref 0–0.2)
PHOSPHATE SERPL-MCNC: 3.4 MG/DL (ref 2.3–3.7)
PLATELET # BLD AUTO: 139 K/UL (ref 150–450)
PMV BLD AUTO: 9.9 FL (ref 9.4–12.3)
POTASSIUM SERPL-SCNC: 4.1 MMOL/L (ref 3.5–5.1)
PROT SERPL-MCNC: 6.9 G/DL (ref 6.3–8.2)
RBC # BLD AUTO: 2.96 M/UL (ref 4.23–5.6)
SODIUM SERPL-SCNC: 138 MMOL/L (ref 133–143)
WBC # BLD AUTO: 3.7 K/UL (ref 4.3–11.1)

## 2023-10-26 PROCEDURE — G8484 FLU IMMUNIZE NO ADMIN: HCPCS | Performed by: NURSE PRACTITIONER

## 2023-10-26 PROCEDURE — 1123F ACP DISCUSS/DSCN MKR DOCD: CPT | Performed by: NURSE PRACTITIONER

## 2023-10-26 PROCEDURE — 83735 ASSAY OF MAGNESIUM: CPT

## 2023-10-26 PROCEDURE — 1111F DSCHRG MED/CURRENT MED MERGE: CPT | Performed by: NURSE PRACTITIONER

## 2023-10-26 PROCEDURE — G8427 DOCREV CUR MEDS BY ELIG CLIN: HCPCS | Performed by: NURSE PRACTITIONER

## 2023-10-26 PROCEDURE — 99214 OFFICE O/P EST MOD 30 MIN: CPT | Performed by: NURSE PRACTITIONER

## 2023-10-26 PROCEDURE — G8420 CALC BMI NORM PARAMETERS: HCPCS | Performed by: NURSE PRACTITIONER

## 2023-10-26 PROCEDURE — 80053 COMPREHEN METABOLIC PANEL: CPT

## 2023-10-26 PROCEDURE — 84100 ASSAY OF PHOSPHORUS: CPT

## 2023-10-26 PROCEDURE — 85025 COMPLETE CBC W/AUTO DIFF WBC: CPT

## 2023-10-26 PROCEDURE — 1036F TOBACCO NON-USER: CPT | Performed by: NURSE PRACTITIONER

## 2023-10-26 PROCEDURE — 2580000003 HC RX 258: Performed by: INTERNAL MEDICINE

## 2023-10-26 PROCEDURE — 36591 DRAW BLOOD OFF VENOUS DEVICE: CPT

## 2023-10-26 RX ORDER — SODIUM CHLORIDE 9 MG/ML
INJECTION, SOLUTION INTRAVENOUS CONTINUOUS
Status: CANCELLED | OUTPATIENT
Start: 2023-10-27

## 2023-10-26 RX ORDER — DIPHENHYDRAMINE HYDROCHLORIDE 50 MG/ML
50 INJECTION INTRAMUSCULAR; INTRAVENOUS
Status: CANCELLED | OUTPATIENT
Start: 2023-10-27

## 2023-10-26 RX ORDER — ONDANSETRON 2 MG/ML
8 INJECTION INTRAMUSCULAR; INTRAVENOUS ONCE
Status: CANCELLED | OUTPATIENT
Start: 2023-10-27 | End: 2023-10-27

## 2023-10-26 RX ORDER — SODIUM CHLORIDE 0.9 % (FLUSH) 0.9 %
5-40 SYRINGE (ML) INJECTION PRN
Status: CANCELLED | OUTPATIENT
Start: 2023-10-27

## 2023-10-26 RX ORDER — SODIUM CHLORIDE 0.9 % (FLUSH) 0.9 %
5-40 SYRINGE (ML) INJECTION PRN
OUTPATIENT
Start: 2023-10-31

## 2023-10-26 RX ORDER — HEPARIN SODIUM (PORCINE) LOCK FLUSH IV SOLN 100 UNIT/ML 100 UNIT/ML
500 SOLUTION INTRAVENOUS PRN
OUTPATIENT
Start: 2023-10-31

## 2023-10-26 RX ORDER — TRAZODONE HYDROCHLORIDE 50 MG/1
50 TABLET ORAL NIGHTLY
Qty: 30 TABLET | Refills: 2 | Status: SHIPPED | OUTPATIENT
Start: 2023-10-26 | End: 2024-01-24

## 2023-10-26 RX ORDER — SODIUM CHLORIDE 9 MG/ML
5-250 INJECTION, SOLUTION INTRAVENOUS PRN
Status: CANCELLED | OUTPATIENT
Start: 2023-10-27

## 2023-10-26 RX ORDER — SODIUM CHLORIDE 9 MG/ML
5-250 INJECTION, SOLUTION INTRAVENOUS PRN
OUTPATIENT
Start: 2023-10-31

## 2023-10-26 RX ORDER — HEPARIN SODIUM (PORCINE) LOCK FLUSH IV SOLN 100 UNIT/ML 100 UNIT/ML
500 SOLUTION INTRAVENOUS PRN
Status: CANCELLED | OUTPATIENT
Start: 2023-10-27

## 2023-10-26 RX ORDER — ONDANSETRON 2 MG/ML
8 INJECTION INTRAMUSCULAR; INTRAVENOUS
Status: CANCELLED | OUTPATIENT
Start: 2023-10-27

## 2023-10-26 RX ORDER — ACETAMINOPHEN 325 MG/1
650 TABLET ORAL
Status: CANCELLED | OUTPATIENT
Start: 2023-10-27

## 2023-10-26 RX ORDER — SODIUM CHLORIDE 0.9 % (FLUSH) 0.9 %
5-40 SYRINGE (ML) INJECTION PRN
Status: DISCONTINUED | OUTPATIENT
Start: 2023-10-26 | End: 2023-10-30 | Stop reason: HOSPADM

## 2023-10-26 RX ORDER — EPINEPHRINE 1 MG/ML
0.3 INJECTION, SOLUTION, CONCENTRATE INTRAVENOUS PRN
Status: CANCELLED | OUTPATIENT
Start: 2023-10-27

## 2023-10-26 RX ORDER — ALBUTEROL SULFATE 90 UG/1
4 AEROSOL, METERED RESPIRATORY (INHALATION) PRN
Status: CANCELLED | OUTPATIENT
Start: 2023-10-27

## 2023-10-26 RX ORDER — MEPERIDINE HYDROCHLORIDE 50 MG/ML
12.5 INJECTION INTRAMUSCULAR; INTRAVENOUS; SUBCUTANEOUS PRN
Status: CANCELLED | OUTPATIENT
Start: 2023-10-27

## 2023-10-26 RX ORDER — FAMOTIDINE 10 MG/ML
20 INJECTION, SOLUTION INTRAVENOUS
Status: CANCELLED | OUTPATIENT
Start: 2023-10-27

## 2023-10-26 RX ADMIN — SODIUM CHLORIDE, PRESERVATIVE FREE 10 ML: 5 INJECTION INTRAVENOUS at 10:47

## 2023-10-26 NOTE — CARE COORDINATION
Patient has graduated from the Care Transitions program on 10.26.23. Patient/family has the ability to self-manage at this time. Patient has no further care management needs, no referral to the [facility] team for further management. Patient has [facility] Care Coordinator's contact information for any further questions, concerns, or needs.   Patients upcoming visits:    Future Appointments   Date Time Provider [address]   10/26/2023 11:30 AM PACO Maldonado - OLEG [facility] AMB   10/26/2023 11:30 AM Loraine Honeycutt, MARCY [facility] AMB   10/27/2023 10:30 AM INFUSION [address] [place] [address]   10/31/2023  4:45 PM INFUSION GCCOPIC [facility]   11/16/2023 12:30 PM PORT GCCOIG [address] [facility]   11/16/2023  1:00 PM Ricardo Schaeffer NP-C [facility] AMB   11/16/2023  1:00 PM Loraine Honeycutt, MARCY [facility] AMB   11/17/2023 10:30 AM INFUSION GCCOPIC GCC   11/17/2023 11:00 AM PACO Vasquez - NP PCHO GVL AMB   11/21/2023  4:00 PM INFUSION GCCOPIC GCC   12/7/2023 12:30 PM PORT GCCOIG GCC   12/7/2023  1:00 PM Cam Toro MD [facility] AMB   12/7/2023  1:00 PM Loraine Honeycutt, RD [facility] AMB   12/8/2023 10:30 AM INFUSION [address] [place] [address]   12/12/2023 11:00 AM SFD IR UNIT 1 SFDRSP SFD   12/12/2023  4:00 PM INFUSION GCCOPIC [facility]   12/13/2023 11:30 AM Nolberto Curiel MD ENTG GVL AMB   12/28/2023 12:30 PM PORT GCCOIG GCC   12/28/2023  1:00 PM Paco Walters NP-C [facility] AMB   12/28/2023  1:00 PM Loraine Honeycutt, RD [facility] AMB   12/29/2023 10:30 AM INFUSION [facility]   1/2/2024  4:00 [facility]   1/19/2024 11:20 AM DO SHANA Saunders AMB

## 2023-10-26 NOTE — PROGRESS NOTES
Outpatient Consult Note    Data Source: Patient, [facility] record. 10/26/2023  2:03 PM      Ignacio Skinner [id number]    6 y.o. 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 Per records margins were close but negative with evidence of LVI as well as PNI and extracapsular spread. He completed adjuvant radiation therapy and end of 2010 to early 2011. He followed with Dr. Rahpael Brown ENT up till the 5-year carlee in 2015 with no evidence of recurrence. 2 years post therapy he had multiple teeth removed of his left lower jaw followed by HBO therapy x10 sessions due to poor healing. More recently patient presented with symptoms of congestion and hypersalivation, completed a couple courses of antibiotics without resolution. Had minimal weight loss. He was seen by Dr. Cherelle Castillo ENT with the scope and abnormal CT neck which was abnormal.  He was thereafter referred to Dr. Tex douglas at [facility] at [facility]. Patient underwent direct laryngoscopy 8/17/2023 as well as PET scan 8/9/2023. PET scan demonstrated hypermetabolic mass at base of tongue, mildly metabolic nonenlarged right level 3 cervical lymph node, and no evidence of distant metastasis. Direct laryngoscopy with biopsy with pathology of larynx and epiglottis revealing invasive moderately differentiated squamous cell cancer. P16 stain for HPV was negative. Patient now referred to [facility] oncology service for consideration of systemic therapy. Patient seen today in company of wife.   Sitting in chair with crutches on his side, reports

## 2023-10-26 NOTE — PROGRESS NOTES
Patient arrived to port lab for port access and lab draw   [facility] accessed and labs drawn per protocol   *Port remains accessed.  Patient discharged from port lab ambulatory*

## 2023-10-27 ENCOUNTER — HOSPITAL ENCOUNTER (OUTPATIENT)
Dept: INFUSION THERAPY | Age: 78
Discharge: HOME | End: 2023-10-27
Payer: MEDICARE

## 2023-10-27 VITALS
RESPIRATION RATE: 16 BRPM | SYSTOLIC BLOOD PRESSURE: 98 MMHG | DIASTOLIC BLOOD PRESSURE: 65 MMHG | HEART RATE: 92 BPM | WEIGHT: 160.05 LBS | TEMPERATURE: 98.1 F | BODY MASS INDEX: 22.32 KG/M2 | OXYGEN SATURATION: 99 %

## 2023-10-27 DIAGNOSIS — E03.2 HYPOTHYROIDISM DUE TO MEDICAMENTS AND OTHER EXOGENOUS SUBSTANCES: ICD-10-CM

## 2023-10-27 DIAGNOSIS — C32.9 KERATINIZING SQUAMOUS CELL CARCINOMA OF LARYNX (HCC): Primary | ICD-10-CM

## 2023-10-27 LAB — TSH, 3RD GENERATION: 0.69 UIU/ML (ref 0.36–3.74)

## 2023-10-27 PROCEDURE — 96413 CHEMO IV INFUSION 1 HR: CPT

## 2023-10-27 PROCEDURE — 2580000003 HC RX 258: Performed by: NURSE PRACTITIONER

## 2023-10-27 PROCEDURE — 84443 ASSAY THYROID STIM HORMONE: CPT

## 2023-10-27 PROCEDURE — 96367 TX/PROPH/DG ADDL SEQ IV INF: CPT

## 2023-10-27 PROCEDURE — 96375 TX/PRO/DX INJ NEW DRUG ADDON: CPT

## 2023-10-27 PROCEDURE — 96417 CHEMO IV INFUS EACH ADDL SEQ: CPT

## 2023-10-27 PROCEDURE — 6360000002 HC RX W HCPCS: Performed by: NURSE PRACTITIONER

## 2023-10-27 PROCEDURE — G0498 CHEMO EXTEND IV INFUS W/PUMP: HCPCS

## 2023-10-27 RX ORDER — ALBUTEROL SULFATE 90 UG/1
4 AEROSOL, METERED RESPIRATORY (INHALATION) PRN
Status: DISCONTINUED | OUTPATIENT
Start: 2023-10-27 | End: 2023-10-28 | Stop reason: HOSPADM

## 2023-10-27 RX ORDER — SODIUM CHLORIDE 9 MG/ML
5-250 INJECTION, SOLUTION INTRAVENOUS PRN
Status: DISCONTINUED | OUTPATIENT
Start: 2023-10-27 | End: 2023-10-28 | Stop reason: HOSPADM

## 2023-10-27 RX ORDER — EPINEPHRINE 1 MG/ML
0.3 INJECTION, SOLUTION, CONCENTRATE INTRAVENOUS PRN
Status: DISCONTINUED | OUTPATIENT
Start: 2023-10-27 | End: 2023-10-28 | Stop reason: HOSPADM

## 2023-10-27 RX ORDER — MEPERIDINE HYDROCHLORIDE 25 MG/ML
12.5 INJECTION INTRAMUSCULAR; INTRAVENOUS; SUBCUTANEOUS PRN
Status: DISCONTINUED | OUTPATIENT
Start: 2023-10-27 | End: 2023-10-28 | Stop reason: HOSPADM

## 2023-10-27 RX ORDER — DIPHENHYDRAMINE HYDROCHLORIDE 50 MG/ML
50 INJECTION INTRAMUSCULAR; INTRAVENOUS
Status: DISCONTINUED | OUTPATIENT
Start: 2023-10-27 | End: 2023-10-28 | Stop reason: HOSPADM

## 2023-10-27 RX ORDER — ONDANSETRON 2 MG/ML
8 INJECTION INTRAMUSCULAR; INTRAVENOUS
Status: DISCONTINUED | OUTPATIENT
Start: 2023-10-27 | End: 2023-10-28 | Stop reason: HOSPADM

## 2023-10-27 RX ORDER — SODIUM CHLORIDE 0.9 % (FLUSH) 0.9 %
5-40 SYRINGE (ML) INJECTION PRN
Status: DISCONTINUED | OUTPATIENT
Start: 2023-10-27 | End: 2023-10-28 | Stop reason: HOSPADM

## 2023-10-27 RX ORDER — ONDANSETRON 2 MG/ML
8 INJECTION INTRAMUSCULAR; INTRAVENOUS ONCE
Status: COMPLETED | OUTPATIENT
Start: 2023-10-27 | End: 2023-10-27

## 2023-10-27 RX ORDER — ACETAMINOPHEN 325 MG/1
650 TABLET ORAL
Status: DISCONTINUED | OUTPATIENT
Start: 2023-10-27 | End: 2023-10-28 | Stop reason: HOSPADM

## 2023-10-27 RX ADMIN — FLUOROURACIL 7500 MG: 50 INJECTION, SOLUTION INTRAVENOUS at 13:30

## 2023-10-27 RX ADMIN — DEXAMETHASONE SODIUM PHOSPHATE 12 MG: 4 INJECTION INTRA-ARTICULAR; INTRALESIONAL; INTRAMUSCULAR; INTRAVENOUS; SOFT TISSUE at 12:02

## 2023-10-27 RX ADMIN — SODIUM CHLORIDE, PRESERVATIVE FREE 10 ML: 5 INJECTION INTRAVENOUS at 10:50

## 2023-10-27 RX ADMIN — CARBOPLATIN 580 MG: 450 INJECTION, SOLUTION INTRAVENOUS at 12:53

## 2023-10-27 RX ADMIN — FOSAPREPITANT 150 MG: 150 INJECTION, POWDER, LYOPHILIZED, FOR SOLUTION INTRAVENOUS at 12:27

## 2023-10-27 RX ADMIN — SODIUM CHLORIDE 200 MG: 9 INJECTION, SOLUTION INTRAVENOUS at 11:22

## 2023-10-27 RX ADMIN — SODIUM CHLORIDE 100 ML/HR: 9 INJECTION, SOLUTION INTRAVENOUS at 10:51

## 2023-10-27 RX ADMIN — ONDANSETRON 8 MG: 2 INJECTION INTRAMUSCULAR; INTRAVENOUS at 12:00

## 2023-10-27 NOTE — PROGRESS NOTES
Arrived to the [address] [place] [address]. [facility]. Keytuda, carboplatin, and fluorouracil pump connection completed. Patient tolerated well. Any issues or concerns during appointment: none. Patient aware of next infusion appointment on 10/31/23 (date) at 1400 (time). Patient aware of next lab and [facility] office visit on 11/16/23 (date) at 200 (time). Patient instructed to call provider with temperature of 100.4 or greater or nausea/vomiting/ diarrhea or pain not controlled by medications  Discharged via wheelchair accompanied by spouse.

## 2023-10-31 ENCOUNTER — HOSPITAL ENCOUNTER (OUTPATIENT)
Dept: INFUSION THERAPY | Age: 78
Discharge: HOME | End: 2023-10-31
Payer: MEDICARE

## 2023-10-31 VITALS
SYSTOLIC BLOOD PRESSURE: 90 MMHG | DIASTOLIC BLOOD PRESSURE: 53 MMHG | OXYGEN SATURATION: 97 % | TEMPERATURE: 98.4 F | HEART RATE: 95 BPM | RESPIRATION RATE: 16 BRPM

## 2023-10-31 DIAGNOSIS — C32.9 KERATINIZING SQUAMOUS CELL CARCINOMA OF LARYNX (HCC): Primary | ICD-10-CM

## 2023-10-31 PROCEDURE — 96523 IRRIG DRUG DELIVERY DEVICE: CPT

## 2023-10-31 PROCEDURE — 2580000003 HC RX 258: Performed by: NURSE PRACTITIONER

## 2023-10-31 RX ORDER — SODIUM CHLORIDE 0.9 % (FLUSH) 0.9 %
5-40 SYRINGE (ML) INJECTION PRN
Status: DISCONTINUED | OUTPATIENT
Start: 2023-10-31 | End: 2023-11-01 | Stop reason: HOSPADM

## 2023-10-31 RX ADMIN — SODIUM CHLORIDE, PRESERVATIVE FREE 10 ML: 5 INJECTION INTRAVENOUS at 14:33

## 2023-11-01 NOTE — PROGRESS NOTES
Nutrition/H&N Navigation:  RD was unable to see pt during today's office visit, will follow up at next scheduled appointment. Pt has RD/Navigator contact information to use prn. [id number] [facility], [facility], [address] [place] [address]      Addendum (10/31):  Pt's wife called and reported that pt had constipation after cycle 2 of chemo, and it was worse with this past cycle 3. She gave him 1 dose of Miralax with no results, glycerin suppository x 3, and fleets enema x 1 which resulted in diarrhea. Pt is taking Zofran as prescribed to prevent nausea which may be exacerbating constipation. Reviewed need to avoid enemas and suppositories w/ getting chemo and fluctuation in counts. Instructed pt's wife to hold any other medications until current diarrhea/loose stools resolve, and then add daily stool softener once or twice a day. Pt can then add daily Miralax or every other day when receiving chemo to better help manage constipation.

## 2023-11-09 RX ORDER — DEXAMETHASONE 0.5 MG/5ML
1 SOLUTION ORAL DAILY
Qty: 240 ML | Refills: 1 | Status: SHIPPED | OUTPATIENT
Start: 2023-11-09

## 2023-11-09 NOTE — PROGRESS NOTES
H&N Navigation:  Pt's wife Kota Pruitt called and reports that pt has mouth sores/ulcers inside of his bottom lip, it's red, and will bleed at times. Pt is using MMW and Orajel with some relief. Mouth sores likely from 5-FU pump, Dex mouthrinse prescribed - 10 mL, hold in mouth, gargle/swish/spit TID.       [address] [facility], [address] [facility]

## 2023-11-15 DIAGNOSIS — C15.9 MALIGNANT NEOPLASM OF ESOPHAGUS, UNSPECIFIED LOCATION (HCC): ICD-10-CM

## 2023-11-15 DIAGNOSIS — E03.2 HYPOTHYROIDISM DUE TO MEDICAMENTS AND OTHER EXOGENOUS SUBSTANCES: Primary | ICD-10-CM

## 2023-11-16 ENCOUNTER — OFFICE VISIT (OUTPATIENT)
Dept: ONCOLOGY | Age: 78
End: 2023-11-16

## 2023-11-16 ENCOUNTER — HOSPITAL ENCOUNTER (OUTPATIENT)
Dept: LAB | Age: 78
Discharge: HOME | End: 2023-11-16
Payer: MEDICARE

## 2023-11-16 ENCOUNTER — OFFICE VISIT (OUTPATIENT)
Dept: ONCOLOGY | Age: 78
End: 2023-11-16
Payer: MEDICARE

## 2023-11-16 VITALS
SYSTOLIC BLOOD PRESSURE: 119 MMHG | WEIGHT: 155.5 LBS | RESPIRATION RATE: 12 BRPM | DIASTOLIC BLOOD PRESSURE: 71 MMHG | TEMPERATURE: 98.9 F | HEIGHT: 71 IN | OXYGEN SATURATION: 100 % | BODY MASS INDEX: 21.77 KG/M2 | HEART RATE: 98 BPM

## 2023-11-16 DIAGNOSIS — E03.2 HYPOTHYROIDISM DUE TO MEDICAMENTS AND OTHER EXOGENOUS SUBSTANCES: ICD-10-CM

## 2023-11-16 DIAGNOSIS — D50.9 IRON DEFICIENCY ANEMIA, UNSPECIFIED IRON DEFICIENCY ANEMIA TYPE: ICD-10-CM

## 2023-11-16 DIAGNOSIS — C10.8 MALIGNANT NEOPLASM OF OVERLAPPING SITES OF OROPHARYNX (HCC): ICD-10-CM

## 2023-11-16 DIAGNOSIS — Z78.9 ON ENTERAL NUTRITION: ICD-10-CM

## 2023-11-16 DIAGNOSIS — C32.9 KERATINIZING SQUAMOUS CELL CARCINOMA OF LARYNX (HCC): Primary | ICD-10-CM

## 2023-11-16 DIAGNOSIS — R05.9 COUGH, UNSPECIFIED TYPE: ICD-10-CM

## 2023-11-16 DIAGNOSIS — C15.9 MALIGNANT NEOPLASM OF ESOPHAGUS, UNSPECIFIED LOCATION (HCC): ICD-10-CM

## 2023-11-16 LAB
ALBUMIN SERPL-MCNC: 2.9 G/DL (ref 3.2–4.6)
ALBUMIN/GLOB SERPL: 0.7 (ref 0.4–1.6)
ALP SERPL-CCNC: 82 U/L (ref 50–136)
ALT SERPL-CCNC: 20 U/L (ref 12–65)
ANION GAP SERPL CALC-SCNC: 5 MMOL/L (ref 2–11)
AST SERPL-CCNC: 21 U/L (ref 15–37)
BASOPHILS # BLD: 0 K/UL (ref 0–0.2)
BASOPHILS NFR BLD: 0 % (ref 0–2)
BILIRUB SERPL-MCNC: 0.3 MG/DL (ref 0.2–1.1)
BUN SERPL-MCNC: 27 MG/DL (ref 8–23)
CALCIUM SERPL-MCNC: 8.6 MG/DL (ref 8.3–10.4)
CHLORIDE SERPL-SCNC: 105 MMOL/L (ref 101–110)
CO2 SERPL-SCNC: 27 MMOL/L (ref 21–32)
CREAT SERPL-MCNC: 0.6 MG/DL (ref 0.8–1.5)
DIFFERENTIAL METHOD BLD: ABNORMAL
EOSINOPHIL # BLD: 0 K/UL (ref 0–0.8)
EOSINOPHIL NFR BLD: 1 % (ref 0.5–7.8)
ERYTHROCYTE [DISTWIDTH] IN BLOOD BY AUTOMATED COUNT: 21.4 % (ref 11.9–14.6)
GLOBULIN SER CALC-MCNC: 4.2 G/DL (ref 2.8–4.5)
GLUCOSE SERPL-MCNC: 122 MG/DL (ref 65–100)
HCT VFR BLD AUTO: 25.7 % (ref 41.1–50.3)
HGB BLD-MCNC: 8.4 G/DL (ref 13.6–17.2)
IMM GRANULOCYTES # BLD AUTO: 0 K/UL (ref 0–0.5)
IMM GRANULOCYTES NFR BLD AUTO: 1 % (ref 0–5)
LYMPHOCYTES # BLD: 0.6 K/UL (ref 0.5–4.6)
LYMPHOCYTES NFR BLD: 14 % (ref 13–44)
MAGNESIUM SERPL-MCNC: 2.2 MG/DL (ref 1.8–2.4)
MCH RBC QN AUTO: 33.1 PG (ref 26.1–32.9)
MCHC RBC AUTO-ENTMCNC: 32.7 G/DL (ref 31.4–35)
MCV RBC AUTO: 101.2 FL (ref 82–102)
MONOCYTES # BLD: 0.6 K/UL (ref 0.1–1.3)
MONOCYTES NFR BLD: 13 % (ref 4–12)
NEUTS SEG # BLD: 3.3 K/UL (ref 1.7–8.2)
NEUTS SEG NFR BLD: 71 % (ref 43–78)
NRBC # BLD: 0 K/UL (ref 0–0.2)
PHOSPHATE SERPL-MCNC: 2.9 MG/DL (ref 2.3–3.7)
PLATELET # BLD AUTO: 124 K/UL (ref 150–450)
PMV BLD AUTO: 10.1 FL (ref 9.4–12.3)
POTASSIUM SERPL-SCNC: 4 MMOL/L (ref 3.5–5.1)
PROT SERPL-MCNC: 7.1 G/DL (ref 6.3–8.2)
RBC # BLD AUTO: 2.54 M/UL (ref 4.23–5.6)
SODIUM SERPL-SCNC: 137 MMOL/L (ref 133–143)
TSH, 3RD GENERATION: 0.86 UIU/ML (ref 0.36–3.74)
WBC # BLD AUTO: 4.6 K/UL (ref 4.3–11.1)

## 2023-11-16 PROCEDURE — 99214 OFFICE O/P EST MOD 30 MIN: CPT | Performed by: NURSE PRACTITIONER

## 2023-11-16 PROCEDURE — 1036F TOBACCO NON-USER: CPT | Performed by: NURSE PRACTITIONER

## 2023-11-16 PROCEDURE — 85025 COMPLETE CBC W/AUTO DIFF WBC: CPT

## 2023-11-16 PROCEDURE — G8420 CALC BMI NORM PARAMETERS: HCPCS | Performed by: NURSE PRACTITIONER

## 2023-11-16 PROCEDURE — 2580000003 HC RX 258: Performed by: INTERNAL MEDICINE

## 2023-11-16 PROCEDURE — 84443 ASSAY THYROID STIM HORMONE: CPT

## 2023-11-16 PROCEDURE — G8484 FLU IMMUNIZE NO ADMIN: HCPCS | Performed by: NURSE PRACTITIONER

## 2023-11-16 PROCEDURE — 1123F ACP DISCUSS/DSCN MKR DOCD: CPT | Performed by: NURSE PRACTITIONER

## 2023-11-16 PROCEDURE — 36591 DRAW BLOOD OFF VENOUS DEVICE: CPT

## 2023-11-16 PROCEDURE — 80053 COMPREHEN METABOLIC PANEL: CPT

## 2023-11-16 PROCEDURE — 84100 ASSAY OF PHOSPHORUS: CPT

## 2023-11-16 PROCEDURE — 83735 ASSAY OF MAGNESIUM: CPT

## 2023-11-16 PROCEDURE — G8427 DOCREV CUR MEDS BY ELIG CLIN: HCPCS | Performed by: NURSE PRACTITIONER

## 2023-11-16 RX ORDER — SODIUM CHLORIDE 9 MG/ML
5-250 INJECTION, SOLUTION INTRAVENOUS PRN
Status: CANCELLED | OUTPATIENT
Start: 2023-11-17

## 2023-11-16 RX ORDER — SODIUM CHLORIDE 0.9 % (FLUSH) 0.9 %
5-40 SYRINGE (ML) INJECTION PRN
OUTPATIENT
Start: 2023-11-21

## 2023-11-16 RX ORDER — MEPERIDINE HYDROCHLORIDE 50 MG/ML
12.5 INJECTION INTRAMUSCULAR; INTRAVENOUS; SUBCUTANEOUS PRN
Status: CANCELLED | OUTPATIENT
Start: 2023-11-17

## 2023-11-16 RX ORDER — FAMOTIDINE 10 MG/ML
20 INJECTION, SOLUTION INTRAVENOUS
Status: CANCELLED | OUTPATIENT
Start: 2023-11-17

## 2023-11-16 RX ORDER — HEPARIN SODIUM (PORCINE) LOCK FLUSH IV SOLN 100 UNIT/ML 100 UNIT/ML
500 SOLUTION INTRAVENOUS PRN
OUTPATIENT
Start: 2023-11-21

## 2023-11-16 RX ORDER — HEPARIN SODIUM (PORCINE) LOCK FLUSH IV SOLN 100 UNIT/ML 100 UNIT/ML
500 SOLUTION INTRAVENOUS PRN
Status: CANCELLED | OUTPATIENT
Start: 2023-11-17

## 2023-11-16 RX ORDER — ACETAMINOPHEN 325 MG/1
650 TABLET ORAL
Status: CANCELLED | OUTPATIENT
Start: 2023-11-17

## 2023-11-16 RX ORDER — SODIUM CHLORIDE 9 MG/ML
5-250 INJECTION, SOLUTION INTRAVENOUS PRN
OUTPATIENT
Start: 2023-11-21

## 2023-11-16 RX ORDER — SODIUM CHLORIDE 9 MG/ML
INJECTION, SOLUTION INTRAVENOUS CONTINUOUS
Status: CANCELLED | OUTPATIENT
Start: 2023-11-17

## 2023-11-16 RX ORDER — EPINEPHRINE 1 MG/ML
0.3 INJECTION, SOLUTION, CONCENTRATE INTRAVENOUS PRN
Status: CANCELLED | OUTPATIENT
Start: 2023-11-17

## 2023-11-16 RX ORDER — DIPHENHYDRAMINE HYDROCHLORIDE 50 MG/ML
50 INJECTION INTRAMUSCULAR; INTRAVENOUS
Status: CANCELLED | OUTPATIENT
Start: 2023-11-17

## 2023-11-16 RX ORDER — SODIUM CHLORIDE 0.9 % (FLUSH) 0.9 %
5-40 SYRINGE (ML) INJECTION PRN
Status: CANCELLED | OUTPATIENT
Start: 2023-11-17

## 2023-11-16 RX ORDER — SODIUM CHLORIDE 0.9 % (FLUSH) 0.9 %
5-40 SYRINGE (ML) INJECTION PRN
Status: DISCONTINUED | OUTPATIENT
Start: 2023-11-16 | End: 2023-11-20 | Stop reason: HOSPADM

## 2023-11-16 RX ORDER — ONDANSETRON 2 MG/ML
8 INJECTION INTRAMUSCULAR; INTRAVENOUS ONCE
Status: CANCELLED | OUTPATIENT
Start: 2023-11-17 | End: 2023-11-17

## 2023-11-16 RX ORDER — ONDANSETRON 2 MG/ML
8 INJECTION INTRAMUSCULAR; INTRAVENOUS
Status: CANCELLED | OUTPATIENT
Start: 2023-11-17

## 2023-11-16 RX ORDER — ALBUTEROL SULFATE 90 UG/1
4 AEROSOL, METERED RESPIRATORY (INHALATION) PRN
Status: CANCELLED | OUTPATIENT
Start: 2023-11-17

## 2023-11-16 RX ADMIN — SODIUM CHLORIDE, PRESERVATIVE FREE 10 ML: 5 INJECTION INTRAVENOUS at 12:35

## 2023-11-16 NOTE — PROGRESS NOTES
well and weight is up to 154#. Fatigue and weakness is ongoing. He has ongoing secretions/cough with trach. No shortness of breath or edema. Denies any pain or neuropathy. No fevers or infectious symptoms. His wife is doing a fantast job caring for his FT and trach! Labs reviewed and adequate to proceed with treatment as planned. 10/26/23:  He is here with wife for follow up and C3 5FU/Carbo/Keytruda and doing well. There is no nausea and constipation was resolved with miralax. No mucositis. He is tolerating TF very well along with additional Boost/heavy cream/etc that wife give via tube, weight up 5#. His secretions are being well managed as well, trach clean. He needs a suction machine to facilitate secretion clearance via his trach as well as suction supplies, and he also needs trach supplies as well for daily cleaning and maintenance  His energy and strength are improving. There is no shortness of breath or edema. He denies any neuropath, pain, rash, or pruritis. Denies any fevers or infectious symptoms. Wife reports liquid pepcid is $60 a bottle, however patients states he wants to stick with it. They can try gaviscon via tube if desired. Labs reviewed and adequate to proceed, albumin up to 2.9 from 2.2.      11/16/23: He is here with wife for follow up and C4 5FU/Carbo/Keytruda. He is doing great. His strength and stamina are improving with hard work. His weight is stable and RD following - tube feeds are adequate. He has no nausea or vomiting. Mucositis improved with Decadron mouthwash. He had severe constipation with last cycle and will go ahead and start Miralax today. There is no shortness of breath or edema. Chronic cough stable. He denies any neuropathy. No pain. No rash or itching. No bleeding. No fevers or infectious symptoms. Denies any fevers or infectious symptoms. Labs reviewed and adequate to proceed. He will need restaging prior to cycle 5.  Call with any fevers,

## 2023-11-16 NOTE — PATIENT INSTRUCTIONS
Patient Instructions from Today's Visit    Reason for Visit:  Pre-chemo for metastatic H&N Cancer - Carbo + 5-FU (4 day pump) + Keytruda (cycle 4)      Plan:  Proceed with chemo tomorrow as planned    Start taking the liquid colace and/or Miralax today and take 1 or both of these once or twice a day for the next 5 days when getting chemo and wearing the chemo pump. We will try to be proactive with this cycle and hopefully prevent you from having constipation. Start your steroid mouth rinse when you get chemo tomorrow, and take the whole time you have the chemo pump on. Hopefully this will us prevent you having mouth sores at all this cycle, or be less painful and bothersome. We will reach out to Yesi Dalton about the [facility] and the [facility] 1.5     We will arrange for you to have a repeat scan prior to your next cycle. Follow Up:  3 weeks for next cycle     Recent Lab Results:  Labs are still pending       Treatment Summary has been discussed and given to patient: NA        -------------------------------------------------------------------------------------------------------------------  Please call our office at [phone] if you have any  of the following symptoms:   Fever of 100.5 or greater  Chills  Shortness of breath  Swelling or pain in one leg    After office hours an answering service is available and will contact a provider for emergencies or if you are experiencing any of the above symptoms. Patient has My Chart. My Chart log in information explained on the after visit summary printout at the [address] desk.     [address] [facility]   Outpatient Oncology Dietitian  Head and Neck Tumor Navigator  [phone]  [email or web address]

## 2023-11-17 ENCOUNTER — OFFICE VISIT (OUTPATIENT)
Dept: PALLATIVE CARE | Age: 78
End: 2023-11-17
Payer: MEDICARE

## 2023-11-17 ENCOUNTER — HOSPITAL ENCOUNTER (OUTPATIENT)
Dept: INFUSION THERAPY | Age: 78
Discharge: HOME | End: 2023-11-17
Payer: MEDICARE

## 2023-11-17 VITALS
SYSTOLIC BLOOD PRESSURE: 107 MMHG | OXYGEN SATURATION: 94 % | DIASTOLIC BLOOD PRESSURE: 75 MMHG | BODY MASS INDEX: 21.62 KG/M2 | HEART RATE: 113 BPM | WEIGHT: 155 LBS | TEMPERATURE: 97.6 F | RESPIRATION RATE: 14 BRPM

## 2023-11-17 DIAGNOSIS — C32.9 KERATINIZING SQUAMOUS CELL CARCINOMA OF LARYNX (HCC): ICD-10-CM

## 2023-11-17 DIAGNOSIS — R53.0 NEOPLASTIC (MALIGNANT) RELATED FATIGUE: Primary | ICD-10-CM

## 2023-11-17 DIAGNOSIS — E03.2 HYPOTHYROIDISM DUE TO MEDICAMENTS AND OTHER EXOGENOUS SUBSTANCES: Primary | ICD-10-CM

## 2023-11-17 DIAGNOSIS — Z51.5 ENCOUNTER FOR PALLIATIVE CARE: ICD-10-CM

## 2023-11-17 PROCEDURE — G8484 FLU IMMUNIZE NO ADMIN: HCPCS

## 2023-11-17 PROCEDURE — G0498 CHEMO EXTEND IV INFUS W/PUMP: HCPCS

## 2023-11-17 PROCEDURE — 1036F TOBACCO NON-USER: CPT

## 2023-11-17 PROCEDURE — 1123F ACP DISCUSS/DSCN MKR DOCD: CPT

## 2023-11-17 PROCEDURE — 96417 CHEMO IV INFUS EACH ADDL SEQ: CPT

## 2023-11-17 PROCEDURE — G8420 CALC BMI NORM PARAMETERS: HCPCS

## 2023-11-17 PROCEDURE — 2580000003 HC RX 258: Performed by: NURSE PRACTITIONER

## 2023-11-17 PROCEDURE — G8428 CUR MEDS NOT DOCUMENT: HCPCS

## 2023-11-17 PROCEDURE — 6360000002 HC RX W HCPCS: Performed by: NURSE PRACTITIONER

## 2023-11-17 PROCEDURE — 96413 CHEMO IV INFUSION 1 HR: CPT

## 2023-11-17 PROCEDURE — 99213 OFFICE O/P EST LOW 20 MIN: CPT

## 2023-11-17 PROCEDURE — 96367 TX/PROPH/DG ADDL SEQ IV INF: CPT

## 2023-11-17 PROCEDURE — 96375 TX/PRO/DX INJ NEW DRUG ADDON: CPT

## 2023-11-17 RX ORDER — SODIUM CHLORIDE 0.9 % (FLUSH) 0.9 %
5-40 SYRINGE (ML) INJECTION PRN
Status: DISCONTINUED | OUTPATIENT
Start: 2023-11-17 | End: 2023-11-18 | Stop reason: HOSPADM

## 2023-11-17 RX ORDER — ONDANSETRON 2 MG/ML
8 INJECTION INTRAMUSCULAR; INTRAVENOUS
Status: DISCONTINUED | OUTPATIENT
Start: 2023-11-17 | End: 2023-11-18 | Stop reason: HOSPADM

## 2023-11-17 RX ORDER — EPINEPHRINE 1 MG/ML
0.3 INJECTION, SOLUTION, CONCENTRATE INTRAVENOUS PRN
Status: DISCONTINUED | OUTPATIENT
Start: 2023-11-17 | End: 2023-11-18 | Stop reason: HOSPADM

## 2023-11-17 RX ORDER — SODIUM CHLORIDE 9 MG/ML
5-250 INJECTION, SOLUTION INTRAVENOUS PRN
Status: DISCONTINUED | OUTPATIENT
Start: 2023-11-17 | End: 2023-11-18 | Stop reason: HOSPADM

## 2023-11-17 RX ORDER — NUT TX, LACT-REDUCED, IRON 0.09G-2.25
LIQUID (ML) ORAL
Qty: 58440 ML | Refills: 5 | Status: SHIPPED | OUTPATIENT
Start: 2023-11-17 | End: 2024-02-25

## 2023-11-17 RX ORDER — ALBUTEROL SULFATE 90 UG/1
4 AEROSOL, METERED RESPIRATORY (INHALATION) PRN
Status: DISCONTINUED | OUTPATIENT
Start: 2023-11-17 | End: 2023-11-18 | Stop reason: HOSPADM

## 2023-11-17 RX ORDER — MEPERIDINE HYDROCHLORIDE 25 MG/ML
12.5 INJECTION INTRAMUSCULAR; INTRAVENOUS; SUBCUTANEOUS PRN
Status: DISCONTINUED | OUTPATIENT
Start: 2023-11-17 | End: 2023-11-18 | Stop reason: HOSPADM

## 2023-11-17 RX ORDER — ONDANSETRON 2 MG/ML
8 INJECTION INTRAMUSCULAR; INTRAVENOUS ONCE
Status: COMPLETED | OUTPATIENT
Start: 2023-11-17 | End: 2023-11-17

## 2023-11-17 RX ORDER — ACETAMINOPHEN 325 MG/1
650 TABLET ORAL
Status: DISCONTINUED | OUTPATIENT
Start: 2023-11-17 | End: 2023-11-18 | Stop reason: HOSPADM

## 2023-11-17 RX ORDER — DIPHENHYDRAMINE HYDROCHLORIDE 50 MG/ML
50 INJECTION INTRAMUSCULAR; INTRAVENOUS
Status: DISCONTINUED | OUTPATIENT
Start: 2023-11-17 | End: 2023-11-18 | Stop reason: HOSPADM

## 2023-11-17 RX ORDER — SODIUM CHLORIDE 9 MG/ML
INJECTION, SOLUTION INTRAVENOUS CONTINUOUS
Status: DISCONTINUED | OUTPATIENT
Start: 2023-11-17 | End: 2023-11-18 | Stop reason: HOSPADM

## 2023-11-17 RX ADMIN — SODIUM CHLORIDE 100 ML/HR: 9 INJECTION, SOLUTION INTRAVENOUS at 11:06

## 2023-11-17 RX ADMIN — CARBOPLATIN 565 MG: 450 INJECTION, SOLUTION INTRAVENOUS at 13:10

## 2023-11-17 RX ADMIN — ONDANSETRON 8 MG: 2 INJECTION INTRAMUSCULAR; INTRAVENOUS at 12:07

## 2023-11-17 RX ADMIN — DEXAMETHASONE SODIUM PHOSPHATE 12 MG: 4 INJECTION INTRA-ARTICULAR; INTRALESIONAL; INTRAMUSCULAR; INTRAVENOUS; SOFT TISSUE at 12:09

## 2023-11-17 RX ADMIN — FLUOROURACIL 7500 MG: 50 INJECTION, SOLUTION INTRAVENOUS at 13:55

## 2023-11-17 RX ADMIN — SODIUM CHLORIDE 200 MG: 9 INJECTION, SOLUTION INTRAVENOUS at 11:33

## 2023-11-17 RX ADMIN — FOSAPREPITANT 150 MG: 150 INJECTION, POWDER, LYOPHILIZED, FOR SOLUTION INTRAVENOUS at 12:28

## 2023-11-17 RX ADMIN — SODIUM CHLORIDE, PRESERVATIVE FREE 10 ML: 5 INJECTION INTRAVENOUS at 11:05

## 2023-11-17 ASSESSMENT — ENCOUNTER SYMPTOMS: COUGH: 1

## 2023-11-17 NOTE — PROGRESS NOTES
activity: Not on file   Other Topics Concern    Not on file   Social History Narrative    Not on file     Social Determinants of Health     Financial Resource Strain: Low Risk  (6/21/2023)    Overall Financial Resource Strain (CARDIA)     Difficulty of Paying Living Expenses: Not hard at all   Food Insecurity: No Food Insecurity (6/21/2023)    Hunger Vital Sign     Worried About Running Out of Food in the Last Year: Never true     Ran Out of Food in the Last Year: Never true   Transportation Needs: Unknown (6/21/2023)    PRAPARE - Transportation     Lack of Transportation (Medical): Not on file     Lack of Transportation (Non-Medical): No   Physical Activity: Insufficiently Active (6/9/2023)    Exercise Vital Sign     Days of Exercise per Week: 6 days     Minutes of Exercise per Session: 20 min   Stress: Not on file   Social Connections: Not on file   Intimate Partner Violence: Not on file   Housing Stability: Unknown (6/21/2023)    Housing Stability Vital Sign     Unable to Pay for Housing in the Last Year: Not on file     Number of [address] in the Last Year: Not on file     Unstable Housing in the Last Year: No     Current Outpatient Medications   Medication Sig Dispense Refill    dexamethasone 0.5 MG/5ML solution Take 10 mLs by mouth daily Hold in mouth, gargle/swish/spit 3 times daily for mouth sores. DO NOT SWALLOW 240 mL 1    docusate (COLACE) 50 MG/5ML liquid 5 mLs by Per G Tube route daily 237 mL 2    traZODone (DESYREL) 50 MG tablet Take 1 tablet by mouth nightly 30 tablet 2    lidocaine (SOLARCAINE) 0.5 % topical spray       cephALEXin (KEFLEX) 250 MG/5ML suspension  (Patient not taking: Reported on 11/16/2023)      famotidine (PEPCID) 40 MG/5ML suspension 5 mLs by Per G Tube route daily 150 mL 3    Nebulizers (COMPRESSOR/NEBULIZER) MISC Use as directed.  Dx: Z93.0, R05.9 1 each 0    omeprazole (PRILOSEC) 2 MG/ML SUSP 2 mg/mL oral suspension 20 mLs by Per G Tube route Daily 600 mL 2    ferrous sulfate

## 2023-11-17 NOTE — PROGRESS NOTES
Arrived to the [facility]. Labs and orders reviewed. Keytruda/Carbo/5fu infusions completed. Patient tolerated well. Any issues or concerns during appointment: no  Patient aware of next infusion appointment on 11/21/2023 (date) at 0 (time). Patient aware of next lab and [facility] office visit on 12/7/2023 (date) at 200 (time). Patient instructed to call provider with temperature of 100.4 or greater or nausea/vomiting/ diarrhea or pain not controlled by medications  Discharged ambulatory.

## 2023-11-20 NOTE — PROGRESS NOTES
then prn. Monitoring/Evaluation:  1. RD to follow up during next office visit - follow up wt status, tolerance/intake of TF/po diet, symptom management.       [address] [place] [address]

## 2023-11-21 ENCOUNTER — HOSPITAL ENCOUNTER (OUTPATIENT)
Dept: INFUSION THERAPY | Age: 78
Discharge: HOME | End: 2023-11-21
Payer: MEDICARE

## 2023-11-21 VITALS
DIASTOLIC BLOOD PRESSURE: 60 MMHG | SYSTOLIC BLOOD PRESSURE: 97 MMHG | RESPIRATION RATE: 15 BRPM | OXYGEN SATURATION: 96 % | HEART RATE: 108 BPM | TEMPERATURE: 97.7 F

## 2023-11-21 DIAGNOSIS — E03.2 HYPOTHYROIDISM DUE TO MEDICAMENTS AND OTHER EXOGENOUS SUBSTANCES: Primary | ICD-10-CM

## 2023-11-21 DIAGNOSIS — C32.9 KERATINIZING SQUAMOUS CELL CARCINOMA OF LARYNX (HCC): ICD-10-CM

## 2023-11-21 PROCEDURE — 2580000003 HC RX 258: Performed by: NURSE PRACTITIONER

## 2023-11-21 PROCEDURE — 96523 IRRIG DRUG DELIVERY DEVICE: CPT

## 2023-11-21 RX ORDER — SODIUM CHLORIDE 0.9 % (FLUSH) 0.9 %
5-40 SYRINGE (ML) INJECTION PRN
Status: DISCONTINUED | OUTPATIENT
Start: 2023-11-21 | End: 2023-11-22 | Stop reason: HOSPADM

## 2023-11-21 RX ADMIN — SODIUM CHLORIDE, PRESERVATIVE FREE 10 ML: 5 INJECTION INTRAVENOUS at 14:00

## 2023-11-21 NOTE — PROGRESS NOTES
Patient arrived to [address] [facility] for pump removal. Assessment completed. Patient states he's had diarrhea for 3 days but it has subsided for today. Patient declined fluids. Pump stopped and removed, port flushed, and needle removed. Patient tolerated well and is aware of next appointment on 12/7/2023 @1230. Patient discharged via wheelchair with spouse.

## 2023-11-27 ENCOUNTER — CLINICAL DOCUMENTATION (OUTPATIENT)
Dept: ONCOLOGY | Age: 78
End: 2023-11-27

## 2023-11-27 DIAGNOSIS — C10.8 MALIGNANT NEOPLASM OF OVERLAPPING SITES OF OROPHARYNX (HCC): ICD-10-CM

## 2023-11-27 DIAGNOSIS — R05.9 COUGH, UNSPECIFIED TYPE: Primary | ICD-10-CM

## 2023-11-27 RX ORDER — LEVOFLOXACIN 500 MG/1
500 TABLET, FILM COATED ORAL DAILY
Qty: 5 TABLET | Refills: 0 | Status: SHIPPED | OUTPATIENT
Start: 2023-11-27 | End: 2023-12-02

## 2023-11-27 NOTE — PROGRESS NOTES
H&N Navigation:  Pt's wife Gabriela Galdamez called and reports pt with worsening cough and more secretions that normal via trach, yellow/greenish in color. Reviewed with Dr. Sabrina Reyna and verbal order for check on O2 sat (97% per wife), chest x-ray - ordered and will go to [facility] to complete, and 5 day course of Levaquin (500 mg) - can be crushed and given via PEG. Pt's wife Gabriela Galdamez is aware of plan, will follow up results of chest x-ray and any other recommendations. Pt has not had a fever, but wife knows to call if pt develops a temp greater than 100.4.     [address] [facility], [address] [facility]

## 2023-11-29 ENCOUNTER — HOSPITAL ENCOUNTER (OUTPATIENT)
Dept: GENERAL RADIOLOGY | Age: 78
Discharge: HOME | End: 2023-12-02
Payer: MEDICARE

## 2023-11-29 DIAGNOSIS — R05.9 COUGH, UNSPECIFIED TYPE: ICD-10-CM

## 2023-11-29 DIAGNOSIS — C10.8 MALIGNANT NEOPLASM OF OVERLAPPING SITES OF OROPHARYNX (HCC): ICD-10-CM

## 2023-11-29 PROCEDURE — 71046 X-RAY EXAM CHEST 2 VIEWS: CPT

## 2023-11-30 ENCOUNTER — TELEPHONE (OUTPATIENT)
Dept: INTERNAL MEDICINE CLINIC | Facility: CLINIC | Age: 78
End: 2023-11-30

## 2023-11-30 NOTE — TELEPHONE ENCOUNTER
Ana from [facility] needs  verbal orders that we we will continue seeing him from Dr. Donte Durham.     Her phone number is [phone]

## 2023-12-01 ENCOUNTER — CLINICAL DOCUMENTATION (OUTPATIENT)
Dept: ONCOLOGY | Age: 78
End: 2023-12-01

## 2023-12-01 NOTE — PROGRESS NOTES
H&N Navigation/Nutrition:  Received message from [facility] regarding pt's most recent split TF order. TF orders reflected what pt had been receiving at home - wife was giving 6 cartons of Isosource 1.5 and 1 Boost VHC daily to provide 2250 kcal and 102 gm protein (Isosource 1.5, 6 cartons) + 530 kcal and 22 gm protein (1 Boost VHC) = 2780 kcal (39 kcal/kg BW) and 124 gm protein (1.7 gm/kg BW). Pt has been slowly gaining/maintaining weight on current TF regimen, and additional shake was added daily because pt felt hungry and eager to regain weight. Current BW: 155# (11/17), fluctuating between 155-160# since placement of PEG, lowest weight 148# prior to PEG placement. Updated TF orders were sent for 4 cartons/day of Isosource 1.5 to provide 1500 kcal and 68 gm protein, and 2 cartons/day of Boost VHC to provide 1060 kcal and 44 gm protein. Total intake would provide 2560 kcal (36 kcal/kg BW) and 112 gm protein (1.5 gm/kg BW). Updated TF orders were to reflect close to current intake at home that pt has been receiving and maintaining/slowly regaining weight; kept whole cartons because wife did not want to deal with 1/2 carton left over daily. Pt remains NPO and 100% TF dependent for estimated nutrition needs, has been able to have a few small sips of water po.       [id number] [place] [address], [address] [facility]

## 2023-12-04 ENCOUNTER — HOSPITAL ENCOUNTER (OUTPATIENT)
Dept: PET IMAGING | Age: 78
Discharge: HOME | End: 2023-12-07
Payer: MEDICARE

## 2023-12-04 DIAGNOSIS — C10.8 MALIGNANT NEOPLASM OF OVERLAPPING SITES OF OROPHARYNX (HCC): ICD-10-CM

## 2023-12-04 DIAGNOSIS — C32.9 KERATINIZING SQUAMOUS CELL CARCINOMA OF LARYNX (HCC): ICD-10-CM

## 2023-12-04 LAB
GLUCOSE BLD STRIP.AUTO-MCNC: 122 MG/DL (ref 65–100)
SERVICE CMNT-IMP: ABNORMAL

## 2023-12-04 PROCEDURE — 3430000000 HC RX DIAGNOSTIC RADIOPHARMACEUTICAL: Performed by: INTERNAL MEDICINE

## 2023-12-04 PROCEDURE — 82962 GLUCOSE BLOOD TEST: CPT

## 2023-12-04 PROCEDURE — 78815 PET IMAGE W/CT SKULL-THIGH: CPT

## 2023-12-04 PROCEDURE — A9552 F18 FDG: HCPCS | Performed by: INTERNAL MEDICINE

## 2023-12-04 PROCEDURE — 2580000003 HC RX 258: Performed by: INTERNAL MEDICINE

## 2023-12-04 RX ORDER — FLUDEOXYGLUCOSE F 18 200 MCI/ML
13.43 INJECTION, SOLUTION INTRAVENOUS
Status: COMPLETED | OUTPATIENT
Start: 2023-12-04 | End: 2023-12-04

## 2023-12-04 RX ORDER — SODIUM CHLORIDE 0.9 % (FLUSH) 0.9 %
20 SYRINGE (ML) INJECTION AS NEEDED
Status: DISCONTINUED | OUTPATIENT
Start: 2023-12-04 | End: 2023-12-08 | Stop reason: HOSPADM

## 2023-12-04 RX ADMIN — SODIUM CHLORIDE, PRESERVATIVE FREE 20 ML: 5 INJECTION INTRAVENOUS at 13:10

## 2023-12-04 RX ADMIN — FLUDEOXYGLUCOSE F 18 13.43 MILLICURIE: 200 INJECTION, SOLUTION INTRAVENOUS at 13:10

## 2023-12-06 DIAGNOSIS — D64.9 ANEMIA, UNSPECIFIED TYPE: ICD-10-CM

## 2023-12-06 DIAGNOSIS — C10.8 MALIGNANT NEOPLASM OF OVERLAPPING SITES OF OROPHARYNX (HCC): ICD-10-CM

## 2023-12-06 DIAGNOSIS — D50.9 IRON DEFICIENCY ANEMIA, UNSPECIFIED IRON DEFICIENCY ANEMIA TYPE: Primary | ICD-10-CM

## 2023-12-07 ENCOUNTER — HOSPITAL ENCOUNTER (OUTPATIENT)
Dept: LAB | Age: 78
Discharge: HOME | End: 2023-12-07
Payer: MEDICARE

## 2023-12-07 ENCOUNTER — OFFICE VISIT (OUTPATIENT)
Dept: ONCOLOGY | Age: 78
End: 2023-12-07
Payer: MEDICARE

## 2023-12-07 ENCOUNTER — OFFICE VISIT (OUTPATIENT)
Dept: ONCOLOGY | Age: 78
End: 2023-12-07

## 2023-12-07 VITALS
SYSTOLIC BLOOD PRESSURE: 106 MMHG | HEART RATE: 95 BPM | WEIGHT: 156 LBS | OXYGEN SATURATION: 98 % | DIASTOLIC BLOOD PRESSURE: 63 MMHG | HEIGHT: 71 IN | RESPIRATION RATE: 16 BRPM | BODY MASS INDEX: 21.84 KG/M2 | TEMPERATURE: 97.5 F

## 2023-12-07 DIAGNOSIS — Z79.899 HIGH RISK MEDICATION USE: ICD-10-CM

## 2023-12-07 DIAGNOSIS — D50.9 IRON DEFICIENCY ANEMIA, UNSPECIFIED IRON DEFICIENCY ANEMIA TYPE: ICD-10-CM

## 2023-12-07 DIAGNOSIS — D64.9 ANEMIA, UNSPECIFIED TYPE: ICD-10-CM

## 2023-12-07 DIAGNOSIS — C32.9 KERATINIZING SQUAMOUS CELL CARCINOMA OF LARYNX (HCC): Primary | ICD-10-CM

## 2023-12-07 DIAGNOSIS — E03.2 HYPOTHYROIDISM DUE TO MEDICAMENTS AND OTHER EXOGENOUS SUBSTANCES: ICD-10-CM

## 2023-12-07 DIAGNOSIS — Z00.8 NUTRITIONAL ASSESSMENT: ICD-10-CM

## 2023-12-07 DIAGNOSIS — Z78.9 ON ENTERAL NUTRITION: Primary | ICD-10-CM

## 2023-12-07 DIAGNOSIS — J69.0 ASPIRATION PNEUMONIA, UNSPECIFIED ASPIRATION PNEUMONIA TYPE, UNSPECIFIED LATERALITY, UNSPECIFIED PART OF LUNG (HCC): ICD-10-CM

## 2023-12-07 DIAGNOSIS — C10.8 MALIGNANT NEOPLASM OF OVERLAPPING SITES OF OROPHARYNX (HCC): ICD-10-CM

## 2023-12-07 LAB
ALBUMIN SERPL-MCNC: 2.7 G/DL (ref 3.2–4.6)
ALBUMIN/GLOB SERPL: 0.6 (ref 0.4–1.6)
ALP SERPL-CCNC: 74 U/L (ref 50–136)
ALT SERPL-CCNC: 16 U/L (ref 12–65)
ANION GAP SERPL CALC-SCNC: 7 MMOL/L (ref 2–11)
AST SERPL-CCNC: 14 U/L (ref 15–37)
BASOPHILS # BLD: 0 K/UL (ref 0–0.2)
BASOPHILS NFR BLD: 0 % (ref 0–2)
BILIRUB SERPL-MCNC: 0.2 MG/DL (ref 0.2–1.1)
BUN SERPL-MCNC: 26 MG/DL (ref 8–23)
CALCIUM SERPL-MCNC: 8.5 MG/DL (ref 8.3–10.4)
CHLORIDE SERPL-SCNC: 103 MMOL/L (ref 103–113)
CO2 SERPL-SCNC: 29 MMOL/L (ref 21–32)
CREAT SERPL-MCNC: 0.7 MG/DL (ref 0.8–1.5)
DIFFERENTIAL METHOD BLD: ABNORMAL
EOSINOPHIL # BLD: 0 K/UL (ref 0–0.8)
EOSINOPHIL NFR BLD: 1 % (ref 0.5–7.8)
ERYTHROCYTE [DISTWIDTH] IN BLOOD BY AUTOMATED COUNT: 20.4 % (ref 11.9–14.6)
FERRITIN SERPL-MCNC: 418 NG/ML (ref 8–388)
GLOBULIN SER CALC-MCNC: 4.3 G/DL (ref 2.8–4.5)
GLUCOSE SERPL-MCNC: 132 MG/DL (ref 65–100)
HCT VFR BLD AUTO: 24.4 % (ref 41.1–50.3)
HGB BLD-MCNC: 7.7 G/DL (ref 13.6–17.2)
IMM GRANULOCYTES # BLD AUTO: 0.1 K/UL (ref 0–0.5)
IMM GRANULOCYTES NFR BLD AUTO: 1 % (ref 0–5)
IRON SATN MFR SERPL: 20 %
IRON SERPL-MCNC: 56 UG/DL (ref 35–150)
LYMPHOCYTES # BLD: 0.6 K/UL (ref 0.5–4.6)
LYMPHOCYTES NFR BLD: 8 % (ref 13–44)
MAGNESIUM SERPL-MCNC: 1.9 MG/DL (ref 1.8–2.4)
MCH RBC QN AUTO: 33.3 PG (ref 26.1–32.9)
MCHC RBC AUTO-ENTMCNC: 31.6 G/DL (ref 31.4–35)
MCV RBC AUTO: 105.6 FL (ref 82–102)
MONOCYTES # BLD: 0.8 K/UL (ref 0.1–1.3)
MONOCYTES NFR BLD: 11 % (ref 4–12)
NEUTS SEG # BLD: 5.7 K/UL (ref 1.7–8.2)
NEUTS SEG NFR BLD: 78 % (ref 43–78)
NRBC # BLD: 0 K/UL (ref 0–0.2)
PLATELET # BLD AUTO: 147 K/UL (ref 150–450)
PMV BLD AUTO: 9.7 FL (ref 9.4–12.3)
POTASSIUM SERPL-SCNC: 3.8 MMOL/L (ref 3.5–5.1)
PROT SERPL-MCNC: 7 G/DL (ref 6.3–8.2)
RBC # BLD AUTO: 2.31 M/UL (ref 4.23–5.6)
SODIUM SERPL-SCNC: 139 MMOL/L (ref 136–146)
TIBC SERPL-MCNC: 286 UG/DL (ref 250–450)
WBC # BLD AUTO: 7.3 K/UL (ref 4.3–11.1)

## 2023-12-07 PROCEDURE — 83550 IRON BINDING TEST: CPT

## 2023-12-07 PROCEDURE — G8484 FLU IMMUNIZE NO ADMIN: HCPCS | Performed by: INTERNAL MEDICINE

## 2023-12-07 PROCEDURE — 36591 DRAW BLOOD OFF VENOUS DEVICE: CPT

## 2023-12-07 PROCEDURE — G8428 CUR MEDS NOT DOCUMENT: HCPCS | Performed by: INTERNAL MEDICINE

## 2023-12-07 PROCEDURE — 1036F TOBACCO NON-USER: CPT | Performed by: INTERNAL MEDICINE

## 2023-12-07 PROCEDURE — 85025 COMPLETE CBC W/AUTO DIFF WBC: CPT

## 2023-12-07 PROCEDURE — G8420 CALC BMI NORM PARAMETERS: HCPCS | Performed by: INTERNAL MEDICINE

## 2023-12-07 PROCEDURE — 83540 ASSAY OF IRON: CPT

## 2023-12-07 PROCEDURE — 80053 COMPREHEN METABOLIC PANEL: CPT

## 2023-12-07 PROCEDURE — 82728 ASSAY OF FERRITIN: CPT

## 2023-12-07 PROCEDURE — 83735 ASSAY OF MAGNESIUM: CPT

## 2023-12-07 PROCEDURE — 99215 OFFICE O/P EST HI 40 MIN: CPT | Performed by: INTERNAL MEDICINE

## 2023-12-07 PROCEDURE — 2580000003 HC RX 258: Performed by: INTERNAL MEDICINE

## 2023-12-07 PROCEDURE — 1123F ACP DISCUSS/DSCN MKR DOCD: CPT | Performed by: INTERNAL MEDICINE

## 2023-12-07 RX ORDER — SODIUM CHLORIDE 9 MG/ML
5-250 INJECTION, SOLUTION INTRAVENOUS PRN
OUTPATIENT
Start: 2023-12-18

## 2023-12-07 RX ORDER — ACETAMINOPHEN 325 MG/1
650 TABLET ORAL
Status: CANCELLED | OUTPATIENT
Start: 2023-12-14

## 2023-12-07 RX ORDER — DIPHENHYDRAMINE HYDROCHLORIDE 50 MG/ML
50 INJECTION INTRAMUSCULAR; INTRAVENOUS
Status: CANCELLED | OUTPATIENT
Start: 2023-12-14

## 2023-12-07 RX ORDER — HEPARIN SODIUM (PORCINE) LOCK FLUSH IV SOLN 100 UNIT/ML 100 UNIT/ML
500 SOLUTION INTRAVENOUS PRN
Status: CANCELLED | OUTPATIENT
Start: 2023-12-14

## 2023-12-07 RX ORDER — SODIUM CHLORIDE 0.9 % (FLUSH) 0.9 %
5-40 SYRINGE (ML) INJECTION PRN
Status: CANCELLED | OUTPATIENT
Start: 2023-12-14

## 2023-12-07 RX ORDER — EPINEPHRINE 1 MG/ML
0.3 INJECTION, SOLUTION, CONCENTRATE INTRAVENOUS PRN
Status: CANCELLED | OUTPATIENT
Start: 2023-12-14

## 2023-12-07 RX ORDER — SODIUM CHLORIDE 0.9 % (FLUSH) 0.9 %
5-40 SYRINGE (ML) INJECTION PRN
Status: ACTIVE | OUTPATIENT
Start: 2023-12-07 | End: 2023-12-07

## 2023-12-07 RX ORDER — FAMOTIDINE 10 MG/ML
20 INJECTION, SOLUTION INTRAVENOUS
Status: CANCELLED | OUTPATIENT
Start: 2023-12-14

## 2023-12-07 RX ORDER — ONDANSETRON 2 MG/ML
8 INJECTION INTRAMUSCULAR; INTRAVENOUS
Status: CANCELLED | OUTPATIENT
Start: 2023-12-14

## 2023-12-07 RX ORDER — ALBUTEROL SULFATE 90 UG/1
4 AEROSOL, METERED RESPIRATORY (INHALATION) PRN
Status: CANCELLED | OUTPATIENT
Start: 2023-12-14

## 2023-12-07 RX ORDER — SODIUM CHLORIDE 0.9 % (FLUSH) 0.9 %
5-40 SYRINGE (ML) INJECTION PRN
OUTPATIENT
Start: 2023-12-18

## 2023-12-07 RX ORDER — AMOXICILLIN AND CLAVULANATE POTASSIUM 600; 42.9 MG/5ML; MG/5ML
875 POWDER, FOR SUSPENSION ORAL 2 TIMES DAILY
Qty: 146 ML | Refills: 0 | Status: SHIPPED | OUTPATIENT
Start: 2023-12-07 | End: 2023-12-17

## 2023-12-07 RX ORDER — ONDANSETRON 2 MG/ML
8 INJECTION INTRAMUSCULAR; INTRAVENOUS ONCE
Status: CANCELLED | OUTPATIENT
Start: 2023-12-14 | End: 2023-12-08

## 2023-12-07 RX ORDER — SODIUM CHLORIDE 9 MG/ML
INJECTION, SOLUTION INTRAVENOUS CONTINUOUS
Status: CANCELLED | OUTPATIENT
Start: 2023-12-14

## 2023-12-07 RX ORDER — SODIUM CHLORIDE 9 MG/ML
5-250 INJECTION, SOLUTION INTRAVENOUS PRN
Status: CANCELLED | OUTPATIENT
Start: 2023-12-14

## 2023-12-07 RX ORDER — HEPARIN SODIUM (PORCINE) LOCK FLUSH IV SOLN 100 UNIT/ML 100 UNIT/ML
500 SOLUTION INTRAVENOUS PRN
OUTPATIENT
Start: 2023-12-18

## 2023-12-07 RX ORDER — MEPERIDINE HYDROCHLORIDE 50 MG/ML
12.5 INJECTION INTRAMUSCULAR; INTRAVENOUS; SUBCUTANEOUS PRN
Status: CANCELLED | OUTPATIENT
Start: 2023-12-14

## 2023-12-07 RX ADMIN — SODIUM CHLORIDE, PRESERVATIVE FREE 10 ML: 5 INJECTION INTRAVENOUS at 09:35

## 2023-12-07 ASSESSMENT — PATIENT HEALTH QUESTIONNAIRE - PHQ9
1. LITTLE INTEREST OR PLEASURE IN DOING THINGS: 0
SUM OF ALL RESPONSES TO PHQ QUESTIONS 1-9: 0
SUM OF ALL RESPONSES TO PHQ QUESTIONS 1-9: 0
SUM OF ALL RESPONSES TO PHQ9 QUESTIONS 1 & 2: 0
SUM OF ALL RESPONSES TO PHQ QUESTIONS 1-9: 0
2. FEELING DOWN, DEPRESSED OR HOPELESS: 0
SUM OF ALL RESPONSES TO PHQ QUESTIONS 1-9: 0

## 2023-12-07 NOTE — PROGRESS NOTES
(NARCAN) 4 MG/0.1ML LIQD nasal spray Use 1 spray intranasally at onset of opioid overdose. May repeat dose using alternate nostril every 2 minutes as needed should symptoms persist or recur. 2 each 5    omeprazole (PRILOSEC) 40 MG delayed release capsule Take 1 capsule by mouth daily 90 capsule 2     No current facility-administered medications for this visit. Facility-Administered Medications Ordered in Other Visits   Medication Dose Route Frequency Provider Last Rate Last Admin    sodium chloride flush 0.9 % injection 5-40 mL  5-40 mL IntraVENous PRN Early MD Niles   10 mL at 23 0935    sodium chloride flush 0.9 % injection 20 mL  20 mL IntraVENous PRN Early MD Niles   20 mL at 23 1310       Social History     Socioeconomic History    Marital status:    Tobacco Use    Smoking status: Former     Packs/day: 1.00     Years: 15.00     Additional pack years: 0.00     Total pack years: 15.00     Types: Cigarettes     Quit date: 1990     Years since quittin.9    Smokeless tobacco: Former    Tobacco comments:     Quit smoking: age 26 to 37   Substance and Sexual Activity    Alcohol use: Not Currently    Drug use: Never     Social Determinants of Health     Financial Resource Strain: Low Risk  (2023)    Overall Financial Resource Strain (CARDIA)     Difficulty of Paying Living Expenses: Not hard at all   Transportation Needs: Unknown (2023)    PRAPARE - Transportation     Lack of Transportation (Non-Medical): No   Physical Activity: Insufficiently Active (2023)    Exercise Vital Sign     Days of Exercise per Week: 6 days     Minutes of Exercise per Session: 20 min   Housing Stability: Unknown (2023)    Housing Stability Vital Sign     Unstable Housing in the Last Year: No       Family History   Problem Relation Age of Onset    Malig Hypertherm Neg Hx     Pseudochol.  Deficiency Neg Hx     Delayed Awakening Neg Hx     Post-op Nausea/Vomiting Neg Hx     Emergence

## 2023-12-07 NOTE — PATIENT INSTRUCTIONS
Patient Instructions from Today's Visit    Reason for Visit:  Pre-chemo for H&N cancer - cycle 5 of Carbo + 5-FU + Keytruda    Plan: We will hold your chemo this week, it looks like you may have some aspiration pneumonia from your PET scan that Dr. Wendie Moritz reviewed this morning. We have sent in a prescription for liquid Augmentin to take 7.3 mL twice a day via your feeding tube for 10 days. We will reschedule your labs and infusion for next Friday. Dr. Wendie Moritz plans on you completing cycle 5 and cycle 6 of this regimen, and then transition to [facility] every 3 weeks for maintenance. Your PET scan shows response to the treatment and you have been tolerating well so far. Continue with colace and/or Miralax - 1/2 dosing seems to work best for you. Continue this approach with Imodium if needed as well.       Follow Up:  1 week for infusion only  4 weeks prior to next cycle of chemo     Recent Lab Results:  Hospital Outpatient Visit on 12/07/2023   Component Date Value Ref Range Status    WBC 12/07/2023 7.3  4.3 - 11.1 K/uL Final    RBC 12/07/2023 2.31 (L)  4.23 - 5.6 M/uL Final    Hemoglobin 12/07/2023 7.7 (L)  13.6 - 17.2 g/dL Final    Hematocrit 12/07/2023 24.4 (L)  41.1 - 50.3 % Final    MCV 12/07/2023 105.6 (H)  82.0 - 102.0 FL Final    MCH 12/07/2023 33.3 (H)  26.1 - 32.9 PG Final    MCHC 12/07/2023 31.6  31.4 - 35.0 g/dL Final    RDW 12/07/2023 20.4 (H)  11.9 - 14.6 % Final    Platelets 97/07/6637 147 (L)  150 - 450 K/uL Final    MPV 12/07/2023 9.7  9.4 - 12.3 FL Final    nRBC 12/07/2023 0.00  0.0 - 0.2 K/uL Final    **Note: Absolute NRBC parameter is now reported with Hemogram**    Differential Type 12/07/2023 AUTOMATED    Final    Neutrophils % 12/07/2023 78  43 - 78 % Final    Lymphocytes % 12/07/2023 8 (L)  13 - 44 % Final    Monocytes % 12/07/2023 11  4.0 - 12.0 % Final    Eosinophils % 12/07/2023 1  0.5 - 7.8 % Final    Basophils % 12/07/2023 0  0.0 - 2.0 % Final    Immature

## 2023-12-08 ENCOUNTER — HOSPITAL ENCOUNTER (OUTPATIENT)
Dept: INFUSION THERAPY | Age: 78
End: 2023-12-08

## 2023-12-11 NOTE — PROGRESS NOTES
Nutrition F/U:  Assessment:  Pt seen during office visit w/ Dr. Alia Almeida, plan to hold treatment for 1 week and take 10 days of Augmentin given suspicion of aspiration PNA on PET scan; otherwise PET scan showing response to treatment w/ plans to complete 6 cycles of this regimen and then transition to [facility]. Pt has taken small sips of water only po, remains TF dependent for estimated nutrition needs, bolus feeding combination of Isosource 1.5 and Boost VHC. Pt continues w/ alternating constipation and diarrhea - taking 1/2 dose of Colace and Miralax seem to be the best solution moving forward per spouse. Labs notable for BUN (26), Cr (0.7), glucose (132). Current BW: 156#, stable over the past 3 weeks. Intervention:  1. Continue w/ sips of thin liquids as tolerated for pleasure  2. Continue w/ current TF regimen - Isosource 1.5 (4 cartons/day) + Boost VHC (2 cartons/day). 3. Augmentin BID x 10 days   4. Move chemo infusion to next week per Dr. Alia Almeida d/t infection. Monitoring/Evaluation:  1. RD to follow up during next office visit - follow up wt status, tolerance/intake of TF, symptom management.       [address] [place] [address]

## 2023-12-12 ENCOUNTER — HOSPITAL ENCOUNTER (OUTPATIENT)
Dept: INTERVENTIONAL RADIOLOGY/VASCULAR | Age: 78
Discharge: HOME | End: 2023-12-15
Attending: RADIOLOGY
Payer: MEDICARE

## 2023-12-12 VITALS
OXYGEN SATURATION: 99 % | SYSTOLIC BLOOD PRESSURE: 101 MMHG | HEART RATE: 80 BPM | HEIGHT: 71 IN | RESPIRATION RATE: 16 BRPM | DIASTOLIC BLOOD PRESSURE: 57 MMHG | TEMPERATURE: 98 F | WEIGHT: 157 LBS | BODY MASS INDEX: 21.98 KG/M2

## 2023-12-12 PROCEDURE — 49450 REPLACE G/C TUBE PERC: CPT

## 2023-12-12 PROCEDURE — 49450 REPLACE G/C TUBE PERC: CPT | Performed by: RADIOLOGY

## 2023-12-12 PROCEDURE — 2709999900 IR GUIDED REPLACE G TUBE/CECOSTOMY PERC W CONTRAST

## 2023-12-12 PROCEDURE — 6360000004 HC RX CONTRAST MEDICATION: Performed by: RADIOLOGY

## 2023-12-12 PROCEDURE — 2500000003 HC RX 250 WO HCPCS: Performed by: RADIOLOGY

## 2023-12-12 PROCEDURE — 6370000000 HC RX 637 (ALT 250 FOR IP): Performed by: RADIOLOGY

## 2023-12-12 RX ORDER — LIDOCAINE HYDROCHLORIDE 20 MG/ML
INJECTION, SOLUTION INFILTRATION; PERINEURAL PRN
Status: COMPLETED | OUTPATIENT
Start: 2023-12-12 | End: 2023-12-12

## 2023-12-12 RX ORDER — LIDOCAINE HYDROCHLORIDE 20 MG/ML
SOLUTION OROPHARYNGEAL PRN
Status: COMPLETED | OUTPATIENT
Start: 2023-12-12 | End: 2023-12-12

## 2023-12-12 RX ADMIN — IOHEXOL 8 ML: 300 INJECTION, SOLUTION INTRAVENOUS at 11:35

## 2023-12-12 RX ADMIN — LIDOCAINE HYDROCHLORIDE 15 ML: 20 SOLUTION ORAL at 11:34

## 2023-12-12 RX ADMIN — LIDOCAINE HYDROCHLORIDE 5 ML: 20 INJECTION, SOLUTION INFILTRATION; PERINEURAL at 11:33

## 2023-12-12 NOTE — OR NURSING
No recovery period required. Pt alert and oriented and denies pain. Dressing is clean, dry, and intact. Reviewed discharge instructions with patient and spouse, both verbalized understanding. [facility] Vital signs and Elida score completed.

## 2023-12-13 ENCOUNTER — OFFICE VISIT (OUTPATIENT)
Dept: ENT CLINIC | Age: 78
End: 2023-12-13
Payer: MEDICARE

## 2023-12-13 VITALS — BODY MASS INDEX: 21.98 KG/M2 | TEMPERATURE: 97.2 F | HEIGHT: 71 IN | WEIGHT: 157 LBS

## 2023-12-13 DIAGNOSIS — R13.14 PHARYNGOESOPHAGEAL DYSPHAGIA: ICD-10-CM

## 2023-12-13 DIAGNOSIS — C10.9 SQUAMOUS CELL CARCINOMA OF OROPHARYNX (HCC): Primary | ICD-10-CM

## 2023-12-13 DIAGNOSIS — Z43.0 TRACHEOSTOMY CARE (HCC): ICD-10-CM

## 2023-12-13 PROCEDURE — 31575 DIAGNOSTIC LARYNGOSCOPY: CPT | Performed by: STUDENT IN AN ORGANIZED HEALTH CARE EDUCATION/TRAINING PROGRAM

## 2023-12-13 PROCEDURE — G8484 FLU IMMUNIZE NO ADMIN: HCPCS | Performed by: STUDENT IN AN ORGANIZED HEALTH CARE EDUCATION/TRAINING PROGRAM

## 2023-12-13 PROCEDURE — G8420 CALC BMI NORM PARAMETERS: HCPCS | Performed by: STUDENT IN AN ORGANIZED HEALTH CARE EDUCATION/TRAINING PROGRAM

## 2023-12-13 PROCEDURE — 1123F ACP DISCUSS/DSCN MKR DOCD: CPT | Performed by: STUDENT IN AN ORGANIZED HEALTH CARE EDUCATION/TRAINING PROGRAM

## 2023-12-13 PROCEDURE — 1036F TOBACCO NON-USER: CPT | Performed by: STUDENT IN AN ORGANIZED HEALTH CARE EDUCATION/TRAINING PROGRAM

## 2023-12-13 PROCEDURE — 99214 OFFICE O/P EST MOD 30 MIN: CPT | Performed by: STUDENT IN AN ORGANIZED HEALTH CARE EDUCATION/TRAINING PROGRAM

## 2023-12-13 PROCEDURE — G8427 DOCREV CUR MEDS BY ELIG CLIN: HCPCS | Performed by: STUDENT IN AN ORGANIZED HEALTH CARE EDUCATION/TRAINING PROGRAM

## 2023-12-13 ASSESSMENT — ENCOUNTER SYMPTOMS
EYE REDNESS: 0
SHORTNESS OF BREATH: 0
VOMITING: 0
TROUBLE SWALLOWING: 1
EYE ITCHING: 0

## 2023-12-15 ENCOUNTER — HOSPITAL ENCOUNTER (OUTPATIENT)
Dept: INFUSION THERAPY | Age: 78
Setting detail: INFUSION SERIES
Discharge: HOME | End: 2023-12-15
Payer: MEDICARE

## 2023-12-15 VITALS
WEIGHT: 157.8 LBS | DIASTOLIC BLOOD PRESSURE: 65 MMHG | OXYGEN SATURATION: 97 % | BODY MASS INDEX: 22.01 KG/M2 | RESPIRATION RATE: 16 BRPM | HEART RATE: 83 BPM | TEMPERATURE: 97.7 F | SYSTOLIC BLOOD PRESSURE: 110 MMHG

## 2023-12-15 DIAGNOSIS — E03.2 HYPOTHYROIDISM DUE TO MEDICAMENTS AND OTHER EXOGENOUS SUBSTANCES: Primary | ICD-10-CM

## 2023-12-15 DIAGNOSIS — C32.9 KERATINIZING SQUAMOUS CELL CARCINOMA OF LARYNX (HCC): ICD-10-CM

## 2023-12-15 LAB
ALBUMIN SERPL-MCNC: 2.8 G/DL (ref 3.2–4.6)
ALBUMIN/GLOB SERPL: 0.7 (ref 0.4–1.6)
ALP SERPL-CCNC: 76 U/L (ref 50–136)
ALT SERPL-CCNC: 16 U/L (ref 12–65)
ANION GAP SERPL CALC-SCNC: 5 MMOL/L (ref 2–11)
AST SERPL-CCNC: 18 U/L (ref 15–37)
BASOPHILS # BLD: 0 K/UL (ref 0–0.2)
BASOPHILS NFR BLD: 0 % (ref 0–2)
BILIRUB SERPL-MCNC: 0.2 MG/DL (ref 0.2–1.1)
BUN SERPL-MCNC: 26 MG/DL (ref 8–23)
CALCIUM SERPL-MCNC: 8.7 MG/DL (ref 8.3–10.4)
CHLORIDE SERPL-SCNC: 106 MMOL/L (ref 103–113)
CO2 SERPL-SCNC: 28 MMOL/L (ref 21–32)
CREAT SERPL-MCNC: 0.7 MG/DL (ref 0.8–1.5)
DIFFERENTIAL METHOD BLD: ABNORMAL
EOSINOPHIL # BLD: 0.2 K/UL (ref 0–0.8)
EOSINOPHIL NFR BLD: 3 % (ref 0.5–7.8)
ERYTHROCYTE [DISTWIDTH] IN BLOOD BY AUTOMATED COUNT: 19.5 % (ref 11.9–14.6)
GLOBULIN SER CALC-MCNC: 4.2 G/DL (ref 2.8–4.5)
GLUCOSE SERPL-MCNC: 121 MG/DL (ref 65–100)
HCT VFR BLD AUTO: 26.6 % (ref 41.1–50.3)
HGB BLD-MCNC: 8.5 G/DL (ref 13.6–17.2)
IMM GRANULOCYTES # BLD AUTO: 0 K/UL (ref 0–0.5)
IMM GRANULOCYTES NFR BLD AUTO: 0 % (ref 0–5)
LYMPHOCYTES # BLD: 0.7 K/UL (ref 0.5–4.6)
LYMPHOCYTES NFR BLD: 11 % (ref 13–44)
MCH RBC QN AUTO: 34 PG (ref 26.1–32.9)
MCHC RBC AUTO-ENTMCNC: 32 G/DL (ref 31.4–35)
MCV RBC AUTO: 106.4 FL (ref 82–102)
MONOCYTES # BLD: 0.9 K/UL (ref 0.1–1.3)
MONOCYTES NFR BLD: 14 % (ref 4–12)
NEUTS SEG # BLD: 4.3 K/UL (ref 1.7–8.2)
NEUTS SEG NFR BLD: 72 % (ref 43–78)
NRBC # BLD: 0 K/UL (ref 0–0.2)
PLATELET # BLD AUTO: 435 K/UL (ref 150–450)
PMV BLD AUTO: 9 FL (ref 9.4–12.3)
POTASSIUM SERPL-SCNC: 3.9 MMOL/L (ref 3.5–5.1)
PROT SERPL-MCNC: 7 G/DL (ref 6.3–8.2)
RBC # BLD AUTO: 2.5 M/UL (ref 4.23–5.6)
SODIUM SERPL-SCNC: 139 MMOL/L (ref 136–146)
TSH, 3RD GENERATION: 1.19 UIU/ML (ref 0.36–3)
WBC # BLD AUTO: 6 K/UL (ref 4.3–11.1)

## 2023-12-15 PROCEDURE — 2580000003 HC RX 258: Performed by: INTERNAL MEDICINE

## 2023-12-15 PROCEDURE — 80053 COMPREHEN METABOLIC PANEL: CPT

## 2023-12-15 PROCEDURE — 85025 COMPLETE CBC W/AUTO DIFF WBC: CPT

## 2023-12-15 PROCEDURE — 6360000002 HC RX W HCPCS: Performed by: INTERNAL MEDICINE

## 2023-12-15 PROCEDURE — 84443 ASSAY THYROID STIM HORMONE: CPT

## 2023-12-15 RX ORDER — DIPHENHYDRAMINE HYDROCHLORIDE 50 MG/ML
50 INJECTION INTRAMUSCULAR; INTRAVENOUS
Status: DISCONTINUED | OUTPATIENT
Start: 2023-12-15 | End: 2023-12-16 | Stop reason: HOSPADM

## 2023-12-15 RX ORDER — SODIUM CHLORIDE 9 MG/ML
5-250 INJECTION, SOLUTION INTRAVENOUS PRN
Status: DISCONTINUED | OUTPATIENT
Start: 2023-12-15 | End: 2023-12-16 | Stop reason: HOSPADM

## 2023-12-15 RX ORDER — ACETAMINOPHEN 325 MG/1
650 TABLET ORAL
Status: DISCONTINUED | OUTPATIENT
Start: 2023-12-15 | End: 2023-12-16 | Stop reason: HOSPADM

## 2023-12-15 RX ORDER — ONDANSETRON 2 MG/ML
8 INJECTION INTRAMUSCULAR; INTRAVENOUS
Status: DISCONTINUED | OUTPATIENT
Start: 2023-12-15 | End: 2023-12-16 | Stop reason: HOSPADM

## 2023-12-15 RX ORDER — ONDANSETRON 2 MG/ML
8 INJECTION INTRAMUSCULAR; INTRAVENOUS ONCE
Status: COMPLETED | OUTPATIENT
Start: 2023-12-15 | End: 2023-12-15

## 2023-12-15 RX ORDER — EPINEPHRINE 1 MG/ML
0.3 INJECTION, SOLUTION, CONCENTRATE INTRAVENOUS PRN
Status: DISCONTINUED | OUTPATIENT
Start: 2023-12-15 | End: 2023-12-16 | Stop reason: HOSPADM

## 2023-12-15 RX ORDER — MEPERIDINE HYDROCHLORIDE 25 MG/ML
12.5 INJECTION INTRAMUSCULAR; INTRAVENOUS; SUBCUTANEOUS PRN
Status: DISCONTINUED | OUTPATIENT
Start: 2023-12-15 | End: 2023-12-16 | Stop reason: HOSPADM

## 2023-12-15 RX ORDER — SODIUM CHLORIDE 0.9 % (FLUSH) 0.9 %
5-40 SYRINGE (ML) INJECTION PRN
Status: DISCONTINUED | OUTPATIENT
Start: 2023-12-15 | End: 2023-12-16 | Stop reason: HOSPADM

## 2023-12-15 RX ORDER — ALBUTEROL SULFATE 90 UG/1
4 AEROSOL, METERED RESPIRATORY (INHALATION) PRN
Status: DISCONTINUED | OUTPATIENT
Start: 2023-12-15 | End: 2023-12-16 | Stop reason: HOSPADM

## 2023-12-15 RX ADMIN — DEXAMETHASONE SODIUM PHOSPHATE 12 MG: 4 INJECTION INTRA-ARTICULAR; INTRALESIONAL; INTRAMUSCULAR; INTRAVENOUS; SOFT TISSUE at 13:15

## 2023-12-15 RX ADMIN — SODIUM CHLORIDE 200 MG: 9 INJECTION, SOLUTION INTRAVENOUS at 12:35

## 2023-12-15 RX ADMIN — SODIUM CHLORIDE 50 ML/HR: 9 INJECTION, SOLUTION INTRAVENOUS at 11:00

## 2023-12-15 RX ADMIN — FLUOROURACIL 5500 MG: 50 INJECTION, SOLUTION INTRAVENOUS at 14:52

## 2023-12-15 RX ADMIN — FOSAPREPITANT 150 MG: 150 INJECTION, POWDER, LYOPHILIZED, FOR SOLUTION INTRAVENOUS at 13:37

## 2023-12-15 RX ADMIN — ONDANSETRON 8 MG: 2 INJECTION INTRAMUSCULAR; INTRAVENOUS at 13:12

## 2023-12-15 RX ADMIN — SODIUM CHLORIDE, PRESERVATIVE FREE 10 ML: 5 INJECTION INTRAVENOUS at 10:50

## 2023-12-15 RX ADMIN — CARBOPLATIN 460 MG: 10 INJECTION INTRAVENOUS at 14:11

## 2023-12-15 NOTE — PROGRESS NOTES
Arrived to the [address] [place] [address]. labs completed and reviewed. Itzel Mazarakesh completed 5FU elastomeric pump attached, clamps checked x 2 by  Riddhi KURTZ rN. Patient tolerated well. Any issues or concerns during appointment: none. Patient aware of next infusion appointment on 12/19 at 4:15pm.  Patient instructed to call provider with temperature of 100.4 or greater or nausea/vomiting/ diarrhea or pain not controlled by medications  Discharged via [facility].

## 2023-12-15 NOTE — PROGRESS NOTES
's visit with Mr. Jannie Rockwell, preferred name Micki Jacques, and his wife Joy Martins. Conveyed care and concern and offered spiritual/emotional support.      [address], [address] [facility] Certified

## 2023-12-19 ENCOUNTER — HOSPITAL ENCOUNTER (OUTPATIENT)
Dept: INFUSION THERAPY | Age: 78
Setting detail: INFUSION SERIES
Discharge: HOME | End: 2023-12-19
Payer: MEDICARE

## 2023-12-19 VITALS
RESPIRATION RATE: 16 BRPM | OXYGEN SATURATION: 97 % | DIASTOLIC BLOOD PRESSURE: 71 MMHG | SYSTOLIC BLOOD PRESSURE: 120 MMHG | HEART RATE: 90 BPM | TEMPERATURE: 98.4 F

## 2023-12-19 DIAGNOSIS — C32.1 PRIMARY SQUAMOUS CELL CARCINOMA OF SUPRAGLOTTIS (HCC): ICD-10-CM

## 2023-12-19 DIAGNOSIS — C10.9 SQUAMOUS CELL CARCINOMA OF OROPHARYNX (HCC): Primary | ICD-10-CM

## 2023-12-19 PROCEDURE — 2580000003 HC RX 258: Performed by: INTERNAL MEDICINE

## 2023-12-19 PROCEDURE — 96523 IRRIG DRUG DELIVERY DEVICE: CPT

## 2023-12-19 RX ORDER — SODIUM CHLORIDE 9 MG/ML
25 INJECTION, SOLUTION INTRAVENOUS PRN
OUTPATIENT
Start: 2023-12-19

## 2023-12-19 RX ORDER — SODIUM CHLORIDE 0.9 % (FLUSH) 0.9 %
5-40 SYRINGE (ML) INJECTION PRN
OUTPATIENT
Start: 2023-12-19

## 2023-12-19 RX ORDER — SODIUM CHLORIDE 0.9 % (FLUSH) 0.9 %
5-40 SYRINGE (ML) INJECTION PRN
Status: DISCONTINUED | OUTPATIENT
Start: 2023-12-19 | End: 2023-12-20 | Stop reason: HOSPADM

## 2023-12-19 RX ORDER — HEPARIN 100 UNIT/ML
500 SYRINGE INTRAVENOUS PRN
OUTPATIENT
Start: 2023-12-19

## 2023-12-19 RX ADMIN — SODIUM CHLORIDE, PRESERVATIVE FREE 10 ML: 5 INJECTION INTRAVENOUS at 15:26

## 2023-12-19 NOTE — PROGRESS NOTES
Arrived to the [address] [facility]. Adrucil elastomeric pump discontinued at 1525 and discarded into chemogator. Patient instructed to report any side affects to ordering provider. Patient tolerated well. Any issues or concerns during appointment: none. Patient aware of next infusion appointment on 1/5/24 (date) at 5 (time). Discharged in [facility].

## 2024-01-03 DIAGNOSIS — C10.8 MALIGNANT NEOPLASM OF OVERLAPPING SITES OF OROPHARYNX (HCC): ICD-10-CM

## 2024-01-03 DIAGNOSIS — E03.2 HYPOTHYROIDISM DUE TO MEDICAMENTS AND OTHER EXOGENOUS SUBSTANCES: Primary | ICD-10-CM

## 2024-01-04 ENCOUNTER — HOSPITAL ENCOUNTER (OUTPATIENT)
Dept: LAB | Age: 79
Discharge: HOME | End: 2024-01-04
Payer: MEDICARE

## 2024-01-04 ENCOUNTER — OFFICE VISIT (OUTPATIENT)
Dept: ONCOLOGY | Age: 79
End: 2024-01-04

## 2024-01-04 ENCOUNTER — OFFICE VISIT (OUTPATIENT)
Dept: ONCOLOGY | Age: 79
End: 2024-01-04
Payer: MEDICARE

## 2024-01-04 VITALS
WEIGHT: 170.2 LBS | DIASTOLIC BLOOD PRESSURE: 63 MMHG | RESPIRATION RATE: 18 BRPM | TEMPERATURE: 97.9 F | SYSTOLIC BLOOD PRESSURE: 107 MMHG | HEART RATE: 92 BPM | HEIGHT: 71 IN | OXYGEN SATURATION: 97 % | BODY MASS INDEX: 23.83 KG/M2

## 2024-01-04 DIAGNOSIS — R13.12 DYSPHAGIA, OROPHARYNGEAL PHASE: ICD-10-CM

## 2024-01-04 DIAGNOSIS — E03.2 HYPOTHYROIDISM DUE TO MEDICAMENTS AND OTHER EXOGENOUS SUBSTANCES: ICD-10-CM

## 2024-01-04 DIAGNOSIS — C32.9 KERATINIZING SQUAMOUS CELL CARCINOMA OF LARYNX (HCC): ICD-10-CM

## 2024-01-04 DIAGNOSIS — R05.2 SUBACUTE COUGH: ICD-10-CM

## 2024-01-04 DIAGNOSIS — J69.0 ASPIRATION PNEUMONIA, UNSPECIFIED ASPIRATION PNEUMONIA TYPE, UNSPECIFIED LATERALITY, UNSPECIFIED PART OF LUNG (HCC): ICD-10-CM

## 2024-01-04 DIAGNOSIS — R53.83 FATIGUE DUE TO TREATMENT: ICD-10-CM

## 2024-01-04 DIAGNOSIS — Z00.8 NUTRITIONAL ASSESSMENT: ICD-10-CM

## 2024-01-04 DIAGNOSIS — Z78.9 ON ENTERAL NUTRITION: Primary | ICD-10-CM

## 2024-01-04 DIAGNOSIS — C10.8 MALIGNANT NEOPLASM OF OVERLAPPING SITES OF OROPHARYNX (HCC): ICD-10-CM

## 2024-01-04 DIAGNOSIS — Z79.899 HIGH RISK MEDICATION USE: ICD-10-CM

## 2024-01-04 DIAGNOSIS — C15.9 MALIGNANT NEOPLASM OF ESOPHAGUS, UNSPECIFIED LOCATION (HCC): Primary | ICD-10-CM

## 2024-01-04 LAB
ALBUMIN SERPL-MCNC: 2.9 G/DL (ref 3.2–4.6)
ALBUMIN/GLOB SERPL: 0.7 (ref 0.4–1.6)
ALP SERPL-CCNC: 68 U/L (ref 50–136)
ALT SERPL-CCNC: 22 U/L (ref 12–65)
ANION GAP SERPL CALC-SCNC: 7 MMOL/L (ref 2–11)
AST SERPL-CCNC: 20 U/L (ref 15–37)
BASOPHILS # BLD: 0 K/UL (ref 0–0.2)
BASOPHILS NFR BLD: 0 % (ref 0–2)
BILIRUB SERPL-MCNC: 0.3 MG/DL (ref 0.2–1.1)
BUN SERPL-MCNC: 22 MG/DL (ref 8–23)
CALCIUM SERPL-MCNC: 9 MG/DL (ref 8.3–10.4)
CHLORIDE SERPL-SCNC: 104 MMOL/L (ref 103–113)
CO2 SERPL-SCNC: 27 MMOL/L (ref 21–32)
CREAT SERPL-MCNC: 0.6 MG/DL (ref 0.8–1.5)
DIFFERENTIAL METHOD BLD: ABNORMAL
EOSINOPHIL # BLD: 0.2 K/UL (ref 0–0.8)
EOSINOPHIL NFR BLD: 5 % (ref 0.5–7.8)
ERYTHROCYTE [DISTWIDTH] IN BLOOD BY AUTOMATED COUNT: 15.6 % (ref 11.9–14.6)
GLOBULIN SER CALC-MCNC: 4 G/DL (ref 2.8–4.5)
GLUCOSE SERPL-MCNC: 136 MG/DL (ref 65–100)
HCT VFR BLD AUTO: 28.3 % (ref 41.1–50.3)
HGB BLD-MCNC: 9.2 G/DL (ref 13.6–17.2)
IMM GRANULOCYTES # BLD AUTO: 0 K/UL (ref 0–0.5)
IMM GRANULOCYTES NFR BLD AUTO: 1 % (ref 0–5)
LYMPHOCYTES # BLD: 0.5 K/UL (ref 0.5–4.6)
LYMPHOCYTES NFR BLD: 11 % (ref 13–44)
MAGNESIUM SERPL-MCNC: 2.1 MG/DL (ref 1.8–2.4)
MCH RBC QN AUTO: 34.1 PG (ref 26.1–32.9)
MCHC RBC AUTO-ENTMCNC: 32.5 G/DL (ref 31.4–35)
MCV RBC AUTO: 104.8 FL (ref 82–102)
MONOCYTES # BLD: 1.1 K/UL (ref 0.1–1.3)
MONOCYTES NFR BLD: 24 % (ref 4–12)
NEUTS SEG # BLD: 2.7 K/UL (ref 1.7–8.2)
NEUTS SEG NFR BLD: 59 % (ref 43–78)
NRBC # BLD: 0 K/UL (ref 0–0.2)
PLATELET # BLD AUTO: 155 K/UL (ref 150–450)
PMV BLD AUTO: 10.3 FL (ref 9.4–12.3)
POTASSIUM SERPL-SCNC: 3.9 MMOL/L (ref 3.5–5.1)
PROT SERPL-MCNC: 6.9 G/DL (ref 6.3–8.2)
RBC # BLD AUTO: 2.7 M/UL (ref 4.23–5.6)
SODIUM SERPL-SCNC: 138 MMOL/L (ref 136–146)
TSH, 3RD GENERATION: 1.21 UIU/ML (ref 0.36–3.74)
WBC # BLD AUTO: 4.5 K/UL (ref 4.3–11.1)

## 2024-01-04 PROCEDURE — 80053 COMPREHEN METABOLIC PANEL: CPT

## 2024-01-04 PROCEDURE — 1123F ACP DISCUSS/DSCN MKR DOCD: CPT | Performed by: NURSE PRACTITIONER

## 2024-01-04 PROCEDURE — G8484 FLU IMMUNIZE NO ADMIN: HCPCS | Performed by: NURSE PRACTITIONER

## 2024-01-04 PROCEDURE — G8427 DOCREV CUR MEDS BY ELIG CLIN: HCPCS | Performed by: NURSE PRACTITIONER

## 2024-01-04 PROCEDURE — 85025 COMPLETE CBC W/AUTO DIFF WBC: CPT

## 2024-01-04 PROCEDURE — 2580000003 HC RX 258: Performed by: INTERNAL MEDICINE

## 2024-01-04 PROCEDURE — 1036F TOBACCO NON-USER: CPT | Performed by: NURSE PRACTITIONER

## 2024-01-04 PROCEDURE — 84443 ASSAY THYROID STIM HORMONE: CPT

## 2024-01-04 PROCEDURE — 36591 DRAW BLOOD OFF VENOUS DEVICE: CPT

## 2024-01-04 PROCEDURE — 83735 ASSAY OF MAGNESIUM: CPT

## 2024-01-04 PROCEDURE — G8420 CALC BMI NORM PARAMETERS: HCPCS | Performed by: NURSE PRACTITIONER

## 2024-01-04 PROCEDURE — 99214 OFFICE O/P EST MOD 30 MIN: CPT | Performed by: NURSE PRACTITIONER

## 2024-01-04 RX ORDER — AMOXICILLIN AND CLAVULANATE POTASSIUM 600; 42.9 MG/5ML; MG/5ML
875 POWDER, FOR SUSPENSION ORAL 2 TIMES DAILY
Qty: 146 ML | Refills: 0 | Status: SHIPPED | OUTPATIENT
Start: 2024-01-04 | End: 2024-01-14

## 2024-01-04 RX ORDER — SODIUM CHLORIDE 9 MG/ML
5-250 INJECTION, SOLUTION INTRAVENOUS PRN
OUTPATIENT
Start: 2024-01-08

## 2024-01-04 RX ORDER — HEPARIN 100 UNIT/ML
500 SYRINGE INTRAVENOUS PRN
OUTPATIENT
Start: 2024-01-08

## 2024-01-04 RX ORDER — EPINEPHRINE 1 MG/ML
0.3 INJECTION, SOLUTION, CONCENTRATE INTRAVENOUS PRN
Status: CANCELLED | OUTPATIENT
Start: 2024-01-04

## 2024-01-04 RX ORDER — SODIUM CHLORIDE 0.9 % (FLUSH) 0.9 %
5-40 SYRINGE (ML) INJECTION PRN
Status: CANCELLED | OUTPATIENT
Start: 2024-01-04

## 2024-01-04 RX ORDER — ONDANSETRON 2 MG/ML
8 INJECTION INTRAMUSCULAR; INTRAVENOUS
Status: CANCELLED | OUTPATIENT
Start: 2024-01-04

## 2024-01-04 RX ORDER — HEPARIN 100 UNIT/ML
500 SYRINGE INTRAVENOUS PRN
Status: CANCELLED | OUTPATIENT
Start: 2024-01-04

## 2024-01-04 RX ORDER — ONDANSETRON 2 MG/ML
8 INJECTION INTRAMUSCULAR; INTRAVENOUS ONCE
Status: CANCELLED | OUTPATIENT
Start: 2024-01-04 | End: 2024-01-04

## 2024-01-04 RX ORDER — SODIUM CHLORIDE 9 MG/ML
INJECTION, SOLUTION INTRAVENOUS CONTINUOUS
Status: CANCELLED | OUTPATIENT
Start: 2024-01-04

## 2024-01-04 RX ORDER — ALBUTEROL SULFATE 90 UG/1
4 AEROSOL, METERED RESPIRATORY (INHALATION) PRN
Status: CANCELLED | OUTPATIENT
Start: 2024-01-04

## 2024-01-04 RX ORDER — ACETAMINOPHEN 325 MG/1
650 TABLET ORAL
Status: CANCELLED | OUTPATIENT
Start: 2024-01-04

## 2024-01-04 RX ORDER — SODIUM CHLORIDE 0.9 % (FLUSH) 0.9 %
5-40 SYRINGE (ML) INJECTION PRN
OUTPATIENT
Start: 2024-01-08

## 2024-01-04 RX ORDER — DIPHENHYDRAMINE HYDROCHLORIDE 50 MG/ML
50 INJECTION INTRAMUSCULAR; INTRAVENOUS
Status: CANCELLED | OUTPATIENT
Start: 2024-01-04

## 2024-01-04 RX ORDER — MEPERIDINE HYDROCHLORIDE 25 MG/ML
12.5 INJECTION INTRAMUSCULAR; INTRAVENOUS; SUBCUTANEOUS PRN
Status: CANCELLED | OUTPATIENT
Start: 2024-01-04

## 2024-01-04 RX ORDER — SODIUM CHLORIDE 0.9 % (FLUSH) 0.9 %
5-40 SYRINGE (ML) INJECTION PRN
Status: ACTIVE | OUTPATIENT
Start: 2024-01-04

## 2024-01-04 RX ORDER — SODIUM CHLORIDE 9 MG/ML
5-250 INJECTION, SOLUTION INTRAVENOUS PRN
Status: CANCELLED | OUTPATIENT
Start: 2024-01-04

## 2024-01-04 RX ADMIN — SODIUM CHLORIDE, PRESERVATIVE FREE 10 ML: 5 INJECTION INTRAVENOUS at 09:59

## 2024-01-04 ASSESSMENT — PATIENT HEALTH QUESTIONNAIRE - PHQ9
SUM OF ALL RESPONSES TO PHQ9 QUESTIONS 1 & 2: 0
SUM OF ALL RESPONSES TO PHQ QUESTIONS 1-9: 0
1. LITTLE INTEREST OR PLEASURE IN DOING THINGS: 0
SUM OF ALL RESPONSES TO PHQ QUESTIONS 1-9: 0
2. FEELING DOWN, DEPRESSED OR HOPELESS: 0

## 2024-01-04 NOTE — PROGRESS NOTES
will like to choose palliative systemic therapy moving forward.  He is also open to radiation however unsure if prior XRT and radionecrosis rules him out.  We will move forward as below:    11/16/23: NP note: He is here with wife for follow up and C4 5FU/Carbo/Keytruda. He is doing great. His strength and stamina are improving with hard work. His weight is stable and RD following - tube feeds are adequate. He has no nausea or vomiting. Mucositis improved with Decadron mouthwash. He had severe constipation with last cycle and will go ahead and start Miralax today.  There is no shortness of breath or edema. Chronic cough stable.  He denies any neuropathy. No pain. No rash or itching. No bleeding. No fevers or infectious symptoms. Denies any fevers or infectious symptoms.  Labs reviewed and adequate to proceed. He will need restaging prior to cycle 5. Call with any fevers, uncontrolled side effects from treatment or any other worrisome/concerning symptoms. Follow up in 3 weeks for cycle 5 or sooner if needed.      12/7/2023: Here for cycle 5 therapy.  Clinically feels neck mass is smaller, PET scan final report pending however on personal review of imaging consistent with responding disease.  Question uptake in the right lung, as possible aspiration pneumonia.  Patient himself does admit to increased cough with phlegm however denies any fevers.  Recently completed 5 days of Levaquin.  Will complete another 7 days of Augmentin.  Will defer next cycle by week.  Will also dose reduced moving forward.  NGS studies reviewed showing PD-L1 status is positive with CPS score of 10.  ERBB2 variation of undetermined significance noted.  BCR AA to pathogenic variation seen.  Will dose reduced cycle 5 and 6 moving forward.  Given worsening anemia, will get CBC 1 week and to cycle 5    1/04/2024: Here for cycle 6 5-FU, carboplatin, keytruday. Cycle 5 was dose reduced as will cycle 6. He had aspiration pneumonia on PET at last visit  No

## 2024-01-04 NOTE — PROGRESS NOTES
Patient arrived to port lab in wheelchair for port access and lab draw   Port accessed and labs drawn per protocol   Port flushed and de-accessed  Patient discharged from port lab in wheelchair.

## 2024-01-04 NOTE — PATIENT INSTRUCTIONS
if you are experiencing any of the above symptoms.    Patient has My Chart.  My Chart log in information explained on the after visit summary printout at the check-out desk.    Loraine Honeycutt RD, , LD  Outpatient Oncology Dietitian  Head and Neck Tumor Navigator  [phone]  [email or web address]

## 2024-01-05 ENCOUNTER — HOSPITAL ENCOUNTER (OUTPATIENT)
Dept: INFUSION THERAPY | Age: 79
Discharge: HOME | End: 2024-01-05
Payer: MEDICARE

## 2024-01-05 VITALS
SYSTOLIC BLOOD PRESSURE: 96 MMHG | DIASTOLIC BLOOD PRESSURE: 59 MMHG | RESPIRATION RATE: 16 BRPM | HEART RATE: 85 BPM | WEIGHT: 155 LBS | BODY MASS INDEX: 21.62 KG/M2 | TEMPERATURE: 98.2 F | OXYGEN SATURATION: 95 %

## 2024-01-05 DIAGNOSIS — E03.2 HYPOTHYROIDISM DUE TO MEDICAMENTS AND OTHER EXOGENOUS SUBSTANCES: ICD-10-CM

## 2024-01-05 DIAGNOSIS — C32.9 KERATINIZING SQUAMOUS CELL CARCINOMA OF LARYNX (HCC): Primary | ICD-10-CM

## 2024-01-05 PROCEDURE — G0498 CHEMO EXTEND IV INFUS W/PUMP: HCPCS

## 2024-01-05 PROCEDURE — 6360000002 HC RX W HCPCS: Performed by: NURSE PRACTITIONER

## 2024-01-05 PROCEDURE — 96375 TX/PRO/DX INJ NEW DRUG ADDON: CPT

## 2024-01-05 PROCEDURE — 96367 TX/PROPH/DG ADDL SEQ IV INF: CPT

## 2024-01-05 PROCEDURE — 96413 CHEMO IV INFUSION 1 HR: CPT

## 2024-01-05 PROCEDURE — 2580000003 HC RX 258: Performed by: NURSE PRACTITIONER

## 2024-01-05 PROCEDURE — 96417 CHEMO IV INFUS EACH ADDL SEQ: CPT

## 2024-01-05 RX ORDER — EPINEPHRINE 1 MG/ML
0.3 INJECTION, SOLUTION, CONCENTRATE INTRAVENOUS PRN
Status: DISCONTINUED | OUTPATIENT
Start: 2024-01-05 | End: 2024-01-06 | Stop reason: HOSPADM

## 2024-01-05 RX ORDER — ACETAMINOPHEN 325 MG/1
650 TABLET ORAL
Status: DISCONTINUED | OUTPATIENT
Start: 2024-01-05 | End: 2024-01-06 | Stop reason: HOSPADM

## 2024-01-05 RX ORDER — DIPHENHYDRAMINE HYDROCHLORIDE 50 MG/ML
50 INJECTION INTRAMUSCULAR; INTRAVENOUS
Status: DISCONTINUED | OUTPATIENT
Start: 2024-01-05 | End: 2024-01-06 | Stop reason: HOSPADM

## 2024-01-05 RX ORDER — MEPERIDINE HYDROCHLORIDE 25 MG/ML
12.5 INJECTION INTRAMUSCULAR; INTRAVENOUS; SUBCUTANEOUS PRN
Status: DISCONTINUED | OUTPATIENT
Start: 2024-01-05 | End: 2024-01-06 | Stop reason: HOSPADM

## 2024-01-05 RX ORDER — ONDANSETRON 2 MG/ML
8 INJECTION INTRAMUSCULAR; INTRAVENOUS ONCE
Status: COMPLETED | OUTPATIENT
Start: 2024-01-05 | End: 2024-01-05

## 2024-01-05 RX ORDER — ALBUTEROL SULFATE 90 UG/1
4 AEROSOL, METERED RESPIRATORY (INHALATION) PRN
Status: DISCONTINUED | OUTPATIENT
Start: 2024-01-05 | End: 2024-01-06 | Stop reason: HOSPADM

## 2024-01-05 RX ORDER — SODIUM CHLORIDE 9 MG/ML
5-250 INJECTION, SOLUTION INTRAVENOUS PRN
Status: DISCONTINUED | OUTPATIENT
Start: 2024-01-05 | End: 2024-01-06 | Stop reason: HOSPADM

## 2024-01-05 RX ORDER — ONDANSETRON 2 MG/ML
8 INJECTION INTRAMUSCULAR; INTRAVENOUS
Status: DISCONTINUED | OUTPATIENT
Start: 2024-01-05 | End: 2024-01-06 | Stop reason: HOSPADM

## 2024-01-05 RX ORDER — SODIUM CHLORIDE 0.9 % (FLUSH) 0.9 %
5-40 SYRINGE (ML) INJECTION PRN
Status: DISCONTINUED | OUTPATIENT
Start: 2024-01-05 | End: 2024-01-06 | Stop reason: HOSPADM

## 2024-01-05 RX ORDER — SODIUM CHLORIDE 9 MG/ML
INJECTION, SOLUTION INTRAVENOUS CONTINUOUS
Status: DISCONTINUED | OUTPATIENT
Start: 2024-01-05 | End: 2024-01-06 | Stop reason: HOSPADM

## 2024-01-05 RX ADMIN — DEXAMETHASONE SODIUM PHOSPHATE 12 MG: 4 INJECTION INTRA-ARTICULAR; INTRALESIONAL; INTRAMUSCULAR; INTRAVENOUS; SOFT TISSUE at 10:37

## 2024-01-05 RX ADMIN — SODIUM CHLORIDE, PRESERVATIVE FREE 10 ML: 5 INJECTION INTRAVENOUS at 09:45

## 2024-01-05 RX ADMIN — FOSAPREPITANT 150 MG: 150 INJECTION, POWDER, LYOPHILIZED, FOR SOLUTION INTRAVENOUS at 11:03

## 2024-01-05 RX ADMIN — SODIUM CHLORIDE 200 MG: 9 INJECTION, SOLUTION INTRAVENOUS at 10:01

## 2024-01-05 RX ADMIN — CARBOPLATIN 450 MG: 600 INJECTION, SOLUTION INTRAVENOUS at 11:32

## 2024-01-05 RX ADMIN — SODIUM CHLORIDE 50 ML/HR: 9 INJECTION, SOLUTION INTRAVENOUS at 09:45

## 2024-01-05 RX ADMIN — ONDANSETRON 8 MG: 2 INJECTION INTRAMUSCULAR; INTRAVENOUS at 10:37

## 2024-01-05 RX ADMIN — FLUOROURACIL 5500 MG: 50 INJECTION, SOLUTION INTRAVENOUS at 12:10

## 2024-01-05 NOTE — PROGRESS NOTES
Arrived to the Infusion Center. Keytruda/Carbo/5fu completed. Patient tolerated well.   Any issues or concerns during appointment: no  Patient aware of next infusion appointment on 1/9/2024 (date) at 1615 (time).  Patient aware of next lab and [facility] office visit on 1/25/2024 (date) at 0945 (time).  Patient instructed to call provider with temperature of 100.4 or greater or nausea/vomiting/ diarrhea or pain not controlled by medications  Discharged via wheelchair w/family.

## 2024-01-05 NOTE — PROGRESS NOTES
Nutrition F/U:  Assessment:  Pt seen during office visit w/ NP, receiving his 6th and final dose of chemo + Keytruda today, plan to transition to single agent Keytruda q 3 weeks moving forward.  Pt has tolerated chemo well overall, is having increased coughing w/ suspected recurrent aspiration PNA - another round of Augmentin prescribed today by NP and advised pt to stop trying to eat/drink po until he can be evaluated by his home health SLP, MBSS ordered - unfortunately first available appointment isn't until (2/20).  Pt has follow up with Dr. Cortez on (1/18) regarding his trach, he has been able to tolerate capping of his trach all day long, but not at night.  Pt has a lot of questions about whether or not his trach is permanent, and would like to speak to Dr. Artis at [facility] regarding this matter since he referred him to us originally for treatment - reached out to navigator in [place] and pt is scheduled to see Dr. Artis on (1/22).  Pt remains TF dependent for 100% of his estimated nutrition needs, bolus feeding a mix of Isosource 1.5 and Boost VHC daily.  Nutrition related lab values WNL.  Current BW: 155# - recorded weight on (1/4) was inaccurate and did not account for weight of leg braces.      Intervention:  1. NPO until he can be evaluated by home health SLP - receiving home health from [facility].   2. Continue w/ current TF regimen  3. Augmentin (7.3 mL) BID x 10 days prescribed today by NP  4. MBSS ordered - scheduled (2/20); will see if this can be completed when pt sees Dr. Artis in [place] on (1/22).  Follow up with Dr. Cortez on (1/18) regarding trach.   5. Transition to single agent Keytruda in 3 weeks, continue q 3 week dosing.     Monitoring/Evaluation:  1. RD to follow up during next office visit - follow up wt status, tolerance/intake of TF, symptom management, SLP recommendations regarding po diet.      Loraine Honeycutt RD, , LD  [phone]

## 2024-01-09 ENCOUNTER — HOSPITAL ENCOUNTER (OUTPATIENT)
Dept: INFUSION THERAPY | Age: 79
Discharge: HOME | End: 2024-01-09
Payer: MEDICARE

## 2024-01-09 VITALS
OXYGEN SATURATION: 95 % | SYSTOLIC BLOOD PRESSURE: 108 MMHG | RESPIRATION RATE: 16 BRPM | TEMPERATURE: 98.3 F | DIASTOLIC BLOOD PRESSURE: 70 MMHG | HEART RATE: 95 BPM

## 2024-01-09 PROCEDURE — 96523 IRRIG DRUG DELIVERY DEVICE: CPT

## 2024-01-09 PROCEDURE — 2580000003 HC RX 258: Performed by: INTERNAL MEDICINE

## 2024-01-09 RX ORDER — SODIUM CHLORIDE 0.9 % (FLUSH) 0.9 %
5-40 SYRINGE (ML) INJECTION PRN
Status: DISCONTINUED | OUTPATIENT
Start: 2024-01-09 | End: 2024-01-10 | Stop reason: HOSPADM

## 2024-01-09 RX ADMIN — SODIUM CHLORIDE, PRESERVATIVE FREE 10 ML: 5 INJECTION INTRAVENOUS at 14:05

## 2024-01-09 NOTE — PROGRESS NOTES
Ph call to pt verbal understanding that prescription was sent to Pharmacy and confirmed by pharmacy.   Physical Therapy  PHYSICAL THERAPY DAILY NOTE  Time In:  858 AM  Time Out:  1001 AM  Total Treatment Time:  (P) 63 Minutes  Pt. Seen for: AM, Gait Training, Therapeutic Exercise, and Transfer Training     Subjective: Patient had no complaints. Objective:  Precautions: Falls, Poor Safety Awareness, and Impulsive     GROSS ASSESSMENT Daily Assessment           COGNITION Daily Assessment    intact       BED/MAT MOBILITY Daily Assessment    Rolling Right:   Rolling Left:   Supine to Sit: Supervision/Standby Assist  Sit to Supine: Supervision/Standby Assist       TRANSFERS Daily Assessment    Sit to Stand: Min A  Stand to Sit: Min A  Transfer Type: Lateral Pivot  Transfer Assistance: Supervision/Standby Assist and after chair set up  Car Transfers: NT         GAIT Daily Assessment   Attempted 3 wheeled rollator that patient s family brought from home. Once he stood he stated ' I dont feel comfortable walking with this' . We then sat down and ambulated with crutches. We recommended not to use rollator but wanted him to fail to realize himself. Wife spoken to yesterday how rolling walker would be safer than crutches and she stated that she would get him one. Amount of Assistance: Min A  Distance (ft): 60  Assistive Device: Crutches and Gait Belt  Surface: Level Surface       STEPS/STAIRS Daily Assessment   Patient has his own way ambulating stairs. He goes up sideways. He had good balance. Once down steps patient did state that he use to be able to do stairs better. He did agree that his son will build ramp at home.  Steps Ambulated:  4  Level of Assistance:  Min A and Mod A  Railing: Left rail and crutch on right  Assistive Device: Crutches and Gait Belt       BALANCE Daily Assessment    Static Sitting:   Dynamic Sitting:   Static Standing:   Dynamic Standing:        WHEELCHAIR MOBILITY Daily Assessment    Able to Propel (ft):   Assistance:   Surface:   Wheelchair Set-up:        LOWER EXTREMITY EXERCISES Daily

## 2024-01-09 NOTE — PROGRESS NOTES
Arrived to the Infusion Center.  DC pump completed. Patient tolerated well.   Any issues or concerns during appointment: none.  Patient aware of next infusion appointment on 1/26 at 10am.  Patient aware of next lab and [facility] office visit on 1/25 (date) at 9:45am.  Patient instructed to call provider with temperature of 100.4 or greater or nausea/vomiting/ diarrhea or pain not controlled by medications  Discharged via WC.

## 2024-01-14 NOTE — PROGRESS NOTES
Jesus Laguna (:  1945) is a 78 y.o. male,Established patient, here for evaluation of the following chief complaint(s):  Follow-up, Cancer, and Medication Refill         ASSESSMENT/PLAN:  1. Laryngeal cancer (HCC)  2. History of tongue cancer  3. Tracheostomy status (HCC)  4. 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  Status post partial glossectomy with tonsillectomy, neck dissection and radial forearm free flap to left oral cavity on 8/3/2010  Pathology with negative margins, evidence of LVI, PNI and extracapsular spread  Completed adjuvant radiation therapy in early   Positive PET scan on 2023  Direct laryngoscopy with biopsy on 2023 confirmed invasive moderately differentiated squamous cell carcinoma with p16 stain negative for HPV  Flexible laryngoscopy with ENT on 2023 with large base of tongue/epiglottic tumor that sits above the glottis with slight ball valving, still moving air appropriately  Status postgastrostomy tube placement on 2023 and tracheostomy on 2023  Status post PICC line on 9/15/2023, subsequently starting chemotherapy with C1 carbo/FEU  Status post chest port placement on 10/19/2023  S/p chemotherapy  Continue management per ENT and oncology.  Currently on trazodone, famotidine and mirtazapine, refills provided (suspect mirtazapine is for appetite stimulation, famotidine to minimize reflux to intern minimize pharyngeal irritation, trazodone likely to assist with sleep)    - traZODone (DESYREL) 50 MG tablet; 1 tablet by Per G Tube route nightly  Dispense: 90 tablet; Refill: 1  - famotidine (PEPCID) 40 MG/5ML suspension; 5 mLs by Per G Tube route daily  Dispense: 150 mL; Refill: 3  - mirtazapine (REMERON) 7.5 MG tablet; 1 tablet by Per G Tube route nightly  Dispense: 90 tablet; Refill: 1    5. Macrocytic anemia  Suspect multifactorial in setting of underlying malignancy and chemotherapy  Currently on iron

## 2024-01-18 ENCOUNTER — OFFICE VISIT (OUTPATIENT)
Dept: ENT CLINIC | Age: 79
End: 2024-01-18
Payer: MEDICARE

## 2024-01-18 VITALS — HEIGHT: 71 IN | BODY MASS INDEX: 23.38 KG/M2 | WEIGHT: 167 LBS

## 2024-01-18 DIAGNOSIS — R13.14 PHARYNGOESOPHAGEAL DYSPHAGIA: ICD-10-CM

## 2024-01-18 DIAGNOSIS — Z43.0 TRACHEOSTOMY CARE (HCC): ICD-10-CM

## 2024-01-18 DIAGNOSIS — C10.9 SQUAMOUS CELL CARCINOMA OF OROPHARYNX (HCC): Primary | ICD-10-CM

## 2024-01-18 PROCEDURE — 1123F ACP DISCUSS/DSCN MKR DOCD: CPT | Performed by: STUDENT IN AN ORGANIZED HEALTH CARE EDUCATION/TRAINING PROGRAM

## 2024-01-18 PROCEDURE — 99213 OFFICE O/P EST LOW 20 MIN: CPT | Performed by: STUDENT IN AN ORGANIZED HEALTH CARE EDUCATION/TRAINING PROGRAM

## 2024-01-18 PROCEDURE — G8484 FLU IMMUNIZE NO ADMIN: HCPCS | Performed by: STUDENT IN AN ORGANIZED HEALTH CARE EDUCATION/TRAINING PROGRAM

## 2024-01-18 PROCEDURE — G8420 CALC BMI NORM PARAMETERS: HCPCS | Performed by: STUDENT IN AN ORGANIZED HEALTH CARE EDUCATION/TRAINING PROGRAM

## 2024-01-18 PROCEDURE — 31575 DIAGNOSTIC LARYNGOSCOPY: CPT | Performed by: STUDENT IN AN ORGANIZED HEALTH CARE EDUCATION/TRAINING PROGRAM

## 2024-01-18 PROCEDURE — G8427 DOCREV CUR MEDS BY ELIG CLIN: HCPCS | Performed by: STUDENT IN AN ORGANIZED HEALTH CARE EDUCATION/TRAINING PROGRAM

## 2024-01-18 PROCEDURE — 1036F TOBACCO NON-USER: CPT | Performed by: STUDENT IN AN ORGANIZED HEALTH CARE EDUCATION/TRAINING PROGRAM

## 2024-01-18 ASSESSMENT — ENCOUNTER SYMPTOMS
EYE ITCHING: 0
COUGH: 1
EYE REDNESS: 0
SHORTNESS OF BREATH: 0
VOMITING: 0

## 2024-01-18 NOTE — PROGRESS NOTES
May ENT Office Note    Patient: Jesus Laguna  MRN: [id number]  : 1945  Gender:  male  Vital Signs: Ht 1.803 m (5' 11\")   Wt 75.8 kg (167 lb)   BMI 23.29 kg/m²   Date: 2024    CC:   Chief Complaint   Patient presents with    Follow-up     Patient presents today for a 1 month follow up.        HPI:  Jesus Laguna is a 78 y.o. 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   Underwent a partial glossectomy with tonsillectomy, neck dissection with a radial forearm free flap to left oral cavity on 8/3/2010. According to the referral the margins were close but negative with evidence of lymphovascular invasion as well as perineural invasion and extracapsular spread. He underwent adjuvant radiation treatment which was completed at the end of /early . Patient followed up with Dr. Boyce for oncologic surveillance. Patient developed osteoradionecrosis of the left jaw and was treated by Dr. Amaury Omalley. He underwent HBO after oral surgery intervention. The last PET scan was 2012 which demonstrated some uptake in the left jaw which was felt to be inflammatory. Patient was released from Dr. Boyce clinic at the 5 year carlee in  with no signs of recurrence. Two years after his treatment he had several teeth removed on the left lower jaw. He underwent 10 treatments of HBO due to nonhealing wound at the surgical site where the teeth were removed. The mucosa healed after treatment.  Patient has done well and has been asymptomatic until about 7 weeks ago when he started experiencing increased congestion and hypersalivation. He saw his PCP and was started on a round of antibiotics for what was thought to be a sinus infection. No improvement with antibiotics. Symptoms continued and he was put on a 2nd course of antibiotics with no improvement in symptoms. On  patient woke up with left facial swelling and went to his dentist who thought he had a tooth

## 2024-01-19 ENCOUNTER — OFFICE VISIT (OUTPATIENT)
Dept: INTERNAL MEDICINE CLINIC | Facility: CLINIC | Age: 79
End: 2024-01-19

## 2024-01-19 ENCOUNTER — HOSPITAL ENCOUNTER (OUTPATIENT)
Dept: GENERAL RADIOLOGY | Age: 79
DRG: 871 | End: 2024-01-19
Payer: MEDICARE

## 2024-01-19 ENCOUNTER — APPOINTMENT (OUTPATIENT)
Dept: GENERAL RADIOLOGY | Age: 79
DRG: 871 | End: 2024-01-19
Payer: MEDICARE

## 2024-01-19 ENCOUNTER — HOSPITAL ENCOUNTER (INPATIENT)
Age: 79
LOS: 4 days | Discharge: HOME | DRG: 871 | End: 2024-01-23
Attending: EMERGENCY MEDICINE | Admitting: FAMILY MEDICINE
Payer: MEDICARE

## 2024-01-19 VITALS
OXYGEN SATURATION: 95 % | TEMPERATURE: 98.7 F | HEART RATE: 123 BPM | SYSTOLIC BLOOD PRESSURE: 116 MMHG | DIASTOLIC BLOOD PRESSURE: 60 MMHG

## 2024-01-19 DIAGNOSIS — C32.9 LARYNGEAL CANCER (HCC): Primary | ICD-10-CM

## 2024-01-19 DIAGNOSIS — R00.0 TACHYCARDIA: ICD-10-CM

## 2024-01-19 DIAGNOSIS — R06.89 ABNORMAL BREATH SOUNDS: ICD-10-CM

## 2024-01-19 DIAGNOSIS — J18.9 PNEUMONIA OF RIGHT LOWER LOBE DUE TO INFECTIOUS ORGANISM: Primary | ICD-10-CM

## 2024-01-19 DIAGNOSIS — Z85.810 HISTORY OF TONGUE CANCER: ICD-10-CM

## 2024-01-19 DIAGNOSIS — Z93.0 TRACHEOSTOMY STATUS (HCC): ICD-10-CM

## 2024-01-19 DIAGNOSIS — D53.9 MACROCYTIC ANEMIA: ICD-10-CM

## 2024-01-19 DIAGNOSIS — E78.5 DYSLIPIDEMIA: ICD-10-CM

## 2024-01-19 DIAGNOSIS — C32.9 LARYNGEAL CANCER (HCC): ICD-10-CM

## 2024-01-19 DIAGNOSIS — Z93.1 GASTROSTOMY STATUS (HCC): ICD-10-CM

## 2024-01-19 DIAGNOSIS — A41.9 SEPSIS, DUE TO UNSPECIFIED ORGANISM, UNSPECIFIED WHETHER ACUTE ORGAN DYSFUNCTION PRESENT (HCC): ICD-10-CM

## 2024-01-19 LAB
ALBUMIN SERPL-MCNC: 3.2 G/DL (ref 3.2–4.6)
ALBUMIN/GLOB SERPL: 0.8 (ref 0.4–1.6)
ALP SERPL-CCNC: 67 U/L (ref 50–136)
ALT SERPL-CCNC: 19 U/L (ref 12–65)
ANION GAP SERPL CALC-SCNC: 8 MMOL/L (ref 2–11)
APPEARANCE UR: CLEAR
AST SERPL-CCNC: 16 U/L (ref 15–37)
B PERT DNA SPEC QL NAA+PROBE: NOT DETECTED
BACTERIA URNS QL MICRO: NEGATIVE /HPF
BASOPHILS # BLD: 0 K/UL (ref 0–0.2)
BASOPHILS NFR BLD: 1 % (ref 0–2)
BILIRUB SERPL-MCNC: 0.3 MG/DL (ref 0.2–1.1)
BILIRUB UR QL: NEGATIVE
BORDETELLA PARAPERTUSSIS BY PCR: NOT DETECTED
BUN SERPL-MCNC: 33 MG/DL (ref 8–23)
C PNEUM DNA SPEC QL NAA+PROBE: NOT DETECTED
CALCIUM SERPL-MCNC: 8.6 MG/DL (ref 8.3–10.4)
CASTS URNS QL MICRO: ABNORMAL /LPF
CHLORIDE SERPL-SCNC: 101 MMOL/L (ref 103–113)
CO2 SERPL-SCNC: 25 MMOL/L (ref 21–32)
COLOR UR: ABNORMAL
CREAT SERPL-MCNC: 1 MG/DL (ref 0.8–1.5)
DIFFERENTIAL METHOD BLD: ABNORMAL
EOSINOPHIL # BLD: 0 K/UL (ref 0–0.8)
EOSINOPHIL NFR BLD: 0 % (ref 0.5–7.8)
EPI CELLS #/AREA URNS HPF: ABNORMAL /HPF
ERYTHROCYTE [DISTWIDTH] IN BLOOD BY AUTOMATED COUNT: 15.8 % (ref 11.9–14.6)
FLUAV SUBTYP SPEC NAA+PROBE: NOT DETECTED
FLUBV RNA SPEC QL NAA+PROBE: NOT DETECTED
GLOBULIN SER CALC-MCNC: 4 G/DL (ref 2.8–4.5)
GLUCOSE SERPL-MCNC: 167 MG/DL (ref 65–100)
GLUCOSE UR STRIP.AUTO-MCNC: NEGATIVE MG/DL
HADV DNA SPEC QL NAA+PROBE: NOT DETECTED
HCOV 229E RNA SPEC QL NAA+PROBE: NOT DETECTED
HCOV HKU1 RNA SPEC QL NAA+PROBE: NOT DETECTED
HCOV NL63 RNA SPEC QL NAA+PROBE: NOT DETECTED
HCOV OC43 RNA SPEC QL NAA+PROBE: NOT DETECTED
HCT VFR BLD AUTO: 28.3 % (ref 41.1–50.3)
HGB BLD-MCNC: 9.2 G/DL (ref 13.6–17.2)
HGB UR QL STRIP: NEGATIVE
HMPV RNA SPEC QL NAA+PROBE: NOT DETECTED
HPIV1 RNA SPEC QL NAA+PROBE: NOT DETECTED
HPIV2 RNA SPEC QL NAA+PROBE: NOT DETECTED
HPIV3 RNA SPEC QL NAA+PROBE: NOT DETECTED
HPIV4 RNA SPEC QL NAA+PROBE: NOT DETECTED
IMM GRANULOCYTES # BLD AUTO: 0 K/UL (ref 0–0.5)
IMM GRANULOCYTES NFR BLD AUTO: 1 % (ref 0–5)
KETONES UR QL STRIP.AUTO: ABNORMAL MG/DL
LACTATE SERPL-SCNC: 1.7 MMOL/L (ref 0.4–2)
LACTATE SERPL-SCNC: 3.3 MMOL/L (ref 0.4–2)
LEUKOCYTE ESTERASE UR QL STRIP.AUTO: ABNORMAL
LIPASE SERPL-CCNC: 70 U/L (ref 73–393)
LYMPHOCYTES # BLD: 0.3 K/UL (ref 0.5–4.6)
LYMPHOCYTES NFR BLD: 16 % (ref 13–44)
M PNEUMO DNA SPEC QL NAA+PROBE: NOT DETECTED
MCH RBC QN AUTO: 33.1 PG (ref 26.1–32.9)
MCHC RBC AUTO-ENTMCNC: 32.5 G/DL (ref 31.4–35)
MCV RBC AUTO: 101.8 FL (ref 82–102)
MONOCYTES # BLD: 0.8 K/UL (ref 0.1–1.3)
MONOCYTES NFR BLD: 38 % (ref 4–12)
MUCOUS THREADS URNS QL MICRO: 0 /LPF
NEUTS SEG # BLD: 1 K/UL (ref 1.7–8.2)
NEUTS SEG NFR BLD: 44 % (ref 43–78)
NITRITE UR QL STRIP.AUTO: NEGATIVE
NRBC # BLD: 0 K/UL (ref 0–0.2)
PH UR STRIP: 6 (ref 5–9)
PLATELET # BLD AUTO: 156 K/UL (ref 150–450)
PLATELET COMMENT: ADEQUATE
PMV BLD AUTO: 10 FL (ref 9.4–12.3)
POTASSIUM SERPL-SCNC: 3.7 MMOL/L (ref 3.5–5.1)
PROCALCITONIN SERPL-MCNC: 0.71 NG/ML (ref 0–0.49)
PROT SERPL-MCNC: 7.2 G/DL (ref 6.3–8.2)
PROT UR STRIP-MCNC: 30 MG/DL
RBC # BLD AUTO: 2.78 M/UL (ref 4.23–5.6)
RBC #/AREA URNS HPF: ABNORMAL /HPF
RBC MORPH BLD: ABNORMAL
RBC MORPH BLD: ABNORMAL
RSV RNA SPEC QL NAA+PROBE: NOT DETECTED
RV+EV RNA SPEC QL NAA+PROBE: NOT DETECTED
SARS-COV-2 RNA RESP QL NAA+PROBE: NOT DETECTED
SODIUM SERPL-SCNC: 134 MMOL/L (ref 136–146)
SP GR UR REFRACTOMETRY: 1.03 (ref 1–1.02)
URINE CULTURE IF INDICATED: ABNORMAL
UROBILINOGEN UR QL STRIP.AUTO: 1 EU/DL (ref 0.2–1)
WBC # BLD AUTO: 2.1 K/UL (ref 4.3–11.1)
WBC MORPH BLD: ABNORMAL
WBC URNS QL MICRO: ABNORMAL /HPF

## 2024-01-19 PROCEDURE — 81001 URINALYSIS AUTO W/SCOPE: CPT

## 2024-01-19 PROCEDURE — 71046 X-RAY EXAM CHEST 2 VIEWS: CPT

## 2024-01-19 PROCEDURE — 83690 ASSAY OF LIPASE: CPT

## 2024-01-19 PROCEDURE — 96375 TX/PRO/DX INJ NEW DRUG ADDON: CPT

## 2024-01-19 PROCEDURE — 6370000000 HC RX 637 (ALT 250 FOR IP): Performed by: EMERGENCY MEDICINE

## 2024-01-19 PROCEDURE — 84145 PROCALCITONIN (PCT): CPT

## 2024-01-19 PROCEDURE — 83605 ASSAY OF LACTIC ACID: CPT

## 2024-01-19 PROCEDURE — 1100000003 HC PRIVATE W/ TELEMETRY

## 2024-01-19 PROCEDURE — 80053 COMPREHEN METABOLIC PANEL: CPT

## 2024-01-19 PROCEDURE — 85025 COMPLETE CBC W/AUTO DIFF WBC: CPT

## 2024-01-19 PROCEDURE — 96365 THER/PROPH/DIAG IV INF INIT: CPT

## 2024-01-19 PROCEDURE — 87040 BLOOD CULTURE FOR BACTERIA: CPT

## 2024-01-19 PROCEDURE — 0202U NFCT DS 22 TRGT SARS-COV-2: CPT

## 2024-01-19 PROCEDURE — 99285 EMERGENCY DEPT VISIT HI MDM: CPT

## 2024-01-19 PROCEDURE — 6360000002 HC RX W HCPCS: Performed by: EMERGENCY MEDICINE

## 2024-01-19 PROCEDURE — 2580000003 HC RX 258: Performed by: EMERGENCY MEDICINE

## 2024-01-19 RX ORDER — SODIUM CHLORIDE 9 MG/ML
INJECTION, SOLUTION INTRAVENOUS PRN
Status: DISCONTINUED | OUTPATIENT
Start: 2024-01-19 | End: 2024-01-23 | Stop reason: HOSPADM

## 2024-01-19 RX ORDER — ACETAMINOPHEN 325 MG/1
650 TABLET ORAL EVERY 6 HOURS PRN
Status: DISCONTINUED | OUTPATIENT
Start: 2024-01-19 | End: 2024-01-23 | Stop reason: HOSPADM

## 2024-01-19 RX ORDER — 0.9 % SODIUM CHLORIDE 0.9 %
1000 INTRAVENOUS SOLUTION INTRAVENOUS
Status: COMPLETED | OUTPATIENT
Start: 2024-01-19 | End: 2024-01-19

## 2024-01-19 RX ORDER — FAMOTIDINE 40 MG/5ML
40 POWDER, FOR SUSPENSION ORAL DAILY
Status: DISCONTINUED | OUTPATIENT
Start: 2024-01-20 | End: 2024-01-23 | Stop reason: HOSPADM

## 2024-01-19 RX ORDER — TRAZODONE HYDROCHLORIDE 50 MG/1
50 TABLET ORAL NIGHTLY
Status: DISCONTINUED | OUTPATIENT
Start: 2024-01-19 | End: 2024-01-23 | Stop reason: HOSPADM

## 2024-01-19 RX ORDER — MIRTAZAPINE 7.5 MG/1
7.5 TABLET, FILM COATED ORAL NIGHTLY
Qty: 90 TABLET | Refills: 1 | Status: ON HOLD | OUTPATIENT
Start: 2024-01-19

## 2024-01-19 RX ORDER — TRAZODONE HYDROCHLORIDE 50 MG/1
50 TABLET ORAL NIGHTLY
Qty: 90 TABLET | Refills: 1 | Status: ON HOLD | OUTPATIENT
Start: 2024-01-19

## 2024-01-19 RX ORDER — ALBUTEROL SULFATE 2.5 MG/3ML
2.5 SOLUTION RESPIRATORY (INHALATION)
Status: DISCONTINUED | OUTPATIENT
Start: 2024-01-19 | End: 2024-01-22

## 2024-01-19 RX ORDER — FERROUS GLUCONATE 324(38)MG
324 TABLET ORAL
Status: DISCONTINUED | OUTPATIENT
Start: 2024-01-20 | End: 2024-01-22 | Stop reason: CLARIF

## 2024-01-19 RX ORDER — ONDANSETRON 4 MG/1
4 TABLET, ORALLY DISINTEGRATING ORAL EVERY 8 HOURS PRN
Status: DISCONTINUED | OUTPATIENT
Start: 2024-01-19 | End: 2024-01-23 | Stop reason: HOSPADM

## 2024-01-19 RX ORDER — POTASSIUM CHLORIDE 7.45 MG/ML
10 INJECTION INTRAVENOUS PRN
Status: DISCONTINUED | OUTPATIENT
Start: 2024-01-19 | End: 2024-01-23 | Stop reason: HOSPADM

## 2024-01-19 RX ORDER — SODIUM CHLORIDE FOR INHALATION 3 %
4 VIAL, NEBULIZER (ML) INHALATION 3 TIMES DAILY
Status: DISCONTINUED | OUTPATIENT
Start: 2024-01-19 | End: 2024-01-23 | Stop reason: HOSPADM

## 2024-01-19 RX ORDER — SODIUM CHLORIDE 0.9 % (FLUSH) 0.9 %
5-40 SYRINGE (ML) INJECTION EVERY 12 HOURS SCHEDULED
Status: DISCONTINUED | OUTPATIENT
Start: 2024-01-19 | End: 2024-01-23 | Stop reason: HOSPADM

## 2024-01-19 RX ORDER — ROSUVASTATIN CALCIUM 20 MG/1
20 TABLET, COATED ORAL NIGHTLY
Qty: 90 TABLET | Refills: 1 | Status: ON HOLD | OUTPATIENT
Start: 2024-01-19

## 2024-01-19 RX ORDER — SCOLOPAMINE TRANSDERMAL SYSTEM 1 MG/1
1 PATCH, EXTENDED RELEASE TRANSDERMAL
Status: DISCONTINUED | OUTPATIENT
Start: 2024-01-19 | End: 2024-01-23 | Stop reason: HOSPADM

## 2024-01-19 RX ORDER — MAGNESIUM HYDROXIDE/ALUMINUM HYDROXICE/SIMETHICONE 120; 1200; 1200 MG/30ML; MG/30ML; MG/30ML
30 SUSPENSION ORAL EVERY 6 HOURS PRN
Status: DISCONTINUED | OUTPATIENT
Start: 2024-01-19 | End: 2024-01-19

## 2024-01-19 RX ORDER — POLYETHYLENE GLYCOL 3350 17 G/17G
17 POWDER, FOR SOLUTION ORAL DAILY PRN
Status: DISCONTINUED | OUTPATIENT
Start: 2024-01-19 | End: 2024-01-19

## 2024-01-19 RX ORDER — POTASSIUM CHLORIDE 20 MEQ/1
40 TABLET, EXTENDED RELEASE ORAL PRN
Status: DISCONTINUED | OUTPATIENT
Start: 2024-01-19 | End: 2024-01-19

## 2024-01-19 RX ORDER — ACETAMINOPHEN 325 MG/1
650 TABLET ORAL EVERY 6 HOURS PRN
Status: DISCONTINUED | OUTPATIENT
Start: 2024-01-19 | End: 2024-01-19

## 2024-01-19 RX ORDER — ACETAMINOPHEN 160 MG/5ML
1000 SUSPENSION ORAL ONCE
Status: COMPLETED | OUTPATIENT
Start: 2024-01-19 | End: 2024-01-19

## 2024-01-19 RX ORDER — ROSUVASTATIN CALCIUM 20 MG/1
20 TABLET, COATED ORAL NIGHTLY
Status: DISCONTINUED | OUTPATIENT
Start: 2024-01-19 | End: 2024-01-23 | Stop reason: HOSPADM

## 2024-01-19 RX ORDER — ACETAMINOPHEN 500 MG
1000 TABLET ORAL
Status: DISCONTINUED | OUTPATIENT
Start: 2024-01-19 | End: 2024-01-19 | Stop reason: SDUPTHER

## 2024-01-19 RX ORDER — ACETAMINOPHEN 650 MG/1
650 SUPPOSITORY RECTAL EVERY 6 HOURS PRN
Status: DISCONTINUED | OUTPATIENT
Start: 2024-01-19 | End: 2024-01-19

## 2024-01-19 RX ORDER — MAGNESIUM SULFATE IN WATER 40 MG/ML
2000 INJECTION, SOLUTION INTRAVENOUS PRN
Status: DISCONTINUED | OUTPATIENT
Start: 2024-01-19 | End: 2024-01-23 | Stop reason: HOSPADM

## 2024-01-19 RX ORDER — ONDANSETRON 2 MG/ML
4 INJECTION INTRAMUSCULAR; INTRAVENOUS EVERY 6 HOURS PRN
Status: DISCONTINUED | OUTPATIENT
Start: 2024-01-19 | End: 2024-01-23 | Stop reason: HOSPADM

## 2024-01-19 RX ORDER — ASPIRIN 81 MG/1
81 TABLET, CHEWABLE ORAL DAILY
Status: DISCONTINUED | OUTPATIENT
Start: 2024-01-20 | End: 2024-01-23 | Stop reason: HOSPADM

## 2024-01-19 RX ORDER — FERROUS GLUCONATE 324(38)MG
324 TABLET ORAL
Qty: 90 TABLET | Refills: 1 | Status: ON HOLD | OUTPATIENT
Start: 2024-01-19

## 2024-01-19 RX ORDER — FAMOTIDINE 40 MG/5ML
40 POWDER, FOR SUSPENSION ORAL DAILY
Qty: 150 ML | Refills: 3 | Status: ON HOLD | OUTPATIENT
Start: 2024-01-19

## 2024-01-19 RX ORDER — ENOXAPARIN SODIUM 100 MG/ML
40 INJECTION SUBCUTANEOUS DAILY
Status: DISCONTINUED | OUTPATIENT
Start: 2024-01-20 | End: 2024-01-23 | Stop reason: HOSPADM

## 2024-01-19 RX ORDER — BISACODYL 10 MG
10 SUPPOSITORY, RECTAL RECTAL DAILY PRN
Status: DISCONTINUED | OUTPATIENT
Start: 2024-01-19 | End: 2024-01-23 | Stop reason: HOSPADM

## 2024-01-19 RX ORDER — ACETAMINOPHEN 650 MG/1
650 SUPPOSITORY RECTAL EVERY 6 HOURS PRN
Status: DISCONTINUED | OUTPATIENT
Start: 2024-01-19 | End: 2024-01-23 | Stop reason: HOSPADM

## 2024-01-19 RX ORDER — SODIUM CHLORIDE 0.9 % (FLUSH) 0.9 %
5-40 SYRINGE (ML) INJECTION PRN
Status: DISCONTINUED | OUTPATIENT
Start: 2024-01-19 | End: 2024-01-23 | Stop reason: HOSPADM

## 2024-01-19 RX ORDER — ONDANSETRON 4 MG/1
4 TABLET, ORALLY DISINTEGRATING ORAL EVERY 8 HOURS PRN
Status: DISCONTINUED | OUTPATIENT
Start: 2024-01-19 | End: 2024-01-19

## 2024-01-19 RX ORDER — ONDANSETRON 2 MG/ML
4 INJECTION INTRAMUSCULAR; INTRAVENOUS EVERY 6 HOURS PRN
Status: DISCONTINUED | OUTPATIENT
Start: 2024-01-19 | End: 2024-01-19

## 2024-01-19 RX ORDER — FAMOTIDINE 20 MG/1
10 TABLET, FILM COATED ORAL DAILY PRN
Status: DISCONTINUED | OUTPATIENT
Start: 2024-01-19 | End: 2024-01-19

## 2024-01-19 RX ORDER — MIRTAZAPINE 15 MG/1
7.5 TABLET, FILM COATED ORAL NIGHTLY
Status: DISCONTINUED | OUTPATIENT
Start: 2024-01-19 | End: 2024-01-23 | Stop reason: HOSPADM

## 2024-01-19 RX ADMIN — SODIUM CHLORIDE 500 MG: 9 INJECTION, SOLUTION INTRAVENOUS at 21:57

## 2024-01-19 RX ADMIN — SODIUM CHLORIDE 1000 ML: 9 INJECTION, SOLUTION INTRAVENOUS at 21:47

## 2024-01-19 RX ADMIN — SODIUM CHLORIDE 1000 ML: 9 INJECTION, SOLUTION INTRAVENOUS at 20:04

## 2024-01-19 RX ADMIN — ACETAMINOPHEN 1000 MG: 325 SUSPENSION ORAL at 21:41

## 2024-01-19 RX ADMIN — WATER 1000 MG: 1 INJECTION INTRAMUSCULAR; INTRAVENOUS; SUBCUTANEOUS at 21:37

## 2024-01-19 ASSESSMENT — LIFESTYLE VARIABLES
HOW MANY STANDARD DRINKS CONTAINING ALCOHOL DO YOU HAVE ON A TYPICAL DAY: PATIENT DOES NOT DRINK
HOW OFTEN DO YOU HAVE A DRINK CONTAINING ALCOHOL: NEVER

## 2024-01-19 ASSESSMENT — ENCOUNTER SYMPTOMS
NAUSEA: 0
SORE THROAT: 1
VOMITING: 0
ANAL BLEEDING: 0
COUGH: 1
WHEEZING: 0
SHORTNESS OF BREATH: 0
BLOOD IN STOOL: 0
CONSTIPATION: 0
ABDOMINAL PAIN: 0

## 2024-01-19 ASSESSMENT — PAIN - FUNCTIONAL ASSESSMENT
PAIN_FUNCTIONAL_ASSESSMENT: NONE - DENIES PAIN
PAIN_FUNCTIONAL_ASSESSMENT: NONE - DENIES PAIN

## 2024-01-19 NOTE — ED TRIAGE NOTES
Pt arrives seated on wheelchair with spouse with c/o low grade fever today of 100.2F. Wife reports he felt warm when she was assisting with his tube feeding. Pt denies any complaint. States she called oncologist to ask if pt could have tylenol and was told to bring pt to ER r/t checking his WBC. Hx of throat CA with six chemotherapy tx. Pt had his trach taken out yesterday. Denies having any shortness of breath, abdominal pain, n/v/d, or body aches.

## 2024-01-20 PROBLEM — D61.818 PANCYTOPENIA (HCC): Status: ACTIVE | Noted: 2024-01-20

## 2024-01-20 LAB
ALBUMIN SERPL-MCNC: 2.5 G/DL (ref 3.2–4.6)
ALBUMIN/GLOB SERPL: 0.8 (ref 0.4–1.6)
ALP SERPL-CCNC: 50 U/L (ref 50–136)
ALT SERPL-CCNC: 16 U/L (ref 12–65)
ANION GAP SERPL CALC-SCNC: 5 MMOL/L (ref 2–11)
AST SERPL-CCNC: 17 U/L (ref 15–37)
BASOPHILS # BLD: 0 K/UL (ref 0–0.2)
BASOPHILS NFR BLD: 0 % (ref 0–2)
BILIRUB DIRECT SERPL-MCNC: 0.1 MG/DL
BILIRUB SERPL-MCNC: 0.4 MG/DL (ref 0.2–1.1)
BUN SERPL-MCNC: 24 MG/DL (ref 8–23)
C. DIFFICILE TOXIN MOLECULAR: NEGATIVE
CALCIUM SERPL-MCNC: 7.9 MG/DL (ref 8.3–10.4)
CHLORIDE SERPL-SCNC: 110 MMOL/L (ref 103–113)
CO2 SERPL-SCNC: 22 MMOL/L (ref 21–32)
CREAT SERPL-MCNC: 0.5 MG/DL (ref 0.8–1.5)
DIFFERENTIAL METHOD BLD: ABNORMAL
EOSINOPHIL # BLD: 0 K/UL (ref 0–0.8)
EOSINOPHIL NFR BLD: 0 % (ref 0.5–7.8)
ERYTHROCYTE [DISTWIDTH] IN BLOOD BY AUTOMATED COUNT: 15.5 % (ref 11.9–14.6)
GLOBULIN SER CALC-MCNC: 3.3 G/DL (ref 2.8–4.5)
GLUCOSE SERPL-MCNC: 114 MG/DL (ref 65–100)
HCT VFR BLD AUTO: 23.4 % (ref 41.1–50.3)
HGB BLD-MCNC: 7.7 G/DL (ref 13.6–17.2)
IMM GRANULOCYTES # BLD AUTO: 0 K/UL (ref 0–0.5)
IMM GRANULOCYTES NFR BLD AUTO: 2 % (ref 0–5)
LYMPHOCYTES # BLD: 0.2 K/UL (ref 0.5–4.6)
LYMPHOCYTES NFR BLD: 13 % (ref 13–44)
MAGNESIUM SERPL-MCNC: 1.5 MG/DL (ref 1.8–2.4)
MCH RBC QN AUTO: 33.8 PG (ref 26.1–32.9)
MCHC RBC AUTO-ENTMCNC: 32.9 G/DL (ref 31.4–35)
MCV RBC AUTO: 102.6 FL (ref 82–102)
MONOCYTES # BLD: 0.3 K/UL (ref 0.1–1.3)
MONOCYTES NFR BLD: 21 % (ref 4–12)
MRSA DNA SPEC QL NAA+PROBE: NOT DETECTED
NEUTS SEG # BLD: 0.7 K/UL (ref 1.7–8.2)
NEUTS SEG NFR BLD: 64 % (ref 43–78)
NRBC # BLD: 0 K/UL (ref 0–0.2)
PHOSPHATE SERPL-MCNC: 1.6 MG/DL (ref 2.3–3.7)
PLATELET # BLD AUTO: 117 K/UL (ref 150–450)
PLATELET COMMENT: SLIGHT
PMV BLD AUTO: 10.2 FL (ref 9.4–12.3)
POTASSIUM SERPL-SCNC: 3.1 MMOL/L (ref 3.5–5.1)
PROT SERPL-MCNC: 5.8 G/DL (ref 6.3–8.2)
RBC # BLD AUTO: 2.28 M/UL (ref 4.23–5.6)
RBC MORPH BLD: ABNORMAL
S AUREUS CPE NOSE QL NAA+PROBE: DETECTED
SODIUM SERPL-SCNC: 137 MMOL/L (ref 136–146)
WBC # BLD AUTO: 1.2 K/UL (ref 4.3–11.1)
WBC MORPH BLD: ABNORMAL

## 2024-01-20 PROCEDURE — 2580000003 HC RX 258: Performed by: FAMILY MEDICINE

## 2024-01-20 PROCEDURE — 92526 ORAL FUNCTION THERAPY: CPT

## 2024-01-20 PROCEDURE — 6370000000 HC RX 637 (ALT 250 FOR IP): Performed by: FAMILY MEDICINE

## 2024-01-20 PROCEDURE — 6360000002 HC RX W HCPCS: Performed by: FAMILY MEDICINE

## 2024-01-20 PROCEDURE — 36415 COLL VENOUS BLD VENIPUNCTURE: CPT

## 2024-01-20 PROCEDURE — 94640 AIRWAY INHALATION TREATMENT: CPT

## 2024-01-20 PROCEDURE — 92610 EVALUATE SWALLOWING FUNCTION: CPT

## 2024-01-20 PROCEDURE — 1100000000 HC RM PRIVATE

## 2024-01-20 PROCEDURE — 87641 MR-STAPH DNA AMP PROBE: CPT

## 2024-01-20 PROCEDURE — 6370000000 HC RX 637 (ALT 250 FOR IP): Performed by: PHYSICIAN ASSISTANT

## 2024-01-20 PROCEDURE — 99222 1ST HOSP IP/OBS MODERATE 55: CPT | Performed by: INTERNAL MEDICINE

## 2024-01-20 PROCEDURE — 87493 C DIFF AMPLIFIED PROBE: CPT

## 2024-01-20 PROCEDURE — 83735 ASSAY OF MAGNESIUM: CPT

## 2024-01-20 PROCEDURE — 94760 N-INVAS EAR/PLS OXIMETRY 1: CPT

## 2024-01-20 PROCEDURE — 2500000003 HC RX 250 WO HCPCS: Performed by: STUDENT IN AN ORGANIZED HEALTH CARE EDUCATION/TRAINING PROGRAM

## 2024-01-20 PROCEDURE — 2500000003 HC RX 250 WO HCPCS: Performed by: FAMILY MEDICINE

## 2024-01-20 PROCEDURE — 2580000003 HC RX 258: Performed by: STUDENT IN AN ORGANIZED HEALTH CARE EDUCATION/TRAINING PROGRAM

## 2024-01-20 PROCEDURE — 6360000002 HC RX W HCPCS: Performed by: NURSE PRACTITIONER

## 2024-01-20 PROCEDURE — 84100 ASSAY OF PHOSPHORUS: CPT

## 2024-01-20 PROCEDURE — 94761 N-INVAS EAR/PLS OXIMETRY MLT: CPT

## 2024-01-20 PROCEDURE — 80048 BASIC METABOLIC PNL TOTAL CA: CPT

## 2024-01-20 PROCEDURE — 85025 COMPLETE CBC W/AUTO DIFF WBC: CPT

## 2024-01-20 PROCEDURE — 80076 HEPATIC FUNCTION PANEL: CPT

## 2024-01-20 PROCEDURE — 6370000000 HC RX 637 (ALT 250 FOR IP): Performed by: STUDENT IN AN ORGANIZED HEALTH CARE EDUCATION/TRAINING PROGRAM

## 2024-01-20 RX ORDER — LANOLIN ALCOHOL/MO/W.PET/CERES
100 CREAM (GRAM) TOPICAL DAILY
Status: DISCONTINUED | OUTPATIENT
Start: 2024-01-20 | End: 2024-01-23 | Stop reason: HOSPADM

## 2024-01-20 RX ORDER — LANOLIN ALCOHOL/MO/W.PET/CERES
1000 CREAM (GRAM) TOPICAL DAILY
Status: DISCONTINUED | OUTPATIENT
Start: 2024-01-20 | End: 2024-01-22

## 2024-01-20 RX ORDER — FOLIC ACID 1 MG/1
1 TABLET ORAL DAILY
Status: DISCONTINUED | OUTPATIENT
Start: 2024-01-20 | End: 2024-01-22

## 2024-01-20 RX ADMIN — ALBUTEROL SULFATE 2.5 MG: 2.5 SOLUTION RESPIRATORY (INHALATION) at 08:23

## 2024-01-20 RX ADMIN — SODIUM CHLORIDE: 9 INJECTION, SOLUTION INTRAVENOUS at 08:14

## 2024-01-20 RX ADMIN — TUBERCULIN PURIFIED PROTEIN DERIVATIVE 5 UNITS: 5 INJECTION, SOLUTION INTRADERMAL at 00:50

## 2024-01-20 RX ADMIN — SODIUM CHLORIDE, PRESERVATIVE FREE 10 ML: 5 INJECTION INTRAVENOUS at 08:18

## 2024-01-20 RX ADMIN — CYANOCOBALAMIN TAB 1000 MCG 1000 MCG: 1000 TAB at 09:19

## 2024-01-20 RX ADMIN — SODIUM CHLORIDE SOLN NEBU 3% 4 ML: 3 NEBU SOLN at 13:11

## 2024-01-20 RX ADMIN — TRAZODONE HYDROCHLORIDE 50 MG: 50 TABLET ORAL at 22:46

## 2024-01-20 RX ADMIN — SODIUM CHLORIDE, PRESERVATIVE FREE 10 ML: 5 INJECTION INTRAVENOUS at 23:08

## 2024-01-20 RX ADMIN — CEFEPIME 2000 MG: 2 INJECTION, POWDER, FOR SOLUTION INTRAVENOUS at 00:48

## 2024-01-20 RX ADMIN — MIRTAZAPINE 7.5 MG: 15 TABLET, FILM COATED ORAL at 22:47

## 2024-01-20 RX ADMIN — SODIUM CHLORIDE SOLN NEBU 3% 4 ML: 3 NEBU SOLN at 08:23

## 2024-01-20 RX ADMIN — FAMOTIDINE 40 MG: 40 POWDER, FOR SUSPENSION ORAL at 09:29

## 2024-01-20 RX ADMIN — FILGRASTIM-AAFI 300 MCG: 300 INJECTION, SOLUTION SUBCUTANEOUS at 18:41

## 2024-01-20 RX ADMIN — FERROUS GLUCONATE 324 MG: 324 TABLET ORAL at 08:18

## 2024-01-20 RX ADMIN — Medication 100 MG: at 17:50

## 2024-01-20 RX ADMIN — ROSUVASTATIN CALCIUM 20 MG: 20 TABLET, FILM COATED ORAL at 22:46

## 2024-01-20 RX ADMIN — POTASSIUM CHLORIDE 10 MEQ: 7.46 INJECTION, SOLUTION INTRAVENOUS at 10:24

## 2024-01-20 RX ADMIN — CEFEPIME 2000 MG: 2 INJECTION, POWDER, FOR SOLUTION INTRAVENOUS at 08:26

## 2024-01-20 RX ADMIN — CEFEPIME 2000 MG: 2 INJECTION, POWDER, FOR SOLUTION INTRAVENOUS at 17:54

## 2024-01-20 RX ADMIN — VANCOMYCIN HYDROCHLORIDE 1750 MG: 10 INJECTION, POWDER, LYOPHILIZED, FOR SOLUTION INTRAVENOUS at 00:58

## 2024-01-20 RX ADMIN — ASPIRIN 81 MG 81 MG: 81 TABLET ORAL at 08:18

## 2024-01-20 RX ADMIN — MAGNESIUM SULFATE HEPTAHYDRATE 2000 MG: 40 INJECTION, SOLUTION INTRAVENOUS at 12:11

## 2024-01-20 RX ADMIN — VANCOMYCIN HYDROCHLORIDE 750 MG: 750 INJECTION, POWDER, LYOPHILIZED, FOR SOLUTION INTRAVENOUS at 13:47

## 2024-01-20 RX ADMIN — POTASSIUM CHLORIDE 10 MEQ: 7.46 INJECTION, SOLUTION INTRAVENOUS at 09:27

## 2024-01-20 RX ADMIN — SODIUM PHOSPHATE, MONOBASIC, MONOHYDRATE AND SODIUM PHOSPHATE, DIBASIC, ANHYDROUS 15 MMOL: 276; 142 INJECTION, SOLUTION INTRAVENOUS at 16:20

## 2024-01-20 RX ADMIN — POTASSIUM CHLORIDE 10 MEQ: 7.46 INJECTION, SOLUTION INTRAVENOUS at 13:49

## 2024-01-20 RX ADMIN — POTASSIUM CHLORIDE 10 MEQ: 7.46 INJECTION, SOLUTION INTRAVENOUS at 08:16

## 2024-01-20 RX ADMIN — FOLIC ACID 1 MG: 1 TABLET ORAL at 09:19

## 2024-01-20 RX ADMIN — ALBUTEROL SULFATE 2.5 MG: 2.5 SOLUTION RESPIRATORY (INHALATION) at 12:56

## 2024-01-20 RX ADMIN — SODIUM CHLORIDE, PRESERVATIVE FREE 10 ML: 5 INJECTION INTRAVENOUS at 00:17

## 2024-01-20 RX ADMIN — SODIUM CHLORIDE SOLN NEBU 3% 4 ML: 3 NEBU SOLN at 20:12

## 2024-01-20 RX ADMIN — ALBUTEROL SULFATE 2.5 MG: 2.5 SOLUTION RESPIRATORY (INHALATION) at 20:12

## 2024-01-20 NOTE — CONSULTS
extremity edema.   Gastrointestinal Denies nausea, vomiting, changes in bowel habits, bloody or black stools, abdominal pain.    Denies dysuria, frequency or hesitancy of urination.   Neuro Denies headaches, visual changes or ataxia. Denies dizziness, tingling, tremors, sensory change, speech change, focal weakness or headaches.     Hematology Denies easy bruising or bleeding, denies gingival bleeding or epistaxis.   Endo Denies heat/cold intolerance, denies diabetes or thyroid abnormalities.   MSK Denies back pain, arthralgias, myalgias or frequent falls.     Psychiatric/Behavioral Denies depression and substance abuse. The patient is not nervous/anxious.         Allergies   Allergen Reactions    Zolpidem Other (See Comments)     Hallucinations   Ambien    Sulfamethoxazole-Trimethoprim Other (See Comments)     Past Medical History:   Diagnosis Date    Back problem     Cancer (HCC)     cancerous tumor to tongue, 1/3rd tongue removed in Aug. 2010.  Followed with radiation treatment.    DDD (degenerative disc disease), lumbar 12/05/2022    Dehydration 9/5/2023    Hearing difficulty of both ears     Hypercholesterolemia     Hypertension     pt. states only takes his BP med twice weekly due to decrease BP after weight loss.    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    HEENT      precautionary trach placed in 8/2010 when having tongue removal surgery and closed one week later.    HERNIA REPAIR      IR GASTROSTOMY TUBE PLACEMENT PERCUTANEOUS  9/8/2023    IR GASTROSTOMY TUBE PLACEMENT PERCUTANEOUS 9/8/2023 [facility]CI[facility]    IR PORT  start G-CSF.              Matt Morales MD    [facility] Hematology/Oncology  [address] [place] [address]  Office : [phone]  Fax : [phone]

## 2024-01-20 NOTE — ED NOTES
TRANSFER - OUT REPORT:    Verbal report given to GENI Beck on Jesus Laguna  being transferred to [facility] for routine progression of patient care       Report consisted of patient's Situation, Background, Assessment and   Recommendations(SBAR).     Information from the following report(s) Nurse Handoff Report, Index, ED Encounter Summary, ED SBAR, Intake/Output, MAR, and Recent Results was reviewed with the receiving nurse.    [place] Assessment:    Presents to emergency department  because of falls (Syncope, seizure, or loss of consciousness): No  Age > 70: Yes  Altered Mental Status, Intoxication with alcohol or substance confusion (Disorientation, impaired judgment, poor safety awaremess, or inability to follow instructions): No  Impaired Mobility: Ambulates or transfers with assistive devices or assistance; Unable to ambulate or transer.: Yes  Nursing Judgement: Yes          Lines:   Single Lumen Implantable Port 10/19/23 Right Chest (Active)       Peripheral IV 01/19/24 Right Forearm (Active)   Site Assessment Clean, dry & intact 01/19/24 2302   Line Status Flushed;Normal saline locked;Blood return noted 01/19/24 2302       Peripheral IV 01/19/24 Right Antecubital (Active)   Site Assessment Clean, dry & intact 01/19/24 2303   Line Status Blood return noted;Flushed;Normal saline locked 01/19/24 2303        Opportunity for questions and clarification was provided.      Patient transported with:  Neelam Lobato RN  01/19/24 2307

## 2024-01-20 NOTE — PROGRESS NOTES
Occupational Therapy Note:    Orders received and chart reviewed. Attempted to see patient this PM for occupational therapy evaluation session. Patient refused, reports he does not want to perform ADLs at this time or mobilize without his [place] crutches. Pt plans to have family member bring crutches to hospital. Will follow and re-attempt as schedule permits/patient available. Thank you,    Maria Teresa Mars, OT    Rehab Caseload Tracker

## 2024-01-20 NOTE — ACP (ADVANCE CARE PLANNING)
[facility] Hospitalist Service  At the heart of better care     Advance Care Planning   Admit Date:  2024  7:35 PM   Name:  Jesus Laguna   Age:  78 y.o.  Sex:  male  :  1945   MRN:  [id number]   Room:  [place] [address]    Jesus Laguna has capacity to make his own decisions:   Yes      Patient / surrogate decision-maker directed code status:  Full Code        Patient or surrogate consented to discussion of the current conditions, workup, management plans, prognosis, and the risk for further deterioration.  Time spent: 17 minutes in direct discussion.      Signed:  GABBY JAIN DO

## 2024-01-20 NOTE — PROGRESS NOTES
Hospitalist Progress Note   Admit Date:  2024  7:35 PM   Name:  Jesus Laguna   Age:  78 y.o.  Sex:  male  :  1945   MRN:  [id number]   Room:  [facility]/    Presenting/Chief Complaint: Fever     Reason(s) for Admission: Laryngeal cancer (HCC) [C32.9]  Sepsis due to pneumonia (HCC) [J18.9, A41.9]  Pneumonia of right lower lobe due to infectious organism [J18.9]  Sepsis, due to unspecified organism, unspecified whether acute organ dysfunction present (HCC) [A41.9]     Hospital Course:   Jesus Laguna is a 78 y.o. male with medical history of polio as a child, now with b/l LE braces, laryngeal cancer s/p gastrostomy, tracheastomy, L lateral tongue partial glossectomy and tosillectomy, neck dissection with forearm flap who presented to the ER on  with c/o fever and directed to the ER by oncology. Pt had last round of 5-FU + carboplatin + Keytruda 2 weeks ago. Pt also admitted to swallowing water with possible aspiration. In ER, Temp 101.6, pt tachycardic with , and elevated lactic acid. WBC depressed. Admitted for aspiration pneumonia and sepsis.         Subjective & 24hr Events:   Pt seen and examined this morning, doing well. No new complaints. Notes he has possible diarrhea last night. Is requesting he have a wheelchair in his room, as this is what he uses at home. Denies evidence of bleeding - no blood in stools, no dark tarry stools.       Assessment & Plan:     Principal Problem:    Sepsis due to pneumonia (HCC)  Plan: Suspect aspiration as cause  - Cont Cefepime and Vancomycin given immunosuppressed state  - lactic acid normalized after IVF  - cultures pending   - RVP negative  - stool for Cdiff pending - if negative, would add Imodium to slow down transit    Active Problems:    Pancytopenia -  Plan: Likely from recent chemotherapy  - Also volume repletion and acute infection could be contributing  - Hold lovenox for now  - monitor for signs of bleeding  - Repeat CBC with diff

## 2024-01-20 NOTE — ED PROVIDER NOTES
Emergency Department Provider Note       PCP: Quinten Hartmann DO   Age: 78 y.o.   Sex: male     DISPOSITION Decision To Admit 01/19/2024 09:22:38 PM       ICD-10-CM    1. Pneumonia of right lower lobe due to infectious organism  J18.9       2. Sepsis, due to unspecified organism, unspecified whether acute organ dysfunction present (HCC)  A41.9       3. Laryngeal cancer (HCC)  C32.9           Medical Decision Making     Complexity of Problems Addressed:  1 or more acute illnesses that pose a threat to life or bodily function.     Data Reviewed and Analyzed:   I independently ordered and reviewed each unique test.  I reviewed external records: provider visit note from outside specialist.   The patients assessment required an independent historian: wife.  The reason they were needed is important historical information not provided by the patient.    I independently ordered and interpreted the ED       I interpreted the X-rays right infiltrate.    Discussion of management or test interpretation.  Patient with right lower lobe pneumonia.  Has fever and sepsis.  Elevated white blood count lactic acid with fever.  Will admit for further treatment evaluation  The patient was admitted and I have discussed patient management with the admitting provider.    Risk of Complications and/or Morbidity of Patient Management:  Prescription drug management performed.  Shared medical decision making was utilized in creating the patients health plan today.         Is this patient to be included in the SEP-1 core measure due to severe sepsis or septic shock? Yes SEP-1 CORE MEASURE DATA    sepsis  Identified.    Fluid Resuscitation Rational: at least 30mL/kg based on entered actual weight at time of triage    Repeat Lactate Level: ordered and pending at this time    Reassessment Exam: Not applicable. Patient does not have septic shock.    Critical Care Procedure Note: 45 minutes of critical care time was performed in the emergency  Coronavirus 229E by PCR NOT DETECTED NOTDET      Coronavirus HKU1 by PCR NOT DETECTED NOTDET      Coronavirus NL63 by PCR NOT DETECTED NOTDET      Coronavirus OC43 by PCR NOT DETECTED NOTDET      SARS-CoV-2, PCR NOT DETECTED NOTDET      Human Metapneumovirus by PCR NOT DETECTED NOTDET      Rhinovirus Enterovirus PCR NOT DETECTED NOTDET      Influenza A by PCR NOT DETECTED NOTDET      Influenza B PCR NOT DETECTED NOTDET      Parainfluenza 1 PCR NOT DETECTED NOTDET      Parainfluenza 2 PCR NOT DETECTED NOTDET      Parainfluenza 3 PCR NOT DETECTED NOTDET      Parainfluenza 4 PCR NOT DETECTED NOTDET      Respiratory Syncytial Virus by PCR NOT DETECTED NOTDET      Bordetella parapertussis by PCR NOT DETECTED NOTDET      Bordetella pertussis by PCR NOT DETECTED NOTDET      Chlamydophila Pneumonia PCR NOT DETECTED NOTDET      Mycoplasma pneumo by PCR NOT DETECTED NOTDET     CBC with Auto Differential   Result Value Ref Range    WBC 2.1 (L) 4.3 - 11.1 K/uL    RBC 2.78 (L) 4.23 - 5.6 M/uL    Hemoglobin 9.2 (L) 13.6 - 17.2 g/dL    Hematocrit 28.3 (L) 41.1 - 50.3 %    .8 82 - 102 FL    MCH 33.1 (H) 26.1 - 32.9 PG    MCHC 32.5 31.4 - 35.0 g/dL    RDW 15.8 (H) 11.9 - 14.6 %    Platelets 156 150 - 450 K/uL    MPV 10.0 9.4 - 12.3 FL    nRBC 0.00 0.0 - 0.2 K/uL    Neutrophils % 44 43 - 78 %    Lymphocytes % 16 13 - 44 %    Monocytes % 38 (H) 4.0 - 12.0 %    Eosinophils % 0 (L) 0.5 - 7.8 %    Basophils % 1 0.0 - 2.0 %    Immature Granulocytes 1 0.0 - 5.0 %    Neutrophils Absolute 1.0 (L) 1.7 - 8.2 K/UL    Lymphocytes Absolute 0.3 (L) 0.5 - 4.6 K/UL    Monocytes Absolute 0.8 0.1 - 1.3 K/UL    Eosinophils Absolute 0.0 0.0 - 0.8 K/UL    Basophils Absolute 0.0 0.0 - 0.2 K/UL    Absolute Immature Granulocyte 0.0 0.0 - 0.5 K/UL    RBC Comment SLIGHT  ANISOCYTOSIS + POIKILOCYTOSIS        RBC Comment OCCASIONAL  POLYCHROMASIA        WBC Comment Result Confirmed By Smear      Platelet Comment ADEQUATE      Differential Type AUTOMATED

## 2024-01-20 NOTE — CONSULTS
Comprehensive Nutrition Assessment    Type and Reason for Visit: Initial  Tube Feeding Management (Hospitalists)    Nutrition Recommendations/Plan:   Enteral Nutrition:   Enteral Access: PEG  Initiate  Formula: 2 calorie (TwoCal HN)  Goal Rate: Bolus 237 ml times 5/day 0900, 1200, 1500, 1800, 2100  Initiate  Water flush  150 ml before and after each bolus  Modulars: None not indicated at this time   Enteral regimen at above goal to provide per 24 hours:  2375 calories, 100 grams protein and 2330 ml free fluid.    Above regimen: Intended to meet macronutrient goals Slightly exceeds kcal goal (103%) secondary to bolus feeds  IV Fluids:  Not applicable  Labs:   EN labs: BMP daily, Mg daily x 7 days at initiation then MWF and Phos daily x 7 days at initiation then MWF.     POC Glucoses/SSI Not indicated  Nutrition Related Medication Management:  Electrolyte Replacement Protocol PRN Initiate for Phosphorus, Continue for Mg + K  Thiamine 100 mg daily x 7 days (EOT 1/26)  Bowel Regimen Active prn  Continue NPO     Malnutrition Assessment:  Malnutrition Status: At risk for malnutrition (Comment) (PEG dependent, laryngeal + squamous cell cancer s/p partial glossectomy)    Stores within reasonable limites for age. Calf wasting; not 2/2 nutrition deficiencies.    Nutrition Assessment:  Nutrition History:  Pt 100% PEG dependent pta. PTA orders: 4 cartons/day of Isosource 1.5 + 2 cartons/day of Boost VHC.  Combined formulas to provide 2560 kcal, 156 gm protein, 1082 ml (free water from TF). Pt needs extra 1478 ml free water daily. Give 60 ml flush after each feed, and 38 oz free H20 between feeds . Confirmed regimen with pt wife- reports doing 1 carton of Isosource 1.5 + 1/2 carton of boost VHC four times per day. Wife was speaking with GENI Vines - reports that he has lost weight throughout chemotherapy treatment but they have been working hard towards weight regain; pt got down to 147#. Confirms he was drinking several sips of

## 2024-01-20 NOTE — PROGRESS NOTES
TRANSFER - OUT REPORT:    Verbal report given to Tom ARECHIGA on Jesus Laguna  being transferred to [facility] for routine progression of patient care       Report consisted of patient's Situation, Background, Assessment and   Recommendations(SBAR).     Information from the following report(s) Nurse Handoff Report was reviewed with the receiving nurse.           Lines:   Single Lumen Implantable Port 10/19/23 Right Chest (Active)   Port A Cath Status Not Accessed 01/19/24 2325   Criteria for Appropriate Use Long term IV/antibiotic administration 01/05/24 0945   Site Assessment Clean, dry & intact 01/05/24 0945   Line Care Ports disinfected;Cap changed;Connections checked and tightened;Tubing changed 01/05/24 0945   Alcohol Cap Used No 01/05/24 0945   Date of Last Dressing Change 01/05/24 01/05/24 0945   Dressing Type Transparent w/CHG gel 01/05/24 0945   Dressing Status New dressing applied;Clean, dry & intact 01/05/24 0945   Dressing Intervention New 01/05/24 0945   Date Accessed  01/05/24 01/05/24 0945   Access Attempts  1 01/05/24 0945   Access Needle Gauge 20 G 01/05/24 0945   Access Needle Length 0.75 inches 01/05/24 0945   Accessed By: Jenn GARCIA RN 01/05/24 0945   Single Lumen Status Brisk blood return;Flushed 01/09/24 1402   De-Access Date 01/09/24 01/09/24 1402   De-Access Time 1402 01/09/24 1402   De-Accessed By mb 01/09/24 1402       Peripheral IV 01/19/24 Right Forearm (Active)   Site Assessment Clean, dry & intact 01/19/24 2325   Line Status Capped;Flushed 01/19/24 2325   Phlebitis Assessment No symptoms 01/19/24 2325   Infiltration Assessment 0 01/19/24 2325   Alcohol Cap Used Yes 01/19/24 2325   Dressing Status Clean, dry & intact 01/19/24 2325   Dressing Type Transparent 01/19/24 2325       Peripheral IV 01/19/24 Right Antecubital (Active)   Site Assessment Clean, dry & intact 01/19/24 2325   Line Status Capped;Flushed 01/19/24 2325   Phlebitis Assessment No symptoms 01/19/24 2325   Infiltration Assessment

## 2024-01-20 NOTE — PROGRESS NOTES
GOALS:  LTG: Patient will tolerate safest, least restrictive oral diet without signs or symptoms of airway compromise.    STG: Patient will tolerate ongoing po trials in efforts to advance diet.  STG:  Patient to complete laryngeal strengthening exercises HEP with independence.  STG: Patient will participate in instrumental swallowing assessment to objectively assess oropharyngeal swallow if clinically indicated.     SPEECH LANGUAGE PATHOLOGY: DYSPHAGIA  Initial Assessment and Treatment Day:     NAME: Jesus Laguna  : 1945  MRN: [id number]    ADMISSION DATE: 2024  PRIMARY DIAGNOSIS: Sepsis due to pneumonia (HCC)  Laryngeal cancer (HCC) [C32.9]  Sepsis due to pneumonia (HCC) [J18.9, A41.9]  Pneumonia of right lower lobe due to infectious organism [J18.9]  Sepsis, due to unspecified organism, unspecified whether acute organ dysfunction present (HCC) [A41.9]    ICD-10: Treatment Diagnosis: R13.13 Dysphagia, Pharyngeal Phase    RECOMMENDATIONS   Diet:  Diet Solids Recommendation: NPO (until MBS)  Liquid Consistency Recommendation: NPO    Medications: Via alternative means of nutrition     Compensatory Swallowing Strategies: Upright as possible for all oral intake;Remain upright for 30-45 minutes after meals (Also remain upright when receiving PEG feedings)   Recommendations: NPO;Modified barium swallow study    Therapeutic Interventions: Laryngeal exercises;Oral care   Patient continues to require skilled intervention:  Yes. Recommend ongoing speech therapy services during this hospitalization      ASSESSMENT    Dysphagia Diagnosis: Mild to moderate pharyngeal stage dysphagia  Dysphagia Impression : mild to moderate pharyngeal dysphagia  Clinician with decreased laryngeal elevation.  Patient with difficulty holding breath due to stoma still open where trach removed.  Patient completed masoko swallow with some difficulty due to microglossia and difficulty with effortful swallow secondary to

## 2024-01-20 NOTE — PROGRESS NOTES
Physical Therapy Note:    Attempted to see patient this AM for physical therapy evaluation session. Patient post polio chronic status now with Pneumonia,  refusing PT today as he has his leg braces but no loftstrand crutches.  PT asked that he have family bring these.  Also explained need for PT for discharge plans and return to home.  He states he will do it his way as he has for all these years.    Will follow and re-attempt as schedule permits/patient availability with delivery of loftstrand crutches. Thank you,    Livan Harvey, PT     Rehab Caseload Tracker

## 2024-01-20 NOTE — H&P
Hospitalist History and Physical   Admit Date:  2024  7:35 PM   Name:  Jesus Laguna   Age:  78 y.o.  Sex:  male  :  1945   MRN:  [id number]   Room:  [place] [address]    Presenting/Chief Complaint: Fever     Reason(s) for Admission: Sepsis due to pneumonia (HCC) [J18.9, A41.9]     History of Present Illness:   Jesus Laguna is a 78 y.o. male who presented to the ED for cc fevers that developed today. Just had his tracheostomy removed on  and admits to starting to drink water recently. He denies having any obvious choking episodes.     Hx of laryngeal cancer s/p G tube placement, tracheostomy, 6 rounds of chemo, HLD, macrocytic anemia, squamous cell carcinome of left lateral tongue s/p partial glossectomy with tonsillectomy, neck dissection with a radial forearm free flap to left oral cavity on 8/3/2010, aspiration pneumonia,     Temp 101.6, .     Lactic acid 3.3. procal 0.7, WBC 2.1.   Assessment & Plan:     Active Problems:    Sepsis met by temp, HR, WBC due to RLL pneumonia - Vanc, Zosyn. Blood cultures ordered. I am suspecting he has recently aspirated. NPO and have speech therapy to see. Albuterol and hypertonic saline nebulizers.     Lactic acidosis - Tx as above. Trend.     PEG tube dependent - Consult nutrition    Laryngeal cancer - consult oncology. Scopolamine to help with secretions.     HLD -statin    Trazodone    PPI    I tried to review meds with patient but he was unsure of what all he takes. May need to speak with wife in AM.     PT/OT evals and PPD ordered?  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

## 2024-01-20 NOTE — PROGRESS NOTES
Patient transferred from ED to room 837 via stretcher. Alert and oriented x4. Respirations even and unlabored on room air. NAD noted. Oriented to room.

## 2024-01-20 NOTE — PROGRESS NOTES
VANCO DAILY FOLLOW UP NOTE  [facility]   Pharmacy Pharmacokinetic Monitoring Service - Vancomycin    Consulting Provider: Milton   Indication: sepsis- HAP  Target Concentration: Goal AUC/WANDY 400-600 mg*hr/L  Day of Therapy: 1  Additional Antimicrobials: cefepime    Patient eligible for piperacillin-tazobactam to cefepime auto-substitution per P&T approved protocol? YES    Pertinent Laboratory Values:   Wt Readings from Last 1 Encounters:   01/19/24 75.8 kg (167 lb)     Temp Readings from Last 1 Encounters:   01/19/24 97.7 °F (36.5 °C) (Axillary)     Recent Labs     01/19/24 1945 01/19/24 1959 01/19/24 2159   BUN  --  33*  --    CREATININE  --  1.00  --    WBC  --  2.1*  --    PROCAL  --  0.71*  --    LACACIDPL 3.3*  --  1.7     Estimated Creatinine Clearance: 66 mL/min (based on SCr of 1 mg/dL).    No results found for: \"VANCOTROUGH\", \"VANCORANDOM\"    MRSA Nasal Swab: not ordered. Order placed by pharmacy    Assessment:  Date/Time Dose Concentration AUC         Note: Serum concentrations collected for AUC dosing may appear elevated if collected in close proximity to the dose administered, this is not necessarily an indication of toxicity    Plan:  Dosing recommendations based on Bayesian software  Start vancomycin 750mg q12h.  Anticipated AUC of 482 and trough concentration of 14.6 at steady state  Renal labs as indicated   Vancomycin concentrations will be ordered as clinically appropriate   Pharmacy will continue to monitor patient and adjust therapy as indicated    Thank you for the consult,  Dhara Baldwin [facility]

## 2024-01-20 NOTE — PLAN OF CARE
Problem: Discharge Planning  Goal: Discharge to home or other facility with appropriate resources  Outcome: Progressing  Flowsheets (Taken 1/20/2024 3104)  Discharge to home or other facility with appropriate resources: Identify barriers to discharge with patient and caregiver     Problem: Skin/Tissue Integrity  Goal: Absence of new skin breakdown  Description: 1.  Monitor for areas of redness and/or skin breakdown  2.  Assess vascular access sites hourly  3.  Every 4-6 hours minimum:  Change oxygen saturation probe site  4.  Every 4-6 hours:  If on nasal continuous positive airway pressure, respiratory therapy assess nares and determine need for appliance change or resting period.  Outcome: Progressing     Problem: Safety - Adult  Goal: Free from fall injury  Outcome: Progressing

## 2024-01-21 LAB
ANION GAP SERPL CALC-SCNC: 6 MMOL/L (ref 2–11)
BASOPHILS # BLD: 0.1 K/UL (ref 0–0.2)
BASOPHILS NFR BLD: 1 % (ref 0–2)
BUN SERPL-MCNC: 13 MG/DL (ref 8–23)
CALCIUM SERPL-MCNC: 7.8 MG/DL (ref 8.3–10.4)
CHLORIDE SERPL-SCNC: 111 MMOL/L (ref 103–113)
CO2 SERPL-SCNC: 23 MMOL/L (ref 21–32)
CREAT SERPL-MCNC: 0.5 MG/DL (ref 0.8–1.5)
DIFFERENTIAL METHOD BLD: ABNORMAL
EOSINOPHIL # BLD: 0.1 K/UL (ref 0–0.8)
EOSINOPHIL NFR BLD: 2 % (ref 0.5–7.8)
ERYTHROCYTE [DISTWIDTH] IN BLOOD BY AUTOMATED COUNT: 15.4 % (ref 11.9–14.6)
GLUCOSE SERPL-MCNC: 113 MG/DL (ref 65–100)
HCT VFR BLD AUTO: 25.4 % (ref 41.1–50.3)
HGB BLD-MCNC: 8 G/DL (ref 13.6–17.2)
IMM GRANULOCYTES # BLD AUTO: 0.1 K/UL (ref 0–0.5)
IMM GRANULOCYTES NFR BLD AUTO: 1 % (ref 0–5)
LYMPHOCYTES # BLD: 0.4 K/UL (ref 0.5–4.6)
LYMPHOCYTES NFR BLD: 6 % (ref 13–44)
MAGNESIUM SERPL-MCNC: 1.7 MG/DL (ref 1.8–2.4)
MCH RBC QN AUTO: 33.1 PG (ref 26.1–32.9)
MCHC RBC AUTO-ENTMCNC: 31.5 G/DL (ref 31.4–35)
MCV RBC AUTO: 105 FL (ref 82–102)
MM INDURATION, POC: 0 MM (ref 0–5)
MONOCYTES # BLD: 1.2 K/UL (ref 0.1–1.3)
MONOCYTES NFR BLD: 17 % (ref 4–12)
NEUTS SEG # BLD: 5.1 K/UL (ref 1.7–8.2)
NEUTS SEG NFR BLD: 73 % (ref 43–78)
NRBC # BLD: 0 K/UL (ref 0–0.2)
PHOSPHATE SERPL-MCNC: 1.8 MG/DL (ref 2.3–3.7)
PLATELET # BLD AUTO: 142 K/UL (ref 150–450)
PLATELET COMMENT: SLIGHT
PMV BLD AUTO: 10 FL (ref 9.4–12.3)
POTASSIUM SERPL-SCNC: 3.5 MMOL/L (ref 3.5–5.1)
PPD, POC: NEGATIVE
RBC # BLD AUTO: 2.42 M/UL (ref 4.23–5.6)
RBC MORPH BLD: ABNORMAL
SODIUM SERPL-SCNC: 140 MMOL/L (ref 136–146)
VANCOMYCIN SERPL-MCNC: 10.9 UG/ML
WBC # BLD AUTO: 7 K/UL (ref 4.3–11.1)
WBC MORPH BLD: ABNORMAL

## 2024-01-21 PROCEDURE — 1100000000 HC RM PRIVATE

## 2024-01-21 PROCEDURE — 6360000002 HC RX W HCPCS: Performed by: NURSE PRACTITIONER

## 2024-01-21 PROCEDURE — 6370000000 HC RX 637 (ALT 250 FOR IP): Performed by: FAMILY MEDICINE

## 2024-01-21 PROCEDURE — 2500000003 HC RX 250 WO HCPCS: Performed by: NURSE PRACTITIONER

## 2024-01-21 PROCEDURE — 85025 COMPLETE CBC W/AUTO DIFF WBC: CPT

## 2024-01-21 PROCEDURE — 80048 BASIC METABOLIC PNL TOTAL CA: CPT

## 2024-01-21 PROCEDURE — 6370000000 HC RX 637 (ALT 250 FOR IP): Performed by: PHYSICIAN ASSISTANT

## 2024-01-21 PROCEDURE — 36415 COLL VENOUS BLD VENIPUNCTURE: CPT

## 2024-01-21 PROCEDURE — 84100 ASSAY OF PHOSPHORUS: CPT

## 2024-01-21 PROCEDURE — 6370000000 HC RX 637 (ALT 250 FOR IP): Performed by: STUDENT IN AN ORGANIZED HEALTH CARE EDUCATION/TRAINING PROGRAM

## 2024-01-21 PROCEDURE — 99233 SBSQ HOSP IP/OBS HIGH 50: CPT | Performed by: INTERNAL MEDICINE

## 2024-01-21 PROCEDURE — 2580000003 HC RX 258: Performed by: FAMILY MEDICINE

## 2024-01-21 PROCEDURE — 6360000002 HC RX W HCPCS: Performed by: FAMILY MEDICINE

## 2024-01-21 PROCEDURE — 2580000003 HC RX 258: Performed by: NURSE PRACTITIONER

## 2024-01-21 PROCEDURE — 94760 N-INVAS EAR/PLS OXIMETRY 1: CPT

## 2024-01-21 PROCEDURE — 83735 ASSAY OF MAGNESIUM: CPT

## 2024-01-21 PROCEDURE — 80202 ASSAY OF VANCOMYCIN: CPT

## 2024-01-21 RX ORDER — MAGNESIUM SULFATE IN WATER 40 MG/ML
2000 INJECTION, SOLUTION INTRAVENOUS ONCE
Status: COMPLETED | OUTPATIENT
Start: 2024-01-21 | End: 2024-01-21

## 2024-01-21 RX ADMIN — FERROUS GLUCONATE 324 MG: 324 TABLET ORAL at 09:24

## 2024-01-21 RX ADMIN — CYANOCOBALAMIN TAB 1000 MCG 1000 MCG: 1000 TAB at 09:24

## 2024-01-21 RX ADMIN — ACETAMINOPHEN 650 MG: 325 TABLET ORAL at 15:23

## 2024-01-21 RX ADMIN — SODIUM PHOSPHATE, MONOBASIC, MONOHYDRATE AND SODIUM PHOSPHATE, DIBASIC, ANHYDROUS 15 MMOL: 276; 142 INJECTION, SOLUTION INTRAVENOUS at 16:55

## 2024-01-21 RX ADMIN — CEFEPIME 2000 MG: 2 INJECTION, POWDER, FOR SOLUTION INTRAVENOUS at 00:40

## 2024-01-21 RX ADMIN — Medication 100 MG: at 09:24

## 2024-01-21 RX ADMIN — FAMOTIDINE 40 MG: 40 POWDER, FOR SUSPENSION ORAL at 10:32

## 2024-01-21 RX ADMIN — SODIUM CHLORIDE, PRESERVATIVE FREE 10 ML: 5 INJECTION INTRAVENOUS at 09:25

## 2024-01-21 RX ADMIN — MAGNESIUM SULFATE HEPTAHYDRATE 2000 MG: 40 INJECTION, SOLUTION INTRAVENOUS at 15:35

## 2024-01-21 RX ADMIN — CEFEPIME 2000 MG: 2 INJECTION, POWDER, FOR SOLUTION INTRAVENOUS at 08:30

## 2024-01-21 RX ADMIN — SODIUM CHLORIDE, PRESERVATIVE FREE 10 ML: 5 INJECTION INTRAVENOUS at 21:17

## 2024-01-21 RX ADMIN — CEFEPIME 2000 MG: 2 INJECTION, POWDER, FOR SOLUTION INTRAVENOUS at 15:36

## 2024-01-21 RX ADMIN — FOLIC ACID 1 MG: 1 TABLET ORAL at 09:29

## 2024-01-21 RX ADMIN — MIRTAZAPINE 7.5 MG: 15 TABLET, FILM COATED ORAL at 21:13

## 2024-01-21 RX ADMIN — ASPIRIN 81 MG 81 MG: 81 TABLET ORAL at 09:24

## 2024-01-21 RX ADMIN — TRAZODONE HYDROCHLORIDE 50 MG: 50 TABLET ORAL at 21:13

## 2024-01-21 RX ADMIN — VANCOMYCIN HYDROCHLORIDE 750 MG: 750 INJECTION, POWDER, LYOPHILIZED, FOR SOLUTION INTRAVENOUS at 01:49

## 2024-01-21 RX ADMIN — ROSUVASTATIN CALCIUM 20 MG: 20 TABLET, FILM COATED ORAL at 21:13

## 2024-01-21 NOTE — PLAN OF CARE
Problem: Discharge Planning  Goal: Discharge to home or other facility with appropriate resources  1/20/2024 2346 by Alejandrina Baca RN  Outcome: Progressing  Flowsheets (Taken 1/20/2024 2300)  Discharge to home or other facility with appropriate resources: Identify barriers to discharge with patient and caregiver  1/20/2024 1444 by Stephanie Gandhi RN  Outcome: Progressing  Flowsheets (Taken 1/20/2024 0823)  Discharge to home or other facility with appropriate resources: Identify barriers to discharge with patient and caregiver     Problem: Skin/Tissue Integrity  Goal: Absence of new skin breakdown  Description: 1.  Monitor for areas of redness and/or skin breakdown  2.  Assess vascular access sites hourly  3.  Every 4-6 hours minimum:  Change oxygen saturation probe site  4.  Every 4-6 hours:  If on nasal continuous positive airway pressure, respiratory therapy assess nares and determine need for appliance change or resting period.  1/20/2024 2346 by Alejandrina Baca RN  Outcome: Progressing  1/20/2024 1444 by Stephanie Gandhi RN  Outcome: Progressing     Problem: Safety - Adult  Goal: Free from fall injury  1/20/2024 2346 by Alejandrina Baca RN  Outcome: Progressing  Flowsheets (Taken 1/20/2024 2300)  Free From Fall Injury: Instruct family/caregiver on patient safety  1/20/2024 1444 by Stephanie Gandhi RN  Outcome: Progressing

## 2024-01-21 NOTE — PROGRESS NOTES
If you need to see a   -  just ask the nurse to call us.    We look forward to serving your family!!        Chaplain Duran

## 2024-01-21 NOTE — PROGRESS NOTES
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  CV: Negative for chest pain, palpitations, edema.  Respiratory: Negative for dyspnea, cough, wheezing.  GI: Negative for nausea, abdominal pain, diarrhea.    10 point review of systems is otherwise negative with the exception of the elements mentioned above in the HPI.     Allergies   Allergen Reactions    Zolpidem Other (See Comments)     Hallucinations   Ambien    Sulfamethoxazole-Trimethoprim Other (See Comments)       OBJECTIVE:  Patient Vitals for the past 8 hrs:   BP Temp Temp src Pulse Resp SpO2   24 1139 105/61 99.3 °F (37.4 °C) Oral 96 18 98 %   24 0720 114/64 98.5 °F (36.9 °C) Oral 98 18 98 %     Temp (24hrs), Av.3 °F (36.8 °C), Min:97.8 °F (36.6 °C), Max:99.3 °F (37.4 °C)    No intake/output data recorded.    Physical Exam: per MD  Constitutional: Well developed, well nourished, thin male in no acute distress, sitting comfortably in the hospital bed.    HEENT: Normocephalic and atraumatic. Oropharynx is clear, mucous membranes are moist.   Extraocular muscles are intact.  Sclerae anicteric. Neck supple.    Skin Warm and dry.  No bruising and no rash noted.  No erythema.  No pallor.    Neuro Grossly nonfocal with no obvious sensory or motor deficits.   MSK Normal range of motion in general.  No edema and no tenderness.   Psych Appropriate mood and affect.        Labs:      Recent Labs     24

## 2024-01-21 NOTE — CARE COORDINATION
01/21/24 1046   Service Assessment   Patient Orientation Alert and Oriented   Cognition Alert   History Provided By Patient;Spouse   Primary Caregiver Self   Accompanied By/Relationship spouse at bedside   Support Systems Spouse/Significant Other;Children   Patient's Healthcare Decision Maker is: Legal Next of Kin   PCP Verified by CM Yes   Last Visit to PCP Within last 3 months   Prior Functional Level Independent in ADLs/IADLs   Current Functional Level Independent in ADLs/IADLs   Can patient return to prior living arrangement Yes   Ability to make needs known: Good   Family able to assist with home care needs: Yes   Would you like for me to discuss the discharge plan with any other family members/significant others, and if so, who? Yes   Financial Resources Medicare   Community Resources None   Social/Functional History   Lives With Spouse   Type of Home House   Home Equipment Wheelchair-manual   Receives Help From Family   ADL Assistance Needs assistance   Homemaking Assistance Independent   Homemaking Responsibilities No   Ambulation Assistance Needs assistance   Transfer Assistance Independent   Active  No   Occupation Retired   Discharge Planning   Type of Residence House   Living Arrangements Spouse/Significant Other   Current Services Prior To Admission Durable Medical Equipment   Current DME Prior to Arrival Wheelchair;Other (Comment)   Potential Assistance Needed N/A   DME Ordered? No   Potential Assistance Purchasing Medications No   Type of Home Care Services PT;Skilled Therapy   Patient expects to be discharged to: House   History of falls? 0     Patient uses a wc or crutches to ambulate. Patient has a feeding tube. Current with [facility] which will likely be resumed at discharge. Wife at bedside assists him but patient is mostly independent. Wife drives patient to appointments. Confirmed patient has a pcp. CM following .

## 2024-01-21 NOTE — PROGRESS NOTES
Physical Therapy Note:    Attempted to see patient this AM for physical therapy evaluation session. Patient's wife refused due to pt sound asleep and did not sleep well last night. Will follow and re-attempt as schedule permits/patient available. Thank you,    MIKE MATTHEW, PT     Rehab Caseload Tracker

## 2024-01-21 NOTE — PROGRESS NOTES
Occupational Therapy Note:    OT consult received and chart reviewed--pt soundly asleep with wife at bedside. Wife requested therapist not wake pt up due to pt not sleeping well last night. Will continue to follow and attempt to see as schedule allows.    Thank you,  Silvia Brown, OTR/L

## 2024-01-22 ENCOUNTER — APPOINTMENT (OUTPATIENT)
Dept: GENERAL RADIOLOGY | Age: 79
DRG: 871 | End: 2024-01-22
Payer: MEDICARE

## 2024-01-22 PROBLEM — A41.9 SEPSIS (HCC): Status: ACTIVE | Noted: 2024-01-22

## 2024-01-22 LAB
ANION GAP SERPL CALC-SCNC: 5 MMOL/L (ref 2–11)
BASOPHILS # BLD: 0 K/UL (ref 0–0.2)
BASOPHILS NFR BLD: 0 % (ref 0–2)
BUN SERPL-MCNC: 14 MG/DL (ref 8–23)
CALCIUM SERPL-MCNC: 8.1 MG/DL (ref 8.3–10.4)
CHLORIDE SERPL-SCNC: 110 MMOL/L (ref 103–113)
CO2 SERPL-SCNC: 25 MMOL/L (ref 21–32)
CREAT SERPL-MCNC: 0.4 MG/DL (ref 0.8–1.5)
DIFFERENTIAL METHOD BLD: ABNORMAL
EOSINOPHIL # BLD: 0.2 K/UL (ref 0–0.8)
EOSINOPHIL NFR BLD: 2 % (ref 0.5–7.8)
ERYTHROCYTE [DISTWIDTH] IN BLOOD BY AUTOMATED COUNT: 15.6 % (ref 11.9–14.6)
GLUCOSE SERPL-MCNC: 108 MG/DL (ref 65–100)
HCT VFR BLD AUTO: 24.8 % (ref 41.1–50.3)
HGB BLD-MCNC: 8 G/DL (ref 13.6–17.2)
IMM GRANULOCYTES # BLD AUTO: 0.3 K/UL (ref 0–0.5)
IMM GRANULOCYTES NFR BLD AUTO: 3 % (ref 0–5)
LYMPHOCYTES # BLD: 0.6 K/UL (ref 0.5–4.6)
LYMPHOCYTES NFR BLD: 6 % (ref 13–44)
MAGNESIUM SERPL-MCNC: 1.8 MG/DL (ref 1.8–2.4)
MCH RBC QN AUTO: 33.9 PG (ref 26.1–32.9)
MCHC RBC AUTO-ENTMCNC: 32.3 G/DL (ref 31.4–35)
MCV RBC AUTO: 105.1 FL (ref 82–102)
MM INDURATION, POC: 0 MM (ref 0–5)
MONOCYTES # BLD: 1.4 K/UL (ref 0.1–1.3)
MONOCYTES NFR BLD: 14 % (ref 4–12)
NEUTS SEG # BLD: 7.4 K/UL (ref 1.7–8.2)
NEUTS SEG NFR BLD: 75 % (ref 43–78)
NRBC # BLD: 0 K/UL (ref 0–0.2)
PHOSPHATE SERPL-MCNC: 2.6 MG/DL (ref 2.3–3.7)
PLATELET # BLD AUTO: 154 K/UL (ref 150–450)
PLATELET COMMENT: ADEQUATE
PMV BLD AUTO: 10.4 FL (ref 9.4–12.3)
POTASSIUM SERPL-SCNC: 3.3 MMOL/L (ref 3.5–5.1)
PPD, POC: NEGATIVE
RBC # BLD AUTO: 2.36 M/UL (ref 4.23–5.6)
RBC MORPH BLD: ABNORMAL
RBC MORPH BLD: ABNORMAL
SODIUM SERPL-SCNC: 140 MMOL/L (ref 136–146)
WBC # BLD AUTO: 9.9 K/UL (ref 4.3–11.1)
WBC MORPH BLD: ABNORMAL

## 2024-01-22 PROCEDURE — 6360000002 HC RX W HCPCS: Performed by: FAMILY MEDICINE

## 2024-01-22 PROCEDURE — 6370000000 HC RX 637 (ALT 250 FOR IP): Performed by: FAMILY MEDICINE

## 2024-01-22 PROCEDURE — 6370000000 HC RX 637 (ALT 250 FOR IP): Performed by: STUDENT IN AN ORGANIZED HEALTH CARE EDUCATION/TRAINING PROGRAM

## 2024-01-22 PROCEDURE — 36415 COLL VENOUS BLD VENIPUNCTURE: CPT

## 2024-01-22 PROCEDURE — 83735 ASSAY OF MAGNESIUM: CPT

## 2024-01-22 PROCEDURE — 1100000000 HC RM PRIVATE

## 2024-01-22 PROCEDURE — 6370000000 HC RX 637 (ALT 250 FOR IP): Performed by: INTERNAL MEDICINE

## 2024-01-22 PROCEDURE — APPSS45 APP SPLIT SHARED TIME 31-45 MINUTES

## 2024-01-22 PROCEDURE — 92526 ORAL FUNCTION THERAPY: CPT

## 2024-01-22 PROCEDURE — 99232 SBSQ HOSP IP/OBS MODERATE 35: CPT | Performed by: INTERNAL MEDICINE

## 2024-01-22 PROCEDURE — 84100 ASSAY OF PHOSPHORUS: CPT

## 2024-01-22 PROCEDURE — 85025 COMPLETE CBC W/AUTO DIFF WBC: CPT

## 2024-01-22 PROCEDURE — 94640 AIRWAY INHALATION TREATMENT: CPT

## 2024-01-22 PROCEDURE — 2580000003 HC RX 258: Performed by: FAMILY MEDICINE

## 2024-01-22 PROCEDURE — 2500000003 HC RX 250 WO HCPCS: Performed by: STUDENT IN AN ORGANIZED HEALTH CARE EDUCATION/TRAINING PROGRAM

## 2024-01-22 PROCEDURE — 74230 X-RAY XM SWLNG FUNCJ C+: CPT

## 2024-01-22 PROCEDURE — 92611 MOTION FLUOROSCOPY/SWALLOW: CPT

## 2024-01-22 PROCEDURE — 80048 BASIC METABOLIC PNL TOTAL CA: CPT

## 2024-01-22 RX ORDER — FERROUS SULFATE 300 MG/5ML
300 LIQUID (ML) ORAL DAILY
Status: DISCONTINUED | OUTPATIENT
Start: 2024-01-22 | End: 2024-01-23 | Stop reason: HOSPADM

## 2024-01-22 RX ORDER — ALBUTEROL SULFATE 2.5 MG/3ML
2.5 SOLUTION RESPIRATORY (INHALATION)
Status: DISCONTINUED | OUTPATIENT
Start: 2024-01-23 | End: 2024-01-23 | Stop reason: HOSPADM

## 2024-01-22 RX ORDER — LANOLIN ALCOHOL/MO/W.PET/CERES
1000 CREAM (GRAM) TOPICAL DAILY
Status: DISCONTINUED | OUTPATIENT
Start: 2024-01-22 | End: 2024-01-23 | Stop reason: HOSPADM

## 2024-01-22 RX ORDER — FOLIC ACID 1 MG/1
1 TABLET ORAL DAILY
Status: DISCONTINUED | OUTPATIENT
Start: 2024-01-22 | End: 2024-01-23 | Stop reason: HOSPADM

## 2024-01-22 RX ADMIN — CEFEPIME 2000 MG: 2 INJECTION, POWDER, FOR SOLUTION INTRAVENOUS at 15:48

## 2024-01-22 RX ADMIN — SODIUM CHLORIDE SOLN NEBU 3% 4 ML: 3 NEBU SOLN at 07:10

## 2024-01-22 RX ADMIN — SODIUM CHLORIDE, PRESERVATIVE FREE 10 ML: 5 INJECTION INTRAVENOUS at 22:12

## 2024-01-22 RX ADMIN — POTASSIUM CHLORIDE 10 MEQ: 7.46 INJECTION, SOLUTION INTRAVENOUS at 12:06

## 2024-01-22 RX ADMIN — BARIUM SULFATE 15 ML: 400 SUSPENSION ORAL at 11:01

## 2024-01-22 RX ADMIN — CYANOCOBALAMIN TAB 1000 MCG 1000 MCG: 1000 TAB at 09:04

## 2024-01-22 RX ADMIN — MINERAL SUPPLEMENT IRON 300 MG / 5 ML STRENGTH LIQUID 100 PER BOX UNFLAVORED 300 MG: at 12:02

## 2024-01-22 RX ADMIN — BARIUM SULFATE 15 ML: 0.81 POWDER, FOR SUSPENSION ORAL at 11:01

## 2024-01-22 RX ADMIN — FOLIC ACID 1 MG: 1 TABLET ORAL at 09:05

## 2024-01-22 RX ADMIN — ALBUTEROL SULFATE 2.5 MG: 2.5 SOLUTION RESPIRATORY (INHALATION) at 07:10

## 2024-01-22 RX ADMIN — POTASSIUM CHLORIDE 10 MEQ: 7.46 INJECTION, SOLUTION INTRAVENOUS at 09:39

## 2024-01-22 RX ADMIN — ASPIRIN 81 MG 81 MG: 81 TABLET ORAL at 09:05

## 2024-01-22 RX ADMIN — FAMOTIDINE 40 MG: 40 POWDER, FOR SUSPENSION ORAL at 12:02

## 2024-01-22 RX ADMIN — ROSUVASTATIN CALCIUM 20 MG: 20 TABLET, FILM COATED ORAL at 21:45

## 2024-01-22 RX ADMIN — Medication 100 MG: at 09:05

## 2024-01-22 RX ADMIN — MIRTAZAPINE 7.5 MG: 15 TABLET, FILM COATED ORAL at 21:45

## 2024-01-22 RX ADMIN — BARIUM SULFATE 15 ML: 400 PASTE ORAL at 11:01

## 2024-01-22 RX ADMIN — CEFEPIME 2000 MG: 2 INJECTION, POWDER, FOR SOLUTION INTRAVENOUS at 07:57

## 2024-01-22 RX ADMIN — CEFEPIME 2000 MG: 2 INJECTION, POWDER, FOR SOLUTION INTRAVENOUS at 00:19

## 2024-01-22 RX ADMIN — TRAZODONE HYDROCHLORIDE 50 MG: 50 TABLET ORAL at 21:45

## 2024-01-22 ASSESSMENT — PAIN SCALES - GENERAL: PAINLEVEL_OUTOF10: 0

## 2024-01-22 NOTE — CARE COORDINATION
Chart screened by CM for d/c planning.  Pt underwent MBS today which shows aspiration with all food consistencies.  Per SLP pt to remain NPO with ice chips for comfort.  Pt has a PEG.  PT/OT have attempted multiple times to work with pt.  Each time either pt or spouse has refused.    Today PT/OT have d/c pt from caseload.  Will need to add SLP to pt's [facility] order at d/c (he is current with [facility]).  Dispo: home once medically stable.  CM will continue to follow.  LOS = 3 days

## 2024-01-22 NOTE — PROGRESS NOTES
Pt evaluation received, chart reviewed, evaluation attempted. Pt declined evaluation and does not want therapy to check back later today or at a future date. Per pt request, will discontinue orders at this time. Please re-consult if any needs arise.    Thank you,    Nella Ambrosio, PT, DPT

## 2024-01-22 NOTE — PROGRESS NOTES
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 VSS on room air.   MBS today.  C3 Cefe/Vanc.    Transfusions: None.   Replacements: Potassium.    ROS:  Constitutional: Negative for fever, chills, weakness, malaise.  CV: Negative for chest pain, palpitations, edema.  Respiratory: Negative for dyspnea, cough, wheezing.  GI: Negative for nausea, abdominal pain, diarrhea.    10 point review of systems is otherwise negative with the exception of the elements mentioned above in the HPI.     Allergies   Allergen Reactions    Zolpidem Other (See Comments)     Hallucinations   Ambien    Sulfamethoxazole-Trimethoprim Other (See Comments)       OBJECTIVE:  Patient Vitals for the past 8 hrs:   BP Temp Temp src Pulse Resp SpO2   24 0830 120/66 98.6 °F (37 °C) Oral 100 18 98 %   24 0710 -- -- -- 100 18 97 %       Temp (24hrs), Av.6 °F (37 °C), Min:97.7 °F (36.5 °C), Max:100.5 °F (38.1 °C)    No intake/output data recorded.    Physical Exam: per MD  Constitutional: Well developed, well nourished, thin male in no acute distress, sitting comfortably in the hospital bed.    HEENT: Normocephalic and atraumatic. Oropharynx is clear, mucous membranes are moist.   Extraocular muscles are intact.  Sclerae anicteric. Neck supple.    Skin Warm and dry.  No bruising and no rash noted.  No erythema.  No pallor.    Neuro Grossly nonfocal with no obvious sensory or motor deficits.   MSK Normal range of motion in general.  No edema and no tenderness.   Psych Appropriate mood and affect.        Labs:      Recent  response. Plans for proceeding with keytruda maintenance next week. He had his tracheostomy removed a few days ago.  He denies obvious choking but he has had some sips of water recently (has history of aspiration pneumonia) He presented to the ER with complaints of chills and fever and was found to have a temperature of 101.6, P116. Labs revealed ANC 1000, Hgb 9.2. Chest xray suggestive of pneumonia in posterior right lower lobe. He was admitted for neutropenic fever and possible aspiration pneumonia. We were consulted on our known patient.     PLAN:  Squamous Cell Cancer   - S/p 6 cycles of 5-FU/carboplatin/keytruda with good response. Plans for keytruda maintenance going forward.   1/22 This week's appts need to be moved to next week to allow time to recover from admission.     Sepsis/PNA/Neutropenic Fever   - T101.6, P116, lactic acid 3.3, procal 0.7, ANC 1000. Chest xray suggestive of pneumonia in posterior right lower lobe. RVP negative. C.diff negative.   1/21 Blood cultures - NGTD. Continues vancomycin and maxipime.   1/22 BC NGTD. D3 Cefe/Vanc. Tmax 100.5 yesterday afternoon.       Pancytopenia Relate to Chemotherapy    - WBC 1.2, , Hgb 7.7, platelets 117. Transfuse for Hgb < 7.0.   - start G-CSF daily   1/21 WBC 7.0, ANC 5100 - stop G-CSF. Hgb 8.0, plt 142,000  1/22 Counts recovering. WBC 9.9 with 7.4 ANC. Hgb and plts stable.     Possible Aspiration   - Speech therapy consulted, recommend NPO until MBS  1/21 MBS pending.   1/22 MBS today.     PEG Tube Dependent   - Consult RD.      Home Health - pending PT/OT eval  Diet:  Diet NPO  VTE prophylaxis: SCD's   Code status: Full Code      Stacy Bermudez, PACO - CNP  [facility] Hematology and Oncology  [address] [place] [address]  Office : [phone]  Fax : [phone]    Attending Addendum:  I have personally performed a face to face diagnostic evaluation on this patient. I have reviewed and agree with the care plan as documented

## 2024-01-22 NOTE — PROGRESS NOTES
on Flagyl improvement in facial swelling. Patient reports a sore throat with swallowing that has continue to progress and now is eating only soft foods. He reports a 5-6 lb weight loss over the past month. Patient also endorses dysphonia. Patient saw Dr. Peralta at [facility] who scoped the patient and ordered a CT neck with contrast to Dr. Artis with concerns of a new of a base of tongue mass verses ORN of the left mandible.   8/17/23: Underwent a direct laryngoscopy with biopsy by Dr. Artis. 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 P 16 negative.  8/28/23: Presents with his wife to discuss treatment options related to new diagnosis of squamous cell carcinoma. He is accompanied by his wife. He reports that the pain with swallowing has progressively getting worse and has transition to a soft/full liquid diet.   He has been having progressively worsening dysphagia.  Prior Instrumental Assessment: Multiple MBSS in past. Most recent on 9/20/2023 with recommendation for NPO aside from ice chips for comfort.     Current Diet:  strict NPO  ice chips for comfort      Respiratory Status: Room air    Pain:  Patient does not c/o pain    OBJECTIVE      Modified Barium Swallow Study was performed in the radiology suite using c-arm with Mr. Laguna in the lateral plane.  Radiologist: Dr. Sherwood  Fluoroscopy completed by: Dr. Sherwood    PO trials  Patient was presented with trials of thin liquids (by spoon), mildly-thick liquids (by spoon), moderately-thick liquids (by spoon), and applesauce.    Oral phase:  piecemeal deglutition  oral expectoration    Pharyngeal phase:  Swallows of thin liquids, mildly-thick liquids, and moderately-thick liquids, and purees were timely. There was deep laryngeal penetration observed during and after the swallow of all consistencies that entered the laryngeal vestibule, reached the vocal folds, and was not completely cleared despite reflexive cough response.  Continue to follow patient 3x/week for duration of hospitalization and/or until goals met    Rehabilitation Potential For Stated Goals: Good    Medical Necessity    Skilled intervention continues to be required due to persistent signs and symptoms of aspiration, patient still consuming a modified diet, and medical complications.    Education:   Patient educated on Results of evaluation, Speech therapy recommendations, Role of speech therapy, SLP plan, and Diet recommendations  Education provided to Patient and RN  Education response: Verbalizes understanding    Safety:   Patient waiting in radiology holding bay for transport back to room.    Therapy Time:  Time In: 1030  Time Out: 1100  Minutes: 30    Toyin Agosto  1/22/2024 12:50 PM  Brandt Millard M.S., CCC-SLP

## 2024-01-22 NOTE — PROGRESS NOTES
Goals:  LTG: Patient will tolerate least restrictive diet without overt signs or symptoms of airway compromise.   STG: Patient will tolerate ice chips and thin liquids without overt signs or symptoms of airway compromise. Goal Updated   STG: Patient will participate in modified barium swallow study as clinically indicated.  Completed 2024    SPEECH LANGUAGE PATHOLOGY: SFSLPNOTES: Dysphagia  Daily Note    Acknowledge Order  I  Therapy Time  I   Charges     I  Rehab Caseload Tracker  NAME: Jesus Laguna  : 1945  MRN: [id number]    ADMISSION DATE: 2024  PRIMARY DIAGNOSIS: Pneumonia of right lower lobe due to infectious organism    ICD-10: Treatment Diagnosis: R13.12 Dysphagia, Oropharyngeal Phase    RECOMMENDATIONS   Diet:    NPO  Ice chips for comfort    Medication: non-oral; via PEG   Compensatory Swallowing Strategies:   Oral care Q4  Oral care prior to ice chip consumption   Therapeutic Intervention:   Patient/family education  Instrumental swallow assessment  Dysphagia treatment   Patient continues to require skilled intervention:  Yes. Recommend ongoing speech therapy services during this hospitalization.     Anticipated Discharge Needs: Ongoing speech therapy is recommended at next level of care.   Modified barium swallow study after speech therapy home health - plan for 2024.      ASSESSMENT    Clinician present post MBSS to review results of MBSS, discuss recommendations, and options for therapy. Patient in favor of continuing speech therapy to improve swallowing function. Home exercise program provided for the patient and family reports home health speech therapy services are planned post acute.     Recommend repeat modified barium swallow study in 3 months - tentatively plan for April.     GENERAL    Subjective: Patient and wife at bedside upon entry. Patient decannulated on 2024.     Recent Information/Background: Jesus Laguna is a 78 y.o. male with  30    TRAVIS CACERES, SLP  1/22/2024 3:50 PM

## 2024-01-22 NOTE — PROGRESS NOTES
Occupational Therapy Note:    OT consult received and chart reviewed--attempted to see pt for the 3x today. Pt and pt's wife both refused therapy evaluations. Stated he is mobilizing well and does not need therapy services while he is in the hospital. Requested to be removed from therapy caseload. Will d/c OT orders at this time.    Thank you,  Silvia Brown, OTR/L

## 2024-01-22 NOTE — PROGRESS NOTES
Physical Therapy Note:    Attempted to see patient this AM for physical therapy evaluation session. Patient JEM at time of attempt. Will follow and re-attempt as schedule permits/patient available. Thank you,    AYE FOX, PT     Rehab Caseload Tracker

## 2024-01-23 VITALS
WEIGHT: 174.38 LBS | BODY MASS INDEX: 24.41 KG/M2 | SYSTOLIC BLOOD PRESSURE: 110 MMHG | OXYGEN SATURATION: 96 % | HEIGHT: 71 IN | DIASTOLIC BLOOD PRESSURE: 66 MMHG | TEMPERATURE: 97.9 F | HEART RATE: 84 BPM | RESPIRATION RATE: 16 BRPM

## 2024-01-23 LAB
ANION GAP SERPL CALC-SCNC: 4 MMOL/L (ref 2–11)
BASOPHILS # BLD: 0.1 K/UL (ref 0–0.2)
BASOPHILS NFR BLD: 1 % (ref 0–2)
BUN SERPL-MCNC: 14 MG/DL (ref 8–23)
CALCIUM SERPL-MCNC: 8.5 MG/DL (ref 8.3–10.4)
CHLORIDE SERPL-SCNC: 109 MMOL/L (ref 103–113)
CO2 SERPL-SCNC: 26 MMOL/L (ref 21–32)
CREAT SERPL-MCNC: 0.5 MG/DL (ref 0.8–1.5)
DIFFERENTIAL METHOD BLD: ABNORMAL
EOSINOPHIL # BLD: 0.2 K/UL (ref 0–0.8)
EOSINOPHIL NFR BLD: 3 % (ref 0.5–7.8)
ERYTHROCYTE [DISTWIDTH] IN BLOOD BY AUTOMATED COUNT: 15.8 % (ref 11.9–14.6)
GLUCOSE SERPL-MCNC: 107 MG/DL (ref 65–100)
HCT VFR BLD AUTO: 26 % (ref 41.1–50.3)
HGB BLD-MCNC: 8.2 G/DL (ref 13.6–17.2)
IMM GRANULOCYTES # BLD AUTO: 0.8 K/UL (ref 0–0.5)
IMM GRANULOCYTES NFR BLD AUTO: 10 % (ref 0–5)
LYMPHOCYTES # BLD: 0.7 K/UL (ref 0.5–4.6)
LYMPHOCYTES NFR BLD: 9 % (ref 13–44)
MAGNESIUM SERPL-MCNC: 1.7 MG/DL (ref 1.8–2.4)
MCH RBC QN AUTO: 32.7 PG (ref 26.1–32.9)
MCHC RBC AUTO-ENTMCNC: 31.5 G/DL (ref 31.4–35)
MCV RBC AUTO: 103.6 FL (ref 82–102)
MONOCYTES # BLD: 1.2 K/UL (ref 0.1–1.3)
MONOCYTES NFR BLD: 15 % (ref 4–12)
NEUTS SEG # BLD: 5 K/UL (ref 1.7–8.2)
NEUTS SEG NFR BLD: 62 % (ref 43–78)
NRBC # BLD: 0 K/UL (ref 0–0.2)
PHOSPHATE SERPL-MCNC: 3.4 MG/DL (ref 2.3–3.7)
PLATELET # BLD AUTO: 178 K/UL (ref 150–450)
PLATELET COMMENT: ADEQUATE
PMV BLD AUTO: 9.9 FL (ref 9.4–12.3)
POTASSIUM SERPL-SCNC: 3.5 MMOL/L (ref 3.5–5.1)
RBC # BLD AUTO: 2.51 M/UL (ref 4.23–5.6)
RBC MORPH BLD: ABNORMAL
RBC MORPH BLD: ABNORMAL
SODIUM SERPL-SCNC: 139 MMOL/L (ref 136–146)
WBC # BLD AUTO: 8 K/UL (ref 4.3–11.1)

## 2024-01-23 PROCEDURE — 6370000000 HC RX 637 (ALT 250 FOR IP): Performed by: STUDENT IN AN ORGANIZED HEALTH CARE EDUCATION/TRAINING PROGRAM

## 2024-01-23 PROCEDURE — 2580000003 HC RX 258: Performed by: FAMILY MEDICINE

## 2024-01-23 PROCEDURE — 83735 ASSAY OF MAGNESIUM: CPT

## 2024-01-23 PROCEDURE — 6370000000 HC RX 637 (ALT 250 FOR IP): Performed by: FAMILY MEDICINE

## 2024-01-23 PROCEDURE — 99239 HOSP IP/OBS DSCHRG MGMT >30: CPT | Performed by: INTERNAL MEDICINE

## 2024-01-23 PROCEDURE — APPSS60 APP SPLIT SHARED TIME 46-60 MINUTES: Performed by: NURSE PRACTITIONER

## 2024-01-23 PROCEDURE — 85025 COMPLETE CBC W/AUTO DIFF WBC: CPT

## 2024-01-23 PROCEDURE — 80048 BASIC METABOLIC PNL TOTAL CA: CPT

## 2024-01-23 PROCEDURE — 6360000002 HC RX W HCPCS: Performed by: FAMILY MEDICINE

## 2024-01-23 PROCEDURE — 6370000000 HC RX 637 (ALT 250 FOR IP): Performed by: INTERNAL MEDICINE

## 2024-01-23 PROCEDURE — 36415 COLL VENOUS BLD VENIPUNCTURE: CPT

## 2024-01-23 PROCEDURE — 84100 ASSAY OF PHOSPHORUS: CPT

## 2024-01-23 RX ORDER — LEVOFLOXACIN 500 MG/1
500 TABLET, FILM COATED ORAL DAILY
Qty: 7 TABLET | Refills: 0 | Status: SHIPPED | OUTPATIENT
Start: 2024-01-23 | End: 2024-01-23

## 2024-01-23 RX ORDER — LEVOFLOXACIN 500 MG/1
500 TABLET, FILM COATED ORAL DAILY
Qty: 7 TABLET | Refills: 0 | Status: SHIPPED | OUTPATIENT
Start: 2024-01-23 | End: 2024-01-30

## 2024-01-23 RX ORDER — POTASSIUM CHLORIDE 7.45 MG/ML
10 INJECTION INTRAVENOUS
Status: DISCONTINUED | OUTPATIENT
Start: 2024-01-23 | End: 2024-01-23

## 2024-01-23 RX ADMIN — FAMOTIDINE 40 MG: 40 POWDER, FOR SUSPENSION ORAL at 09:25

## 2024-01-23 RX ADMIN — CYANOCOBALAMIN TAB 1000 MCG 1000 MCG: 1000 TAB at 09:24

## 2024-01-23 RX ADMIN — CEFEPIME 2000 MG: 2 INJECTION, POWDER, FOR SOLUTION INTRAVENOUS at 00:22

## 2024-01-23 RX ADMIN — FOLIC ACID 1 MG: 1 TABLET ORAL at 09:22

## 2024-01-23 RX ADMIN — MINERAL SUPPLEMENT IRON 300 MG / 5 ML STRENGTH LIQUID 100 PER BOX UNFLAVORED 300 MG: at 09:25

## 2024-01-23 RX ADMIN — SODIUM CHLORIDE, PRESERVATIVE FREE 10 ML: 5 INJECTION INTRAVENOUS at 09:27

## 2024-01-23 RX ADMIN — CEFEPIME 2000 MG: 2 INJECTION, POWDER, FOR SOLUTION INTRAVENOUS at 09:22

## 2024-01-23 RX ADMIN — ASPIRIN 81 MG 81 MG: 81 TABLET ORAL at 09:25

## 2024-01-23 RX ADMIN — Medication 100 MG: at 09:23

## 2024-01-23 NOTE — DISCHARGE SUMMARY
[facility]: Inpatient Hematology / Oncology Discharge Summary Note    Patient ID:  Jesus Laguna  [id number]  78 y.o.  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]  Sepsis due to pneumonia (HCC) [J18.9, A41.9]  Pneumonia of right lower lobe due to infectious organism [J18.9]  Sepsis, due to unspecified organism, unspecified whether acute organ dysfunction present (HCC) [A41.9]    Discharge Diagnoses:  Principal Diagnosis: Pneumonia of right lower lobe due to infectious organism  Principal Problem:    Pneumonia of right lower lobe due to infectious organism  Active Problems:    Laryngeal cancer (HCC)    Moderate protein-calorie malnutrition (HCC)    Hypersalivation    Immunocompromised (HCC)    Pancytopenia (HCC)    Sepsis (HCC)  Resolved Problems:    * No resolved hospital problems. *      Hospital Course:  Mr. Laguna is a 78 y.o. male admitted on 1/19/2024 with a primary diagnosis of The primary encounter diagnosis was Pneumonia of right lower lobe due to infectious organism. Diagnoses of Sepsis, due to unspecified organism, unspecified whether acute organ dysfunction present (HCC) and Laryngeal cancer (HCC) were also pertinent to this visit..       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 Plans for proceeding with keytruda maintenance next week. He had his tracheostomy removed a few days ago.  He denies obvious choking but he has had some sips of water recently (has history of aspiration pneumonia) He presented to the ER with complaints of chills and fever and was found to have a temperature of 101.6, P116. Labs revealed ANC 1000, Hgb 9.2. Chest xray suggestive of pneumonia in posterior right lower lobe. He was admitted for neutropenic fever  PEPCID  5 mLs by Per G Tube route daily     ferrous gluconate 324 (38 Fe) MG tablet  Commonly known as: FERGON  1 tablet by Per G Tube route daily (with breakfast)     lidocaine 0.5 % topical spray  Commonly known as: SOLARCAINE     lidocaine viscous hcl 2 % Soln solution  Commonly known as: XYLOCAINE     lidocaine-prilocaine 2.5-2.5 % cream  Commonly known as: EMLA  Apply dime size amount to port site ~30 min prior to accessing, DO NOT RUB IN, cover in plastic wrap to protect clothes     Magic Mouthwash  Commonly known as: Miracle Mouthwash  Swish and spit 5 mLs 4 times daily as needed for Irrit[facility] Pharm[facility] to mix equal parts Benadryl, Maalox, Viscous Lidocaine.  Take 5 mL 4 times daily as needed, gargle/swish/spit.     melatonin 3 MG Tabs tablet  Take 1 tablet by mouth nightly as needed (Sleep aid)     mirtazapine 7.5 MG tablet  Commonly known as: REMERON  1 tablet by Per G Tube route nightly     ondansetron 8 MG tablet  Commonly known as: Zofran  Take 1 tablet by mouth every 8 hours as needed for Nausea or Vomiting Take every 8 hours for 3-5 days post chemo     prochlorperazine 10 MG tablet  Commonly known as: COMPAZINE  Take 1 tablet by mouth every 6 hours as needed (N/V) Take as needed if Zofran is not controlling nausea     rosuvastatin 20 MG tablet  Commonly known as: CRESTOR  1 tablet by Per G Tube route nightly     scopolamine transdermal patch  Commonly known as: TRANSDERM-SCOP  Place 1 patch onto the skin every 72 hours     sodium chloride (Inhalant) 3 % nebulizer solution  Take 4 mLs by nebulization in the morning, at noon, and at bedtime     traZODone 50 MG tablet  Commonly known as: DESYREL  1 tablet by Per G Tube route nightly               Where to Get Your Medications        These medications were sent to [id number] - [place], [place] - [address] - P [phone] - F [phone]  [address] [facility] [address]      Phone: [phone]   levoFLOXacin 500 MG tablet

## 2024-01-23 NOTE — CARE COORDINATION
Pt to d/c home today.  Spouse is at the bedside and will provide transportation.  Neither pt nor spouse would allow PT/OT to work with pt during this hospitalization.  Pt has needed home DME.  ADAM order sent to [facility]: SN, PT, and SLP.  No other supportive care needs identified.  Pt and spouse agree with d/c plan.  Milestones met.  LOS = 4 days       01/21/24 1046   Service Assessment   Patient Orientation Alert and Oriented;Person;Place;Self   Cognition Alert   History Provided By Patient;Spouse;Medical Record   Primary Caregiver Self   Accompanied By/Relationship Spouse   Support Systems Spouse/Significant Other;Children;Family Members;Other (Comment)  (Current with [facility])   Patient's Healthcare Decision Maker is: Legal Next of Kin   PCP Verified by CM Yes  (Quinten Hartmann DO)   Last Visit to PCP Within last 3 months   Prior Functional Level Independent in ADLs/IADLs   Current Functional Level Independent in ADLs/IADLs   Can patient return to prior living arrangement Yes   Ability to make needs known: Good   Family able to assist with home care needs: Yes   Would you like for me to discuss the discharge plan with any other family members/significant others, and if so, who? No   Financial Resources Medicare;Other (Comment)  (Secondary: BCBS)   Community Resources None   CM/SW Referral Other (see comment)  (N/A)   Social/Functional History   Lives With Spouse   Type of Home House   Bathroom Toilet Standard   Bathroom Equipment Commode   Bathroom Accessibility Accessible   Home Equipment Wheelchair-manual;Crutches;Feeding equipment   Receives Help From Family;Home health  (Current with [facility])   ADL Assistance Needs assistance   Homemaking Assistance Independent   Homemaking Responsibilities No   Ambulation Assistance Needs assistance   Transfer Assistance Independent   Active  No   Patient's  Info Family   Mode of Transportation Family   Occupation Retired   Discharge Planning

## 2024-01-24 ENCOUNTER — CARE COORDINATION (OUTPATIENT)
Dept: CARE COORDINATION | Facility: CLINIC | Age: 79
End: 2024-01-24

## 2024-01-24 LAB
BACTERIA SPEC CULT: NORMAL
BACTERIA SPEC CULT: NORMAL
SERVICE CMNT-IMP: NORMAL
SERVICE CMNT-IMP: NORMAL

## 2024-01-24 NOTE — CARE COORDINATION
Care Transitions Initial Follow Up Call    Call within 2 business days of discharge: Yes    Patient Current Location:  Home: [address] [place] [id number]    Care Transition Nurse contacted the spouse/partner by telephone to perform post hospital discharge assessment. Verified name and  with spouse/partner as identifiers. Provided introduction to self, and explanation of the Care Transition Nurse role.     Patient: Jesus Laguna Patient : 1945   MRN: [id number]  Reason for Admission: PNA  Discharge Date: 24 RARS: Readmission Risk Score: 23.2      Last Discharge Facility       Date Complaint Diagnosis Description Type Department Provider    24 Fever Pneumonia of right lower lobe due to infectious organism ... ED to Hosp-Admission (Discharged) (ADMITTED) [address] Kasandra Olvera MD; Darrion Currie.            Was this an external facility discharge? No Discharge Facility: [facility]  CTN spoke with patient spouse who indicated they had all of their needs met at this time, have all DME equipment, have [facility] to house and have been in contact and will follow up with Oncology . Patient was ble to obtain medication after d/c and no questions/concerns at this time. CTN to resolve ALINE program episode and will reopen with a return call for acute needs.    Discussed follow-up appointments. If no appointment was previously scheduled, appointment scheduling offered: Yes.   Is follow up appointment scheduled within 7 days of discharge? Yes.    Follow Up  Future Appointments   Date Time Provider Department Center   2024 10:30 AM Shahid Cortez MD [facility] AMB   2024  1:45 PM [facility]   2024  2:30 PM Amanda Peter, APRN - CNP [facility] AMB   2024  2:30 PM Loraine Honeycutt RD [facility] AMB   2024  3:30 PM BMT [facility]   2024  9:15 AM [facility]   2024 10:00 AM Kasandra Olvera MD [facility] AMB   2024 10:00 AM Loraine Honeycutt RD

## 2024-01-25 ENCOUNTER — TELEPHONE (OUTPATIENT)
Dept: INTERNAL MEDICINE CLINIC | Facility: CLINIC | Age: 79
End: 2024-01-25

## 2024-01-25 NOTE — TELEPHONE ENCOUNTER
from [facility] is calling to request verbal orders to continue home health. The pt was recently discharged from the hospital and will need home health orders.

## 2024-01-30 ENCOUNTER — OFFICE VISIT (OUTPATIENT)
Dept: ENT CLINIC | Age: 79
End: 2024-01-30
Payer: MEDICARE

## 2024-01-30 VITALS — HEIGHT: 71 IN | WEIGHT: 166 LBS | BODY MASS INDEX: 23.24 KG/M2

## 2024-01-30 DIAGNOSIS — R13.14 PHARYNGOESOPHAGEAL DYSPHAGIA: ICD-10-CM

## 2024-01-30 DIAGNOSIS — Z43.0 TRACHEOSTOMY CARE (HCC): ICD-10-CM

## 2024-01-30 DIAGNOSIS — C10.9 SQUAMOUS CELL CARCINOMA OF OROPHARYNX (HCC): Primary | ICD-10-CM

## 2024-01-30 PROCEDURE — 1111F DSCHRG MED/CURRENT MED MERGE: CPT | Performed by: STUDENT IN AN ORGANIZED HEALTH CARE EDUCATION/TRAINING PROGRAM

## 2024-01-30 PROCEDURE — 1123F ACP DISCUSS/DSCN MKR DOCD: CPT | Performed by: STUDENT IN AN ORGANIZED HEALTH CARE EDUCATION/TRAINING PROGRAM

## 2024-01-30 PROCEDURE — G8484 FLU IMMUNIZE NO ADMIN: HCPCS | Performed by: STUDENT IN AN ORGANIZED HEALTH CARE EDUCATION/TRAINING PROGRAM

## 2024-01-30 PROCEDURE — 31575 DIAGNOSTIC LARYNGOSCOPY: CPT | Performed by: STUDENT IN AN ORGANIZED HEALTH CARE EDUCATION/TRAINING PROGRAM

## 2024-01-30 PROCEDURE — G8420 CALC BMI NORM PARAMETERS: HCPCS | Performed by: STUDENT IN AN ORGANIZED HEALTH CARE EDUCATION/TRAINING PROGRAM

## 2024-01-30 PROCEDURE — 1036F TOBACCO NON-USER: CPT | Performed by: STUDENT IN AN ORGANIZED HEALTH CARE EDUCATION/TRAINING PROGRAM

## 2024-01-30 PROCEDURE — G8427 DOCREV CUR MEDS BY ELIG CLIN: HCPCS | Performed by: STUDENT IN AN ORGANIZED HEALTH CARE EDUCATION/TRAINING PROGRAM

## 2024-01-30 PROCEDURE — 99214 OFFICE O/P EST MOD 30 MIN: CPT | Performed by: STUDENT IN AN ORGANIZED HEALTH CARE EDUCATION/TRAINING PROGRAM

## 2024-01-30 ASSESSMENT — ENCOUNTER SYMPTOMS
VOMITING: 0
EYE ITCHING: 0
EYE REDNESS: 0
SHORTNESS OF BREATH: 0

## 2024-01-30 NOTE — PROGRESS NOTES
vallecula and epiglottic tumor.  Widely patent airway.  Small piece of tracheal cartilage protruding into the lumen, nonobstructive.  Both aryepiglottic folds were clear and there was a normal appearing glottis w/ healthy TVCs. No nodules or polyps and the cords were fully mobile. No concerning lesions seen along post-cricoid region or within either piriform sinus. The posterior pharyngeal was clear as well. The scope was then carefully removed. The patient tolerated the procedure well and there were no complications.    Lab Results   Component Value Date    WBC 8.0 01/23/2024    HGB 8.2 (L) 01/23/2024    HCT 26.0 (L) 01/23/2024    .6 (H) 01/23/2024     01/23/2024     Lab Results   Component Value Date/Time     01/23/2024 07:44 AM    K 3.5 01/23/2024 07:44 AM     01/23/2024 07:44 AM    CO2 26 01/23/2024 07:44 AM    BUN 14 01/23/2024 07:44 AM    CREATININE 0.50 01/23/2024 07:44 AM    GLUCOSE 107 01/23/2024 07:44 AM    CALCIUM 8.5 01/23/2024 07:44 AM    LABGLOM >60 01/23/2024 07:44 AM      Xray Result (most recent):  XR CHEST STANDARD TWO VW 01/19/2024    Narrative  PA AND LATERAL VIEWS OF THE CHEST    HISTORY: Cough    COMPARISON: Chest radiographs dated 11/29/2023    Impression  Hardware/Lines: Right chest port catheter, unchanged.  The cardiac silhouette is normal in size.  Subtle hazy opacity in the posterior right lower lobe, suggestive of pneumonia  in the appropriate clinical setting. Left lung is clear. Trace right pleural  effusion. No left pleural effusion. No pneumothorax.      ASSESSMENT and PLAN  They will apply airtight pressure dressing to trach stoma for 1 more week.  Strict n.p.o. at this point given recent pneumonia.  Nutrition via G-tube.  Airway still widely patent.  He is starting pembrolizumab.  I will see him back in 2 months.    ICD-10-CM    1. Squamous cell carcinoma of oropharynx (HCC)  C10.9       2. Tracheostomy care (HCC)  Z43.0       3. Pharyngoesophageal

## 2024-01-31 DIAGNOSIS — R53.83 FATIGUE DUE TO TREATMENT: ICD-10-CM

## 2024-01-31 DIAGNOSIS — C32.9 LARYNGEAL CANCER (HCC): ICD-10-CM

## 2024-01-31 DIAGNOSIS — J18.9 PNEUMONIA OF RIGHT LOWER LOBE DUE TO INFECTIOUS ORGANISM: Primary | ICD-10-CM

## 2024-02-01 ENCOUNTER — HOSPITAL ENCOUNTER (OUTPATIENT)
Dept: LAB | Age: 79
End: 2024-02-01
Payer: MEDICARE

## 2024-02-01 ENCOUNTER — OFFICE VISIT (OUTPATIENT)
Dept: ONCOLOGY | Age: 79
End: 2024-02-01
Payer: MEDICARE

## 2024-02-01 ENCOUNTER — HOSPITAL ENCOUNTER (OUTPATIENT)
Dept: INFUSION THERAPY | Age: 79
Setting detail: INFUSION SERIES
Discharge: HOME | End: 2024-02-01
Payer: MEDICARE

## 2024-02-01 VITALS
TEMPERATURE: 97.5 F | BODY MASS INDEX: 23.63 KG/M2 | WEIGHT: 168.8 LBS | OXYGEN SATURATION: 97 % | DIASTOLIC BLOOD PRESSURE: 74 MMHG | SYSTOLIC BLOOD PRESSURE: 119 MMHG | HEIGHT: 71 IN | HEART RATE: 95 BPM | RESPIRATION RATE: 16 BRPM

## 2024-02-01 DIAGNOSIS — E03.2 HYPOTHYROIDISM DUE TO MEDICAMENTS AND OTHER EXOGENOUS SUBSTANCES: ICD-10-CM

## 2024-02-01 DIAGNOSIS — C32.9 LARYNGEAL CANCER (HCC): Primary | ICD-10-CM

## 2024-02-01 DIAGNOSIS — C32.9 KERATINIZING SQUAMOUS CELL CARCINOMA OF LARYNX (HCC): Primary | ICD-10-CM

## 2024-02-01 DIAGNOSIS — Z78.9 ON ENTERAL NUTRITION: ICD-10-CM

## 2024-02-01 DIAGNOSIS — R53.83 FATIGUE DUE TO TREATMENT: ICD-10-CM

## 2024-02-01 DIAGNOSIS — C32.9 LARYNGEAL CANCER (HCC): ICD-10-CM

## 2024-02-01 LAB
ALBUMIN SERPL-MCNC: 3.1 G/DL (ref 3.2–4.6)
ALBUMIN/GLOB SERPL: 0.8 (ref 0.4–1.6)
ALP SERPL-CCNC: 76 U/L (ref 50–136)
ALT SERPL-CCNC: 25 U/L (ref 12–65)
ANION GAP SERPL CALC-SCNC: 3 MMOL/L (ref 2–11)
AST SERPL-CCNC: 18 U/L (ref 15–37)
BASOPHILS # BLD: 0 K/UL (ref 0–0.2)
BASOPHILS NFR BLD: 0 % (ref 0–2)
BILIRUB SERPL-MCNC: 0.2 MG/DL (ref 0.2–1.1)
BUN SERPL-MCNC: 28 MG/DL (ref 8–23)
CALCIUM SERPL-MCNC: 8.5 MG/DL (ref 8.3–10.4)
CHLORIDE SERPL-SCNC: 106 MMOL/L (ref 103–113)
CO2 SERPL-SCNC: 28 MMOL/L (ref 21–32)
CREAT SERPL-MCNC: 0.6 MG/DL (ref 0.8–1.5)
DIFFERENTIAL METHOD BLD: ABNORMAL
EOSINOPHIL # BLD: 0.1 K/UL (ref 0–0.8)
EOSINOPHIL NFR BLD: 1 % (ref 0.5–7.8)
ERYTHROCYTE [DISTWIDTH] IN BLOOD BY AUTOMATED COUNT: 15.9 % (ref 11.9–14.6)
GLOBULIN SER CALC-MCNC: 3.8 G/DL (ref 2.8–4.5)
GLUCOSE SERPL-MCNC: 135 MG/DL (ref 65–100)
HCT VFR BLD AUTO: 29.2 % (ref 41.1–50.3)
HGB BLD-MCNC: 9.2 G/DL (ref 13.6–17.2)
IMM GRANULOCYTES # BLD AUTO: 0 K/UL (ref 0–0.5)
IMM GRANULOCYTES NFR BLD AUTO: 0 % (ref 0–5)
LYMPHOCYTES # BLD: 0.7 K/UL (ref 0.5–4.6)
LYMPHOCYTES NFR BLD: 10 % (ref 13–44)
MAGNESIUM SERPL-MCNC: 1.8 MG/DL (ref 1.8–2.4)
MCH RBC QN AUTO: 33 PG (ref 26.1–32.9)
MCHC RBC AUTO-ENTMCNC: 31.5 G/DL (ref 31.4–35)
MCV RBC AUTO: 104.7 FL (ref 82–102)
MONOCYTES # BLD: 0.8 K/UL (ref 0.1–1.3)
MONOCYTES NFR BLD: 12 % (ref 4–12)
NEUTS SEG # BLD: 5.1 K/UL (ref 1.7–8.2)
NEUTS SEG NFR BLD: 76 % (ref 43–78)
NRBC # BLD: 0 K/UL (ref 0–0.2)
PLATELET # BLD AUTO: 308 K/UL (ref 150–450)
PMV BLD AUTO: 9.4 FL (ref 9.4–12.3)
POTASSIUM SERPL-SCNC: 3.9 MMOL/L (ref 3.5–5.1)
PROT SERPL-MCNC: 6.9 G/DL (ref 6.3–8.2)
RBC # BLD AUTO: 2.79 M/UL (ref 4.23–5.6)
SODIUM SERPL-SCNC: 137 MMOL/L (ref 136–146)
TSH, 3RD GENERATION: 0.84 UIU/ML (ref 0.36–3)
WBC # BLD AUTO: 6.7 K/UL (ref 4.3–11.1)

## 2024-02-01 PROCEDURE — 83735 ASSAY OF MAGNESIUM: CPT

## 2024-02-01 PROCEDURE — 2580000003 HC RX 258: Performed by: INTERNAL MEDICINE

## 2024-02-01 PROCEDURE — G8484 FLU IMMUNIZE NO ADMIN: HCPCS | Performed by: NURSE PRACTITIONER

## 2024-02-01 PROCEDURE — 96413 CHEMO IV INFUSION 1 HR: CPT

## 2024-02-01 PROCEDURE — 6360000002 HC RX W HCPCS: Performed by: NURSE PRACTITIONER

## 2024-02-01 PROCEDURE — 1111F DSCHRG MED/CURRENT MED MERGE: CPT | Performed by: NURSE PRACTITIONER

## 2024-02-01 PROCEDURE — 1036F TOBACCO NON-USER: CPT | Performed by: NURSE PRACTITIONER

## 2024-02-01 PROCEDURE — 85025 COMPLETE CBC W/AUTO DIFF WBC: CPT

## 2024-02-01 PROCEDURE — 99214 OFFICE O/P EST MOD 30 MIN: CPT | Performed by: NURSE PRACTITIONER

## 2024-02-01 PROCEDURE — G8427 DOCREV CUR MEDS BY ELIG CLIN: HCPCS | Performed by: NURSE PRACTITIONER

## 2024-02-01 PROCEDURE — 84443 ASSAY THYROID STIM HORMONE: CPT

## 2024-02-01 PROCEDURE — G8420 CALC BMI NORM PARAMETERS: HCPCS | Performed by: NURSE PRACTITIONER

## 2024-02-01 PROCEDURE — 80053 COMPREHEN METABOLIC PANEL: CPT

## 2024-02-01 PROCEDURE — 2580000003 HC RX 258: Performed by: NURSE PRACTITIONER

## 2024-02-01 PROCEDURE — 1123F ACP DISCUSS/DSCN MKR DOCD: CPT | Performed by: NURSE PRACTITIONER

## 2024-02-01 RX ORDER — ONDANSETRON 2 MG/ML
8 INJECTION INTRAMUSCULAR; INTRAVENOUS
Status: CANCELLED | OUTPATIENT
Start: 2024-02-01

## 2024-02-01 RX ORDER — SODIUM CHLORIDE 9 MG/ML
5-250 INJECTION, SOLUTION INTRAVENOUS PRN
Status: DISCONTINUED | OUTPATIENT
Start: 2024-02-01 | End: 2024-02-02 | Stop reason: HOSPADM

## 2024-02-01 RX ORDER — DIPHENHYDRAMINE HYDROCHLORIDE 50 MG/ML
50 INJECTION INTRAMUSCULAR; INTRAVENOUS
Status: CANCELLED | OUTPATIENT
Start: 2024-02-01

## 2024-02-01 RX ORDER — MEPERIDINE HYDROCHLORIDE 25 MG/ML
12.5 INJECTION INTRAMUSCULAR; INTRAVENOUS; SUBCUTANEOUS PRN
Status: DISCONTINUED | OUTPATIENT
Start: 2024-02-01 | End: 2024-02-02 | Stop reason: HOSPADM

## 2024-02-01 RX ORDER — EPINEPHRINE 1 MG/ML
0.3 INJECTION, SOLUTION, CONCENTRATE INTRAVENOUS PRN
Status: CANCELLED | OUTPATIENT
Start: 2024-02-01

## 2024-02-01 RX ORDER — ALBUTEROL SULFATE 90 UG/1
4 AEROSOL, METERED RESPIRATORY (INHALATION) PRN
Status: DISCONTINUED | OUTPATIENT
Start: 2024-02-01 | End: 2024-02-02 | Stop reason: HOSPADM

## 2024-02-01 RX ORDER — ACETAMINOPHEN 325 MG/1
650 TABLET ORAL
Status: DISCONTINUED | OUTPATIENT
Start: 2024-02-01 | End: 2024-02-02 | Stop reason: HOSPADM

## 2024-02-01 RX ORDER — SODIUM CHLORIDE 0.9 % (FLUSH) 0.9 %
5-40 SYRINGE (ML) INJECTION PRN
Status: ACTIVE | OUTPATIENT
Start: 2024-02-01 | End: 2024-02-02

## 2024-02-01 RX ORDER — SODIUM CHLORIDE 9 MG/ML
5-250 INJECTION, SOLUTION INTRAVENOUS PRN
Status: CANCELLED | OUTPATIENT
Start: 2024-02-01

## 2024-02-01 RX ORDER — ACETAMINOPHEN 325 MG/1
650 TABLET ORAL
Status: CANCELLED | OUTPATIENT
Start: 2024-02-01

## 2024-02-01 RX ORDER — HEPARIN 100 UNIT/ML
500 SYRINGE INTRAVENOUS PRN
Status: CANCELLED | OUTPATIENT
Start: 2024-02-01

## 2024-02-01 RX ORDER — DIPHENHYDRAMINE HYDROCHLORIDE 50 MG/ML
50 INJECTION INTRAMUSCULAR; INTRAVENOUS
Status: DISCONTINUED | OUTPATIENT
Start: 2024-02-01 | End: 2024-02-02 | Stop reason: HOSPADM

## 2024-02-01 RX ORDER — SODIUM CHLORIDE 0.9 % (FLUSH) 0.9 %
5-40 SYRINGE (ML) INJECTION PRN
Status: DISCONTINUED | OUTPATIENT
Start: 2024-02-01 | End: 2024-02-02 | Stop reason: HOSPADM

## 2024-02-01 RX ORDER — SODIUM CHLORIDE 0.9 % (FLUSH) 0.9 %
5-40 SYRINGE (ML) INJECTION PRN
Status: CANCELLED | OUTPATIENT
Start: 2024-02-01

## 2024-02-01 RX ORDER — ONDANSETRON 2 MG/ML
8 INJECTION INTRAMUSCULAR; INTRAVENOUS
Status: DISCONTINUED | OUTPATIENT
Start: 2024-02-01 | End: 2024-02-02 | Stop reason: HOSPADM

## 2024-02-01 RX ORDER — SODIUM CHLORIDE 9 MG/ML
INJECTION, SOLUTION INTRAVENOUS CONTINUOUS
Status: CANCELLED | OUTPATIENT
Start: 2024-02-01

## 2024-02-01 RX ORDER — MEPERIDINE HYDROCHLORIDE 25 MG/ML
12.5 INJECTION INTRAMUSCULAR; INTRAVENOUS; SUBCUTANEOUS PRN
Status: CANCELLED | OUTPATIENT
Start: 2024-02-01

## 2024-02-01 RX ORDER — ALBUTEROL SULFATE 90 UG/1
4 AEROSOL, METERED RESPIRATORY (INHALATION) PRN
Status: CANCELLED | OUTPATIENT
Start: 2024-02-01

## 2024-02-01 RX ORDER — EPINEPHRINE 1 MG/ML
0.3 INJECTION, SOLUTION, CONCENTRATE INTRAVENOUS PRN
Status: DISCONTINUED | OUTPATIENT
Start: 2024-02-01 | End: 2024-02-02 | Stop reason: HOSPADM

## 2024-02-01 RX ADMIN — SODIUM CHLORIDE, PRESERVATIVE FREE 10 ML: 5 INJECTION INTRAVENOUS at 13:48

## 2024-02-01 RX ADMIN — SODIUM CHLORIDE, PRESERVATIVE FREE 10 ML: 5 INJECTION INTRAVENOUS at 15:10

## 2024-02-01 RX ADMIN — SODIUM CHLORIDE 200 MG: 9 INJECTION, SOLUTION INTRAVENOUS at 15:31

## 2024-02-01 RX ADMIN — SODIUM CHLORIDE 50 ML/HR: 9 INJECTION, SOLUTION INTRAVENOUS at 15:10

## 2024-02-01 ASSESSMENT — PATIENT HEALTH QUESTIONNAIRE - PHQ9
SUM OF ALL RESPONSES TO PHQ QUESTIONS 1-9: 0
2. FEELING DOWN, DEPRESSED OR HOPELESS: 0
SUM OF ALL RESPONSES TO PHQ QUESTIONS 1-9: 0
SUM OF ALL RESPONSES TO PHQ9 QUESTIONS 1 & 2: 0
SUM OF ALL RESPONSES TO PHQ QUESTIONS 1-9: 0
SUM OF ALL RESPONSES TO PHQ QUESTIONS 1-9: 0
1. LITTLE INTEREST OR PLEASURE IN DOING THINGS: 0

## 2024-02-01 NOTE — PROGRESS NOTES
Arrived to the Infusion Center.  C7D1 Keytruda infusion completed. Patient tolerated well.   Any issues or concerns during appointment: none.  Patient aware of next infusion appointment on 2/22/24 (date) at 1200 (time).  Patient aware of next lab and [facility] office visit on 2/22/24 (date) at 0915 (time).  Patient instructed to call provider with temperature of 100.4 or greater or nausea/vomiting/ diarrhea or pain not controlled by medications  Discharged home ambulatory.

## 2024-02-01 NOTE — PROGRESS NOTES
[facility]    2/1/2024    2:40 PM    Jesus Laguna [id number]    78 y.o.      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  Per records margins were close but negative with evidence of LVI as well as PNI and extracapsular spread.  He completed adjuvant radiation therapy and end of 2010 to early 2011.  He followed with Dr. Austen Bowman ENT up till the 5-year carlee in 2015 with no evidence of recurrence.  2 years post therapy he had multiple teeth removed of his left lower jaw followed by HBO therapy x10 sessions due to poor healing.  More recently patient presented with symptoms of congestion and hypersalivation, completed a couple courses of antibiotics without resolution.  Had minimal weight loss.  He was seen by Dr. Clair Bowman ENT with the scope and abnormal CT neck which was abnormal.  He was thereafter referred to Dr. James douglas at [facility].  Patient underwent direct laryngoscopy 8/17/2023 as well as PET scan 8/9/2023.  PET scan demonstrated hypermetabolic mass at base of tongue, mildly metabolic nonenlarged right level 3 cervical lymph node, and no evidence of distant metastasis.  Direct laryngoscopy with biopsy with pathology of larynx and epiglottis revealing invasive moderately differentiated squamous cell cancer.  P16 stain for HPV was negative.  Patient now

## 2024-02-05 ENCOUNTER — TELEPHONE (OUTPATIENT)
Dept: INTERNAL MEDICINE CLINIC | Facility: CLINIC | Age: 79
End: 2024-02-05

## 2024-02-05 NOTE — TELEPHONE ENCOUNTER
Patient needs a PA for his famotidine. PA is asking if patient has GERD. Called patient's wife to ask if the patient has acid reflux, patient's wife confirmed.

## 2024-02-21 RX ORDER — SODIUM CHLORIDE 0.9 % (FLUSH) 0.9 %
5-40 SYRINGE (ML) INJECTION PRN
Status: DISCONTINUED | OUTPATIENT
Start: 2024-02-22 | End: 2024-02-26 | Stop reason: HOSPADM

## 2024-02-22 ENCOUNTER — HOSPITAL ENCOUNTER (OUTPATIENT)
Dept: INFUSION THERAPY | Age: 79
Discharge: HOME | End: 2024-02-22
Payer: MEDICARE

## 2024-02-22 ENCOUNTER — OFFICE VISIT (OUTPATIENT)
Dept: ONCOLOGY | Age: 79
End: 2024-02-22
Payer: MEDICARE

## 2024-02-22 ENCOUNTER — HOSPITAL ENCOUNTER (OUTPATIENT)
Dept: LAB | Age: 79
End: 2024-02-22
Payer: MEDICARE

## 2024-02-22 ENCOUNTER — OFFICE VISIT (OUTPATIENT)
Dept: ONCOLOGY | Age: 79
End: 2024-02-22

## 2024-02-22 VITALS
TEMPERATURE: 97.9 F | DIASTOLIC BLOOD PRESSURE: 87 MMHG | OXYGEN SATURATION: 100 % | HEIGHT: 71 IN | BODY MASS INDEX: 23.94 KG/M2 | HEART RATE: 84 BPM | SYSTOLIC BLOOD PRESSURE: 122 MMHG | RESPIRATION RATE: 15 BRPM | WEIGHT: 171 LBS

## 2024-02-22 DIAGNOSIS — C32.9 LARYNGEAL CANCER (HCC): ICD-10-CM

## 2024-02-22 DIAGNOSIS — Z78.9 ON ENTERAL NUTRITION: Primary | ICD-10-CM

## 2024-02-22 DIAGNOSIS — Z00.8 NUTRITIONAL ASSESSMENT: ICD-10-CM

## 2024-02-22 DIAGNOSIS — E03.2 HYPOTHYROIDISM DUE TO MEDICAMENTS AND OTHER EXOGENOUS SUBSTANCES: Primary | ICD-10-CM

## 2024-02-22 DIAGNOSIS — E03.2 HYPOTHYROIDISM DUE TO MEDICAMENTS AND OTHER EXOGENOUS SUBSTANCES: ICD-10-CM

## 2024-02-22 DIAGNOSIS — C13.2 MALIGNANT NEOPLASM OF POSTERIOR WALL OF HYPOPHARYNX (HCC): ICD-10-CM

## 2024-02-22 DIAGNOSIS — Z79.899 HIGH RISK MEDICATION USE: ICD-10-CM

## 2024-02-22 DIAGNOSIS — Z78.9 ON ENTERAL NUTRITION: ICD-10-CM

## 2024-02-22 DIAGNOSIS — C32.9 LARYNGEAL CANCER (HCC): Primary | ICD-10-CM

## 2024-02-22 DIAGNOSIS — C32.9 KERATINIZING SQUAMOUS CELL CARCINOMA OF LARYNX (HCC): Primary | ICD-10-CM

## 2024-02-22 LAB
ALBUMIN SERPL-MCNC: 3.1 G/DL (ref 3.2–4.6)
ALBUMIN/GLOB SERPL: 0.7 (ref 0.4–1.6)
ALP SERPL-CCNC: 84 U/L (ref 50–136)
ALT SERPL-CCNC: 64 U/L (ref 12–65)
ANION GAP SERPL CALC-SCNC: 6 MMOL/L (ref 2–11)
AST SERPL-CCNC: 40 U/L (ref 15–37)
BASOPHILS # BLD: 0 K/UL (ref 0–0.2)
BASOPHILS NFR BLD: 0 % (ref 0–2)
BILIRUB SERPL-MCNC: 0.2 MG/DL (ref 0.2–1.1)
BUN SERPL-MCNC: 29 MG/DL (ref 8–23)
CALCIUM SERPL-MCNC: 9.2 MG/DL (ref 8.3–10.4)
CHLORIDE SERPL-SCNC: 105 MMOL/L (ref 103–113)
CO2 SERPL-SCNC: 27 MMOL/L (ref 21–32)
CREAT SERPL-MCNC: 0.7 MG/DL (ref 0.8–1.5)
DIFFERENTIAL METHOD BLD: ABNORMAL
EOSINOPHIL # BLD: 0.2 K/UL (ref 0–0.8)
EOSINOPHIL NFR BLD: 2 % (ref 0.5–7.8)
ERYTHROCYTE [DISTWIDTH] IN BLOOD BY AUTOMATED COUNT: 14.5 % (ref 11.9–14.6)
GLOBULIN SER CALC-MCNC: 4.4 G/DL (ref 2.8–4.5)
GLUCOSE SERPL-MCNC: 135 MG/DL (ref 65–100)
HCT VFR BLD AUTO: 33.1 % (ref 41.1–50.3)
HGB BLD-MCNC: 10.6 G/DL (ref 13.6–17.2)
IMM GRANULOCYTES # BLD AUTO: 0 K/UL (ref 0–0.5)
IMM GRANULOCYTES NFR BLD AUTO: 1 % (ref 0–5)
LYMPHOCYTES # BLD: 0.6 K/UL (ref 0.5–4.6)
LYMPHOCYTES NFR BLD: 9 % (ref 13–44)
MAGNESIUM SERPL-MCNC: 1.9 MG/DL (ref 1.8–2.4)
MCH RBC QN AUTO: 32.4 PG (ref 26.1–32.9)
MCHC RBC AUTO-ENTMCNC: 32 G/DL (ref 31.4–35)
MCV RBC AUTO: 101.2 FL (ref 82–102)
MONOCYTES # BLD: 1.2 K/UL (ref 0.1–1.3)
MONOCYTES NFR BLD: 18 % (ref 4–12)
NEUTS SEG # BLD: 4.5 K/UL (ref 1.7–8.2)
NEUTS SEG NFR BLD: 70 % (ref 43–78)
NRBC # BLD: 0 K/UL (ref 0–0.2)
PLATELET # BLD AUTO: 257 K/UL (ref 150–450)
PMV BLD AUTO: 9.8 FL (ref 9.4–12.3)
POTASSIUM SERPL-SCNC: 3.7 MMOL/L (ref 3.5–5.1)
PROT SERPL-MCNC: 7.5 G/DL (ref 6.3–8.2)
RBC # BLD AUTO: 3.27 M/UL (ref 4.23–5.6)
SODIUM SERPL-SCNC: 138 MMOL/L (ref 136–146)
TSH W FREE THYROID IF ABNORMAL: 1.53 UIU/ML (ref 0.36–3.74)
WBC # BLD AUTO: 6.4 K/UL (ref 4.3–11.1)

## 2024-02-22 PROCEDURE — 83735 ASSAY OF MAGNESIUM: CPT

## 2024-02-22 PROCEDURE — 84443 ASSAY THYROID STIM HORMONE: CPT

## 2024-02-22 PROCEDURE — 2580000003 HC RX 258: Performed by: INTERNAL MEDICINE

## 2024-02-22 PROCEDURE — G8484 FLU IMMUNIZE NO ADMIN: HCPCS | Performed by: INTERNAL MEDICINE

## 2024-02-22 PROCEDURE — 99215 OFFICE O/P EST HI 40 MIN: CPT | Performed by: INTERNAL MEDICINE

## 2024-02-22 PROCEDURE — G8420 CALC BMI NORM PARAMETERS: HCPCS | Performed by: INTERNAL MEDICINE

## 2024-02-22 PROCEDURE — 1123F ACP DISCUSS/DSCN MKR DOCD: CPT | Performed by: INTERNAL MEDICINE

## 2024-02-22 PROCEDURE — 1036F TOBACCO NON-USER: CPT | Performed by: INTERNAL MEDICINE

## 2024-02-22 PROCEDURE — 6360000002 HC RX W HCPCS: Performed by: INTERNAL MEDICINE

## 2024-02-22 PROCEDURE — 96413 CHEMO IV INFUSION 1 HR: CPT

## 2024-02-22 PROCEDURE — 85025 COMPLETE CBC W/AUTO DIFF WBC: CPT

## 2024-02-22 PROCEDURE — G8427 DOCREV CUR MEDS BY ELIG CLIN: HCPCS | Performed by: INTERNAL MEDICINE

## 2024-02-22 PROCEDURE — 80053 COMPREHEN METABOLIC PANEL: CPT

## 2024-02-22 PROCEDURE — 1111F DSCHRG MED/CURRENT MED MERGE: CPT | Performed by: INTERNAL MEDICINE

## 2024-02-22 RX ORDER — SODIUM CHLORIDE 9 MG/ML
5-250 INJECTION, SOLUTION INTRAVENOUS PRN
Status: CANCELLED | OUTPATIENT
Start: 2024-02-22

## 2024-02-22 RX ORDER — HEPARIN 100 UNIT/ML
500 SYRINGE INTRAVENOUS PRN
Status: CANCELLED | OUTPATIENT
Start: 2024-02-22

## 2024-02-22 RX ORDER — ALBUTEROL SULFATE 90 UG/1
4 AEROSOL, METERED RESPIRATORY (INHALATION) PRN
Status: CANCELLED | OUTPATIENT
Start: 2024-02-22

## 2024-02-22 RX ORDER — ACETAMINOPHEN 325 MG/1
650 TABLET ORAL
Status: DISCONTINUED | OUTPATIENT
Start: 2024-02-22 | End: 2024-02-23 | Stop reason: HOSPADM

## 2024-02-22 RX ORDER — ONDANSETRON 2 MG/ML
8 INJECTION INTRAMUSCULAR; INTRAVENOUS
Status: DISCONTINUED | OUTPATIENT
Start: 2024-02-22 | End: 2024-02-23 | Stop reason: HOSPADM

## 2024-02-22 RX ORDER — ONDANSETRON 2 MG/ML
8 INJECTION INTRAMUSCULAR; INTRAVENOUS
Status: CANCELLED | OUTPATIENT
Start: 2024-02-22

## 2024-02-22 RX ORDER — DIPHENHYDRAMINE HYDROCHLORIDE 50 MG/ML
50 INJECTION INTRAMUSCULAR; INTRAVENOUS
Status: CANCELLED | OUTPATIENT
Start: 2024-02-22

## 2024-02-22 RX ORDER — SODIUM CHLORIDE 9 MG/ML
INJECTION, SOLUTION INTRAVENOUS CONTINUOUS
Status: CANCELLED | OUTPATIENT
Start: 2024-02-22

## 2024-02-22 RX ORDER — EPINEPHRINE 1 MG/ML
0.3 INJECTION, SOLUTION, CONCENTRATE INTRAVENOUS PRN
Status: CANCELLED | OUTPATIENT
Start: 2024-02-22

## 2024-02-22 RX ORDER — MEPERIDINE HYDROCHLORIDE 25 MG/ML
12.5 INJECTION INTRAMUSCULAR; INTRAVENOUS; SUBCUTANEOUS PRN
Status: CANCELLED | OUTPATIENT
Start: 2024-02-22

## 2024-02-22 RX ORDER — SODIUM CHLORIDE 0.9 % (FLUSH) 0.9 %
5-40 SYRINGE (ML) INJECTION PRN
Status: CANCELLED | OUTPATIENT
Start: 2024-02-22

## 2024-02-22 RX ORDER — DIPHENHYDRAMINE HYDROCHLORIDE 50 MG/ML
50 INJECTION INTRAMUSCULAR; INTRAVENOUS
Status: DISCONTINUED | OUTPATIENT
Start: 2024-02-22 | End: 2024-02-23 | Stop reason: HOSPADM

## 2024-02-22 RX ORDER — SODIUM CHLORIDE 9 MG/ML
5-250 INJECTION, SOLUTION INTRAVENOUS PRN
Status: DISCONTINUED | OUTPATIENT
Start: 2024-02-22 | End: 2024-02-23 | Stop reason: HOSPADM

## 2024-02-22 RX ORDER — ALBUTEROL SULFATE 90 UG/1
4 AEROSOL, METERED RESPIRATORY (INHALATION) PRN
Status: DISCONTINUED | OUTPATIENT
Start: 2024-02-22 | End: 2024-02-23 | Stop reason: HOSPADM

## 2024-02-22 RX ORDER — ACETAMINOPHEN 325 MG/1
650 TABLET ORAL
Status: CANCELLED | OUTPATIENT
Start: 2024-02-22

## 2024-02-22 RX ORDER — EPINEPHRINE 1 MG/ML
0.3 INJECTION, SOLUTION, CONCENTRATE INTRAVENOUS PRN
Status: DISCONTINUED | OUTPATIENT
Start: 2024-02-22 | End: 2024-02-23 | Stop reason: HOSPADM

## 2024-02-22 RX ORDER — MEPERIDINE HYDROCHLORIDE 25 MG/ML
12.5 INJECTION INTRAMUSCULAR; INTRAVENOUS; SUBCUTANEOUS PRN
Status: DISCONTINUED | OUTPATIENT
Start: 2024-02-22 | End: 2024-02-23 | Stop reason: HOSPADM

## 2024-02-22 RX ORDER — SODIUM CHLORIDE 0.9 % (FLUSH) 0.9 %
5-40 SYRINGE (ML) INJECTION PRN
Status: DISCONTINUED | OUTPATIENT
Start: 2024-02-22 | End: 2024-02-23 | Stop reason: HOSPADM

## 2024-02-22 RX ADMIN — SODIUM CHLORIDE, PRESERVATIVE FREE 10 ML: 5 INJECTION INTRAVENOUS at 09:32

## 2024-02-22 RX ADMIN — SODIUM CHLORIDE 200 MG: 9 INJECTION, SOLUTION INTRAVENOUS at 12:37

## 2024-02-22 RX ADMIN — SODIUM CHLORIDE, PRESERVATIVE FREE 10 ML: 5 INJECTION INTRAVENOUS at 12:36

## 2024-02-22 RX ADMIN — SODIUM CHLORIDE 25 ML/HR: 9 INJECTION, SOLUTION INTRAVENOUS at 12:36

## 2024-02-22 ASSESSMENT — PATIENT HEALTH QUESTIONNAIRE - PHQ9
1. LITTLE INTEREST OR PLEASURE IN DOING THINGS: 0
1. LITTLE INTEREST OR PLEASURE IN DOING THINGS: 0
SUM OF ALL RESPONSES TO PHQ QUESTIONS 1-9: 0
SUM OF ALL RESPONSES TO PHQ9 QUESTIONS 1 & 2: 0
SUM OF ALL RESPONSES TO PHQ QUESTIONS 1-9: 0
SUM OF ALL RESPONSES TO PHQ9 QUESTIONS 1 & 2: 0
SUM OF ALL RESPONSES TO PHQ QUESTIONS 1-9: 0
SUM OF ALL RESPONSES TO PHQ QUESTIONS 1-9: 0
2. FEELING DOWN, DEPRESSED OR HOPELESS: 0
SUM OF ALL RESPONSES TO PHQ QUESTIONS 1-9: 0
SUM OF ALL RESPONSES TO PHQ QUESTIONS 1-9: 0
2. FEELING DOWN, DEPRESSED OR HOPELESS: 0

## 2024-02-22 NOTE — PROGRESS NOTES
Patient arrived to port lab for port access and lab draw   Port accessed and labs drawn per protocol   Port remains accessed   Patient discharged from port lab in a wheelchair with his wife

## 2024-02-22 NOTE — PROGRESS NOTES
Quit date: 1990     Years since quittin.1    Smokeless tobacco: Former    Tobacco comments:     Quit smoking: age 19 to 41   Vaping Use    Vaping Use: Never used   Substance and Sexual Activity    Alcohol use: Not Currently    Drug use: Never     Social Determinants of Health     Financial Resource Strain: Low Risk  (2023)    Overall Financial Resource Strain (CARDIA)     Difficulty of Paying Living Expenses: Not hard at all   Food Insecurity: No Food Insecurity (2024)    Hunger Vital Sign     Worried About Running Out of Food in the Last Year: Never true     Ran Out of Food in the Last Year: Never true   Transportation Needs: No Transportation Needs (2024)    PRAPARE - Transportation     Lack of Transportation (Medical): No     Lack of Transportation (Non-Medical): No   Physical Activity: Insufficiently Active (2023)    Exercise Vital Sign     Days of Exercise per Week: 6 days     Minutes of Exercise per Session: 20 min   Housing Stability: Low Risk  (2024)    Housing Stability Vital Sign     Unable to Pay for Housing in the Last Year: No     Number of Places Lived in the Last Year: 1     Unstable Housing in the Last Year: No       Family History   Problem Relation Age of Onset    Malig Hypertherm Neg Hx     Pseudochol. Deficiency Neg Hx     Delayed Awakening Neg Hx     Post-op Nausea/Vomiting Neg Hx     Emergence Delirium Neg Hx     Deep Vein Thrombosis Brother         PE     Other Neg Hx     Cancer Mother         breast    Deep Vein Thrombosis Father         with PE; provoked     Lupus Sister     Post-op Cognitive Dysfunction Neg Hx        Allergies   Allergen Reactions    Zolpidem Other (See Comments)     Hallucinations   Ambien    Sulfamethoxazole-Trimethoprim Other (See Comments)       PHYSICAL EXAMINATION:  General Appearance: Healthy appearing patient in no acute distress  Vitals reviewed.   /87   Pulse 84   Temp 97.9 °F (36.6 °C) (Oral)   Resp 15   Ht 1.803 m (5'

## 2024-02-22 NOTE — PROGRESS NOTES
Arrived to the Infusion Center.  Keytruda completed. Patient tolerated well.   Any issues or concerns during appointment: none.  Patient aware of next infusion appointment on 3/14/24 (date) at 11 AM (time).  Patient instructed to call provider with temperature of 100.4 or greater or nausea/vomiting/ diarrhea or pain not controlled by medications  Discharged via wheelchair with wife.

## 2024-02-22 NOTE — PATIENT INSTRUCTIONS
Patient Instructions from Today's Visit    Reason for Visit:  Pre-chemo for H&N Cancer - Keytruda (cycle 2 of single agent Keytruda)    Plan:  Proceed with Keytruda tomorrow as planned    Continue with your current tube feedings as prescribed    Continue to work with speech therapy through home health - repeat swallow study requested for April 2024, we can go ahead and get this scheduled for you today.     We will repeat a PET scan in 6 weeks.     Follow Up:  3 weeks for next cycle of Keytruda     Recent Lab Results:  Hospital Outpatient Visit on 02/22/2024   Component Date Value Ref Range Status    WBC 02/22/2024 6.4  4.3 - 11.1 K/uL Final    RBC 02/22/2024 3.27 (L)  4.23 - 5.6 M/uL Final    Hemoglobin 02/22/2024 10.6 (L)  13.6 - 17.2 g/dL Final    Hematocrit 02/22/2024 33.1 (L)  41.1 - 50.3 % Final    MCV 02/22/2024 101.2  82.0 - 102.0 FL Final    MCH 02/22/2024 32.4  26.1 - 32.9 PG Final    MCHC 02/22/2024 32.0  31.4 - 35.0 g/dL Final    RDW 02/22/2024 14.5  11.9 - 14.6 % Final    Platelets 02/22/2024 257  150 - 450 K/uL Final    MPV 02/22/2024 9.8  9.4 - 12.3 FL Final    nRBC 02/22/2024 0.00  0.0 - 0.2 K/uL Final    **Note: Absolute NRBC parameter is now reported with Hemogram**    Differential Type 02/22/2024 AUTOMATED    Final    Neutrophils % 02/22/2024 70  43 - 78 % Final    Lymphocytes % 02/22/2024 9 (L)  13 - 44 % Final    Monocytes % 02/22/2024 18 (H)  4.0 - 12.0 % Final    Eosinophils % 02/22/2024 2  0.5 - 7.8 % Final    Basophils % 02/22/2024 0  0.0 - 2.0 % Final    Immature Granulocytes 02/22/2024 1  0.0 - 5.0 % Final    Neutrophils Absolute 02/22/2024 4.5  1.7 - 8.2 K/UL Final    Lymphocytes Absolute 02/22/2024 0.6  0.5 - 4.6 K/UL Final    Monocytes Absolute 02/22/2024 1.2  0.1 - 1.3 K/UL Final    Eosinophils Absolute 02/22/2024 0.2  0.0 - 0.8 K/UL Final    Basophils Absolute 02/22/2024 0.0  0.0 - 0.2 K/UL Final    Absolute Immature Granulocyte 02/22/2024 0.0  0.0 - 0.5 K/UL Final    Sodium

## 2024-02-27 RX ORDER — SCOLOPAMINE TRANSDERMAL SYSTEM 1 MG/1
1 PATCH, EXTENDED RELEASE TRANSDERMAL
Qty: 4 PATCH | Refills: 5 | Status: SHIPPED | OUTPATIENT
Start: 2024-02-27

## 2024-02-27 NOTE — PROGRESS NOTES
Nutrition F/U:  Assessment:  Pt seen during office visit w/ Dr. Olvera, trach has been removed, pt remains NPO/TF dependent for estimated nutrition needs - MBSS while admitted for neutropenic fever showing aspiration of all consistencies.  Pt is working w/ home health SLP, requesting repeat MBSS in April 2024 - order placed today, and scheduled in May (first available).  Pt reports feeling well, receiving his 2nd dose of single agent Keytruda today. Continues to bolus feed a combination of Isosource 1.5 and Boost VHC, back to doing exercises daily. Nutrition related lab values WNL.  Current BW: 171#, up 16# over the past 6 weeks.     Intervention:  1. NPO, further diet advancement per SLP   2. MBSS scheduled for May 2024  3. Continue w/ current TF regimen   4. Refill Scopolamine patches    Monitoring/Evaluation:  1. RD to follow up during next office visit - follow up wt status, tolerance/intake of TF, symptom management.      Loraine Honeycutt RD, , LD  [phone]

## 2024-03-06 DIAGNOSIS — E03.2 HYPOTHYROIDISM DUE TO MEDICAMENTS AND OTHER EXOGENOUS SUBSTANCES: Primary | ICD-10-CM

## 2024-03-06 DIAGNOSIS — Z79.899 HIGH RISK MEDICATION USE: ICD-10-CM

## 2024-03-06 DIAGNOSIS — C15.9 MALIGNANT NEOPLASM OF ESOPHAGUS, UNSPECIFIED LOCATION (HCC): ICD-10-CM

## 2024-03-14 ENCOUNTER — HOSPITAL ENCOUNTER (OUTPATIENT)
Dept: LAB | Age: 79
End: 2024-03-14
Payer: MEDICARE

## 2024-03-14 ENCOUNTER — HOSPITAL ENCOUNTER (OUTPATIENT)
Dept: INFUSION THERAPY | Age: 79
Discharge: HOME | End: 2024-03-14
Payer: MEDICARE

## 2024-03-14 ENCOUNTER — OFFICE VISIT (OUTPATIENT)
Dept: ONCOLOGY | Age: 79
End: 2024-03-14

## 2024-03-14 ENCOUNTER — OFFICE VISIT (OUTPATIENT)
Dept: ONCOLOGY | Age: 79
End: 2024-03-14
Payer: MEDICARE

## 2024-03-14 VITALS
RESPIRATION RATE: 16 BRPM | HEART RATE: 86 BPM | BODY MASS INDEX: 23.94 KG/M2 | HEIGHT: 71 IN | SYSTOLIC BLOOD PRESSURE: 120 MMHG | TEMPERATURE: 97.7 F | WEIGHT: 171 LBS | DIASTOLIC BLOOD PRESSURE: 65 MMHG | OXYGEN SATURATION: 97 %

## 2024-03-14 DIAGNOSIS — C15.9 MALIGNANT NEOPLASM OF ESOPHAGUS, UNSPECIFIED LOCATION (HCC): ICD-10-CM

## 2024-03-14 DIAGNOSIS — C32.9 LARYNGEAL CANCER (HCC): Primary | ICD-10-CM

## 2024-03-14 DIAGNOSIS — E03.2 HYPOTHYROIDISM DUE TO MEDICAMENTS AND OTHER EXOGENOUS SUBSTANCES: ICD-10-CM

## 2024-03-14 DIAGNOSIS — Z79.899 HIGH RISK MEDICATION USE: ICD-10-CM

## 2024-03-14 DIAGNOSIS — R68.89 COPIOUS ORAL SECRETIONS: ICD-10-CM

## 2024-03-14 DIAGNOSIS — Z00.8 NUTRITIONAL ASSESSMENT: ICD-10-CM

## 2024-03-14 DIAGNOSIS — Z78.9 ON ENTERAL NUTRITION: ICD-10-CM

## 2024-03-14 DIAGNOSIS — Z78.9 ON ENTERAL NUTRITION: Primary | ICD-10-CM

## 2024-03-14 LAB
ALBUMIN SERPL-MCNC: 3 G/DL (ref 3.2–4.6)
ALBUMIN/GLOB SERPL: 0.7 (ref 0.4–1.6)
ALP SERPL-CCNC: 85 U/L (ref 50–136)
ALT SERPL-CCNC: 47 U/L (ref 12–65)
ANION GAP SERPL CALC-SCNC: 4 MMOL/L (ref 2–11)
AST SERPL-CCNC: 33 U/L (ref 15–37)
BASOPHILS # BLD: 0 K/UL (ref 0–0.2)
BASOPHILS NFR BLD: 0 % (ref 0–2)
BILIRUB SERPL-MCNC: 0.3 MG/DL (ref 0.2–1.1)
BUN SERPL-MCNC: 24 MG/DL (ref 8–23)
CALCIUM SERPL-MCNC: 9 MG/DL (ref 8.3–10.4)
CHLORIDE SERPL-SCNC: 106 MMOL/L (ref 103–113)
CO2 SERPL-SCNC: 29 MMOL/L (ref 21–32)
CREAT SERPL-MCNC: 0.6 MG/DL (ref 0.8–1.5)
DIFFERENTIAL METHOD BLD: ABNORMAL
EOSINOPHIL # BLD: 0.3 K/UL (ref 0–0.8)
EOSINOPHIL NFR BLD: 4 % (ref 0.5–7.8)
ERYTHROCYTE [DISTWIDTH] IN BLOOD BY AUTOMATED COUNT: 13.8 % (ref 11.9–14.6)
GLOBULIN SER CALC-MCNC: 4.4 G/DL (ref 2.8–4.5)
GLUCOSE SERPL-MCNC: 110 MG/DL (ref 65–100)
HCT VFR BLD AUTO: 35.1 % (ref 41.1–50.3)
HGB BLD-MCNC: 11 G/DL (ref 13.6–17.2)
IMM GRANULOCYTES # BLD AUTO: 0 K/UL (ref 0–0.5)
IMM GRANULOCYTES NFR BLD AUTO: 0 % (ref 0–5)
LYMPHOCYTES # BLD: 0.7 K/UL (ref 0.5–4.6)
LYMPHOCYTES NFR BLD: 10 % (ref 13–44)
MAGNESIUM SERPL-MCNC: 2 MG/DL (ref 1.8–2.4)
MCH RBC QN AUTO: 30.8 PG (ref 26.1–32.9)
MCHC RBC AUTO-ENTMCNC: 31.3 G/DL (ref 31.4–35)
MCV RBC AUTO: 98.3 FL (ref 82–102)
MONOCYTES # BLD: 1.2 K/UL (ref 0.1–1.3)
MONOCYTES NFR BLD: 16 % (ref 4–12)
NEUTS SEG # BLD: 4.9 K/UL (ref 1.7–8.2)
NEUTS SEG NFR BLD: 69 % (ref 43–78)
NRBC # BLD: 0 K/UL (ref 0–0.2)
PLATELET # BLD AUTO: 224 K/UL (ref 150–450)
PMV BLD AUTO: 10.7 FL (ref 9.4–12.3)
POTASSIUM SERPL-SCNC: 3.7 MMOL/L (ref 3.5–5.1)
PROT SERPL-MCNC: 7.4 G/DL (ref 6.3–8.2)
RBC # BLD AUTO: 3.57 M/UL (ref 4.23–5.6)
SODIUM SERPL-SCNC: 139 MMOL/L (ref 136–146)
TSH W FREE THYROID IF ABNORMAL: 2.02 UIU/ML (ref 0.36–3)
WBC # BLD AUTO: 7.1 K/UL (ref 4.3–11.1)

## 2024-03-14 PROCEDURE — 85025 COMPLETE CBC W/AUTO DIFF WBC: CPT

## 2024-03-14 PROCEDURE — G8420 CALC BMI NORM PARAMETERS: HCPCS | Performed by: NURSE PRACTITIONER

## 2024-03-14 PROCEDURE — 2580000003 HC RX 258: Performed by: NURSE PRACTITIONER

## 2024-03-14 PROCEDURE — 80053 COMPREHEN METABOLIC PANEL: CPT

## 2024-03-14 PROCEDURE — 1036F TOBACCO NON-USER: CPT | Performed by: NURSE PRACTITIONER

## 2024-03-14 PROCEDURE — 99214 OFFICE O/P EST MOD 30 MIN: CPT | Performed by: NURSE PRACTITIONER

## 2024-03-14 PROCEDURE — 84443 ASSAY THYROID STIM HORMONE: CPT

## 2024-03-14 PROCEDURE — G8484 FLU IMMUNIZE NO ADMIN: HCPCS | Performed by: NURSE PRACTITIONER

## 2024-03-14 PROCEDURE — 6360000002 HC RX W HCPCS: Performed by: NURSE PRACTITIONER

## 2024-03-14 PROCEDURE — 83735 ASSAY OF MAGNESIUM: CPT

## 2024-03-14 PROCEDURE — 1123F ACP DISCUSS/DSCN MKR DOCD: CPT | Performed by: NURSE PRACTITIONER

## 2024-03-14 PROCEDURE — 2580000003 HC RX 258: Performed by: INTERNAL MEDICINE

## 2024-03-14 PROCEDURE — G8427 DOCREV CUR MEDS BY ELIG CLIN: HCPCS | Performed by: NURSE PRACTITIONER

## 2024-03-14 PROCEDURE — 96413 CHEMO IV INFUSION 1 HR: CPT

## 2024-03-14 RX ORDER — DIPHENHYDRAMINE HYDROCHLORIDE 50 MG/ML
50 INJECTION INTRAMUSCULAR; INTRAVENOUS
Status: CANCELLED | OUTPATIENT
Start: 2024-03-14

## 2024-03-14 RX ORDER — SODIUM CHLORIDE 9 MG/ML
5-250 INJECTION, SOLUTION INTRAVENOUS PRN
Status: CANCELLED | OUTPATIENT
Start: 2024-03-14

## 2024-03-14 RX ORDER — HEPARIN 100 UNIT/ML
500 SYRINGE INTRAVENOUS PRN
OUTPATIENT
Start: 2024-03-14

## 2024-03-14 RX ORDER — EPINEPHRINE 1 MG/ML
0.3 INJECTION, SOLUTION, CONCENTRATE INTRAVENOUS PRN
Status: CANCELLED | OUTPATIENT
Start: 2024-03-14

## 2024-03-14 RX ORDER — ALBUTEROL SULFATE 90 UG/1
4 AEROSOL, METERED RESPIRATORY (INHALATION) PRN
Status: DISCONTINUED | OUTPATIENT
Start: 2024-03-14 | End: 2024-03-15 | Stop reason: HOSPADM

## 2024-03-14 RX ORDER — SODIUM CHLORIDE 9 MG/ML
INJECTION, SOLUTION INTRAVENOUS CONTINUOUS
Status: DISCONTINUED | OUTPATIENT
Start: 2024-03-14 | End: 2024-03-15 | Stop reason: HOSPADM

## 2024-03-14 RX ORDER — EPINEPHRINE 1 MG/ML
0.3 INJECTION, SOLUTION, CONCENTRATE INTRAVENOUS PRN
Status: DISCONTINUED | OUTPATIENT
Start: 2024-03-14 | End: 2024-03-15 | Stop reason: HOSPADM

## 2024-03-14 RX ORDER — ACETAMINOPHEN 325 MG/1
650 TABLET ORAL
Status: DISCONTINUED | OUTPATIENT
Start: 2024-03-14 | End: 2024-03-15 | Stop reason: HOSPADM

## 2024-03-14 RX ORDER — MEPERIDINE HYDROCHLORIDE 25 MG/ML
12.5 INJECTION INTRAMUSCULAR; INTRAVENOUS; SUBCUTANEOUS PRN
Status: DISCONTINUED | OUTPATIENT
Start: 2024-03-14 | End: 2024-03-15 | Stop reason: HOSPADM

## 2024-03-14 RX ORDER — SODIUM CHLORIDE 0.9 % (FLUSH) 0.9 %
5-40 SYRINGE (ML) INJECTION PRN
Status: DISCONTINUED | OUTPATIENT
Start: 2024-03-14 | End: 2024-03-15 | Stop reason: HOSPADM

## 2024-03-14 RX ORDER — SODIUM CHLORIDE 0.9 % (FLUSH) 0.9 %
5-40 SYRINGE (ML) INJECTION PRN
Status: CANCELLED | OUTPATIENT
Start: 2024-03-14

## 2024-03-14 RX ORDER — MEPERIDINE HYDROCHLORIDE 25 MG/ML
12.5 INJECTION INTRAMUSCULAR; INTRAVENOUS; SUBCUTANEOUS PRN
Status: CANCELLED | OUTPATIENT
Start: 2024-03-14

## 2024-03-14 RX ORDER — ONDANSETRON 2 MG/ML
8 INJECTION INTRAMUSCULAR; INTRAVENOUS
Status: CANCELLED | OUTPATIENT
Start: 2024-03-14

## 2024-03-14 RX ORDER — ACETAMINOPHEN 325 MG/1
650 TABLET ORAL
OUTPATIENT
Start: 2024-03-14

## 2024-03-14 RX ORDER — ACETAMINOPHEN 325 MG/1
650 TABLET ORAL
Status: CANCELLED | OUTPATIENT
Start: 2024-03-14

## 2024-03-14 RX ORDER — DIPHENHYDRAMINE HYDROCHLORIDE 50 MG/ML
50 INJECTION INTRAMUSCULAR; INTRAVENOUS
OUTPATIENT
Start: 2024-03-14

## 2024-03-14 RX ORDER — SODIUM CHLORIDE 0.9 % (FLUSH) 0.9 %
5-40 SYRINGE (ML) INJECTION PRN
Status: ACTIVE | OUTPATIENT
Start: 2024-03-14 | End: 2024-03-14

## 2024-03-14 RX ORDER — SODIUM CHLORIDE 9 MG/ML
5-250 INJECTION, SOLUTION INTRAVENOUS PRN
Status: DISCONTINUED | OUTPATIENT
Start: 2024-03-14 | End: 2024-03-15 | Stop reason: HOSPADM

## 2024-03-14 RX ORDER — SODIUM CHLORIDE 9 MG/ML
5-250 INJECTION, SOLUTION INTRAVENOUS PRN
OUTPATIENT
Start: 2024-03-14

## 2024-03-14 RX ORDER — SODIUM CHLORIDE 9 MG/ML
INJECTION, SOLUTION INTRAVENOUS CONTINUOUS
Status: CANCELLED | OUTPATIENT
Start: 2024-03-14

## 2024-03-14 RX ORDER — DIPHENHYDRAMINE HYDROCHLORIDE 50 MG/ML
50 INJECTION INTRAMUSCULAR; INTRAVENOUS
Status: DISCONTINUED | OUTPATIENT
Start: 2024-03-14 | End: 2024-03-15 | Stop reason: HOSPADM

## 2024-03-14 RX ORDER — ONDANSETRON 2 MG/ML
8 INJECTION INTRAMUSCULAR; INTRAVENOUS
Status: DISCONTINUED | OUTPATIENT
Start: 2024-03-14 | End: 2024-03-15 | Stop reason: HOSPADM

## 2024-03-14 RX ORDER — ALBUTEROL SULFATE 90 UG/1
4 AEROSOL, METERED RESPIRATORY (INHALATION) PRN
Status: CANCELLED | OUTPATIENT
Start: 2024-03-14

## 2024-03-14 RX ADMIN — SODIUM CHLORIDE 100 ML/HR: 9 INJECTION, SOLUTION INTRAVENOUS at 10:49

## 2024-03-14 RX ADMIN — SODIUM CHLORIDE, PRESERVATIVE FREE 10 ML: 5 INJECTION INTRAVENOUS at 09:05

## 2024-03-14 RX ADMIN — SODIUM CHLORIDE 200 MG: 9 INJECTION, SOLUTION INTRAVENOUS at 11:20

## 2024-03-14 RX ADMIN — SODIUM CHLORIDE, PRESERVATIVE FREE 10 ML: 5 INJECTION INTRAVENOUS at 10:49

## 2024-03-14 NOTE — PROGRESS NOTES
Patient to port lab for port access and lab draw. Port accessed using 20g 0.75\" alegria needle without difficulty. Labs drawn from port and port flushed. Port remains accessed. Patient tolerated well. Discharged via wheelchair.

## 2024-03-14 NOTE — PROGRESS NOTES
Data Source: Patient, [facility] record.    3/14/2024    3:25 PM    Jesus Laguna [id number]    78 y.o.      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  Per records margins were close but negative with evidence of LVI as well as PNI and extracapsular spread.  He completed adjuvant radiation therapy and end of 2010 to early 2011.  He followed with Dr. Austen Bowman ENT up till the 5-year carlee in 2015 with no evidence of recurrence.  2 years post therapy he had multiple teeth removed of his left lower jaw followed by HBO therapy x10 sessions due to poor healing.  More recently patient presented with symptoms of congestion and hypersalivation, completed a couple courses of antibiotics without resolution.  Had minimal weight loss.  He was seen by Dr. Clair Bowman ENT with the scope and abnormal CT neck which was abnormal.  He was thereafter referred to Dr. James douglas at ENT surgeons at [facility].  Patient underwent direct laryngoscopy 8/17/2023 as well as PET scan 8/9/2023.  PET scan demonstrated hypermetabolic mass at base of tongue, mildly metabolic nonenlarged right level 3 cervical lymph node, and no evidence of distant metastasis.  Direct laryngoscopy with biopsy with pathology of larynx and epiglottis revealing invasive moderately differentiated squamous cell cancer.  P16 stain for HPV was negative.  Patient now referred to [place] [facility]

## 2024-03-14 NOTE — PROGRESS NOTES
Arrived to the Infusion Center.  Keytruda infusion completed.  Patient tolerated well.   Any issues or concerns during appointment: none.  Patient aware of next infusion appointment on 04/04/2024 (date) at 1130 (time).  Discharged in a wheelchair.

## 2024-03-14 NOTE — PATIENT INSTRUCTIONS
Patient Information from Today's Visit      Dx: Head and Neck Cancer - Keytruda (single agent now)    Proceed with Keytruda every 3 weeks     We will repeat a PET scan on (4/1) prior to your next cycle of Keytruda and before seeing Dr. Olvera.     You can try Gaviscon over the counter through your feeding tube to see if this helps with reflux from feeding. Take 15 mL prior to your feedings.     Continue with your scopolamine patch for secretions     Follow Up:   Dr. Olvera on (4/4)    Please refer to After Visit Summary or [employer] for upcoming appointment information. If you have any questions regarding your upcoming schedule please reach out to your care team through [employer] or call [phone].          -------------------------------------------------------------------------------------------------------------------  Please call our office at [phone] if you have any  of the following symptoms:   Fever of 100.5 or greater  Chills  Shortness of breath  Swelling or pain in one leg    After office hours an answering service is available and will contact a provider for emergencies or if you are experiencing any of the above symptoms.    Patient has My Chart.  My Chart log in information explained on the after visit summary printout at the check-out desk.    Loraine Honeycutt RD, , LD  Outpatient Oncology Dietitian  Head and Neck Tumor Navigator  [phone]  [email or web address]

## 2024-03-18 NOTE — PROGRESS NOTES
Nutrition F/U:  Assessment:  Pt seen during office visit w/ NP, receiving single agent Keytruda today.  Pt remains NPO/TF dependent for estimated nutrition needs, bolus feeding [employer] 1.4 and Boost [facility], working w/ home health SLP - repeat MBSS in May.  Pt c/o thick secretions and reflux - liquid Pepcid costing close to $100 per month, pt to try liquid Gaviscon (15 mL) QID prior to meals, pt to continue w/ scopolamine patch to help with secretions.  Nutrition related lab values WNL.  Current BW: 171#, stable over the past 3 weeks.     Intervention:  1. NPO, any further diet advancement per SLP through home health.  Repeat MBSS in May.   2. Continue w/ current TF regimen  3. Try Gaviscon (15 mL) QID prior to bolus feedings for reflux.  If pt needs another refill of prescription liquid Pepcid they will let us know.     Monitoring/Evaluation:  1. RD to follow up during next office visit - follow up wt status, tolerance/intake of TF, symptom management.      Loraine Honeycutt RD, , LD  [phone]

## 2024-04-01 ENCOUNTER — HOSPITAL ENCOUNTER (OUTPATIENT)
Dept: PET IMAGING | Age: 79
Discharge: HOME | End: 2024-04-04
Payer: MEDICARE

## 2024-04-01 DIAGNOSIS — C32.9 KERATINIZING SQUAMOUS CELL CARCINOMA OF LARYNX (HCC): ICD-10-CM

## 2024-04-01 DIAGNOSIS — E03.2 HYPOTHYROIDISM DUE TO MEDICAMENTS AND OTHER EXOGENOUS SUBSTANCES: ICD-10-CM

## 2024-04-01 DIAGNOSIS — C32.9 LARYNGEAL CANCER (HCC): Primary | ICD-10-CM

## 2024-04-01 DIAGNOSIS — C13.2 MALIGNANT NEOPLASM OF POSTERIOR WALL OF HYPOPHARYNX (HCC): ICD-10-CM

## 2024-04-01 LAB
GLUCOSE BLD STRIP.AUTO-MCNC: 122 MG/DL (ref 65–100)
SERVICE CMNT-IMP: ABNORMAL

## 2024-04-01 PROCEDURE — A9609 HC RX DIAGNOSTIC RADIOPHARMACEUTICAL: HCPCS | Performed by: INTERNAL MEDICINE

## 2024-04-01 PROCEDURE — 82962 GLUCOSE BLOOD TEST: CPT

## 2024-04-01 PROCEDURE — 2580000003 HC RX 258: Performed by: INTERNAL MEDICINE

## 2024-04-01 PROCEDURE — 78815 PET IMAGE W/CT SKULL-THIGH: CPT

## 2024-04-01 PROCEDURE — 3430000000 HC RX DIAGNOSTIC RADIOPHARMACEUTICAL: Performed by: INTERNAL MEDICINE

## 2024-04-01 PROCEDURE — 6360000004 HC RX CONTRAST MEDICATION: Performed by: INTERNAL MEDICINE

## 2024-04-01 RX ORDER — FLUDEOXYGLUCOSE F 18 200 MCI/ML
12.18 INJECTION, SOLUTION INTRAVENOUS
Status: COMPLETED | OUTPATIENT
Start: 2024-04-01 | End: 2024-04-01

## 2024-04-01 RX ORDER — SODIUM CHLORIDE 0.9 % (FLUSH) 0.9 %
20 SYRINGE (ML) INJECTION AS NEEDED
Status: DISCONTINUED | OUTPATIENT
Start: 2024-04-01 | End: 2024-04-05 | Stop reason: HOSPADM

## 2024-04-01 RX ADMIN — FLUDEOXYGLUCOSE F 18 12.18 MILLICURIE: 200 INJECTION, SOLUTION INTRAVENOUS at 12:30

## 2024-04-01 RX ADMIN — SODIUM CHLORIDE, PRESERVATIVE FREE 20 ML: 5 INJECTION INTRAVENOUS at 12:30

## 2024-04-01 NOTE — PROGRESS NOTES
Tsh with reflex orders every other cycle placed during this encounter were verbal orders received from Dr. Olvera, read back and verified.

## 2024-04-03 ENCOUNTER — OFFICE VISIT (OUTPATIENT)
Dept: ENT CLINIC | Age: 79
End: 2024-04-03
Payer: MEDICARE

## 2024-04-03 VITALS
BODY MASS INDEX: 23.94 KG/M2 | SYSTOLIC BLOOD PRESSURE: 128 MMHG | DIASTOLIC BLOOD PRESSURE: 68 MMHG | HEIGHT: 71 IN | WEIGHT: 171 LBS

## 2024-04-03 DIAGNOSIS — R13.14 PHARYNGOESOPHAGEAL DYSPHAGIA: ICD-10-CM

## 2024-04-03 DIAGNOSIS — C10.9 SQUAMOUS CELL CARCINOMA OF OROPHARYNX (HCC): Primary | ICD-10-CM

## 2024-04-03 PROCEDURE — 1036F TOBACCO NON-USER: CPT | Performed by: STUDENT IN AN ORGANIZED HEALTH CARE EDUCATION/TRAINING PROGRAM

## 2024-04-03 PROCEDURE — 99214 OFFICE O/P EST MOD 30 MIN: CPT | Performed by: STUDENT IN AN ORGANIZED HEALTH CARE EDUCATION/TRAINING PROGRAM

## 2024-04-03 PROCEDURE — 1123F ACP DISCUSS/DSCN MKR DOCD: CPT | Performed by: STUDENT IN AN ORGANIZED HEALTH CARE EDUCATION/TRAINING PROGRAM

## 2024-04-03 PROCEDURE — G8427 DOCREV CUR MEDS BY ELIG CLIN: HCPCS | Performed by: STUDENT IN AN ORGANIZED HEALTH CARE EDUCATION/TRAINING PROGRAM

## 2024-04-03 PROCEDURE — G8420 CALC BMI NORM PARAMETERS: HCPCS | Performed by: STUDENT IN AN ORGANIZED HEALTH CARE EDUCATION/TRAINING PROGRAM

## 2024-04-03 PROCEDURE — 31575 DIAGNOSTIC LARYNGOSCOPY: CPT | Performed by: STUDENT IN AN ORGANIZED HEALTH CARE EDUCATION/TRAINING PROGRAM

## 2024-04-03 ASSESSMENT — ENCOUNTER SYMPTOMS
EYE ITCHING: 0
EYE REDNESS: 0

## 2024-04-03 NOTE — PROGRESS NOTES
previously. The maximum  SUV is 13.7 at this time whereas previously it was 8.3.  2.  No evidence of metastatic disease.  3.  No acute pulmonary disease with a chronic interstitial pattern seen  bilaterally.     ASSESSMENT and PLAN  Slight increase in tumor size on physical exam, laryngoscopy, and PET scan.  Airway still stable.  I will see him back in 2 months.  He has been treated by heme-onc.    ICD-10-CM    1. Squamous cell carcinoma of oropharynx (HCC)  C10.9 LARYNGOSCOPY,FLEX FIBER,DIAGNOSTIC      2. Pharyngoesophageal dysphagia  R13.14               Shahid Cortez MD  4/3/2024  Electronically signed    Note dictated using voice recognition software.  Please excuse any typos.

## 2024-04-04 ENCOUNTER — HOSPITAL ENCOUNTER (OUTPATIENT)
Dept: LAB | Age: 79
Discharge: HOME | End: 2024-04-04
Payer: MEDICARE

## 2024-04-04 ENCOUNTER — OFFICE VISIT (OUTPATIENT)
Dept: ONCOLOGY | Age: 79
End: 2024-04-04
Payer: MEDICARE

## 2024-04-04 ENCOUNTER — OFFICE VISIT (OUTPATIENT)
Dept: ONCOLOGY | Age: 79
End: 2024-04-04

## 2024-04-04 ENCOUNTER — HOSPITAL ENCOUNTER (OUTPATIENT)
Dept: INFUSION THERAPY | Age: 79
Setting detail: INFUSION SERIES
Discharge: HOME | End: 2024-04-04

## 2024-04-04 VITALS
SYSTOLIC BLOOD PRESSURE: 112 MMHG | HEART RATE: 88 BPM | DIASTOLIC BLOOD PRESSURE: 67 MMHG | HEIGHT: 71 IN | BODY MASS INDEX: 23.46 KG/M2 | TEMPERATURE: 97.7 F | OXYGEN SATURATION: 97 % | WEIGHT: 167.6 LBS | RESPIRATION RATE: 16 BRPM

## 2024-04-04 DIAGNOSIS — E03.2 HYPOTHYROIDISM DUE TO MEDICAMENTS AND OTHER EXOGENOUS SUBSTANCES: ICD-10-CM

## 2024-04-04 DIAGNOSIS — C32.9 LARYNGEAL CANCER (HCC): ICD-10-CM

## 2024-04-04 DIAGNOSIS — Z00.8 NUTRITIONAL ASSESSMENT: Primary | ICD-10-CM

## 2024-04-04 DIAGNOSIS — Z78.9 ON ENTERAL NUTRITION: ICD-10-CM

## 2024-04-04 DIAGNOSIS — Z79.899 HIGH RISK MEDICATION USE: ICD-10-CM

## 2024-04-04 DIAGNOSIS — C32.9 LARYNGEAL CANCER (HCC): Primary | ICD-10-CM

## 2024-04-04 LAB
ALBUMIN SERPL-MCNC: 3.1 G/DL (ref 3.2–4.6)
ALBUMIN/GLOB SERPL: 0.8 (ref 0.4–1.6)
ALP SERPL-CCNC: 91 U/L (ref 50–136)
ALT SERPL-CCNC: 58 U/L (ref 12–65)
ANION GAP SERPL CALC-SCNC: 5 MMOL/L (ref 2–11)
AST SERPL-CCNC: 37 U/L (ref 15–37)
BASOPHILS # BLD: 0 K/UL (ref 0–0.2)
BASOPHILS NFR BLD: 0 % (ref 0–2)
BILIRUB SERPL-MCNC: 0.4 MG/DL (ref 0.2–1.1)
BUN SERPL-MCNC: 23 MG/DL (ref 8–23)
CALCIUM SERPL-MCNC: 8.8 MG/DL (ref 8.3–10.4)
CHLORIDE SERPL-SCNC: 105 MMOL/L (ref 103–113)
CO2 SERPL-SCNC: 28 MMOL/L (ref 21–32)
CREAT SERPL-MCNC: 0.5 MG/DL (ref 0.8–1.5)
DIFFERENTIAL METHOD BLD: ABNORMAL
EOSINOPHIL # BLD: 0.2 K/UL (ref 0–0.8)
EOSINOPHIL NFR BLD: 2 % (ref 0.5–7.8)
ERYTHROCYTE [DISTWIDTH] IN BLOOD BY AUTOMATED COUNT: 13.3 % (ref 11.9–14.6)
GLOBULIN SER CALC-MCNC: 4.1 G/DL (ref 2.8–4.5)
GLUCOSE SERPL-MCNC: 117 MG/DL (ref 65–100)
HCT VFR BLD AUTO: 35.9 % (ref 41.1–50.3)
HGB BLD-MCNC: 11.4 G/DL (ref 13.6–17.2)
IMM GRANULOCYTES # BLD AUTO: 0 K/UL (ref 0–0.5)
IMM GRANULOCYTES NFR BLD AUTO: 0 % (ref 0–5)
LYMPHOCYTES # BLD: 0.6 K/UL (ref 0.5–4.6)
LYMPHOCYTES NFR BLD: 8 % (ref 13–44)
MCH RBC QN AUTO: 30 PG (ref 26.1–32.9)
MCHC RBC AUTO-ENTMCNC: 31.8 G/DL (ref 31.4–35)
MCV RBC AUTO: 94.5 FL (ref 82–102)
MONOCYTES # BLD: 0.9 K/UL (ref 0.1–1.3)
MONOCYTES NFR BLD: 12 % (ref 4–12)
NEUTS SEG # BLD: 5.7 K/UL (ref 1.7–8.2)
NEUTS SEG NFR BLD: 78 % (ref 43–78)
NRBC # BLD: 0 K/UL (ref 0–0.2)
PLATELET # BLD AUTO: 219 K/UL (ref 150–450)
PMV BLD AUTO: 10.2 FL (ref 9.4–12.3)
POTASSIUM SERPL-SCNC: 3.8 MMOL/L (ref 3.5–5.1)
PROT SERPL-MCNC: 7.2 G/DL (ref 6.3–8.2)
RBC # BLD AUTO: 3.8 M/UL (ref 4.23–5.6)
SODIUM SERPL-SCNC: 138 MMOL/L (ref 136–146)
TSH W FREE THYROID IF ABNORMAL: 1.87 UIU/ML (ref 0.36–3)
WBC # BLD AUTO: 7.5 K/UL (ref 4.3–11.1)

## 2024-04-04 PROCEDURE — 1036F TOBACCO NON-USER: CPT | Performed by: INTERNAL MEDICINE

## 2024-04-04 PROCEDURE — 85025 COMPLETE CBC W/AUTO DIFF WBC: CPT

## 2024-04-04 PROCEDURE — G8427 DOCREV CUR MEDS BY ELIG CLIN: HCPCS | Performed by: INTERNAL MEDICINE

## 2024-04-04 PROCEDURE — 2580000003 HC RX 258: Performed by: INTERNAL MEDICINE

## 2024-04-04 PROCEDURE — 80053 COMPREHEN METABOLIC PANEL: CPT

## 2024-04-04 PROCEDURE — 84443 ASSAY THYROID STIM HORMONE: CPT

## 2024-04-04 PROCEDURE — 1123F ACP DISCUSS/DSCN MKR DOCD: CPT | Performed by: INTERNAL MEDICINE

## 2024-04-04 PROCEDURE — 36591 DRAW BLOOD OFF VENOUS DEVICE: CPT

## 2024-04-04 PROCEDURE — 99215 OFFICE O/P EST HI 40 MIN: CPT | Performed by: INTERNAL MEDICINE

## 2024-04-04 PROCEDURE — G8420 CALC BMI NORM PARAMETERS: HCPCS | Performed by: INTERNAL MEDICINE

## 2024-04-04 RX ORDER — SODIUM CHLORIDE 0.9 % (FLUSH) 0.9 %
5-40 SYRINGE (ML) INJECTION PRN
Status: ACTIVE | OUTPATIENT
Start: 2024-04-04

## 2024-04-04 RX ORDER — SODIUM CHLORIDE 9 MG/ML
5-250 INJECTION, SOLUTION INTRAVENOUS PRN
OUTPATIENT
Start: 2024-04-11

## 2024-04-04 RX ORDER — EPINEPHRINE 1 MG/ML
0.3 INJECTION, SOLUTION, CONCENTRATE INTRAVENOUS PRN
OUTPATIENT
Start: 2024-04-11

## 2024-04-04 RX ORDER — SODIUM CHLORIDE 9 MG/ML
INJECTION, SOLUTION INTRAVENOUS CONTINUOUS
OUTPATIENT
Start: 2024-04-11

## 2024-04-04 RX ORDER — DIPHENHYDRAMINE HYDROCHLORIDE 50 MG/ML
50 INJECTION INTRAMUSCULAR; INTRAVENOUS
OUTPATIENT
Start: 2024-04-11

## 2024-04-04 RX ORDER — ONDANSETRON 2 MG/ML
8 INJECTION INTRAMUSCULAR; INTRAVENOUS ONCE
OUTPATIENT
Start: 2024-04-11 | End: 2024-04-11

## 2024-04-04 RX ORDER — ALBUTEROL SULFATE 90 UG/1
4 AEROSOL, METERED RESPIRATORY (INHALATION) PRN
OUTPATIENT
Start: 2024-04-11

## 2024-04-04 RX ORDER — ONDANSETRON 2 MG/ML
8 INJECTION INTRAMUSCULAR; INTRAVENOUS
OUTPATIENT
Start: 2024-04-11

## 2024-04-04 RX ORDER — ACETAMINOPHEN 325 MG/1
650 TABLET ORAL
OUTPATIENT
Start: 2024-04-11

## 2024-04-04 RX ORDER — HEPARIN 100 UNIT/ML
500 SYRINGE INTRAVENOUS PRN
OUTPATIENT
Start: 2024-04-11

## 2024-04-04 RX ORDER — SODIUM CHLORIDE 0.9 % (FLUSH) 0.9 %
5-40 SYRINGE (ML) INJECTION PRN
OUTPATIENT
Start: 2024-04-11

## 2024-04-04 RX ORDER — MEPERIDINE HYDROCHLORIDE 25 MG/ML
12.5 INJECTION INTRAMUSCULAR; INTRAVENOUS; SUBCUTANEOUS PRN
OUTPATIENT
Start: 2024-04-11

## 2024-04-04 RX ADMIN — SODIUM CHLORIDE, PRESERVATIVE FREE 10 ML: 5 INJECTION INTRAVENOUS at 09:56

## 2024-04-04 ASSESSMENT — PATIENT HEALTH QUESTIONNAIRE - PHQ9
SUM OF ALL RESPONSES TO PHQ QUESTIONS 1-9: 0
2. FEELING DOWN, DEPRESSED OR HOPELESS: NOT AT ALL
SUM OF ALL RESPONSES TO PHQ QUESTIONS 1-9: 0
1. LITTLE INTEREST OR PLEASURE IN DOING THINGS: NOT AT ALL
SUM OF ALL RESPONSES TO PHQ9 QUESTIONS 1 & 2: 0

## 2024-04-04 NOTE — PROGRESS NOTES
Patient arrived to port lab in wheelchair. Port accessed and labs drawn per protocol. Port remains accessed at time of discharge.

## 2024-04-04 NOTE — PROGRESS NOTES
04/04/2024 09:49 AM    HGB 11.4 04/04/2024 09:49 AM    HCT 35.9 04/04/2024 09:49 AM     04/04/2024 09:49 AM    MCV 94.5 04/04/2024 09:49 AM       Lab Results   Component Value Date/Time     04/04/2024 09:49 AM    K 3.8 04/04/2024 09:49 AM     04/04/2024 09:49 AM    CO2 28 04/04/2024 09:49 AM    BUN 23 04/04/2024 09:49 AM    GFRAA >60 07/22/2022 03:13 PM    GLOB 4.1 04/04/2024 09:49 AM    ALT 58 04/04/2024 09:49 AM         Above results reviewed with patient.     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  Per records margins were close but negative with evidence of LVI as well as PNI and extracapsular spread.  He completed adjuvant radiation therapy and end of 2010 to early 2011.  He followed with Dr. Austen Bowman ENT up till the 5-year carlee in 2015 with no evidence of recurrence.  2 years post therapy he had multiple teeth removed of his left lower jaw followed by HBO therapy x10 sessions due to poor healing.  More recently patient presented with symptoms of congestion and hypersalivation, completed a couple courses of antibiotics without resolution.  Had minimal weight loss.  He was seen by Dr. Clair Bowman ENT with the scope and abnormal CT neck which was abnormal.  He was thereafter referred to Dr. James douglas at ENT surgeons at [facility].  Patient underwent direct laryngoscopy 8/17/2023 as well as PET scan 8/9/2023.  PET scan demonstrated hypermetabolic mass at base of tongue, mildly metabolic nonenlarged right level 3 cervical lymph node, and no evidence of distant metastasis.  Direct laryngoscopy with biopsy with pathology of larynx/epiglottis revealing invasive moderately differentiated squamous cell

## 2024-04-04 NOTE — PATIENT INSTRUCTIONS
Patient Information from Today's Visit        Treatment Summary has been discussed and given to patient:  N/A    Reason for Today's Visit:  Pre-chemo for H&N cancer - Keytruda, PET scan results     Plan:  Your PET scan shows that the tumor in the head and neck area is larger, this was confirmed by your exam by Dr. Cortez yesterday.     Dr. Olvera plans on starting you back on chemo with the immunotherapy.  This is the same chemo regimen you had before. We will arrange for you to start this next week.     We will arrange for you to have your feeding tube replaced with a Button - SNOW Button PEG.  Loraine will send updated orders to [facility].        Follow Up:   1 week for labs/chemo    Please refer to After Visit Summary or [employer] for upcoming appointment information. If you have any questions regarding your upcoming schedule please reach out to your care team through [employer] or call [phone].          -------------------------------------------------------------------------------------------------------------------  Please call our office at [phone] if you have any  of the following symptoms:   Fever of 100.5 or greater  Chills  Shortness of breath  Swelling or pain in one leg    After office hours an answering service is available and will contact a provider for emergencies or if you are experiencing any of the above symptoms.    Patient has My Chart.  My Chart log in information explained on the after visit summary printout at the check-out desk.    Loraine Honeycutt RD, , LD  Outpatient Oncology Dietitian  Head and Neck Tumor Navigator  [phone]  [email or web address]

## 2024-04-05 DIAGNOSIS — C32.9 LARYNGEAL CANCER (HCC): ICD-10-CM

## 2024-04-05 DIAGNOSIS — T85.598D FEEDING TUBE DYSFUNCTION, SUBSEQUENT ENCOUNTER: ICD-10-CM

## 2024-04-05 DIAGNOSIS — Z78.9 ON ENTERAL NUTRITION: Primary | ICD-10-CM

## 2024-04-05 NOTE — PROGRESS NOTES
Nutrition F/U:  Assessment:  Pt seen during office visit w/ Dr. Olvera, PET scan showing disease progression in the H&N, no other disease identified, disease progression noted per scope yesterday completed by Dr. Cortez, airway remains patent w/out trach.  Pt remains NPO/TF dependent for estimated nutrition needs, using Gaviscon once daily in place of liquid Pepcid d/t price and reflux has been controlled per pt and spouse.  Pt has repeat MBSS in May scheduled.  Dr. Olvera wanting to resume chemo + immunotherapy for 4 cycles then repeat PET scan and hopefully resume immunotherapy alone.  Nutrition related lab values WNL.  Current BW: 167#, down 4# over the past 3 weeks.     Intervention:  1. NPO, further diet advancement per SLP. Repeat MBSS in May    2. Continue w/ current TF regimen, continue w/ Gaviscon (15-30 mL) once a day for reflux via PEG.   3. Resume Carbo + 5-FU (4 day pump) + Keytruda q 3 weeks next Thursday.    4. Repeat PET scan after 4 cycles - will hold on ordering for now given uncertainty if pt's counts will continue to be WNL for every cycle.     Monitoring/Evaluation:  1. RD to follow up during next office visit - follow up wt status, tolerance/intake of TF, symptom management.      Loraine Honeycutt RD, , LD  [phone]

## 2024-04-10 ENCOUNTER — TELEPHONE (OUTPATIENT)
Dept: ONCOLOGY | Age: 79
End: 2024-04-10

## 2024-04-10 NOTE — TELEPHONE ENCOUNTER
Returned call.  Requested any outstanding forms to be faxed to [phone] damien Adkins.  Verbalized understanding.

## 2024-04-11 ENCOUNTER — HOSPITAL ENCOUNTER (OUTPATIENT)
Dept: INFUSION THERAPY | Age: 79
Setting detail: INFUSION SERIES
Discharge: HOME | End: 2024-04-11
Payer: MEDICARE

## 2024-04-11 VITALS
WEIGHT: 175 LBS | RESPIRATION RATE: 15 BRPM | HEART RATE: 84 BPM | TEMPERATURE: 98.7 F | DIASTOLIC BLOOD PRESSURE: 87 MMHG | SYSTOLIC BLOOD PRESSURE: 133 MMHG | BODY MASS INDEX: 24.41 KG/M2 | OXYGEN SATURATION: 93 %

## 2024-04-11 DIAGNOSIS — C32.9 KERATINIZING SQUAMOUS CELL CARCINOMA OF LARYNX (HCC): ICD-10-CM

## 2024-04-11 DIAGNOSIS — C32.9 LARYNGEAL CANCER (HCC): Primary | ICD-10-CM

## 2024-04-11 DIAGNOSIS — E03.2 HYPOTHYROIDISM DUE TO MEDICAMENTS AND OTHER EXOGENOUS SUBSTANCES: ICD-10-CM

## 2024-04-11 LAB
ALBUMIN SERPL-MCNC: 3.1 G/DL (ref 3.2–4.6)
ALBUMIN/GLOB SERPL: 0.8 (ref 0.4–1.6)
ALP SERPL-CCNC: 86 U/L (ref 50–136)
ALT SERPL-CCNC: 44 U/L (ref 12–65)
ANION GAP SERPL CALC-SCNC: 5 MMOL/L (ref 2–11)
AST SERPL-CCNC: 26 U/L (ref 15–37)
BASOPHILS # BLD: 0 K/UL (ref 0–0.2)
BASOPHILS NFR BLD: 0 % (ref 0–2)
BILIRUB SERPL-MCNC: 0.4 MG/DL (ref 0.2–1.1)
BUN SERPL-MCNC: 25 MG/DL (ref 8–23)
CALCIUM SERPL-MCNC: 9 MG/DL (ref 8.3–10.4)
CHLORIDE SERPL-SCNC: 103 MMOL/L (ref 103–113)
CO2 SERPL-SCNC: 27 MMOL/L (ref 21–32)
CREAT SERPL-MCNC: 0.6 MG/DL (ref 0.8–1.5)
DIFFERENTIAL METHOD BLD: ABNORMAL
EOSINOPHIL # BLD: 0.1 K/UL (ref 0–0.8)
EOSINOPHIL NFR BLD: 2 % (ref 0.5–7.8)
ERYTHROCYTE [DISTWIDTH] IN BLOOD BY AUTOMATED COUNT: 13.2 % (ref 11.9–14.6)
GLOBULIN SER CALC-MCNC: 4.1 G/DL (ref 2.8–4.5)
GLUCOSE SERPL-MCNC: 113 MG/DL (ref 65–100)
HCT VFR BLD AUTO: 34.6 % (ref 41.1–50.3)
HGB BLD-MCNC: 11.2 G/DL (ref 13.6–17.2)
IMM GRANULOCYTES # BLD AUTO: 0 K/UL (ref 0–0.5)
IMM GRANULOCYTES NFR BLD AUTO: 0 % (ref 0–5)
LYMPHOCYTES # BLD: 0.9 K/UL (ref 0.5–4.6)
LYMPHOCYTES NFR BLD: 12 % (ref 13–44)
MCH RBC QN AUTO: 29.9 PG (ref 26.1–32.9)
MCHC RBC AUTO-ENTMCNC: 32.4 G/DL (ref 31.4–35)
MCV RBC AUTO: 92.3 FL (ref 82–102)
MONOCYTES # BLD: 1.1 K/UL (ref 0.1–1.3)
MONOCYTES NFR BLD: 15 % (ref 4–12)
NEUTS SEG # BLD: 5.2 K/UL (ref 1.7–8.2)
NEUTS SEG NFR BLD: 71 % (ref 43–78)
NRBC # BLD: 0 K/UL (ref 0–0.2)
PLATELET # BLD AUTO: 246 K/UL (ref 150–450)
PMV BLD AUTO: 10.3 FL (ref 9.4–12.3)
POTASSIUM SERPL-SCNC: 3.7 MMOL/L (ref 3.5–5.1)
PROT SERPL-MCNC: 7.2 G/DL (ref 6.3–8.2)
RBC # BLD AUTO: 3.75 M/UL (ref 4.23–5.6)
SODIUM SERPL-SCNC: 135 MMOL/L (ref 136–146)
TSH W FREE THYROID IF ABNORMAL: 1.73 UIU/ML (ref 0.36–3.74)
WBC # BLD AUTO: 7.4 K/UL (ref 4.3–11.1)

## 2024-04-11 PROCEDURE — 84443 ASSAY THYROID STIM HORMONE: CPT

## 2024-04-11 PROCEDURE — G0498 CHEMO EXTEND IV INFUS W/PUMP: HCPCS

## 2024-04-11 PROCEDURE — 2580000003 HC RX 258: Performed by: INTERNAL MEDICINE

## 2024-04-11 PROCEDURE — 85025 COMPLETE CBC W/AUTO DIFF WBC: CPT

## 2024-04-11 PROCEDURE — 96367 TX/PROPH/DG ADDL SEQ IV INF: CPT

## 2024-04-11 PROCEDURE — 80053 COMPREHEN METABOLIC PANEL: CPT

## 2024-04-11 PROCEDURE — 96413 CHEMO IV INFUSION 1 HR: CPT

## 2024-04-11 PROCEDURE — 6360000002 HC RX W HCPCS: Performed by: INTERNAL MEDICINE

## 2024-04-11 PROCEDURE — 96417 CHEMO IV INFUS EACH ADDL SEQ: CPT

## 2024-04-11 PROCEDURE — 96375 TX/PRO/DX INJ NEW DRUG ADDON: CPT

## 2024-04-11 RX ORDER — DIPHENHYDRAMINE HYDROCHLORIDE 50 MG/ML
50 INJECTION INTRAMUSCULAR; INTRAVENOUS
Status: DISCONTINUED | OUTPATIENT
Start: 2024-04-11 | End: 2024-04-12 | Stop reason: HOSPADM

## 2024-04-11 RX ORDER — ONDANSETRON 2 MG/ML
8 INJECTION INTRAMUSCULAR; INTRAVENOUS
Status: COMPLETED | OUTPATIENT
Start: 2024-04-11 | End: 2024-04-11

## 2024-04-11 RX ORDER — SODIUM CHLORIDE 9 MG/ML
INJECTION, SOLUTION INTRAVENOUS CONTINUOUS
Status: DISCONTINUED | OUTPATIENT
Start: 2024-04-11 | End: 2024-04-12 | Stop reason: HOSPADM

## 2024-04-11 RX ORDER — ALBUTEROL SULFATE 90 UG/1
4 AEROSOL, METERED RESPIRATORY (INHALATION) PRN
Status: DISCONTINUED | OUTPATIENT
Start: 2024-04-11 | End: 2024-04-12 | Stop reason: HOSPADM

## 2024-04-11 RX ORDER — SODIUM CHLORIDE 9 MG/ML
5-250 INJECTION, SOLUTION INTRAVENOUS PRN
Status: DISCONTINUED | OUTPATIENT
Start: 2024-04-11 | End: 2024-04-12 | Stop reason: HOSPADM

## 2024-04-11 RX ORDER — SODIUM CHLORIDE 0.9 % (FLUSH) 0.9 %
5-40 SYRINGE (ML) INJECTION PRN
Status: DISCONTINUED | OUTPATIENT
Start: 2024-04-11 | End: 2024-04-12 | Stop reason: HOSPADM

## 2024-04-11 RX ORDER — ONDANSETRON 2 MG/ML
8 INJECTION INTRAMUSCULAR; INTRAVENOUS ONCE
Status: DISCONTINUED | OUTPATIENT
Start: 2024-04-11 | End: 2024-04-12 | Stop reason: HOSPADM

## 2024-04-11 RX ORDER — MEPERIDINE HYDROCHLORIDE 25 MG/ML
12.5 INJECTION INTRAMUSCULAR; INTRAVENOUS; SUBCUTANEOUS PRN
Status: DISCONTINUED | OUTPATIENT
Start: 2024-04-11 | End: 2024-04-12 | Stop reason: HOSPADM

## 2024-04-11 RX ORDER — ACETAMINOPHEN 325 MG/1
650 TABLET ORAL
Status: DISCONTINUED | OUTPATIENT
Start: 2024-04-11 | End: 2024-04-12 | Stop reason: HOSPADM

## 2024-04-11 RX ORDER — EPINEPHRINE 1 MG/ML
0.3 INJECTION, SOLUTION, CONCENTRATE INTRAVENOUS PRN
Status: DISCONTINUED | OUTPATIENT
Start: 2024-04-11 | End: 2024-04-12 | Stop reason: HOSPADM

## 2024-04-11 RX ADMIN — FLUOROURACIL 5500 MG: 50 INJECTION, SOLUTION INTRAVENOUS at 12:54

## 2024-04-11 RX ADMIN — ONDANSETRON 8 MG: 2 INJECTION INTRAMUSCULAR; INTRAVENOUS at 10:44

## 2024-04-11 RX ADMIN — CARBOPLATIN 620 MG: 10 INJECTION, SOLUTION INTRAVENOUS at 12:21

## 2024-04-11 RX ADMIN — SODIUM CHLORIDE 150 MG: 900 INJECTION, SOLUTION INTRAVENOUS at 11:50

## 2024-04-11 RX ADMIN — SODIUM CHLORIDE 50 ML/HR: 9 INJECTION, SOLUTION INTRAVENOUS at 10:23

## 2024-04-11 RX ADMIN — SODIUM CHLORIDE 200 MG: 9 INJECTION, SOLUTION INTRAVENOUS at 10:53

## 2024-04-11 RX ADMIN — DEXAMETHASONE SODIUM PHOSPHATE 12 MG: 4 INJECTION, SOLUTION INTRAMUSCULAR; INTRAVENOUS at 11:29

## 2024-04-11 NOTE — PROGRESS NOTES
Pt arrived via w/c.  Labs drawn from port.  Keytruda, Premeds, Carbo and 5FU pump completed without complications.  Pt aware of next appt 4/15 at 1400.  Discharged ambulatory, no distress noted.  Patient instructed to call provider with temperature of 100.4 or greater or nausea/vomiting/ diarrhea or pain not controlled by medications.

## 2024-04-15 ENCOUNTER — HOSPITAL ENCOUNTER (OUTPATIENT)
Dept: INFUSION THERAPY | Age: 79
Setting detail: INFUSION SERIES
Discharge: HOME | End: 2024-04-15
Payer: MEDICARE

## 2024-04-15 VITALS
DIASTOLIC BLOOD PRESSURE: 72 MMHG | RESPIRATION RATE: 16 BRPM | SYSTOLIC BLOOD PRESSURE: 108 MMHG | HEART RATE: 94 BPM | OXYGEN SATURATION: 96 % | TEMPERATURE: 98.1 F

## 2024-04-15 PROCEDURE — 2580000003 HC RX 258: Performed by: INTERNAL MEDICINE

## 2024-04-15 PROCEDURE — 96523 IRRIG DRUG DELIVERY DEVICE: CPT

## 2024-04-15 RX ORDER — SODIUM CHLORIDE 0.9 % (FLUSH) 0.9 %
5-40 SYRINGE (ML) INJECTION PRN
Status: DISCONTINUED | OUTPATIENT
Start: 2024-04-15 | End: 2024-04-16 | Stop reason: HOSPADM

## 2024-04-15 RX ADMIN — SODIUM CHLORIDE, PRESERVATIVE FREE 10 ML: 5 INJECTION INTRAVENOUS at 14:16

## 2024-04-15 NOTE — PROGRESS NOTES
Arrived to the Infusion Center.  Pump DC completed.   Provided education on pump DC    Patient instructed to report any side affects to ordering provider.  Patient tolerated well.   Any issues or concerns during appointment: no.  Patient aware of next infusion appointment on 5/2/24 (date) at 0915 (time).  Discharged via w/c.

## 2024-04-16 ENCOUNTER — HOSPITAL ENCOUNTER (OUTPATIENT)
Dept: INTERVENTIONAL RADIOLOGY/VASCULAR | Age: 79
Discharge: HOME | End: 2024-04-19
Attending: RADIOLOGY
Payer: MEDICARE

## 2024-04-16 DIAGNOSIS — E46 MALNUTRITION, UNSPECIFIED TYPE (HCC): ICD-10-CM

## 2024-04-16 PROCEDURE — 49450 REPLACE G/C TUBE PERC: CPT | Performed by: RADIOLOGY

## 2024-04-16 PROCEDURE — 49450 REPLACE G/C TUBE PERC: CPT

## 2024-04-16 PROCEDURE — 2709999900 IR GUIDED REPLACE G TUBE/CECOSTOMY PERC W CONTRAST

## 2024-04-16 PROCEDURE — 6370000000 HC RX 637 (ALT 250 FOR IP): Performed by: RADIOLOGY

## 2024-04-16 PROCEDURE — 6360000004 HC RX CONTRAST MEDICATION: Performed by: RADIOLOGY

## 2024-04-16 RX ORDER — DIAZEPAM 2 MG/1
TABLET ORAL PRN
Status: COMPLETED | OUTPATIENT
Start: 2024-04-16 | End: 2024-04-16

## 2024-04-16 RX ORDER — OXYCODONE HYDROCHLORIDE 5 MG/1
TABLET ORAL PRN
Status: COMPLETED | OUTPATIENT
Start: 2024-04-16 | End: 2024-04-16

## 2024-04-16 RX ADMIN — IOHEXOL 10 ML: 300 INJECTION, SOLUTION INTRAVENOUS at 13:18

## 2024-04-16 RX ADMIN — DIAZEPAM 5 MG: 2 TABLET ORAL at 12:40

## 2024-04-16 RX ADMIN — OXYCODONE 10 MG: 5 TABLET ORAL at 12:40

## 2024-04-16 NOTE — OR NURSING
Recovery period without difficulty. Pt alert and oriented and denies pain. Dressing around gastric tube button is clean, dry, and intact. Reviewed discharge instructions with patient and spouse, both verbalized understanding. Pt escorted to lobby discharge area via wheelchair. Vital signs and Elida score completed.

## 2024-04-16 NOTE — DISCHARGE INSTRUCTIONS
If you have any questions about your procedure, please call the Interventional Radiology department at [phone].      After business hours (5pm) and weekends, call the answering service at [phone] and ask for the Radiologist on call to be paged.            Si tiene Preguntas acerca del procedimiento, por favor llame al departamento de Radiología Intervencional al [phone].      Después de horas de oficina (5 pm) y los fines de semana, llamar al servicio de llamadas al [phone] y pregunte por el Radiologo de anthony.

## 2024-04-16 NOTE — BRIEF OP NOTE
[facility]  Department of Interventional Radiology  [phone]        Brief Procedure Note    Patient: Jesus Laguna MRN: [id number]  SSN: [id number]    YOB: 1945  Age: 78 y.o.  Sex: male      Date of Procedure: 4/16/2024     Pre-Procedure Diagnosis:   Protein calorie malnutrition.    Post-Procedure Diagnosis:  SAME    Procedure(s):  G-Tube exchange    Brief Description of Procedure:  WANDY button    Performed By:  NAN CASTILLO MD     Assistants:  None    Anesthesia:  anxiolysis    Estimated Blood Loss:  None    Specimens:  None    Implants:  18 Fr 2.5 cm WANDY VELAZQUEZ Button    Findings:  good position    Complications:  None    Recommendations:  ready to use     Follow Up:  4 months    Signed By: NAN CASTILLO MD     April 16, 2024

## 2024-04-17 RX ORDER — DEXAMETHASONE 0.5 MG/5ML
1 SOLUTION ORAL DAILY
Qty: 240 ML | Refills: 1 | Status: SHIPPED | OUTPATIENT
Start: 2024-04-17

## 2024-04-17 NOTE — TELEPHONE ENCOUNTER
Physician provider: Kasandra Olvera MD  Reason for today's call: Medication refill  Last office visit:N/A    Patient notified that their information will be routed to the [facility] clinical triage team for review. Patient is advised that they will receive a phone call from the triage department. If symptoms worsen before receiving a call back, the patient has been advised to proceed to the nearest ED.      Pt's wife called in to ask about medication refill:  dexamethasone 0.5 MG/5ML solution [Dr. Kasandra Olvera MD]     [id number] [place] [address] - P [phone] - F [phone]    Pt only has about 1 more dosage of this left

## 2024-04-17 NOTE — TELEPHONE ENCOUNTER
Medication Requested: Dexamethasone    Date last prescribed: 11/9/23    Requested Pharmacy: [facility]    Action Taken: Chart reviewed, refill pended and routed for signature.

## 2024-04-18 NOTE — PROGRESS NOTES
Nutrition:  Pt s/p feeding tube exchange on (4/16).  Pt had low profile WANDY-KEY button placed in IR (18 Fr, 2.5 cm).  Orders sent to [facility] for [employer] extension sets, and replacement tube q 90 days.  Refill x 12.  Orders signed by Dr. Olvera and scanned/emailed to Anthony Munoz RD w/ [facility].      Loraine Honeycutt RD, , LD  [phone]

## 2024-04-21 NOTE — TELEPHONE ENCOUNTER
Jayce from [facility] is calling the office to request a continuation of home health physical therapy. Josse Bonilla is requesting once a week for 8 more weeks. WOUND CARE WITH DERMABOND    Dermabond is a sterile, liquid skin adhesive that holds wound edges together.  The film will usually remain in place for 5-10 days before it naturally falls off your skin.    Do not scratch, rub, or pick at the Dermabond adhesive film, this may loosen the film before your wound is healed.  Do not place tape directly on the Dermabond adhesive film, because removing the tape may also remove the film.    Protect the wound from prolonged exposure to sunlight or tanning lamps while the film is in place.  Do not apply liquid or ointment medications or any other product while the Dermabond adhesive film is in place as this may loosen the film before your wound has healed.    Protect your wound from repeated injury until the skin has had sufficient time to heal.  Do not soak or scrub your wound, swim, and avoid periods of heavy perspiration until the Dermabond adhesive has naturally fallen off.    You may briefly wet the wound in the shower or bath;  gently wipe your wound dry with a soft towel.    If bandaged, keep the bandage dry.  Replace the dressing daily until the adhesive film has fallen off.  Remember do not apply tape directly to the Dermabond adhesive film.  Inspect the wound for signs of infection.    REDNESS  ~  There may be minimal redness at the edge of the cut but if the redness spreads call your physician for advice.    PAIN  ~  At first the wound may be painful, but the pain should subside as the wound heals.  If pain persists or increases, call your physician for advice.    SWELLING  ~  There may be minimal swelling near the wound edge, especially the day after the cut, but if the swelling keeps increasing, call for advice.    If you have any questions or concerns, please consult your physician.

## 2024-04-23 ENCOUNTER — TELEPHONE (OUTPATIENT)
Dept: ONCOLOGY | Age: 79
End: 2024-04-23

## 2024-04-23 DIAGNOSIS — C32.9 LARYNGEAL CANCER (HCC): Primary | ICD-10-CM

## 2024-04-23 RX ORDER — DEXAMETHASONE 0.5 MG/5ML
1 SOLUTION ORAL DAILY
Qty: 240 ML | Refills: 1 | OUTPATIENT
Start: 2024-04-23

## 2024-04-23 NOTE — TELEPHONE ENCOUNTER
H&N Navigation/Nutrition:  Received call from pt's wife Rosalinda reporting that pt was having diarrhea and severe mouth sores - mouth bleeding.  Pt has had 6 cycles of current chemo regimen, was on Keytruda alone w/ progression, and recently resumed prior chemo regimen (Carbo + 5-FU).  Pt's symptoms started after the chemo pump was discontinued which was delayed compared to previous cycles, does report feeling mildly better today but still w/ diarrhea and bleeding mouth sores.  Discussed w/ Dr. Olvera and he would like pt to be seen by NP, labs, and IVF/replacements prn.  Appointment scheduled w/ JOHNNIE Richter on (4/24) @ 9 am, pt and wife aware of appointment.  Labs ordered, hydration plan updated for 1 L NS tomorrow - orders pended to Dr. Olvera for signature.     Loraine Honeycutt RD, , LD  [phone]

## 2024-04-24 ENCOUNTER — HOSPITAL ENCOUNTER (OUTPATIENT)
Dept: INFUSION THERAPY | Age: 79
Setting detail: INFUSION SERIES
Discharge: HOME | End: 2024-04-24

## 2024-04-24 ENCOUNTER — OFFICE VISIT (OUTPATIENT)
Dept: ONCOLOGY | Age: 79
End: 2024-04-24
Payer: MEDICARE

## 2024-04-24 ENCOUNTER — HOSPITAL ENCOUNTER (OUTPATIENT)
Dept: LAB | Age: 79
Discharge: HOME | End: 2024-04-27
Payer: MEDICARE

## 2024-04-24 ENCOUNTER — OFFICE VISIT (OUTPATIENT)
Dept: ONCOLOGY | Age: 79
End: 2024-04-24

## 2024-04-24 VITALS
TEMPERATURE: 98.8 F | DIASTOLIC BLOOD PRESSURE: 71 MMHG | RESPIRATION RATE: 16 BRPM | HEART RATE: 89 BPM | WEIGHT: 174.2 LBS | HEIGHT: 71 IN | BODY MASS INDEX: 24.39 KG/M2 | OXYGEN SATURATION: 97 % | SYSTOLIC BLOOD PRESSURE: 115 MMHG

## 2024-04-24 DIAGNOSIS — E03.2 HYPOTHYROIDISM DUE TO MEDICAMENTS AND OTHER EXOGENOUS SUBSTANCES: ICD-10-CM

## 2024-04-24 DIAGNOSIS — K59.00 CONSTIPATION, UNSPECIFIED CONSTIPATION TYPE: ICD-10-CM

## 2024-04-24 DIAGNOSIS — Z78.9 ON ENTERAL NUTRITION: ICD-10-CM

## 2024-04-24 DIAGNOSIS — K12.31 MUCOSITIS DUE TO CHEMOTHERAPY: ICD-10-CM

## 2024-04-24 DIAGNOSIS — R05.1 ACUTE COUGH: ICD-10-CM

## 2024-04-24 DIAGNOSIS — Z00.8 NUTRITIONAL ASSESSMENT: Primary | ICD-10-CM

## 2024-04-24 DIAGNOSIS — R68.89 COPIOUS ORAL SECRETIONS: ICD-10-CM

## 2024-04-24 DIAGNOSIS — C32.9 LARYNGEAL CANCER (HCC): Primary | ICD-10-CM

## 2024-04-24 LAB
ALBUMIN SERPL-MCNC: 2.7 G/DL (ref 3.2–4.6)
ALBUMIN/GLOB SERPL: 0.7 (ref 1–1.9)
ALP SERPL-CCNC: 69 U/L (ref 40–129)
ALT SERPL-CCNC: 17 U/L (ref 12–65)
ANION GAP SERPL CALC-SCNC: 11 MMOL/L (ref 9–18)
AST SERPL-CCNC: 22 U/L (ref 15–37)
BASOPHILS # BLD: 0 K/UL (ref 0–0.2)
BASOPHILS NFR BLD: 1 % (ref 0–2)
BILIRUB SERPL-MCNC: 0.2 MG/DL (ref 0–1.2)
BUN SERPL-MCNC: 18 MG/DL (ref 8–23)
CALCIUM SERPL-MCNC: 8.5 MG/DL (ref 8.8–10.2)
CHLORIDE SERPL-SCNC: 100 MMOL/L (ref 98–107)
CO2 SERPL-SCNC: 25 MMOL/L (ref 20–28)
CREAT SERPL-MCNC: 0.5 MG/DL (ref 0.8–1.3)
DIFFERENTIAL METHOD BLD: ABNORMAL
EOSINOPHIL # BLD: 0.2 K/UL (ref 0–0.8)
EOSINOPHIL NFR BLD: 8 % (ref 0.5–7.8)
ERYTHROCYTE [DISTWIDTH] IN BLOOD BY AUTOMATED COUNT: 12.7 % (ref 11.9–14.6)
GLOBULIN SER CALC-MCNC: 3.7 G/DL (ref 2.3–3.5)
GLUCOSE SERPL-MCNC: 114 MG/DL (ref 70–99)
HCT VFR BLD AUTO: 30.1 % (ref 41.1–50.3)
HGB BLD-MCNC: 9.9 G/DL (ref 13.6–17.2)
IMM GRANULOCYTES # BLD AUTO: 0 K/UL (ref 0–0.5)
IMM GRANULOCYTES NFR BLD AUTO: 0 % (ref 0–5)
LYMPHOCYTES # BLD: 0.6 K/UL (ref 0.5–4.6)
LYMPHOCYTES NFR BLD: 28 % (ref 13–44)
MCH RBC QN AUTO: 29.5 PG (ref 26.1–32.9)
MCHC RBC AUTO-ENTMCNC: 32.9 G/DL (ref 31.4–35)
MCV RBC AUTO: 89.6 FL (ref 82–102)
MONOCYTES # BLD: 0.7 K/UL (ref 0.1–1.3)
MONOCYTES NFR BLD: 34 % (ref 4–12)
NEUTS SEG # BLD: 0.6 K/UL (ref 1.7–8.2)
NEUTS SEG NFR BLD: 29 % (ref 43–78)
NRBC # BLD: 0 K/UL (ref 0–0.2)
PLATELET # BLD AUTO: 110 K/UL (ref 150–450)
PMV BLD AUTO: 9.6 FL (ref 9.4–12.3)
POTASSIUM SERPL-SCNC: 3.4 MMOL/L (ref 3.5–5.1)
PROT SERPL-MCNC: 6.4 G/DL (ref 6.3–8.2)
RBC # BLD AUTO: 3.36 M/UL (ref 4.23–5.6)
SODIUM SERPL-SCNC: 136 MMOL/L (ref 136–145)
TSH, 3RD GENERATION: 2.39 UIU/ML (ref 0.27–4.2)
WBC # BLD AUTO: 2.1 K/UL (ref 4.3–11.1)

## 2024-04-24 PROCEDURE — G8427 DOCREV CUR MEDS BY ELIG CLIN: HCPCS | Performed by: NURSE PRACTITIONER

## 2024-04-24 PROCEDURE — 85025 COMPLETE CBC W/AUTO DIFF WBC: CPT

## 2024-04-24 PROCEDURE — 1036F TOBACCO NON-USER: CPT | Performed by: NURSE PRACTITIONER

## 2024-04-24 PROCEDURE — 84443 ASSAY THYROID STIM HORMONE: CPT

## 2024-04-24 PROCEDURE — 2580000003 HC RX 258: Performed by: INTERNAL MEDICINE

## 2024-04-24 PROCEDURE — G8420 CALC BMI NORM PARAMETERS: HCPCS | Performed by: NURSE PRACTITIONER

## 2024-04-24 PROCEDURE — 80053 COMPREHEN METABOLIC PANEL: CPT

## 2024-04-24 PROCEDURE — 36591 DRAW BLOOD OFF VENOUS DEVICE: CPT

## 2024-04-24 PROCEDURE — 1123F ACP DISCUSS/DSCN MKR DOCD: CPT | Performed by: NURSE PRACTITIONER

## 2024-04-24 PROCEDURE — 99214 OFFICE O/P EST MOD 30 MIN: CPT | Performed by: NURSE PRACTITIONER

## 2024-04-24 RX ORDER — SODIUM CHLORIDE 0.9 % (FLUSH) 0.9 %
5-40 SYRINGE (ML) INJECTION PRN
Status: DISCONTINUED | OUTPATIENT
Start: 2024-04-24 | End: 2024-04-28 | Stop reason: HOSPADM

## 2024-04-24 RX ORDER — 0.9 % SODIUM CHLORIDE 0.9 %
1000 INTRAVENOUS SOLUTION INTRAVENOUS ONCE
OUTPATIENT
Start: 2024-04-24 | End: 2024-04-24

## 2024-04-24 RX ORDER — SODIUM CHLORIDE 9 MG/ML
5-250 INJECTION, SOLUTION INTRAVENOUS PRN
OUTPATIENT
Start: 2024-04-24

## 2024-04-24 RX ORDER — SODIUM CHLORIDE 0.9 % (FLUSH) 0.9 %
5-40 SYRINGE (ML) INJECTION PRN
OUTPATIENT
Start: 2024-04-24

## 2024-04-24 RX ORDER — HEPARIN 100 UNIT/ML
500 SYRINGE INTRAVENOUS PRN
OUTPATIENT
Start: 2024-04-24

## 2024-04-24 RX ORDER — SCOLOPAMINE TRANSDERMAL SYSTEM 1 MG/1
1 PATCH, EXTENDED RELEASE TRANSDERMAL
Qty: 10 PATCH | Refills: 5 | Status: SHIPPED | OUTPATIENT
Start: 2024-04-24

## 2024-04-24 RX ADMIN — SODIUM CHLORIDE, PRESERVATIVE FREE 10 ML: 5 INJECTION INTRAVENOUS at 08:17

## 2024-04-24 ASSESSMENT — PATIENT HEALTH QUESTIONNAIRE - PHQ9
SUM OF ALL RESPONSES TO PHQ QUESTIONS 1-9: 0
SUM OF ALL RESPONSES TO PHQ9 QUESTIONS 1 & 2: 0
2. FEELING DOWN, DEPRESSED OR HOPELESS: NOT AT ALL
SUM OF ALL RESPONSES TO PHQ QUESTIONS 1-9: 0
1. LITTLE INTEREST OR PLEASURE IN DOING THINGS: NOT AT ALL

## 2024-04-24 NOTE — PATIENT INSTRUCTIONS
Patient Information from Today's Visit        Treatment Summary has been discussed and given to patient: N/A    Reason for Today's Visit:  Follow up for H&N Cancer - work in for tox check     Plan:  Dr. Olvera will dose reduce your next cycle given the toxicities you have had with this cycle.     Neelam would like for you to get a chest x-ray to rule out pneumonia - this will just be across the street at [facility].     Continue with your tube feedings    Continue with your mouth rinses - Magic Mouthwash and Steroid mouth rinse.     Follow Up:   2 weeks for next cycle of chemo     Please refer to After Visit Summary or [employer] for upcoming appointment information. If you have any questions regarding your upcoming schedule please reach out to your care team through [employer] or call [phone].          -------------------------------------------------------------------------------------------------------------------  Please call our office at [phone] if you have any  of the following symptoms:   Fever of 100.5 or greater  Chills  Shortness of breath  Swelling or pain in one leg    After office hours an answering service is available and will contact a provider for emergencies or if you are experiencing any of the above symptoms.    Patient has My Chart.  My Chart log in information explained on the after visit summary printout at the check-out desk.    Loraine Honeycutt RD, , LD  Outpatient Oncology Dietitian  Head and Neck Tumor Navigator  [phone]  [email or web address]

## 2024-04-24 NOTE — PROGRESS NOTES
Patient to port lab for port de-access.  Port de-accessed, dressing applied. Patient tolerated well. Discharged via wheelchair.

## 2024-04-24 NOTE — PROGRESS NOTES
Data Source: Patient, [facility] record.    4/24/2024    9:25 AM    Jesus Laguna [id number]    78 y.o.      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  Per records margins were close but negative with evidence of LVI as well as PNI and extracapsular spread.  He completed adjuvant radiation therapy and end of 2010 to early 2011.  He followed with Dr. Austen Bowman ENT up till the 5-year carlee in 2015 with no evidence of recurrence.  2 years post therapy he had multiple teeth removed of his left lower jaw followed by HBO therapy x10 sessions due to poor healing.  More recently patient presented with symptoms of congestion and hypersalivation, completed a couple courses of antibiotics without resolution.  Had minimal weight loss.  He was seen by Dr. Clair Bowman ENT with the scope and abnormal CT neck which was abnormal.  He was thereafter referred to Dr. James douglas at ENT surgeons at [facility].  Patient underwent direct laryngoscopy 8/17/2023 as well as PET scan 8/9/2023.  PET scan demonstrated hypermetabolic mass at base of tongue, mildly metabolic nonenlarged right level 3 cervical lymph node, and no evidence of distant metastasis.  Direct laryngoscopy with biopsy with pathology of larynx and epiglottis revealing invasive moderately differentiated squamous cell cancer.  P16 stain for HPV was negative.  Patient now referred to [place] [facility]

## 2024-04-25 ENCOUNTER — HOSPITAL ENCOUNTER (OUTPATIENT)
Dept: INTERVENTIONAL RADIOLOGY/VASCULAR | Age: 79
Discharge: HOME | End: 2024-04-28
Attending: INTERNAL MEDICINE

## 2024-04-25 DIAGNOSIS — T85.598A FEEDING TUBE DYSFUNCTION, INITIAL ENCOUNTER: Primary | ICD-10-CM

## 2024-04-25 DIAGNOSIS — T85.598A FEEDING TUBE DYSFUNCTION, INITIAL ENCOUNTER: ICD-10-CM

## 2024-04-25 NOTE — BRIEF OP NOTE
[facility]  Department of Interventional Radiology  [phone]        Interventional Radiology Brief Procedure Note    Patient: Jesus Laguna MRN: [id number]  SSN: [id number]    YOB: 1945  Age: 78 y.o.  Sex: male      Date of Procedure: 4/25/2024    Dysphagia, c/o drainage and redness around feeding tube.      Site examined, yellow, crusty drainage around feeding tube.    Cleaned with sterile saline gauze.  Granulation tissue treated with silver nitrate.    Wife reports they are only performing wound care daily.  Recommend keeping site very clean and dry, likely requiring attention 3 times a day.    I encouraged the patient that the site looks pretty good, not infected and treating the granulation tissue and keeping the site clean and dry is all that it needs.  He was relieved and appreciative.    Signed By: Claudine Humphrey PA-C     April 25, 2024

## 2024-04-25 NOTE — PROGRESS NOTES
Nutrition:  Pt's WANDY-KEY button having mild drainage, skin is excoriated and beefy red around button site.  Pt reports only mild pain when it's being cleaned.  Button site was examined by NP during office visit on (4/25), verbal order to contact IR - pictures taken and sent to GENI Harrington (charge) for ROMERO Chow to review.  Pt to be worked in on (4/25) to evaluate feeding tube.      Loraine Honeycutt RD, , LD  [phone]

## 2024-04-25 NOTE — PROGRESS NOTES
Nutrition F/U:  Assessment:  Pt seen during office visit w/ NP, work in today for chemo toxicities - bleeding mouth sores and diarrhea.  Pt reports that symptoms are improved today compared to Monday when his wife call with concerns.  Pt remains NPO/TF dependent for estimated nutrition needs, has WANDY-KEY button in place now - noted to have mild drainage, slight tenderness when touched, and red/beefy swollen skin at button site; picture taken and sent to IR for PA/MD to review.  Pt reports new coughing - history of aspiration PNA so chest x-rays ordered today.  Pt does not need IVF or electrolyte replacements per lab review.  Current BW: 174#, up 7# over the past 2 weeks.     Intervention:  1. NPO, continue w/ current TF regimen  2. Chest X-ray today  3. Picture of WANDY-KEY button site sent to IR to review - pt scheduled to come in (4/25) @ 2 pm to address feeding tube issues.     Monitoring/Evaluation:  1. RD to follow up during next office visit - follow up wt status, tolerance/intake of TF, symptom management.      Loraine Honeycutt RD, , LD  [phone]

## 2024-04-28 NOTE — PROGRESS NOTES
not anxious or depressed. Affect is not tearful.         Speech: Speech normal. Speech is not rapid and pressured or slurred.         Behavior: Behavior normal. Behavior is not agitated, aggressive or combative. Behavior is cooperative.         Thought Content: Thought content normal.                  An electronic signature was used to authenticate this note.    --Quinten Hartmann, DO

## 2024-04-29 ENCOUNTER — OFFICE VISIT (OUTPATIENT)
Dept: INTERNAL MEDICINE CLINIC | Facility: CLINIC | Age: 79
End: 2024-04-29
Payer: MEDICARE

## 2024-04-29 VITALS
DIASTOLIC BLOOD PRESSURE: 78 MMHG | TEMPERATURE: 97.9 F | HEART RATE: 85 BPM | WEIGHT: 174.8 LBS | RESPIRATION RATE: 16 BRPM | SYSTOLIC BLOOD PRESSURE: 110 MMHG | OXYGEN SATURATION: 99 % | BODY MASS INDEX: 24.47 KG/M2 | HEIGHT: 71 IN

## 2024-04-29 DIAGNOSIS — D61.818 PANCYTOPENIA (HCC): ICD-10-CM

## 2024-04-29 DIAGNOSIS — K21.9 GASTROESOPHAGEAL REFLUX DISEASE, UNSPECIFIED WHETHER ESOPHAGITIS PRESENT: ICD-10-CM

## 2024-04-29 DIAGNOSIS — C32.9 LARYNGEAL CANCER (HCC): Primary | ICD-10-CM

## 2024-04-29 DIAGNOSIS — Z85.810 HISTORY OF TONGUE CANCER: ICD-10-CM

## 2024-04-29 DIAGNOSIS — Z93.1 GASTROSTOMY STATUS (HCC): ICD-10-CM

## 2024-04-29 PROCEDURE — 1123F ACP DISCUSS/DSCN MKR DOCD: CPT | Performed by: STUDENT IN AN ORGANIZED HEALTH CARE EDUCATION/TRAINING PROGRAM

## 2024-04-29 PROCEDURE — 99214 OFFICE O/P EST MOD 30 MIN: CPT | Performed by: STUDENT IN AN ORGANIZED HEALTH CARE EDUCATION/TRAINING PROGRAM

## 2024-04-29 PROCEDURE — G8427 DOCREV CUR MEDS BY ELIG CLIN: HCPCS | Performed by: STUDENT IN AN ORGANIZED HEALTH CARE EDUCATION/TRAINING PROGRAM

## 2024-04-29 PROCEDURE — G8420 CALC BMI NORM PARAMETERS: HCPCS | Performed by: STUDENT IN AN ORGANIZED HEALTH CARE EDUCATION/TRAINING PROGRAM

## 2024-04-29 PROCEDURE — 1036F TOBACCO NON-USER: CPT | Performed by: STUDENT IN AN ORGANIZED HEALTH CARE EDUCATION/TRAINING PROGRAM

## 2024-04-29 ASSESSMENT — PATIENT HEALTH QUESTIONNAIRE - PHQ9
SUM OF ALL RESPONSES TO PHQ QUESTIONS 1-9: 0
1. LITTLE INTEREST OR PLEASURE IN DOING THINGS: NOT AT ALL
SUM OF ALL RESPONSES TO PHQ QUESTIONS 1-9: 0
2. FEELING DOWN, DEPRESSED OR HOPELESS: NOT AT ALL
SUM OF ALL RESPONSES TO PHQ QUESTIONS 1-9: 0
SUM OF ALL RESPONSES TO PHQ9 QUESTIONS 1 & 2: 0
SUM OF ALL RESPONSES TO PHQ QUESTIONS 1-9: 0

## 2024-04-29 ASSESSMENT — ENCOUNTER SYMPTOMS
BACK PAIN: 0
COUGH: 0
SHORTNESS OF BREATH: 0

## 2024-04-30 ENCOUNTER — TELEPHONE (OUTPATIENT)
Dept: ONCOLOGY | Age: 79
End: 2024-04-30

## 2024-04-30 RX ORDER — FERROUS SULFATE 220 (44)/5
325 ELIXIR ORAL DAILY
Qty: 473 ML | Refills: 5 | Status: SHIPPED | OUTPATIENT
Start: 2024-04-30 | End: 2024-05-30

## 2024-04-30 NOTE — TELEPHONE ENCOUNTER
Nutrition:  Pt's wife went to refill pt's iron tablets and w/ her new pharmacy the bottle says to not crush iron tablets, which previous prescription from [facility] did not say this.  Reviewed and iron tablets/capsules should not be crushed, changed to liquid formulation - 325 mg = 8 mL liquid, prescription pended to Neelam Schaeffer NP to sign.  Instructed pt's wife to mix 8 mL iron liquid into orange juice (vitamin C) and give via PEG.  She has also been crushing Vitamin C tablets - this will eliminate the need for that as well w/ liquid iron.     Loraine Honeycutt RD, , LD  [phone]

## 2024-05-02 ENCOUNTER — OFFICE VISIT (OUTPATIENT)
Dept: ONCOLOGY | Age: 79
End: 2024-05-02
Payer: MEDICARE

## 2024-05-02 ENCOUNTER — HOSPITAL ENCOUNTER (OUTPATIENT)
Dept: LAB | Age: 79
End: 2024-05-02
Payer: MEDICARE

## 2024-05-02 ENCOUNTER — OFFICE VISIT (OUTPATIENT)
Dept: ONCOLOGY | Age: 79
End: 2024-05-02

## 2024-05-02 ENCOUNTER — HOSPITAL ENCOUNTER (OUTPATIENT)
Dept: INFUSION THERAPY | Age: 79
Setting detail: INFUSION SERIES
Discharge: HOME | End: 2024-05-02
Payer: MEDICARE

## 2024-05-02 VITALS
HEART RATE: 77 BPM | BODY MASS INDEX: 24.75 KG/M2 | TEMPERATURE: 98.2 F | RESPIRATION RATE: 16 BRPM | SYSTOLIC BLOOD PRESSURE: 121 MMHG | WEIGHT: 176.8 LBS | DIASTOLIC BLOOD PRESSURE: 74 MMHG | OXYGEN SATURATION: 97 % | HEIGHT: 71 IN

## 2024-05-02 DIAGNOSIS — Z78.9 ON ENTERAL NUTRITION: ICD-10-CM

## 2024-05-02 DIAGNOSIS — Z00.8 NUTRITIONAL ASSESSMENT: Primary | ICD-10-CM

## 2024-05-02 DIAGNOSIS — D50.9 IRON DEFICIENCY ANEMIA, UNSPECIFIED IRON DEFICIENCY ANEMIA TYPE: ICD-10-CM

## 2024-05-02 DIAGNOSIS — C32.9 LARYNGEAL CANCER (HCC): Primary | ICD-10-CM

## 2024-05-02 DIAGNOSIS — E03.2 HYPOTHYROIDISM DUE TO MEDICAMENTS AND OTHER EXOGENOUS SUBSTANCES: ICD-10-CM

## 2024-05-02 DIAGNOSIS — C32.9 KERATINIZING SQUAMOUS CELL CARCINOMA OF LARYNX (HCC): ICD-10-CM

## 2024-05-02 DIAGNOSIS — R68.89 COPIOUS ORAL SECRETIONS: ICD-10-CM

## 2024-05-02 DIAGNOSIS — C32.9 LARYNGEAL CANCER (HCC): ICD-10-CM

## 2024-05-02 LAB
ALBUMIN SERPL-MCNC: 2.9 G/DL (ref 3.2–4.6)
ALBUMIN/GLOB SERPL: 0.7 (ref 1–1.9)
ALP SERPL-CCNC: 88 U/L (ref 40–129)
ALT SERPL-CCNC: 21 U/L (ref 12–65)
ANION GAP SERPL CALC-SCNC: 11 MMOL/L (ref 9–18)
AST SERPL-CCNC: 25 U/L (ref 15–37)
BASOPHILS # BLD: 0 K/UL (ref 0–0.2)
BASOPHILS NFR BLD: 0 % (ref 0–2)
BILIRUB SERPL-MCNC: <0.2 MG/DL (ref 0–1.2)
BUN SERPL-MCNC: 23 MG/DL (ref 8–23)
CALCIUM SERPL-MCNC: 9.1 MG/DL (ref 8.8–10.2)
CHLORIDE SERPL-SCNC: 101 MMOL/L (ref 98–107)
CO2 SERPL-SCNC: 27 MMOL/L (ref 20–28)
CREAT SERPL-MCNC: 0.51 MG/DL (ref 0.8–1.3)
DIFFERENTIAL METHOD BLD: ABNORMAL
EOSINOPHIL # BLD: 0.1 K/UL (ref 0–0.8)
EOSINOPHIL NFR BLD: 1 % (ref 0.5–7.8)
ERYTHROCYTE [DISTWIDTH] IN BLOOD BY AUTOMATED COUNT: 14.1 % (ref 11.9–14.6)
FERRITIN SERPL-MCNC: 147 NG/ML (ref 8–388)
GLOBULIN SER CALC-MCNC: 4 G/DL (ref 2.3–3.5)
GLUCOSE SERPL-MCNC: 122 MG/DL (ref 70–99)
HCT VFR BLD AUTO: 32.2 % (ref 41.1–50.3)
HGB BLD-MCNC: 10.3 G/DL (ref 13.6–17.2)
IMM GRANULOCYTES # BLD AUTO: 0 K/UL (ref 0–0.5)
IMM GRANULOCYTES NFR BLD AUTO: 0 % (ref 0–5)
LYMPHOCYTES # BLD: 0.7 K/UL (ref 0.5–4.6)
LYMPHOCYTES NFR BLD: 12 % (ref 13–44)
MCH RBC QN AUTO: 29.4 PG (ref 26.1–32.9)
MCHC RBC AUTO-ENTMCNC: 32 G/DL (ref 31.4–35)
MCV RBC AUTO: 92 FL (ref 82–102)
MONOCYTES # BLD: 0.7 K/UL (ref 0.1–1.3)
MONOCYTES NFR BLD: 11 % (ref 4–12)
NEUTS SEG # BLD: 4.4 K/UL (ref 1.7–8.2)
NEUTS SEG NFR BLD: 75 % (ref 43–78)
NRBC # BLD: 0 K/UL (ref 0–0.2)
PLATELET # BLD AUTO: 200 K/UL (ref 150–450)
PMV BLD AUTO: 9.9 FL (ref 9.4–12.3)
POTASSIUM SERPL-SCNC: 4 MMOL/L (ref 3.5–5.1)
PROT SERPL-MCNC: 6.9 G/DL (ref 6.3–8.2)
RBC # BLD AUTO: 3.5 M/UL (ref 4.23–5.6)
SODIUM SERPL-SCNC: 139 MMOL/L (ref 136–145)
TSH, 3RD GENERATION: 1.23 UIU/ML (ref 0.27–4.2)
WBC # BLD AUTO: 5.8 K/UL (ref 4.3–11.1)

## 2024-05-02 PROCEDURE — 2580000003 HC RX 258: Performed by: INTERNAL MEDICINE

## 2024-05-02 PROCEDURE — 85025 COMPLETE CBC W/AUTO DIFF WBC: CPT

## 2024-05-02 PROCEDURE — 82728 ASSAY OF FERRITIN: CPT

## 2024-05-02 PROCEDURE — 1036F TOBACCO NON-USER: CPT | Performed by: NURSE PRACTITIONER

## 2024-05-02 PROCEDURE — 2580000003 HC RX 258: Performed by: NURSE PRACTITIONER

## 2024-05-02 PROCEDURE — G0498 CHEMO EXTEND IV INFUS W/PUMP: HCPCS

## 2024-05-02 PROCEDURE — G8420 CALC BMI NORM PARAMETERS: HCPCS | Performed by: NURSE PRACTITIONER

## 2024-05-02 PROCEDURE — 96413 CHEMO IV INFUSION 1 HR: CPT

## 2024-05-02 PROCEDURE — 6360000002 HC RX W HCPCS: Performed by: NURSE PRACTITIONER

## 2024-05-02 PROCEDURE — 99214 OFFICE O/P EST MOD 30 MIN: CPT | Performed by: NURSE PRACTITIONER

## 2024-05-02 PROCEDURE — 96375 TX/PRO/DX INJ NEW DRUG ADDON: CPT

## 2024-05-02 PROCEDURE — G8427 DOCREV CUR MEDS BY ELIG CLIN: HCPCS | Performed by: NURSE PRACTITIONER

## 2024-05-02 PROCEDURE — 84443 ASSAY THYROID STIM HORMONE: CPT

## 2024-05-02 PROCEDURE — 96417 CHEMO IV INFUS EACH ADDL SEQ: CPT

## 2024-05-02 PROCEDURE — 80053 COMPREHEN METABOLIC PANEL: CPT

## 2024-05-02 PROCEDURE — 96367 TX/PROPH/DG ADDL SEQ IV INF: CPT

## 2024-05-02 PROCEDURE — 1123F ACP DISCUSS/DSCN MKR DOCD: CPT | Performed by: NURSE PRACTITIONER

## 2024-05-02 RX ORDER — MEPERIDINE HYDROCHLORIDE 25 MG/ML
12.5 INJECTION INTRAMUSCULAR; INTRAVENOUS; SUBCUTANEOUS PRN
Status: CANCELLED | OUTPATIENT
Start: 2024-05-02

## 2024-05-02 RX ORDER — ACETAMINOPHEN 325 MG/1
650 TABLET ORAL
Status: CANCELLED | OUTPATIENT
Start: 2024-05-02

## 2024-05-02 RX ORDER — HEPARIN 100 UNIT/ML
500 SYRINGE INTRAVENOUS PRN
Status: CANCELLED | OUTPATIENT
Start: 2024-05-02

## 2024-05-02 RX ORDER — SODIUM CHLORIDE 9 MG/ML
INJECTION, SOLUTION INTRAVENOUS CONTINUOUS
Status: CANCELLED | OUTPATIENT
Start: 2024-05-02

## 2024-05-02 RX ORDER — DIPHENHYDRAMINE HYDROCHLORIDE 50 MG/ML
50 INJECTION INTRAMUSCULAR; INTRAVENOUS
Status: CANCELLED | OUTPATIENT
Start: 2024-05-02

## 2024-05-02 RX ORDER — SODIUM CHLORIDE 9 MG/ML
INJECTION, SOLUTION INTRAVENOUS CONTINUOUS
Status: DISCONTINUED | OUTPATIENT
Start: 2024-05-02 | End: 2024-05-03 | Stop reason: HOSPADM

## 2024-05-02 RX ORDER — ACETAMINOPHEN 325 MG/1
650 TABLET ORAL
Status: DISCONTINUED | OUTPATIENT
Start: 2024-05-02 | End: 2024-05-03 | Stop reason: HOSPADM

## 2024-05-02 RX ORDER — SODIUM CHLORIDE 9 MG/ML
INJECTION, SOLUTION INTRAVENOUS CONTINUOUS
OUTPATIENT
Start: 2024-05-23

## 2024-05-02 RX ORDER — DIPHENHYDRAMINE HYDROCHLORIDE 50 MG/ML
50 INJECTION INTRAMUSCULAR; INTRAVENOUS
OUTPATIENT
Start: 2024-05-23

## 2024-05-02 RX ORDER — EPINEPHRINE 1 MG/ML
0.3 INJECTION, SOLUTION, CONCENTRATE INTRAVENOUS PRN
Status: DISCONTINUED | OUTPATIENT
Start: 2024-05-02 | End: 2024-05-03 | Stop reason: HOSPADM

## 2024-05-02 RX ORDER — SODIUM CHLORIDE 9 MG/ML
5-250 INJECTION, SOLUTION INTRAVENOUS PRN
OUTPATIENT
Start: 2024-05-23

## 2024-05-02 RX ORDER — ALBUTEROL SULFATE 90 UG/1
4 AEROSOL, METERED RESPIRATORY (INHALATION) PRN
OUTPATIENT
Start: 2024-05-23

## 2024-05-02 RX ORDER — ONDANSETRON 2 MG/ML
8 INJECTION INTRAMUSCULAR; INTRAVENOUS
Status: CANCELLED | OUTPATIENT
Start: 2024-05-02

## 2024-05-02 RX ORDER — ONDANSETRON 2 MG/ML
8 INJECTION INTRAMUSCULAR; INTRAVENOUS ONCE
Status: COMPLETED | OUTPATIENT
Start: 2024-05-02 | End: 2024-05-02

## 2024-05-02 RX ORDER — ALBUTEROL SULFATE 90 UG/1
4 AEROSOL, METERED RESPIRATORY (INHALATION) PRN
Status: CANCELLED | OUTPATIENT
Start: 2024-05-02

## 2024-05-02 RX ORDER — DIPHENHYDRAMINE HYDROCHLORIDE 50 MG/ML
50 INJECTION INTRAMUSCULAR; INTRAVENOUS
Status: DISCONTINUED | OUTPATIENT
Start: 2024-05-02 | End: 2024-05-03 | Stop reason: HOSPADM

## 2024-05-02 RX ORDER — SODIUM CHLORIDE 0.9 % (FLUSH) 0.9 %
5-40 SYRINGE (ML) INJECTION PRN
Status: DISCONTINUED | OUTPATIENT
Start: 2024-05-02 | End: 2024-05-06 | Stop reason: HOSPADM

## 2024-05-02 RX ORDER — ALBUTEROL SULFATE 90 UG/1
4 AEROSOL, METERED RESPIRATORY (INHALATION) PRN
Status: DISCONTINUED | OUTPATIENT
Start: 2024-05-02 | End: 2024-05-03 | Stop reason: HOSPADM

## 2024-05-02 RX ORDER — ONDANSETRON 2 MG/ML
8 INJECTION INTRAMUSCULAR; INTRAVENOUS
Status: DISCONTINUED | OUTPATIENT
Start: 2024-05-02 | End: 2024-05-03 | Stop reason: HOSPADM

## 2024-05-02 RX ORDER — MEPERIDINE HYDROCHLORIDE 25 MG/ML
12.5 INJECTION INTRAMUSCULAR; INTRAVENOUS; SUBCUTANEOUS PRN
OUTPATIENT
Start: 2024-05-23

## 2024-05-02 RX ORDER — EPINEPHRINE 1 MG/ML
0.3 INJECTION, SOLUTION, CONCENTRATE INTRAVENOUS PRN
Status: CANCELLED | OUTPATIENT
Start: 2024-05-02

## 2024-05-02 RX ORDER — SODIUM CHLORIDE 9 MG/ML
5-250 INJECTION, SOLUTION INTRAVENOUS PRN
Status: DISCONTINUED | OUTPATIENT
Start: 2024-05-02 | End: 2024-05-03 | Stop reason: HOSPADM

## 2024-05-02 RX ORDER — EPINEPHRINE 1 MG/ML
0.3 INJECTION, SOLUTION, CONCENTRATE INTRAVENOUS PRN
OUTPATIENT
Start: 2024-05-23

## 2024-05-02 RX ORDER — SODIUM CHLORIDE 0.9 % (FLUSH) 0.9 %
5-40 SYRINGE (ML) INJECTION PRN
OUTPATIENT
Start: 2024-05-23

## 2024-05-02 RX ORDER — SODIUM CHLORIDE 0.9 % (FLUSH) 0.9 %
5-40 SYRINGE (ML) INJECTION PRN
Status: DISCONTINUED | OUTPATIENT
Start: 2024-05-02 | End: 2024-05-03 | Stop reason: HOSPADM

## 2024-05-02 RX ORDER — ACETAMINOPHEN 325 MG/1
650 TABLET ORAL
OUTPATIENT
Start: 2024-05-23

## 2024-05-02 RX ORDER — ONDANSETRON 2 MG/ML
8 INJECTION INTRAMUSCULAR; INTRAVENOUS ONCE
Status: CANCELLED | OUTPATIENT
Start: 2024-05-02 | End: 2024-05-02

## 2024-05-02 RX ORDER — SODIUM CHLORIDE 9 MG/ML
5-250 INJECTION, SOLUTION INTRAVENOUS PRN
Status: CANCELLED | OUTPATIENT
Start: 2024-05-02

## 2024-05-02 RX ORDER — HEPARIN 100 UNIT/ML
500 SYRINGE INTRAVENOUS PRN
OUTPATIENT
Start: 2024-05-23

## 2024-05-02 RX ORDER — ONDANSETRON 2 MG/ML
8 INJECTION INTRAMUSCULAR; INTRAVENOUS
OUTPATIENT
Start: 2024-05-23

## 2024-05-02 RX ORDER — MEPERIDINE HYDROCHLORIDE 25 MG/ML
12.5 INJECTION INTRAMUSCULAR; INTRAVENOUS; SUBCUTANEOUS PRN
Status: DISCONTINUED | OUTPATIENT
Start: 2024-05-02 | End: 2024-05-03 | Stop reason: HOSPADM

## 2024-05-02 RX ORDER — SODIUM CHLORIDE 0.9 % (FLUSH) 0.9 %
5-40 SYRINGE (ML) INJECTION PRN
Status: CANCELLED | OUTPATIENT
Start: 2024-05-02

## 2024-05-02 RX ORDER — ONDANSETRON 2 MG/ML
8 INJECTION INTRAMUSCULAR; INTRAVENOUS ONCE
OUTPATIENT
Start: 2024-05-23 | End: 2024-05-23

## 2024-05-02 RX ADMIN — ONDANSETRON 8 MG: 2 INJECTION INTRAMUSCULAR; INTRAVENOUS at 13:07

## 2024-05-02 RX ADMIN — CARBOPLATIN 625 MG: 10 INJECTION, SOLUTION INTRAVENOUS at 13:46

## 2024-05-02 RX ADMIN — DEXAMETHASONE SODIUM PHOSPHATE 12 MG: 4 INJECTION INTRA-ARTICULAR; INTRALESIONAL; INTRAMUSCULAR; INTRAVENOUS; SOFT TISSUE at 12:38

## 2024-05-02 RX ADMIN — FLUOROURACIL 4000 MG: 50 INJECTION, SOLUTION INTRAVENOUS at 14:19

## 2024-05-02 RX ADMIN — SODIUM CHLORIDE, PRESERVATIVE FREE 10 ML: 5 INJECTION INTRAVENOUS at 11:10

## 2024-05-02 RX ADMIN — SODIUM CHLORIDE 200 MG: 9 INJECTION, SOLUTION INTRAVENOUS at 12:03

## 2024-05-02 RX ADMIN — SODIUM CHLORIDE 100 ML/HR: 9 INJECTION, SOLUTION INTRAVENOUS at 11:14

## 2024-05-02 RX ADMIN — SODIUM CHLORIDE 150 MG: 9 INJECTION, SOLUTION INTRAVENOUS at 13:09

## 2024-05-02 RX ADMIN — SODIUM CHLORIDE, PRESERVATIVE FREE 10 ML: 5 INJECTION INTRAVENOUS at 09:30

## 2024-05-02 ASSESSMENT — PATIENT HEALTH QUESTIONNAIRE - PHQ9
SUM OF ALL RESPONSES TO PHQ QUESTIONS 1-9: 0
SUM OF ALL RESPONSES TO PHQ QUESTIONS 1-9: 0
SUM OF ALL RESPONSES TO PHQ9 QUESTIONS 1 & 2: 0
SUM OF ALL RESPONSES TO PHQ QUESTIONS 1-9: 0
2. FEELING DOWN, DEPRESSED OR HOPELESS: NOT AT ALL
SUM OF ALL RESPONSES TO PHQ QUESTIONS 1-9: 0
1. LITTLE INTEREST OR PLEASURE IN DOING THINGS: NOT AT ALL

## 2024-05-02 NOTE — PATIENT INSTRUCTIONS
Patient Information from Today's Visit    Reason for Today's Visit:  Pre-chemo for H&N Cancer - Carbo + 5-FU + Keytruda     Patient Instructions:   Proceed with chemo today as planned    Your 5-FU dose has been reduced back to your original dose - this was increased last cycle because you had gained weight.     We will check your Ferritin today and see if you need to keep taking your iron daily - we will call you and let you know the final decision once the lab results.       Treatment Summary has been discussed and given to patient:N/A      Current Labs:   Hospital Outpatient Visit on 05/02/2024   Component Date Value Ref Range Status    TSH, 3rd Generation 05/02/2024 1.230  0.270 - 4.200 uIU/mL Final    Sodium 05/02/2024 139  136 - 145 mmol/L Final    Potassium 05/02/2024 4.0  3.5 - 5.1 mmol/L Final    Chloride 05/02/2024 101  98 - 107 mmol/L Final    CO2 05/02/2024 27  20 - 28 mmol/L Final    Anion Gap 05/02/2024 11  9 - 18 mmol/L Final    Glucose 05/02/2024 122 (H)  70 - 99 mg/dL Final    Comment: <70 mg/dL Consistent with, but not fully diagnostic of hypoglycemia.  100 - 125 mg/dL Impaired fasting glucose/consistent with pre-diabetes mellitus.  > 126 mg/dl Fasting glucose consistent with overt diabetes mellitus      BUN 05/02/2024 23  8 - 23 MG/DL Final    Creatinine 05/02/2024 0.51 (L)  0.80 - 1.30 MG/DL Final    Est, Glom Filt Rate 05/02/2024 >90  >60 ml/min/1.73m2 Final    Comment:    Pediatric calculator link: https://www.kidney.org/professionals/kdoqi/gfr_calculatorped     These results are not intended for use in patients <18 years of age.     eGFR results are calculated without a race factor using  the 2021 CKD-EPI equation. Careful clinical correlation is recommended, particularly when comparing to results calculated using previous equations.  The CKD-EPI equation is less accurate in patients with extremes of muscle mass, extra-renal metabolism of creatinine, excessive creatine ingestion, or following

## 2024-05-02 NOTE — PROGRESS NOTES
Arrived to the Infusion Center. Labs and orders reviewed. Keytruda and Carboplatin completed. Patient tolerated well. Adrucil pump connected and connections verified by Aimee Gaines RN.   Any issues or concerns during appointment: none.  Patient aware of next infusion appointment on 5/6/2024 (date) at 1345 (time).  Patient aware of next lab and [facility] office visit on 5/23/2024 (date) at 0930 (time).  Patient instructed to call provider with temperature of 100.4 or greater or nausea/vomiting/ diarrhea or pain not controlled by medications  Discharged via wheelchair, accompanied by spouse.

## 2024-05-02 NOTE — PROGRESS NOTES
Patient to port lab for port access and lab draw. Port accessed using 20g 0.75\" alegria needle without difficulty. Labs drawn from port and port flushed. Port remains accessed. Patient tolerated well. Discharged ambulatory.

## 2024-05-02 NOTE — PROGRESS NOTES
Hallucinations     Hallucinations     Ambien    Other Reaction(s): HALLUNATIONS    Metronidazole      Other Reaction(s): CONSTIPATION, NO APPETITE    Sulfamethoxazole-Trimethoprim Other (See Comments)     Other Reaction(s): Unknown       PHYSICAL EXAMINATION:  General Appearance: Healthy appearing patient in no acute distress.  Vitals reviewed.   /74   Pulse 77   Temp 98.2 °F (36.8 °C) (Oral)   Resp 16   Ht 1.803 m (5' 11\")   Wt 80.2 kg (176 lb 12.8 oz) Comment: 237.8 lb 61.6  SpO2 97%   BMI 24.66 kg/m²   HEENT: No oral or pharyngeal masses, ulceration or thrush noted, no sinus tenderness.  Neck is supple.  Lungs/Thorax: Clear to auscultation, no accessory muscles of respiration being used.  Heart: Regular rate and rhythm, normal S1, S2, no appreciable murmurs, rubs, gallops.  Abdomen: Soft, nontender, bowel sounds present, no appreciable hepatosplenomegaly, no palpable masses.  Extremeties: Good pulses bilaterally, no peripheral edema.  Skin: Normal skin tone with no rash, petechiae, ecchymosis noted.  Musculoskeletal: No pain on palpation over bony prominence, no edema, no evidence of gout, no joint or bony deformity.  Neurologic: Grossly intact.    LABS/IMAGING:    Lab Results   Component Value Date/Time    WBC 5.8 05/02/2024 09:29 AM    HGB 10.3 05/02/2024 09:29 AM    HCT 32.2 05/02/2024 09:29 AM     05/02/2024 09:29 AM    MCV 92.0 05/02/2024 09:29 AM       Lab Results   Component Value Date/Time     05/02/2024 09:29 AM    K 4.0 05/02/2024 09:29 AM     05/02/2024 09:29 AM    CO2 27 05/02/2024 09:29 AM    BUN 23 05/02/2024 09:29 AM    GFRAA >60 07/22/2022 03:13 PM    GLOB 4.0 05/02/2024 09:29 AM    ALT 21 05/02/2024 09:29 AM         Above results reviewed with patient.     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

## 2024-05-06 ENCOUNTER — HOSPITAL ENCOUNTER (OUTPATIENT)
Dept: INFUSION THERAPY | Age: 79
Setting detail: INFUSION SERIES
Discharge: HOME | End: 2024-05-06
Payer: MEDICARE

## 2024-05-06 PROCEDURE — 2580000003 HC RX 258: Performed by: INTERNAL MEDICINE

## 2024-05-06 PROCEDURE — 96523 IRRIG DRUG DELIVERY DEVICE: CPT

## 2024-05-06 RX ORDER — SODIUM CHLORIDE 0.9 % (FLUSH) 0.9 %
5-40 SYRINGE (ML) INJECTION PRN
Status: DISCONTINUED | OUTPATIENT
Start: 2024-05-06 | End: 2024-05-07 | Stop reason: HOSPADM

## 2024-05-06 RX ADMIN — SODIUM CHLORIDE, PRESERVATIVE FREE 10 ML: 5 INJECTION INTRAVENOUS at 13:53

## 2024-05-06 NOTE — PROGRESS NOTES
Arrived to the Infusion Center.  Adrucil pump disconnected, port flushed and de-acccessed.   Patient instructed to report any side effects to ordering provider.  Patient tolerated well.   Any issues or concerns during appointment: none.  Patient aware of next infusion appointment on 5/23/24 (date) at 10:15 AM (time).  Discharged via wheelchair with family.

## 2024-05-07 NOTE — PROGRESS NOTES
Nutrition F/U:  Assessment:  Pt seen during office visit w/ NP, receiving chemo today - previous cycle caused increased toxicity; determined to be dose related d/t pt's weight gain since previous cycles given at diagnosis; adjusted back to original dose today.  Pt has dex mouth rinse and MMW to use, NP encouraged pt to start Decadron mouth rinse as a preventative this cycle, diarrhea managed w/ Imodium and wife may try prune juice.  Pt remains NPO/TF dependent for estimated nutrition needs, continues to gain weight, has had small sips of water to keep his mouth moist, awaiting further diet advancement post MBSS later this month. WANDY-KEY button healed now. Current BW: 176#, up 2# over the past     Intervention:  1. NPO, further diet advancement per SLP.  MBSS scheduled (5/21)  2. Continue w/ current TF regimen   3. Chemo dose adjusted by JOHNNIE Richter after discussion w/ Dr. Olvera  4. Dex and MMW mouth rinses prn for mouth sores, Imodium for diarrhea prn.     Monitoring/Evaluation:  1. RD to follow up during next office visit - follow up wt status, tolerance/intake of TF, symptom management, SLP recommendations.       Loraine Honeycutt RD, , LD  [phone]     Patient may require Sliding scale insulin in the perioperative state.

## 2024-05-21 ENCOUNTER — HOSPITAL ENCOUNTER (OUTPATIENT)
Dept: GENERAL RADIOLOGY | Age: 79
Discharge: HOME | End: 2024-05-24
Payer: MEDICARE

## 2024-05-21 DIAGNOSIS — R13.12 DYSPHAGIA, OROPHARYNGEAL PHASE: ICD-10-CM

## 2024-05-21 DIAGNOSIS — J69.0 ASPIRATION PNEUMONIA, UNSPECIFIED ASPIRATION PNEUMONIA TYPE, UNSPECIFIED LATERALITY, UNSPECIFIED PART OF LUNG (HCC): ICD-10-CM

## 2024-05-21 PROCEDURE — 92611 MOTION FLUOROSCOPY/SWALLOW: CPT

## 2024-05-21 PROCEDURE — 74230 X-RAY XM SWLNG FUNCJ C+: CPT

## 2024-05-21 PROCEDURE — 2500000003 HC RX 250 WO HCPCS: Performed by: NURSE PRACTITIONER

## 2024-05-21 RX ADMIN — BARIUM SULFATE 15 ML: 0.81 POWDER, FOR SUSPENSION ORAL at 09:21

## 2024-05-21 RX ADMIN — BARIUM SULFATE 15 ML: 400 SUSPENSION ORAL at 09:21

## 2024-05-21 NOTE — THERAPY EVALUATION
consistency: Not assessed.  Cracker: Not assessed.    Upper Esophageal Screen:   decreased opening of upper segment of esophagus  signs of reduced bolus clearance through cervical esophagus   *Distal esophagus not assessed due to limitations of modified barium swallow study.     National Outcomes Measure:   SWALLOWING: Level 1:  Individual is not able to swallow anything safely by mouth. All nutrition and hydration is received through non-oral means (e.g., nasogastric tube, PEG).    PATIENT EDUCATION:  Education provided on the following topics: anatomy and physiology of the swallowing mechanism, normal swallow expectations, and results and recommendations from this assessment.   They verbalized understanding.    **It should be noted that this instrumental assessment presents only a brief snapshot of the patient's true swallow-functioning; results should be interpreted within the context of overall clinical presentation of the patient's swallow status, prior/current medical history, and clinical indicators noted or observed during formal/informal assessment.**    Therapy Time  SLP Individual Minutes  Time In: 0900  Time Out: 0930  Minutes: 30    Deneen Salinas M.Ed, CF-SLP

## 2024-05-23 ENCOUNTER — HOSPITAL ENCOUNTER (OUTPATIENT)
Dept: INFUSION THERAPY | Age: 79
Setting detail: INFUSION SERIES
Discharge: HOME | End: 2024-05-23
Payer: MEDICARE

## 2024-05-23 ENCOUNTER — OFFICE VISIT (OUTPATIENT)
Dept: ONCOLOGY | Age: 79
End: 2024-05-23
Payer: MEDICARE

## 2024-05-23 ENCOUNTER — HOSPITAL ENCOUNTER (OUTPATIENT)
Dept: LAB | Age: 79
End: 2024-05-23
Payer: MEDICARE

## 2024-05-23 ENCOUNTER — OFFICE VISIT (OUTPATIENT)
Dept: ONCOLOGY | Age: 79
End: 2024-05-23

## 2024-05-23 VITALS
RESPIRATION RATE: 16 BRPM | BODY MASS INDEX: 24.84 KG/M2 | WEIGHT: 177.4 LBS | OXYGEN SATURATION: 96 % | HEART RATE: 87 BPM | DIASTOLIC BLOOD PRESSURE: 76 MMHG | TEMPERATURE: 97.8 F | HEIGHT: 71 IN | SYSTOLIC BLOOD PRESSURE: 118 MMHG

## 2024-05-23 DIAGNOSIS — Z00.8 NUTRITIONAL ASSESSMENT: ICD-10-CM

## 2024-05-23 DIAGNOSIS — R68.89 COPIOUS ORAL SECRETIONS: ICD-10-CM

## 2024-05-23 DIAGNOSIS — E03.2 HYPOTHYROIDISM DUE TO MEDICAMENTS AND OTHER EXOGENOUS SUBSTANCES: ICD-10-CM

## 2024-05-23 DIAGNOSIS — C32.9 LARYNGEAL CANCER (HCC): Primary | ICD-10-CM

## 2024-05-23 DIAGNOSIS — E55.9 VITAMIN D DEFICIENCY: ICD-10-CM

## 2024-05-23 DIAGNOSIS — C32.9 KERATINIZING SQUAMOUS CELL CARCINOMA OF LARYNX (HCC): ICD-10-CM

## 2024-05-23 DIAGNOSIS — C32.9 LARYNGEAL CANCER (HCC): ICD-10-CM

## 2024-05-23 DIAGNOSIS — Z78.9 ON ENTERAL NUTRITION: ICD-10-CM

## 2024-05-23 DIAGNOSIS — J69.0 ASPIRATION PNEUMONIA, UNSPECIFIED ASPIRATION PNEUMONIA TYPE, UNSPECIFIED LATERALITY, UNSPECIFIED PART OF LUNG (HCC): ICD-10-CM

## 2024-05-23 DIAGNOSIS — C76.0 MALIGNANT NEOPLASM OF HEAD, FACE AND NECK (HCC): ICD-10-CM

## 2024-05-23 LAB
ALBUMIN SERPL-MCNC: 3.3 G/DL (ref 3.2–4.6)
ALBUMIN/GLOB SERPL: 1 (ref 1–1.9)
ALP SERPL-CCNC: 88 U/L (ref 40–129)
ALT SERPL-CCNC: 21 U/L (ref 12–65)
ANION GAP SERPL CALC-SCNC: 10 MMOL/L (ref 9–18)
AST SERPL-CCNC: 26 U/L (ref 15–37)
BASOPHILS # BLD: 0 K/UL (ref 0–0.2)
BASOPHILS NFR BLD: 0 % (ref 0–2)
BILIRUB SERPL-MCNC: 0.3 MG/DL (ref 0–1.2)
BUN SERPL-MCNC: 21 MG/DL (ref 8–23)
CALCIUM SERPL-MCNC: 9.1 MG/DL (ref 8.8–10.2)
CHLORIDE SERPL-SCNC: 103 MMOL/L (ref 98–107)
CO2 SERPL-SCNC: 27 MMOL/L (ref 20–28)
CREAT SERPL-MCNC: 0.54 MG/DL (ref 0.8–1.3)
DIFFERENTIAL METHOD BLD: ABNORMAL
EOSINOPHIL # BLD: 0.1 K/UL (ref 0–0.8)
EOSINOPHIL NFR BLD: 1 % (ref 0.5–7.8)
ERYTHROCYTE [DISTWIDTH] IN BLOOD BY AUTOMATED COUNT: 17.3 % (ref 11.9–14.6)
GLOBULIN SER CALC-MCNC: 3.4 G/DL (ref 2.3–3.5)
GLUCOSE SERPL-MCNC: 134 MG/DL (ref 70–99)
HCT VFR BLD AUTO: 31.1 % (ref 41.1–50.3)
HGB BLD-MCNC: 10.1 G/DL (ref 13.6–17.2)
IMM GRANULOCYTES # BLD AUTO: 0 K/UL (ref 0–0.5)
IMM GRANULOCYTES NFR BLD AUTO: 1 % (ref 0–5)
LYMPHOCYTES # BLD: 0.7 K/UL (ref 0.5–4.6)
LYMPHOCYTES NFR BLD: 13 % (ref 13–44)
MCH RBC QN AUTO: 30.2 PG (ref 26.1–32.9)
MCHC RBC AUTO-ENTMCNC: 32.5 G/DL (ref 31.4–35)
MCV RBC AUTO: 93.1 FL (ref 82–102)
MONOCYTES # BLD: 0.8 K/UL (ref 0.1–1.3)
MONOCYTES NFR BLD: 15 % (ref 4–12)
NEUTS SEG # BLD: 3.5 K/UL (ref 1.7–8.2)
NEUTS SEG NFR BLD: 70 % (ref 43–78)
NRBC # BLD: 0 K/UL (ref 0–0.2)
PLATELET # BLD AUTO: 103 K/UL (ref 150–450)
PMV BLD AUTO: 10.4 FL (ref 9.4–12.3)
POTASSIUM SERPL-SCNC: 3.8 MMOL/L (ref 3.5–5.1)
PROT SERPL-MCNC: 6.7 G/DL (ref 6.3–8.2)
RBC # BLD AUTO: 3.34 M/UL (ref 4.23–5.6)
SODIUM SERPL-SCNC: 140 MMOL/L (ref 136–145)
TSH W FREE THYROID IF ABNORMAL: 1.24 UIU/ML (ref 0.27–4.2)
WBC # BLD AUTO: 5 K/UL (ref 4.3–11.1)

## 2024-05-23 PROCEDURE — G8427 DOCREV CUR MEDS BY ELIG CLIN: HCPCS | Performed by: NURSE PRACTITIONER

## 2024-05-23 PROCEDURE — 84443 ASSAY THYROID STIM HORMONE: CPT

## 2024-05-23 PROCEDURE — 1036F TOBACCO NON-USER: CPT | Performed by: NURSE PRACTITIONER

## 2024-05-23 PROCEDURE — 6360000002 HC RX W HCPCS: Performed by: NURSE PRACTITIONER

## 2024-05-23 PROCEDURE — 2580000003 HC RX 258: Performed by: INTERNAL MEDICINE

## 2024-05-23 PROCEDURE — 2580000003 HC RX 258: Performed by: NURSE PRACTITIONER

## 2024-05-23 PROCEDURE — 96367 TX/PROPH/DG ADDL SEQ IV INF: CPT

## 2024-05-23 PROCEDURE — 85025 COMPLETE CBC W/AUTO DIFF WBC: CPT

## 2024-05-23 PROCEDURE — 99214 OFFICE O/P EST MOD 30 MIN: CPT | Performed by: NURSE PRACTITIONER

## 2024-05-23 PROCEDURE — G0498 CHEMO EXTEND IV INFUS W/PUMP: HCPCS

## 2024-05-23 PROCEDURE — 96375 TX/PRO/DX INJ NEW DRUG ADDON: CPT

## 2024-05-23 PROCEDURE — 1123F ACP DISCUSS/DSCN MKR DOCD: CPT | Performed by: NURSE PRACTITIONER

## 2024-05-23 PROCEDURE — 96417 CHEMO IV INFUS EACH ADDL SEQ: CPT

## 2024-05-23 PROCEDURE — G8420 CALC BMI NORM PARAMETERS: HCPCS | Performed by: NURSE PRACTITIONER

## 2024-05-23 PROCEDURE — 96413 CHEMO IV INFUSION 1 HR: CPT

## 2024-05-23 PROCEDURE — 80053 COMPREHEN METABOLIC PANEL: CPT

## 2024-05-23 RX ORDER — DIPHENHYDRAMINE HYDROCHLORIDE 50 MG/ML
50 INJECTION INTRAMUSCULAR; INTRAVENOUS
Status: DISCONTINUED | OUTPATIENT
Start: 2024-05-23 | End: 2024-05-24 | Stop reason: HOSPADM

## 2024-05-23 RX ORDER — SODIUM CHLORIDE 9 MG/ML
5-250 INJECTION, SOLUTION INTRAVENOUS PRN
Status: DISCONTINUED | OUTPATIENT
Start: 2024-05-23 | End: 2024-05-24 | Stop reason: HOSPADM

## 2024-05-23 RX ORDER — ONDANSETRON 2 MG/ML
8 INJECTION INTRAMUSCULAR; INTRAVENOUS
Status: DISCONTINUED | OUTPATIENT
Start: 2024-05-23 | End: 2024-05-24 | Stop reason: HOSPADM

## 2024-05-23 RX ORDER — SODIUM CHLORIDE 0.9 % (FLUSH) 0.9 %
5-40 SYRINGE (ML) INJECTION PRN
Status: ACTIVE | OUTPATIENT
Start: 2024-05-23 | End: 2024-05-23

## 2024-05-23 RX ORDER — ACETAMINOPHEN 325 MG/1
650 TABLET ORAL
Status: DISCONTINUED | OUTPATIENT
Start: 2024-05-23 | End: 2024-05-24 | Stop reason: HOSPADM

## 2024-05-23 RX ORDER — ONDANSETRON 2 MG/ML
8 INJECTION INTRAMUSCULAR; INTRAVENOUS ONCE
Status: COMPLETED | OUTPATIENT
Start: 2024-05-23 | End: 2024-05-23

## 2024-05-23 RX ORDER — SODIUM CHLORIDE 0.9 % (FLUSH) 0.9 %
5-40 SYRINGE (ML) INJECTION PRN
Status: DISCONTINUED | OUTPATIENT
Start: 2024-05-23 | End: 2024-05-24 | Stop reason: HOSPADM

## 2024-05-23 RX ORDER — MEPERIDINE HYDROCHLORIDE 25 MG/ML
12.5 INJECTION INTRAMUSCULAR; INTRAVENOUS; SUBCUTANEOUS PRN
Status: DISCONTINUED | OUTPATIENT
Start: 2024-05-23 | End: 2024-05-24 | Stop reason: HOSPADM

## 2024-05-23 RX ORDER — EPINEPHRINE 1 MG/ML
0.3 INJECTION, SOLUTION, CONCENTRATE INTRAVENOUS PRN
Status: DISCONTINUED | OUTPATIENT
Start: 2024-05-23 | End: 2024-05-24 | Stop reason: HOSPADM

## 2024-05-23 RX ORDER — ALBUTEROL SULFATE 90 UG/1
4 AEROSOL, METERED RESPIRATORY (INHALATION) PRN
Status: DISCONTINUED | OUTPATIENT
Start: 2024-05-23 | End: 2024-05-24 | Stop reason: HOSPADM

## 2024-05-23 RX ADMIN — FLUOROURACIL 4000 MG: 50 INJECTION, SOLUTION INTRAVENOUS at 13:48

## 2024-05-23 RX ADMIN — SODIUM CHLORIDE, PRESERVATIVE FREE 10 ML: 5 INJECTION INTRAVENOUS at 09:11

## 2024-05-23 RX ADMIN — ONDANSETRON 8 MG: 2 INJECTION INTRAMUSCULAR; INTRAVENOUS at 12:35

## 2024-05-23 RX ADMIN — SODIUM CHLORIDE, PRESERVATIVE FREE 10 ML: 5 INJECTION INTRAVENOUS at 11:10

## 2024-05-23 RX ADMIN — CARBOPLATIN 495 MG: 10 INJECTION, SOLUTION INTRAVENOUS at 13:07

## 2024-05-23 RX ADMIN — SODIUM CHLORIDE 25 ML/HR: 9 INJECTION, SOLUTION INTRAVENOUS at 11:15

## 2024-05-23 RX ADMIN — DEXAMETHASONE SODIUM PHOSPHATE 12 MG: 4 INJECTION INTRA-ARTICULAR; INTRALESIONAL; INTRAMUSCULAR; INTRAVENOUS; SOFT TISSUE at 12:17

## 2024-05-23 RX ADMIN — SODIUM CHLORIDE 200 MG: 9 INJECTION, SOLUTION INTRAVENOUS at 11:41

## 2024-05-23 RX ADMIN — SODIUM CHLORIDE 150 MG: 9 INJECTION, SOLUTION INTRAVENOUS at 12:39

## 2024-05-23 ASSESSMENT — PATIENT HEALTH QUESTIONNAIRE - PHQ9
1. LITTLE INTEREST OR PLEASURE IN DOING THINGS: NOT AT ALL
SUM OF ALL RESPONSES TO PHQ9 QUESTIONS 1 & 2: 0
2. FEELING DOWN, DEPRESSED OR HOPELESS: NOT AT ALL
SUM OF ALL RESPONSES TO PHQ QUESTIONS 1-9: 0

## 2024-05-23 NOTE — PROGRESS NOTES
and toxicity management.  He returns as a work in after restarting carbo/5-FU. He had awful mucositis - started Decadron mouthwash which worked well. This has mostly healed now. No further bleeding from his mouth. He has had no nausea. He does have some constipation - planning on using prune juice today through his tube. He has started coughing again - has a hx of frequent aspiration pneumonia, obtain CXR. No fevers. Utilizing scopolamine patches for copious secretions - refill provided. Fatigued but manageable. Getting proper nutrition thought his tube. Labs reviewed and adequate. Follow up next week for cycle 2 (will plan to dose reduce) or sooner if needed.     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 He is here today for cycle 2 carbo/5-FU/Keytruda after restarting. He had awful mucositis (bleeding, painful sores - started Decadron mouthwash which worked well. This has healed now. Plan to dose reduce 5-FU flat 4000 mg dose as he had originally.  He has had no nausea. He does have some constipation and diarrhea which tend to alternate. Cough at baseline - recent CXR negative.  No fevers. Utilizing scopolamine patches for copious secretions. Fatigued but manageable. Getting proper nutrition thought his tube, weight increasing. Labs reviewed and adequate. Discussed with Dr. Olvera and will dose reduce carbo to AUC 4 and 5-FU to 4000 mg flat dose.  Follow up 3 weeks for cycle 3 or sooner if needed.     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 He is here today for cycle 3 carbo/5-FU/Keytruda (restarted).  His mucositis was much better after last cycle, he still had a few sores but much better than the previous time.  He has not had any nausea.  He has occasional alternating diarrhea and constipation which he

## 2024-05-23 NOTE — PROGRESS NOTES
Patient arrived to infusion. Keytruda/Carbo/5FU completed. Patient tolerated well. Discharged via w/c with elastomeric pump. Tubing unclamped and secured. Patient returning at 11:45am on 5/27 for pump d/c.

## 2024-05-23 NOTE — PATIENT INSTRUCTIONS
Patient Information from Today's Visit    The members of your [facility] are listed below:    Physician Provider: Kasandra Olvera Medical Oncologist  Advanced Practice Clinician: Neelam Schaeffer NP  Registered Nurse: Lucille ASHER RN  Nurse Navigator: Loraine CUELLAR RD  Medical Assistant: Celsa ASHER MA  :Breanna WARNER  Supportive Care Services: Ilana CALDERON LMSW and Loraine CUELLAR, Registered Dietitian    Diagnosis:   H&N Cancer    Follow Up Instructions:   Proceed with chemo today as planned    We will repeat a PET scan after 6 cycles - today is cycle 3    Continue with the manuka honey and steroid mouth rinse for mouth sores.     Treatment Summary has been discussed and given to patient:No      Current Labs:   Hospital Outpatient Visit on 05/23/2024   Component Date Value Ref Range Status    Sodium 05/23/2024 140  136 - 145 mmol/L Final    Potassium 05/23/2024 3.8  3.5 - 5.1 mmol/L Final    Chloride 05/23/2024 103  98 - 107 mmol/L Final    CO2 05/23/2024 27  20 - 28 mmol/L Final    Anion Gap 05/23/2024 10  9 - 18 mmol/L Final    Glucose 05/23/2024 134 (H)  70 - 99 mg/dL Final    Comment: <70 mg/dL Consistent with, but not fully diagnostic of hypoglycemia.  100 - 125 mg/dL Impaired fasting glucose/consistent with pre-diabetes mellitus.  > 126 mg/dl Fasting glucose consistent with overt diabetes mellitus      BUN 05/23/2024 21  8 - 23 MG/DL Final    Creatinine 05/23/2024 0.54 (L)  0.80 - 1.30 MG/DL Final    Est, Glom Filt Rate 05/23/2024 >90  >60 ml/min/1.73m2 Final    Comment:    Pediatric calculator link: https://www.kidney.org/professionals/kdoqi/gfr_calculatorped     These results are not intended for use in patients <18 years of age.     eGFR results are calculated without a race factor using  the 2021 CKD-EPI equation. Careful clinical correlation is recommended, particularly when comparing to results calculated using previous equations.  The CKD-EPI equation is less accurate in patients with extremes of muscle

## 2024-05-23 NOTE — PROGRESS NOTES
Patient arrived to port lab for port access and lab draw   Port accessed and labs drawn per protocol   *Port remains accessed   Patient discharged from port lab in wheelchair*

## 2024-05-24 NOTE — PROGRESS NOTES
Nutrition F/U:  Assessment:  Pt seen during office visit w/ NP, receiving cycle 3 of Carbo + 5-FU (4 day pump) + Keytruda today, plan for repeat PET scan after 4 cycles per Dr. Olvera.  Pt s/p MBSS, remains NPO/TF dependent for estimated nutrition needs, is able to take teaspoon sips of water and ice chips for pleasure now, SLP recommending EGD/dilation - referral sent to Dr. De La Cruz w/ [facility] as his wife is an established pt of his.  Pt does have a history of esophageal stricture and dilation.  Pt reports improved tolerance of chemo last cycle w/ dose adjustments made - used Dex mouthrinse and Manuka honey for mild mouth sores.  Nutrition related lab values WNL.  Current BW: 177#, stable over the past 3 weeks.     Intervention:  1. NPO, ice chips and teaspoon of water per SLP  2. Continue w/ current TF regimen  3. Continue w/ manuka honey and dex mouth rinse for mouth sores.   4. Repeat PET scan prior to cycle 5 per Dr. Olvera.   5. Referral to GI for possible EGD/dilation - Dr. De La Cruz    Monitoring/Evaluation:  1. RD to follow up during next office visit - follow up wt status, tolerance/intake of TF, symptom management.      Loraine Honeycutt RD, , LD  [phone]

## 2024-05-27 ENCOUNTER — HOSPITAL ENCOUNTER (OUTPATIENT)
Dept: INFUSION THERAPY | Age: 79
Setting detail: INFUSION SERIES
End: 2024-05-27
Payer: MEDICARE

## 2024-05-27 ENCOUNTER — HOSPITAL ENCOUNTER (OUTPATIENT)
Dept: INFUSION THERAPY | Age: 79
Setting detail: INFUSION SERIES
Discharge: HOME | End: 2024-05-27
Payer: MEDICARE

## 2024-05-27 VITALS
RESPIRATION RATE: 16 BRPM | TEMPERATURE: 97.8 F | SYSTOLIC BLOOD PRESSURE: 101 MMHG | OXYGEN SATURATION: 97 % | HEART RATE: 90 BPM | DIASTOLIC BLOOD PRESSURE: 63 MMHG

## 2024-05-27 PROCEDURE — 2580000003 HC RX 258: Performed by: INTERNAL MEDICINE

## 2024-05-27 PROCEDURE — 96523 IRRIG DRUG DELIVERY DEVICE: CPT

## 2024-05-27 RX ORDER — SODIUM CHLORIDE 0.9 % (FLUSH) 0.9 %
5-40 SYRINGE (ML) INJECTION PRN
Status: DISCONTINUED | OUTPATIENT
Start: 2024-05-27 | End: 2024-05-28 | Stop reason: HOSPADM

## 2024-05-27 RX ADMIN — SODIUM CHLORIDE, PRESERVATIVE FREE 10 ML: 5 INJECTION INTRAVENOUS at 13:47

## 2024-05-27 NOTE — PROGRESS NOTES
Patient arrived to infusion. Pump d/c completed. Port flushed and deaccessed. Discharged via w/c. Patient aware of next infusion appt on 6/13.

## 2024-06-04 ENCOUNTER — OFFICE VISIT (OUTPATIENT)
Dept: ENT CLINIC | Age: 79
End: 2024-06-04

## 2024-06-04 VITALS — BODY MASS INDEX: 24.78 KG/M2 | WEIGHT: 177 LBS | HEIGHT: 71 IN

## 2024-06-04 DIAGNOSIS — C10.9 SQUAMOUS CELL CARCINOMA OF OROPHARYNX (HCC): Primary | ICD-10-CM

## 2024-06-04 DIAGNOSIS — R13.14 PHARYNGOESOPHAGEAL DYSPHAGIA: ICD-10-CM

## 2024-06-04 ASSESSMENT — ENCOUNTER SYMPTOMS
ALLERGIC/IMMUNOLOGIC NEGATIVE: 1
EYES NEGATIVE: 1
RESPIRATORY NEGATIVE: 1
GASTROINTESTINAL NEGATIVE: 1

## 2024-06-04 NOTE — PROGRESS NOTES
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  DO NOT SWALLOW    aluminum hydroxide-magnesium carbonate (GAVISCON)  MG/15ML suspension 15 mLs by Per G Tube route 4 times daily (with meals and nightly) Take 15 mL through your feeding tube prior to your meals.    rosuvastatin (CRESTOR) 20 MG tablet 1 tablet by Per G Tube route nightly    Nutritional Supplements (BOOST VHC) LIQD 2 each by PEG Tube route daily AND 4 each daily. Adjustment to TF Orders (Active Patient). Continue w/ bolus feedings and supplies, mix formula to include 4 cartons/day of Isosource 1.5 + 2 cartons/day of Boost VHC.  Combined formulas to provide 2560 kcal, 156 gm protein, 1082 ml (free water from TF). Pt needs extra 1478 ml free water daily. Give 60 ml flush after each feed, and 38 oz free H20 between feeds w/ meds to meet hydration needs..    docusate (COLACE) 50 MG/5ML liquid 5 mLs by Per G Tube route daily    lidocaine (SOLARCAINE) 0.5 % topical spray     sodium chloride, Inhalant, 3 % nebulizer solution Take 4 mLs by nebulization in the morning, at noon, and at bedtime    aspirin 81 MG chewable tablet Take 1 tablet by mouth daily    albuterol

## 2024-06-11 DIAGNOSIS — E03.2 HYPOTHYROIDISM DUE TO MEDICAMENTS AND OTHER EXOGENOUS SUBSTANCES: ICD-10-CM

## 2024-06-11 DIAGNOSIS — C32.9 LARYNGEAL CANCER (HCC): ICD-10-CM

## 2024-06-11 DIAGNOSIS — Z79.899 HIGH RISK MEDICATION USE: Primary | ICD-10-CM

## 2024-06-13 ENCOUNTER — HOSPITAL ENCOUNTER (OUTPATIENT)
Dept: INFUSION THERAPY | Age: 79
Setting detail: INFUSION SERIES
Discharge: HOME | End: 2024-06-13
Payer: MEDICARE

## 2024-06-13 ENCOUNTER — OFFICE VISIT (OUTPATIENT)
Dept: ONCOLOGY | Age: 79
End: 2024-06-13

## 2024-06-13 ENCOUNTER — OFFICE VISIT (OUTPATIENT)
Dept: ONCOLOGY | Age: 79
End: 2024-06-13
Payer: MEDICARE

## 2024-06-13 ENCOUNTER — HOSPITAL ENCOUNTER (OUTPATIENT)
Dept: LAB | Age: 79
End: 2024-06-13
Payer: MEDICARE

## 2024-06-13 VITALS
DIASTOLIC BLOOD PRESSURE: 70 MMHG | OXYGEN SATURATION: 96 % | SYSTOLIC BLOOD PRESSURE: 120 MMHG | TEMPERATURE: 97.6 F | HEART RATE: 82 BPM | BODY MASS INDEX: 24.39 KG/M2 | HEIGHT: 71 IN | RESPIRATION RATE: 16 BRPM | WEIGHT: 174.2 LBS

## 2024-06-13 DIAGNOSIS — K12.31 MUCOSITIS DUE TO CHEMOTHERAPY: ICD-10-CM

## 2024-06-13 DIAGNOSIS — Z79.899 HIGH RISK MEDICATION USE: ICD-10-CM

## 2024-06-13 DIAGNOSIS — Z00.8 NUTRITIONAL ASSESSMENT: Primary | ICD-10-CM

## 2024-06-13 DIAGNOSIS — E55.9 VITAMIN D DEFICIENCY: ICD-10-CM

## 2024-06-13 DIAGNOSIS — C32.9 LARYNGEAL CANCER (HCC): Primary | ICD-10-CM

## 2024-06-13 DIAGNOSIS — E03.2 HYPOTHYROIDISM DUE TO MEDICAMENTS AND OTHER EXOGENOUS SUBSTANCES: ICD-10-CM

## 2024-06-13 DIAGNOSIS — C32.9 KERATINIZING SQUAMOUS CELL CARCINOMA OF LARYNX (HCC): ICD-10-CM

## 2024-06-13 DIAGNOSIS — Z51.11 ENCOUNTER FOR ANTINEOPLASTIC CHEMOTHERAPY: ICD-10-CM

## 2024-06-13 DIAGNOSIS — Z78.9 ON ENTERAL NUTRITION: ICD-10-CM

## 2024-06-13 DIAGNOSIS — C32.9 LARYNGEAL CANCER (HCC): ICD-10-CM

## 2024-06-13 LAB
ALBUMIN SERPL-MCNC: 3.4 G/DL (ref 3.2–4.6)
ALBUMIN/GLOB SERPL: 1.1 (ref 1–1.9)
ALP SERPL-CCNC: 89 U/L (ref 40–129)
ALT SERPL-CCNC: 23 U/L (ref 12–65)
ANION GAP SERPL CALC-SCNC: 10 MMOL/L (ref 9–18)
AST SERPL-CCNC: 29 U/L (ref 15–37)
BASOPHILS # BLD: 0 K/UL (ref 0–0.2)
BASOPHILS NFR BLD: 1 % (ref 0–2)
BILIRUB SERPL-MCNC: 0.3 MG/DL (ref 0–1.2)
BUN SERPL-MCNC: 22 MG/DL (ref 8–23)
CALCIUM SERPL-MCNC: 9.2 MG/DL (ref 8.8–10.2)
CHLORIDE SERPL-SCNC: 102 MMOL/L (ref 98–107)
CO2 SERPL-SCNC: 26 MMOL/L (ref 20–28)
CREAT SERPL-MCNC: 0.58 MG/DL (ref 0.8–1.3)
DIFFERENTIAL METHOD BLD: ABNORMAL
EOSINOPHIL # BLD: 0.1 K/UL (ref 0–0.8)
EOSINOPHIL NFR BLD: 1 % (ref 0.5–7.8)
ERYTHROCYTE [DISTWIDTH] IN BLOOD BY AUTOMATED COUNT: 20.4 % (ref 11.9–14.6)
GLOBULIN SER CALC-MCNC: 3.2 G/DL (ref 2.3–3.5)
GLUCOSE SERPL-MCNC: 112 MG/DL (ref 70–99)
HCT VFR BLD AUTO: 32.4 % (ref 41.1–50.3)
HGB BLD-MCNC: 10.3 G/DL (ref 13.6–17.2)
IMM GRANULOCYTES # BLD AUTO: 0.1 K/UL (ref 0–0.5)
IMM GRANULOCYTES NFR BLD AUTO: 1 % (ref 0–5)
LYMPHOCYTES # BLD: 0.7 K/UL (ref 0.5–4.6)
LYMPHOCYTES NFR BLD: 17 % (ref 13–44)
MAGNESIUM SERPL-MCNC: 2.1 MG/DL (ref 1.8–2.4)
MCH RBC QN AUTO: 30.8 PG (ref 26.1–32.9)
MCHC RBC AUTO-ENTMCNC: 31.8 G/DL (ref 31.4–35)
MCV RBC AUTO: 97 FL (ref 82–102)
MONOCYTES # BLD: 0.8 K/UL (ref 0.1–1.3)
MONOCYTES NFR BLD: 21 % (ref 4–12)
NEUTS SEG # BLD: 2.4 K/UL (ref 1.7–8.2)
NEUTS SEG NFR BLD: 59 % (ref 43–78)
NRBC # BLD: 0 K/UL (ref 0–0.2)
PLATELET # BLD AUTO: 129 K/UL (ref 150–450)
PMV BLD AUTO: 9.9 FL (ref 9.4–12.3)
POTASSIUM SERPL-SCNC: 3.9 MMOL/L (ref 3.5–5.1)
PROT SERPL-MCNC: 6.6 G/DL (ref 6.3–8.2)
RBC # BLD AUTO: 3.34 M/UL (ref 4.23–5.6)
SODIUM SERPL-SCNC: 138 MMOL/L (ref 136–145)
TSH, 3RD GENERATION: 1.47 UIU/ML (ref 0.27–4.2)
WBC # BLD AUTO: 4 K/UL (ref 4.3–11.1)

## 2024-06-13 PROCEDURE — G0498 CHEMO EXTEND IV INFUS W/PUMP: HCPCS

## 2024-06-13 PROCEDURE — G8427 DOCREV CUR MEDS BY ELIG CLIN: HCPCS | Performed by: NURSE PRACTITIONER

## 2024-06-13 PROCEDURE — 80053 COMPREHEN METABOLIC PANEL: CPT

## 2024-06-13 PROCEDURE — 1036F TOBACCO NON-USER: CPT | Performed by: NURSE PRACTITIONER

## 2024-06-13 PROCEDURE — 96375 TX/PRO/DX INJ NEW DRUG ADDON: CPT

## 2024-06-13 PROCEDURE — 83735 ASSAY OF MAGNESIUM: CPT

## 2024-06-13 PROCEDURE — 2580000003 HC RX 258: Performed by: INTERNAL MEDICINE

## 2024-06-13 PROCEDURE — 96413 CHEMO IV INFUSION 1 HR: CPT

## 2024-06-13 PROCEDURE — 6360000002 HC RX W HCPCS: Performed by: NURSE PRACTITIONER

## 2024-06-13 PROCEDURE — 2580000003 HC RX 258: Performed by: NURSE PRACTITIONER

## 2024-06-13 PROCEDURE — G8420 CALC BMI NORM PARAMETERS: HCPCS | Performed by: NURSE PRACTITIONER

## 2024-06-13 PROCEDURE — 96367 TX/PROPH/DG ADDL SEQ IV INF: CPT

## 2024-06-13 PROCEDURE — 84443 ASSAY THYROID STIM HORMONE: CPT

## 2024-06-13 PROCEDURE — 1123F ACP DISCUSS/DSCN MKR DOCD: CPT | Performed by: NURSE PRACTITIONER

## 2024-06-13 PROCEDURE — 85025 COMPLETE CBC W/AUTO DIFF WBC: CPT

## 2024-06-13 PROCEDURE — 99214 OFFICE O/P EST MOD 30 MIN: CPT | Performed by: NURSE PRACTITIONER

## 2024-06-13 PROCEDURE — 96417 CHEMO IV INFUS EACH ADDL SEQ: CPT

## 2024-06-13 RX ORDER — SODIUM CHLORIDE 9 MG/ML
5-250 INJECTION, SOLUTION INTRAVENOUS PRN
Status: CANCELLED | OUTPATIENT
Start: 2024-06-13

## 2024-06-13 RX ORDER — ONDANSETRON 2 MG/ML
8 INJECTION INTRAMUSCULAR; INTRAVENOUS ONCE
Status: CANCELLED | OUTPATIENT
Start: 2024-06-13 | End: 2024-06-13

## 2024-06-13 RX ORDER — ACETAMINOPHEN 325 MG/1
650 TABLET ORAL
Status: CANCELLED | OUTPATIENT
Start: 2024-06-13

## 2024-06-13 RX ORDER — HEPARIN 100 UNIT/ML
500 SYRINGE INTRAVENOUS PRN
Status: CANCELLED | OUTPATIENT
Start: 2024-06-13

## 2024-06-13 RX ORDER — SODIUM CHLORIDE 0.9 % (FLUSH) 0.9 %
5-40 SYRINGE (ML) INJECTION PRN
Status: ACTIVE | OUTPATIENT
Start: 2024-06-13 | End: 2024-06-13

## 2024-06-13 RX ORDER — MEPERIDINE HYDROCHLORIDE 25 MG/ML
12.5 INJECTION INTRAMUSCULAR; INTRAVENOUS; SUBCUTANEOUS PRN
Status: CANCELLED | OUTPATIENT
Start: 2024-06-13

## 2024-06-13 RX ORDER — SODIUM CHLORIDE 0.9 % (FLUSH) 0.9 %
5-40 SYRINGE (ML) INJECTION PRN
Status: DISCONTINUED | OUTPATIENT
Start: 2024-06-13 | End: 2024-06-14 | Stop reason: HOSPADM

## 2024-06-13 RX ORDER — SODIUM CHLORIDE 9 MG/ML
5-250 INJECTION, SOLUTION INTRAVENOUS PRN
Status: DISCONTINUED | OUTPATIENT
Start: 2024-06-13 | End: 2024-06-14 | Stop reason: HOSPADM

## 2024-06-13 RX ORDER — EPINEPHRINE 1 MG/ML
0.3 INJECTION, SOLUTION, CONCENTRATE INTRAVENOUS PRN
Status: CANCELLED | OUTPATIENT
Start: 2024-06-13

## 2024-06-13 RX ORDER — DIPHENHYDRAMINE HYDROCHLORIDE 50 MG/ML
50 INJECTION INTRAMUSCULAR; INTRAVENOUS
Status: CANCELLED | OUTPATIENT
Start: 2024-06-13

## 2024-06-13 RX ORDER — ALBUTEROL SULFATE 90 UG/1
4 AEROSOL, METERED RESPIRATORY (INHALATION) PRN
Status: CANCELLED | OUTPATIENT
Start: 2024-06-13

## 2024-06-13 RX ORDER — ONDANSETRON 2 MG/ML
8 INJECTION INTRAMUSCULAR; INTRAVENOUS
Status: CANCELLED | OUTPATIENT
Start: 2024-06-13

## 2024-06-13 RX ORDER — SODIUM CHLORIDE 9 MG/ML
INJECTION, SOLUTION INTRAVENOUS CONTINUOUS
Status: CANCELLED | OUTPATIENT
Start: 2024-06-13

## 2024-06-13 RX ORDER — DEXAMETHASONE 0.5 MG/5ML
1 SOLUTION ORAL DAILY
Qty: 240 ML | Refills: 1 | Status: SHIPPED | OUTPATIENT
Start: 2024-06-13

## 2024-06-13 RX ORDER — ONDANSETRON 2 MG/ML
8 INJECTION INTRAMUSCULAR; INTRAVENOUS ONCE
Status: COMPLETED | OUTPATIENT
Start: 2024-06-13 | End: 2024-06-13

## 2024-06-13 RX ORDER — SODIUM CHLORIDE 0.9 % (FLUSH) 0.9 %
5-40 SYRINGE (ML) INJECTION PRN
Status: CANCELLED | OUTPATIENT
Start: 2024-06-13

## 2024-06-13 RX ADMIN — FLUOROURACIL 4000 MG: 50 INJECTION, SOLUTION INTRAVENOUS at 13:25

## 2024-06-13 RX ADMIN — SODIUM CHLORIDE, PRESERVATIVE FREE 10 ML: 5 INJECTION INTRAVENOUS at 08:38

## 2024-06-13 RX ADMIN — SODIUM CHLORIDE, PRESERVATIVE FREE 10 ML: 5 INJECTION INTRAVENOUS at 11:23

## 2024-06-13 RX ADMIN — DEXAMETHASONE SODIUM PHOSPHATE 12 MG: 4 INJECTION INTRA-ARTICULAR; INTRALESIONAL; INTRAMUSCULAR; INTRAVENOUS; SOFT TISSUE at 12:01

## 2024-06-13 RX ADMIN — ONDANSETRON 8 MG: 2 INJECTION INTRAMUSCULAR; INTRAVENOUS at 11:58

## 2024-06-13 RX ADMIN — SODIUM CHLORIDE 200 MG: 9 INJECTION, SOLUTION INTRAVENOUS at 11:27

## 2024-06-13 RX ADMIN — SODIUM CHLORIDE 150 MG: 9 INJECTION, SOLUTION INTRAVENOUS at 12:18

## 2024-06-13 RX ADMIN — SODIUM CHLORIDE 100 ML/HR: 9 INJECTION, SOLUTION INTRAVENOUS at 11:02

## 2024-06-13 RX ADMIN — CARBOPLATIN 490 MG: 10 INJECTION, SOLUTION INTRAVENOUS at 12:50

## 2024-06-13 ASSESSMENT — PATIENT HEALTH QUESTIONNAIRE - PHQ9
SUM OF ALL RESPONSES TO PHQ QUESTIONS 1-9: 0
SUM OF ALL RESPONSES TO PHQ QUESTIONS 1-9: 0
2. FEELING DOWN, DEPRESSED OR HOPELESS: NOT AT ALL
1. LITTLE INTEREST OR PLEASURE IN DOING THINGS: NOT AT ALL
SUM OF ALL RESPONSES TO PHQ QUESTIONS 1-9: 0
SUM OF ALL RESPONSES TO PHQ9 QUESTIONS 1 & 2: 0
SUM OF ALL RESPONSES TO PHQ QUESTIONS 1-9: 0

## 2024-06-13 NOTE — PROGRESS NOTES
Pt arrived via wheelchair, chemo completed, 5FU pump connected and unclamped, pt tolerated well discharged via wheelchair, aware of appt Monday at 1400

## 2024-06-13 NOTE — PROGRESS NOTES
Data Source: Patient, [facility] record.    6/13/2024    11:57 AM    Jesus Laguna [id number]    79 y.o.      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  Per records margins were close but negative with evidence of LVI as well as PNI and extracapsular spread.  He completed adjuvant radiation therapy and end of 2010 to early 2011.  He followed with Dr. Austen Bowman ENT up till the 5-year carlee in 2015 with no evidence of recurrence.  2 years post therapy he had multiple teeth removed of his left lower jaw followed by HBO therapy x10 sessions due to poor healing.  More recently patient presented with symptoms of congestion and hypersalivation, completed a couple courses of antibiotics without resolution.  Had minimal weight loss.  He was seen by Dr. Clair Bowman ENT with the scope and abnormal CT neck which was abnormal.  He was thereafter referred to Dr. James douglas at [facility] at [facility].  Patient underwent direct laryngoscopy 8/17/2023 as well as PET scan 8/9/2023.  PET scan demonstrated hypermetabolic mass at base of tongue, mildly metabolic nonenlarged right level 3 cervical lymph node, and no evidence of distant metastasis.  Direct laryngoscopy with biopsy with pathology of larynx and epiglottis revealing invasive moderately differentiated squamous cell cancer.  P16 stain for HPV was negative.  Patient now referred to [facility]

## 2024-06-17 ENCOUNTER — APPOINTMENT (OUTPATIENT)
Dept: INFUSION THERAPY | Age: 79
End: 2024-06-17
Payer: MEDICARE

## 2024-06-17 ENCOUNTER — HOSPITAL ENCOUNTER (OUTPATIENT)
Dept: INFUSION THERAPY | Age: 79
Setting detail: INFUSION SERIES
Discharge: HOME | End: 2024-06-17
Payer: MEDICARE

## 2024-06-17 VITALS
OXYGEN SATURATION: 96 % | TEMPERATURE: 97.6 F | DIASTOLIC BLOOD PRESSURE: 70 MMHG | RESPIRATION RATE: 16 BRPM | SYSTOLIC BLOOD PRESSURE: 114 MMHG | HEART RATE: 89 BPM

## 2024-06-17 DIAGNOSIS — E78.5 DYSLIPIDEMIA: ICD-10-CM

## 2024-06-17 DIAGNOSIS — C32.1 PRIMARY SQUAMOUS CELL CARCINOMA OF SUPRAGLOTTIS (HCC): ICD-10-CM

## 2024-06-17 DIAGNOSIS — C10.9 SQUAMOUS CELL CARCINOMA OF OROPHARYNX (HCC): Primary | ICD-10-CM

## 2024-06-17 PROCEDURE — 2580000003 HC RX 258: Performed by: INTERNAL MEDICINE

## 2024-06-17 PROCEDURE — 96523 IRRIG DRUG DELIVERY DEVICE: CPT

## 2024-06-17 RX ORDER — SODIUM CHLORIDE 0.9 % (FLUSH) 0.9 %
5-40 SYRINGE (ML) INJECTION PRN
Status: DISCONTINUED | OUTPATIENT
Start: 2024-06-17 | End: 2024-06-18 | Stop reason: HOSPADM

## 2024-06-17 RX ORDER — SODIUM CHLORIDE 0.9 % (FLUSH) 0.9 %
5-40 SYRINGE (ML) INJECTION PRN
OUTPATIENT
Start: 2024-06-17

## 2024-06-17 RX ORDER — ROSUVASTATIN CALCIUM 20 MG/1
TABLET, COATED ORAL
Qty: 90 TABLET | Refills: 1 | OUTPATIENT
Start: 2024-06-17

## 2024-06-17 RX ORDER — HEPARIN 100 UNIT/ML
500 SYRINGE INTRAVENOUS PRN
OUTPATIENT
Start: 2024-06-17

## 2024-06-17 RX ORDER — SODIUM CHLORIDE 9 MG/ML
25 INJECTION, SOLUTION INTRAVENOUS PRN
OUTPATIENT
Start: 2024-06-17

## 2024-06-17 RX ADMIN — SODIUM CHLORIDE, PRESERVATIVE FREE 10 ML: 5 INJECTION INTRAVENOUS at 14:06

## 2024-06-17 NOTE — PROGRESS NOTES
H&N Navigation/Nutrition Follow up:  RD was unable to see pt during today's office visit information obtained from staff and chart review.  Proceed w/ cycle 4 of Carbo/5-FU/Keytruda today as planned, will have repeat PET scan prior to next cycle and follow up with Dr. Olvera.  Pt remains NPO/TF dependent for estimated nutrition needs, mouth sores manageable w/ dose reduction and dex mouthrinse.  RD to follow up during next office visit.     Loraine Honeycutt RD, , LD  [phone]

## 2024-06-17 NOTE — PROGRESS NOTES
Arrived to the Infusion Center. 96 hour Adrucil pump completed and disconnected at 1405. Discarded into chemogator.  Provided education on Adrucil.    Patient instructed to report any side affects to ordering provider.  Patient tolerated well.   Any issues or concerns during appointment: none.  Patient aware of next infusion appointment on 7/8/24 (date) at 1100 (time).  Discharged in .

## 2024-06-28 ENCOUNTER — TELEPHONE (OUTPATIENT)
Dept: ONCOLOGY | Age: 79
End: 2024-06-28

## 2024-06-28 ENCOUNTER — HOSPITAL ENCOUNTER (INPATIENT)
Age: 79
LOS: 1 days | Discharge: HOME | DRG: 178 | End: 2024-06-29
Attending: GENERAL PRACTICE | Admitting: HOSPITALIST
Payer: MEDICARE

## 2024-06-28 ENCOUNTER — APPOINTMENT (OUTPATIENT)
Dept: GENERAL RADIOLOGY | Age: 79
DRG: 178 | End: 2024-06-28
Payer: MEDICARE

## 2024-06-28 DIAGNOSIS — R50.9 FEVER, UNSPECIFIED FEVER CAUSE: ICD-10-CM

## 2024-06-28 DIAGNOSIS — J18.9 PNEUMONIA OF RIGHT LUNG DUE TO INFECTIOUS ORGANISM, UNSPECIFIED PART OF LUNG: Primary | ICD-10-CM

## 2024-06-28 DIAGNOSIS — D72.819 LEUKOPENIA, UNSPECIFIED TYPE: ICD-10-CM

## 2024-06-28 PROBLEM — E78.5 DYSLIPIDEMIA: Status: ACTIVE | Noted: 2024-06-28

## 2024-06-28 PROBLEM — J69.0 ASPIRATION PNEUMONIA, UNSPECIFIED ASPIRATION PNEUMONIA TYPE, UNSPECIFIED LATERALITY, UNSPECIFIED PART OF LUNG (HCC): Status: ACTIVE | Noted: 2024-06-28

## 2024-06-28 LAB
ALBUMIN SERPL-MCNC: 3.8 G/DL (ref 3.2–4.6)
ALBUMIN/GLOB SERPL: 1.2 (ref 1–1.9)
ALP SERPL-CCNC: 82 U/L (ref 40–129)
ALT SERPL-CCNC: 21 U/L (ref 12–65)
AMORPH CRY URNS QL MICRO: ABNORMAL
ANION GAP SERPL CALC-SCNC: 13 MMOL/L (ref 9–18)
APPEARANCE UR: ABNORMAL
AST SERPL-CCNC: 31 U/L (ref 15–37)
B PERT DNA SPEC QL NAA+PROBE: NOT DETECTED
BACTERIA URNS QL MICRO: ABNORMAL /HPF
BASOPHILS # BLD: 0 K/UL (ref 0–0.2)
BASOPHILS NFR BLD: 0 % (ref 0–2)
BILIRUB SERPL-MCNC: 0.3 MG/DL (ref 0–1.2)
BILIRUB UR QL: NEGATIVE
BORDETELLA PARAPERTUSSIS BY PCR: NOT DETECTED
BUN SERPL-MCNC: 24 MG/DL (ref 8–23)
C PNEUM DNA SPEC QL NAA+PROBE: NOT DETECTED
CALCIUM SERPL-MCNC: 9.4 MG/DL (ref 8.8–10.2)
CHLORIDE SERPL-SCNC: 101 MMOL/L (ref 98–107)
CO2 SERPL-SCNC: 24 MMOL/L (ref 20–28)
COLOR UR: ABNORMAL
CREAT SERPL-MCNC: 0.62 MG/DL (ref 0.8–1.3)
DIFFERENTIAL METHOD BLD: ABNORMAL
EOSINOPHIL # BLD: 0 K/UL (ref 0–0.8)
EOSINOPHIL NFR BLD: 1 % (ref 0.5–7.8)
ERYTHROCYTE [DISTWIDTH] IN BLOOD BY AUTOMATED COUNT: 20.7 % (ref 11.9–14.6)
FLUAV SUBTYP SPEC NAA+PROBE: NOT DETECTED
FLUBV RNA SPEC QL NAA+PROBE: NOT DETECTED
GLOBULIN SER CALC-MCNC: 3.2 G/DL (ref 2.3–3.5)
GLUCOSE SERPL-MCNC: 139 MG/DL (ref 70–99)
GLUCOSE UR STRIP.AUTO-MCNC: NEGATIVE MG/DL
HADV DNA SPEC QL NAA+PROBE: NOT DETECTED
HCOV 229E RNA SPEC QL NAA+PROBE: NOT DETECTED
HCOV HKU1 RNA SPEC QL NAA+PROBE: NOT DETECTED
HCOV NL63 RNA SPEC QL NAA+PROBE: NOT DETECTED
HCOV OC43 RNA SPEC QL NAA+PROBE: NOT DETECTED
HCT VFR BLD AUTO: 34.2 % (ref 41.1–50.3)
HGB BLD-MCNC: 11 G/DL (ref 13.6–17.2)
HGB UR QL STRIP: NEGATIVE
HMPV RNA SPEC QL NAA+PROBE: NOT DETECTED
HPIV1 RNA SPEC QL NAA+PROBE: NOT DETECTED
HPIV2 RNA SPEC QL NAA+PROBE: NOT DETECTED
HPIV3 RNA SPEC QL NAA+PROBE: NOT DETECTED
HPIV4 RNA SPEC QL NAA+PROBE: NOT DETECTED
IMM GRANULOCYTES # BLD AUTO: 0 K/UL (ref 0–0.5)
IMM GRANULOCYTES NFR BLD AUTO: 0 % (ref 0–5)
KETONES UR QL STRIP.AUTO: NEGATIVE MG/DL
LACTATE SERPL-SCNC: 1.8 MMOL/L (ref 0.5–2)
LEUKOCYTE ESTERASE UR QL STRIP.AUTO: NEGATIVE
LYMPHOCYTES # BLD: 0.5 K/UL (ref 0.5–4.6)
LYMPHOCYTES NFR BLD: 15 % (ref 13–44)
M PNEUMO DNA SPEC QL NAA+PROBE: NOT DETECTED
MCH RBC QN AUTO: 32 PG (ref 26.1–32.9)
MCHC RBC AUTO-ENTMCNC: 32.2 G/DL (ref 31.4–35)
MCV RBC AUTO: 99.4 FL (ref 82–102)
MONOCYTES # BLD: 0.9 K/UL (ref 0.1–1.3)
MONOCYTES NFR BLD: 28 % (ref 4–12)
MUCOUS THREADS URNS QL MICRO: ABNORMAL /LPF
NEUTS SEG # BLD: 1.7 K/UL (ref 1.7–8.2)
NEUTS SEG NFR BLD: 55 % (ref 43–78)
NITRITE UR QL STRIP.AUTO: NEGATIVE
NRBC # BLD: 0 K/UL (ref 0–0.2)
OTHER OBSERVATIONS: ABNORMAL
PH UR STRIP: 8.5 (ref 5–9)
PLATELET # BLD AUTO: 168 K/UL (ref 150–450)
PMV BLD AUTO: 9.9 FL (ref 9.4–12.3)
POTASSIUM SERPL-SCNC: 4.3 MMOL/L (ref 3.5–5.1)
PROCALCITONIN SERPL-MCNC: 0.09 NG/ML (ref 0–0.1)
PROT SERPL-MCNC: 7 G/DL (ref 6.3–8.2)
PROT UR STRIP-MCNC: ABNORMAL MG/DL
RBC # BLD AUTO: 3.44 M/UL (ref 4.23–5.6)
RBC #/AREA URNS HPF: ABNORMAL /HPF
RSV RNA SPEC QL NAA+PROBE: NOT DETECTED
RV+EV RNA SPEC QL NAA+PROBE: NOT DETECTED
SARS-COV-2 RNA RESP QL NAA+PROBE: NOT DETECTED
SODIUM SERPL-SCNC: 137 MMOL/L (ref 136–145)
SP GR UR REFRACTOMETRY: 1.02 (ref 1–1.02)
UROBILINOGEN UR QL STRIP.AUTO: 0.2 EU/DL (ref 0.2–1)
WBC # BLD AUTO: 3 K/UL (ref 4.3–11.1)
WBC URNS QL MICRO: ABNORMAL /HPF

## 2024-06-28 PROCEDURE — 2580000003 HC RX 258: Performed by: HOSPITALIST

## 2024-06-28 PROCEDURE — 96365 THER/PROPH/DIAG IV INF INIT: CPT

## 2024-06-28 PROCEDURE — 96361 HYDRATE IV INFUSION ADD-ON: CPT

## 2024-06-28 PROCEDURE — 71046 X-RAY EXAM CHEST 2 VIEWS: CPT

## 2024-06-28 PROCEDURE — 80053 COMPREHEN METABOLIC PANEL: CPT

## 2024-06-28 PROCEDURE — 6360000002 HC RX W HCPCS: Performed by: NURSE PRACTITIONER

## 2024-06-28 PROCEDURE — 85025 COMPLETE CBC W/AUTO DIFF WBC: CPT

## 2024-06-28 PROCEDURE — 87040 BLOOD CULTURE FOR BACTERIA: CPT

## 2024-06-28 PROCEDURE — 6360000002 HC RX W HCPCS: Performed by: HOSPITALIST

## 2024-06-28 PROCEDURE — 83605 ASSAY OF LACTIC ACID: CPT

## 2024-06-28 PROCEDURE — 1100000000 HC RM PRIVATE

## 2024-06-28 PROCEDURE — 99285 EMERGENCY DEPT VISIT HI MDM: CPT

## 2024-06-28 PROCEDURE — 93005 ELECTROCARDIOGRAM TRACING: CPT | Performed by: GENERAL PRACTICE

## 2024-06-28 PROCEDURE — 84145 PROCALCITONIN (PCT): CPT

## 2024-06-28 PROCEDURE — 2580000003 HC RX 258: Performed by: NURSE PRACTITIONER

## 2024-06-28 PROCEDURE — 81001 URINALYSIS AUTO W/SCOPE: CPT

## 2024-06-28 PROCEDURE — 0202U NFCT DS 22 TRGT SARS-COV-2: CPT

## 2024-06-28 RX ORDER — ACETAMINOPHEN 325 MG/1
650 TABLET ORAL
Status: DISCONTINUED | OUTPATIENT
Start: 2024-06-28 | End: 2024-06-29

## 2024-06-28 RX ORDER — ENOXAPARIN SODIUM 100 MG/ML
40 INJECTION SUBCUTANEOUS EVERY 24 HOURS
Status: DISCONTINUED | OUTPATIENT
Start: 2024-06-28 | End: 2024-06-29 | Stop reason: HOSPADM

## 2024-06-28 RX ORDER — SODIUM CHLORIDE 0.9 % (FLUSH) 0.9 %
5-40 SYRINGE (ML) INJECTION PRN
Status: DISCONTINUED | OUTPATIENT
Start: 2024-06-28 | End: 2024-06-29 | Stop reason: HOSPADM

## 2024-06-28 RX ORDER — ONDANSETRON 4 MG/1
4 TABLET, ORALLY DISINTEGRATING ORAL EVERY 8 HOURS PRN
Status: DISCONTINUED | OUTPATIENT
Start: 2024-06-28 | End: 2024-06-29 | Stop reason: HOSPADM

## 2024-06-28 RX ORDER — SODIUM CHLORIDE 9 MG/ML
INJECTION, SOLUTION INTRAVENOUS CONTINUOUS
Status: DISCONTINUED | OUTPATIENT
Start: 2024-06-28 | End: 2024-06-29

## 2024-06-28 RX ORDER — ONDANSETRON 2 MG/ML
4 INJECTION INTRAMUSCULAR; INTRAVENOUS EVERY 6 HOURS PRN
Status: DISCONTINUED | OUTPATIENT
Start: 2024-06-28 | End: 2024-06-29 | Stop reason: HOSPADM

## 2024-06-28 RX ORDER — SODIUM CHLORIDE 0.9 % (FLUSH) 0.9 %
5-40 SYRINGE (ML) INJECTION EVERY 12 HOURS SCHEDULED
Status: DISCONTINUED | OUTPATIENT
Start: 2024-06-28 | End: 2024-06-29 | Stop reason: HOSPADM

## 2024-06-28 RX ORDER — ACETAMINOPHEN 650 MG/1
650 SUPPOSITORY RECTAL EVERY 6 HOURS PRN
Status: DISCONTINUED | OUTPATIENT
Start: 2024-06-28 | End: 2024-06-29

## 2024-06-28 RX ORDER — SODIUM CHLORIDE 9 MG/ML
INJECTION, SOLUTION INTRAVENOUS PRN
Status: DISCONTINUED | OUTPATIENT
Start: 2024-06-28 | End: 2024-06-29 | Stop reason: HOSPADM

## 2024-06-28 RX ORDER — 0.9 % SODIUM CHLORIDE 0.9 %
1000 INTRAVENOUS SOLUTION INTRAVENOUS
Status: COMPLETED | OUTPATIENT
Start: 2024-06-28 | End: 2024-06-28

## 2024-06-28 RX ORDER — ACETAMINOPHEN 325 MG/1
650 TABLET ORAL EVERY 6 HOURS PRN
Status: DISCONTINUED | OUTPATIENT
Start: 2024-06-28 | End: 2024-06-29 | Stop reason: HOSPADM

## 2024-06-28 RX ORDER — POLYETHYLENE GLYCOL 3350 17 G/17G
17 POWDER, FOR SOLUTION ORAL DAILY PRN
Status: DISCONTINUED | OUTPATIENT
Start: 2024-06-28 | End: 2024-06-29 | Stop reason: HOSPADM

## 2024-06-28 RX ADMIN — SODIUM CHLORIDE 1000 ML: 9 INJECTION, SOLUTION INTRAVENOUS at 18:54

## 2024-06-28 RX ADMIN — SODIUM CHLORIDE: 9 INJECTION, SOLUTION INTRAVENOUS at 22:08

## 2024-06-28 RX ADMIN — SODIUM CHLORIDE, PRESERVATIVE FREE 10 ML: 5 INJECTION INTRAVENOUS at 22:05

## 2024-06-28 RX ADMIN — VANCOMYCIN HYDROCHLORIDE 2000 MG: 10 INJECTION, POWDER, LYOPHILIZED, FOR SOLUTION INTRAVENOUS at 22:35

## 2024-06-28 RX ADMIN — PIPERACILLIN AND TAZOBACTAM 4500 MG: 4; .5 INJECTION, POWDER, FOR SOLUTION INTRAVENOUS at 18:55

## 2024-06-28 RX ADMIN — ENOXAPARIN SODIUM 40 MG: 100 INJECTION SUBCUTANEOUS at 22:08

## 2024-06-28 ASSESSMENT — ENCOUNTER SYMPTOMS
SHORTNESS OF BREATH: 0
COUGH: 1
ABDOMINAL PAIN: 0

## 2024-06-28 ASSESSMENT — PAIN - FUNCTIONAL ASSESSMENT: PAIN_FUNCTIONAL_ASSESSMENT: NONE - DENIES PAIN

## 2024-06-28 NOTE — ED TRIAGE NOTES
Patient reports of having fever few hours ago. Patient reports of having a throat cancer and actively on his chemotherapy- last treatment Monday.   Patient did not take anything for his fever    Patient denies shortness of breath, headache, abdominal pain, chest pain, nausea or vomit.

## 2024-06-28 NOTE — ED PROVIDER NOTES
Emergency Department Provider Note       PCP: Quinten Hartmann DO   Age: 79 y.o.   Sex: male     DISPOSITION Decision To Admit 06/28/2024 08:33:38 PM       ICD-10-CM    1. Pneumonia of right lung due to infectious organism, unspecified part of lung  J18.9       2. Leukopenia, unspecified type  D72.819       3. 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  He has had a mild cough.  He appears in no acute distress.  He is tachycardic upon arrival.  Febrile at 100.2.  No hypoxia.  Lung sounds currently appear clear on auscultation.  Will check labs with cultures, lactic acid, and procalcitonin.  Will check chest x-ray as well.  Will go ahead and start IV fluids and antibiotics.    Leukopenia noted.  Case discussed with my attending.  X-ray concerning for possible pneumonia.  All results discussed with the patient and his wife.  Discussed need for admission at this time with patient and his spouse.  They agree to this plan.  Hospitalist consulted for admission and have agreed to admit.     1 or more acute illnesses that pose a threat to life or bodily function.   Shared medical decision making was utilized in creating the patients health plan today.    I independently ordered and reviewed each unique test.  I reviewed external records: provider visit note from PCP.  I reviewed external records: provider visit note from outside specialist.  I reviewed external records: previous lab results from outside ED.     I interpreted the X-rays questionable right lobe PNA.  My Independent EKG Interpretation: sinus rhythm, no evidence of arrhythmia      ST  CHEST (2 VW)   Final Result      Question of a developing opacity in the infrahilar right lung. Please correlate   for pneumonia versus atelectasis.      Electronically signed by Yolie Zuniga                   [facility]     06/28/24  1845   COVID19 NOT DETECTED       Voice dictation software was used during the making of this note.  This software is not perfect and grammatical and other typographical errors may be present.  This note has not been completely proofread for errors.        Eli Harrington, PACO - CNP  06/28/24 7415

## 2024-06-28 NOTE — TELEPHONE ENCOUNTER
Physician provider: Dr. Olvera  Reason for today's call (Please detail here patients chief complaint): fever  Last office visit:n/a  Preferred pharmacy (If refill request): n/a  Calls to office within the last 48 hours?:No    Patient notified that their information will be routed to the [facility] clinical triage team for review. Patient is advised that they will receive a phone call from the triage department. If symptom related and symptoms worsen before receiving a call back, the patient has been advised to proceed to the nearest ED.          Pt's wife called. Pt has a fever of 101.1 and it started a couple of hours ago.  She wanted to know if she should go to the ER or if she could keep him home and watch him

## 2024-06-29 VITALS
DIASTOLIC BLOOD PRESSURE: 67 MMHG | WEIGHT: 178 LBS | TEMPERATURE: 97.9 F | HEIGHT: 72 IN | OXYGEN SATURATION: 96 % | HEART RATE: 88 BPM | RESPIRATION RATE: 16 BRPM | BODY MASS INDEX: 24.11 KG/M2 | SYSTOLIC BLOOD PRESSURE: 113 MMHG

## 2024-06-29 PROBLEM — R50.9 FEVER: Status: ACTIVE | Noted: 2024-06-29

## 2024-06-29 LAB
ALBUMIN SERPL-MCNC: 2.8 G/DL (ref 3.2–4.6)
ALBUMIN/GLOB SERPL: 0.9 (ref 1–1.9)
ALP SERPL-CCNC: 65 U/L (ref 40–129)
ALT SERPL-CCNC: 14 U/L (ref 12–65)
ANION GAP SERPL CALC-SCNC: 9 MMOL/L (ref 9–18)
AST SERPL-CCNC: 24 U/L (ref 15–37)
BASOPHILS # BLD: 0 K/UL (ref 0–0.2)
BASOPHILS NFR BLD: 0 % (ref 0–2)
BILIRUB DIRECT SERPL-MCNC: 0.2 MG/DL (ref 0–0.4)
BILIRUB SERPL-MCNC: 0.7 MG/DL (ref 0–1.2)
BUN SERPL-MCNC: 18 MG/DL (ref 8–23)
CALCIUM SERPL-MCNC: 8.4 MG/DL (ref 8.8–10.2)
CHLORIDE SERPL-SCNC: 106 MMOL/L (ref 98–107)
CO2 SERPL-SCNC: 22 MMOL/L (ref 20–28)
CREAT SERPL-MCNC: 0.5 MG/DL (ref 0.8–1.3)
DIFFERENTIAL METHOD BLD: ABNORMAL
EKG ATRIAL RATE: 109 BPM
EKG DIAGNOSIS: NORMAL
EKG P AXIS: 32 DEGREES
EKG P-R INTERVAL: 162 MS
EKG Q-T INTERVAL: 313 MS
EKG QRS DURATION: 87 MS
EKG QTC CALCULATION (BAZETT): 420 MS
EKG R AXIS: 3 DEGREES
EKG T AXIS: 27 DEGREES
EKG VENTRICULAR RATE: 108 BPM
EOSINOPHIL # BLD: 0 K/UL (ref 0–0.8)
EOSINOPHIL NFR BLD: 1 % (ref 0.5–7.8)
ERYTHROCYTE [DISTWIDTH] IN BLOOD BY AUTOMATED COUNT: 21 % (ref 11.9–14.6)
GLOBULIN SER CALC-MCNC: 3 G/DL (ref 2.3–3.5)
GLUCOSE SERPL-MCNC: 105 MG/DL (ref 70–99)
HCT VFR BLD AUTO: 26.7 % (ref 41.1–50.3)
HGB BLD-MCNC: 8.4 G/DL (ref 13.6–17.2)
IMM GRANULOCYTES # BLD AUTO: 0 K/UL (ref 0–0.5)
IMM GRANULOCYTES NFR BLD AUTO: 0 % (ref 0–5)
LYMPHOCYTES # BLD: 0.5 K/UL (ref 0.5–4.6)
LYMPHOCYTES NFR BLD: 19 % (ref 13–44)
MAGNESIUM SERPL-MCNC: 1.7 MG/DL (ref 1.8–2.4)
MCH RBC QN AUTO: 31.5 PG (ref 26.1–32.9)
MCHC RBC AUTO-ENTMCNC: 31.5 G/DL (ref 31.4–35)
MCV RBC AUTO: 100 FL (ref 82–102)
MONOCYTES # BLD: 0.7 K/UL (ref 0.1–1.3)
MONOCYTES NFR BLD: 24 % (ref 4–12)
MRSA DNA SPEC QL NAA+PROBE: NOT DETECTED
NEUTS SEG # BLD: 1.6 K/UL (ref 1.7–8.2)
NEUTS SEG NFR BLD: 55 % (ref 43–78)
NRBC # BLD: 0 K/UL (ref 0–0.2)
PHOSPHATE SERPL-MCNC: 2.6 MG/DL (ref 2.5–4.5)
PLATELET # BLD AUTO: 131 K/UL (ref 150–450)
PMV BLD AUTO: 10.4 FL (ref 9.4–12.3)
POTASSIUM SERPL-SCNC: 3.6 MMOL/L (ref 3.5–5.1)
PROT SERPL-MCNC: 5.8 G/DL (ref 6.3–8.2)
RBC # BLD AUTO: 2.67 M/UL (ref 4.23–5.6)
S AUREUS CPE NOSE QL NAA+PROBE: DETECTED
SODIUM SERPL-SCNC: 137 MMOL/L (ref 136–145)
TRIGL SERPL-MCNC: 101 MG/DL (ref 0–150)
WBC # BLD AUTO: 2.8 K/UL (ref 4.3–11.1)

## 2024-06-29 PROCEDURE — 36415 COLL VENOUS BLD VENIPUNCTURE: CPT

## 2024-06-29 PROCEDURE — 80053 COMPREHEN METABOLIC PANEL: CPT

## 2024-06-29 PROCEDURE — 84100 ASSAY OF PHOSPHORUS: CPT

## 2024-06-29 PROCEDURE — 87641 MR-STAPH DNA AMP PROBE: CPT

## 2024-06-29 PROCEDURE — 83735 ASSAY OF MAGNESIUM: CPT

## 2024-06-29 PROCEDURE — 2580000003 HC RX 258: Performed by: HOSPITALIST

## 2024-06-29 PROCEDURE — 93010 ELECTROCARDIOGRAM REPORT: CPT | Performed by: INTERNAL MEDICINE

## 2024-06-29 PROCEDURE — APPSS60 APP SPLIT SHARED TIME 46-60 MINUTES

## 2024-06-29 PROCEDURE — 99223 1ST HOSP IP/OBS HIGH 75: CPT | Performed by: INTERNAL MEDICINE

## 2024-06-29 PROCEDURE — 6360000002 HC RX W HCPCS: Performed by: HOSPITALIST

## 2024-06-29 PROCEDURE — 84478 ASSAY OF TRIGLYCERIDES: CPT

## 2024-06-29 PROCEDURE — 82248 BILIRUBIN DIRECT: CPT

## 2024-06-29 PROCEDURE — 85025 COMPLETE CBC W/AUTO DIFF WBC: CPT

## 2024-06-29 RX ORDER — AMOXICILLIN AND CLAVULANATE POTASSIUM 400; 57 MG/5ML; MG/5ML
400 POWDER, FOR SUSPENSION ORAL 2 TIMES DAILY
Qty: 50 ML | Refills: 0 | Status: SHIPPED | OUTPATIENT
Start: 2024-06-29 | End: 2024-07-04

## 2024-06-29 RX ORDER — SCOLOPAMINE TRANSDERMAL SYSTEM 1 MG/1
1 PATCH, EXTENDED RELEASE TRANSDERMAL
Status: DISCONTINUED | OUTPATIENT
Start: 2024-06-29 | End: 2024-06-29 | Stop reason: HOSPADM

## 2024-06-29 RX ORDER — ASPIRIN 81 MG/1
81 TABLET, CHEWABLE ORAL DAILY
Status: DISCONTINUED | OUTPATIENT
Start: 2024-06-30 | End: 2024-06-29 | Stop reason: HOSPADM

## 2024-06-29 RX ORDER — ROSUVASTATIN CALCIUM 20 MG/1
20 TABLET, COATED ORAL NIGHTLY
Status: DISCONTINUED | OUTPATIENT
Start: 2024-06-29 | End: 2024-06-29 | Stop reason: HOSPADM

## 2024-06-29 RX ORDER — LEVOFLOXACIN 750 MG/1
750 TABLET, FILM COATED ORAL DAILY
Qty: 5 TABLET | Refills: 0 | Status: SHIPPED | OUTPATIENT
Start: 2024-06-29 | End: 2024-07-04

## 2024-06-29 RX ORDER — MAGNESIUM HYDROXIDE/ALUMINUM HYDROXICE/SIMETHICONE 120; 1200; 1200 MG/30ML; MG/30ML; MG/30ML
15 SUSPENSION ORAL
Status: DISCONTINUED | OUTPATIENT
Start: 2024-06-29 | End: 2024-06-29 | Stop reason: HOSPADM

## 2024-06-29 RX ADMIN — PIPERACILLIN AND TAZOBACTAM 3375 MG: 3; .375 INJECTION, POWDER, LYOPHILIZED, FOR SOLUTION INTRAVENOUS at 00:42

## 2024-06-29 RX ADMIN — PIPERACILLIN AND TAZOBACTAM 3375 MG: 3; .375 INJECTION, POWDER, LYOPHILIZED, FOR SOLUTION INTRAVENOUS at 08:47

## 2024-06-29 NOTE — H&P
.      Hospitalist History and Physical   Admit Date:  2024  5:56 PM   Name:  Jesus Laguna   Age:  79 y.o.  Sex:  male  :  1945   MRN:  [id number]   Room:  ER/    Presenting/Chief Complaint: Fever     Reason(s) for Admission: Aspiration pneumonia, unspecified aspiration pneumonia type, unspecified laterality, unspecified part of lung (HCC) [J69.0]     History of Present Illness:     71 years old male with history of hypertension, hypothyroidism, history of squamous cell cancer of tongue status post partial glossectomy, history of throat cancer currently getting chemotherapy completed chemotherapy few days ago presented to emergency room with chief complaint of fever.  Patient reports he started experiencing fever at home around afternoon, history of cough with yellow-colored sputum production.  Patient gets tube feeds via G-tube, sometimes he feels like aspirating.  Evaluated in ER, chest x-ray shows right-sided early infiltrates.  Patient was febrile in the emergency room, tachycardic.  Patient was initiated on antibiotics with Zosyn.  Emergency room physician requested admission of this patient for further evaluation and management.  Denies any nausea, vomiting, abdominal pain.  Reports generalized weakness.                Assessment & Plan:     Aspiration pneumonia: Suspect aspiration from tube feeding, initiated on Zosyn, will also add vancomycin pharmacy to dose as patient is actively getting chemo to broaden the spectrum.  Follow cultures.    Squamous cell carcinoma of oropharynx: Will request oncology to follow-up.  Currently getting Keytruda    Moderate protein calorie malnutrition: Will continue tube feedings.    Dyslipidemia: Continue on statins.        Diet: Diet NPO  VTE prophylaxis: Lovenox  Code status: Full Code      Non-peripheral Lines and Tubes (if present):             Hospital Problems:  Principal Problem:    Aspiration pneumonia, unspecified aspiration pneumonia type,

## 2024-06-29 NOTE — PLAN OF CARE
Problem: Discharge Planning  Goal: Discharge to home or other facility with appropriate resources  Outcome: Progressing  Flowsheets (Taken 6/29/2024 8437)  Discharge to home or other facility with appropriate resources: Identify barriers to discharge with patient and caregiver     Problem: Safety - Adult  Goal: Free from fall injury  Outcome: Progressing     Problem: ABCDS Injury Assessment  Goal: Absence of physical injury  Outcome: Progressing

## 2024-06-29 NOTE — ED NOTES
TRANSFER - OUT REPORT:    Verbal report given to Ebony ARECHIGA on Jesus Laguna  being transferred to room 805 for routine progression of patient care       Report consisted of patient's Situation, Background, Assessment and   Recommendations(SBAR).     Information from the following report(s) ED SBAR was reviewed with the receiving nurse.    Lines:   Peripheral IV 06/28/24 Left Antecubital (Active)   Site Assessment Clean, dry & intact 06/28/24 2122   Line Status Blood return noted;Flushed;Normal saline locked 06/28/24 2122   Line Care Connections checked and tightened 06/28/24 2122   Phlebitis Assessment No symptoms 06/28/24 2122   Infiltration Assessment 0 06/28/24 2122   Dressing Status Clean, dry & intact 06/28/24 2122   Dressing Type Transparent 06/28/24 2122       Peripheral IV 06/28/24 Right Forearm (Active)   Site Assessment Clean, dry & intact 06/28/24 2122   Line Status Blood return noted;Flushed;Normal saline locked 06/28/24 2122   Line Care Connections checked and tightened 06/28/24 2122   Phlebitis Assessment No symptoms 06/28/24 2122   Infiltration Assessment 0 06/28/24 2122   Dressing Status Clean, dry & intact 06/28/24 2122   Dressing Type Transparent 06/28/24 2122        Opportunity for questions and clarification was provided.      Patient transported with:  Evy Mims RN  06/28/24 2126

## 2024-06-29 NOTE — PLAN OF CARE
Problem: Discharge Planning  Goal: Discharge to home or other facility with appropriate resources  6/29/2024 1327 by Nilda Bowles RN  Outcome: Adequate for Discharge  6/29/2024 0810 by Nilda Bowles RN  Outcome: Progressing  Flowsheets (Taken 6/29/2024 0752)  Discharge to home or other facility with appropriate resources: Identify barriers to discharge with patient and caregiver     Problem: Safety - Adult  Goal: Free from fall injury  6/29/2024 1327 by Nilda Bowles RN  Outcome: Adequate for Discharge  6/29/2024 0810 by Nilda Bowles RN  Outcome: Progressing     Problem: ABCDS Injury Assessment  Goal: Absence of physical injury  6/29/2024 1327 by Nilda Bowles RN  Outcome: Adequate for Discharge  6/29/2024 0810 by Nilda Bowles RN  Outcome: Progressing

## 2024-06-29 NOTE — PROGRESS NOTES
House supervisor assisted in looking for extension set for maria elena key g-tube in materials, IR, GI lab, and ICUs. Unable to find item. Patient's wife states she will bring morning of 6/29. Have been unable to check residuals.

## 2024-06-29 NOTE — DISCHARGE SUMMARY
Hospitalist Discharge Summary   Admit Date:  2024  5:56 PM   DC Note date: 2024  Name:  Jesus Laguna   Age:  79 y.o.  Sex:  male  :  1945   MRN:  [id number]   Room:  [facility]  PCP:  Quinten Hartmann DO    Presenting Complaint: Fever     Initial Admission Diagnosis: Fever, unspecified fever cause [R50.9]  Pneumonia of right lung due to infectious organism, unspecified part of lung [J18.9]  Aspiration pneumonia, unspecified aspiration pneumonia type, unspecified laterality, unspecified part of lung (HCC) [J69.0]  Leukopenia, unspecified type [D72.819]     Problem List for this Hospitalization (present on admission):    Principal Problem:    Aspiration pneumonia, unspecified aspiration pneumonia type, unspecified laterality, unspecified part of lung (HCC)  Active Problems:    Squamous cell carcinoma of oropharynx (HCC)    Dyslipidemia  Resolved Problems:    * No resolved hospital problems. *      Hospital Course:  Jesus Laguna is a 79 y.o. male with medical history of SCC of left lateral tongue s/p partial glossectomy with tonsillectomy (8/3/2010) and s/p PEG placement who presented to the ED on 2024 with cc of fever x 1 day.  In the ED, WBC 3.0, hemoglobin 11.0 and creatinine 0.62.  Chest x-ray showed developing opacity in the infrahilar right lung.  He was started on IV fluids, IV antibiotics, and admitted for further care. Pt improved and remained afebrile. Stable to discharge home on 24 to continue augmentin and Levaquin x 5 days. Will follow up with his oncologist in one week.     Patient improved more rapidly than anticipated and was stable to discharge home.     Disposition: Home  Diet: Diet NPO  ADULT TUBE FEEDING; Gastrostomy; Standard with Fiber; Bolus; 4 Times Daily; 200; Syringe Push; 30; Q 6 hours; Protein; 1 Dose; TID  Code Status: Full Code    Follow Ups:   Follow-up Information     Oncology Follow up in 1 week(s).                     Time spent in patient

## 2024-06-29 NOTE — CONSULTS
Alcohol use: Not Currently    Drug use: Never    Sexual activity: Not on file   Other Topics Concern    Not on file   Social History Narrative    Not on file     Social Determinants of Health     Financial Resource Strain: Low Risk  (6/21/2023)    Overall Financial Resource Strain (CARDIA)     Difficulty of Paying Living Expenses: Not hard at all   Food Insecurity: No Food Insecurity (6/28/2024)    Hunger Vital Sign     Worried About Running Out of Food in the Last Year: Never true     Ran Out of Food in the Last Year: Never true   Transportation Needs: No Transportation Needs (6/28/2024)    PRAPARE - Transportation     Lack of Transportation (Medical): No     Lack of Transportation (Non-Medical): No   Physical Activity: Insufficiently Active (6/9/2023)    Exercise Vital Sign     Days of Exercise per Week: 6 days     Minutes of Exercise per Session: 20 min   Stress: Not on file   Social Connections: Not on file   Intimate Partner Violence: Not on file   Housing Stability: Low Risk  (6/28/2024)    Housing Stability Vital Sign     Unable to Pay for Housing in the Last Year: No     Number of Places Lived in the Last Year: 1     Unstable Housing in the Last Year: No     Current Facility-Administered Medications   Medication Dose Route Frequency Provider Last Rate Last Admin    [START ON 6/30/2024] aspirin chewable tablet 81 mg  81 mg Per G Tube Daily Jhony Terry DO        docusate (COLACE) 50 MG/5ML liquid 50 mg  50 mg Per G Tube Daily Jhony Terry DO        rosuvastatin (CRESTOR) tablet 20 mg  20 mg Per G Tube Nightly Jhony Terry DO        scopolamine (TRANSDERM-SCOP) transdermal patch 1 patch  1 patch TransDERmal Q72H Jhony Terry DO        aluminum & magnesium hydroxide-simethicone (MAALOX) 200-200-20 MG/5ML suspension 15 mL  15 mL Per G Tube 4x Daily WC Jhony Terry,         sodium chloride flush 0.9 % injection 5-40 mL  5-40 mL IntraVENous 2 times per day Rhys Washington MD   10 mL at  He eventually transitioned to single agent Pembro and PET showed early local POD and he was started back on 5-FU/Carbo/Pembro in April. He was struggling with mucositis and dose reduction in May relieved this. He completed a MBS 5/21 which showed aspiration of thin and thick liquids and also felt that his esophagus was narrowed and GI referral was placed. He has a trach and a G-tube. C4 of 5-FU/Carbo/Pembro was given on 6/13. He presented to the ED on 6/28 with fever and cough. CXR showed early R infiltrates and he was started on Vanc and Zosyn. Oncology was consulted for recommendations on this patient.       RECOMMENDATIONS:  Squamous Cell Cancer of Tongue  -S/p C4 of 5-FU/Carbo/Pembro.  -He is scheduled for PET scan this week and then FU with Dr. Olvera.     Fever / PNA  - Bcx NGTD. RVP negative. UA negative. Sputum culture ordered.  -CXR with early R infiltrates. On D2 Vanc/Zosyn.  -.2 since admitted.  -Hospitalist to discharge home on LVQ and Augmentin X 5 days.    Dysphagia  -Continue tube feeds.  -GI referral OP and appt is scheduled for 8/6.      Goals and plan of care reviewed with the patient.  All questions answered to the best of our ability.  Lab studies and imaging studies were personally reviewed.  Thank you for allowing us to participate in the care of Mr. Laguna.          PACO Bardales - CNP   [facility]  [address] [place] [address]  Office : [phone]  Fax : [phone]      Attending Addendum:  I have personally performed a face to face diagnostic evaluation on this patient. I have reviewed and agree with the care plan as documented above by Stacy John N.P.  My findings are as follows: Patient appears lethargic, heart rate regular without murmurs, abdomen is non-tender, bowel sounds are positive.  79-year-old white male patient, history of recurrent base of tongue cancer, status post feeding tube placement with history of

## 2024-06-29 NOTE — PROGRESS NOTES
Patient transferred from Ed to room 805 via stretcher. Alert and oriented x4. Respirations even and unlabored on room air. States he has had productive cough yellow sputum. Patient hard of hearing with bilateral hearing aids in. Patient's speech is sometimes difficult to understand secondary to partial glossectomy. Placed on telemetry NSR. Button PEG in place. Patient on bolus feeds and states he has had enough today. Will not need til tomorrow morning. Leg brace noted to left leg. Patient states he has had a brace all his life due to polio. Uses crutches at home to ambulate but left them at home. Urinal at bedside. Call light within reach. Instructed to call for any needs or assistnace. Voiced understanding. NS started at 75ml/hr as ordered via right forearm IV.

## 2024-06-29 NOTE — CARE COORDINATION
CM reviewed / screen patient for discharge today.  CM reviewed medical chart / discharge summary: Disposition: Home.  Patient has no needs identified.  Family will transport patient home.  Patient has met all treatment goals / milestones.  CM will continue to follow and remain available for any needs, concerns or questions that may arise.     [As Noted in HPI] : as noted in HPI [Negative] : Heme/Lymph

## 2024-06-29 NOTE — PROGRESS NOTES
VANCO DAILY NOTE  [facility]   Pharmacy Pharmacokinetic Monitoring Service - Vancomycin    Consulting Provider: Rhys Washington MD    Indication: Fever (patient on chemo), aspiration PNA  Target Concentration: Goal AUC/WANDY 400-600 mg*hr/L  Day of Therapy: 1  Additional Antimicrobials: pip/tazo    Pertinent Laboratory Values:   Wt Readings from Last 1 Encounters:   06/28/24 80.7 kg (178 lb)     Temp Readings from Last 1 Encounters:   06/28/24 97.3 °F (36.3 °C) (Oral)     Recent Labs     06/28/24  1752   BUN 24*   CREATININE 0.62*   WBC 3.0*   PROCAL 0.09   LACTSEPSIS 1.8     Estimated Creatinine Clearance: 108 mL/min (A) (based on SCr of 0.62 mg/dL (L)).    Lab Results   Component Value Date/Time    VANCORANDOM 10.9 01/21/2024 07:27 AM       MRSA Nasal Swab: Not ordered. Order placed by pharmacy    Assessment:  Date/Time Dose Concentration AUC         Note: Serum concentrations collected for AUC dosing may appear elevated if collected in close proximity to the dose administered, this is not necessarily an indication of toxicity    Plan:  Dosing recommendations based on Bayesian software  Start vancomycin 2000mg IV x 1 then 1250mg IV q12h  Anticipated AUC of 555 and trough concentration of 15.1 at steady state  Renal labs as indicated   Vancomycin concentrations will be ordered as clinically appropriate  Pharmacy will continue to monitor patient and adjust therapy as indicated    Thank you for the consult,  Mary Taylor RPH

## 2024-07-01 ENCOUNTER — CARE COORDINATION (OUTPATIENT)
Dept: CARE COORDINATION | Facility: CLINIC | Age: 79
End: 2024-07-01

## 2024-07-01 NOTE — CARE COORDINATION
Care Transitions Note    Initial Call - Call within 2 business days of discharge: Yes    Patient Current Location:  Home: [address] [place] [id number]    Care Transition Nurse contacted the patient, spouse/partner  by telephone to perform post hospital discharge assessment, verified name and  as identifiers. Provided introduction to self, and explanation of the Care Transition Nurse role.     Patient: Jesus Laguna    Patient : 1945   MRN: [id number]    Reason for Admission: Aspiration pneumonia  Discharge Date: 24  RURS: Readmission Risk Score: 25.8      Last Discharge Facility       Date Complaint Diagnosis Description Type Department Provider    24 Fever Pneumonia of right lung due to infectious organism, unspecified part of lung ... ED to Hosp-Admission (Discharged) (ADMITTED) [id number] Jhony Terry DO; Ingris Silvestre...            Was this an external facility discharge? No    Additional needs identified to be addressed with provider   No needs identified             Method of communication with provider: none.    Patients top risk factors for readmission:   Pneumonia of right lung due to infectious organism, unspecified part of lung  Aspiration pneumonia, unspecified aspiration pneumonia type, unspecified laterality  Leukopenia    Interventions to address risk factors:   Reviewed discharge instructions and offered opportunity to ask any questions regarding discharge instructions.  Confirmed medications obtained and taking as ordered  Reviewed upcoming follow up appointments   Cancer Center PET Scan-2024   Hematology and Oncology-2024 at 9:15 am port, 10:00 am Dr. Olvera and RD, Infusion at 11:00 am    Care Transition Nurse reviewed discharge instructions, medical action plan, and red flags with patient and spouse/partner. The patient and spouse/partner was given an opportunity to ask questions; no further questions or concerns at this time.. The patient and

## 2024-07-02 ENCOUNTER — HOSPITAL ENCOUNTER (OUTPATIENT)
Dept: PET IMAGING | Age: 79
Discharge: HOME | End: 2024-07-05
Payer: MEDICARE

## 2024-07-02 DIAGNOSIS — C32.9 LARYNGEAL CANCER (HCC): ICD-10-CM

## 2024-07-02 DIAGNOSIS — C76.0 MALIGNANT NEOPLASM OF HEAD, FACE AND NECK (HCC): ICD-10-CM

## 2024-07-02 LAB
GLUCOSE BLD STRIP.AUTO-MCNC: 105 MG/DL (ref 65–100)
SERVICE CMNT-IMP: ABNORMAL

## 2024-07-02 PROCEDURE — 82962 GLUCOSE BLOOD TEST: CPT

## 2024-07-02 PROCEDURE — 78815 PET IMAGE W/CT SKULL-THIGH: CPT

## 2024-07-02 PROCEDURE — 3430000000 HC RX DIAGNOSTIC RADIOPHARMACEUTICAL

## 2024-07-02 PROCEDURE — 2580000003 HC RX 258

## 2024-07-02 PROCEDURE — A9609 HC RX DIAGNOSTIC RADIOPHARMACEUTICAL: HCPCS

## 2024-07-02 RX ORDER — SODIUM CHLORIDE 0.9 % (FLUSH) 0.9 %
20 SYRINGE (ML) INJECTION AS NEEDED
Status: DISCONTINUED | OUTPATIENT
Start: 2024-07-02 | End: 2024-07-06 | Stop reason: HOSPADM

## 2024-07-02 RX ORDER — FLUDEOXYGLUCOSE F 18 200 MCI/ML
12.78 INJECTION, SOLUTION INTRAVENOUS
Status: COMPLETED | OUTPATIENT
Start: 2024-07-02 | End: 2024-07-02

## 2024-07-02 RX ADMIN — FLUDEOXYGLUCOSE F 18 12.78 MILLICURIE: 200 INJECTION, SOLUTION INTRAVENOUS at 11:00

## 2024-07-02 RX ADMIN — SODIUM CHLORIDE, PRESERVATIVE FREE 20 ML: 5 INJECTION INTRAVENOUS at 11:00

## 2024-07-08 ENCOUNTER — OFFICE VISIT (OUTPATIENT)
Dept: ONCOLOGY | Age: 79
End: 2024-07-08

## 2024-07-08 ENCOUNTER — HOSPITAL ENCOUNTER (OUTPATIENT)
Dept: INFUSION THERAPY | Age: 79
Setting detail: INFUSION SERIES
Discharge: HOME | End: 2024-07-08
Payer: MEDICARE

## 2024-07-08 ENCOUNTER — OFFICE VISIT (OUTPATIENT)
Dept: ONCOLOGY | Age: 79
End: 2024-07-08
Payer: MEDICARE

## 2024-07-08 ENCOUNTER — HOSPITAL ENCOUNTER (OUTPATIENT)
Dept: LAB | Age: 79
Discharge: HOME | End: 2024-07-11
Payer: MEDICARE

## 2024-07-08 VITALS
DIASTOLIC BLOOD PRESSURE: 77 MMHG | TEMPERATURE: 97.8 F | BODY MASS INDEX: 25.26 KG/M2 | HEIGHT: 71 IN | RESPIRATION RATE: 12 BRPM | WEIGHT: 180.4 LBS | HEART RATE: 87 BPM | OXYGEN SATURATION: 94 % | SYSTOLIC BLOOD PRESSURE: 113 MMHG

## 2024-07-08 DIAGNOSIS — C32.9 LARYNGEAL CANCER (HCC): Primary | ICD-10-CM

## 2024-07-08 DIAGNOSIS — Z78.9 ON ENTERAL NUTRITION: ICD-10-CM

## 2024-07-08 DIAGNOSIS — E03.2 HYPOTHYROIDISM DUE TO MEDICAMENTS AND OTHER EXOGENOUS SUBSTANCES: ICD-10-CM

## 2024-07-08 DIAGNOSIS — Z79.899 HIGH RISK MEDICATION USE: ICD-10-CM

## 2024-07-08 DIAGNOSIS — E55.9 VITAMIN D DEFICIENCY: ICD-10-CM

## 2024-07-08 DIAGNOSIS — D69.6 THROMBOCYTOPENIA, UNSPECIFIED (HCC): ICD-10-CM

## 2024-07-08 DIAGNOSIS — C32.9 KERATINIZING SQUAMOUS CELL CARCINOMA OF LARYNX (HCC): ICD-10-CM

## 2024-07-08 DIAGNOSIS — C10.9 SQUAMOUS CELL CARCINOMA OF OROPHARYNX (HCC): Primary | ICD-10-CM

## 2024-07-08 DIAGNOSIS — Z00.8 NUTRITIONAL ASSESSMENT: Primary | ICD-10-CM

## 2024-07-08 DIAGNOSIS — C32.9 LARYNGEAL CANCER (HCC): ICD-10-CM

## 2024-07-08 LAB
25(OH)D3 SERPL-MCNC: 27.2 NG/ML (ref 30–100)
ALBUMIN SERPL-MCNC: 3.3 G/DL (ref 3.2–4.6)
ALBUMIN/GLOB SERPL: 1 (ref 1–1.9)
ALP SERPL-CCNC: 78 U/L (ref 40–129)
ALT SERPL-CCNC: 18 U/L (ref 12–65)
ANION GAP SERPL CALC-SCNC: 10 MMOL/L (ref 9–18)
AST SERPL-CCNC: 26 U/L (ref 15–37)
BASOPHILS # BLD: 0 K/UL (ref 0–0.2)
BASOPHILS NFR BLD: 1 % (ref 0–2)
BILIRUB SERPL-MCNC: 0.2 MG/DL (ref 0–1.2)
BUN SERPL-MCNC: 24 MG/DL (ref 8–23)
CALCIUM SERPL-MCNC: 9.1 MG/DL (ref 8.8–10.2)
CHLORIDE SERPL-SCNC: 101 MMOL/L (ref 98–107)
CO2 SERPL-SCNC: 27 MMOL/L (ref 20–28)
CREAT SERPL-MCNC: 0.53 MG/DL (ref 0.8–1.3)
DIFFERENTIAL METHOD BLD: ABNORMAL
EOSINOPHIL # BLD: 0.1 K/UL (ref 0–0.8)
EOSINOPHIL NFR BLD: 1 % (ref 0.5–7.8)
ERYTHROCYTE [DISTWIDTH] IN BLOOD BY AUTOMATED COUNT: 19.3 % (ref 11.9–14.6)
GLOBULIN SER CALC-MCNC: 3.3 G/DL (ref 2.3–3.5)
GLUCOSE SERPL-MCNC: 112 MG/DL (ref 70–99)
HCT VFR BLD AUTO: 31.4 % (ref 41.1–50.3)
HGB BLD-MCNC: 10.1 G/DL (ref 13.6–17.2)
IMM GRANULOCYTES # BLD AUTO: 0 K/UL (ref 0–0.5)
IMM GRANULOCYTES NFR BLD AUTO: 1 % (ref 0–5)
LYMPHOCYTES # BLD: 0.7 K/UL (ref 0.5–4.6)
LYMPHOCYTES NFR BLD: 12 % (ref 13–44)
MAGNESIUM SERPL-MCNC: 2.1 MG/DL (ref 1.8–2.4)
MCH RBC QN AUTO: 32.3 PG (ref 26.1–32.9)
MCHC RBC AUTO-ENTMCNC: 32.2 G/DL (ref 31.4–35)
MCV RBC AUTO: 100.3 FL (ref 82–102)
MONOCYTES # BLD: 0.7 K/UL (ref 0.1–1.3)
MONOCYTES NFR BLD: 12 % (ref 4–12)
NEUTS SEG # BLD: 4.2 K/UL (ref 1.7–8.2)
NEUTS SEG NFR BLD: 73 % (ref 43–78)
NRBC # BLD: 0 K/UL (ref 0–0.2)
PLATELET # BLD AUTO: 187 K/UL (ref 150–450)
PMV BLD AUTO: 9.6 FL (ref 9.4–12.3)
POTASSIUM SERPL-SCNC: 3.9 MMOL/L (ref 3.5–5.1)
PROT SERPL-MCNC: 6.6 G/DL (ref 6.3–8.2)
RBC # BLD AUTO: 3.13 M/UL (ref 4.23–5.6)
SODIUM SERPL-SCNC: 138 MMOL/L (ref 136–145)
TSH W FREE THYROID IF ABNORMAL: 2.03 UIU/ML (ref 0.27–4.2)
WBC # BLD AUTO: 5.6 K/UL (ref 4.3–11.1)

## 2024-07-08 PROCEDURE — 85025 COMPLETE CBC W/AUTO DIFF WBC: CPT

## 2024-07-08 PROCEDURE — 99214 OFFICE O/P EST MOD 30 MIN: CPT | Performed by: INTERNAL MEDICINE

## 2024-07-08 PROCEDURE — 83735 ASSAY OF MAGNESIUM: CPT

## 2024-07-08 PROCEDURE — 1123F ACP DISCUSS/DSCN MKR DOCD: CPT | Performed by: INTERNAL MEDICINE

## 2024-07-08 PROCEDURE — G8428 CUR MEDS NOT DOCUMENT: HCPCS | Performed by: INTERNAL MEDICINE

## 2024-07-08 PROCEDURE — 96417 CHEMO IV INFUS EACH ADDL SEQ: CPT

## 2024-07-08 PROCEDURE — 80053 COMPREHEN METABOLIC PANEL: CPT

## 2024-07-08 PROCEDURE — 1036F TOBACCO NON-USER: CPT | Performed by: INTERNAL MEDICINE

## 2024-07-08 PROCEDURE — 96367 TX/PROPH/DG ADDL SEQ IV INF: CPT

## 2024-07-08 PROCEDURE — 82306 VITAMIN D 25 HYDROXY: CPT

## 2024-07-08 PROCEDURE — 96413 CHEMO IV INFUSION 1 HR: CPT

## 2024-07-08 PROCEDURE — 84443 ASSAY THYROID STIM HORMONE: CPT

## 2024-07-08 PROCEDURE — G0498 CHEMO EXTEND IV INFUS W/PUMP: HCPCS

## 2024-07-08 PROCEDURE — 6360000002 HC RX W HCPCS: Performed by: INTERNAL MEDICINE

## 2024-07-08 PROCEDURE — G8419 CALC BMI OUT NRM PARAM NOF/U: HCPCS | Performed by: INTERNAL MEDICINE

## 2024-07-08 PROCEDURE — 96375 TX/PRO/DX INJ NEW DRUG ADDON: CPT

## 2024-07-08 PROCEDURE — 1111F DSCHRG MED/CURRENT MED MERGE: CPT | Performed by: INTERNAL MEDICINE

## 2024-07-08 PROCEDURE — 2580000003 HC RX 258: Performed by: INTERNAL MEDICINE

## 2024-07-08 RX ORDER — SODIUM CHLORIDE 0.9 % (FLUSH) 0.9 %
5-40 SYRINGE (ML) INJECTION PRN
Status: CANCELLED | OUTPATIENT
Start: 2024-07-08

## 2024-07-08 RX ORDER — HEPARIN SODIUM (PORCINE) LOCK FLUSH IV SOLN 100 UNIT/ML 100 UNIT/ML
500 SOLUTION INTRAVENOUS PRN
Status: CANCELLED | OUTPATIENT
Start: 2024-07-08

## 2024-07-08 RX ORDER — SODIUM CHLORIDE 0.9 % (FLUSH) 0.9 %
5-40 SYRINGE (ML) INJECTION PRN
Status: DISCONTINUED | OUTPATIENT
Start: 2024-07-08 | End: 2024-07-09 | Stop reason: HOSPADM

## 2024-07-08 RX ORDER — DIPHENHYDRAMINE HYDROCHLORIDE 50 MG/ML
50 INJECTION INTRAMUSCULAR; INTRAVENOUS
Status: CANCELLED | OUTPATIENT
Start: 2024-07-08

## 2024-07-08 RX ORDER — SODIUM CHLORIDE 0.9 % (FLUSH) 0.9 %
5-40 SYRINGE (ML) INJECTION PRN
Status: DISCONTINUED | OUTPATIENT
Start: 2024-07-08 | End: 2024-07-12 | Stop reason: HOSPADM

## 2024-07-08 RX ORDER — DIPHENHYDRAMINE HYDROCHLORIDE 50 MG/ML
50 INJECTION INTRAMUSCULAR; INTRAVENOUS
Status: DISCONTINUED | OUTPATIENT
Start: 2024-07-08 | End: 2024-07-09 | Stop reason: HOSPADM

## 2024-07-08 RX ORDER — EPINEPHRINE 1 MG/ML
0.3 INJECTION, SOLUTION, CONCENTRATE INTRAVENOUS PRN
Status: CANCELLED | OUTPATIENT
Start: 2024-07-08

## 2024-07-08 RX ORDER — MEPERIDINE HYDROCHLORIDE 50 MG/ML
12.5 INJECTION INTRAMUSCULAR; INTRAVENOUS; SUBCUTANEOUS PRN
Status: CANCELLED | OUTPATIENT
Start: 2024-07-08

## 2024-07-08 RX ORDER — FAMOTIDINE 10 MG/ML
20 INJECTION, SOLUTION INTRAVENOUS
Status: CANCELLED | OUTPATIENT
Start: 2024-07-08

## 2024-07-08 RX ORDER — SODIUM CHLORIDE 9 MG/ML
INJECTION, SOLUTION INTRAVENOUS CONTINUOUS
Status: CANCELLED | OUTPATIENT
Start: 2024-07-08

## 2024-07-08 RX ORDER — ACETAMINOPHEN 325 MG/1
650 TABLET ORAL
Status: CANCELLED | OUTPATIENT
Start: 2024-07-08

## 2024-07-08 RX ORDER — SODIUM CHLORIDE 9 MG/ML
5-250 INJECTION, SOLUTION INTRAVENOUS PRN
Status: CANCELLED | OUTPATIENT
Start: 2024-07-08

## 2024-07-08 RX ORDER — SODIUM CHLORIDE 9 MG/ML
5-250 INJECTION, SOLUTION INTRAVENOUS PRN
Status: DISCONTINUED | OUTPATIENT
Start: 2024-07-08 | End: 2024-07-09 | Stop reason: HOSPADM

## 2024-07-08 RX ORDER — ALBUTEROL SULFATE 90 UG/1
4 AEROSOL, METERED RESPIRATORY (INHALATION) PRN
Status: CANCELLED | OUTPATIENT
Start: 2024-07-08

## 2024-07-08 RX ORDER — ONDANSETRON 2 MG/ML
8 INJECTION INTRAMUSCULAR; INTRAVENOUS
Status: CANCELLED | OUTPATIENT
Start: 2024-07-08

## 2024-07-08 RX ORDER — ONDANSETRON 2 MG/ML
8 INJECTION INTRAMUSCULAR; INTRAVENOUS ONCE
Status: CANCELLED | OUTPATIENT
Start: 2024-07-08 | End: 2024-07-08

## 2024-07-08 RX ORDER — ONDANSETRON 2 MG/ML
8 INJECTION INTRAMUSCULAR; INTRAVENOUS ONCE
Status: COMPLETED | OUTPATIENT
Start: 2024-07-08 | End: 2024-07-08

## 2024-07-08 RX ADMIN — ONDANSETRON 8 MG: 2 INJECTION INTRAMUSCULAR; INTRAVENOUS at 10:54

## 2024-07-08 RX ADMIN — SODIUM CHLORIDE 100 ML/HR: 9 INJECTION, SOLUTION INTRAVENOUS at 09:26

## 2024-07-08 RX ADMIN — DEXAMETHASONE SODIUM PHOSPHATE 12 MG: 4 INJECTION, SOLUTION INTRAMUSCULAR; INTRAVENOUS at 10:57

## 2024-07-08 RX ADMIN — SODIUM CHLORIDE, PRESERVATIVE FREE 10 ML: 5 INJECTION INTRAVENOUS at 07:20

## 2024-07-08 RX ADMIN — SODIUM CHLORIDE 150 MG: 9 INJECTION, SOLUTION INTRAVENOUS at 11:17

## 2024-07-08 RX ADMIN — SODIUM CHLORIDE 200 MG: 9 INJECTION, SOLUTION INTRAVENOUS at 10:22

## 2024-07-08 RX ADMIN — FLUOROURACIL 4000 MG: 50 INJECTION, SOLUTION INTRAVENOUS at 12:17

## 2024-07-08 RX ADMIN — SODIUM CHLORIDE, PRESERVATIVE FREE 10 ML: 5 INJECTION INTRAVENOUS at 09:26

## 2024-07-08 RX ADMIN — CARBOPLATIN 485 MG: 10 INJECTION, SOLUTION INTRAVENOUS at 11:45

## 2024-07-08 ASSESSMENT — PATIENT HEALTH QUESTIONNAIRE - PHQ9
SUM OF ALL RESPONSES TO PHQ QUESTIONS 1-9: 0
2. FEELING DOWN, DEPRESSED OR HOPELESS: NOT AT ALL
SUM OF ALL RESPONSES TO PHQ QUESTIONS 1-9: 0
SUM OF ALL RESPONSES TO PHQ9 QUESTIONS 1 & 2: 0
SUM OF ALL RESPONSES TO PHQ QUESTIONS 1-9: 0
SUM OF ALL RESPONSES TO PHQ QUESTIONS 1-9: 0
1. LITTLE INTEREST OR PLEASURE IN DOING THINGS: NOT AT ALL

## 2024-07-08 NOTE — PATIENT INSTRUCTIONS
Patient Information from Today's Visit    The members of your Oncology Medical Home are listed below:    Physician Provider: Kasandra Olvera Medical Oncologist  Advanced Practice Clinician: Neelam Schaeffer NP  Registered Nurse: Lucille ASHER RN  Nurse Navigator: Loraine CUELLAR RD  Medical Assistant: Celsa ASHER MA  :Breanna WARNER  Supportive Care Services: Jenny SOTELO LMSW and Loraine CUELLAR, Registered Dietitian    Diagnosis:   Follow up for H&N Cancer - PET scan results     Follow Up Instructions:   PET scan results have been reviewed with you by Dr. Sanders    Plan to continue with 2 more cycles of your current chemo regimen.     Continue with tube feedings    Treatment Summary has been discussed and given to patient: No      Current Labs:   Hospital Outpatient Visit on 07/08/2024   Component Date Value Ref Range Status    Sodium 07/08/2024 138  136 - 145 mmol/L Final    Potassium 07/08/2024 3.9  3.5 - 5.1 mmol/L Final    Chloride 07/08/2024 101  98 - 107 mmol/L Final    CO2 07/08/2024 27  20 - 28 mmol/L Final    Anion Gap 07/08/2024 10  9 - 18 mmol/L Final    Glucose 07/08/2024 112 (H)  70 - 99 mg/dL Final    Comment: <70 mg/dL Consistent with, but not fully diagnostic of hypoglycemia.  100 - 125 mg/dL Impaired fasting glucose/consistent with pre-diabetes mellitus.  > 126 mg/dl Fasting glucose consistent with overt diabetes mellitus      BUN 07/08/2024 24 (H)  8 - 23 MG/DL Final    Creatinine 07/08/2024 0.53 (L)  0.80 - 1.30 MG/DL Final    Est, Glom Filt Rate 07/08/2024 >90  >60 ml/min/1.73m2 Final    Comment:    Pediatric calculator link: https://www.kidney.org/professionals/kdoqi/gfr_calculatorped     These results are not intended for use in patients <18 years of age.     eGFR results are calculated without a race factor using  the 2021 CKD-EPI equation. Careful clinical correlation is recommended, particularly when comparing to results calculated using previous equations.  The CKD-EPI equation is less accurate in

## 2024-07-08 NOTE — PROGRESS NOTES
[facility]: Office Visit Established Patient    Chief Complaint:    Chief Complaint   Patient presents with    Follow-up         History of Present Illness:  Mr. Laguna is a 79 y.o. male who returns today for management of SCCHN.  This was of the left lateral tongue status post partial glossectomy with tonsillectomy, neck dissection with radial forearm free flap to left oral cavity 8/3/2010.  Per records margins were close but negative with evidence of LVI as well as PNI and extracapsular spread.  He completed adjuvant radiation therapy and end of 2010 to early 2011.  He followed with Dr. Austen Bowman ENT up till the 5-year carlee in 2015 with no evidence of recurrence.  2 years post therapy he had multiple teeth removed of his left lower jaw followed by HBO therapy x10 sessions due to poor healing.  More recently he presented with symptoms of congestion and hypersalivation, completed a couple courses of antibiotics without resolution.  Had minimal weight loss.  He was seen by Dr. Clair Bowman ENT with abnormal exam and abnormal CT neck.  He was thereafter referred to Dr. Jaems Artis in [place].  Patient underwent direct laryngoscopy 8/17/2023 as well as PET scan 8/9/2023.  PET scan demonstrated hypermetabolic mass at base of tongue, mildly metabolic nonenlarged right level 3 cervical lymph node, and no evidence of distant metastasis.  Direct laryngoscopy with biopsy with pathology of larynx and epiglottis revealing invasive moderately differentiated squamous cell cancer.  P16 stain for HPV was negative.  He was referred to [facility] for consideration of systemic therapy.  Given prolonged projected rehabilitation from extensive surgery, patient has made an educated decision to forego surgical options and will like to choose palliative systemic therapy moving forward.     Here today for cycle 5 (restarted) carbo/5-FU/Keytruda and restaging.  He is tolerating therapy very well

## 2024-07-08 NOTE — PROGRESS NOTES
Arrived to the Infusion Center.  Keytruda, Carboplatin, and 5FU completed. Patient tolerated well.   Any issues or concerns during appointment: none.  Patient aware of next infusion appointment on 7/12 (date) at 2:45 PM (time).  Patient instructed to call provider with temperature of 100.4 or greater or nausea/vomiting/ diarrhea or pain not controlled by medications  Discharged via w/c.

## 2024-07-09 NOTE — PROGRESS NOTES
Nutrition F/U:  Assessment:  Pt seen during office visit w/ Dr. Sanders, receiving his 5th dose of Carbo + 5-FU (4 day pump) + Keytruda.  PET scan results reviewed w/ pt by Dr. Sanders - stable scan, so plan to proceed with 2 more cycles of chemo (6 total) given good tolerance, and then hopefully transition to Keytruda alone per Dr. Sanders.  Pt remains NPO/TF dependent for estimated nutrition needs, consuming ~2500 kcal/day w/ combination of TF formulas.  Pt has follow up with Dr. Cortez in August, last exam in June showed decreased tumor size.  Pt c/o fatigue, mouth sores were manageable this past cycle w/ use of Dex mouth rinse starting day of chemo.  Current BW: 180#, up 3# over the past 6 weeks.     Intervention:  1. NPO, any further diet recommendations per SLP  2. Continue w/ current TF regimen - could decrease Boost VHC cartons given persistent weight gain.   3. Dex mouthrinse for mouth sores - start using today and until pump comes off, then prn.     Monitoring/Evaluation:  1. RD to follow up during next office visit - follow up wt status, tolerance/intake of TF, symptom management.      Loraine Honeycutt RD, , LD  [phone]

## 2024-07-11 RX ORDER — SODIUM CHLORIDE 0.9 % (FLUSH) 0.9 %
5-40 SYRINGE (ML) INJECTION PRN
OUTPATIENT
Start: 2024-07-11

## 2024-07-12 ENCOUNTER — HOSPITAL ENCOUNTER (OUTPATIENT)
Dept: INFUSION THERAPY | Age: 79
Setting detail: INFUSION SERIES
Discharge: HOME | End: 2024-07-12
Payer: MEDICARE

## 2024-07-12 VITALS
SYSTOLIC BLOOD PRESSURE: 114 MMHG | HEART RATE: 89 BPM | TEMPERATURE: 97.7 F | OXYGEN SATURATION: 96 % | RESPIRATION RATE: 16 BRPM | DIASTOLIC BLOOD PRESSURE: 69 MMHG

## 2024-07-12 PROCEDURE — 2580000003 HC RX 258: Performed by: INTERNAL MEDICINE

## 2024-07-12 PROCEDURE — 96523 IRRIG DRUG DELIVERY DEVICE: CPT

## 2024-07-12 RX ORDER — SODIUM CHLORIDE 0.9 % (FLUSH) 0.9 %
5-40 SYRINGE (ML) INJECTION PRN
Status: DISCONTINUED | OUTPATIENT
Start: 2024-07-12 | End: 2024-07-13 | Stop reason: HOSPADM

## 2024-07-12 RX ADMIN — SODIUM CHLORIDE, PRESERVATIVE FREE 10 ML: 5 INJECTION INTRAVENOUS at 14:45

## 2024-07-12 NOTE — PROGRESS NOTES
Arrived to the Infusion Center. Pump d/c completed.   Provided education on side effects    Patient instructed to report any side affects to ordering provider.  Patient tolerated well.   Any issues or concerns during appointment: no  Patient aware of next infusion appointment on 7/29/2024 (date) at 0715 (time).  Discharged via wheelchair w/family.

## 2024-07-29 ENCOUNTER — HOSPITAL ENCOUNTER (OUTPATIENT)
Dept: INFUSION THERAPY | Age: 79
Setting detail: INFUSION SERIES
Discharge: HOME | End: 2024-07-29
Payer: MEDICARE

## 2024-07-29 VITALS
HEART RATE: 83 BPM | TEMPERATURE: 97.6 F | RESPIRATION RATE: 16 BRPM | OXYGEN SATURATION: 95 % | SYSTOLIC BLOOD PRESSURE: 118 MMHG | DIASTOLIC BLOOD PRESSURE: 64 MMHG | WEIGHT: 178.35 LBS | BODY MASS INDEX: 24.88 KG/M2

## 2024-07-29 DIAGNOSIS — C32.9 LARYNGEAL CANCER (HCC): ICD-10-CM

## 2024-07-29 DIAGNOSIS — E03.2 HYPOTHYROIDISM DUE TO MEDICAMENTS AND OTHER EXOGENOUS SUBSTANCES: ICD-10-CM

## 2024-07-29 DIAGNOSIS — C32.9 KERATINIZING SQUAMOUS CELL CARCINOMA OF LARYNX (HCC): ICD-10-CM

## 2024-07-29 DIAGNOSIS — E55.9 VITAMIN D DEFICIENCY: Primary | ICD-10-CM

## 2024-07-29 DIAGNOSIS — Z79.899 HIGH RISK MEDICATION USE: ICD-10-CM

## 2024-07-29 LAB
ALBUMIN SERPL-MCNC: 3.4 G/DL (ref 3.2–4.6)
ALBUMIN/GLOB SERPL: 1.1 (ref 1–1.9)
ALP SERPL-CCNC: 78 U/L (ref 40–129)
ALT SERPL-CCNC: 18 U/L (ref 12–65)
ANION GAP SERPL CALC-SCNC: 10 MMOL/L (ref 9–18)
AST SERPL-CCNC: 26 U/L (ref 15–37)
BASOPHILS # BLD: 0 K/UL (ref 0–0.2)
BASOPHILS NFR BLD: 1 % (ref 0–2)
BILIRUB SERPL-MCNC: 0.3 MG/DL (ref 0–1.2)
BUN SERPL-MCNC: 21 MG/DL (ref 8–23)
CALCIUM SERPL-MCNC: 8.9 MG/DL (ref 8.8–10.2)
CHLORIDE SERPL-SCNC: 102 MMOL/L (ref 98–107)
CO2 SERPL-SCNC: 26 MMOL/L (ref 20–28)
CREAT SERPL-MCNC: 0.53 MG/DL (ref 0.8–1.3)
DIFFERENTIAL METHOD BLD: ABNORMAL
EOSINOPHIL # BLD: 0.1 K/UL (ref 0–0.8)
EOSINOPHIL NFR BLD: 2 % (ref 0.5–7.8)
ERYTHROCYTE [DISTWIDTH] IN BLOOD BY AUTOMATED COUNT: 17.3 % (ref 11.9–14.6)
GLOBULIN SER CALC-MCNC: 3.2 G/DL (ref 2.3–3.5)
GLUCOSE SERPL-MCNC: 107 MG/DL (ref 70–99)
HCT VFR BLD AUTO: 31.1 % (ref 41.1–50.3)
HGB BLD-MCNC: 10 G/DL (ref 13.6–17.2)
IMM GRANULOCYTES # BLD AUTO: 0.1 K/UL (ref 0–0.5)
IMM GRANULOCYTES NFR BLD AUTO: 1 % (ref 0–5)
LYMPHOCYTES # BLD: 0.7 K/UL (ref 0.5–4.6)
LYMPHOCYTES NFR BLD: 15 % (ref 13–44)
MAGNESIUM SERPL-MCNC: 2 MG/DL (ref 1.8–2.4)
MCH RBC QN AUTO: 32.8 PG (ref 26.1–32.9)
MCHC RBC AUTO-ENTMCNC: 32.2 G/DL (ref 31.4–35)
MCV RBC AUTO: 102 FL (ref 82–102)
MONOCYTES # BLD: 0.9 K/UL (ref 0.1–1.3)
MONOCYTES NFR BLD: 19 % (ref 4–12)
NEUTS SEG # BLD: 3.1 K/UL (ref 1.7–8.2)
NEUTS SEG NFR BLD: 62 % (ref 43–78)
NRBC # BLD: 0 K/UL (ref 0–0.2)
PLATELET # BLD AUTO: 151 K/UL (ref 150–450)
PMV BLD AUTO: 9.8 FL (ref 9.4–12.3)
POTASSIUM SERPL-SCNC: 4.1 MMOL/L (ref 3.5–5.1)
PROT SERPL-MCNC: 6.5 G/DL (ref 6.3–8.2)
RBC # BLD AUTO: 3.05 M/UL (ref 4.23–5.6)
SODIUM SERPL-SCNC: 138 MMOL/L (ref 136–145)
TSH, 3RD GENERATION: 1.38 UIU/ML (ref 0.27–4.2)
WBC # BLD AUTO: 4.9 K/UL (ref 4.3–11.1)

## 2024-07-29 PROCEDURE — 84443 ASSAY THYROID STIM HORMONE: CPT

## 2024-07-29 PROCEDURE — 96417 CHEMO IV INFUS EACH ADDL SEQ: CPT

## 2024-07-29 PROCEDURE — 96375 TX/PRO/DX INJ NEW DRUG ADDON: CPT

## 2024-07-29 PROCEDURE — 80053 COMPREHEN METABOLIC PANEL: CPT

## 2024-07-29 PROCEDURE — 96413 CHEMO IV INFUSION 1 HR: CPT

## 2024-07-29 PROCEDURE — 83735 ASSAY OF MAGNESIUM: CPT

## 2024-07-29 PROCEDURE — 96367 TX/PROPH/DG ADDL SEQ IV INF: CPT

## 2024-07-29 PROCEDURE — 85025 COMPLETE CBC W/AUTO DIFF WBC: CPT

## 2024-07-29 PROCEDURE — 2580000003 HC RX 258: Performed by: INTERNAL MEDICINE

## 2024-07-29 PROCEDURE — G0498 CHEMO EXTEND IV INFUS W/PUMP: HCPCS

## 2024-07-29 PROCEDURE — 6360000002 HC RX W HCPCS: Performed by: INTERNAL MEDICINE

## 2024-07-29 RX ORDER — ONDANSETRON 2 MG/ML
8 INJECTION INTRAMUSCULAR; INTRAVENOUS ONCE
Status: COMPLETED | OUTPATIENT
Start: 2024-07-29 | End: 2024-07-29

## 2024-07-29 RX ORDER — SODIUM CHLORIDE 0.9 % (FLUSH) 0.9 %
5-40 SYRINGE (ML) INJECTION PRN
Status: DISCONTINUED | OUTPATIENT
Start: 2024-07-29 | End: 2024-07-30 | Stop reason: HOSPADM

## 2024-07-29 RX ORDER — SODIUM CHLORIDE 9 MG/ML
5-250 INJECTION, SOLUTION INTRAVENOUS PRN
Status: DISCONTINUED | OUTPATIENT
Start: 2024-07-29 | End: 2024-07-30 | Stop reason: HOSPADM

## 2024-07-29 RX ORDER — SODIUM CHLORIDE 9 MG/ML
INJECTION, SOLUTION INTRAVENOUS CONTINUOUS
Status: CANCELLED | OUTPATIENT
Start: 2024-07-29

## 2024-07-29 RX ORDER — DIPHENHYDRAMINE HYDROCHLORIDE 50 MG/ML
50 INJECTION INTRAMUSCULAR; INTRAVENOUS
Status: DISCONTINUED | OUTPATIENT
Start: 2024-07-29 | End: 2024-07-30 | Stop reason: HOSPADM

## 2024-07-29 RX ORDER — HEPARIN 100 UNIT/ML
500 SYRINGE INTRAVENOUS PRN
Status: CANCELLED | OUTPATIENT
Start: 2024-07-29

## 2024-07-29 RX ORDER — ACETAMINOPHEN 325 MG/1
650 TABLET ORAL
Status: DISCONTINUED | OUTPATIENT
Start: 2024-07-29 | End: 2024-07-30 | Stop reason: HOSPADM

## 2024-07-29 RX ORDER — ACETAMINOPHEN 325 MG/1
650 TABLET ORAL
Status: CANCELLED | OUTPATIENT
Start: 2024-07-29

## 2024-07-29 RX ORDER — ONDANSETRON 2 MG/ML
8 INJECTION INTRAMUSCULAR; INTRAVENOUS
Status: DISCONTINUED | OUTPATIENT
Start: 2024-07-29 | End: 2024-07-30 | Stop reason: HOSPADM

## 2024-07-29 RX ORDER — DIPHENHYDRAMINE HYDROCHLORIDE 50 MG/ML
50 INJECTION INTRAMUSCULAR; INTRAVENOUS
Status: CANCELLED | OUTPATIENT
Start: 2024-07-29

## 2024-07-29 RX ORDER — ALBUTEROL SULFATE 90 UG/1
4 AEROSOL, METERED RESPIRATORY (INHALATION) PRN
Status: DISCONTINUED | OUTPATIENT
Start: 2024-07-29 | End: 2024-07-30 | Stop reason: HOSPADM

## 2024-07-29 RX ORDER — MEPERIDINE HYDROCHLORIDE 25 MG/ML
12.5 INJECTION INTRAMUSCULAR; INTRAVENOUS; SUBCUTANEOUS PRN
Status: DISCONTINUED | OUTPATIENT
Start: 2024-07-29 | End: 2024-07-30 | Stop reason: HOSPADM

## 2024-07-29 RX ORDER — SODIUM CHLORIDE 9 MG/ML
5-250 INJECTION, SOLUTION INTRAVENOUS PRN
Status: CANCELLED | OUTPATIENT
Start: 2024-07-29

## 2024-07-29 RX ORDER — EPINEPHRINE 1 MG/ML
0.3 INJECTION, SOLUTION, CONCENTRATE INTRAVENOUS PRN
Status: DISCONTINUED | OUTPATIENT
Start: 2024-07-29 | End: 2024-07-30 | Stop reason: HOSPADM

## 2024-07-29 RX ADMIN — SODIUM CHLORIDE 100 ML/HR: 9 INJECTION, SOLUTION INTRAVENOUS at 10:09

## 2024-07-29 RX ADMIN — CARBOPLATIN 500 MG: 10 INJECTION, SOLUTION INTRAVENOUS at 11:50

## 2024-07-29 RX ADMIN — ONDANSETRON 8 MG: 2 INJECTION INTRAMUSCULAR; INTRAVENOUS at 10:42

## 2024-07-29 RX ADMIN — SODIUM CHLORIDE 200 MG: 9 INJECTION, SOLUTION INTRAVENOUS at 10:10

## 2024-07-29 RX ADMIN — SODIUM CHLORIDE 150 MG: 9 INJECTION, SOLUTION INTRAVENOUS at 11:09

## 2024-07-29 RX ADMIN — DEXAMETHASONE SODIUM PHOSPHATE 12 MG: 4 INJECTION INTRA-ARTICULAR; INTRALESIONAL; INTRAMUSCULAR; INTRAVENOUS; SOFT TISSUE at 10:46

## 2024-07-29 RX ADMIN — SODIUM CHLORIDE, PRESERVATIVE FREE 10 ML: 5 INJECTION INTRAVENOUS at 07:20

## 2024-07-29 RX ADMIN — FLUOROURACIL 4000 MG: 50 INJECTION, SOLUTION INTRAVENOUS at 12:22

## 2024-07-29 NOTE — PROGRESS NOTES
Arrived to the Infusion Center. Labs, Keytruda, Carboplatin completed. 5 FU pump connected. Clamps verified with  Mallika ARECHIGA.  Patient tolerated without complication.   Any issues or concerns during appointment: No.  Patient aware of next infusion appointment on 08/02/24 (date) at 1115.  Patient instructed to call provider with temperature of 100.4 or greater or nausea/vomiting/ diarrhea or pain not controlled by medications  Discharged ambulatory.

## 2024-08-01 RX ORDER — ALBUTEROL SULFATE 90 UG/1
4 AEROSOL, METERED RESPIRATORY (INHALATION) PRN
OUTPATIENT
Start: 2024-08-20

## 2024-08-01 RX ORDER — SODIUM CHLORIDE 9 MG/ML
5-250 INJECTION, SOLUTION INTRAVENOUS PRN
OUTPATIENT
Start: 2024-08-20

## 2024-08-01 RX ORDER — HEPARIN 100 UNIT/ML
500 SYRINGE INTRAVENOUS PRN
Status: CANCELLED | OUTPATIENT
Start: 2024-08-02

## 2024-08-01 RX ORDER — MEPERIDINE HYDROCHLORIDE 25 MG/ML
12.5 INJECTION INTRAMUSCULAR; INTRAVENOUS; SUBCUTANEOUS PRN
OUTPATIENT
Start: 2024-08-20

## 2024-08-01 RX ORDER — ONDANSETRON 2 MG/ML
8 INJECTION INTRAMUSCULAR; INTRAVENOUS ONCE
OUTPATIENT
Start: 2024-08-20 | End: 2024-08-19

## 2024-08-01 RX ORDER — SODIUM CHLORIDE 9 MG/ML
5-250 INJECTION, SOLUTION INTRAVENOUS PRN
Status: CANCELLED | OUTPATIENT
Start: 2024-08-02

## 2024-08-01 RX ORDER — ACETAMINOPHEN 325 MG/1
650 TABLET ORAL
OUTPATIENT
Start: 2024-08-20

## 2024-08-01 RX ORDER — ONDANSETRON 2 MG/ML
8 INJECTION INTRAMUSCULAR; INTRAVENOUS
OUTPATIENT
Start: 2024-08-20

## 2024-08-01 RX ORDER — DIPHENHYDRAMINE HYDROCHLORIDE 50 MG/ML
50 INJECTION INTRAMUSCULAR; INTRAVENOUS
OUTPATIENT
Start: 2024-08-20

## 2024-08-01 RX ORDER — SODIUM CHLORIDE 9 MG/ML
INJECTION, SOLUTION INTRAVENOUS CONTINUOUS
OUTPATIENT
Start: 2024-08-20

## 2024-08-01 RX ORDER — SODIUM CHLORIDE 0.9 % (FLUSH) 0.9 %
5-40 SYRINGE (ML) INJECTION PRN
OUTPATIENT
Start: 2024-08-20

## 2024-08-01 RX ORDER — HEPARIN 100 UNIT/ML
500 SYRINGE INTRAVENOUS PRN
OUTPATIENT
Start: 2024-08-20

## 2024-08-01 RX ORDER — EPINEPHRINE 1 MG/ML
0.3 INJECTION, SOLUTION, CONCENTRATE INTRAVENOUS PRN
OUTPATIENT
Start: 2024-08-20

## 2024-08-02 ENCOUNTER — HOSPITAL ENCOUNTER (OUTPATIENT)
Dept: INFUSION THERAPY | Age: 79
Setting detail: INFUSION SERIES
Discharge: HOME | End: 2024-08-02
Payer: MEDICARE

## 2024-08-02 VITALS
TEMPERATURE: 97.5 F | RESPIRATION RATE: 16 BRPM | DIASTOLIC BLOOD PRESSURE: 70 MMHG | OXYGEN SATURATION: 96 % | HEART RATE: 92 BPM | SYSTOLIC BLOOD PRESSURE: 103 MMHG

## 2024-08-02 DIAGNOSIS — C10.9 SQUAMOUS CELL CARCINOMA OF OROPHARYNX (HCC): Primary | ICD-10-CM

## 2024-08-02 DIAGNOSIS — E55.9 VITAMIN D DEFICIENCY: ICD-10-CM

## 2024-08-02 DIAGNOSIS — C76.0 MALIGNANT NEOPLASM OF HEAD, FACE AND NECK (HCC): ICD-10-CM

## 2024-08-02 DIAGNOSIS — C32.9 LARYNGEAL CANCER (HCC): Primary | ICD-10-CM

## 2024-08-02 DIAGNOSIS — Z79.899 HIGH RISK MEDICATION USE: ICD-10-CM

## 2024-08-02 DIAGNOSIS — E03.2 HYPOTHYROIDISM DUE TO MEDICAMENTS AND OTHER EXOGENOUS SUBSTANCES: ICD-10-CM

## 2024-08-02 PROCEDURE — 2580000003 HC RX 258: Performed by: NURSE PRACTITIONER

## 2024-08-02 PROCEDURE — 96523 IRRIG DRUG DELIVERY DEVICE: CPT

## 2024-08-02 RX ORDER — SODIUM CHLORIDE 0.9 % (FLUSH) 0.9 %
5-40 SYRINGE (ML) INJECTION PRN
Status: DISCONTINUED | OUTPATIENT
Start: 2024-08-02 | End: 2024-08-03 | Stop reason: HOSPADM

## 2024-08-02 RX ADMIN — SODIUM CHLORIDE, PRESERVATIVE FREE 10 ML: 5 INJECTION INTRAVENOUS at 11:43

## 2024-08-02 NOTE — PROGRESS NOTES
Arrived to the Infusion Center.  Adrucil pump disconnected, port flushed and de-accessed.   Patient instructed to report any side effects to ordering provider.  Patient tolerated well.   Any issues or concerns during appointment: none.  Patient aware of next infusion appointment on 8/19/24 (date) at 8:30 AM (time).  Discharged via wheelchair with family.    
24

## 2024-08-02 NOTE — PROGRESS NOTES
H&N Navigation:  Pt has completed 2 more cycles of Carbo + 5-FU (4 day pump) + Keytruda since last PET scan; discussed w/ Dr. Olvera who would like pt to complete 2 more cycles of just 5-FU (4 day pump) + Keytruda and then repeat PET scan.     Verbal order for change in chemo regimen for the next 2 cycles (chemo plan adjusted by Dr. Olvera), and PET scan after these 2 cycles received from [facility]: Kasandra Olvera, Medical Oncologist with verbal read back to confirm. Order signed and routed to provider for co signature.    Loraine Honeycutt RD, , LD  [phone]

## 2024-08-06 DIAGNOSIS — K12.31 MUCOSITIS DUE TO CHEMOTHERAPY: ICD-10-CM

## 2024-08-08 ENCOUNTER — OFFICE VISIT (OUTPATIENT)
Dept: ENT CLINIC | Age: 79
End: 2024-08-08
Payer: MEDICARE

## 2024-08-08 VITALS — BODY MASS INDEX: 24.92 KG/M2 | RESPIRATION RATE: 17 BRPM | WEIGHT: 178 LBS | HEIGHT: 71 IN

## 2024-08-08 DIAGNOSIS — C10.9 SQUAMOUS CELL CARCINOMA OF OROPHARYNX (HCC): Primary | ICD-10-CM

## 2024-08-08 DIAGNOSIS — K12.31 MUCOSITIS DUE TO CHEMOTHERAPY: ICD-10-CM

## 2024-08-08 DIAGNOSIS — E78.5 DYSLIPIDEMIA: ICD-10-CM

## 2024-08-08 DIAGNOSIS — R13.14 PHARYNGOESOPHAGEAL DYSPHAGIA: ICD-10-CM

## 2024-08-08 PROCEDURE — G8420 CALC BMI NORM PARAMETERS: HCPCS | Performed by: STUDENT IN AN ORGANIZED HEALTH CARE EDUCATION/TRAINING PROGRAM

## 2024-08-08 PROCEDURE — 31575 DIAGNOSTIC LARYNGOSCOPY: CPT | Performed by: STUDENT IN AN ORGANIZED HEALTH CARE EDUCATION/TRAINING PROGRAM

## 2024-08-08 PROCEDURE — G8427 DOCREV CUR MEDS BY ELIG CLIN: HCPCS | Performed by: STUDENT IN AN ORGANIZED HEALTH CARE EDUCATION/TRAINING PROGRAM

## 2024-08-08 PROCEDURE — 99214 OFFICE O/P EST MOD 30 MIN: CPT | Performed by: STUDENT IN AN ORGANIZED HEALTH CARE EDUCATION/TRAINING PROGRAM

## 2024-08-08 PROCEDURE — 1123F ACP DISCUSS/DSCN MKR DOCD: CPT | Performed by: STUDENT IN AN ORGANIZED HEALTH CARE EDUCATION/TRAINING PROGRAM

## 2024-08-08 PROCEDURE — 1036F TOBACCO NON-USER: CPT | Performed by: STUDENT IN AN ORGANIZED HEALTH CARE EDUCATION/TRAINING PROGRAM

## 2024-08-08 RX ORDER — ROSUVASTATIN CALCIUM 20 MG/1
20 TABLET, COATED ORAL NIGHTLY
Qty: 90 TABLET | Refills: 1 | Status: SHIPPED | OUTPATIENT
Start: 2024-08-08

## 2024-08-08 RX ORDER — DEXAMETHASONE 0.5 MG/5ML
1 SOLUTION ORAL DAILY
Qty: 240 ML | Refills: 1 | Status: SHIPPED | OUTPATIENT
Start: 2024-08-08

## 2024-08-08 ASSESSMENT — ENCOUNTER SYMPTOMS
RESPIRATORY NEGATIVE: 1
EYE DISCHARGE: 0
EYES NEGATIVE: 1
CHEST TIGHTNESS: 0
VOMITING: 0
BACK PAIN: 0
ALLERGIC/IMMUNOLOGIC NEGATIVE: 1
GASTROINTESTINAL NEGATIVE: 1
COLOR CHANGE: 0

## 2024-08-08 NOTE — PROGRESS NOTES
May ENT Office Note    Patient: Jesus Laguna  MRN: [id number]  : 1945  Gender:  male  Vital Signs: Resp 17   Ht 1.803 m (5' 11\")   Wt 80.7 kg (178 lb)   BMI 24.83 kg/m²   Date: 2024    CC:   Chief Complaint   Patient presents with    Follow-up     2 month follow up on throat       HPI:  Jesus Laguna is a 79 y.o. 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   Underwent a partial glossectomy with tonsillectomy, neck dissection with a radial forearm free flap to left oral cavity on 8/3/2010. According to the referral the margins were close but negative with evidence of lymphovascular invasion as well as perineural invasion and extracapsular spread. He underwent adjuvant radiation treatment which was completed at the end of /early . Patient followed up with Dr. Boyce for oncologic surveillance. Patient developed osteoradionecrosis of the left jaw and was treated by Dr. Amaury Omalley. He underwent HBO after oral surgery intervention. The last PET scan was 2012 which demonstrated some uptake in the left jaw which was felt to be inflammatory. Patient was released from Dr. Boyce clinic at the 5 year carlee in  with no signs of recurrence. Two years after his treatment he had several teeth removed on the left lower jaw. He underwent 10 treatments of HBO due to nonhealing wound at the surgical site where the teeth were removed. The mucosa healed after treatment.   He saw his PCP and was started on a round of antibiotics for what was thought to be a sinus infection. No improvement with antibiotics. Symptoms continued and he was put on a 2nd course of antibiotics with no improvement in symptoms. On  patient woke up with left facial swelling and went to his dentist who thought he had a tooth abscess and was put on Flagyl improvement in facial swelling. Patient reports a sore throat with swallowing that has continue to progress and now is

## 2024-08-08 NOTE — TELEPHONE ENCOUNTER
Physician provider: Dr. Olvera  Reason for today's call (Please detail here patients chief complaint): Rx refill  Last office visit:n/a  Preferred pharmacy (If refill request): [facility]  [place]  Calls to office within the last 48 hours?:No    Patient notified that their information will be routed to the [facility] clinical triage team for review. Patient is advised that they will receive a phone call from the triage department. If symptom related and symptoms worsen before receiving a call back, the patient has been advised to proceed to the nearest ED.          Rx refill dexamethasone 0.5     [place], [place]

## 2024-08-13 NOTE — TELEPHONE ENCOUNTER
Problem: Adult Inpatient Plan of Care  Goal: Plan of Care Review  Outcome: Progressing  Goal: Patient-Specific Goal (Individualized)  Outcome: Progressing  Goal: Absence of Hospital-Acquired Illness or Injury  Outcome: Progressing  Goal: Optimal Comfort and Wellbeing  Outcome: Progressing  Goal: Readiness for Transition of Care  Outcome: Progressing     Problem: Skin Injury Risk Increased  Goal: Skin Health and Integrity  Outcome: Progressing     Problem: Breathing Pattern Ineffective  Goal: Effective Breathing Pattern  Outcome: Progressing     Problem: Gas Exchange Impaired  Goal: Optimal Gas Exchange  Outcome: Progressing     Problem: Acute Kidney Injury/Impairment  Goal: Fluid and Electrolyte Balance  Outcome: Progressing  Goal: Improved Oral Intake  Outcome: Progressing  Goal: Effective Renal Function  Outcome: Progressing     Problem: Pneumonia  Goal: Fluid Balance  Outcome: Progressing  Goal: Resolution of Infection Signs and Symptoms  Outcome: Progressing  Goal: Effective Oxygenation and Ventilation  Outcome: Progressing     Problem: Wound  Goal: Optimal Coping  Outcome: Progressing  Goal: Optimal Functional Ability  Outcome: Progressing  Goal: Absence of Infection Signs and Symptoms  Outcome: Progressing  Goal: Improved Oral Intake  Outcome: Progressing  Goal: Optimal Pain Control and Function  Outcome: Progressing  Goal: Skin Health and Integrity  Outcome: Progressing  Goal: Optimal Wound Healing  Outcome: Progressing     Problem: Fall Injury Risk  Goal: Absence of Fall and Fall-Related Injury  Outcome: Progressing     Problem: Airway Clearance Ineffective  Goal: Effective Airway Clearance  Outcome: Progressing      Physician provider: Kasandra Olvera MD  Reason for today's call: F/U on Order  Last office visit: n/a     Romina burnett/[facility] called to F/U on order for Feeding tube supplies for Pt. She asks that the \"Written Confirmation of Verbal Order\" to be completed and returned to fax number: [phone]. She can be reached at: [phone].

## 2024-08-19 ENCOUNTER — HOSPITAL ENCOUNTER (OUTPATIENT)
Dept: INTERVENTIONAL RADIOLOGY/VASCULAR | Age: 79
Discharge: HOME | End: 2024-08-22
Attending: RADIOLOGY
Payer: MEDICARE

## 2024-08-19 VITALS
TEMPERATURE: 98.3 F | WEIGHT: 181 LBS | HEART RATE: 74 BPM | DIASTOLIC BLOOD PRESSURE: 64 MMHG | BODY MASS INDEX: 25.34 KG/M2 | RESPIRATION RATE: 18 BRPM | HEIGHT: 71 IN | SYSTOLIC BLOOD PRESSURE: 122 MMHG

## 2024-08-19 DIAGNOSIS — E44.0 MODERATE PROTEIN-CALORIE MALNUTRITION (HCC): ICD-10-CM

## 2024-08-19 PROCEDURE — 6360000004 HC RX CONTRAST MEDICATION: Performed by: RADIOLOGY

## 2024-08-19 PROCEDURE — 49450 REPLACE G/C TUBE PERC: CPT | Performed by: RADIOLOGY

## 2024-08-19 PROCEDURE — 49450 REPLACE G/C TUBE PERC: CPT

## 2024-08-19 RX ADMIN — IOHEXOL 10 ML: 180 INJECTION INTRAVENOUS at 12:40

## 2024-08-19 NOTE — DISCHARGE INSTRUCTIONS
If you have any questions about your procedure, please call the Interventional Radiology department at [phone].      After business hours (5pm) and weekends, call the answering service at [phone] and ask for the Radiologist on call to be paged.

## 2024-08-19 NOTE — BRIEF OP NOTE
[facility]  Department of Interventional Radiology  [phone]        Brief Procedure Note    Patient: Jesus Laguna MRN: [id number]  SSN: [id number]    YOB: 1945  Age: 79 y.o.  Sex: male      Date of Procedure: 8/19/2024     Pre-Procedure Diagnosis:   Scheduled feeding tube exchange.  Head and Neck cancer.     Post-Procedure Diagnosis:  SAME    Procedure(s):  WANDY KEY exchange    Brief Description of Procedure:  as above    Performed By:  NAN CASTILLO MD     Assistants:  None    Anesthesia:  None    Estimated Blood Loss:  None    Specimens:  None    Implants:  None    Findings:  18 Fr, 3.0 CM    Complications:  None    Recommendations:  Ready to use     Follow Up:  6 months    Signed By: NAN CASTILLO MD     August 19, 2024

## 2024-08-20 ENCOUNTER — HOSPITAL ENCOUNTER (OUTPATIENT)
Dept: LAB | Age: 79
Discharge: HOME | End: 2024-08-23
Payer: MEDICARE

## 2024-08-20 ENCOUNTER — HOSPITAL ENCOUNTER (OUTPATIENT)
Dept: INFUSION THERAPY | Age: 79
Setting detail: INFUSION SERIES
Discharge: HOME | End: 2024-08-20
Payer: MEDICARE

## 2024-08-20 ENCOUNTER — OFFICE VISIT (OUTPATIENT)
Age: 79
End: 2024-08-20
Payer: MEDICARE

## 2024-08-20 ENCOUNTER — OFFICE VISIT (OUTPATIENT)
Dept: ONCOLOGY | Age: 79
End: 2024-08-20
Payer: MEDICARE

## 2024-08-20 VITALS
WEIGHT: 190.54 LBS | DIASTOLIC BLOOD PRESSURE: 84 MMHG | HEIGHT: 71 IN | BODY MASS INDEX: 26.68 KG/M2 | SYSTOLIC BLOOD PRESSURE: 129 MMHG | HEART RATE: 80 BPM

## 2024-08-20 VITALS — BODY MASS INDEX: 25.97 KG/M2 | WEIGHT: 186.2 LBS

## 2024-08-20 VITALS
OXYGEN SATURATION: 97 % | BODY MASS INDEX: 25.91 KG/M2 | DIASTOLIC BLOOD PRESSURE: 64 MMHG | WEIGHT: 181 LBS | HEIGHT: 70 IN | RESPIRATION RATE: 16 BRPM | HEART RATE: 91 BPM | SYSTOLIC BLOOD PRESSURE: 118 MMHG

## 2024-08-20 DIAGNOSIS — E55.9 VITAMIN D DEFICIENCY: ICD-10-CM

## 2024-08-20 DIAGNOSIS — R68.89 COPIOUS ORAL SECRETIONS: ICD-10-CM

## 2024-08-20 DIAGNOSIS — R53.83 FATIGUE DUE TO TREATMENT: ICD-10-CM

## 2024-08-20 DIAGNOSIS — C10.9 SQUAMOUS CELL CARCINOMA OF OROPHARYNX (HCC): Primary | ICD-10-CM

## 2024-08-20 DIAGNOSIS — Z79.899 HIGH RISK MEDICATION USE: ICD-10-CM

## 2024-08-20 DIAGNOSIS — C32.9 LARYNGEAL CANCER (HCC): Primary | ICD-10-CM

## 2024-08-20 DIAGNOSIS — Z93.1 GASTROSTOMY IN PLACE (HCC): Primary | ICD-10-CM

## 2024-08-20 DIAGNOSIS — C32.9 LARYNGEAL CANCER (HCC): ICD-10-CM

## 2024-08-20 DIAGNOSIS — E03.2 HYPOTHYROIDISM DUE TO MEDICAMENTS AND OTHER EXOGENOUS SUBSTANCES: ICD-10-CM

## 2024-08-20 DIAGNOSIS — C32.9 KERATINIZING SQUAMOUS CELL CARCINOMA OF LARYNX (HCC): ICD-10-CM

## 2024-08-20 LAB
ALBUMIN SERPL-MCNC: 3.5 G/DL (ref 3.2–4.6)
ALBUMIN/GLOB SERPL: 1.1 (ref 1–1.9)
ALP SERPL-CCNC: 84 U/L (ref 40–129)
ALT SERPL-CCNC: 20 U/L (ref 12–65)
ANION GAP SERPL CALC-SCNC: 10 MMOL/L (ref 9–18)
AST SERPL-CCNC: 31 U/L (ref 15–37)
BASOPHILS # BLD: 0 K/UL (ref 0–0.2)
BASOPHILS NFR BLD: 1 % (ref 0–2)
BILIRUB SERPL-MCNC: 0.3 MG/DL (ref 0–1.2)
BUN SERPL-MCNC: 24 MG/DL (ref 8–23)
CALCIUM SERPL-MCNC: 9.1 MG/DL (ref 8.8–10.2)
CHLORIDE SERPL-SCNC: 102 MMOL/L (ref 98–107)
CO2 SERPL-SCNC: 27 MMOL/L (ref 20–28)
CREAT SERPL-MCNC: 0.65 MG/DL (ref 0.8–1.3)
DIFFERENTIAL METHOD BLD: ABNORMAL
EOSINOPHIL # BLD: 0 K/UL (ref 0–0.8)
EOSINOPHIL NFR BLD: 1 % (ref 0.5–7.8)
ERYTHROCYTE [DISTWIDTH] IN BLOOD BY AUTOMATED COUNT: 15.9 % (ref 11.9–14.6)
GLOBULIN SER CALC-MCNC: 3.3 G/DL (ref 2.3–3.5)
GLUCOSE SERPL-MCNC: 123 MG/DL (ref 70–99)
HCT VFR BLD AUTO: 33 % (ref 41.1–50.3)
HGB BLD-MCNC: 10.6 G/DL (ref 13.6–17.2)
IMM GRANULOCYTES # BLD AUTO: 0 K/UL (ref 0–0.5)
IMM GRANULOCYTES NFR BLD AUTO: 1 % (ref 0–5)
LYMPHOCYTES # BLD: 0.6 K/UL (ref 0.5–4.6)
LYMPHOCYTES NFR BLD: 13 % (ref 13–44)
MAGNESIUM SERPL-MCNC: 2.1 MG/DL (ref 1.8–2.4)
MCH RBC QN AUTO: 33.9 PG (ref 26.1–32.9)
MCHC RBC AUTO-ENTMCNC: 32.1 G/DL (ref 31.4–35)
MCV RBC AUTO: 105.4 FL (ref 82–102)
MONOCYTES # BLD: 0.9 K/UL (ref 0.1–1.3)
MONOCYTES NFR BLD: 18 % (ref 4–12)
NEUTS SEG # BLD: 3.2 K/UL (ref 1.7–8.2)
NEUTS SEG NFR BLD: 66 % (ref 43–78)
NRBC # BLD: 0 K/UL (ref 0–0.2)
PLATELET # BLD AUTO: 172 K/UL (ref 150–450)
PMV BLD AUTO: 10.2 FL (ref 9.4–12.3)
POTASSIUM SERPL-SCNC: 4.2 MMOL/L (ref 3.5–5.1)
PROT SERPL-MCNC: 6.8 G/DL (ref 6.3–8.2)
RBC # BLD AUTO: 3.13 M/UL (ref 4.23–5.6)
SODIUM SERPL-SCNC: 139 MMOL/L (ref 136–145)
TSH W FREE THYROID IF ABNORMAL: 1.38 UIU/ML (ref 0.27–4.2)
WBC # BLD AUTO: 4.8 K/UL (ref 4.3–11.1)

## 2024-08-20 PROCEDURE — 99204 OFFICE O/P NEW MOD 45 MIN: CPT | Performed by: INTERNAL MEDICINE

## 2024-08-20 PROCEDURE — 99214 OFFICE O/P EST MOD 30 MIN: CPT | Performed by: NURSE PRACTITIONER

## 2024-08-20 PROCEDURE — 80053 COMPREHEN METABOLIC PANEL: CPT

## 2024-08-20 PROCEDURE — 1123F ACP DISCUSS/DSCN MKR DOCD: CPT | Performed by: INTERNAL MEDICINE

## 2024-08-20 PROCEDURE — 83735 ASSAY OF MAGNESIUM: CPT

## 2024-08-20 PROCEDURE — 84443 ASSAY THYROID STIM HORMONE: CPT

## 2024-08-20 PROCEDURE — 2580000003 HC RX 258: Performed by: INTERNAL MEDICINE

## 2024-08-20 PROCEDURE — G8419 CALC BMI OUT NRM PARAM NOF/U: HCPCS | Performed by: NURSE PRACTITIONER

## 2024-08-20 PROCEDURE — 36593 DECLOT VASCULAR DEVICE: CPT

## 2024-08-20 PROCEDURE — 1036F TOBACCO NON-USER: CPT | Performed by: NURSE PRACTITIONER

## 2024-08-20 PROCEDURE — G8419 CALC BMI OUT NRM PARAM NOF/U: HCPCS | Performed by: INTERNAL MEDICINE

## 2024-08-20 PROCEDURE — G8427 DOCREV CUR MEDS BY ELIG CLIN: HCPCS | Performed by: NURSE PRACTITIONER

## 2024-08-20 PROCEDURE — 2580000003 HC RX 258: Performed by: NURSE PRACTITIONER

## 2024-08-20 PROCEDURE — 85025 COMPLETE CBC W/AUTO DIFF WBC: CPT

## 2024-08-20 PROCEDURE — 1036F TOBACCO NON-USER: CPT | Performed by: INTERNAL MEDICINE

## 2024-08-20 PROCEDURE — 1123F ACP DISCUSS/DSCN MKR DOCD: CPT | Performed by: NURSE PRACTITIONER

## 2024-08-20 PROCEDURE — G8427 DOCREV CUR MEDS BY ELIG CLIN: HCPCS | Performed by: INTERNAL MEDICINE

## 2024-08-20 PROCEDURE — 6360000002 HC RX W HCPCS: Performed by: NURSE PRACTITIONER

## 2024-08-20 PROCEDURE — 96413 CHEMO IV INFUSION 1 HR: CPT

## 2024-08-20 RX ORDER — EPINEPHRINE 1 MG/ML
0.3 INJECTION, SOLUTION, CONCENTRATE INTRAVENOUS PRN
Status: DISCONTINUED | OUTPATIENT
Start: 2024-08-20 | End: 2024-08-21 | Stop reason: HOSPADM

## 2024-08-20 RX ORDER — DIPHENHYDRAMINE HYDROCHLORIDE 50 MG/ML
50 INJECTION INTRAMUSCULAR; INTRAVENOUS
Status: DISCONTINUED | OUTPATIENT
Start: 2024-08-20 | End: 2024-08-21 | Stop reason: HOSPADM

## 2024-08-20 RX ORDER — ALBUTEROL SULFATE 90 UG/1
4 AEROSOL, METERED RESPIRATORY (INHALATION) PRN
Status: DISCONTINUED | OUTPATIENT
Start: 2024-08-20 | End: 2024-08-21 | Stop reason: HOSPADM

## 2024-08-20 RX ORDER — SODIUM CHLORIDE 9 MG/ML
INJECTION, SOLUTION INTRAVENOUS CONTINUOUS
Status: DISCONTINUED | OUTPATIENT
Start: 2024-08-20 | End: 2024-08-21 | Stop reason: HOSPADM

## 2024-08-20 RX ORDER — SODIUM CHLORIDE 9 MG/ML
5-250 INJECTION, SOLUTION INTRAVENOUS PRN
Status: DISCONTINUED | OUTPATIENT
Start: 2024-08-20 | End: 2024-08-21 | Stop reason: HOSPADM

## 2024-08-20 RX ORDER — HEPARIN 100 UNIT/ML
500 SYRINGE INTRAVENOUS PRN
Status: DISCONTINUED | OUTPATIENT
Start: 2024-08-20 | End: 2024-08-21 | Stop reason: HOSPADM

## 2024-08-20 RX ORDER — ONDANSETRON 2 MG/ML
8 INJECTION INTRAMUSCULAR; INTRAVENOUS
Status: DISCONTINUED | OUTPATIENT
Start: 2024-08-20 | End: 2024-08-21 | Stop reason: HOSPADM

## 2024-08-20 RX ORDER — ONDANSETRON 2 MG/ML
8 INJECTION INTRAMUSCULAR; INTRAVENOUS ONCE
Status: CANCELLED | OUTPATIENT
Start: 2024-09-10 | End: 2024-09-10

## 2024-08-20 RX ORDER — ACETAMINOPHEN 325 MG/1
650 TABLET ORAL
Status: DISCONTINUED | OUTPATIENT
Start: 2024-08-20 | End: 2024-08-21 | Stop reason: HOSPADM

## 2024-08-20 RX ORDER — MEPERIDINE HYDROCHLORIDE 25 MG/ML
12.5 INJECTION INTRAMUSCULAR; INTRAVENOUS; SUBCUTANEOUS PRN
Status: DISCONTINUED | OUTPATIENT
Start: 2024-08-20 | End: 2024-08-21 | Stop reason: HOSPADM

## 2024-08-20 RX ORDER — SODIUM CHLORIDE 0.9 % (FLUSH) 0.9 %
5-40 SYRINGE (ML) INJECTION PRN
Status: DISCONTINUED | OUTPATIENT
Start: 2024-08-20 | End: 2024-08-24 | Stop reason: HOSPADM

## 2024-08-20 RX ORDER — SODIUM CHLORIDE 0.9 % (FLUSH) 0.9 %
5-40 SYRINGE (ML) INJECTION PRN
Status: DISCONTINUED | OUTPATIENT
Start: 2024-08-20 | End: 2024-08-21 | Stop reason: HOSPADM

## 2024-08-20 RX ADMIN — SODIUM CHLORIDE 100 ML/HR: 9 INJECTION, SOLUTION INTRAVENOUS at 13:27

## 2024-08-20 RX ADMIN — SODIUM CHLORIDE, PRESERVATIVE FREE 20 ML: 5 INJECTION INTRAVENOUS at 12:15

## 2024-08-20 RX ADMIN — Medication 2 MG: at 12:47

## 2024-08-20 RX ADMIN — SODIUM CHLORIDE 200 MG: 9 INJECTION, SOLUTION INTRAVENOUS at 13:53

## 2024-08-20 RX ADMIN — SODIUM CHLORIDE, PRESERVATIVE FREE 20 ML: 5 INJECTION INTRAVENOUS at 10:25

## 2024-08-20 RX ADMIN — SODIUM CHLORIDE, PRESERVATIVE FREE 10 ML: 5 INJECTION INTRAVENOUS at 13:25

## 2024-08-20 RX ADMIN — SODIUM CHLORIDE, PRESERVATIVE FREE 10 ML: 5 INJECTION INTRAVENOUS at 14:30

## 2024-08-20 NOTE — PROGRESS NOTES
MOUTHWASH) Swish and spit 5 mLs 4 times daily as needed for Irrit[facility] Pharmacy to mix equal parts Benadryl, Maalox, Viscous Lidocaine.  Take 5 mL 4 times daily as needed, gargle/swish/spit. 480 mL 5     No current facility-administered medications for this visit.       Review of Systems  All other systems reviewed and are negative except as noted above.          Objective     Vitals:    08/20/24 0910   BP: 118/64   Pulse: 91   Resp: 16   SpO2: 97%       Physical Exam  Vitals reviewed.   Constitutional:       Appearance: He is not ill-appearing.      Comments: Sitting in wheelchair    HENT:      Head: Normocephalic.      Nose: Nose normal.   Eyes:      Extraocular Movements: Extraocular movements intact.   Cardiovascular:      Rate and Rhythm: Normal rate.   Pulmonary:      Effort: Pulmonary effort is normal.   Abdominal:      Palpations: Abdomen is soft.      Comments: G tube in place, site is C/D/I    Musculoskeletal:      Cervical back: Normal range of motion.      Comments: Brace on LE    Skin:     General: Skin is warm.   Neurological:      General: No focal deficit present.   Psychiatric:         Mood and Affect: Mood normal.              -Galileo De La Cruz MD  Gastroenterology/Hepatology

## 2024-08-20 NOTE — PROGRESS NOTES
Patient to port lab for port access and lab draw. Port accessed using 20g 0.75\" alegria needle without difficulty. No blood return noted, flushed without difficulty. Labs drawn peripherally. Port remains accessed. Patient tolerated well. Discharged ambulatory.

## 2024-08-20 NOTE — PROGRESS NOTES
[facility]    8/20/2024    11:30 AM    Jesus Laguna [id number]    79 y.o.      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  Per records margins were close but negative with evidence of LVI as well as PNI and extracapsular spread.  He completed adjuvant radiation therapy and end of 2010 to early 2011.  He followed with Dr. Austen Bowman ENT up till the 5-year carlee in 2015 with no evidence of recurrence.  2 years post therapy he had multiple teeth removed of his left lower jaw followed by HBO therapy x10 sessions due to poor healing.  More recently patient presented with symptoms of congestion and hypersalivation, completed a couple courses of antibiotics without resolution.  Had minimal weight loss.  He was seen by Dr. Clair Bowman ENT with the scope and abnormal CT neck which was abnormal.  He was thereafter referred to Dr. James douglas at ENT surgeons at [facility].  Patient underwent direct laryngoscopy 8/17/2023 as well as PET scan 8/9/2023.  PET scan demonstrated hypermetabolic mass at base of tongue, mildly metabolic nonenlarged right level 3 cervical lymph node, and no evidence of distant metastasis.  Direct laryngoscopy with biopsy with pathology of larynx and epiglottis revealing invasive moderately differentiated squamous cell cancer.  P16 stain for HPV was negative.  Patient now referred to [facility]

## 2024-08-20 NOTE — PATIENT INSTRUCTIONS
Continue with gastrostomy tube care as directed by IR   No further recommendations by GI, as discussed would hold off on EGD with dilation as it appears that patient is gaining weight with current G-tube and tolerating it well.   Avoid citrus foods as this can increase saliva production (stimulate flow)

## 2024-08-20 NOTE — PROGRESS NOTES
Arrived to the Infusion Center.  Keytruda completed. Patient tolerated without difficulty.   Any issues or concerns during appointment: port without blood return noted upon arrival. Port cath flowed per order, see MAR. 5 ml's blood removed prior to use.  Per Neelam Schaeffer NP-don't give premedications: zofran/decadron/emend as ordered, they  \"need to be removed  from treatment plan\" since 5-fluorouracil has been discontinued.  Patient aware of next infusion appointment on 09/10 (date) at 1330 (time).  Patient instructed to call provider with temperature of 100.4 or greater or nausea/vomiting/ diarrhea or pain not controlled by medications  Discharged via wheelchair, accompanied by spouse.

## 2024-08-21 RX ORDER — DEXAMETHASONE 0.5 MG/5ML
SOLUTION ORAL
Qty: 240 ML | Refills: 1 | OUTPATIENT
Start: 2024-08-21

## 2024-08-23 ENCOUNTER — APPOINTMENT (OUTPATIENT)
Dept: INFUSION THERAPY | Age: 79
End: 2024-08-23
Payer: MEDICARE

## 2024-09-10 ENCOUNTER — OFFICE VISIT (OUTPATIENT)
Dept: ONCOLOGY | Age: 79
End: 2024-09-10

## 2024-09-10 ENCOUNTER — HOSPITAL ENCOUNTER (OUTPATIENT)
Dept: INFUSION THERAPY | Age: 79
Setting detail: INFUSION SERIES
Discharge: HOME | End: 2024-09-10
Payer: MEDICARE

## 2024-09-10 ENCOUNTER — OFFICE VISIT (OUTPATIENT)
Dept: ONCOLOGY | Age: 79
End: 2024-09-10
Payer: MEDICARE

## 2024-09-10 ENCOUNTER — HOSPITAL ENCOUNTER (OUTPATIENT)
Dept: LAB | Age: 79
Discharge: HOME | End: 2024-09-13
Payer: MEDICARE

## 2024-09-10 VITALS
WEIGHT: 185 LBS | OXYGEN SATURATION: 97 % | HEART RATE: 90 BPM | BODY MASS INDEX: 25.9 KG/M2 | SYSTOLIC BLOOD PRESSURE: 132 MMHG | TEMPERATURE: 98.2 F | HEIGHT: 71 IN | DIASTOLIC BLOOD PRESSURE: 77 MMHG | RESPIRATION RATE: 16 BRPM

## 2024-09-10 DIAGNOSIS — Z00.8 NUTRITIONAL ASSESSMENT: Primary | ICD-10-CM

## 2024-09-10 DIAGNOSIS — E03.2 HYPOTHYROIDISM DUE TO MEDICAMENTS AND OTHER EXOGENOUS SUBSTANCES: ICD-10-CM

## 2024-09-10 DIAGNOSIS — Z79.899 HIGH RISK MEDICATION USE: ICD-10-CM

## 2024-09-10 DIAGNOSIS — Z78.9 ON ENTERAL NUTRITION: ICD-10-CM

## 2024-09-10 DIAGNOSIS — C32.9 LARYNGEAL CANCER (HCC): ICD-10-CM

## 2024-09-10 DIAGNOSIS — C32.9 LARYNGEAL CANCER (HCC): Primary | ICD-10-CM

## 2024-09-10 DIAGNOSIS — R68.89 COPIOUS ORAL SECRETIONS: ICD-10-CM

## 2024-09-10 DIAGNOSIS — E55.9 VITAMIN D DEFICIENCY: ICD-10-CM

## 2024-09-10 DIAGNOSIS — R53.83 FATIGUE DUE TO TREATMENT: ICD-10-CM

## 2024-09-10 DIAGNOSIS — J30.89 SEASONAL ALLERGIC RHINITIS DUE TO OTHER ALLERGIC TRIGGER: ICD-10-CM

## 2024-09-10 LAB
ALBUMIN SERPL-MCNC: 3.3 G/DL (ref 3.2–4.6)
ALBUMIN/GLOB SERPL: 1 (ref 1–1.9)
ALP SERPL-CCNC: 85 U/L (ref 40–129)
ALT SERPL-CCNC: 21 U/L (ref 12–65)
ANION GAP SERPL CALC-SCNC: 12 MMOL/L (ref 9–18)
AST SERPL-CCNC: 28 U/L (ref 15–37)
BASOPHILS # BLD: 0 K/UL (ref 0–0.2)
BASOPHILS NFR BLD: 0 % (ref 0–2)
BILIRUB SERPL-MCNC: 0.3 MG/DL (ref 0–1.2)
BUN SERPL-MCNC: 23 MG/DL (ref 8–23)
CALCIUM SERPL-MCNC: 9.2 MG/DL (ref 8.8–10.2)
CHLORIDE SERPL-SCNC: 103 MMOL/L (ref 98–107)
CO2 SERPL-SCNC: 24 MMOL/L (ref 20–28)
CREAT SERPL-MCNC: 0.55 MG/DL (ref 0.8–1.3)
DIFFERENTIAL METHOD BLD: ABNORMAL
EOSINOPHIL # BLD: 0.1 K/UL (ref 0–0.8)
EOSINOPHIL NFR BLD: 2 % (ref 0.5–7.8)
ERYTHROCYTE [DISTWIDTH] IN BLOOD BY AUTOMATED COUNT: 13.9 % (ref 11.9–14.6)
GLOBULIN SER CALC-MCNC: 3.4 G/DL (ref 2.3–3.5)
GLUCOSE SERPL-MCNC: 120 MG/DL (ref 70–99)
HCT VFR BLD AUTO: 34.6 % (ref 41.1–50.3)
HGB BLD-MCNC: 11 G/DL (ref 13.6–17.2)
IMM GRANULOCYTES # BLD AUTO: 0 K/UL (ref 0–0.5)
IMM GRANULOCYTES NFR BLD AUTO: 0 % (ref 0–5)
LYMPHOCYTES # BLD: 0.8 K/UL (ref 0.5–4.6)
LYMPHOCYTES NFR BLD: 12 % (ref 13–44)
MAGNESIUM SERPL-MCNC: 2.1 MG/DL (ref 1.8–2.4)
MCH RBC QN AUTO: 32.6 PG (ref 26.1–32.9)
MCHC RBC AUTO-ENTMCNC: 31.8 G/DL (ref 31.4–35)
MCV RBC AUTO: 102.7 FL (ref 82–102)
MONOCYTES # BLD: 0.9 K/UL (ref 0.1–1.3)
MONOCYTES NFR BLD: 14 % (ref 4–12)
NEUTS SEG # BLD: 4.7 K/UL (ref 1.7–8.2)
NEUTS SEG NFR BLD: 72 % (ref 43–78)
NRBC # BLD: 0 K/UL (ref 0–0.2)
PLATELET # BLD AUTO: 235 K/UL (ref 150–450)
PMV BLD AUTO: 10.3 FL (ref 9.4–12.3)
POTASSIUM SERPL-SCNC: 4.1 MMOL/L (ref 3.5–5.1)
PROT SERPL-MCNC: 6.7 G/DL (ref 6.3–8.2)
RBC # BLD AUTO: 3.37 M/UL (ref 4.23–5.6)
SODIUM SERPL-SCNC: 139 MMOL/L (ref 136–145)
TSH, 3RD GENERATION: 1 UIU/ML (ref 0.27–4.2)
WBC # BLD AUTO: 6.5 K/UL (ref 4.3–11.1)

## 2024-09-10 PROCEDURE — 84443 ASSAY THYROID STIM HORMONE: CPT

## 2024-09-10 PROCEDURE — 96413 CHEMO IV INFUSION 1 HR: CPT

## 2024-09-10 PROCEDURE — 83735 ASSAY OF MAGNESIUM: CPT

## 2024-09-10 PROCEDURE — 1123F ACP DISCUSS/DSCN MKR DOCD: CPT | Performed by: NURSE PRACTITIONER

## 2024-09-10 PROCEDURE — 80053 COMPREHEN METABOLIC PANEL: CPT

## 2024-09-10 PROCEDURE — 6360000002 HC RX W HCPCS: Performed by: NURSE PRACTITIONER

## 2024-09-10 PROCEDURE — 2580000003 HC RX 258: Performed by: INTERNAL MEDICINE

## 2024-09-10 PROCEDURE — 2580000003 HC RX 258: Performed by: NURSE PRACTITIONER

## 2024-09-10 PROCEDURE — 99214 OFFICE O/P EST MOD 30 MIN: CPT | Performed by: NURSE PRACTITIONER

## 2024-09-10 PROCEDURE — G8427 DOCREV CUR MEDS BY ELIG CLIN: HCPCS | Performed by: NURSE PRACTITIONER

## 2024-09-10 PROCEDURE — G8419 CALC BMI OUT NRM PARAM NOF/U: HCPCS | Performed by: NURSE PRACTITIONER

## 2024-09-10 PROCEDURE — 1036F TOBACCO NON-USER: CPT | Performed by: NURSE PRACTITIONER

## 2024-09-10 PROCEDURE — 85025 COMPLETE CBC W/AUTO DIFF WBC: CPT

## 2024-09-10 RX ORDER — ACETAMINOPHEN 325 MG/1
650 TABLET ORAL
Status: CANCELLED | OUTPATIENT
Start: 2024-09-10

## 2024-09-10 RX ORDER — SODIUM CHLORIDE 0.9 % (FLUSH) 0.9 %
5-40 SYRINGE (ML) INJECTION PRN
Status: CANCELLED | OUTPATIENT
Start: 2024-09-10

## 2024-09-10 RX ORDER — SODIUM CHLORIDE 0.9 % (FLUSH) 0.9 %
5-40 SYRINGE (ML) INJECTION PRN
Status: DISCONTINUED | OUTPATIENT
Start: 2024-09-10 | End: 2024-09-14 | Stop reason: HOSPADM

## 2024-09-10 RX ORDER — SODIUM CHLORIDE 9 MG/ML
5-250 INJECTION, SOLUTION INTRAVENOUS PRN
Status: CANCELLED | OUTPATIENT
Start: 2024-09-10

## 2024-09-10 RX ORDER — HEPARIN SODIUM (PORCINE) LOCK FLUSH IV SOLN 100 UNIT/ML 100 UNIT/ML
500 SOLUTION INTRAVENOUS PRN
Status: CANCELLED | OUTPATIENT
Start: 2024-09-10

## 2024-09-10 RX ORDER — MEPERIDINE HYDROCHLORIDE 50 MG/ML
12.5 INJECTION INTRAMUSCULAR; INTRAVENOUS; SUBCUTANEOUS PRN
Status: CANCELLED | OUTPATIENT
Start: 2024-09-10

## 2024-09-10 RX ORDER — SODIUM CHLORIDE 9 MG/ML
INJECTION, SOLUTION INTRAVENOUS CONTINUOUS
Status: CANCELLED | OUTPATIENT
Start: 2024-09-10

## 2024-09-10 RX ORDER — DIPHENHYDRAMINE HYDROCHLORIDE 50 MG/ML
50 INJECTION INTRAMUSCULAR; INTRAVENOUS
Status: CANCELLED | OUTPATIENT
Start: 2024-09-10

## 2024-09-10 RX ORDER — SODIUM CHLORIDE 9 MG/ML
5-250 INJECTION, SOLUTION INTRAVENOUS PRN
Status: DISCONTINUED | OUTPATIENT
Start: 2024-09-10 | End: 2024-09-11 | Stop reason: HOSPADM

## 2024-09-10 RX ORDER — ONDANSETRON 2 MG/ML
8 INJECTION INTRAMUSCULAR; INTRAVENOUS
Status: CANCELLED | OUTPATIENT
Start: 2024-09-10

## 2024-09-10 RX ORDER — SODIUM CHLORIDE 0.9 % (FLUSH) 0.9 %
5-40 SYRINGE (ML) INJECTION PRN
Status: DISCONTINUED | OUTPATIENT
Start: 2024-09-10 | End: 2024-09-11 | Stop reason: HOSPADM

## 2024-09-10 RX ORDER — FAMOTIDINE 10 MG/ML
20 INJECTION, SOLUTION INTRAVENOUS
Status: CANCELLED | OUTPATIENT
Start: 2024-09-10

## 2024-09-10 RX ORDER — ALBUTEROL SULFATE 90 UG/1
4 AEROSOL, METERED RESPIRATORY (INHALATION) PRN
Status: CANCELLED | OUTPATIENT
Start: 2024-09-10

## 2024-09-10 RX ORDER — EPINEPHRINE 1 MG/ML
0.3 INJECTION, SOLUTION, CONCENTRATE INTRAVENOUS PRN
Status: CANCELLED | OUTPATIENT
Start: 2024-09-10

## 2024-09-10 RX ADMIN — SODIUM CHLORIDE, PRESERVATIVE FREE 10 ML: 5 INJECTION INTRAVENOUS at 13:19

## 2024-09-10 RX ADMIN — SODIUM CHLORIDE 200 MG: 9 INJECTION, SOLUTION INTRAVENOUS at 13:25

## 2024-09-10 RX ADMIN — SODIUM CHLORIDE 50 ML/HR: 9 INJECTION, SOLUTION INTRAVENOUS at 13:09

## 2024-09-10 RX ADMIN — SODIUM CHLORIDE, PRESERVATIVE FREE 20 ML: 5 INJECTION INTRAVENOUS at 11:40

## 2024-09-10 ASSESSMENT — PATIENT HEALTH QUESTIONNAIRE - PHQ9
SUM OF ALL RESPONSES TO PHQ QUESTIONS 1-9: 0
SUM OF ALL RESPONSES TO PHQ9 QUESTIONS 1 & 2: 0
2. FEELING DOWN, DEPRESSED OR HOPELESS: NOT AT ALL
SUM OF ALL RESPONSES TO PHQ QUESTIONS 1-9: 0
SUM OF ALL RESPONSES TO PHQ QUESTIONS 1-9: 0
1. LITTLE INTEREST OR PLEASURE IN DOING THINGS: NOT AT ALL
SUM OF ALL RESPONSES TO PHQ QUESTIONS 1-9: 0

## 2024-09-10 ASSESSMENT — PAIN SCALES - GENERAL: PAINLEVEL_OUTOF10: 0

## 2024-09-26 ENCOUNTER — HOSPITAL ENCOUNTER (OUTPATIENT)
Dept: PET IMAGING | Age: 79
Discharge: HOME | End: 2024-09-29
Payer: MEDICARE

## 2024-09-26 DIAGNOSIS — C76.0 MALIGNANT NEOPLASM OF HEAD, FACE AND NECK (HCC): ICD-10-CM

## 2024-09-26 DIAGNOSIS — C10.9 SQUAMOUS CELL CARCINOMA OF OROPHARYNX (HCC): ICD-10-CM

## 2024-09-26 LAB
GLUCOSE BLD STRIP.AUTO-MCNC: 115 MG/DL (ref 65–100)
SERVICE CMNT-IMP: ABNORMAL

## 2024-09-26 PROCEDURE — 3430000000 HC RX DIAGNOSTIC RADIOPHARMACEUTICAL: Performed by: INTERNAL MEDICINE

## 2024-09-26 PROCEDURE — 6360000004 HC RX CONTRAST MEDICATION: Performed by: INTERNAL MEDICINE

## 2024-09-26 PROCEDURE — 2580000003 HC RX 258: Performed by: INTERNAL MEDICINE

## 2024-09-26 PROCEDURE — A9609 HC RX DIAGNOSTIC RADIOPHARMACEUTICAL: HCPCS | Performed by: INTERNAL MEDICINE

## 2024-09-26 PROCEDURE — 82962 GLUCOSE BLOOD TEST: CPT

## 2024-09-26 PROCEDURE — 78815 PET IMAGE W/CT SKULL-THIGH: CPT

## 2024-09-26 RX ORDER — SODIUM CHLORIDE 0.9 % (FLUSH) 0.9 %
20 SYRINGE (ML) INJECTION AS NEEDED
Status: ACTIVE | OUTPATIENT
Start: 2024-09-26

## 2024-09-26 RX ORDER — DIATRIZOATE MEGLUMINE AND DIATRIZOATE SODIUM 660; 100 MG/ML; MG/ML
10 SOLUTION ORAL; RECTAL
Status: ACTIVE | OUTPATIENT
Start: 2024-09-26

## 2024-09-26 RX ORDER — FLUDEOXYGLUCOSE F 18 200 MCI/ML
12.58 INJECTION, SOLUTION INTRAVENOUS
Status: COMPLETED | OUTPATIENT
Start: 2024-09-26 | End: 2024-09-26

## 2024-09-26 RX ADMIN — FLUDEOXYGLUCOSE F 18 12.58 MILLICURIE: 200 INJECTION, SOLUTION INTRAVENOUS at 09:05

## 2024-09-26 RX ADMIN — SODIUM CHLORIDE, PRESERVATIVE FREE 20 ML: 5 INJECTION INTRAVENOUS at 09:05

## 2024-09-26 RX ADMIN — DIATRIZOATE MEGLUMINE AND DIATRIZOATE SODIUM 10 ML: 660; 100 LIQUID ORAL; RECTAL at 09:05

## 2024-10-01 ENCOUNTER — HOSPITAL ENCOUNTER (OUTPATIENT)
Dept: INFUSION THERAPY | Age: 79
Setting detail: INFUSION SERIES
Discharge: HOME | End: 2024-10-01
Payer: MEDICARE

## 2024-10-01 ENCOUNTER — OFFICE VISIT (OUTPATIENT)
Dept: ONCOLOGY | Age: 79
End: 2024-10-01
Payer: MEDICARE

## 2024-10-01 ENCOUNTER — HOSPITAL ENCOUNTER (OUTPATIENT)
Dept: LAB | Age: 79
Discharge: HOME | End: 2024-10-01
Payer: MEDICARE

## 2024-10-01 ENCOUNTER — OFFICE VISIT (OUTPATIENT)
Dept: ONCOLOGY | Age: 79
End: 2024-10-01

## 2024-10-01 VITALS
RESPIRATION RATE: 16 BRPM | WEIGHT: 180 LBS | SYSTOLIC BLOOD PRESSURE: 127 MMHG | HEIGHT: 71 IN | DIASTOLIC BLOOD PRESSURE: 86 MMHG | OXYGEN SATURATION: 98 % | BODY MASS INDEX: 25.2 KG/M2 | HEART RATE: 97 BPM | TEMPERATURE: 98 F

## 2024-10-01 DIAGNOSIS — Z78.9 ON ENTERAL NUTRITION: ICD-10-CM

## 2024-10-01 DIAGNOSIS — C32.9 LARYNGEAL CANCER (HCC): Primary | ICD-10-CM

## 2024-10-01 DIAGNOSIS — Z79.899 HIGH RISK MEDICATION USE: ICD-10-CM

## 2024-10-01 DIAGNOSIS — E03.2 HYPOTHYROIDISM DUE TO MEDICAMENTS AND OTHER EXOGENOUS SUBSTANCES: ICD-10-CM

## 2024-10-01 DIAGNOSIS — E55.9 VITAMIN D DEFICIENCY: ICD-10-CM

## 2024-10-01 DIAGNOSIS — Z00.8 NUTRITIONAL ASSESSMENT: ICD-10-CM

## 2024-10-01 DIAGNOSIS — Z00.8 NUTRITIONAL ASSESSMENT: Primary | ICD-10-CM

## 2024-10-01 DIAGNOSIS — R53.83 FATIGUE DUE TO TREATMENT: ICD-10-CM

## 2024-10-01 DIAGNOSIS — C32.9 LARYNGEAL CANCER (HCC): ICD-10-CM

## 2024-10-01 LAB
ALBUMIN SERPL-MCNC: 3.3 G/DL (ref 3.2–4.6)
ALBUMIN/GLOB SERPL: 0.8 (ref 1–1.9)
ALP SERPL-CCNC: 87 U/L (ref 40–129)
ALT SERPL-CCNC: 17 U/L (ref 8–55)
ANION GAP SERPL CALC-SCNC: 9 MMOL/L (ref 9–18)
AST SERPL-CCNC: 23 U/L (ref 15–37)
BASOPHILS # BLD: 0 K/UL (ref 0–0.2)
BASOPHILS NFR BLD: 0 % (ref 0–2)
BILIRUB SERPL-MCNC: 0.3 MG/DL (ref 0–1.2)
BUN SERPL-MCNC: 21 MG/DL (ref 8–23)
CALCIUM SERPL-MCNC: 9.3 MG/DL (ref 8.8–10.2)
CHLORIDE SERPL-SCNC: 102 MMOL/L (ref 98–107)
CO2 SERPL-SCNC: 25 MMOL/L (ref 20–28)
CREAT SERPL-MCNC: 0.61 MG/DL (ref 0.8–1.3)
DIFFERENTIAL METHOD BLD: ABNORMAL
EOSINOPHIL # BLD: 0.1 K/UL (ref 0–0.8)
EOSINOPHIL NFR BLD: 1 % (ref 0.5–7.8)
ERYTHROCYTE [DISTWIDTH] IN BLOOD BY AUTOMATED COUNT: 13.5 % (ref 11.9–14.6)
GLOBULIN SER CALC-MCNC: 4 G/DL (ref 2.3–3.5)
GLUCOSE SERPL-MCNC: 117 MG/DL (ref 70–99)
HCT VFR BLD AUTO: 35.9 % (ref 41.1–50.3)
HGB BLD-MCNC: 11.6 G/DL (ref 13.6–17.2)
IMM GRANULOCYTES # BLD AUTO: 0 K/UL (ref 0–0.5)
IMM GRANULOCYTES NFR BLD AUTO: 0 % (ref 0–5)
LYMPHOCYTES # BLD: 0.8 K/UL (ref 0.5–4.6)
LYMPHOCYTES NFR BLD: 11 % (ref 13–44)
MAGNESIUM SERPL-MCNC: 2 MG/DL (ref 1.8–2.4)
MCH RBC QN AUTO: 31.1 PG (ref 26.1–32.9)
MCHC RBC AUTO-ENTMCNC: 32.3 G/DL (ref 31.4–35)
MCV RBC AUTO: 96.2 FL (ref 82–102)
MONOCYTES # BLD: 1 K/UL (ref 0.1–1.3)
MONOCYTES NFR BLD: 13 % (ref 4–12)
NEUTS SEG # BLD: 5.4 K/UL (ref 1.7–8.2)
NEUTS SEG NFR BLD: 74 % (ref 43–78)
NRBC # BLD: 0 K/UL (ref 0–0.2)
PLATELET # BLD AUTO: 256 K/UL (ref 150–450)
PMV BLD AUTO: 9.9 FL (ref 9.4–12.3)
POTASSIUM SERPL-SCNC: 3.9 MMOL/L (ref 3.5–5.1)
PROT SERPL-MCNC: 7.3 G/DL (ref 6.3–8.2)
RBC # BLD AUTO: 3.73 M/UL (ref 4.23–5.6)
SODIUM SERPL-SCNC: 136 MMOL/L (ref 136–145)
TSH, 3RD GENERATION: 1.34 UIU/ML (ref 0.27–4.2)
WBC # BLD AUTO: 7.3 K/UL (ref 4.3–11.1)

## 2024-10-01 PROCEDURE — 2580000003 HC RX 258: Performed by: INTERNAL MEDICINE

## 2024-10-01 PROCEDURE — 80053 COMPREHEN METABOLIC PANEL: CPT

## 2024-10-01 PROCEDURE — 85025 COMPLETE CBC W/AUTO DIFF WBC: CPT

## 2024-10-01 PROCEDURE — 96413 CHEMO IV INFUSION 1 HR: CPT

## 2024-10-01 PROCEDURE — G8484 FLU IMMUNIZE NO ADMIN: HCPCS | Performed by: INTERNAL MEDICINE

## 2024-10-01 PROCEDURE — 1036F TOBACCO NON-USER: CPT | Performed by: INTERNAL MEDICINE

## 2024-10-01 PROCEDURE — 6360000002 HC RX W HCPCS: Performed by: INTERNAL MEDICINE

## 2024-10-01 PROCEDURE — G8427 DOCREV CUR MEDS BY ELIG CLIN: HCPCS | Performed by: INTERNAL MEDICINE

## 2024-10-01 PROCEDURE — 83735 ASSAY OF MAGNESIUM: CPT

## 2024-10-01 PROCEDURE — 1123F ACP DISCUSS/DSCN MKR DOCD: CPT | Performed by: INTERNAL MEDICINE

## 2024-10-01 PROCEDURE — 99214 OFFICE O/P EST MOD 30 MIN: CPT | Performed by: INTERNAL MEDICINE

## 2024-10-01 PROCEDURE — G8419 CALC BMI OUT NRM PARAM NOF/U: HCPCS | Performed by: INTERNAL MEDICINE

## 2024-10-01 PROCEDURE — 84443 ASSAY THYROID STIM HORMONE: CPT

## 2024-10-01 RX ORDER — ACETAMINOPHEN 325 MG/1
650 TABLET ORAL
Status: CANCELLED | OUTPATIENT
Start: 2024-10-01

## 2024-10-01 RX ORDER — SODIUM CHLORIDE 0.9 % (FLUSH) 0.9 %
5-40 SYRINGE (ML) INJECTION PRN
Status: DISCONTINUED | OUTPATIENT
Start: 2024-10-01 | End: 2024-10-02 | Stop reason: HOSPADM

## 2024-10-01 RX ORDER — HEPARIN SODIUM (PORCINE) LOCK FLUSH IV SOLN 100 UNIT/ML 100 UNIT/ML
500 SOLUTION INTRAVENOUS PRN
Status: CANCELLED | OUTPATIENT
Start: 2024-10-01

## 2024-10-01 RX ORDER — DIPHENHYDRAMINE HYDROCHLORIDE 50 MG/ML
50 INJECTION INTRAMUSCULAR; INTRAVENOUS
Status: CANCELLED | OUTPATIENT
Start: 2024-10-01

## 2024-10-01 RX ORDER — SODIUM CHLORIDE 9 MG/ML
5-250 INJECTION, SOLUTION INTRAVENOUS PRN
Status: CANCELLED | OUTPATIENT
Start: 2024-10-01

## 2024-10-01 RX ORDER — AMOXICILLIN AND CLAVULANATE POTASSIUM 600; 42.9 MG/5ML; MG/5ML
875 POWDER, FOR SUSPENSION ORAL 2 TIMES DAILY
Qty: 105 ML | Refills: 0 | Status: SHIPPED | OUTPATIENT
Start: 2024-10-01 | End: 2024-10-08

## 2024-10-01 RX ORDER — EPINEPHRINE 1 MG/ML
0.3 INJECTION, SOLUTION, CONCENTRATE INTRAVENOUS PRN
Status: CANCELLED | OUTPATIENT
Start: 2024-10-01

## 2024-10-01 RX ORDER — SODIUM CHLORIDE 9 MG/ML
INJECTION, SOLUTION INTRAVENOUS CONTINUOUS
Status: CANCELLED | OUTPATIENT
Start: 2024-10-01

## 2024-10-01 RX ORDER — SODIUM CHLORIDE 0.9 % (FLUSH) 0.9 %
5-40 SYRINGE (ML) INJECTION PRN
Status: CANCELLED | OUTPATIENT
Start: 2024-10-01

## 2024-10-01 RX ORDER — SODIUM CHLORIDE 9 MG/ML
5-250 INJECTION, SOLUTION INTRAVENOUS PRN
Status: DISCONTINUED | OUTPATIENT
Start: 2024-10-01 | End: 2024-10-02 | Stop reason: HOSPADM

## 2024-10-01 RX ORDER — SODIUM CHLORIDE 0.9 % (FLUSH) 0.9 %
5-40 SYRINGE (ML) INJECTION PRN
Status: DISCONTINUED | OUTPATIENT
Start: 2024-10-01 | End: 2024-10-04 | Stop reason: HOSPADM

## 2024-10-01 RX ORDER — FAMOTIDINE 10 MG/ML
20 INJECTION, SOLUTION INTRAVENOUS
Status: CANCELLED | OUTPATIENT
Start: 2024-10-01

## 2024-10-01 RX ORDER — ALBUTEROL SULFATE 90 UG/1
4 INHALANT RESPIRATORY (INHALATION) PRN
Status: CANCELLED | OUTPATIENT
Start: 2024-10-01

## 2024-10-01 RX ORDER — ONDANSETRON 2 MG/ML
8 INJECTION INTRAMUSCULAR; INTRAVENOUS
Status: CANCELLED | OUTPATIENT
Start: 2024-10-01

## 2024-10-01 RX ORDER — MEPERIDINE HYDROCHLORIDE 50 MG/ML
12.5 INJECTION INTRAMUSCULAR; INTRAVENOUS; SUBCUTANEOUS PRN
Status: CANCELLED | OUTPATIENT
Start: 2024-10-01

## 2024-10-01 RX ADMIN — SODIUM CHLORIDE 200 MG: 9 INJECTION, SOLUTION INTRAVENOUS at 11:09

## 2024-10-01 RX ADMIN — SODIUM CHLORIDE 50 ML/HR: 9 INJECTION, SOLUTION INTRAVENOUS at 10:29

## 2024-10-01 RX ADMIN — SODIUM CHLORIDE, PRESERVATIVE FREE 10 ML: 5 INJECTION INTRAVENOUS at 08:31

## 2024-10-01 RX ADMIN — SODIUM CHLORIDE, PRESERVATIVE FREE 10 ML: 5 INJECTION INTRAVENOUS at 11:51

## 2024-10-01 ASSESSMENT — PATIENT HEALTH QUESTIONNAIRE - PHQ9
1. LITTLE INTEREST OR PLEASURE IN DOING THINGS: NOT AT ALL
SUM OF ALL RESPONSES TO PHQ QUESTIONS 1-9: 0
SUM OF ALL RESPONSES TO PHQ9 QUESTIONS 1 & 2: 0
SUM OF ALL RESPONSES TO PHQ QUESTIONS 1-9: 0
2. FEELING DOWN, DEPRESSED OR HOPELESS: NOT AT ALL

## 2024-10-01 NOTE — PATIENT INSTRUCTIONS
10/01/2024 1.0  0.1 - 1.3 K/UL Final    Eosinophils Absolute 10/01/2024 0.1  0.0 - 0.8 K/UL Final    Basophils Absolute 10/01/2024 0.0  0.0 - 0.2 K/UL Final    Immature Granulocytes Absolute 10/01/2024 0.0  0.0 - 0.5 K/UL Final    Magnesium 10/01/2024 2.0  1.8 - 2.4 mg/dL Final               Please refer to After Visit Summary or EpicForce for upcoming appointment information. Please call our office for rescheduling needs at least 24 hours before your scheduled appointment time.If you have any questions regarding your upcoming schedule please reach out to your care team through EpicForce or call [phone].     Please notify your assigned Nurse Navigator of any unplanned hospital admissions or Emergency Room visits within 24 hours of discharge.    -------------------------------------------------------------------------------------------------------------------  Please call our office at [phone] if you have any  of the following symptoms:   Fever of 100.5 or greater  Chills  Shortness of breath  Swelling or pain in one leg    After office hours an answering service is available and will contact a provider for emergencies or if you are experiencing any of the above symptoms.        Loraine Honeycutt RD, , LD  Outpatient Oncology Dietitian  Head and Neck Tumor Navigator  [phone]  [email or web address]

## 2024-10-01 NOTE — PROGRESS NOTES
Arrived to the Infusion Center.  keytruda completed. Patient tolerated well.   Any issues or concerns during appointment: no.  Patient aware of next infusion appointment on 10/22 at 1330  Patient instructed to call provider with temperature of 100.4 or greater or nausea/vomiting/ diarrhea or pain not controlled by medications  Discharged to home via WC.

## 2024-10-01 NOTE — PROGRESS NOTES
is eager to start working with speech therapy.      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  Reports doing well.   n.p.o. by mouth, entirely dependent on feeding tube for nutrition and hydration.  Blood work reasonable.  Denies any recent fevers chills.  Okay to proceed with next cycle.  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  Continues on single agent pembrolizumab.  ENT Dr. Cortez notes reviewed.  Recent PET scan reviewed.  Appears to be having early local progression, though clinically asymptomatic.  Discussed options, will reintroduce carbo-5-FU to his pembrolizumab x 4, rescan thereafter.  Patient in agreement, his preference is next week for possible given tax season and he is an .  This is reasonable.    10/1/2024: Today accompanied with wife.  Reports doing well, completely dependent on feeding tube.  Notes some right ear discomfort, appears possibly infected on autoscope exam.  Will prescribe Augmentin x 7 days. Received carbo-5-FU x 6 on 7/29/2024, and thereafter switched to single agent pembrolizumab.  PET scan in 8/7/2024 with [facility], and PET scan 9/26/2024 with disease stability.  Blood work today unremarkable.  Discussed results, continue single agent pembrolizumab with next rescan end of December.    We may need to reintroduce chemotherapy at that point.       PLAN:  - As above. On Carbo-5 FU-Pembro x 6 than pembro alone.  Re-introduced Carbo-5-FU-Pembro 4/24-7/24 w [facility].   -  NGS studies reviewed showing PD-L1 status is positive with CPS score of 10.  ERBB2 variation of undetermined significance noted.  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  - Speech and swallow follows  - Palliative care:   Continue using tramadol as needed  - Feeding tube: NPO, entirely dependent

## 2024-10-02 NOTE — PROGRESS NOTES
Nutrition F/U:  Assessment:  Pt seen during office visit w/ Dr. Olvera, PET scan results reviewed - plan to continue w/ Keytruda alone for now, repeat PET scan at the end of December.  Pt remains NPO/TF dependent for estimated nutrition needs, bolus feeding a combination of Isosource 1.5 and Boost VHC.  Pt w/ ear infection today per exam by Dr. Olvera - prescribed 7 days of Augmentin (Liquid).  Nutrition related lab values WNL.  Current BW: 180#, down 5# over the past 3 weeks - has decreased TF intake as he was gaining so much weight.      Intervention:  1. NPO  2. Continue w/ current TF regimen  3. Augmentin - 875 mg BID =  (7.3) mL BID for ear infection per Dr. Olvera.     Monitoring/Evaluation:  1. RD to follow up during next office visit - follow up wt status, tolerance/intake of TF, symptom management.      Loraine Honeycutt RD, , LD  [phone]

## 2024-10-22 ENCOUNTER — OFFICE VISIT (OUTPATIENT)
Dept: ONCOLOGY | Age: 79
End: 2024-10-22
Payer: MEDICARE

## 2024-10-22 ENCOUNTER — HOSPITAL ENCOUNTER (OUTPATIENT)
Dept: LAB | Age: 79
Discharge: HOME | End: 2024-10-22
Payer: MEDICARE

## 2024-10-22 ENCOUNTER — HOSPITAL ENCOUNTER (OUTPATIENT)
Dept: INFUSION THERAPY | Age: 79
Setting detail: INFUSION SERIES
Discharge: HOME | End: 2024-10-22
Payer: MEDICARE

## 2024-10-22 ENCOUNTER — OFFICE VISIT (OUTPATIENT)
Dept: ONCOLOGY | Age: 79
End: 2024-10-22

## 2024-10-22 VITALS
SYSTOLIC BLOOD PRESSURE: 121 MMHG | HEIGHT: 71 IN | WEIGHT: 183.8 LBS | OXYGEN SATURATION: 96 % | HEART RATE: 90 BPM | RESPIRATION RATE: 16 BRPM | TEMPERATURE: 97.8 F | BODY MASS INDEX: 25.73 KG/M2 | DIASTOLIC BLOOD PRESSURE: 77 MMHG

## 2024-10-22 DIAGNOSIS — R68.89 COPIOUS ORAL SECRETIONS: Primary | ICD-10-CM

## 2024-10-22 DIAGNOSIS — E55.9 VITAMIN D DEFICIENCY: ICD-10-CM

## 2024-10-22 DIAGNOSIS — Z79.899 HIGH RISK MEDICATION USE: ICD-10-CM

## 2024-10-22 DIAGNOSIS — C32.9 LARYNGEAL CANCER (HCC): Primary | ICD-10-CM

## 2024-10-22 DIAGNOSIS — C32.9 LARYNGEAL CANCER (HCC): ICD-10-CM

## 2024-10-22 DIAGNOSIS — R53.83 FATIGUE DUE TO TREATMENT: ICD-10-CM

## 2024-10-22 DIAGNOSIS — Z78.9 ON ENTERAL NUTRITION: ICD-10-CM

## 2024-10-22 DIAGNOSIS — E03.2 HYPOTHYROIDISM DUE TO MEDICAMENTS AND OTHER EXOGENOUS SUBSTANCES: ICD-10-CM

## 2024-10-22 DIAGNOSIS — Z00.8 NUTRITIONAL ASSESSMENT: Primary | ICD-10-CM

## 2024-10-22 LAB
ALBUMIN SERPL-MCNC: 3.1 G/DL (ref 3.2–4.6)
ALBUMIN/GLOB SERPL: 0.8 (ref 1–1.9)
ALP SERPL-CCNC: 83 U/L (ref 40–129)
ALT SERPL-CCNC: 18 U/L (ref 8–55)
ANION GAP SERPL CALC-SCNC: 11 MMOL/L (ref 9–18)
AST SERPL-CCNC: 22 U/L (ref 15–37)
BASOPHILS # BLD: 0 K/UL (ref 0–0.2)
BASOPHILS NFR BLD: 0 % (ref 0–2)
BILIRUB SERPL-MCNC: 0.3 MG/DL (ref 0–1.2)
BUN SERPL-MCNC: 20 MG/DL (ref 8–23)
CALCIUM SERPL-MCNC: 9 MG/DL (ref 8.8–10.2)
CHLORIDE SERPL-SCNC: 101 MMOL/L (ref 98–107)
CO2 SERPL-SCNC: 25 MMOL/L (ref 20–28)
CREAT SERPL-MCNC: 0.57 MG/DL (ref 0.8–1.3)
DIFFERENTIAL METHOD BLD: ABNORMAL
EOSINOPHIL # BLD: 0.1 K/UL (ref 0–0.8)
EOSINOPHIL NFR BLD: 1 % (ref 0.5–7.8)
ERYTHROCYTE [DISTWIDTH] IN BLOOD BY AUTOMATED COUNT: 13.7 % (ref 11.9–14.6)
GLOBULIN SER CALC-MCNC: 4.1 G/DL (ref 2.3–3.5)
GLUCOSE SERPL-MCNC: 129 MG/DL (ref 70–99)
HCT VFR BLD AUTO: 35.7 % (ref 41.1–50.3)
HGB BLD-MCNC: 11.4 G/DL (ref 13.6–17.2)
IMM GRANULOCYTES # BLD AUTO: 0 K/UL (ref 0–0.5)
IMM GRANULOCYTES NFR BLD AUTO: 1 % (ref 0–5)
LYMPHOCYTES # BLD: 0.9 K/UL (ref 0.5–4.6)
LYMPHOCYTES NFR BLD: 13 % (ref 13–44)
MAGNESIUM SERPL-MCNC: 2.1 MG/DL (ref 1.8–2.4)
MCH RBC QN AUTO: 30.2 PG (ref 26.1–32.9)
MCHC RBC AUTO-ENTMCNC: 31.9 G/DL (ref 31.4–35)
MCV RBC AUTO: 94.4 FL (ref 82–102)
MONOCYTES # BLD: 1.1 K/UL (ref 0.1–1.3)
MONOCYTES NFR BLD: 16 % (ref 4–12)
NEUTS SEG # BLD: 5 K/UL (ref 1.7–8.2)
NEUTS SEG NFR BLD: 69 % (ref 43–78)
NRBC # BLD: 0 K/UL (ref 0–0.2)
PLATELET # BLD AUTO: 268 K/UL (ref 150–450)
PMV BLD AUTO: 9.8 FL (ref 9.4–12.3)
POTASSIUM SERPL-SCNC: 3.9 MMOL/L (ref 3.5–5.1)
PROT SERPL-MCNC: 7.2 G/DL (ref 6.3–8.2)
RBC # BLD AUTO: 3.78 M/UL (ref 4.23–5.6)
SODIUM SERPL-SCNC: 137 MMOL/L (ref 136–145)
TSH, 3RD GENERATION: 1.15 UIU/ML (ref 0.27–4.2)
WBC # BLD AUTO: 7.2 K/UL (ref 4.3–11.1)

## 2024-10-22 PROCEDURE — 80053 COMPREHEN METABOLIC PANEL: CPT

## 2024-10-22 PROCEDURE — 6360000002 HC RX W HCPCS: Performed by: NURSE PRACTITIONER

## 2024-10-22 PROCEDURE — 2580000003 HC RX 258: Performed by: INTERNAL MEDICINE

## 2024-10-22 PROCEDURE — 1036F TOBACCO NON-USER: CPT | Performed by: NURSE PRACTITIONER

## 2024-10-22 PROCEDURE — 85025 COMPLETE CBC W/AUTO DIFF WBC: CPT

## 2024-10-22 PROCEDURE — G8419 CALC BMI OUT NRM PARAM NOF/U: HCPCS | Performed by: NURSE PRACTITIONER

## 2024-10-22 PROCEDURE — G8484 FLU IMMUNIZE NO ADMIN: HCPCS | Performed by: NURSE PRACTITIONER

## 2024-10-22 PROCEDURE — 84443 ASSAY THYROID STIM HORMONE: CPT

## 2024-10-22 PROCEDURE — G8427 DOCREV CUR MEDS BY ELIG CLIN: HCPCS | Performed by: NURSE PRACTITIONER

## 2024-10-22 PROCEDURE — 83735 ASSAY OF MAGNESIUM: CPT

## 2024-10-22 PROCEDURE — 2580000003 HC RX 258: Performed by: NURSE PRACTITIONER

## 2024-10-22 PROCEDURE — 1123F ACP DISCUSS/DSCN MKR DOCD: CPT | Performed by: NURSE PRACTITIONER

## 2024-10-22 PROCEDURE — 96413 CHEMO IV INFUSION 1 HR: CPT

## 2024-10-22 PROCEDURE — 99214 OFFICE O/P EST MOD 30 MIN: CPT | Performed by: NURSE PRACTITIONER

## 2024-10-22 RX ORDER — ACETAMINOPHEN 325 MG/1
650 TABLET ORAL
Status: DISCONTINUED | OUTPATIENT
Start: 2024-10-22 | End: 2024-10-23 | Stop reason: HOSPADM

## 2024-10-22 RX ORDER — MEPERIDINE HYDROCHLORIDE 50 MG/ML
12.5 INJECTION INTRAMUSCULAR; INTRAVENOUS; SUBCUTANEOUS PRN
OUTPATIENT
Start: 2024-11-12

## 2024-10-22 RX ORDER — FAMOTIDINE 10 MG/ML
20 INJECTION, SOLUTION INTRAVENOUS
Status: CANCELLED | OUTPATIENT
Start: 2024-10-22

## 2024-10-22 RX ORDER — EPINEPHRINE 1 MG/ML
0.3 INJECTION, SOLUTION, CONCENTRATE INTRAVENOUS PRN
OUTPATIENT
Start: 2024-11-12

## 2024-10-22 RX ORDER — SODIUM CHLORIDE 9 MG/ML
5-250 INJECTION, SOLUTION INTRAVENOUS PRN
OUTPATIENT
Start: 2024-11-12

## 2024-10-22 RX ORDER — ACETAMINOPHEN 325 MG/1
650 TABLET ORAL
Status: CANCELLED | OUTPATIENT
Start: 2024-10-22

## 2024-10-22 RX ORDER — SODIUM CHLORIDE 0.9 % (FLUSH) 0.9 %
5-40 SYRINGE (ML) INJECTION PRN
Status: CANCELLED | OUTPATIENT
Start: 2024-10-22

## 2024-10-22 RX ORDER — SODIUM CHLORIDE 9 MG/ML
5-250 INJECTION, SOLUTION INTRAVENOUS PRN
Status: DISCONTINUED | OUTPATIENT
Start: 2024-10-22 | End: 2024-10-23 | Stop reason: HOSPADM

## 2024-10-22 RX ORDER — HEPARIN SODIUM (PORCINE) LOCK FLUSH IV SOLN 100 UNIT/ML 100 UNIT/ML
500 SOLUTION INTRAVENOUS PRN
Status: CANCELLED | OUTPATIENT
Start: 2024-10-22

## 2024-10-22 RX ORDER — EPINEPHRINE 1 MG/ML
0.3 INJECTION, SOLUTION, CONCENTRATE INTRAVENOUS PRN
Status: CANCELLED | OUTPATIENT
Start: 2024-10-22

## 2024-10-22 RX ORDER — ONDANSETRON 2 MG/ML
8 INJECTION INTRAMUSCULAR; INTRAVENOUS
Status: CANCELLED | OUTPATIENT
Start: 2024-10-22

## 2024-10-22 RX ORDER — GLYCOPYRROLATE 1 MG/1
1 TABLET ORAL 3 TIMES DAILY
Qty: 90 TABLET | Refills: 3 | Status: SHIPPED | OUTPATIENT
Start: 2024-10-22

## 2024-10-22 RX ORDER — DIPHENHYDRAMINE HYDROCHLORIDE 50 MG/ML
50 INJECTION INTRAMUSCULAR; INTRAVENOUS
Status: CANCELLED | OUTPATIENT
Start: 2024-10-22

## 2024-10-22 RX ORDER — DIPHENHYDRAMINE HYDROCHLORIDE 50 MG/ML
50 INJECTION INTRAMUSCULAR; INTRAVENOUS
OUTPATIENT
Start: 2024-11-12

## 2024-10-22 RX ORDER — SODIUM CHLORIDE 9 MG/ML
5-250 INJECTION, SOLUTION INTRAVENOUS PRN
Status: CANCELLED | OUTPATIENT
Start: 2024-10-22

## 2024-10-22 RX ORDER — DIPHENHYDRAMINE HYDROCHLORIDE 50 MG/ML
50 INJECTION INTRAMUSCULAR; INTRAVENOUS
Status: DISCONTINUED | OUTPATIENT
Start: 2024-10-22 | End: 2024-10-23 | Stop reason: HOSPADM

## 2024-10-22 RX ORDER — ONDANSETRON 2 MG/ML
8 INJECTION INTRAMUSCULAR; INTRAVENOUS
Status: DISCONTINUED | OUTPATIENT
Start: 2024-10-22 | End: 2024-10-23 | Stop reason: HOSPADM

## 2024-10-22 RX ORDER — ALBUTEROL SULFATE 90 UG/1
4 INHALANT RESPIRATORY (INHALATION) PRN
Status: DISCONTINUED | OUTPATIENT
Start: 2024-10-22 | End: 2024-10-23 | Stop reason: HOSPADM

## 2024-10-22 RX ORDER — ALBUTEROL SULFATE 90 UG/1
4 INHALANT RESPIRATORY (INHALATION) PRN
Status: CANCELLED | OUTPATIENT
Start: 2024-10-22

## 2024-10-22 RX ORDER — SODIUM CHLORIDE 9 MG/ML
INJECTION, SOLUTION INTRAVENOUS CONTINUOUS
Status: CANCELLED | OUTPATIENT
Start: 2024-10-22

## 2024-10-22 RX ORDER — FAMOTIDINE 10 MG/ML
20 INJECTION, SOLUTION INTRAVENOUS
OUTPATIENT
Start: 2024-11-12

## 2024-10-22 RX ORDER — MEPERIDINE HYDROCHLORIDE 50 MG/ML
12.5 INJECTION INTRAMUSCULAR; INTRAVENOUS; SUBCUTANEOUS PRN
Status: CANCELLED | OUTPATIENT
Start: 2024-10-22

## 2024-10-22 RX ORDER — ONDANSETRON 2 MG/ML
8 INJECTION INTRAMUSCULAR; INTRAVENOUS
OUTPATIENT
Start: 2024-11-12

## 2024-10-22 RX ORDER — HEPARIN SODIUM (PORCINE) LOCK FLUSH IV SOLN 100 UNIT/ML 100 UNIT/ML
500 SOLUTION INTRAVENOUS PRN
OUTPATIENT
Start: 2024-11-12

## 2024-10-22 RX ORDER — EPINEPHRINE 1 MG/ML
0.3 INJECTION, SOLUTION, CONCENTRATE INTRAVENOUS PRN
Status: DISCONTINUED | OUTPATIENT
Start: 2024-10-22 | End: 2024-10-23 | Stop reason: HOSPADM

## 2024-10-22 RX ORDER — SODIUM CHLORIDE 0.9 % (FLUSH) 0.9 %
5-40 SYRINGE (ML) INJECTION PRN
Status: DISCONTINUED | OUTPATIENT
Start: 2024-10-22 | End: 2024-10-23 | Stop reason: HOSPADM

## 2024-10-22 RX ORDER — SODIUM CHLORIDE 0.9 % (FLUSH) 0.9 %
5-40 SYRINGE (ML) INJECTION PRN
OUTPATIENT
Start: 2024-11-12

## 2024-10-22 RX ORDER — ACETAMINOPHEN 325 MG/1
650 TABLET ORAL
OUTPATIENT
Start: 2024-11-12

## 2024-10-22 RX ORDER — ALBUTEROL SULFATE 90 UG/1
4 INHALANT RESPIRATORY (INHALATION) PRN
OUTPATIENT
Start: 2024-11-12

## 2024-10-22 RX ORDER — SODIUM CHLORIDE 9 MG/ML
INJECTION, SOLUTION INTRAVENOUS CONTINUOUS
OUTPATIENT
Start: 2024-11-12

## 2024-10-22 RX ORDER — MEPERIDINE HYDROCHLORIDE 25 MG/ML
12.5 INJECTION INTRAMUSCULAR; INTRAVENOUS; SUBCUTANEOUS PRN
Status: DISCONTINUED | OUTPATIENT
Start: 2024-10-22 | End: 2024-10-23 | Stop reason: HOSPADM

## 2024-10-22 RX ADMIN — SODIUM CHLORIDE, PRESERVATIVE FREE 10 ML: 5 INJECTION INTRAVENOUS at 14:38

## 2024-10-22 RX ADMIN — SODIUM CHLORIDE, PRESERVATIVE FREE 20 ML: 5 INJECTION INTRAVENOUS at 11:15

## 2024-10-22 RX ADMIN — SODIUM CHLORIDE 200 MG: 9 INJECTION, SOLUTION INTRAVENOUS at 13:49

## 2024-10-22 ASSESSMENT — PATIENT HEALTH QUESTIONNAIRE - PHQ9
SUM OF ALL RESPONSES TO PHQ9 QUESTIONS 1 & 2: 0
SUM OF ALL RESPONSES TO PHQ QUESTIONS 1-9: 0
2. FEELING DOWN, DEPRESSED OR HOPELESS: NOT AT ALL
SUM OF ALL RESPONSES TO PHQ QUESTIONS 1-9: 0
1. LITTLE INTEREST OR PLEASURE IN DOING THINGS: NOT AT ALL

## 2024-10-22 NOTE — PROGRESS NOTES
completed 6 cycles of carbo/5-FU/Keytruda (restarted - total of 12) and he returns today for next cycle of Keytruda monotherapy. He continues to do very well overall. He has some fatigue which is manageable.  He continues to obtain all his nutrition via his feeding tube due to aspiration. He continues scopolamine patch for increased oral secretions but this has not been as helpful recently - will prescribe glycopyrrolate 1 mg TID PRN to see if this works better.  He has had no nausea or vomiting, bowels moving with Colace as needed.  He denies having any shortness of breath. He does cough often. He has no pain. No fevers or infectious symptoms. No bleeding. Labs reviewed and adequate to proceed with Keytruda alone. He will follow up in 3 weeks for next Keytruda and in 6 weeks for OV/Keytruda.  Call with any fevers, uncontrolled side effects from treatment or any other worrisome/concerning symptoms.        PLAN:  - As above. Continue Carbo-5 FU-Pembro x 6 than pembro alone.  Re-introduce Carbo-5-FU-Pembro 4/11/24 x 6. Now move to pembro alone 8/20/24.   -  NGS studies reviewed showing PD-L1 status is positive with CPS score of 10.  ERBB2 variation of undetermined significance noted.  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  - Speech and swallow follows  - Palliative care:   Continue using tramadol as needed  - Feeding tube: NPO, entirely dependent on feeding tube     RTC in 3 weeks and 6 weeks.        ZINA Modi  [facility]  [address] [place] [address]  Office : [phone]  Fax : [phone]

## 2024-10-22 NOTE — PATIENT INSTRUCTIONS
Patient Information from Today's Visit    The members of your Oncology Medical Home are listed below:    Physician Provider: Kasandra Olvera, Medical Oncologist  Advanced Practice Clinician: Neelam Schaeffer NP  Registered Nurse: Lucille ASHER RN  Nurse Navigator: Loraine CUELLAR RD  Medical Assistant: Celsa ASHER MA  :Breanna WARNER  Supportive Care Services: Yvonne GLASS LM and Loraine CUELLAR, Registered Dietitian    Diagnosis:   H&N Cancer - Keytruda     Follow Up Instructions:   Proceed with Keytruda today as planned    We are trying to adjust your schedule on (12/3).      We will move your PET scan out to the end of January and follow up with Dr. Olvera after that scan when you are due again for Keytruda.     JOHNNIE Richter sent in Robinul to help with your thick secretions       Treatment Summary has been discussed and given to patient: No      Current Labs:   Hospital Outpatient Visit on 10/22/2024   Component Date Value Ref Range Status    Sodium 10/22/2024 137  136 - 145 mmol/L Final    Potassium 10/22/2024 3.9  3.5 - 5.1 mmol/L Final    Chloride 10/22/2024 101  98 - 107 mmol/L Final    CO2 10/22/2024 25  20 - 28 mmol/L Final    Anion Gap 10/22/2024 11  9 - 18 mmol/L Final    Glucose 10/22/2024 129 (H)  70 - 99 mg/dL Final    Comment: <70 mg/dL Consistent with, but not fully diagnostic of hypoglycemia.  100 - 125 mg/dL Impaired fasting glucose/consistent with pre-diabetes mellitus.  > 126 mg/dl Fasting glucose consistent with overt diabetes mellitus      BUN 10/22/2024 20  8 - 23 MG/DL Final    Creatinine 10/22/2024 0.57 (L)  0.80 - 1.30 MG/DL Final    Est, Glom Filt Rate 10/22/2024 >90  >60 ml/min/1.73m2 Final    Comment:    Pediatric calculator link: https://www.kidney.org/professionals/kdoqi/gfr_calculatorped     These results are not intended for use in patients <18 years of age.     eGFR results are calculated without a race factor using  the 2021 CKD-EPI equation. Careful clinical correlation is recommended,

## 2024-10-22 NOTE — PROGRESS NOTES
Arrived to the Infusion Center. Keytruda infusion completed. Patient tolerated well.   Any issues or concerns during appointment: none.  Patient aware of next infusion appointment on 11/12/24 (date) at 1315 (time).  Patient aware of next lab and [facility] office visit on 12/3/24 (date) at 0830 (time).  Patient instructed to call provider with temperature of 100.4 or greater or nausea/vomiting/ diarrhea or pain not controlled by medications  Discharged via wheelchair with spouse.

## 2024-10-24 ENCOUNTER — CLINICAL DOCUMENTATION (OUTPATIENT)
Dept: ONCOLOGY | Age: 79
End: 2024-10-24

## 2024-10-25 NOTE — PROGRESS NOTES
Prior authorization request for Glycopyrrolate 1 mg tablets received through [facility] under Key Code [id number].  Patient demographics and clinical information submitted through the portal and sent to [employer] [employer] Part D for medical review.  The most recent oncology office visit note dated 10/22/24 was uploaded to the portal for medical review.  Pending PA Case ID [id number] for Rx #[id number].    10/27/24 Form received from [employer] via fax requesting additional information.  Form completed and faxed back to #[phone] along with the oncology office visit progress note dated 10/22/24.  Proficient transaction ID #[id number].

## 2024-10-25 NOTE — PROGRESS NOTES
Nutrition F/U:  Assessment:  Pt seen during office visit w/ NP, receiving Keytruda today.  Pt remains NPO/TF dependent for estimated nutrition needs.  Pt c/o increased secretions despite use of scopolamine patch, Robinul prescribed today to trial.  Pt continues to c/o right ear pain, NP's exam WNL - pt has follow up with Dr. Cortez on (11/13) - ear pain exacerbated by wearing hearing aide. Current BW: 183#, up 3# over the past 3 weeks.     Intervention:  1. NPO  2. Continue w/ current TF regimen  3. Follow up ear pain with Dr. Cortez   4. Robinul prescribed to help with secretions, continue to use scopolamine patch as prescribed.     Monitoring/Evaluation:  1. RD to follow up during next office visit - follow up wt status, tolerance/intake of TF, symptom management.      Loraine Honeycutt RD, , LD  [phone]

## 2024-10-27 NOTE — ASSESSMENT & PLAN NOTE
Likely secondary to underlying malignancy and associated treatments  Monitor for recurrent infections and new or worsening bruising or bleeding

## 2024-10-27 NOTE — PROGRESS NOTES
Jesus Laguna (:  1945) is a 79 y.o. male,Established patient, here for evaluation of the following chief complaint(s):  Medicare AWV         Assessment & Plan  Medicare annual wellness visit, subsequent  Medicare wellness responses reviewed in the office today  Colonoscopy on 20 with hyperplastic polyp   Consider checking PSA level at next lab draw for purposes of prostate cancer screening.       Laryngeal cancer (HCC)            History of tongue cancer            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  Status post partial glossectomy with tonsillectomy, neck dissection and radial forearm free flap to left oral cavity on 8/3/2010  Pathology with negative margins, evidence of LVI, PNI and extracapsular spread  Completed adjuvant radiation therapy in early   Positive PET scan on 2023  Direct laryngoscopy with biopsy on 2023 confirmed invasive moderately differentiated squamous cell carcinoma with p16 stain negative for HPV  Flexible laryngoscopy with ENT on 2023 with large base of tongue/epiglottic tumor that sits above the glottis with slight ball valving, still moving air appropriately  Status postgastrostomy tube placement on 2023 and tracheostomy on 2023  Status post PICC line on 9/15/2023, subsequently starting chemotherapy with C1 carbo/FEU  Status post chest port placement on 10/19/2023  PET/CT scan from 2024 as follows:  -Increased uptake in left base of tongue area/postsurgical site right soft tissue, suspicious for residual/recurrent disease  -Mildly avid right neck nodes, suspicious for metastases  -Resolved left lower lobe infiltrates  Per oncology completed 6 cycles of carbo/5-FU/Keytruda.  Currently on Keytruda monotherapy.  On scopolamine patch with recent addition of glycopyrrolate to help with secretions.  Continue as needed tramadol for pain control  Patient currently n.p.o., entirely dependent on

## 2024-10-28 ENCOUNTER — OFFICE VISIT (OUTPATIENT)
Dept: INTERNAL MEDICINE CLINIC | Facility: CLINIC | Age: 79
End: 2024-10-28
Payer: MEDICARE

## 2024-10-28 VITALS
DIASTOLIC BLOOD PRESSURE: 72 MMHG | TEMPERATURE: 98.1 F | SYSTOLIC BLOOD PRESSURE: 128 MMHG | HEART RATE: 94 BPM | HEIGHT: 71 IN | BODY MASS INDEX: 25.62 KG/M2 | WEIGHT: 183 LBS | OXYGEN SATURATION: 97 %

## 2024-10-28 DIAGNOSIS — K12.31 MUCOSITIS DUE TO CHEMOTHERAPY: ICD-10-CM

## 2024-10-28 DIAGNOSIS — Z00.00 MEDICARE ANNUAL WELLNESS VISIT, SUBSEQUENT: Primary | ICD-10-CM

## 2024-10-28 DIAGNOSIS — Z85.810 HISTORY OF TONGUE CANCER: ICD-10-CM

## 2024-10-28 DIAGNOSIS — K21.9 GASTROESOPHAGEAL REFLUX DISEASE, UNSPECIFIED WHETHER ESOPHAGITIS PRESENT: ICD-10-CM

## 2024-10-28 DIAGNOSIS — D61.818 PANCYTOPENIA (HCC): ICD-10-CM

## 2024-10-28 DIAGNOSIS — C32.9 LARYNGEAL CANCER (HCC): ICD-10-CM

## 2024-10-28 DIAGNOSIS — H92.01 RIGHT EAR PAIN: ICD-10-CM

## 2024-10-28 DIAGNOSIS — Z93.1 GASTROSTOMY STATUS (HCC): ICD-10-CM

## 2024-10-28 PROBLEM — Z85.21 PERSONAL HISTORY OF MALIGNANT NEOPLASM OF LARYNX: Status: ACTIVE | Noted: 2023-08-17

## 2024-10-28 PROBLEM — E78.00 PURE HYPERCHOLESTEROLEMIA: Status: ACTIVE | Noted: 2024-10-28

## 2024-10-28 PROCEDURE — 1036F TOBACCO NON-USER: CPT | Performed by: STUDENT IN AN ORGANIZED HEALTH CARE EDUCATION/TRAINING PROGRAM

## 2024-10-28 PROCEDURE — 1123F ACP DISCUSS/DSCN MKR DOCD: CPT | Performed by: STUDENT IN AN ORGANIZED HEALTH CARE EDUCATION/TRAINING PROGRAM

## 2024-10-28 PROCEDURE — G0439 PPPS, SUBSEQ VISIT: HCPCS | Performed by: STUDENT IN AN ORGANIZED HEALTH CARE EDUCATION/TRAINING PROGRAM

## 2024-10-28 PROCEDURE — G8419 CALC BMI OUT NRM PARAM NOF/U: HCPCS | Performed by: STUDENT IN AN ORGANIZED HEALTH CARE EDUCATION/TRAINING PROGRAM

## 2024-10-28 PROCEDURE — 1126F AMNT PAIN NOTED NONE PRSNT: CPT | Performed by: STUDENT IN AN ORGANIZED HEALTH CARE EDUCATION/TRAINING PROGRAM

## 2024-10-28 PROCEDURE — 1160F RVW MEDS BY RX/DR IN RCRD: CPT | Performed by: STUDENT IN AN ORGANIZED HEALTH CARE EDUCATION/TRAINING PROGRAM

## 2024-10-28 PROCEDURE — G8484 FLU IMMUNIZE NO ADMIN: HCPCS | Performed by: STUDENT IN AN ORGANIZED HEALTH CARE EDUCATION/TRAINING PROGRAM

## 2024-10-28 PROCEDURE — G8427 DOCREV CUR MEDS BY ELIG CLIN: HCPCS | Performed by: STUDENT IN AN ORGANIZED HEALTH CARE EDUCATION/TRAINING PROGRAM

## 2024-10-28 PROCEDURE — 1159F MED LIST DOCD IN RCRD: CPT | Performed by: STUDENT IN AN ORGANIZED HEALTH CARE EDUCATION/TRAINING PROGRAM

## 2024-10-28 PROCEDURE — 99213 OFFICE O/P EST LOW 20 MIN: CPT | Performed by: STUDENT IN AN ORGANIZED HEALTH CARE EDUCATION/TRAINING PROGRAM

## 2024-10-28 RX ORDER — DEXAMETHASONE 0.5 MG/5ML
1 SOLUTION ORAL DAILY
Qty: 240 ML | Refills: 4 | Status: SHIPPED | OUTPATIENT
Start: 2024-10-28

## 2024-10-28 RX ORDER — AMOXICILLIN AND CLAVULANATE POTASSIUM 600; 42.9 MG/5ML; MG/5ML
POWDER, FOR SUSPENSION ORAL
Qty: 146 ML | Refills: 0 | Status: SHIPPED | OUTPATIENT
Start: 2024-10-28

## 2024-10-28 ASSESSMENT — PATIENT HEALTH QUESTIONNAIRE - PHQ9
SUM OF ALL RESPONSES TO PHQ QUESTIONS 1-9: 0
SUM OF ALL RESPONSES TO PHQ QUESTIONS 1-9: 0
3. TROUBLE FALLING OR STAYING ASLEEP: NOT AT ALL
1. LITTLE INTEREST OR PLEASURE IN DOING THINGS: NOT AT ALL
SUM OF ALL RESPONSES TO PHQ QUESTIONS 1-9: 0
4. FEELING TIRED OR HAVING LITTLE ENERGY: NOT AT ALL
8. MOVING OR SPEAKING SO SLOWLY THAT OTHER PEOPLE COULD HAVE NOTICED. OR THE OPPOSITE, BEING SO FIGETY OR RESTLESS THAT YOU HAVE BEEN MOVING AROUND A LOT MORE THAN USUAL: NOT AT ALL
2. FEELING DOWN, DEPRESSED OR HOPELESS: NOT AT ALL
SUM OF ALL RESPONSES TO PHQ9 QUESTIONS 1 & 2: 0
9. THOUGHTS THAT YOU WOULD BE BETTER OFF DEAD, OR OF HURTING YOURSELF: NOT AT ALL
10. IF YOU CHECKED OFF ANY PROBLEMS, HOW DIFFICULT HAVE THESE PROBLEMS MADE IT FOR YOU TO DO YOUR WORK, TAKE CARE OF THINGS AT HOME, OR GET ALONG WITH OTHER PEOPLE: NOT DIFFICULT AT ALL
5. POOR APPETITE OR OVEREATING: NOT AT ALL
6. FEELING BAD ABOUT YOURSELF - OR THAT YOU ARE A FAILURE OR HAVE LET YOURSELF OR YOUR FAMILY DOWN: NOT AT ALL
SUM OF ALL RESPONSES TO PHQ QUESTIONS 1-9: 0
7. TROUBLE CONCENTRATING ON THINGS, SUCH AS READING THE NEWSPAPER OR WATCHING TELEVISION: NOT AT ALL

## 2024-10-28 ASSESSMENT — ENCOUNTER SYMPTOMS
NAUSEA: 0
COUGH: 1
ANAL BLEEDING: 1
SINUS PRESSURE: 1
SORE THROAT: 0
VOMITING: 0

## 2024-10-29 NOTE — PATIENT INSTRUCTIONS
closely for changes in your health, and be sure to contact your doctor if you have any problems.  Where can you learn more?  Go to https://www.TrillTip.net/patientEd and enter F075 to learn more about \"A Healthy Heart: Care Instructions.\"  Current as of: June 24, 2023  Content Version: 14.2  © 2024 [employer].   Care instructions adapted under license by [employer]. If you have questions about a medical condition or this instruction, always ask your healthcare professional. [employer] disclaims any warranty or liability for your use of this information.      Personalized Preventive Plan for Jesus Laguna - 10/28/2024  Medicare offers a range of preventive health benefits. Some of the tests and screenings are paid in full while other may be subject to a deductible, co-insurance, and/or copay.    Some of these benefits include a comprehensive review of your medical history including lifestyle, illnesses that may run in your family, and various assessments and screenings as appropriate.    After reviewing your medical record and screening and assessments performed today your provider may have ordered immunizations, labs, imaging, and/or referrals for you.  A list of these orders (if applicable) as well as your Preventive Care list are included within your After Visit Summary for your review.    Other Preventive Recommendations:    A preventive eye exam performed by an eye specialist is recommended every 1-2 years to screen for glaucoma; cataracts, macular degeneration, and other eye disorders.  A preventive dental visit is recommended every 6 months.  Try to get at least 150 minutes of exercise per week or 10,000 steps per day on a pedometer .  Order or download the FREE \"Exercise & Physical Activity: Your Everyday Guide\" from [employer]. Call 1-437.730.6229 or search [employer] online.  You need 5312-2613 mg of calcium and 9872-3558 IU of

## 2024-11-12 ENCOUNTER — HOSPITAL ENCOUNTER (OUTPATIENT)
Dept: INFUSION THERAPY | Age: 79
Setting detail: INFUSION SERIES
Discharge: HOME | End: 2024-11-12
Payer: MEDICARE

## 2024-11-12 ENCOUNTER — HOSPITAL ENCOUNTER (OUTPATIENT)
Dept: LAB | Age: 79
Discharge: HOME | End: 2024-11-12
Payer: MEDICARE

## 2024-11-12 VITALS
TEMPERATURE: 98.1 F | WEIGHT: 184 LBS | HEART RATE: 89 BPM | BODY MASS INDEX: 25.66 KG/M2 | RESPIRATION RATE: 16 BRPM | SYSTOLIC BLOOD PRESSURE: 102 MMHG | DIASTOLIC BLOOD PRESSURE: 62 MMHG | OXYGEN SATURATION: 98 %

## 2024-11-12 DIAGNOSIS — Z79.899 HIGH RISK MEDICATION USE: ICD-10-CM

## 2024-11-12 DIAGNOSIS — E03.2 HYPOTHYROIDISM DUE TO MEDICAMENTS AND OTHER EXOGENOUS SUBSTANCES: ICD-10-CM

## 2024-11-12 DIAGNOSIS — E55.9 VITAMIN D DEFICIENCY: ICD-10-CM

## 2024-11-12 DIAGNOSIS — C32.9 LARYNGEAL CANCER (HCC): Primary | ICD-10-CM

## 2024-11-12 DIAGNOSIS — C32.9 LARYNGEAL CANCER (HCC): ICD-10-CM

## 2024-11-12 LAB
ALBUMIN SERPL-MCNC: 2.9 G/DL (ref 3.2–4.6)
ALBUMIN/GLOB SERPL: 0.7 (ref 1–1.9)
ALP SERPL-CCNC: 76 U/L (ref 40–129)
ALT SERPL-CCNC: 16 U/L (ref 8–55)
ANION GAP SERPL CALC-SCNC: 11 MMOL/L (ref 7–16)
AST SERPL-CCNC: 27 U/L (ref 15–37)
BASOPHILS # BLD: 0 K/UL (ref 0–0.2)
BASOPHILS NFR BLD: 0 % (ref 0–2)
BILIRUB SERPL-MCNC: 0.2 MG/DL (ref 0–1.2)
BUN SERPL-MCNC: 32 MG/DL (ref 8–23)
CALCIUM SERPL-MCNC: 9.2 MG/DL (ref 8.8–10.2)
CHLORIDE SERPL-SCNC: 102 MMOL/L (ref 98–107)
CO2 SERPL-SCNC: 24 MMOL/L (ref 20–29)
CREAT SERPL-MCNC: 0.61 MG/DL (ref 0.8–1.3)
DIFFERENTIAL METHOD BLD: ABNORMAL
EOSINOPHIL # BLD: 0.2 K/UL (ref 0–0.8)
EOSINOPHIL NFR BLD: 2 % (ref 0.5–7.8)
ERYTHROCYTE [DISTWIDTH] IN BLOOD BY AUTOMATED COUNT: 13.8 % (ref 11.9–14.6)
GLOBULIN SER CALC-MCNC: 4.2 G/DL (ref 2.3–3.5)
GLUCOSE SERPL-MCNC: 120 MG/DL (ref 70–99)
HCT VFR BLD AUTO: 34 % (ref 41.1–50.3)
HGB BLD-MCNC: 10.7 G/DL (ref 13.6–17.2)
IMM GRANULOCYTES # BLD AUTO: 0.1 K/UL (ref 0–0.5)
IMM GRANULOCYTES NFR BLD AUTO: 1 % (ref 0–5)
LYMPHOCYTES # BLD: 0.7 K/UL (ref 0.5–4.6)
LYMPHOCYTES NFR BLD: 10 % (ref 13–44)
MAGNESIUM SERPL-MCNC: 2.1 MG/DL (ref 1.8–2.4)
MCH RBC QN AUTO: 29.1 PG (ref 26.1–32.9)
MCHC RBC AUTO-ENTMCNC: 31.5 G/DL (ref 31.4–35)
MCV RBC AUTO: 92.4 FL (ref 82–102)
MONOCYTES # BLD: 1.1 K/UL (ref 0.1–1.3)
MONOCYTES NFR BLD: 16 % (ref 4–12)
NEUTS SEG # BLD: 5.1 K/UL (ref 1.7–8.2)
NEUTS SEG NFR BLD: 71 % (ref 43–78)
NRBC # BLD: 0 K/UL (ref 0–0.2)
PLATELET # BLD AUTO: 280 K/UL (ref 150–450)
PMV BLD AUTO: 9.7 FL (ref 9.4–12.3)
POTASSIUM SERPL-SCNC: 4 MMOL/L (ref 3.5–5.1)
PROT SERPL-MCNC: 7.1 G/DL (ref 6.3–8.2)
RBC # BLD AUTO: 3.68 M/UL (ref 4.23–5.6)
SODIUM SERPL-SCNC: 137 MMOL/L (ref 136–145)
TSH, 3RD GENERATION: 1.05 UIU/ML (ref 0.27–4.2)
WBC # BLD AUTO: 7.2 K/UL (ref 4.3–11.1)

## 2024-11-12 PROCEDURE — 84443 ASSAY THYROID STIM HORMONE: CPT

## 2024-11-12 PROCEDURE — 2580000003 HC RX 258: Performed by: NURSE PRACTITIONER

## 2024-11-12 PROCEDURE — 80053 COMPREHEN METABOLIC PANEL: CPT

## 2024-11-12 PROCEDURE — 6360000002 HC RX W HCPCS: Performed by: NURSE PRACTITIONER

## 2024-11-12 PROCEDURE — 2580000003 HC RX 258: Performed by: INTERNAL MEDICINE

## 2024-11-12 PROCEDURE — 83735 ASSAY OF MAGNESIUM: CPT

## 2024-11-12 PROCEDURE — 96413 CHEMO IV INFUSION 1 HR: CPT

## 2024-11-12 PROCEDURE — 85025 COMPLETE CBC W/AUTO DIFF WBC: CPT

## 2024-11-12 RX ORDER — MEPERIDINE HYDROCHLORIDE 25 MG/ML
12.5 INJECTION INTRAMUSCULAR; INTRAVENOUS; SUBCUTANEOUS PRN
Status: DISCONTINUED | OUTPATIENT
Start: 2024-11-12 | End: 2024-11-13 | Stop reason: HOSPADM

## 2024-11-12 RX ORDER — EPINEPHRINE 1 MG/ML
0.3 INJECTION, SOLUTION, CONCENTRATE INTRAVENOUS PRN
Status: DISCONTINUED | OUTPATIENT
Start: 2024-11-12 | End: 2024-11-13 | Stop reason: HOSPADM

## 2024-11-12 RX ORDER — SODIUM CHLORIDE 0.9 % (FLUSH) 0.9 %
5-40 SYRINGE (ML) INJECTION PRN
Status: DISCONTINUED | OUTPATIENT
Start: 2024-11-12 | End: 2024-11-13 | Stop reason: HOSPADM

## 2024-11-12 RX ORDER — DIPHENHYDRAMINE HYDROCHLORIDE 50 MG/ML
50 INJECTION INTRAMUSCULAR; INTRAVENOUS
Status: DISCONTINUED | OUTPATIENT
Start: 2024-11-12 | End: 2024-11-13 | Stop reason: HOSPADM

## 2024-11-12 RX ORDER — HYDROCORTISONE SODIUM SUCCINATE 100 MG/2ML
100 INJECTION INTRAMUSCULAR; INTRAVENOUS
Status: DISCONTINUED | OUTPATIENT
Start: 2024-11-12 | End: 2024-11-13 | Stop reason: HOSPADM

## 2024-11-12 RX ORDER — ACETAMINOPHEN 325 MG/1
650 TABLET ORAL
Status: DISCONTINUED | OUTPATIENT
Start: 2024-11-12 | End: 2024-11-13 | Stop reason: HOSPADM

## 2024-11-12 RX ORDER — LIDOCAINE/PRILOCAINE 2.5 %-2.5%
CREAM (GRAM) TOPICAL
Qty: 30 G | Refills: 3 | Status: CANCELLED | OUTPATIENT
Start: 2024-11-12

## 2024-11-12 RX ORDER — ALBUTEROL SULFATE 90 UG/1
4 INHALANT RESPIRATORY (INHALATION) PRN
Status: DISCONTINUED | OUTPATIENT
Start: 2024-11-12 | End: 2024-11-13 | Stop reason: HOSPADM

## 2024-11-12 RX ORDER — ONDANSETRON 2 MG/ML
8 INJECTION INTRAMUSCULAR; INTRAVENOUS
Status: DISCONTINUED | OUTPATIENT
Start: 2024-11-12 | End: 2024-11-13 | Stop reason: HOSPADM

## 2024-11-12 RX ADMIN — SODIUM CHLORIDE, PRESERVATIVE FREE 20 ML: 5 INJECTION INTRAVENOUS at 13:08

## 2024-11-12 RX ADMIN — SODIUM CHLORIDE, PRESERVATIVE FREE 10 ML: 5 INJECTION INTRAVENOUS at 14:46

## 2024-11-12 RX ADMIN — SODIUM CHLORIDE 200 MG: 9 INJECTION, SOLUTION INTRAVENOUS at 14:27

## 2024-11-12 NOTE — PROGRESS NOTES
Patient arrived to Infusion Center for Keytruda. Assessment completed.  No needs voiced at this time. Patient tolerated infusion well and is aware of next appointment on 12/3/24 @0745.  Patient discharged ambulatory with spouse.

## 2024-11-13 ENCOUNTER — OFFICE VISIT (OUTPATIENT)
Dept: ENT CLINIC | Age: 79
End: 2024-11-13
Payer: MEDICARE

## 2024-11-13 VITALS — RESPIRATION RATE: 16 BRPM | BODY MASS INDEX: 25.76 KG/M2 | HEIGHT: 71 IN | WEIGHT: 184 LBS

## 2024-11-13 DIAGNOSIS — R13.14 PHARYNGOESOPHAGEAL DYSPHAGIA: ICD-10-CM

## 2024-11-13 DIAGNOSIS — C10.9 SQUAMOUS CELL CARCINOMA OF OROPHARYNX (HCC): Primary | ICD-10-CM

## 2024-11-13 PROCEDURE — 1160F RVW MEDS BY RX/DR IN RCRD: CPT | Performed by: STUDENT IN AN ORGANIZED HEALTH CARE EDUCATION/TRAINING PROGRAM

## 2024-11-13 PROCEDURE — 99214 OFFICE O/P EST MOD 30 MIN: CPT | Performed by: STUDENT IN AN ORGANIZED HEALTH CARE EDUCATION/TRAINING PROGRAM

## 2024-11-13 PROCEDURE — 1123F ACP DISCUSS/DSCN MKR DOCD: CPT | Performed by: STUDENT IN AN ORGANIZED HEALTH CARE EDUCATION/TRAINING PROGRAM

## 2024-11-13 PROCEDURE — 1036F TOBACCO NON-USER: CPT | Performed by: STUDENT IN AN ORGANIZED HEALTH CARE EDUCATION/TRAINING PROGRAM

## 2024-11-13 PROCEDURE — G8427 DOCREV CUR MEDS BY ELIG CLIN: HCPCS | Performed by: STUDENT IN AN ORGANIZED HEALTH CARE EDUCATION/TRAINING PROGRAM

## 2024-11-13 PROCEDURE — 31575 DIAGNOSTIC LARYNGOSCOPY: CPT | Performed by: STUDENT IN AN ORGANIZED HEALTH CARE EDUCATION/TRAINING PROGRAM

## 2024-11-13 PROCEDURE — 1159F MED LIST DOCD IN RCRD: CPT | Performed by: STUDENT IN AN ORGANIZED HEALTH CARE EDUCATION/TRAINING PROGRAM

## 2024-11-13 PROCEDURE — G8419 CALC BMI OUT NRM PARAM NOF/U: HCPCS | Performed by: STUDENT IN AN ORGANIZED HEALTH CARE EDUCATION/TRAINING PROGRAM

## 2024-11-13 PROCEDURE — G8484 FLU IMMUNIZE NO ADMIN: HCPCS | Performed by: STUDENT IN AN ORGANIZED HEALTH CARE EDUCATION/TRAINING PROGRAM

## 2024-11-13 RX ORDER — LIDOCAINE/PRILOCAINE 2.5 %-2.5%
CREAM (GRAM) TOPICAL
Qty: 30 G | Refills: 3 | Status: SHIPPED | OUTPATIENT
Start: 2024-11-13

## 2024-11-13 RX ORDER — ALUMINUM HYDROXIDE, MAGNESIUM HYDROXIDE, DIMETHICONE 400; 400; 40 MG/10ML; MG/10ML; MG/10ML
SUSPENSION ORAL
COMMUNITY
Start: 2024-08-09

## 2024-11-13 ASSESSMENT — ENCOUNTER SYMPTOMS
SHORTNESS OF BREATH: 0
SINUS PRESSURE: 0
DIARRHEA: 0
NAUSEA: 0
STRIDOR: 0
EYE PAIN: 0
FACIAL SWELLING: 0
CHOKING: 0
SINUS PAIN: 0
EYE DISCHARGE: 0
CONSTIPATION: 0
EYE ITCHING: 0
APNEA: 0
WHEEZING: 0
COUGH: 0

## 2024-11-13 NOTE — PROGRESS NOTES
May ENT Office Note    Patient: Jesus Laguna  MRN: [id number]  : 1945  Gender:  male  Vital Signs: Resp 16   Ht 1.803 m (5' 11\")   Wt 83.5 kg (184 lb)   BMI 25.66 kg/m²   Date: 2024    CC:   Chief Complaint   Patient presents with    Follow-up     3 month follow up SCC (throat)         HPI:  Jesus Laguna is a 79 y.o. male  history of squamous cell carcinoma of the left lateral tongue.   Underwent a partial glossectomy with tonsillectomy, neck dissection with a radial forearm free flap to left oral cavity on 8/3/2010. According to the referral the margins were close but negative with evidence of lymphovascular invasion as well as perineural invasion and extracapsular spread. He underwent adjuvant radiation treatment which was completed at the end of /early . Patient followed up with Dr. Boyce for oncologic surveillance. Patient developed osteoradionecrosis of the left jaw and was treated by Dr. Amaury Omalley. He underwent HBO after oral surgery intervention. The last PET scan was 2012 which demonstrated some uptake in the left jaw which was felt to be inflammatory. Patient was released from Dr. Boyce clinic at the 5 year carlee in  with no signs of recurrence. Two years after his treatment he had several teeth removed on the left lower jaw. He underwent 10 treatments of HBO due to nonhealing wound at the surgical site where the teeth were removed. The mucosa healed after treatment.   He saw his PCP and was started on a round of antibiotics for what was thought to be a sinus infection. No improvement with antibiotics. Symptoms continued and he was put on a 2nd course of antibiotics with no improvement in symptoms. On  patient woke up with left facial swelling and went to his dentist who thought he had a tooth abscess and was put on Flagyl improvement in facial swelling. Patient reports a sore throat with swallowing that has continue to progress and

## 2024-12-03 ENCOUNTER — HOSPITAL ENCOUNTER (OUTPATIENT)
Dept: INFUSION THERAPY | Age: 79
Setting detail: INFUSION SERIES
Discharge: HOME | End: 2024-12-03
Payer: MEDICARE

## 2024-12-03 ENCOUNTER — OFFICE VISIT (OUTPATIENT)
Dept: ONCOLOGY | Age: 79
End: 2024-12-03
Payer: MEDICARE

## 2024-12-03 ENCOUNTER — APPOINTMENT (OUTPATIENT)
Dept: INFUSION THERAPY | Age: 79
End: 2024-12-03
Payer: MEDICARE

## 2024-12-03 ENCOUNTER — HOSPITAL ENCOUNTER (OUTPATIENT)
Dept: LAB | Age: 79
Discharge: HOME | End: 2024-12-03
Payer: MEDICARE

## 2024-12-03 ENCOUNTER — OFFICE VISIT (OUTPATIENT)
Dept: ONCOLOGY | Age: 79
End: 2024-12-03

## 2024-12-03 VITALS
BODY MASS INDEX: 24.98 KG/M2 | TEMPERATURE: 98.8 F | DIASTOLIC BLOOD PRESSURE: 87 MMHG | WEIGHT: 178.4 LBS | OXYGEN SATURATION: 97 % | RESPIRATION RATE: 16 BRPM | SYSTOLIC BLOOD PRESSURE: 144 MMHG | HEART RATE: 105 BPM | HEIGHT: 71 IN

## 2024-12-03 DIAGNOSIS — E03.2 HYPOTHYROIDISM DUE TO MEDICAMENTS AND OTHER EXOGENOUS SUBSTANCES: ICD-10-CM

## 2024-12-03 DIAGNOSIS — C77.0 METASTASIS TO CERVICAL LYMPH NODE (HCC): ICD-10-CM

## 2024-12-03 DIAGNOSIS — C32.9 LARYNGEAL CANCER (HCC): ICD-10-CM

## 2024-12-03 DIAGNOSIS — Z78.9 ON ENTERAL NUTRITION: ICD-10-CM

## 2024-12-03 DIAGNOSIS — Z79.899 HIGH RISK MEDICATION USE: ICD-10-CM

## 2024-12-03 DIAGNOSIS — Z00.8 NUTRITIONAL ASSESSMENT: Primary | ICD-10-CM

## 2024-12-03 DIAGNOSIS — C32.9 KERATINIZING SQUAMOUS CELL CARCINOMA OF LARYNX (HCC): ICD-10-CM

## 2024-12-03 DIAGNOSIS — C10.9 SQUAMOUS CELL CARCINOMA OF OROPHARYNX (HCC): Primary | ICD-10-CM

## 2024-12-03 DIAGNOSIS — R53.83 FATIGUE DUE TO TREATMENT: ICD-10-CM

## 2024-12-03 DIAGNOSIS — E55.9 VITAMIN D DEFICIENCY: ICD-10-CM

## 2024-12-03 DIAGNOSIS — R68.89 COPIOUS ORAL SECRETIONS: ICD-10-CM

## 2024-12-03 DIAGNOSIS — C32.9 LARYNGEAL CANCER (HCC): Primary | ICD-10-CM

## 2024-12-03 LAB
ALBUMIN SERPL-MCNC: 3 G/DL (ref 3.2–4.6)
ALBUMIN/GLOB SERPL: 0.6 (ref 1–1.9)
ALP SERPL-CCNC: 80 U/L (ref 40–129)
ALT SERPL-CCNC: 20 U/L (ref 8–55)
ANION GAP SERPL CALC-SCNC: 11 MMOL/L (ref 7–16)
AST SERPL-CCNC: 27 U/L (ref 15–37)
BASOPHILS # BLD: 0.1 K/UL (ref 0–0.2)
BASOPHILS NFR BLD: 0 % (ref 0–2)
BILIRUB SERPL-MCNC: 0.4 MG/DL (ref 0–1.2)
BUN SERPL-MCNC: 21 MG/DL (ref 8–23)
CALCIUM SERPL-MCNC: 9.5 MG/DL (ref 8.8–10.2)
CHLORIDE SERPL-SCNC: 98 MMOL/L (ref 98–107)
CO2 SERPL-SCNC: 27 MMOL/L (ref 20–29)
CREAT SERPL-MCNC: 0.64 MG/DL (ref 0.8–1.3)
DIFFERENTIAL METHOD BLD: ABNORMAL
EOSINOPHIL # BLD: 0.1 K/UL (ref 0–0.8)
EOSINOPHIL NFR BLD: 0 % (ref 0.5–7.8)
ERYTHROCYTE [DISTWIDTH] IN BLOOD BY AUTOMATED COUNT: 13.9 % (ref 11.9–14.6)
GLOBULIN SER CALC-MCNC: 4.7 G/DL (ref 2.3–3.5)
GLUCOSE SERPL-MCNC: 128 MG/DL (ref 70–99)
HCT VFR BLD AUTO: 37.3 % (ref 41.1–50.3)
HGB BLD-MCNC: 11.7 G/DL (ref 13.6–17.2)
IMM GRANULOCYTES # BLD AUTO: 0.1 K/UL (ref 0–0.5)
IMM GRANULOCYTES NFR BLD AUTO: 1 % (ref 0–5)
LYMPHOCYTES # BLD: 1 K/UL (ref 0.5–4.6)
LYMPHOCYTES NFR BLD: 8 % (ref 13–44)
MAGNESIUM SERPL-MCNC: 2.1 MG/DL (ref 1.8–2.4)
MCH RBC QN AUTO: 27.5 PG (ref 26.1–32.9)
MCHC RBC AUTO-ENTMCNC: 31.4 G/DL (ref 31.4–35)
MCV RBC AUTO: 87.6 FL (ref 82–102)
MONOCYTES # BLD: 1.4 K/UL (ref 0.1–1.3)
MONOCYTES NFR BLD: 12 % (ref 4–12)
NEUTS SEG # BLD: 9 K/UL (ref 1.7–8.2)
NEUTS SEG NFR BLD: 79 % (ref 43–78)
NRBC # BLD: 0 K/UL (ref 0–0.2)
PLATELET # BLD AUTO: 353 K/UL (ref 150–450)
PMV BLD AUTO: 9.7 FL (ref 9.4–12.3)
POTASSIUM SERPL-SCNC: 3.6 MMOL/L (ref 3.5–5.1)
PROT SERPL-MCNC: 7.7 G/DL (ref 6.3–8.2)
RBC # BLD AUTO: 4.26 M/UL (ref 4.23–5.6)
SODIUM SERPL-SCNC: 136 MMOL/L (ref 136–145)
TSH, 3RD GENERATION: 1.76 UIU/ML (ref 0.27–4.2)
WBC # BLD AUTO: 11.6 K/UL (ref 4.3–11.1)

## 2024-12-03 PROCEDURE — G8420 CALC BMI NORM PARAMETERS: HCPCS | Performed by: NURSE PRACTITIONER

## 2024-12-03 PROCEDURE — 6360000002 HC RX W HCPCS: Performed by: NURSE PRACTITIONER

## 2024-12-03 PROCEDURE — 1159F MED LIST DOCD IN RCRD: CPT | Performed by: NURSE PRACTITIONER

## 2024-12-03 PROCEDURE — 96413 CHEMO IV INFUSION 1 HR: CPT

## 2024-12-03 PROCEDURE — 1036F TOBACCO NON-USER: CPT | Performed by: NURSE PRACTITIONER

## 2024-12-03 PROCEDURE — 2580000003 HC RX 258: Performed by: NURSE PRACTITIONER

## 2024-12-03 PROCEDURE — 85025 COMPLETE CBC W/AUTO DIFF WBC: CPT

## 2024-12-03 PROCEDURE — 2580000003 HC RX 258: Performed by: INTERNAL MEDICINE

## 2024-12-03 PROCEDURE — G8427 DOCREV CUR MEDS BY ELIG CLIN: HCPCS | Performed by: NURSE PRACTITIONER

## 2024-12-03 PROCEDURE — G8484 FLU IMMUNIZE NO ADMIN: HCPCS | Performed by: NURSE PRACTITIONER

## 2024-12-03 PROCEDURE — 99214 OFFICE O/P EST MOD 30 MIN: CPT | Performed by: NURSE PRACTITIONER

## 2024-12-03 PROCEDURE — 83735 ASSAY OF MAGNESIUM: CPT

## 2024-12-03 PROCEDURE — 80053 COMPREHEN METABOLIC PANEL: CPT

## 2024-12-03 PROCEDURE — 84443 ASSAY THYROID STIM HORMONE: CPT

## 2024-12-03 PROCEDURE — 1126F AMNT PAIN NOTED NONE PRSNT: CPT | Performed by: NURSE PRACTITIONER

## 2024-12-03 PROCEDURE — 1123F ACP DISCUSS/DSCN MKR DOCD: CPT | Performed by: NURSE PRACTITIONER

## 2024-12-03 RX ORDER — DIPHENHYDRAMINE HYDROCHLORIDE 50 MG/ML
50 INJECTION INTRAMUSCULAR; INTRAVENOUS
OUTPATIENT
Start: 2024-12-31

## 2024-12-03 RX ORDER — MEPERIDINE HYDROCHLORIDE 50 MG/ML
12.5 INJECTION INTRAMUSCULAR; INTRAVENOUS; SUBCUTANEOUS PRN
OUTPATIENT
Start: 2024-12-31

## 2024-12-03 RX ORDER — HEPARIN SODIUM (PORCINE) LOCK FLUSH IV SOLN 100 UNIT/ML 100 UNIT/ML
500 SOLUTION INTRAVENOUS PRN
OUTPATIENT
Start: 2025-01-04

## 2024-12-03 RX ORDER — FAMOTIDINE 10 MG/ML
20 INJECTION, SOLUTION INTRAVENOUS
Status: CANCELLED | OUTPATIENT
Start: 2024-12-03

## 2024-12-03 RX ORDER — ACETAMINOPHEN 325 MG/1
650 TABLET ORAL
Status: CANCELLED | OUTPATIENT
Start: 2024-12-03

## 2024-12-03 RX ORDER — DIPHENHYDRAMINE HYDROCHLORIDE 50 MG/ML
50 INJECTION INTRAMUSCULAR; INTRAVENOUS
Status: CANCELLED | OUTPATIENT
Start: 2024-12-03

## 2024-12-03 RX ORDER — HEPARIN SODIUM (PORCINE) LOCK FLUSH IV SOLN 100 UNIT/ML 100 UNIT/ML
500 SOLUTION INTRAVENOUS PRN
OUTPATIENT
Start: 2024-12-31

## 2024-12-03 RX ORDER — HYDROCORTISONE SODIUM SUCCINATE 100 MG/2ML
100 INJECTION INTRAMUSCULAR; INTRAVENOUS
OUTPATIENT
Start: 2024-12-31

## 2024-12-03 RX ORDER — SODIUM CHLORIDE 0.9 % (FLUSH) 0.9 %
5-40 SYRINGE (ML) INJECTION PRN
Status: CANCELLED | OUTPATIENT
Start: 2024-12-03

## 2024-12-03 RX ORDER — EPINEPHRINE 1 MG/ML
0.3 INJECTION, SOLUTION, CONCENTRATE INTRAVENOUS PRN
Status: CANCELLED | OUTPATIENT
Start: 2024-12-03

## 2024-12-03 RX ORDER — ALBUTEROL SULFATE 90 UG/1
4 INHALANT RESPIRATORY (INHALATION) PRN
OUTPATIENT
Start: 2024-12-31

## 2024-12-03 RX ORDER — ONDANSETRON 2 MG/ML
8 INJECTION INTRAMUSCULAR; INTRAVENOUS
OUTPATIENT
Start: 2024-12-31

## 2024-12-03 RX ORDER — SODIUM CHLORIDE 9 MG/ML
5-250 INJECTION, SOLUTION INTRAVENOUS PRN
OUTPATIENT
Start: 2024-12-31

## 2024-12-03 RX ORDER — HYDROCORTISONE SODIUM SUCCINATE 100 MG/2ML
100 INJECTION INTRAMUSCULAR; INTRAVENOUS
Status: CANCELLED | OUTPATIENT
Start: 2024-12-03

## 2024-12-03 RX ORDER — SODIUM CHLORIDE 0.9 % (FLUSH) 0.9 %
5-40 SYRINGE (ML) INJECTION PRN
Status: DISCONTINUED | OUTPATIENT
Start: 2024-12-03 | End: 2024-12-04 | Stop reason: HOSPADM

## 2024-12-03 RX ORDER — SODIUM CHLORIDE 0.9 % (FLUSH) 0.9 %
5-40 SYRINGE (ML) INJECTION PRN
OUTPATIENT
Start: 2025-01-04

## 2024-12-03 RX ORDER — SODIUM CHLORIDE 9 MG/ML
5-250 INJECTION, SOLUTION INTRAVENOUS PRN
Status: CANCELLED | OUTPATIENT
Start: 2024-12-03

## 2024-12-03 RX ORDER — SODIUM CHLORIDE 9 MG/ML
INJECTION, SOLUTION INTRAVENOUS CONTINUOUS
OUTPATIENT
Start: 2024-12-31

## 2024-12-03 RX ORDER — SODIUM CHLORIDE 9 MG/ML
INJECTION, SOLUTION INTRAVENOUS CONTINUOUS
Status: CANCELLED | OUTPATIENT
Start: 2024-12-03

## 2024-12-03 RX ORDER — EPINEPHRINE 1 MG/ML
0.3 INJECTION, SOLUTION, CONCENTRATE INTRAVENOUS PRN
OUTPATIENT
Start: 2024-12-31

## 2024-12-03 RX ORDER — GLYCOPYRROLATE 1 MG/1
1 TABLET ORAL 3 TIMES DAILY
Qty: 90 TABLET | Refills: 3 | Status: SHIPPED | OUTPATIENT
Start: 2024-12-03

## 2024-12-03 RX ORDER — ALBUTEROL SULFATE 90 UG/1
4 INHALANT RESPIRATORY (INHALATION) PRN
Status: CANCELLED | OUTPATIENT
Start: 2024-12-03

## 2024-12-03 RX ORDER — HEPARIN SODIUM (PORCINE) LOCK FLUSH IV SOLN 100 UNIT/ML 100 UNIT/ML
500 SOLUTION INTRAVENOUS PRN
Status: CANCELLED | OUTPATIENT
Start: 2024-12-03

## 2024-12-03 RX ORDER — ACETAMINOPHEN 325 MG/1
650 TABLET ORAL
OUTPATIENT
Start: 2024-12-31

## 2024-12-03 RX ORDER — SODIUM CHLORIDE 0.9 % (FLUSH) 0.9 %
5-40 SYRINGE (ML) INJECTION PRN
OUTPATIENT
Start: 2024-12-31

## 2024-12-03 RX ORDER — FAMOTIDINE 10 MG/ML
20 INJECTION, SOLUTION INTRAVENOUS
OUTPATIENT
Start: 2024-12-31

## 2024-12-03 RX ORDER — ONDANSETRON 2 MG/ML
8 INJECTION INTRAMUSCULAR; INTRAVENOUS
Status: CANCELLED | OUTPATIENT
Start: 2024-12-03

## 2024-12-03 RX ORDER — MEPERIDINE HYDROCHLORIDE 50 MG/ML
12.5 INJECTION INTRAMUSCULAR; INTRAVENOUS; SUBCUTANEOUS PRN
Status: CANCELLED | OUTPATIENT
Start: 2024-12-03

## 2024-12-03 RX ORDER — SODIUM CHLORIDE 9 MG/ML
5-250 INJECTION, SOLUTION INTRAVENOUS PRN
OUTPATIENT
Start: 2025-01-04

## 2024-12-03 RX ORDER — ONDANSETRON 2 MG/ML
8 INJECTION INTRAMUSCULAR; INTRAVENOUS ONCE
OUTPATIENT
Start: 2024-12-31 | End: 2024-12-31

## 2024-12-03 RX ADMIN — SODIUM CHLORIDE 200 MG: 9 INJECTION, SOLUTION INTRAVENOUS at 10:06

## 2024-12-03 RX ADMIN — SODIUM CHLORIDE, PRESERVATIVE FREE 10 ML: 5 INJECTION INTRAVENOUS at 10:03

## 2024-12-03 RX ADMIN — SODIUM CHLORIDE, PRESERVATIVE FREE 40 ML: 5 INJECTION INTRAVENOUS at 10:39

## 2024-12-03 RX ADMIN — SODIUM CHLORIDE, PRESERVATIVE FREE 10 ML: 5 INJECTION INTRAVENOUS at 08:06

## 2024-12-03 ASSESSMENT — PATIENT HEALTH QUESTIONNAIRE - PHQ9
1. LITTLE INTEREST OR PLEASURE IN DOING THINGS: NOT AT ALL
SUM OF ALL RESPONSES TO PHQ QUESTIONS 1-9: 0
SUM OF ALL RESPONSES TO PHQ9 QUESTIONS 1 & 2: 0
SUM OF ALL RESPONSES TO PHQ QUESTIONS 1-9: 0
2. FEELING DOWN, DEPRESSED OR HOPELESS: NOT AT ALL
SUM OF ALL RESPONSES TO PHQ QUESTIONS 1-9: 0
SUM OF ALL RESPONSES TO PHQ QUESTIONS 1-9: 0

## 2024-12-03 NOTE — PROGRESS NOTES
needed.  He denies having any shortness of breath. He does cough often. He has increased seasonal allergy symptoms this week - will add a nasal steroid and Claritin OTC.   He has no pain. No fevers or infectious symptoms. No bleeding. Labs reviewed and adequate to proceed with Keytruda alone. He will follow up in 3 weeks for next Keytruda with PET/CT prior. Call with any fevers, uncontrolled side effects from treatment or any other worrisome/concerning symptoms.       10/1/2024: Today accompanied with wife.  Reports doing well, completely dependent on feeding tube.  Received carbo-5-FU x 6 on 7/29/2024, and thereafter switched to single agent pembrolizumab.  PET scan in 8/7/2024 with ND, and PET scan 9/26/2024 with disease stability.  Blood work today unremarkable.  Discussed results, continue single agent pembrolizumab with next rescan end of December.  We may need to reintroduce chemotherapy at that point.     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 He completed 6 cycles of carbo/5-FU/Keytruda (restarted - total of 12) and he returns today for next cycle of Keytruda monotherapy. He continues to do very well overall. He has some fatigue which is manageable.  He continues to obtain all his nutrition via his feeding tube due to aspiration. He continues scopolamine patch for increased oral secretions but this has not been as helpful recently - will prescribe glycopyrrolate 1 mg TID PRN to see if this works better.  He has had no nausea or vomiting, bowels moving with Colace as needed.  He denies having any shortness of breath. He does cough often. He has no pain. No fevers or infectious symptoms. No bleeding. Labs reviewed and adequate to proceed with Keytruda alone. He will follow up in 3 weeks for next Keytruda and in 6 weeks for OV/Keytruda.  Call with any fevers, uncontrolled side effects from treatment or

## 2024-12-03 NOTE — PATIENT INSTRUCTIONS
Patient Information from Today's Visit    The members of your [facility]cology [facility] are listed below:    Physician Provider: Kasandra Olvera Medical Oncologist  Advanced Practice Clinician: Neelam Schaeffer NP  Registered Nurse: Lucille ASHER RN  Nurse Navigator: Loraine CUELLAR RD  Medical Assistant: Celsa ASHER MA  :Breanna WARNER  Supportive Care Services: Jenny SOTELO LMSW and Loraine CUELLAR, Registered Dietitian    Diagnosis:   Metastatic H&N Cancer - Keytruda     Follow Up Instructions:   Dr. Cortez's exam showed that the tumor on the base of your tongue is growing.     Proceed with Keytruda today as planned, but we will plan to resume a chemotherapy regimen on (12/26) given the growth of your tumor.     Continue with Robinul as needed for secretions    You can try prune juice in your feeding tube to help with constipation       Treatment Summary has been discussed and given to patient: No      Current Labs:   Hospital Outpatient Visit on 12/03/2024   Component Date Value Ref Range Status    Sodium 12/03/2024 136  136 - 145 mmol/L Final    Potassium 12/03/2024 3.6  3.5 - 5.1 mmol/L Final    Chloride 12/03/2024 98  98 - 107 mmol/L Final    CO2 12/03/2024 27  20 - 29 mmol/L Final    Anion Gap 12/03/2024 11  7 - 16 mmol/L Final    Glucose 12/03/2024 128 (H)  70 - 99 mg/dL Final    Comment: <70 mg/dL Consistent with, but not fully diagnostic of hypoglycemia.  100 - 125 mg/dL Impaired fasting glucose/consistent with pre-diabetes mellitus.  > 126 mg/dl Fasting glucose consistent with overt diabetes mellitus      BUN 12/03/2024 21  8 - 23 MG/DL Final    Creatinine 12/03/2024 0.64 (L)  0.80 - 1.30 MG/DL Final    Est, Glom Filt Rate 12/03/2024 >90  >60 ml/min/1.73m2 Final    Comment:    Pediatric calculator link: https://www.kidney.org/professionals/kdoqi/gfr_calculatorped     These results are not intended for use in patients <18 years of age.     eGFR results are calculated without a race factor using  the 2021 CKD-EPI

## 2024-12-07 NOTE — PROGRESS NOTES
Nutrition F/U:  Assessment:  Pt seen during office visit w/ NP, recent ENT exam by Dr. Cortez showing enlargement in BOT mass - plan to proceed with Keytruda alone today, and resume chemo/immunotherapy after the holidays; pt will resume Carbo + 5-FU (4 day pump) + Keytruda q 3 weeks.  Pt remains NPO/TF dependent for estimated nutrition needs. Current BW: 178#, down 5# over the past 6 weeks - pt contributes this to feeling more fatigued and not exercising like he normally does.     Intervention:  1. NPO, continue w/ TF as prescribed  2. Keytruda today, resume Carbo + 5-FU (4 day pump) + Keytruda after the holidays - POC discussed with Dr. Olvera    Monitoring/Evaluation:  1. RD to follow up during next office visit - follow up wt status, tolerance/intake of TF, symptom management.      Loraine Honeycutt RD, , LD  [phone]

## 2024-12-13 ENCOUNTER — TELEPHONE (OUTPATIENT)
Dept: ONCOLOGY | Age: 79
End: 2024-12-13

## 2024-12-13 NOTE — TELEPHONE ENCOUNTER
Chart reviewed by NP.  Returned call to patient and wife.  Informed since patient is reluctant to being evaluated in ER, please monitor and present to ER if worsens.  Verbalized understanding.  
Physician provider: Dr. Olvera  Reason for today's call (Please detail here patients chief complaint): Throat bleeding   Last office visit:na  Patient Callback Number: [phone]  Was callback number verified?: Yes  Preferred pharmacy (If refill request): na  Calls to office within the last 48 hours?:No    Patient notified that their information will be routed to the [facility] clinical triage team for review. Patient is advised that they will receive a phone call from the triage department. If symptom related and symptoms worsen before receiving a call back, the patient has been advised to proceed to the nearest ED.     Patients wife on the phone stating husbands throat is bleeding, has been for a few days, asking what to do.   
   Monocytes Absolute 12/03/2024 1.4 (H)  0.1 - 1.3 K/UL Final    Eosinophils Absolute 12/03/2024 0.1  0.0 - 0.8 K/UL Final    Basophils Absolute 12/03/2024 0.1  0.0 - 0.2 K/UL Final    Immature Granulocytes Absolute 12/03/2024 0.1  0.0 - 0.5 K/UL Final    Differential Type 12/03/2024 AUTOMATED    Final    Magnesium 12/03/2024 2.1  1.8 - 2.4 mg/dL Final          Plan/Intervention    Page Number of Telephone Triage for Oncology Nurses Referenced: 94  Initial Action Plan: RN to Provider Consultation  Patient verbalized understanding of plan: YES/NO: Yes  Patient voiced intended compliance with plan: Verbalized/ Refused: Verbalized intended compliance    Resolution:  Informed patient and spouse will forward all to Dr. Olvera' team.    Final Plan: Pending

## 2024-12-20 ENCOUNTER — HOSPITAL ENCOUNTER (OUTPATIENT)
Dept: PET IMAGING | Age: 79
Discharge: HOME | End: 2024-12-23
Payer: MEDICARE

## 2024-12-20 DIAGNOSIS — Z79.899 HIGH RISK MEDICATION USE: ICD-10-CM

## 2024-12-20 DIAGNOSIS — C32.9 LARYNGEAL CANCER (HCC): ICD-10-CM

## 2024-12-20 LAB
GLUCOSE BLD STRIP.AUTO-MCNC: 119 MG/DL (ref 65–100)
SERVICE CMNT-IMP: ABNORMAL

## 2024-12-20 PROCEDURE — A9609 HC RX DIAGNOSTIC RADIOPHARMACEUTICAL: HCPCS | Performed by: INTERNAL MEDICINE

## 2024-12-20 PROCEDURE — 3430000000 HC RX DIAGNOSTIC RADIOPHARMACEUTICAL: Performed by: INTERNAL MEDICINE

## 2024-12-20 PROCEDURE — 2500000003 HC RX 250 WO HCPCS: Performed by: INTERNAL MEDICINE

## 2024-12-20 PROCEDURE — 82962 GLUCOSE BLOOD TEST: CPT

## 2024-12-20 PROCEDURE — 78815 PET IMAGE W/CT SKULL-THIGH: CPT

## 2024-12-20 RX ORDER — FLUDEOXYGLUCOSE F 18 200 MCI/ML
12.44 INJECTION, SOLUTION INTRAVENOUS
Status: COMPLETED | OUTPATIENT
Start: 2024-12-20 | End: 2024-12-20

## 2024-12-20 RX ORDER — DIATRIZOATE MEGLUMINE AND DIATRIZOATE SODIUM 660; 100 MG/ML; MG/ML
10 SOLUTION ORAL; RECTAL
Status: DISCONTINUED | OUTPATIENT
Start: 2024-12-20 | End: 2024-12-24 | Stop reason: HOSPADM

## 2024-12-20 RX ORDER — SODIUM CHLORIDE 0.9 % (FLUSH) 0.9 %
10 SYRINGE (ML) INJECTION ONCE AS NEEDED
Status: COMPLETED | OUTPATIENT
Start: 2024-12-20 | End: 2024-12-20

## 2024-12-20 RX ADMIN — SODIUM CHLORIDE, PRESERVATIVE FREE 10 ML: 5 INJECTION INTRAVENOUS at 08:20

## 2024-12-20 RX ADMIN — FLUDEOXYGLUCOSE F 18 12.44 MILLICURIE: 200 INJECTION, SOLUTION INTRAVENOUS at 08:20

## 2024-12-30 ENCOUNTER — APPOINTMENT (OUTPATIENT)
Dept: MRI IMAGING | Age: 79
DRG: 871 | End: 2024-12-30
Payer: MEDICARE

## 2024-12-30 ENCOUNTER — APPOINTMENT (OUTPATIENT)
Dept: CT IMAGING | Age: 79
DRG: 871 | End: 2024-12-30
Payer: MEDICARE

## 2024-12-30 ENCOUNTER — APPOINTMENT (OUTPATIENT)
Dept: GENERAL RADIOLOGY | Age: 79
DRG: 871 | End: 2024-12-30
Payer: MEDICARE

## 2024-12-30 ENCOUNTER — HOSPITAL ENCOUNTER (INPATIENT)
Age: 79
LOS: 3 days | Discharge: HOME | DRG: 871 | End: 2025-01-03
Attending: EMERGENCY MEDICINE | Admitting: STUDENT IN AN ORGANIZED HEALTH CARE EDUCATION/TRAINING PROGRAM
Payer: MEDICARE

## 2024-12-30 DIAGNOSIS — R41.82 ALTERED MENTAL STATUS, UNSPECIFIED ALTERED MENTAL STATUS TYPE: Primary | ICD-10-CM

## 2024-12-30 LAB
ALBUMIN SERPL-MCNC: 2.6 G/DL (ref 3.2–4.6)
ALBUMIN/GLOB SERPL: 0.6 (ref 1–1.9)
ALP SERPL-CCNC: 72 U/L (ref 40–129)
ALT SERPL-CCNC: 16 U/L (ref 8–55)
AMMONIA PLAS-SCNC: 12 UMOL/L (ref 16–60)
AMPHET UR QL SCN: NEGATIVE
ANION GAP SERPL CALC-SCNC: 11 MMOL/L (ref 7–16)
APPEARANCE UR: CLEAR
AST SERPL-CCNC: 27 U/L (ref 15–37)
B PERT DNA SPEC QL NAA+PROBE: NOT DETECTED
B PERT DNA SPEC QL NAA+PROBE: NOT DETECTED
BACTERIA URNS QL MICRO: 0 /HPF
BARBITURATES UR QL SCN: NEGATIVE
BASOPHILS # BLD: 0 K/UL (ref 0–0.2)
BASOPHILS NFR BLD: 0 % (ref 0–2)
BENZODIAZ UR QL: NEGATIVE
BILIRUB SERPL-MCNC: 0.3 MG/DL (ref 0–1.2)
BILIRUB UR QL: NEGATIVE
BORDETELLA PARAPERTUSSIS BY PCR: NOT DETECTED
BORDETELLA PARAPERTUSSIS BY PCR: NOT DETECTED
BUN SERPL-MCNC: 29 MG/DL (ref 8–23)
C PNEUM DNA SPEC QL NAA+PROBE: NOT DETECTED
C PNEUM DNA SPEC QL NAA+PROBE: NOT DETECTED
CALCIUM SERPL-MCNC: 8.7 MG/DL (ref 8.8–10.2)
CANNABINOIDS UR QL SCN: NEGATIVE
CHLORIDE SERPL-SCNC: 101 MMOL/L (ref 98–107)
CO2 SERPL-SCNC: 25 MMOL/L (ref 20–29)
COCAINE UR QL SCN: NEGATIVE
COLOR UR: NORMAL
CREAT SERPL-MCNC: 0.64 MG/DL (ref 0.8–1.3)
DIFFERENTIAL METHOD BLD: ABNORMAL
EKG ATRIAL RATE: 101 BPM
EKG DIAGNOSIS: NORMAL
EKG P AXIS: 33 DEGREES
EKG P-R INTERVAL: 165 MS
EKG Q-T INTERVAL: 342 MS
EKG QRS DURATION: 106 MS
EKG QTC CALCULATION (BAZETT): 444 MS
EKG R AXIS: 5 DEGREES
EKG T AXIS: 27 DEGREES
EKG VENTRICULAR RATE: 101 BPM
EOSINOPHIL # BLD: 0.1 K/UL (ref 0–0.8)
EOSINOPHIL NFR BLD: 1 % (ref 0.5–7.8)
ERYTHROCYTE [DISTWIDTH] IN BLOOD BY AUTOMATED COUNT: 14 % (ref 11.9–14.6)
FLUAV SUBTYP SPEC NAA+PROBE: NOT DETECTED
FLUAV SUBTYP SPEC NAA+PROBE: NOT DETECTED
FLUBV RNA SPEC QL NAA+PROBE: NOT DETECTED
FLUBV RNA SPEC QL NAA+PROBE: NOT DETECTED
FOLATE SERPL-MCNC: >40 NG/ML (ref 3.1–17.5)
GLOBULIN SER CALC-MCNC: 4.5 G/DL (ref 2.3–3.5)
GLUCOSE BLD STRIP.AUTO-MCNC: 182 MG/DL (ref 65–100)
GLUCOSE SERPL-MCNC: 113 MG/DL (ref 70–99)
GLUCOSE UR STRIP.AUTO-MCNC: NEGATIVE MG/DL
HADV DNA SPEC QL NAA+PROBE: NOT DETECTED
HADV DNA SPEC QL NAA+PROBE: NOT DETECTED
HCOV 229E RNA SPEC QL NAA+PROBE: NOT DETECTED
HCOV 229E RNA SPEC QL NAA+PROBE: NOT DETECTED
HCOV HKU1 RNA SPEC QL NAA+PROBE: NOT DETECTED
HCOV HKU1 RNA SPEC QL NAA+PROBE: NOT DETECTED
HCOV NL63 RNA SPEC QL NAA+PROBE: NOT DETECTED
HCOV NL63 RNA SPEC QL NAA+PROBE: NOT DETECTED
HCOV OC43 RNA SPEC QL NAA+PROBE: NOT DETECTED
HCOV OC43 RNA SPEC QL NAA+PROBE: NOT DETECTED
HCT VFR BLD AUTO: 31.3 % (ref 41.1–50.3)
HGB BLD-MCNC: 9.7 G/DL (ref 13.6–17.2)
HGB UR QL STRIP: NEGATIVE
HMPV RNA SPEC QL NAA+PROBE: NOT DETECTED
HMPV RNA SPEC QL NAA+PROBE: NOT DETECTED
HPIV1 RNA SPEC QL NAA+PROBE: NOT DETECTED
HPIV1 RNA SPEC QL NAA+PROBE: NOT DETECTED
HPIV2 RNA SPEC QL NAA+PROBE: NOT DETECTED
HPIV2 RNA SPEC QL NAA+PROBE: NOT DETECTED
HPIV3 RNA SPEC QL NAA+PROBE: NOT DETECTED
HPIV3 RNA SPEC QL NAA+PROBE: NOT DETECTED
HPIV4 RNA SPEC QL NAA+PROBE: NOT DETECTED
HPIV4 RNA SPEC QL NAA+PROBE: NOT DETECTED
IMM GRANULOCYTES # BLD AUTO: 0.1 K/UL (ref 0–0.5)
IMM GRANULOCYTES NFR BLD AUTO: 1 % (ref 0–5)
KETONES UR QL STRIP.AUTO: NEGATIVE MG/DL
LACTATE SERPL-SCNC: 0.8 MMOL/L (ref 0.5–2)
LACTATE SERPL-SCNC: 1.2 MMOL/L (ref 0.5–2)
LEUKOCYTE ESTERASE UR QL STRIP.AUTO: NEGATIVE
LYMPHOCYTES # BLD: 0.8 K/UL (ref 0.5–4.6)
LYMPHOCYTES NFR BLD: 8 % (ref 13–44)
M PNEUMO DNA SPEC QL NAA+PROBE: NOT DETECTED
M PNEUMO DNA SPEC QL NAA+PROBE: NOT DETECTED
MCH RBC QN AUTO: 26.9 PG (ref 26.1–32.9)
MCHC RBC AUTO-ENTMCNC: 31 G/DL (ref 31.4–35)
MCV RBC AUTO: 86.7 FL (ref 82–102)
METHADONE UR QL: NEGATIVE
MONOCYTES # BLD: 1.1 K/UL (ref 0.1–1.3)
MONOCYTES NFR BLD: 11 % (ref 4–12)
NEUTS SEG # BLD: 8.4 K/UL (ref 1.7–8.2)
NEUTS SEG NFR BLD: 79 % (ref 43–78)
NITRITE UR QL STRIP.AUTO: NEGATIVE
NRBC # BLD: 0.03 K/UL (ref 0–0.2)
OPIATES UR QL: NEGATIVE
PCP UR QL: NEGATIVE
PH UR STRIP: 6.5 (ref 5–9)
PLATELET # BLD AUTO: 315 K/UL (ref 150–450)
PMV BLD AUTO: 9.6 FL (ref 9.4–12.3)
POTASSIUM SERPL-SCNC: 3.8 MMOL/L (ref 3.5–5.1)
PROCALCITONIN SERPL-MCNC: 0.07 NG/ML (ref 0–0.1)
PROT SERPL-MCNC: 7 G/DL (ref 6.3–8.2)
PROT UR STRIP-MCNC: NEGATIVE MG/DL
RBC # BLD AUTO: 3.61 M/UL (ref 4.23–5.6)
RSV RNA SPEC QL NAA+PROBE: NOT DETECTED
RSV RNA SPEC QL NAA+PROBE: NOT DETECTED
RV+EV RNA SPEC QL NAA+PROBE: NOT DETECTED
RV+EV RNA SPEC QL NAA+PROBE: NOT DETECTED
SARS-COV-2 RNA RESP QL NAA+PROBE: NOT DETECTED
SARS-COV-2 RNA RESP QL NAA+PROBE: NOT DETECTED
SERVICE CMNT-IMP: ABNORMAL
SODIUM SERPL-SCNC: 137 MMOL/L (ref 136–145)
SP GR UR REFRACTOMETRY: 1.02 (ref 1–1.02)
T PALLIDUM AB SER QL IA: NONREACTIVE
UROBILINOGEN UR QL STRIP.AUTO: 1 EU/DL (ref 0.2–1)
VIT B12 SERPL-MCNC: 657 PG/ML (ref 193–986)
WBC # BLD AUTO: 10.5 K/UL (ref 4.3–11.1)

## 2024-12-30 PROCEDURE — 80053 COMPREHEN METABOLIC PANEL: CPT

## 2024-12-30 PROCEDURE — G0378 HOSPITAL OBSERVATION PER HR: HCPCS

## 2024-12-30 PROCEDURE — 86780 TREPONEMA PALLIDUM: CPT

## 2024-12-30 PROCEDURE — 6370000000 HC RX 637 (ALT 250 FOR IP): Performed by: NURSE PRACTITIONER

## 2024-12-30 PROCEDURE — 82746 ASSAY OF FOLIC ACID SERUM: CPT

## 2024-12-30 PROCEDURE — 87040 BLOOD CULTURE FOR BACTERIA: CPT

## 2024-12-30 PROCEDURE — 82607 VITAMIN B-12: CPT

## 2024-12-30 PROCEDURE — 6360000002 HC RX W HCPCS: Performed by: EMERGENCY MEDICINE

## 2024-12-30 PROCEDURE — 6370000000 HC RX 637 (ALT 250 FOR IP): Performed by: FAMILY MEDICINE

## 2024-12-30 PROCEDURE — 93010 ELECTROCARDIOGRAM REPORT: CPT | Performed by: INTERNAL MEDICINE

## 2024-12-30 PROCEDURE — 82962 GLUCOSE BLOOD TEST: CPT

## 2024-12-30 PROCEDURE — 2580000003 HC RX 258: Performed by: PHYSICIAN ASSISTANT

## 2024-12-30 PROCEDURE — 96372 THER/PROPH/DIAG INJ SC/IM: CPT

## 2024-12-30 PROCEDURE — 70450 CT HEAD/BRAIN W/O DYE: CPT

## 2024-12-30 PROCEDURE — 6360000002 HC RX W HCPCS: Performed by: PHYSICIAN ASSISTANT

## 2024-12-30 PROCEDURE — 6360000002 HC RX W HCPCS: Performed by: NURSE PRACTITIONER

## 2024-12-30 PROCEDURE — 71046 X-RAY EXAM CHEST 2 VIEWS: CPT

## 2024-12-30 PROCEDURE — 96365 THER/PROPH/DIAG IV INF INIT: CPT

## 2024-12-30 PROCEDURE — APPSS60 APP SPLIT SHARED TIME 46-60 MINUTES: Performed by: NURSE PRACTITIONER

## 2024-12-30 PROCEDURE — 0202U NFCT DS 22 TRGT SARS-COV-2: CPT

## 2024-12-30 PROCEDURE — 99285 EMERGENCY DEPT VISIT HI MDM: CPT

## 2024-12-30 PROCEDURE — 2580000003 HC RX 258: Performed by: EMERGENCY MEDICINE

## 2024-12-30 PROCEDURE — 99223 1ST HOSP IP/OBS HIGH 75: CPT | Performed by: INTERNAL MEDICINE

## 2024-12-30 PROCEDURE — 96375 TX/PRO/DX INJ NEW DRUG ADDON: CPT

## 2024-12-30 PROCEDURE — 6370000000 HC RX 637 (ALT 250 FOR IP): Performed by: PHYSICIAN ASSISTANT

## 2024-12-30 PROCEDURE — 93005 ELECTROCARDIOGRAM TRACING: CPT | Performed by: EMERGENCY MEDICINE

## 2024-12-30 PROCEDURE — 83605 ASSAY OF LACTIC ACID: CPT

## 2024-12-30 PROCEDURE — 36415 COLL VENOUS BLD VENIPUNCTURE: CPT

## 2024-12-30 PROCEDURE — 81003 URINALYSIS AUTO W/O SCOPE: CPT

## 2024-12-30 PROCEDURE — 82140 ASSAY OF AMMONIA: CPT

## 2024-12-30 PROCEDURE — 84145 PROCALCITONIN (PCT): CPT

## 2024-12-30 PROCEDURE — 80307 DRUG TEST PRSMV CHEM ANLYZR: CPT

## 2024-12-30 PROCEDURE — 85025 COMPLETE CBC W/AUTO DIFF WBC: CPT

## 2024-12-30 PROCEDURE — 2500000003 HC RX 250 WO HCPCS: Performed by: FAMILY MEDICINE

## 2024-12-30 PROCEDURE — 6360000002 HC RX W HCPCS: Performed by: FAMILY MEDICINE

## 2024-12-30 RX ORDER — HALOPERIDOL 5 MG/ML
5 INJECTION INTRAMUSCULAR ONCE
Status: DISCONTINUED | OUTPATIENT
Start: 2024-12-30 | End: 2024-12-30

## 2024-12-30 RX ORDER — MULTIVIT WITH MINERALS/LUTEIN
250 TABLET ORAL
Status: DISCONTINUED | OUTPATIENT
Start: 2024-12-30 | End: 2024-12-30

## 2024-12-30 RX ORDER — ONDANSETRON 4 MG/1
4 TABLET, ORALLY DISINTEGRATING ORAL EVERY 8 HOURS PRN
Status: DISCONTINUED | OUTPATIENT
Start: 2024-12-30 | End: 2024-12-30

## 2024-12-30 RX ORDER — POTASSIUM CHLORIDE 7.45 MG/ML
10 INJECTION INTRAVENOUS PRN
Status: DISCONTINUED | OUTPATIENT
Start: 2024-12-30 | End: 2025-01-03 | Stop reason: HOSPADM

## 2024-12-30 RX ORDER — ACETAMINOPHEN 325 MG/1
650 TABLET ORAL EVERY 6 HOURS PRN
Status: DISCONTINUED | OUTPATIENT
Start: 2024-12-30 | End: 2024-12-30

## 2024-12-30 RX ORDER — QUETIAPINE FUMARATE 25 MG/1
12.5 TABLET, FILM COATED ORAL ONCE
Status: COMPLETED | OUTPATIENT
Start: 2024-12-30 | End: 2024-12-30

## 2024-12-30 RX ORDER — HALOPERIDOL 5 MG/ML
5 INJECTION INTRAMUSCULAR ONCE
Status: COMPLETED | OUTPATIENT
Start: 2024-12-30 | End: 2024-12-30

## 2024-12-30 RX ORDER — SCOLOPAMINE TRANSDERMAL SYSTEM 1 MG/1
1 PATCH, EXTENDED RELEASE TRANSDERMAL
Status: DISCONTINUED | OUTPATIENT
Start: 2024-12-30 | End: 2025-01-03 | Stop reason: HOSPADM

## 2024-12-30 RX ORDER — SODIUM CHLORIDE 0.9 % (FLUSH) 0.9 %
5-40 SYRINGE (ML) INJECTION EVERY 12 HOURS SCHEDULED
Status: DISCONTINUED | OUTPATIENT
Start: 2024-12-30 | End: 2025-01-03 | Stop reason: HOSPADM

## 2024-12-30 RX ORDER — SODIUM CHLORIDE 9 MG/ML
INJECTION, SOLUTION INTRAVENOUS PRN
Status: DISCONTINUED | OUTPATIENT
Start: 2024-12-30 | End: 2025-01-03 | Stop reason: HOSPADM

## 2024-12-30 RX ORDER — GLYCOPYRROLATE 1 MG/1
1 TABLET ORAL 3 TIMES DAILY
Status: DISCONTINUED | OUTPATIENT
Start: 2024-12-30 | End: 2024-12-31

## 2024-12-30 RX ORDER — ROSUVASTATIN CALCIUM 20 MG/1
20 TABLET, COATED ORAL NIGHTLY
Status: DISCONTINUED | OUTPATIENT
Start: 2024-12-30 | End: 2025-01-03 | Stop reason: HOSPADM

## 2024-12-30 RX ORDER — POTASSIUM CHLORIDE 7.45 MG/ML
10 INJECTION INTRAVENOUS PRN
Status: DISCONTINUED | OUTPATIENT
Start: 2024-12-30 | End: 2024-12-30

## 2024-12-30 RX ORDER — ACETAMINOPHEN 650 MG/1
650 SUPPOSITORY RECTAL EVERY 6 HOURS PRN
Status: DISCONTINUED | OUTPATIENT
Start: 2024-12-30 | End: 2024-12-30

## 2024-12-30 RX ORDER — ONDANSETRON 2 MG/ML
4 INJECTION INTRAMUSCULAR; INTRAVENOUS EVERY 6 HOURS PRN
Status: DISCONTINUED | OUTPATIENT
Start: 2024-12-30 | End: 2025-01-03 | Stop reason: HOSPADM

## 2024-12-30 RX ORDER — ASPIRIN 81 MG/1
81 TABLET, CHEWABLE ORAL DAILY
Status: DISCONTINUED | OUTPATIENT
Start: 2024-12-31 | End: 2025-01-03 | Stop reason: HOSPADM

## 2024-12-30 RX ORDER — ONDANSETRON 2 MG/ML
4 INJECTION INTRAMUSCULAR; INTRAVENOUS EVERY 6 HOURS PRN
Status: DISCONTINUED | OUTPATIENT
Start: 2024-12-30 | End: 2024-12-30

## 2024-12-30 RX ORDER — ONDANSETRON 4 MG/1
4 TABLET, ORALLY DISINTEGRATING ORAL EVERY 8 HOURS PRN
Status: DISCONTINUED | OUTPATIENT
Start: 2024-12-30 | End: 2025-01-03 | Stop reason: HOSPADM

## 2024-12-30 RX ORDER — ASCORBIC ACID 500 MG
250 TABLET ORAL
Status: DISCONTINUED | OUTPATIENT
Start: 2024-12-31 | End: 2025-01-03 | Stop reason: HOSPADM

## 2024-12-30 RX ORDER — SODIUM CHLORIDE 0.9 % (FLUSH) 0.9 %
5-40 SYRINGE (ML) INJECTION PRN
Status: DISCONTINUED | OUTPATIENT
Start: 2024-12-30 | End: 2025-01-03 | Stop reason: HOSPADM

## 2024-12-30 RX ORDER — POLYETHYLENE GLYCOL 3350 17 G/17G
17 POWDER, FOR SOLUTION ORAL DAILY PRN
Status: DISCONTINUED | OUTPATIENT
Start: 2024-12-30 | End: 2025-01-03 | Stop reason: HOSPADM

## 2024-12-30 RX ORDER — POTASSIUM CHLORIDE 1500 MG/1
40 TABLET, EXTENDED RELEASE ORAL PRN
Status: DISCONTINUED | OUTPATIENT
Start: 2024-12-30 | End: 2024-12-30

## 2024-12-30 RX ORDER — QUETIAPINE FUMARATE 25 MG/1
12.5 TABLET, FILM COATED ORAL ONCE
Status: DISCONTINUED | OUTPATIENT
Start: 2024-12-30 | End: 2024-12-30

## 2024-12-30 RX ORDER — ENOXAPARIN SODIUM 100 MG/ML
40 INJECTION SUBCUTANEOUS DAILY
Status: DISCONTINUED | OUTPATIENT
Start: 2024-12-30 | End: 2025-01-03 | Stop reason: HOSPADM

## 2024-12-30 RX ORDER — POTASSIUM CHLORIDE 1500 MG/1
40 TABLET, EXTENDED RELEASE ORAL PRN
Status: DISCONTINUED | OUTPATIENT
Start: 2024-12-30 | End: 2025-01-03 | Stop reason: HOSPADM

## 2024-12-30 RX ORDER — ACETAMINOPHEN 650 MG/1
650 SUPPOSITORY RECTAL EVERY 6 HOURS PRN
Status: DISCONTINUED | OUTPATIENT
Start: 2024-12-30 | End: 2025-01-03 | Stop reason: HOSPADM

## 2024-12-30 RX ORDER — ACETAMINOPHEN 160 MG/5ML
650 SUSPENSION ORAL EVERY 6 HOURS PRN
Status: DISCONTINUED | OUTPATIENT
Start: 2024-12-30 | End: 2025-01-03 | Stop reason: HOSPADM

## 2024-12-30 RX ORDER — MAGNESIUM SULFATE IN WATER 40 MG/ML
2000 INJECTION, SOLUTION INTRAVENOUS PRN
Status: DISCONTINUED | OUTPATIENT
Start: 2024-12-30 | End: 2025-01-03 | Stop reason: HOSPADM

## 2024-12-30 RX ORDER — POLYETHYLENE GLYCOL 3350 17 G/17G
17 POWDER, FOR SOLUTION ORAL DAILY PRN
Status: DISCONTINUED | OUTPATIENT
Start: 2024-12-30 | End: 2024-12-30

## 2024-12-30 RX ORDER — ASPIRIN 81 MG/1
81 TABLET, CHEWABLE ORAL DAILY
Status: DISCONTINUED | OUTPATIENT
Start: 2024-12-30 | End: 2024-12-30

## 2024-12-30 RX ADMIN — ACETAMINOPHEN 650 MG: 325 SUSPENSION ORAL at 22:52

## 2024-12-30 RX ADMIN — ROSUVASTATIN CALCIUM 20 MG: 20 TABLET, FILM COATED ORAL at 22:13

## 2024-12-30 RX ADMIN — SODIUM CHLORIDE, PRESERVATIVE FREE 10 ML: 5 INJECTION INTRAVENOUS at 09:31

## 2024-12-30 RX ADMIN — ASPIRIN 81 MG: 81 TABLET, CHEWABLE ORAL at 09:29

## 2024-12-30 RX ADMIN — Medication 250 MG: at 09:29

## 2024-12-30 RX ADMIN — SODIUM CHLORIDE, PRESERVATIVE FREE 10 ML: 5 INJECTION INTRAVENOUS at 19:32

## 2024-12-30 RX ADMIN — SODIUM CHLORIDE 0.5 MG: 9 INJECTION INTRAMUSCULAR; INTRAVENOUS; SUBCUTANEOUS at 19:34

## 2024-12-30 RX ADMIN — HALOPERIDOL LACTATE 5 MG: 5 INJECTION, SOLUTION INTRAMUSCULAR at 22:01

## 2024-12-30 RX ADMIN — ENOXAPARIN SODIUM 40 MG: 100 INJECTION SUBCUTANEOUS at 09:29

## 2024-12-30 RX ADMIN — QUETIAPINE FUMARATE 12.5 MG: 25 TABLET ORAL at 23:07

## 2024-12-30 RX ADMIN — PIPERACILLIN AND TAZOBACTAM 4500 MG: 4; .5 INJECTION, POWDER, LYOPHILIZED, FOR SOLUTION INTRAVENOUS at 05:33

## 2024-12-30 NOTE — CONSULTS
[facility] Hematology & Oncology        Inpatient Hematology / Oncology Consult    Reason for Consult:  AMS (altered mental status) [R41.82]  Referring Physician:  Roman Loo MD    History of Present Illness:  Mr. Laguna is a 79 y.o. male admitted on 12/30/2024. The encounter diagnosis was Altered mental status, unspecified altered mental status type.    Mr Laguna has PMH of childhood polio. He is a patient of Dr Olvera treated for locally advanced SqCCa epiglottis/larynx. He initially completed 12 cycles of carbo/5FU/Keytruda in 7/2024 and has been on single-agent Keytruda as maintenance and lat completed cycle 21 day 1 on 12/3/24. He recently saw ENT and underwent scope and he was noted to have increase in tumor size at the vallecula, base of tongue and epiglottis. He had PET/CT on 12/26/24 that showed enlarging hypermetabolic mass consistent with progression of diseases as well as 3 small hypermetabolic lymph nodes in R of neck, 2 of which were slightly larger but no evidence of distant disease. Dr Olvera planning to add back carbo/5FU with next cycle of treatment.    Mr Laguna presented to ED with after wife complained of intermittent confusion and hallucinations. He initially denied confusion/hallucinations and per H&P was alert and oriented. CT head negative. UA negative and ammonia WNL. However, during rounds he seems confused and agitated. MRI brain pending. Oncology consulted for recommendations.      Review of Systems:  Constitutional Denies fever, chills, weight loss, appetite changes, fatigue, night sweats.   HEENT Denies trauma, blurry vision, hearing loss, ear pain, nosebleeds, sore throat, neck pain and ear discharge.    Skin Denies lesions or rashes.   Lungs Denies dyspnea, cough, sputum production or hemoptysis.   Cardiovascular Denies chest pain, palpitations, or lower extremity edema.   Gastrointestinal Denies nausea, vomiting, changes in bowel habits, bloody or black stools,  negative. UA negative and ammonia WNL. However, during rounds he seems confused and agitated. MRI brain pending. Oncology consulted for recommendations.    RECOMMENDATIONS:    Locally advanced SqCCa of the epiglottis/larynx  - Initially completed 12 cycles of carbo/5FU/Keytruda in 7/2024 and has been on single-agent Keytruda as maintenance and last completed cycle 21 day 1 on 12/3/24  - Recently saw ENT and had PET/CT concerning for POD at primary lesion and some enlarging R neck lymph nodes  - Dr Olvera planning to add back carbo/5FU with next cycle    AMS / hallucinations  - CT head negative, UA and ammonia negative  - Med list reviewed, on scopolamine patch and robinul, neither of which are new but could be contributing to his presentation, will hold  - Follow-up MRI brain results  - Doesn't appear to be related to Keytruda given timing  - May need neuro consult if no improvement      Thank you for allowing us to participate in the care of Mr. Laguna. We will follow along.  56 minutes were spent on the care of this patient       PACO Hollins - CNP   [facility]  [address] [place] [address]  Office : [phone]  Fax : [phone]        Attending Addendum:  I have personally performed a face to face diagnostic evaluation on this patient. I have reviewed and agree with the care plan as documented by Odessa Grider, N.P. 76 minutes were spent on patient care, including but not limited to, reviewing the chart and time with the patient and family, more than 50% of the time documented was spent in face-to-face contact with the patient and in the care of the patient on the floor/unit where the patient is located. My findings are as follows:  He has epiglottic cancer and AMS, appears weak, heart rate regular without murmurs, abdomen is non-tender, bowel sounds are positive, we will follow-up the results of his MRI.              MD Gustavo Rice

## 2024-12-30 NOTE — ASSESSMENT & PLAN NOTE
- Currently alert and oriented x 4  - Patient states he does not know why his wife told the ER that he was confused and having hallucinations  - It appears the report was of \"intermittent\" symptoms, so may be patient is unaware of his symptoms  - He is agreeable for further evaluation though  - Will check MRI, B12, folate, RPR, ammonia  - Consult to Oncology for evaluation as well while here

## 2024-12-30 NOTE — PROGRESS NOTES
4 Eyes Skin Assessment     NAME:  Jesus Laguna  YOB: 1945  MEDICAL RECORD NUMBER:  [id number]    The patient is being assessed for  Admission    I agree that at least one RN has performed a thorough Head to Toe Skin Assessment on the patient. ALL assessment sites listed below have been assessed.      Areas assessed by both nurses:    Head, Face, Ears, Shoulders, Back, Chest, Arms, Elbows, Hands, Sacrum. Buttock, Coccyx, Ischium, Legs. Feet and Heels, and Under Medical Devices         Does the Patient have a Wound? Yes wound(s) were present on assessment. LDA wound assessment was Initiated and completed by RN       Torres Prevention initiated by RN: Yes  Wound Care Orders initiated by RN: Yes    Pressure Injury (Stage 3,4, Unstageable, DTI, NWPT, and Complex wounds) if present, place Wound referral order by RN under : No    New Ostomies, if present place, Ostomy referral order under : No     Nurse 1 eSignature: Electronically signed by Odalis Sullivan RN on 12/30/24 at 5:31 PM EST    **SHARE this note so that the co-signing nurse can place an eSignature**    Nurse 2 eSignature: {Esignature:[id number]}

## 2024-12-30 NOTE — PROGRESS NOTES
78 YO male with PMH childhood polio, advanced SqCCa epiglottis/larynx admitted after midnight with AMS/intermittent confusion and hallucinations. On admission UA, RPR, CXR, folate, B12, ammonia and CT head non-diagnostic.   -MRI pending.   -Holding scopalamine and robinul (neither are new meds)  -check UDS  -per Oncology they favor against Keytruda causing this presentation   -check RVP  -monitor off ABX  -consider neuro involvement   -limit sedating meds  - ordered

## 2024-12-30 NOTE — ED TRIAGE NOTES
Arrived via [facility], coming from home, C/o Increased intermittent confusion x 1 day,Currently A&Ox4. dark urine, Patient denies pain. H/o Throat Cancer, currently on Chemo,Has Port a cath to left chest and G-tube to RUQ. Baseline is A&Ox 4 per family.     EMS: 1 Liter NS  18 ga LFA

## 2024-12-30 NOTE — ED PROVIDER NOTES
Emergency Department Provider Note       PCP: Quinten Hartmann DO   Age: 79 y.o.   Sex: male     DISPOSITION Decision To Admit 12/30/2024 06:14:58 AM    ICD-10-CM    1. Altered mental status, unspecified altered mental status type  R41.82           Medical Decision Making     Patient comes to the ED via EMS for evaluation of altered mental status.  Wife at bedside reports patient with hallucinations beginning 12/27/24.  She reports patient poking her in the stomach asking for something to come out while they were in bed at night.  Wife reports the next day, patient seem to be back to baseline.  She states last night, patient with visual hallucinations and currently is having increased restlessness.  She states symptoms occur both day and night.  She reports patient with \"dark urine\" with a strong odor.  On exam, patient with temperature of 100.3.  Mild tachycardia present.  Patient is currently undergoing immunotherapy for recurrent ENT cancer.  Patient without hypoxia or increased work of breathing.  Copious thick oral and nasal secretions.  Wife states the secretions are at baseline.    CBC without leukocytosis.  Stable anemia present.  CMP unremarkable.  Lactic acid within normal limits at 1.2. CXR with increased R sided opacities.  Will start on Zosyn for possible pna.  CMP unremarkable.  UA without UTI.  Lactic acid and procalcitonin within normal limits.  Respiratory panel negative.  CT brain unremarkable.  Chest x-ray read without infiltrate.  Will message hospitalist to evaluate patient for AMS.     1 acute illness with systemic symptoms.    Over the counter drug management performed.  Prescription drug management performed.  Shared medical decision making was utilized in creating the patients health plan today.    I independently ordered and reviewed each unique test.    I reviewed external records: provider visit note from outside specialist.     Pt seen by Heme/Onc on 12/3/24.  Pt with hx of squamous  Absolute 0.0 0.0 - 0.2 K/UL    Immature Granulocytes Absolute 0.1 0.0 - 0.5 K/UL   Comprehensive Metabolic Panel   Result Value Ref Range    Sodium 137 136 - 145 mmol/L    Potassium 3.8 3.5 - 5.1 mmol/L    Chloride 101 98 - 107 mmol/L    CO2 25 20 - 29 mmol/L    Anion Gap 11 7 - 16 mmol/L    Glucose 113 (H) 70 - 99 mg/dL    BUN 29 (H) 8 - 23 MG/DL    Creatinine 0.64 (L) 0.80 - 1.30 MG/DL    Est, Glom Filt Rate >90 >60 ml/min/1.73m2    Calcium 8.7 (L) 8.8 - 10.2 MG/DL    Total Bilirubin 0.3 0.0 - 1.2 MG/DL    ALT 16 8 - 55 U/L    AST 27 15 - 37 U/L    Alk Phosphatase 72 40 - 129 U/L    Total Protein 7.0 6.3 - 8.2 g/dL    Albumin 2.6 (L) 3.2 - 4.6 g/dL    Globulin 4.5 (H) 2.3 - 3.5 g/dL    Albumin/Globulin Ratio 0.6 (L) 1.0 - 1.9     Urinalysis   Result Value Ref Range    Color, UA YELLOW/STRAW      Appearance CLEAR      Specific Gravity, UA 1.023 1.001 - 1.023      pH, Urine 6.5 5.0 - 9.0      Protein, UA Negative NEG mg/dL    Glucose, Ur Negative NEG mg/dL    Ketones, Urine Negative NEG mg/dL    Bilirubin, Urine Negative NEG      Blood, Urine Negative NEG      Urobilinogen, Urine 1.0 0.2 - 1.0 EU/dL    Nitrite, Urine Negative NEG      Leukocyte Esterase, Urine Negative NEG      BACTERIA, URINE 0 0 /hpf   Procalcitonin   Result Value Ref Range    Procalcitonin 0.07 0.00 - 0.10 ng/mL   EKG 12 Lead   Result Value Ref Range    Ventricular Rate 101 BPM    Atrial Rate 101 BPM    P-R Interval 165 ms    QRS Duration 106 ms    Q-T Interval 342 ms    QTc Calculation (Bazett) 444 ms    P Axis 33 degrees    R Axis 5 degrees    T Axis 27 degrees    Diagnosis       Sinus tachycardia  Low voltage, extremity leads  Abnormal R-wave progression, early transition  Consider anterior infarct           CT HEAD WO CONTRAST   Final Result   1. No acute intracranial process. If clinical concern for acute ischemia, MRI is   a more sensitive exam.   2. Chronic small vessel ischemic disease.   3. Atrophy.      Electronically signed by Fausto

## 2024-12-30 NOTE — ASSESSMENT & PLAN NOTE
- Followed by Dr. Olvera, will consult Oncology  - Speech is somewhat difficult to understand due to partial glossectomy   - Continue home meds  - Consult to Nutrition to start tube feeds if hospitalization continues

## 2024-12-30 NOTE — PROGRESS NOTES
TRANSFER - OUT REPORT:    Verbal report given to RN on Jesus Laguna  being transferred to [facility] for routine progression of patient care       Report consisted of patient's Situation, Background, Assessment and   Recommendations(SBAR).     Information from the following report(s) Nurse Handoff Report was reviewed with the receiving nurse.    Lindsay Fall Assessment:    Presents to emergency department  because of falls (Syncope, seizure, or loss of consciousness): No  Age > 70: Yes  Altered Mental Status, Intoxication with alcohol or substance confusion (Disorientation, impaired judgment, poor safety awaremess, or inability to follow instructions): Yes  Impaired Mobility: Ambulates or transfers with assistive devices or assistance; Unable to ambulate or transer.: No  Nursing Judgement: Yes          Lines:   Peripheral IV 12/30/24 Distal;Left Forearm (Active)       Peripheral IV 12/30/24 Distal;Right Forearm (Active)        Opportunity for questions and clarification was provided.

## 2024-12-30 NOTE — PROGRESS NOTES
Patient admitted from ED with AMS. Brother at bedside. Patient is alert and oriented at this time. Patient will need an MRI. One time dose of ativan has been ordered to give on call for MRI. Covid sent for rule out.  Patient will be started on Jevity at 10 ml/hr with a goal rate of 60 ml/hr. Virtual  at bedside.Patient denies any needs at this time.

## 2024-12-30 NOTE — PLAN OF CARE
Problem: Skin/Tissue Integrity  Goal: Absence of new skin breakdown  Description: 1.  Monitor for areas of redness and/or skin breakdown  2.  Assess vascular access sites hourly  3.  Every 4-6 hours minimum:  Change oxygen saturation probe site  4.  Every 4-6 hours:  If on nasal continuous positive airway pressure, respiratory therapy assess nares and determine need for appliance change or resting period.  Outcome: Progressing     Problem: ABCDS Injury Assessment  Goal: Absence of physical injury  Outcome: Progressing     Problem: Confusion  Goal: Confusion, delirium, dementia, or psychosis is improved or at baseline  Description: INTERVENTIONS:  1. Assess for possible contributors to thought disturbance, including medications, impaired vision or hearing, underlying metabolic abnormalities, dehydration, psychiatric diagnoses, and notify attending LIP  2. [place] high risk fall precautions, as indicated  3. Provide frequent short contacts to provide reality reorientation, refocusing and direction  4. Decrease environmental stimuli, including noise as appropriate  5. Monitor and intervene to maintain adequate nutrition, hydration, elimination, sleep and activity  6. If unable to ensure safety without constant attention obtain sitter and review sitter guidelines with assigned personnel  7. Initiate Psychosocial CNS and Spiritual Care consult, as indicated  Outcome: Progressing     Problem: Safety - Adult  Goal: Free from fall injury  Outcome: Progressing

## 2024-12-31 ENCOUNTER — APPOINTMENT (OUTPATIENT)
Dept: INTERVENTIONAL RADIOLOGY/VASCULAR | Age: 79
DRG: 871 | End: 2024-12-31
Payer: MEDICARE

## 2024-12-31 ENCOUNTER — APPOINTMENT (OUTPATIENT)
Dept: MRI IMAGING | Age: 79
DRG: 871 | End: 2024-12-31
Payer: MEDICARE

## 2024-12-31 ENCOUNTER — HOSPITAL ENCOUNTER (OUTPATIENT)
Dept: INFUSION THERAPY | Age: 79
Setting detail: INFUSION SERIES
End: 2024-12-31

## 2024-12-31 PROBLEM — G93.40 ACUTE ENCEPHALOPATHY: Status: ACTIVE | Noted: 2024-12-31

## 2024-12-31 LAB
ANION GAP SERPL CALC-SCNC: 12 MMOL/L (ref 7–16)
APPEARANCE FLD: CLEAR
BUN SERPL-MCNC: 23 MG/DL (ref 8–23)
C GATTII+NEOFOR DNA CSF QL NAA+NON-PROBE: NOT DETECTED
CALCIUM SERPL-MCNC: 8.9 MG/DL (ref 8.8–10.2)
CHLORIDE SERPL-SCNC: 103 MMOL/L (ref 98–107)
CMV DNA CSF QL NAA+NON-PROBE: NOT DETECTED
CO2 SERPL-SCNC: 25 MMOL/L (ref 20–29)
COLOR FLD: COLORLESS
CREAT SERPL-MCNC: 0.63 MG/DL (ref 0.8–1.3)
CRP SERPL HS-MCNC: 146 MG/L (ref 0–3)
E COLI K1 DNA CSF QL NAA+NON-PROBE: NOT DETECTED
ERYTHROCYTE [DISTWIDTH] IN BLOOD BY AUTOMATED COUNT: 14.1 % (ref 11.9–14.6)
ERYTHROCYTE [SEDIMENTATION RATE] IN BLOOD: 78 MM/HR
EV RNA CSF QL NAA+NON-PROBE: NOT DETECTED
GLUCOSE CSF-MCNC: 70 MG/DL (ref 50–70)
GLUCOSE SERPL-MCNC: 111 MG/DL (ref 70–99)
GP B STREP DNA CSF QL NAA+NON-PROBE: NOT DETECTED
HAEM INFLU DNA CSF QL NAA+NON-PROBE: NOT DETECTED
HCT VFR BLD AUTO: 31.2 % (ref 41.1–50.3)
HGB BLD-MCNC: 9.6 G/DL (ref 13.6–17.2)
HHV6 DNA CSF QL NAA+NON-PROBE: NOT DETECTED
HSV1 DNA CSF QL NAA+PROBE: NOT DETECTED
HSV2 DNA CSF QL NAA+NON-PROBE: NOT DETECTED
L MONOCYTOG DNA CSF QL NAA+NON-PROBE: NOT DETECTED
MAGNESIUM SERPL-MCNC: 2 MG/DL (ref 1.8–2.4)
MCH RBC QN AUTO: 26.4 PG (ref 26.1–32.9)
MCHC RBC AUTO-ENTMCNC: 30.8 G/DL (ref 31.4–35)
MCV RBC AUTO: 86 FL (ref 82–102)
N MEN DNA CSF QL NAA+NON-PROBE: NOT DETECTED
NRBC # BLD: 0 K/UL (ref 0–0.2)
NUC CELL # FLD: 2 /CU MM
PARECHOVIRUS A RNA CSF QL NAA+NON-PROBE: NOT DETECTED
PHOSPHATE SERPL-MCNC: 3 MG/DL (ref 2.5–4.5)
PLATELET # BLD AUTO: 304 K/UL (ref 150–450)
PMV BLD AUTO: 9.7 FL (ref 9.4–12.3)
POTASSIUM SERPL-SCNC: 3.4 MMOL/L (ref 3.5–5.1)
PROT CSF-MCNC: 41 MG/DL (ref 15–45)
RBC # BLD AUTO: 3.63 M/UL (ref 4.23–5.6)
RBC # FLD: 4 /CU MM
S PNEUM DNA CSF QL NAA+NON-PROBE: NOT DETECTED
SODIUM SERPL-SCNC: 140 MMOL/L (ref 136–145)
SPECIMEN SOURCE FLD: NORMAL
TUBE # CSF: NORMAL
TUBE # CSF: NORMAL
VZV DNA CSF QL NAA+NON-PROBE: NOT DETECTED
WBC # BLD AUTO: 13 K/UL (ref 4.3–11.1)

## 2024-12-31 PROCEDURE — 84425 ASSAY OF VITAMIN B-1: CPT

## 2024-12-31 PROCEDURE — 6360000002 HC RX W HCPCS: Performed by: FAMILY MEDICINE

## 2024-12-31 PROCEDURE — 62328 DX LMBR SPI PNXR W/FLUOR/CT: CPT | Performed by: RADIOLOGY

## 2024-12-31 PROCEDURE — 89050 BODY FLUID CELL COUNT: CPT

## 2024-12-31 PROCEDURE — 87483 CNS DNA AMP PROBE TYPE 12-25: CPT

## 2024-12-31 PROCEDURE — 2500000003 HC RX 250 WO HCPCS: Performed by: FAMILY MEDICINE

## 2024-12-31 PROCEDURE — 6360000002 HC RX W HCPCS: Performed by: RADIOLOGY

## 2024-12-31 PROCEDURE — 6370000000 HC RX 637 (ALT 250 FOR IP): Performed by: PHYSICIAN ASSISTANT

## 2024-12-31 PROCEDURE — 009U3ZX DRAINAGE OF SPINAL CANAL, PERCUTANEOUS APPROACH, DIAGNOSTIC: ICD-10-PCS | Performed by: NURSE PRACTITIONER

## 2024-12-31 PROCEDURE — 99232 SBSQ HOSP IP/OBS MODERATE 35: CPT | Performed by: INTERNAL MEDICINE

## 2024-12-31 PROCEDURE — 96372 THER/PROPH/DIAG INJ SC/IM: CPT

## 2024-12-31 PROCEDURE — 1100000000 HC RM PRIVATE

## 2024-12-31 PROCEDURE — 84157 ASSAY OF PROTEIN OTHER: CPT

## 2024-12-31 PROCEDURE — 6360000002 HC RX W HCPCS: Performed by: PHYSICIAN ASSISTANT

## 2024-12-31 PROCEDURE — 80048 BASIC METABOLIC PNL TOTAL CA: CPT

## 2024-12-31 PROCEDURE — 83735 ASSAY OF MAGNESIUM: CPT

## 2024-12-31 PROCEDURE — 85652 RBC SED RATE AUTOMATED: CPT

## 2024-12-31 PROCEDURE — 87077 CULTURE AEROBIC IDENTIFY: CPT

## 2024-12-31 PROCEDURE — G0378 HOSPITAL OBSERVATION PER HR: HCPCS

## 2024-12-31 PROCEDURE — 86141 C-REACTIVE PROTEIN HS: CPT

## 2024-12-31 PROCEDURE — 82945 GLUCOSE OTHER FLUID: CPT

## 2024-12-31 PROCEDURE — 36415 COLL VENOUS BLD VENIPUNCTURE: CPT

## 2024-12-31 PROCEDURE — 2500000003 HC RX 250 WO HCPCS: Performed by: PHYSICIAN ASSISTANT

## 2024-12-31 PROCEDURE — 84100 ASSAY OF PHOSPHORUS: CPT

## 2024-12-31 PROCEDURE — 62328 DX LMBR SPI PNXR W/FLUOR/CT: CPT

## 2024-12-31 PROCEDURE — 87186 SC STD MICRODIL/AGAR DIL: CPT

## 2024-12-31 PROCEDURE — 43762 RPLC GTUBE NO REVJ TRC: CPT

## 2024-12-31 PROCEDURE — 87070 CULTURE OTHR SPECIMN AEROBIC: CPT

## 2024-12-31 PROCEDURE — 2709999900 IR LUMBAR PUNCTURE FOR DIAGNOSIS

## 2024-12-31 PROCEDURE — APPSS30 APP SPLIT SHARED TIME 16-30 MINUTES: Performed by: NURSE PRACTITIONER

## 2024-12-31 PROCEDURE — 51798 US URINE CAPACITY MEASURE: CPT

## 2024-12-31 PROCEDURE — 87205 SMEAR GRAM STAIN: CPT

## 2024-12-31 PROCEDURE — 85027 COMPLETE CBC AUTOMATED: CPT

## 2024-12-31 PROCEDURE — 6370000000 HC RX 637 (ALT 250 FOR IP): Performed by: FAMILY MEDICINE

## 2024-12-31 PROCEDURE — 70551 MRI BRAIN STEM W/O DYE: CPT

## 2024-12-31 RX ORDER — LIDOCAINE HYDROCHLORIDE 20 MG/ML
INJECTION, SOLUTION INFILTRATION; PERINEURAL PRN
Status: DISCONTINUED | OUTPATIENT
Start: 2024-12-31 | End: 2025-01-03 | Stop reason: HOSPADM

## 2024-12-31 RX ORDER — GLYCOPYRROLATE 1 MG/1
1 TABLET ORAL 3 TIMES DAILY
Status: DISCONTINUED | OUTPATIENT
Start: 2024-12-31 | End: 2025-01-03 | Stop reason: HOSPADM

## 2024-12-31 RX ORDER — MIDAZOLAM HYDROCHLORIDE 1 MG/ML
INJECTION, SOLUTION INTRAMUSCULAR; INTRAVENOUS PRN
Status: DISCONTINUED | OUTPATIENT
Start: 2024-12-31 | End: 2025-01-03 | Stop reason: HOSPADM

## 2024-12-31 RX ORDER — HALOPERIDOL 5 MG/ML
5 INJECTION INTRAMUSCULAR
Status: DISPENSED | OUTPATIENT
Start: 2024-12-31 | End: 2025-01-01

## 2024-12-31 RX ADMIN — ASPIRIN 81 MG 81 MG: 81 TABLET ORAL at 09:42

## 2024-12-31 RX ADMIN — ZIPRASIDONE MESYLATE 10 MG: 20 INJECTION, POWDER, LYOPHILIZED, FOR SOLUTION INTRAMUSCULAR at 11:47

## 2024-12-31 RX ADMIN — OXYCODONE HYDROCHLORIDE AND ACETAMINOPHEN 250 MG: 500 TABLET ORAL at 09:42

## 2024-12-31 RX ADMIN — SODIUM CHLORIDE, PRESERVATIVE FREE 10 ML: 5 INJECTION INTRAVENOUS at 09:42

## 2024-12-31 RX ADMIN — LIDOCAINE HYDROCHLORIDE 5 ML: 20 INJECTION, SOLUTION INFILTRATION; PERINEURAL at 14:57

## 2024-12-31 RX ADMIN — ROSUVASTATIN CALCIUM 20 MG: 20 TABLET, FILM COATED ORAL at 21:01

## 2024-12-31 RX ADMIN — MIDAZOLAM 1 MG: 1 INJECTION INTRAMUSCULAR; INTRAVENOUS at 14:57

## 2024-12-31 RX ADMIN — ENOXAPARIN SODIUM 40 MG: 100 INJECTION SUBCUTANEOUS at 09:42

## 2024-12-31 RX ADMIN — POTASSIUM CHLORIDE 40 MEQ: 1500 TABLET, EXTENDED RELEASE ORAL at 11:24

## 2024-12-31 RX ADMIN — SODIUM CHLORIDE, PRESERVATIVE FREE 10 ML: 5 INJECTION INTRAVENOUS at 21:05

## 2024-12-31 NOTE — CARE COORDINATION
ASSESSMENT NOTE    Attending Physician: Fredy Arce MD  Admit Problem: Altered mental status, unspecified altered mental status type [R41.82]  AMS (altered mental status) [R41.82]  Date/Time of Admission: 12/30/2024  2:49 AM  Problem List:  Patient Active Problem List   Diagnosis    Spinal stenosis, lumbar region, with neurogenic claudication    Lumbar radiculopathy    DDD (degenerative disc disease), lumbar    Laryngeal cancer (HCC)    Squamous cell carcinoma of oropharynx (HCC)    Dehydration    Moderate protein-calorie malnutrition (HCC)    Malignant neoplasm of esophagus (HCC)    Sinus tachycardia    Appetite loss    Hypersalivation    Tracheostomy care (HCC)    Ataxia    Increased tracheal secretions    Immunocompromised (HCC)    Complication of tracheostomy tube (HCC)    Primary squamous cell carcinoma of supraglottis (HCC)    Encounter for rehabilitation    Impaired functional mobility, balance, gait, and endurance    Decreased independence with activities of daily living    Left sided lacunar infarction (HCC)    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    Pneumonia of right lung due to infectious organism    Pancytopenia (HCC)    Sepsis (HCC)    Vitamin D deficiency    High risk medication use    Aspiration pneumonia, unspecified aspiration pneumonia type, unspecified laterality, unspecified part of lung (HCC)    Dyslipidemia    Fever    Thrombocytopenia, unspecified (HCC)    Personal history of malignant neoplasm of larynx    Pure hypercholesterolemia    AMS (altered mental status)       Service Assessment  Patient Orientation Alert and Oriented, Person (Pt is currently confused)   Cognition Severely Impaired (Pt is currently confused)   History Provided By Medical Record   Primary Caregiver Self   Accompanied By/Relationship None   Support Systems Spouse/Significant Other, Family Members   Patient's Healthcare Decision Maker is: Legal Next of Kin   PCP Verified by CM Yes Chelsea

## 2024-12-31 NOTE — PROGRESS NOTES
per CT criteria. Gray-white junction is  preserved.    Calvarium is intact. Visualized paranasal sinuses are clear.    Impression  1. No acute intracranial process. If clinical concern for acute ischemia, MRI is  a more sensitive exam.  2. Chronic small vessel ischemic disease.  3. Atrophy.    Electronically signed by Fausto Morales        ASSESSMENT:      ICD-10-CM    1. Altered mental status, unspecified altered mental status type  R41.82         Mr. Laguna is a 79 y.o. male admitted on 12/30/2024. The encounter diagnosis was Altered mental status, unspecified altered mental status type.    Mr Laguna has PMH of childhood polio. He is a patient of Dr Olvera treated for locally advanced SqCCa epiglottis/larynx. He initially completed 12 cycles of carbo/5FU/Keytruda in 7/2024 and has been on single-agent Keytruda as maintenance and last completed cycle 21 day 1 on 12/3/24. He recently saw ENT and underwent scope and he was noted to have increase in tumor size at the vallecula, base of tongue and epiglottis. He had PET/CT on 12/26/24 that showed enlarging hypermetabolic mass consistent with progression of diseases as well as 3 small hypermetabolic lymph nodes in R of neck, 2 of which were slightly larger but no evidence of distant disease. Dr Olvera planning to add back carbo/5FU with next cycle of treatment.    Mr Laguna presented to ED with after wife complained of intermittent confusion and hallucinations. He initially denied confusion/hallucinations and per H&P was alert and oriented. CT head negative. UA negative and ammonia WNL. However, during rounds he seems confused and agitated. MRI brain pending. Oncology consulted for recommendations.    RECOMMENDATIONS:    Locally advanced SqCCa of the epiglottis/larynx  - Initially completed 12 cycles of carbo/5FU/Keytruda in 7/2024 and has been on single-agent Keytruda as maintenance and last completed cycle 21 day 1 on 12/3/24  - Recently saw ENT and had PET/CT           Matt Morales MD    [facility] Hematology/Oncology  [address] [place], [place] [id number]  Office : [phone]  Fax : [phone]

## 2024-12-31 NOTE — PLAN OF CARE
Problem: Skin/Tissue Integrity  Goal: Absence of new skin breakdown  Description: 1.  Monitor for areas of redness and/or skin breakdown  2.  Assess vascular access sites hourly  3.  Every 4-6 hours minimum:  Change oxygen saturation probe site  4.  Every 4-6 hours:  If on nasal continuous positive airway pressure, respiratory therapy assess nares and determine need for appliance change or resting period.  12/31/2024 0036 by Liseth Benoit RN  Outcome: Progressing  12/30/2024 1756 by Odalis Sullivan RN  Outcome: Progressing     Problem: ABCDS Injury Assessment  Goal: Absence of physical injury  12/31/2024 0036 by Liseth Benoit RN  Outcome: Progressing  12/30/2024 1756 by Odalis Sullivan RN  Outcome: Progressing     Problem: Confusion  Goal: Confusion, delirium, dementia, or psychosis is improved or at baseline  Description: INTERVENTIONS:  1. Assess for possible contributors to thought disturbance, including medications, impaired vision or hearing, underlying metabolic abnormalities, dehydration, psychiatric diagnoses, and notify attending LIP  2. [place] high risk fall precautions, as indicated  3. Provide frequent short contacts to provide reality reorientation, refocusing and direction  4. Decrease environmental stimuli, including noise as appropriate  5. Monitor and intervene to maintain adequate nutrition, hydration, elimination, sleep and activity  6. If unable to ensure safety without constant attention obtain sitter and review sitter guidelines with assigned personnel  7. Initiate Psychosocial CNS and Spiritual Care consult, as indicated  12/31/2024 0036 by Liseth Benoit RN  Outcome: Progressing  12/30/2024 1756 by Odalis Sullivan RN  Outcome: Progressing     Problem: Safety - Adult  Goal: Free from fall injury  12/31/2024 0036 by Liseth Benoit RN  Outcome: Progressing  Flowsheets (Taken 12/30/2024 1931)  Free From Fall Injury:   Instruct family/caregiver on patient

## 2024-12-31 NOTE — CONSULTS
I attempted to see the patient but he was off the floor for lumbar puncture and MRI.  I will see the patient tomorrow in all likelihood.    There should be a very low threshold to start acyclovir with HSV encephalitis on the differential diagnosis

## 2024-12-31 NOTE — BRIEF OP NOTE
[facility]  Department of Interventional Radiology  [phone]        Brief Procedure Note    Patient: Jesus Laguna MRN: [id number]  SSN: [id number]    YOB: 1945  Age: 79 y.o.  Sex: male      Date of Procedure: 12/31/2024     Pre-Procedure Diagnosis:   AMS.  Low grade fever.   H&N Squamous Cell Ca.      Post-Procedure Diagnosis:  SAME    Procedure(s):  Lumbar Puncture    Brief Description of Procedure:  Limited due to patient motion.     Performed By:  NAN CASTILLO MD     Assistants:  None    Anesthesia:  Lidocaine    Estimated Blood Loss:  None    Specimens:   Lab    Implants:  None    Findings:  Clear CSF--3 cc obtained.     Complications:  None    Recommendations:  1 hour bedrest.  Hold anticoagulants x 24 hour.       Follow Up:  PRN    Signed By: NAN CASTILLO MD     December 31, 2024

## 2024-12-31 NOTE — FLOWSHEET NOTE
12/30/24 2322   Vital Signs   Temp (!) 100.6 °F (38.1 °C)  (RN notified)   Temp Source Axillary   Pulse (!) 124  (RN Notified)   Heart Rate Source Monitor   Respirations 20   BP 96/66   MAP (Calculated) 76   BP Location Right lower arm   BP Method Automatic   [facility]     Hospitalist notified of vitals above. Pt still very restless and agitated. Nurse practitioner to bedside to assess pt. Orders received for Seroquel via G tube.

## 2024-12-31 NOTE — PROGRESS NOTES
MRI sent for patient, and they were picking him up to do a lumbar puncture in IR. NP is aware of the scheduling and said MRI is less important than STAT LP and is aware the MRI may have to wait until 1/2. MRI will hold off. He will have IV medication for him to be sedated for the MRI to hold still as he was unsuccessful with his first attempt.

## 2024-12-31 NOTE — PROGRESS NOTES
Hospitalist Progress Note   Admit Date:  2024  2:49 AM   Name:  Jesus Laguna   Age:  79 y.o.  Sex:  male  :  1945   MRN:  [id number]   Room:  [facility]/01    Presenting/Chief Complaint: Altered Mental Status     Reason(s) for Admission: Altered mental status, unspecified altered mental status type [R41.82]  AMS (altered mental status) [R41.82]     Hospital Course:   80 YO male with PMH childhood polio, advanced SqCCa epiglottis/larynx admitted after midnight with AMS/intermittent confusion and hallucinations. On admission UA, RPR, CXR, folate, B12, ammonia and CT head non-diagnostic. Presented with slight leukocytosis, given zosyn but then ABX de-escalated. No infectious sx at home per wife. On  he spiked tmax 100.6F. WBC on  13K. Still no infectious source identified. LP ordered. MRI pending    Subjective & 24hr Events:   Geodon finally helped his agitation. Ativan given last night did not work and seemed to agitate him more.   Seen twice today: initially hallucinating and speaking non-sensibly. D/w wife: he had been in his usual state of health until Saturday when he became profoundly weak and confused.   Seen this afternoon to assess if Geodon calmed him down and it had. I explained my rationale for holding ABX until LP completed. Discussed this with oncology as well and they agree.     Assessment & Plan:       Altered mental status with acute weakness and hallucinations  - concerns for encephalitis  - obtain LP with infectious studies.  IR has been consulted, as has neurology.   - hold on ABX as this could skew LP results.   - changed MRI with with and without contrast  - may want to consider CT face with contrast given recent dental procedure but patient without pain. Seems he took Augmentin around that time  - resume robinul and scopolamine as I do not feel holding these has helped   - PRN Geodon 10 seems to have calmed him down the best. AVOID benzos as this worsened his

## 2024-12-31 NOTE — PROGRESS NOTES
Pt given IV ativan for MRI. Pt now severely agitated, trying to crawl out of bed, combative,and had a incontinent episode. Hospitalist notified. Orders received for one time dose of haldol.

## 2024-12-31 NOTE — ACP (ADVANCE CARE PLANNING)
Advance Care Planning   Healthcare Decision Maker:    Primary Decision Maker: Rosalinda Laguna - [facility] - [phone]    Click here to complete Healthcare Decision Makers including selection of the Healthcare Decision Maker Relationship (ie \"Primary\").  Today we documented Decision Maker(s) consistent with Legal Next of Kin hierarchy.       If the relationship to the patient does NOT follow our state's Next of Kin hierarchy, the patient MUST complete an ACP Document to allow him/her to act on the patient's behalf. :  No ACP documents on file.  Full Code per physician order.

## 2025-01-01 ENCOUNTER — PREP FOR PROCEDURE (OUTPATIENT)
Dept: ENT CLINIC | Age: 80
End: 2025-01-01

## 2025-01-01 ENCOUNTER — HOSPITAL ENCOUNTER (OUTPATIENT)
Dept: INFUSION THERAPY | Age: 80
Setting detail: INFUSION SERIES
End: 2025-01-01

## 2025-01-01 DIAGNOSIS — J98.8 CHRONIC RESPIRATORY INFECTION: ICD-10-CM

## 2025-01-01 DIAGNOSIS — C10.9 SQUAMOUS CELL CARCINOMA OF OROPHARYNX: Primary | ICD-10-CM

## 2025-01-01 LAB
ALBUMIN SERPL-MCNC: 2.5 G/DL (ref 3.2–4.6)
ALBUMIN/GLOB SERPL: 0.5 (ref 1–1.9)
ALP SERPL-CCNC: 71 U/L (ref 40–129)
ALT SERPL-CCNC: 28 U/L (ref 8–55)
ANION GAP SERPL CALC-SCNC: 11 MMOL/L (ref 7–16)
AST SERPL-CCNC: 89 U/L (ref 15–37)
BASOPHILS # BLD: 0.1 K/UL (ref 0–0.2)
BASOPHILS NFR BLD: 1 % (ref 0–2)
BILIRUB DIRECT SERPL-MCNC: <0.2 MG/DL (ref 0–0.4)
BILIRUB SERPL-MCNC: 0.5 MG/DL (ref 0–1.2)
BUN SERPL-MCNC: 24 MG/DL (ref 8–23)
CALCIUM SERPL-MCNC: 9.4 MG/DL (ref 8.8–10.2)
CHLORIDE SERPL-SCNC: 105 MMOL/L (ref 98–107)
CO2 SERPL-SCNC: 27 MMOL/L (ref 20–29)
CREAT SERPL-MCNC: 0.67 MG/DL (ref 0.8–1.3)
CRP SERPL HS-MCNC: 201 MG/L (ref 0–3)
DIFFERENTIAL METHOD BLD: ABNORMAL
EOSINOPHIL # BLD: 0 K/UL (ref 0–0.8)
EOSINOPHIL NFR BLD: 0 % (ref 0.5–7.8)
ERYTHROCYTE [DISTWIDTH] IN BLOOD BY AUTOMATED COUNT: 14.4 % (ref 11.9–14.6)
GLOBULIN SER CALC-MCNC: 4.7 G/DL (ref 2.3–3.5)
GLUCOSE SERPL-MCNC: 150 MG/DL (ref 70–99)
HCT VFR BLD AUTO: 32.4 % (ref 41.1–50.3)
HGB BLD-MCNC: 10.2 G/DL (ref 13.6–17.2)
IMM GRANULOCYTES # BLD AUTO: 0.1 K/UL (ref 0–0.5)
IMM GRANULOCYTES NFR BLD AUTO: 1 % (ref 0–5)
LYMPHOCYTES # BLD: 0.9 K/UL (ref 0.5–4.6)
LYMPHOCYTES NFR BLD: 8 % (ref 13–44)
MAGNESIUM SERPL-MCNC: 2.2 MG/DL (ref 1.8–2.4)
MCH RBC QN AUTO: 26.9 PG (ref 26.1–32.9)
MCHC RBC AUTO-ENTMCNC: 31.5 G/DL (ref 31.4–35)
MCV RBC AUTO: 85.5 FL (ref 82–102)
MONOCYTES # BLD: 0.9 K/UL (ref 0.1–1.3)
MONOCYTES NFR BLD: 8 % (ref 4–12)
NEUTS SEG # BLD: 8.6 K/UL (ref 1.7–8.2)
NEUTS SEG NFR BLD: 82 % (ref 43–78)
NRBC # BLD: 0.02 K/UL (ref 0–0.2)
PHOSPHATE SERPL-MCNC: 3 MG/DL (ref 2.5–4.5)
PLATELET # BLD AUTO: 327 K/UL (ref 150–450)
PMV BLD AUTO: 10.1 FL (ref 9.4–12.3)
POTASSIUM SERPL-SCNC: 4 MMOL/L (ref 3.5–5.1)
PROT SERPL-MCNC: 7.2 G/DL (ref 6.3–8.2)
RBC # BLD AUTO: 3.79 M/UL (ref 4.23–5.6)
SODIUM SERPL-SCNC: 143 MMOL/L (ref 136–145)
WBC # BLD AUTO: 10.5 K/UL (ref 4.3–11.1)

## 2025-01-01 PROCEDURE — 6360000002 HC RX W HCPCS: Performed by: PHYSICIAN ASSISTANT

## 2025-01-01 PROCEDURE — 87075 CULTR BACTERIA EXCEPT BLOOD: CPT

## 2025-01-01 PROCEDURE — 87205 SMEAR GRAM STAIN: CPT

## 2025-01-01 PROCEDURE — 80048 BASIC METABOLIC PNL TOTAL CA: CPT

## 2025-01-01 PROCEDURE — 87641 MR-STAPH DNA AMP PROBE: CPT

## 2025-01-01 PROCEDURE — 87070 CULTURE OTHR SPECIMN AEROBIC: CPT

## 2025-01-01 PROCEDURE — 80076 HEPATIC FUNCTION PANEL: CPT

## 2025-01-01 PROCEDURE — 6370000000 HC RX 637 (ALT 250 FOR IP): Performed by: PHYSICIAN ASSISTANT

## 2025-01-01 PROCEDURE — 84100 ASSAY OF PHOSPHORUS: CPT

## 2025-01-01 PROCEDURE — 1100000000 HC RM PRIVATE

## 2025-01-01 PROCEDURE — 6360000002 HC RX W HCPCS: Performed by: FAMILY MEDICINE

## 2025-01-01 PROCEDURE — APPSS30 APP SPLIT SHARED TIME 16-30 MINUTES: Performed by: NURSE PRACTITIONER

## 2025-01-01 PROCEDURE — 99221 1ST HOSP IP/OBS SF/LOW 40: CPT | Performed by: PSYCHIATRY & NEUROLOGY

## 2025-01-01 PROCEDURE — 2500000003 HC RX 250 WO HCPCS: Performed by: FAMILY MEDICINE

## 2025-01-01 PROCEDURE — 87186 SC STD MICRODIL/AGAR DIL: CPT

## 2025-01-01 PROCEDURE — 2580000003 HC RX 258: Performed by: PHYSICIAN ASSISTANT

## 2025-01-01 PROCEDURE — 86141 C-REACTIVE PROTEIN HS: CPT

## 2025-01-01 PROCEDURE — 2500000003 HC RX 250 WO HCPCS: Performed by: PHYSICIAN ASSISTANT

## 2025-01-01 PROCEDURE — 83735 ASSAY OF MAGNESIUM: CPT

## 2025-01-01 PROCEDURE — 85025 COMPLETE CBC W/AUTO DIFF WBC: CPT

## 2025-01-01 PROCEDURE — 6370000000 HC RX 637 (ALT 250 FOR IP): Performed by: FAMILY MEDICINE

## 2025-01-01 PROCEDURE — 36415 COLL VENOUS BLD VENIPUNCTURE: CPT

## 2025-01-01 PROCEDURE — 99232 SBSQ HOSP IP/OBS MODERATE 35: CPT | Performed by: INTERNAL MEDICINE

## 2025-01-01 RX ORDER — SODIUM CHLORIDE 0.9 % (FLUSH) 0.9 %
5-40 SYRINGE (ML) INJECTION PRN
Status: DISCONTINUED | OUTPATIENT
Start: 2025-01-01 | End: 2025-01-03 | Stop reason: HOSPADM

## 2025-01-01 RX ORDER — VITAMIN B COMPLEX
1000 TABLET ORAL DAILY
Status: DISCONTINUED | OUTPATIENT
Start: 2025-01-01 | End: 2025-01-03 | Stop reason: HOSPADM

## 2025-01-01 RX ADMIN — SODIUM CHLORIDE, PRESERVATIVE FREE 10 ML: 5 INJECTION INTRAVENOUS at 09:32

## 2025-01-01 RX ADMIN — GLYCOPYRROLATE 1 MG: 1 TABLET ORAL at 12:41

## 2025-01-01 RX ADMIN — ENOXAPARIN SODIUM 40 MG: 100 INJECTION SUBCUTANEOUS at 09:47

## 2025-01-01 RX ADMIN — PIPERACILLIN AND TAZOBACTAM 3375 MG: 3; .375 INJECTION, POWDER, LYOPHILIZED, FOR SOLUTION INTRAVENOUS at 17:33

## 2025-01-01 RX ADMIN — ROSUVASTATIN CALCIUM 20 MG: 20 TABLET, FILM COATED ORAL at 21:51

## 2025-01-01 RX ADMIN — GLYCOPYRROLATE 1 MG: 1 TABLET ORAL at 21:51

## 2025-01-01 RX ADMIN — PIPERACILLIN AND TAZOBACTAM 4500 MG: 4; .5 INJECTION, POWDER, FOR SOLUTION INTRAVENOUS at 10:49

## 2025-01-01 RX ADMIN — ZIPRASIDONE MESYLATE 10 MG: 20 INJECTION, POWDER, LYOPHILIZED, FOR SOLUTION INTRAMUSCULAR at 22:34

## 2025-01-01 RX ADMIN — OXYCODONE HYDROCHLORIDE AND ACETAMINOPHEN 250 MG: 500 TABLET ORAL at 09:30

## 2025-01-01 RX ADMIN — SODIUM CHLORIDE, PRESERVATIVE FREE 10 ML: 5 INJECTION INTRAVENOUS at 21:52

## 2025-01-01 RX ADMIN — VITAMIN D, TAB 1000IU (100/BT) 1000 UNITS: 25 TAB at 17:31

## 2025-01-01 RX ADMIN — ASPIRIN 81 MG 81 MG: 81 TABLET ORAL at 09:30

## 2025-01-01 RX ADMIN — ACETAMINOPHEN 650 MG: 325 SUSPENSION ORAL at 09:35

## 2025-01-01 NOTE — CONSULTS
Neurology Consult Note       History: The patient is doing much better today and is approaching his neurological baseline.  MRI of the brain was unremarkable for acute pathology and cerebral spinal fluid appears benign.  No further hallucinations or agitation.  Overall, the patient is doing very well today      Exam: Pertinent positives and negatives include:    Cognition: Follows all commands.  He is awake and alert  Language: Normal  Speech: Dysarthric  Cranial nerve examination: Pupils are equal, round, and reactive to light.  Face is symmetrical  Motor examination: Full strength in the bilateral upper extremities.  Atrophy and diffuse weakness in the bilateral lower extremities  Sensory examination: Normal sensory examination  Cerebellar examination: Normal cerebellar examination with finger-to-nose testing  Special testing: Muscle stretch reflexes are absent in the bilateral lower extremities and trace in the bilateral upper extremities    Imaging and review of data: MRI of the brain does not show acute intracranial pathology.  CSF is benign      Assessment and Plan: 79-year-old man with intermittent encephalopathy, now resolved.  MRI of the brain and CSF findings are both reassuring.  I do not have a clear explanation for the patient's encephalopathic state, but it has resolved.  No further recommendations.  Neurology will sign off        Cumulative time spent today was 40 minutes which included chart review, obtaining history (from patient, family, or other providers), review of images, examining the patient, and counseling the patient and/or family on medical condition.

## 2025-01-01 NOTE — PROGRESS NOTES
Nutrition Monitoring/Evaluation      Current Nutrition Therapies:  No diet orders on file    Bibiana ARECHIGA reached out to pt d/t no active tube feeding orders. Tube feed currently running at goal rate (Jevity 1.5 @ 60 mL) in room.   Tube feeding orders appears to have been incidentally discontinued with changes to diet order.   RD will resume previous EN orders. Reviewed with Bibiana ARECHIGA.     Maria Dolores James RD

## 2025-01-01 NOTE — PLAN OF CARE
Problem: Skin/Tissue Integrity  Goal: Absence of new skin breakdown  Description: 1.  Monitor for areas of redness and/or skin breakdown  2.  Assess vascular access sites hourly  3.  Every 4-6 hours minimum:  Change oxygen saturation probe site  4.  Every 4-6 hours:  If on nasal continuous positive airway pressure, respiratory therapy assess nares and determine need for appliance change or resting period.  Outcome: Progressing     Problem: ABCDS Injury Assessment  Goal: Absence of physical injury  Outcome: Progressing     Problem: Confusion  Goal: Confusion, delirium, dementia, or psychosis is improved or at baseline  Description: INTERVENTIONS:  1. Assess for possible contributors to thought disturbance, including medications, impaired vision or hearing, underlying metabolic abnormalities, dehydration, psychiatric diagnoses, and notify attending LIP  2. [place] high risk fall precautions, as indicated  3. Provide frequent short contacts to provide reality reorientation, refocusing and direction  4. Decrease environmental stimuli, including noise as appropriate  5. Monitor and intervene to maintain adequate nutrition, hydration, elimination, sleep and activity  6. If unable to ensure safety without constant attention obtain sitter and review sitter guidelines with assigned personnel  7. Initiate Psychosocial CNS and Spiritual Care consult, as indicated  Outcome: Progressing     Problem: Safety - Adult  Goal: Free from fall injury  Outcome: Progressing

## 2025-01-01 NOTE — PROGRESS NOTES
Hospitalist Progress Note   Admit Date:  2024  2:49 AM   Name:  Jesus Laguna   Age:  79 y.o.  Sex:  male  :  1945   MRN:  [id number]   Room:  520/01    Presenting/Chief Complaint: Altered Mental Status     Reason(s) for Admission: Altered mental status, unspecified altered mental status type [R41.82]  AMS (altered mental status) [R41.82]  Acute encephalopathy [G93.40]     Hospital Course:   78 YO male with PMH childhood polio, advanced SqCCa epiglottis/larynx admitted after midnight with AMS/intermittent confusion and hallucinations. On admission UA, RPR, CXR, folate, B12, ammonia and CT head non-diagnostic. Presented with slight leukocytosis, given zosyn but then ABX de-escalated. No infectious sx at home per wife. On  he spiked tmax 100.6F. WBC on  13K. Still no infectious source identified. LP ordered. MRI pending    Subjective & 24hr Events:   25 - Pt sitting in bed. Wife at bedside. Per wife, pt is much closer to his normal baseline today. Pt alert, oriented to person but not place or time. Does not remember events leading to hospitalization. Unable to recall president. Thinks it is .    Denies chest pain, shortness of breath, n/v, abd pain.  Some loose stools, wife attributes to Jevity, states similar issue last hospitalization.      Assessment & Plan:       Altered mental status with acute weakness and hallucinations  - concerns for encephalitis; LP obtained , neg for infectious changes, PCR neg.  MRI neg for acute process.  Neurology consulted, does not appear to be encephalitis.    - PRN Geodon 10 seems to have calmed him down the best. AVOID benzos as this worsened his encephalopathy  - ASA and statin have been continued (wife endorses prior hx of TIA/CVA).   - no known psych hx   - Mental status improving.       Fever  - CXR clear on admission. Urine studies benign.  CSF clear. Bcx no growth to date.  Resp panel negative on admission  -   - Resp  (Patient Supplied)  1 mg PEG Tube TID    ziprasidone (GEODON) 10 mg in sterile water 0.5 mL injection  10 mg IntraMUSCular Q6H PRN    midazolam (VERSED) injection    PRN    lidocaine 2 % injection    PRN    magic (miracle) mouthwash  5 mL Swish & Spit 4x Daily PRN    rosuvastatin (CRESTOR) tablet 20 mg  20 mg Per G Tube Nightly    scopolamine (TRANSDERM-SCOP) transdermal patch 1 patch  1 patch TransDERmal Q72H    sodium chloride flush 0.9 % injection 5-40 mL  5-40 mL IntraVENous 2 times per day    sodium chloride flush 0.9 % injection 5-40 mL  5-40 mL IntraVENous PRN    0.9 % sodium chloride infusion   IntraVENous PRN    magnesium sulfate 2000 mg in 50 mL IVPB premix  2,000 mg IntraVENous PRN    enoxaparin (LOVENOX) injection 40 mg  40 mg SubCUTAneous Daily    aspirin chewable tablet 81 mg  81 mg Per G Tube Daily    ascorbic acid (VITAMIN C) tablet 250 mg  250 mg Per G Tube Daily with breakfast    acetaminophen (TYLENOL) suspension 650 mg  650 mg Per G Tube Q6H PRN    Or    acetaminophen (TYLENOL) suppository 650 mg  650 mg Rectal Q6H PRN    ondansetron (ZOFRAN-ODT) disintegrating tablet 4 mg  4 mg Per G Tube Q8H PRN    Or    ondansetron (ZOFRAN) injection 4 mg  4 mg IntraVENous Q6H PRN    polyethylene glycol (GLYCOLAX) packet 17 g  17 g Per G Tube Daily PRN    potassium chloride (KLOR-CON M) extended release tablet 40 mEq  40 mEq Oral PRN    Or    potassium bicarb-citric acid (EFFER-K) effervescent tablet 40 mEq  40 mEq Per G Tube PRN    Or    potassium chloride 10 mEq/100 mL IVPB (Peripheral Line)  10 mEq IntraVENous PRN       Signed:  ROMERO Govea    Part of this note may have been written by using a voice dictation software.  The note has been proof read but may still contain some grammatical/other typographical errors.

## 2025-01-01 NOTE — PROGRESS NOTES
T max 99.0 Remains pleasantly confused. Virtual  at BS. Pt redirects easily. No complaint of pain. Scopolamine patch applied below/behind R ear. Wife to bring in Titi from home. Beige/green purulent drainage from G tube insertion site. Obtained culture and sent to lab. TF currently infusing at 50/45/q1hr; goal 60/45/q1hr. NPO. LP site intact, holding Lovenox until 1700 today. Manwick in place with dark yellow urine. Small BM last night. Port not accessed, has x2 PIV's. Continue with POC. MRI today or tomorrow. Neuro to see today. Report to oncoming RN.

## 2025-01-01 NOTE — PROGRESS NOTES
comparison to the prior exam and are  appropriate for the mild degree of symmetric global brain parenchymal volume  loss.    There is no midline shift. The basilar cisterns are patent. There is no  cerebellar tonsillar ectopia or herniation.    Scattered T2 hyperintensities in the periventricular and subcortical white  matter are stable in comparison to the prior exam. This is a nonspecific finding  which most likely represents a mild burden of chronic microangiopathy.    Diffusion imaging shows no evidence of acute infarction or other acute  abnormality.    The abnormal diffusion at the site of the left cerebellar infarction on the  9/7/2023 exam has resolved.    The gradient, axial T1, coronal T2, and sagittal T1-weighted images are nearly  nondiagnostic due to the severe motion degradation.    There are no definite suspicious osseous lesions within limits of patient  motion. There is reversal of the normal cervical lordosis.    Impression  1. No evidence of acute ischemic infarction.    2. Unchanged mild burden of chronic microangiopathy.    3. Otherwise severely motion degraded examination. If there are persistent or  progressive unexplained symptoms, a repeat examination could be obtained when  the patient is better able to lie still.        Electronically signed by GEO ZHAO        ASSESSMENT:      ICD-10-CM    1. Altered mental status, unspecified altered mental status type  R41.82         Mr. Laguna is a 79 y.o. male admitted on 12/30/2024. The encounter diagnosis was Altered mental status, unspecified altered mental status type.    Mr Laguna has PMH of childhood polio. He is a patient of Dr Olvera treated for locally advanced SqCCa epiglottis/larynx. He initially completed 12 cycles of carbo/5FU/Keytruda in 7/2024 and has been on single-agent Keytruda as maintenance and last completed cycle 21 day 1 on 12/3/24. He recently saw ENT and underwent scope and he was noted to have increase in tumor  did not appear infected. WBC up to 13.   1/1 Tmax 100.4.  Bcx-NGTD.  CSF Cx-NGTD.  Resp cx +GNR/GPC.  On Zosyn.  MRSA screen ordered.    G-tube status  - NPO/TF at home      Thank you for allowing us to participate in the care of Mr. Laguna. We will continue to follow along.  29 minutes were spent on the care of this patient       PACO Ramírez - CNP   [facility] Hematology & Oncology  [address] [place] [address]  Office : [phone]  Fax : [phone]          Attending Addendum:  I have personally performed a face to face diagnostic evaluation on this patient. I have reviewed and agree with the care plan as documented by Roxanne Osorio N.P. 36 minutes were spent on patient care, including but not limited to, reviewing the chart and time with the patient and family, more than 50% of the time documented was spent in face-to-face contact with the patient and in the care of the patient on the floor/unit where the patient is located. My findings are as follows: He has laryngeal cancer, pneumonia and altered mental status, appears weak, heart rate regular without murmurs, abdomen is non-tender, bowel sounds are positive, we will continue IV ABX and follow-up the results of his LP.              Matt Morales MD    [facility] Hematology/Oncology  [address] [place] [address]  Office : [phone]  Fax : [phone]

## 2025-01-02 LAB
ANION GAP SERPL CALC-SCNC: 12 MMOL/L (ref 7–16)
BACTERIA SPEC CULT: NORMAL
BASOPHILS # BLD: 0 K/UL (ref 0–0.2)
BASOPHILS NFR BLD: 0 % (ref 0–2)
BUN SERPL-MCNC: 23 MG/DL (ref 8–23)
CALCIUM SERPL-MCNC: 8.9 MG/DL (ref 8.8–10.2)
CHLORIDE SERPL-SCNC: 105 MMOL/L (ref 98–107)
CO2 SERPL-SCNC: 24 MMOL/L (ref 20–29)
CREAT SERPL-MCNC: 0.71 MG/DL (ref 0.8–1.3)
DIFFERENTIAL METHOD BLD: ABNORMAL
EOSINOPHIL # BLD: 0.1 K/UL (ref 0–0.8)
EOSINOPHIL NFR BLD: 1 % (ref 0.5–7.8)
ERYTHROCYTE [DISTWIDTH] IN BLOOD BY AUTOMATED COUNT: 14.2 % (ref 11.9–14.6)
GLUCOSE SERPL-MCNC: 153 MG/DL (ref 70–99)
HCT VFR BLD AUTO: 29.3 % (ref 41.1–50.3)
HGB BLD-MCNC: 9.2 G/DL (ref 13.6–17.2)
IMM GRANULOCYTES # BLD AUTO: 0 K/UL (ref 0–0.5)
IMM GRANULOCYTES NFR BLD AUTO: 0 % (ref 0–5)
LYMPHOCYTES # BLD: 0.8 K/UL (ref 0.5–4.6)
LYMPHOCYTES NFR BLD: 8 % (ref 13–44)
MAGNESIUM SERPL-MCNC: 2 MG/DL (ref 1.8–2.4)
MCH RBC QN AUTO: 26.9 PG (ref 26.1–32.9)
MCHC RBC AUTO-ENTMCNC: 31.4 G/DL (ref 31.4–35)
MCV RBC AUTO: 85.7 FL (ref 82–102)
MONOCYTES # BLD: 0.9 K/UL (ref 0.1–1.3)
MONOCYTES NFR BLD: 9 % (ref 4–12)
MRSA DNA SPEC QL NAA+PROBE: NOT DETECTED
NEUTS SEG # BLD: 7.5 K/UL (ref 1.7–8.2)
NEUTS SEG NFR BLD: 82 % (ref 43–78)
NRBC # BLD: 0 K/UL (ref 0–0.2)
PHOSPHATE SERPL-MCNC: 3.4 MG/DL (ref 2.5–4.5)
PLATELET # BLD AUTO: 266 K/UL (ref 150–450)
PMV BLD AUTO: 10.2 FL (ref 9.4–12.3)
POTASSIUM SERPL-SCNC: ABNORMAL MMOL/L (ref 3.5–5.1)
RBC # BLD AUTO: 3.42 M/UL (ref 4.23–5.6)
S AUREUS CPE NOSE QL NAA+PROBE: DETECTED
SERVICE CMNT-IMP: NORMAL
SODIUM SERPL-SCNC: 141 MMOL/L (ref 136–145)
WBC # BLD AUTO: 9.3 K/UL (ref 4.3–11.1)

## 2025-01-02 PROCEDURE — 2500000003 HC RX 250 WO HCPCS: Performed by: FAMILY MEDICINE

## 2025-01-02 PROCEDURE — 1100000000 HC RM PRIVATE

## 2025-01-02 PROCEDURE — 6360000002 HC RX W HCPCS

## 2025-01-02 PROCEDURE — 99232 SBSQ HOSP IP/OBS MODERATE 35: CPT | Performed by: INTERNAL MEDICINE

## 2025-01-02 PROCEDURE — 6370000000 HC RX 637 (ALT 250 FOR IP): Performed by: PHYSICIAN ASSISTANT

## 2025-01-02 PROCEDURE — 2500000003 HC RX 250 WO HCPCS: Performed by: PHYSICIAN ASSISTANT

## 2025-01-02 PROCEDURE — 85025 COMPLETE CBC W/AUTO DIFF WBC: CPT

## 2025-01-02 PROCEDURE — 2500000003 HC RX 250 WO HCPCS

## 2025-01-02 PROCEDURE — APPSS30 APP SPLIT SHARED TIME 16-30 MINUTES: Performed by: NURSE PRACTITIONER

## 2025-01-02 PROCEDURE — 97112 NEUROMUSCULAR REEDUCATION: CPT

## 2025-01-02 PROCEDURE — 84100 ASSAY OF PHOSPHORUS: CPT

## 2025-01-02 PROCEDURE — 97530 THERAPEUTIC ACTIVITIES: CPT

## 2025-01-02 PROCEDURE — 83735 ASSAY OF MAGNESIUM: CPT

## 2025-01-02 PROCEDURE — 80048 BASIC METABOLIC PNL TOTAL CA: CPT

## 2025-01-02 PROCEDURE — 97165 OT EVAL LOW COMPLEX 30 MIN: CPT

## 2025-01-02 PROCEDURE — 36415 COLL VENOUS BLD VENIPUNCTURE: CPT

## 2025-01-02 PROCEDURE — 6360000002 HC RX W HCPCS: Performed by: FAMILY MEDICINE

## 2025-01-02 PROCEDURE — 2580000003 HC RX 258: Performed by: PHYSICIAN ASSISTANT

## 2025-01-02 PROCEDURE — 6360000002 HC RX W HCPCS: Performed by: PHYSICIAN ASSISTANT

## 2025-01-02 PROCEDURE — 97162 PT EVAL MOD COMPLEX 30 MIN: CPT

## 2025-01-02 RX ORDER — HALOPERIDOL 5 MG/ML
10 INJECTION INTRAMUSCULAR ONCE
Status: COMPLETED | OUTPATIENT
Start: 2025-01-02 | End: 2025-01-02

## 2025-01-02 RX ORDER — DIPHENHYDRAMINE HYDROCHLORIDE 50 MG/ML
50 INJECTION INTRAMUSCULAR; INTRAVENOUS ONCE
Status: COMPLETED | OUTPATIENT
Start: 2025-01-02 | End: 2025-01-02

## 2025-01-02 RX ADMIN — ZIPRASIDONE MESYLATE 10 MG: 20 INJECTION, POWDER, LYOPHILIZED, FOR SOLUTION INTRAMUSCULAR at 17:05

## 2025-01-02 RX ADMIN — OXYCODONE HYDROCHLORIDE AND ACETAMINOPHEN 250 MG: 500 TABLET ORAL at 07:55

## 2025-01-02 RX ADMIN — SODIUM CHLORIDE, PRESERVATIVE FREE 10 ML: 5 INJECTION INTRAVENOUS at 07:56

## 2025-01-02 RX ADMIN — VITAMIN D, TAB 1000IU (100/BT) 1000 UNITS: 25 TAB at 07:55

## 2025-01-02 RX ADMIN — PIPERACILLIN AND TAZOBACTAM 3375 MG: 3; .375 INJECTION, POWDER, LYOPHILIZED, FOR SOLUTION INTRAVENOUS at 08:18

## 2025-01-02 RX ADMIN — HALOPERIDOL LACTATE 10 MG: 5 INJECTION, SOLUTION INTRAMUSCULAR at 19:20

## 2025-01-02 RX ADMIN — DIPHENHYDRAMINE HYDROCHLORIDE 50 MG: 50 INJECTION INTRAMUSCULAR; INTRAVENOUS at 20:34

## 2025-01-02 RX ADMIN — ENOXAPARIN SODIUM 40 MG: 100 INJECTION SUBCUTANEOUS at 07:58

## 2025-01-02 RX ADMIN — ASPIRIN 81 MG 81 MG: 81 TABLET ORAL at 07:55

## 2025-01-02 RX ADMIN — PIPERACILLIN AND TAZOBACTAM 3375 MG: 3; .375 INJECTION, POWDER, LYOPHILIZED, FOR SOLUTION INTRAVENOUS at 01:11

## 2025-01-02 RX ADMIN — WATER 1000 MG: 1 INJECTION INTRAMUSCULAR; INTRAVENOUS; SUBCUTANEOUS at 16:57

## 2025-01-02 NOTE — THERAPY EVALUATION
admission.    This date, pt presented supine with various family members at bedside and agreeable to session. Pt overall SBA-min Ax1-2 for bed mobility and squat pivot transfer from bed to chair. Overall fair+ sitting balance. Pt refusing at this time to attempt sit to stand transfer or further mobility. Pt denied any ADL needs at this time. Pt left seated in bedside chair with all needs met. Educated staff on best way to assist pt in transferring back to bed (lateral pivot via drop arm chair). Today pt presents with decreased activity tolerance, functional transfers, strength, cognition, and balance impacting ADLs. Pt is currently functioning below baseline and would benefit from skilled OT services to address OT goals and plan of care. Spoke with wife about d/c planning, would benefit from OT services at discharge however pt and wife need to think through what level of care is needed (if wife can provide level of care needed at home, or if pt needs to go to a rehab facility).     [facility]™ “6 Clicks” Daily Activity Inpatient Short Form:    AM-PAC Daily Activity - Inpatient   How much help is needed for putting on and taking off regular lower body clothing?: A Lot  How much help is needed for bathing (which includes washing, rinsing, drying)?: A Little  How much help is needed for toileting (which includes using toilet, bedpan, or urinal)?: A Little  How much help is needed for putting on and taking off regular upper body clothing?: None  How much help is needed for taking care of personal grooming?: None  How much help for eating meals?: Total (dependent via tube feeds)  [facility] Inpatient Daily Activity Raw Score: 17  AM-PAC Inpatient ADL T-Scale Score : 37.26  ADL Inpatient CMS 0-100% Score: 50.11  ADL Inpatient CMS G-Code Modifier : CK           SUBJECTIVE:     Mr. Laguna states, \"today is not my day\"     Social/Functional Lives With: Spouse  Type of Home: House  Home Layout: One level  Home  skilled monitoring of the patient's response to treatment in order to develop a plan of care.     TREATMENT:   Co-Treatment PT/OT necessary due to patient's decreased overall endurance/tolerance levels, as well as need for high level skilled assistance to complete functional transfers/mobility and functional tasks  Neuromuscular Re-education (23 Minutes): Patient participated in neuromuscular re-education including weight shifting, postural training, and sitting balance activity   with minimal assistance, verbal cues, tactile cues, and education to improve sitting balance, posture, coordination, static balance, and dynamic balance in order to prepare for functional task, prepare for seated ADLs, prepare for functional transfer, and prepare for self care..     TREATMENT GRID:  N/A    AFTER TREATMENT PRECAUTIONS: Alarm Activated, Bed/Chair Locked, Call light within reach, Chair, Needs within reach, RN notified, and Visitors at bedside    INTERDISCIPLINARY COLLABORATION:  RN/ PCT and PT/ PTA    EDUCATION:  Education Given To: Patient;Family  Education Provided: Role of Therapy;Plan of Care  Education Method: Verbal  Education Outcome: Verbalized understanding    TOTAL TREATMENT DURATION AND TIME:  Time In: 1316  Time Out: 1344  Minutes: 28    Trupti Yen OT

## 2025-01-02 NOTE — PROGRESS NOTES
Hospitalist Progress Note   Admit Date:  2024  2:49 AM   Name:  Jesus Laguna   Age:  79 y.o.  Sex:  male  :  1945   MRN:  [id number]   Room:  520/01    Presenting/Chief Complaint: Altered Mental Status     Reason(s) for Admission: Altered mental status, unspecified altered mental status type [R41.82]  AMS (altered mental status) [R41.82]  Acute encephalopathy [G93.40]     Hospital Course:   78 YO male with PMH childhood polio, advanced SqCCa epiglottis/larynx admitted after midnight with AMS/intermittent confusion and hallucinations. On admission UA, RPR, CXR, folate, B12, ammonia and CT head non-diagnostic. Presented with slight leukocytosis, given zosyn but then ABX de-escalated. No infectious sx at home per wife. On  he spiked tmax 100.6F. WBC on  13K. Still no infectious source identified.  LP negative for any infectious pathology.  MRI shows mild burden of chronic microangiopathy.  Neurology signed off after negative neurologic workup.  Being treated with antibiotics for aspiration pneumonia.      Subjective & 24hr Events:   Last fever was up  at 1000.  Patient with significant secretions.      Assessment & Plan:     Altered mental status  Fevers  Suspect correlation between 2.  Evaluated for encephalitis with negative LP.  MRI showed no acute pathology.  Improvement of symptoms.  Patient with known laryngeal cancer with PEG tube in place and significant upper airway secretions.  Strongly suspicious of aspiration pneumonia despite negative chest x-ray (can take weeks before positive radiographic finding).  Respiratory culture consistent with respiratory aleja  -Transition to ceftriaxone  -Glycopyrrolate for uncontrolled secretion  -Scopolamine patch  -PEG tube in place  -Discharge after afebrile for 24 hours     Laryngeal cancer   - He had PET/CT on 24 that showed enlarging hypermetabolic mass consistent with progression of diseases as well as 3 small hypermetabolic  may still contain some grammatical/other typographical errors.

## 2025-01-02 NOTE — WOUND CARE
Patient declined assessment of G-tube, states it is clear. Spouse states he does not have wounds this time. Left arm noted to have small foam, patient states small skin tear, recommend foam every other day. Will sign off, but can be called or new eval order,  if needed.

## 2025-01-02 NOTE — PLAN OF CARE
Patient has remained A&O x 1-2.   -pt started on bolus feedings today around lunch time. Pt given bolus around 1230 and 1630. Pt kept trying to pull/remove extension tube from G-tube.  Extension removed with help from wife and placed back on for 1630 feeding.  Pt encouraged to keep on and not keep taking off to help prevent risk of infection.   Pt confused but mostly redirectable with family present today, but this afternoon kept trying to get up, yelling at staff shaking fists, and trying to leave.     Geodon 10 mg given per order.  After 40 mins, geodon did not seem to be helping, and JOHNNIE Tiwari notified.  New orders received.  Pt calmed down for a short while. 1920- haldol 10 mg IM given     Patient rounded on every 30 mins to 1 hour byut patient assistant and encouraged to call with any needs.  No signs of distress noted. Bed low, locked, call bell within reach. Bed alarm kept on and virtual sitter in room.   BSSR given to GENI Childers RN    Problem: Skin/Tissue Integrity  Goal: Absence of new skin breakdown  Description: 1.  Monitor for areas of redness and/or skin breakdown  2.  Assess vascular access sites hourly  3.  Every 4-6 hours minimum:  Change oxygen saturation probe site  4.  Every 4-6 hours:  If on nasal continuous positive airway pressure, respiratory therapy assess nares and determine need for appliance change or resting period.  1/2/2025 0701 by Bibiana Mcfarlane RN  Outcome: Progressing  1/1/2025 1923 by Nora Villanueva RN  Outcome: Progressing     Problem: ABCDS Injury Assessment  Goal: Absence of physical injury  1/2/2025 0701 by Bibiana Mcfarlane RN  Outcome: Progressing  1/1/2025 1923 by Nora Villanueva RN  Outcome: Progressing  Flowsheets (Taken 1/1/2025 0830 by Bibiana Mcfarlane RN)  Absence of Physical Injury: Implement safety measures based on patient assessment     Problem: Confusion  Goal: Confusion, delirium, dementia, or psychosis is improved or at baseline  Description:  INTERVENTIONS:  1. Assess for possible contributors to thought disturbance, including medications, impaired vision or hearing, underlying metabolic abnormalities, dehydration, psychiatric diagnoses, and notify attending LIP  2. [place] high risk fall precautions, as indicated  3. Provide frequent short contacts to provide reality reorientation, refocusing and direction  4. Decrease environmental stimuli, including noise as appropriate  5. Monitor and intervene to maintain adequate nutrition, hydration, elimination, sleep and activity  6. If unable to ensure safety without constant attention obtain sitter and review sitter guidelines with assigned personnel  7. Initiate Psychosocial CNS and Spiritual Care consult, as indicated  1/2/2025 0701 by Bibiana Mcfarlane, RN  Outcome: Progressing  1/1/2025 1923 by Nora Villanueva, RN  Outcome: Progressing     Problem: Safety - Adult  Goal: Free from fall injury  Outcome: Progressing

## 2025-01-02 NOTE — PROGRESS NOTES
[facility] Hematology & Oncology        Inpatient Hematology / Oncology Progress Note    Reason for Consult:  Altered mental status, unspecified altered mental status type [R41.82]  AMS (altered mental status) [R41.82]  Acute encephalopathy [G93.40]  Referring Physician:  Bryant Powell,*    24 Hour Events:  Tmax 101.5, tachycardic at times  RVP negative, BC NGTD  WBC down to 9k  On antibiotics for pneumonia (day 2)  MRI brain neg, LP negative and neuro signed off  Still a little confused, especially at night, but better  Family at bedside      ROS:  TOÑO due to patient condition    10 point review of systems is otherwise negative with the exception of the elements mentioned above in the HPI.         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  Followed with radiation treatment.    DDD (degenerative disc disease), lumbar 12/05/2022    Dehydration 9/5/2023    Hearing difficulty of both ears     Hypercholesterolemia     Hypertension     pt. states only takes his BP med twice weekly due to decrease BP after weight loss.    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

## 2025-01-02 NOTE — PROGRESS NOTES
ACUTE PHYSICAL THERAPY GOALS:   (Developed with and agreed upon by patient and/or caregiver.)   Mr. Laguna will perform bed to chair with set up independently in 5 days.    Mr. Laguna will perform gait with crutches 20 ft to bathroom and back independently in 5 days.      PHYSICAL THERAPY Initial Assessment, Daily Note, and PM  (Link to Caseload Tracking: PT Visit Days : 1  Acknowledge Orders  Time In/Out  PT Charge Capture  Rehab Caseload Tracker    Jesus Laguna is a 79 y.o. male   PRIMARY DIAGNOSIS: AMS (altered mental status)  Altered mental status, unspecified altered mental status type [R41.82]  AMS (altered mental status) [R41.82]  Acute encephalopathy [G93.40]       Reason for Referral: Generalized Muscle Weakness (M62.81)  Difficulty in walking, Not elsewhere classified (R26.2)  Inpatient: Payor: MEDICARE / Plan: MEDICARE PART A AND B / Product Type: *No Product type* /     ASSESSMENT:     REHAB RECOMMENDATIONS:   Recommendation to date pending progress:  Setting:  Continued physical therapy recommended at discharge.    Equipment:    None     ASSESSMENT:  Mr. Laguna  was admitted to the hospital with AMS.  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  He has a PEG,  he has heavy secretions for which he has suction at home.  At times during evaluation it was very hard to understand him.  His wife, brother and sister were all present today.  At baseline Mr. Laguna uses a wc for mobility all except in and out of the bathroom which he uses crutches.  His crutches are present his wc is not.  When transferring from bed to chair he slides.  In fact he always has 1 arm rest off his wc.  He never puts it on.  He is super particular about how he does things.  Today supine to sit with supervision.  Bed to chair with set up and close supervision.  He was quite frustrated with us because he really just wanted to be brought to the door to go home.  His

## 2025-01-02 NOTE — PLAN OF CARE
Problem: Skin/Tissue Integrity  Goal: Absence of new skin breakdown  Description: 1.  Monitor for areas of redness and/or skin breakdown  2.  Assess vascular access sites hourly  3.  Every 4-6 hours minimum:  Change oxygen saturation probe site  4.  Every 4-6 hours:  If on nasal continuous positive airway pressure, respiratory therapy assess nares and determine need for appliance change or resting period.  1/2/2025 0701 by Bibiana Mcfarlane RN  Outcome: Progressing  1/1/2025 1923 by Nora Villanueva RN  Outcome: Progressing     Problem: ABCDS Injury Assessment  Goal: Absence of physical injury  1/2/2025 0701 by Bibiana Mcfarlane RN  Outcome: Progressing  1/1/2025 1923 by Nora Villanueva RN  Outcome: Progressing  Flowsheets (Taken 1/1/2025 0830 by Bibiana Mcfarlane RN)  Absence of Physical Injury: Implement safety measures based on patient assessment     Problem: Confusion  Goal: Confusion, delirium, dementia, or psychosis is improved or at baseline  Description: INTERVENTIONS:  1. Assess for possible contributors to thought disturbance, including medications, impaired vision or hearing, underlying metabolic abnormalities, dehydration, psychiatric diagnoses, and notify attending LIP  2. [place] high risk fall precautions, as indicated  3. Provide frequent short contacts to provide reality reorientation, refocusing and direction  4. Decrease environmental stimuli, including noise as appropriate  5. Monitor and intervene to maintain adequate nutrition, hydration, elimination, sleep and activity  6. If unable to ensure safety without constant attention obtain sitter and review sitter guidelines with assigned personnel  7. Initiate Psychosocial CNS and Spiritual Care consult, as indicated  1/2/2025 0701 by Bibiana Mcfarlane RN  Outcome: Progressing  1/1/2025 1923 by Nora Villanueva RN  Outcome: Progressing     Problem: Safety - Adult  Goal: Free from fall injury  Outcome: Progressing

## 2025-01-02 NOTE — PROGRESS NOTES
Nutrition Assessment  Assessment Type: Reassess  Reason for visit:  Tube Feeding Management (Hospitalists)  Malnutrition Screening Tool Score: 0    Nutrition Intervention:   Food and/or Nutrient Delivery:   Enteral Nutrition:   Enteral Access: PEG  Change  Formula: Standard with Fiber (Jevity 1.5 Kilo)  Goal Rate: Bolus 360 ml times 4/day 0800, 1200, 1600, 2000  Change  Water flush  120 ml before and after each bolus  Modulars: None not indicated at this time and deferred until tolerance of base tube feeding regimen achieved   Enteral regimen at above goal to provide per 24 hours:  2130 calories, 91 grams protein and 2040 ml free fluid.    Above regimen: Intended to meet macronutrient goals  Labs:   Basic Metabolic Panel, Magnesium and Phosphorus active per nutrition parameters  POC Glucoses/SSI Not indicated   Nutrition Related Medication Management:  Electrolyte Replacement:   Continue prn protocol Magnesium and Potassium   Intravenous fluids:  Not applicable  Thiamine Not indicated  Bowel Regimen Active prn  Meals and Snacks:  Diet:  Continue NPO  Coordination of Nutrition Care:  Coordination with asha care provider Bibiana ARECHIGA     Malnutrition Assessment:  Malnutrition Status: Mild malnutrition  Context: Chronic Illness  Findings of clinical characteristics of malnutrition:   Energy Intake:  Unable to assess (TF dependent for ~16 months with stable weight until last ~1 month)  Weight Loss:  Mild weight loss (6.5% over ~3 months, 2.8% over last month)     Body Fat Loss:  No body fat loss     Muscle Mass Loss:  Mild muscle mass loss Temples (temporalis), Clavicles (pectoralis & deltoids)  Fluid Accumulation:  No fluid accumulation     Strength:  Not Performed    Nutrition Assessment:  Food/Nutrition Related History: Patient TF dependent since 9/2023. Per last inpatient assessment 1/2024 \"PTA orders: 4 cartons/day of Isosource 1.5 + 2 cartons/day of Boost VHC.  Combined formulas to provide 2560 kcal, 156 gm

## 2025-01-03 VITALS
OXYGEN SATURATION: 96 % | HEART RATE: 103 BPM | TEMPERATURE: 98.2 F | RESPIRATION RATE: 18 BRPM | BODY MASS INDEX: 24.07 KG/M2 | HEIGHT: 72 IN | SYSTOLIC BLOOD PRESSURE: 107 MMHG | DIASTOLIC BLOOD PRESSURE: 71 MMHG | WEIGHT: 177.7 LBS

## 2025-01-03 LAB
ANION GAP SERPL CALC-SCNC: 13 MMOL/L (ref 7–16)
BACTERIA SPEC CULT: ABNORMAL
BUN SERPL-MCNC: 17 MG/DL (ref 8–23)
CALCIUM SERPL-MCNC: 9 MG/DL (ref 8.8–10.2)
CHLORIDE SERPL-SCNC: 105 MMOL/L (ref 98–107)
CO2 SERPL-SCNC: 24 MMOL/L (ref 20–29)
CREAT SERPL-MCNC: 0.6 MG/DL (ref 0.8–1.3)
GLUCOSE SERPL-MCNC: 112 MG/DL (ref 70–99)
GRAM STN SPEC: ABNORMAL
MAGNESIUM SERPL-MCNC: 2 MG/DL (ref 1.8–2.4)
PHOSPHATE SERPL-MCNC: 3.3 MG/DL (ref 2.5–4.5)
POTASSIUM SERPL-SCNC: 3.2 MMOL/L (ref 3.5–5.1)
SERVICE CMNT-IMP: ABNORMAL
SERVICE CMNT-IMP: ABNORMAL
SODIUM SERPL-SCNC: 141 MMOL/L (ref 136–145)
VIT B1 BLD-SCNC: 155.3 NMOL/L (ref 66.5–200)

## 2025-01-03 PROCEDURE — 97530 THERAPEUTIC ACTIVITIES: CPT

## 2025-01-03 PROCEDURE — 6370000000 HC RX 637 (ALT 250 FOR IP)

## 2025-01-03 PROCEDURE — 83735 ASSAY OF MAGNESIUM: CPT

## 2025-01-03 PROCEDURE — 6360000002 HC RX W HCPCS: Performed by: FAMILY MEDICINE

## 2025-01-03 PROCEDURE — 6370000000 HC RX 637 (ALT 250 FOR IP): Performed by: PHYSICIAN ASSISTANT

## 2025-01-03 PROCEDURE — 2500000003 HC RX 250 WO HCPCS: Performed by: FAMILY MEDICINE

## 2025-01-03 PROCEDURE — 80048 BASIC METABOLIC PNL TOTAL CA: CPT

## 2025-01-03 PROCEDURE — 36415 COLL VENOUS BLD VENIPUNCTURE: CPT

## 2025-01-03 PROCEDURE — 84100 ASSAY OF PHOSPHORUS: CPT

## 2025-01-03 PROCEDURE — 97112 NEUROMUSCULAR REEDUCATION: CPT

## 2025-01-03 RX ORDER — LEVOFLOXACIN 500 MG/1
750 TABLET, FILM COATED ORAL EVERY 24 HOURS
Status: DISCONTINUED | OUTPATIENT
Start: 2025-01-03 | End: 2025-01-03 | Stop reason: HOSPADM

## 2025-01-03 RX ORDER — LEVOFLOXACIN 750 MG/1
750 TABLET, FILM COATED ORAL EVERY 24 HOURS
Qty: 7 TABLET | Refills: 0 | Status: SHIPPED | OUTPATIENT
Start: 2025-01-04 | End: 2025-01-11

## 2025-01-03 RX ADMIN — OXYCODONE HYDROCHLORIDE AND ACETAMINOPHEN 250 MG: 500 TABLET ORAL at 08:10

## 2025-01-03 RX ADMIN — POTASSIUM BICARBONATE 40 MEQ: 782 TABLET, EFFERVESCENT ORAL at 08:10

## 2025-01-03 RX ADMIN — ENOXAPARIN SODIUM 40 MG: 100 INJECTION SUBCUTANEOUS at 08:11

## 2025-01-03 RX ADMIN — LEVOFLOXACIN 750 MG: 500 TABLET, FILM COATED ORAL at 10:59

## 2025-01-03 RX ADMIN — GLYCOPYRROLATE 1 MG: 1 TABLET ORAL at 08:09

## 2025-01-03 RX ADMIN — SODIUM CHLORIDE, PRESERVATIVE FREE 10 ML: 5 INJECTION INTRAVENOUS at 08:10

## 2025-01-03 RX ADMIN — ASPIRIN 81 MG 81 MG: 81 TABLET ORAL at 08:10

## 2025-01-03 RX ADMIN — VITAMIN D, TAB 1000IU (100/BT) 1000 UNITS: 25 TAB at 08:10

## 2025-01-03 NOTE — DISCHARGE SUMMARY
Hospitalist Discharge Summary   Admit Date:  2024  2:49 AM   DC Note date: 1/3/2025  Name:  Jesus Laguna   Age:  79 y.o.  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 *      Hospital Course:  78 YO male with PMH childhood polio, advanced SqCCa epiglottis/larynx admitted after midnight with AMS/intermittent confusion and hallucinations. On admission UA, RPR, CXR, folate, B12, ammonia and CT head non-diagnostic. Presented with slight leukocytosis, given zosyn but then ABX de-escalated. No infectious sx at home per wife. On  he spiked tmax 100.6F. WBC on  13K. Still no infectious source identified.  LP negative for any infectious pathology.  MRI shows mild burden of chronic microangiopathy.  Neurology signed off after negative.    Strong suspicious of aspiration pneumonia.  Respiratory culture grows mixed respiratory aleja but also notable for E. coli/Serratia?  Patient able to go 24 hours without fevers.  Transition from IV antibiotics to oral levofloxacin 750 mg daily for 7 days.  Will receive the medication through PEG tube.  Will continue send glycopyrrolate and scopolamine given his significant upper airway secretions.  Confirmed with the wife that patient's DNR/DNI at discharge.  Should be noted if he were to present again to the hospital.  Has oncology follow-up scheduled for .    Disposition: Home with

## 2025-01-03 NOTE — PROGRESS NOTES
Physician Progress Note      PATIENT:               LAUREEN SUAREZ  CSN #:                  [id number]  :                       1945  ADMIT DATE:       2024 2:49 AM  DISCH DATE:  RESPONDING  PROVIDER #:        Bryant Powell MD          QUERY TEXT:    Pt admitted with sepsis, aspiration PNA and has malnutrition documented in RD   CN  note . Please document in the medical record if evaluating or   treating:.    The medical record reflects the following:  Risk Factors: TF dependent for   16 months with stable weight until last   1 month,  Laryngeal cancer  Clinical Indicators: RD note: Mild weight loss (6.5% over   3 months, 2.8% over last month) , Mild muscle mass loss Temples (temporalis),   Clavicles (pectoralis & deltoids). Patient TF dependent since 2023.BMI:   23.3.    Inadequate oral intake related to swallowing difficulty as evidenced   by nutrition support - enteral nutrition  Treatment: Continued monitoring TF tolerance, RD consulted.    ASPEN Criteria:    https://aspenjournals.onlinelibrary.ceron.com/doi/full/10.1177/[email or web address]  5    Thank You, Kelly CDS  Options provided:  -- Mild Malnutrition  -- Other - I will add my own diagnosis  -- Disagree - Not applicable / Not valid  -- Disagree - Clinically unable to determine / Unknown  -- Refer to Clinical Documentation Reviewer    PROVIDER RESPONSE TEXT:    This patient has mild malnutrition.    Query created by: Patricia Ryan on 1/3/2025 8:02 AM      Electronically signed by:  Bryant Powell MD 1/3/2025 12:27 PM

## 2025-01-03 NOTE — PROGRESS NOTES
Pt continues to try and get out of bed and pulling at lines. Per dayshift RN, he had pulled at g tube. G tube site still reddened and oozing, but now protruding underneath. Meds and bolus feed held. Wound care re-consulted.Hospitalist notified. Orders received with bilateral mitts.    pt comes to ED for abdominal pain and vaginal bleeding since this morning. pt LMP 6/23 pmhx of HTN pt took meds today. pt BP is high otherwise VSS pt denies HA or blurry vision.  NAD

## 2025-01-03 NOTE — CARE COORDINATION
Pt to d/c home today.  Family will provide transportation.  Per spouse's choice [facility]: SN, PT, OT ordered and acceptance confirmed.  No other supportive care needs identified.  Spouse agrees with d/c plan.  Milestones met.  LOS = 4 days       12/31/24 0919   Service Assessment   Patient Orientation Alert and Oriented;Person;Self   Cognition Alert   History Provided By Medical Record;Spouse   Primary Caregiver Self   Accompanied By/Relationship Spouse   Support Systems Spouse/Significant Other;Family Members   Patient's Healthcare Decision Maker is: Legal Next of Kin   PCP Verified by CM Yes  (Quinten Hartmann, )   Last Visit to PCP Within last 6 months   Prior Functional Level Assistance with the following:;Mobility;Shopping;Housework;Cooking   Current Functional Level Assistance with the following:;Mobility;Shopping;Housework;Cooking   Can patient return to prior living arrangement Yes   Ability to make needs known: Fair   Family able to assist with home care needs: Yes   Would you like for me to discuss the discharge plan with any other family members/significant others, and if so, who? Yes  (Spouse)   Financial Resources Medicare;Other (Comment)  (Secondary[place])   Community Resources None   CM/SW Referral Other (see comment)  (None)   Social/Functional History   Lives With Spouse   Type of Home House   Home Layout One level   Home Access Stairs to enter with rails   Entrance Stairs - Rails Both   Bathroom Toilet Bedside commode   Bathroom Equipment 3-in-1 Commode   Bathroom Accessibility Accessible   Home Equipment Feeding equipment;Crutches;Walker - Rolling;Wheelchair - Manual   Receives Help From Family   Prior Level of Assist for ADLs Needs assistance   Ambulation Assistance Needs assistance   Prior Level of Assist for Transfers Independent   Active  No   Patient's  Info Family   Mode of Transportation Family   Occupation Retired   Discharge Planning   Type of Residence House   Living  Arrangements Spouse/Significant Other   Current Services Prior To Admission Durable Medical Equipment   Current DME Prior to Arrival Bedside Commode;Walker;Wheelchair;Crutches;Enteral Feedings;Home Aerosol   Potential Assistance Needed Home Care   DME Ordered? No   Potential Assistance Purchasing Medications No   Type of Home Care Services OT;PT;Nursing Services   Patient expects to be discharged to: House   One/Two Story Residence One story   History of falls? 0   Services At/After Discharge   Transition of Care Consult (CM Consult) Discharge Planning;Home Health   Internal Home Health No   Reason Outside Agency Chosen Script used patient chose alternate agency  ([facility])   Services At/After Discharge Home Health    Resource Information Provided? No   Mode of Transport at Discharge Other (see comment)  (Family)   Confirm Follow Up Transport Family   Condition of Participation: Discharge Planning   The Plan for Transition of Care is related to the following treatment goals: Pt requires home health to return to functional baseline in the community.   The Patient and/or Patient Representative was provided with a Choice of Provider? Patient Representative;Patient   Name of the Patient Representative who was provided with the Choice of Provider and agrees with the Discharge Plan?  Spouse: Rosalinda Laguna   The Patient and/Or Patient Representative agree with the Discharge Plan? Yes   Freedom of Choice list was provided with basic dialogue that supports the patient's individualized plan of care/goals, treatment preferences, and shares the quality data associated with the providers?  Yes

## 2025-01-03 NOTE — PROGRESS NOTES
Pt discharge is complete at this time. Discharge instructions and follow ups reviewed. Prescriptions reviewed. IV removed. Opportunity for questions given. Patient is awaiting meds from meds to beds for discharge.

## 2025-01-03 NOTE — ACP (ADVANCE CARE PLANNING)
Advance Care Planning Note   Admit Date:  2024  2:49 AM   Name:  Jesus Laguna   Age:  79 y.o.  Sex:  male  :  1945   MRN:  [id number]   Room:  [facility]/01    Jesus Laguna has capacity to make his own decisions:   Yes    If pt unable to make decisions, POA/surrogate decision maker:  Spouse    Other people present:   Spouse    Patient / surrogate decision-maker directed code status:  DNR/DNI    Other ACP topics discussed, if applicable:   None    Patient or surrogate consented to discussion of the current conditions, workup, management plans, prognosis, and the risk for further deterioration.  Time spent: 20 minutes in direct discussion.      Signed:  Bryant Powell MD

## 2025-01-03 NOTE — PROGRESS NOTES
Bathing [] [] [] [] [] [] [] [] [] [x]     Lower Body Bathing [] [] [] [] [] [] [] [] [] [x]     Toileting [] [] [] [] [] [] [] [] [] [x]    Upper Body Dressing [] [] [] [] [] [] [] [] [] [x]    Lower Body Dressing [] [] [] [] [] [] [] [] [] [x]    Feeding [] [] [] [] [] [] [] [] [] [x]    Grooming [] [] [] [] [] [] [] [] [] [x]    Personal Device Care [] [] [] [] [] [] [] [] [] [x]    Functional Mobility [] [] [] [] [x] [] [] [] [] [] Lateral, squat, pivot transfers   I=Independent, Mod I=Modified Independent, S=Supervision/Setup, SBA=Standby Assistance, CGA=Contact Guard Assistance, Min=Minimal Assistance, Mod=Moderate Assistance, Max=Maximal Assistance, Total=Total Assistance, NT=Not Tested    BALANCE: Good Fair+ Fair Fair- Poor NT Comments   Sitting Static [x] [] [] [] [] []    Sitting Dynamic [] [x] [] [] [] []              Standing Static [] [] [] [] [] [x]    Standing Dynamic [] [] [] [] [] [x]        PLAN:     FREQUENCY/DURATION   OT Plan of Care: 3 times/week for duration of hospital stay or until stated goals are met, whichever comes first.    TREATMENT:     TREATMENT:   Co-Treatment PT/OT necessary due to patient's decreased overall endurance/tolerance levels, as well as need for high level skilled assistance to complete functional transfers/mobility and functional tasks  Neuromuscular Re-education (27 Minutes): Patient participated in neuromuscular re-education including functional reaching, weight shifting, postural training, midline training, and sitting balance activity   with minimal verbal cues and tactile cues to improve sitting balance, posture, coordination, static balance, and dynamic balance in order to prepare for functional task, prepare for seated ADLs, prepare for functional transfer, increase safety awareness, prepare for discharge home , and prepare for self care..     TREATMENT GRID:  N/A    AFTER TREATMENT PRECAUTIONS: Bed, Call light within reach, Needs within reach, RN notified, and  Visitors at bedside    INTERDISCIPLINARY COLLABORATION:  RN/ PCT, PT/ PTA, and OT/ ALLEN    EDUCATION:       TOTAL TREATMENT DURATION AND TIME:  Time In: 1119  Time Out: 1146  Minutes: 27    Silvia Brown OT

## 2025-01-03 NOTE — PROGRESS NOTES
ACUTE PHYSICAL THERAPY GOALS:   (Developed with and agreed upon by patient and/or caregiver.)   Mr. Laguna will perform bed to chair with set up independently in 5 days.    Mr. Laguna will perform gait with crutches 20 ft to bathroom and back independently in 5 days.       PHYSICAL THERAPY: Daily Note AM   (Link to Caseload Tracking: PT Visit Days : 2  Time In/Out PT Charge Capture  Rehab Caseload Tracker  Orders    Jesus Laguna is a 79 y.o. male   PRIMARY DIAGNOSIS: AMS (altered mental status)  Altered mental status, unspecified altered mental status type [R41.82]  AMS (altered mental status) [R41.82]  Acute encephalopathy [G93.40]       Inpatient: Payor: MEDICARE / Plan: MEDICARE PART A AND B / Product Type: *No Product type* /     ASSESSMENT:     REHAB RECOMMENDATIONS:   Recommendation to date pending progress:  Setting:  Home Health Therapy    Equipment:    None     ASSESSMENT:  Mr. Laguna seen for PT this AM. Pt needs to be able to perform transfers to/from  to d/c home. Pt is agreeable to participate. He denies pain. Wife present at bedside. Pt was up in recliner upon therapist arrival. Pt declined use of crutches. Pt wanted to attempt transfer recliner to . Due to poor set up, pt required min A to complete. He then performed WC <> bed with SBA/CGA x 2 reps. Pt's wife feels she can assist pt at home. Pt transferred to supine in bed with CGA/min A. Pt able to perform mobility with less assistance today. RN updated. PT will continue to follow as inpatient.  Should he d/c home today, recommend HHPT.     SUBJECTIVE:   Mr. Laguna states, \"This is how I do it.\"     Social/Functional Lives With: Spouse  Type of Home: House  Home Layout: One level  Home Access: Ramped entrance  Entrance Stairs - Rails: Both  Bathroom Toilet: Bedside commode  Bathroom Equipment: 3-in-1 Commode  Bathroom Accessibility: Accessible  Home Equipment: Feeding equipment, Crutches, Walker - Rolling, Wheelchair -

## 2025-01-03 NOTE — PROGRESS NOTES
Physician Progress Note      PATIENT:               LAUREEN SUAREZ  CSN #:                  [id number]  :                       1945  ADMIT DATE:       2024 2:49 AM  DISCH DATE:  RESPONDING  PROVIDER #:        Bryant Powell MD          QUERY TEXT:    Pt admitted with AMS, Hallucinations. Pt noted to have suspected aspiration   PNA with Tmax 38.6 on , HR , WBC 10.5->13.0->10.5->9.3 . If possible,   please document in the progress notes and discharge summary if you are   evaluating and /or treating any of the following:    The medical record reflects the following:  Risk Factors: aspiration PNA, L wound culture moderate staph, Resp sputum gram   neg rods, mod staph aureus, heavy strept  Clinical Indicators: Tmax 38.6  ; HR , WBC 10.5->13.0->10.5->9.3 ,  L   wound culture moderate staph, Resp sputum gram neg rods, mod staph aureus,   heavy strept , L Gtube mod staph aureus  Treatment: Rocephin -> ; Zosyn    Thank You, Kelly CDS  Options provided:  -- Sepsis, present on admission  -- Sepsis was ruled out  -- Other - I will add my own diagnosis  -- Disagree - Not applicable / Not valid  -- Disagree - Clinically unable to determine / Unknown  -- Refer to Clinical Documentation Reviewer    PROVIDER RESPONSE TEXT:    This patient has sepsis which was present on admission.    Query created by: Patricia Ryan on 2025 1:40 PM      QUERY TEXT:    Pt admitted with intermittent confusion and hallucinations and has acute   encephalopathy documented. If possible, please document in progress notes and   discharge summary further specificity regarding the type of encephalopathy:    The medical record reflects the following:  Risk Factors: advanced SqCCa epiglottis/larynx , aspiration pneumonia,   Keytruda  Clinical Indicators: presents with  hallucinations beginning 24 . GCS   15->14->15  Med list reviewed, on scopolamine patch and robinul, neither of which are new   but could be

## 2025-01-03 NOTE — WOUND CARE
Hypergranulation noted around stoma of g-tube site, no signs of infection no erythema.  Hyerpgranulation is scar tissue, his is wet and likely has epithelium from stomach. Patient and spouse states has been that way for months, does better if it is allowed to dry. Hypergranulaiton will not hurt patient, is ok to leave open to air. May benefit from silver nitrate treatment, however patient declined, states he will allow Dr. Persaud to treat the area when he changes the tube in a couple of months.

## 2025-01-03 NOTE — CARE COORDINATION
Pt discussed during IDR and chart screened by CM for d/c planning.  Mentation improving.  PT/OT recommend \"continued therapy at d/c.\"  Pt is asking to d/c home.  PT to work with pt today for transfers from bed to wheelchair.  CM will continue to follow.  LOS = 3 days

## 2025-01-03 NOTE — PLAN OF CARE
Problem: Skin/Tissue Integrity  Goal: Absence of new skin breakdown  Description: 1.  Monitor for areas of redness and/or skin breakdown  2.  Assess vascular access sites hourly  3.  Every 4-6 hours minimum:  Change oxygen saturation probe site  4.  Every 4-6 hours:  If on nasal continuous positive airway pressure, respiratory therapy assess nares and determine need for appliance change or resting period.  Outcome: Progressing     Problem: ABCDS Injury Assessment  Goal: Absence of physical injury  Outcome: Progressing     Problem: Confusion  Goal: Confusion, delirium, dementia, or psychosis is improved or at baseline  Description: INTERVENTIONS:  1. Assess for possible contributors to thought disturbance, including medications, impaired vision or hearing, underlying metabolic abnormalities, dehydration, psychiatric diagnoses, and notify attending LIP  2. [place] high risk fall precautions, as indicated  3. Provide frequent short contacts to provide reality reorientation, refocusing and direction  4. Decrease environmental stimuli, including noise as appropriate  5. Monitor and intervene to maintain adequate nutrition, hydration, elimination, sleep and activity  6. If unable to ensure safety without constant attention obtain sitter and review sitter guidelines with assigned personnel  7. Initiate Psychosocial CNS and Spiritual Care consult, as indicated  Outcome: Progressing  Flowsheets (Taken 1/2/2025 2027)  Effect of thought disturbance (confusion, delirium, dementia, or psychosis) are managed with adequate functional status:   Assess for contributors to thought disturbance, including medications, impaired vision or hearing, underlying metabolic abnormalities, dehydration, psychiatric diagnoses, notify LIP   [place] high risk fall precautions, as indicated   Decrease environmental stimuli, including noise as appropriate     Problem: Safety - Adult  Goal: Free from fall injury  Outcome: Progressing  Flowsheets

## 2025-01-05 LAB
BACTERIA SPEC CULT: NORMAL
GRAM STN SPEC: NORMAL
GRAM STN SPEC: NORMAL
SERVICE CMNT-IMP: NORMAL

## 2025-01-06 ENCOUNTER — CARE COORDINATION (OUTPATIENT)
Dept: CARE COORDINATION | Facility: CLINIC | Age: 80
End: 2025-01-06

## 2025-01-06 LAB
BACTERIA SPEC CULT: NORMAL
SERVICE CMNT-IMP: NORMAL

## 2025-01-06 NOTE — CARE COORDINATION
10:45 AM Shahid Cortez MD Otolaryngology [phone]    1/21/2025 8:00 AM PORT Lab [phone]    1/21/2025 8:45 AM Kasandra Olvera MD Oncology [phone]    1/21/2025 8:45 AM Loraine Honeycutt, RD Oncology [phone]    1/21/2025 9:30 AM INFUSION Infusion Therapy [phone]    1/24/2025 2:45 PM INFUSION Infusion Therapy [phone]    2/11/2025 8:15 AM PORT Lab [phone]    2/11/2025 9:00 AM Neelam Schaeffer NP-C Oncology [phone]    2/11/2025 9:00 AM Loraine Honeycutt RD Oncology [phone]    2/11/2025 10:00 AM BMT Infusion Therapy [phone]    2/14/2025 3:15 PM INFUSION Infusion Therapy [phone]    2/19/2025 12:00 PM (Arrive by 11:00 AM) [facility] IR RADIOLOGIST RESOURCE; [facility] IR UNIT 1 Radiology [phone]    2/28/2025 9:45 AM MAT LAB Internal Medicine [phone]    3/7/2025 11:20 AM Quinten Hartmann DO Internal Medicine [phone]            Care Transition Nurse provided contact information.   Plan for follow up next week  based on severity of symptoms and risk factors.  Plan for next call: Symptom management - assess for continued improvements and/or any new or worsening signs and symptoms to report and follow up.     Letitia Rivas RN

## 2025-01-07 ENCOUNTER — TELEPHONE (OUTPATIENT)
Dept: INTERNAL MEDICINE CLINIC | Facility: CLINIC | Age: 80
End: 2025-01-07

## 2025-01-07 DIAGNOSIS — C32.9 LARYNGEAL CANCER (HCC): ICD-10-CM

## 2025-01-07 DIAGNOSIS — R53.81 DEBILITY: Primary | ICD-10-CM

## 2025-01-07 NOTE — TELEPHONE ENCOUNTER
First thing, will you follow home health orders?  2nd-pt needs a hospital bed and would like to know if you can send in a rx for this

## 2025-01-07 NOTE — TELEPHONE ENCOUNTER
Care Transitions Initial Follow Up Call    Outreach made within 2 business days of discharge: Yes    Patient: Jesus Laguna Patient : 1945   MRN: [id number]  Reason for Admission: AMS  Discharge Date: 1/3/25       Spoke with: pts wife    Discharge department/facility: D    TCM Interactive Patient Contact:  Was patient able to fill all prescriptions: Yes  Was patient instructed to bring all medications to the follow-up visit: Yes  Is patient taking all medications as directed in the discharge summary? Yes  Does patient understand their discharge instructions: Yes  Does patient have questions or concerns that need addressed prior to 7-14 day follow up office visit: yes - hosp bed needed due to denial for staying in rehab    Additional needs identified to be addressed with provider  No needs identified             Scheduled appointment with PCP within 7-14 days    Follow Up  Future Appointments   Date Time Provider Department Center   2025  9:45 AM PORT [facility]   2025 10:30 AM Neelam Schaeffer NP-C [facility] AMB   2025 10:30 AM Loraine Honeycutt RD [facility] AMB   2025 11:30 AM INFUSION [facility] GCC   2025  4:00 PM Katya Luna APRN - CNP [facility]   2025 10:45 AM Shahid Cortez MD [facility] AMB   2025  8:00 AM PORT [facility] GCC   2025  8:45 AM Kasandra Olvera MD [facility] AMB   2025  8:45 AM Loraine Honeycutt RD [facility] AMB   2025  9:30 AM INFUSION [facility]   2025  2:45 PM INF[facility]ION [facility]   2025  8:15 AM PORT [facility]   2025  9:00 AM Neelam Schaeffer NP-C [facility] AMB   2025  9:00 AM Loraine Honeycutt RD [facility] AMB   2025 10:00 AM BMT [facility] GCC   2025  3:15 PM INFUSION [facility] GCC   2025 12:00 PM [facility] IR UNIT 1 [facility]   2025  9:45 AM MAT LAB MAT [facility] DEP   3/7/2025 11:20 AM Quinten Hartmann DO MAT [facility] DEP       Nori Mix

## 2025-01-07 NOTE — TELEPHONE ENCOUNTER
Called [facility] spoke with Terrance, and he informed me Dr. Scott is on call and he will page him to call Dr. Traylor back.   Patient recently discharged from hospital on 1/3/2025 after being admitted for altered mental status.  Patient was discharged home and I spoke with patient's wife who is also my patient yesterday during her visit with plans for home health to come out including physical therapy and nursing.  However patient's wife concerned because patient is not nearly as strong as she thought he would be upon discharge and she is often needing to call her son who lives close by to help with several of patient's ADLs.  She is concerned because this son will be getting ready to go out of town and she is not certain how she will be able to address all of the patient's needs.  Referral for case management placed.    Orders Placed This Encounter    [facility] - [facility] Coord[facility]Social [facility]     Referral Priority:   Routine     Referral Type:   Eval and Treat     Referral Reason:   Specialty Services Required     Number of Visits Requested:   1

## 2025-01-07 NOTE — TELEPHONE ENCOUNTER
Pts wife called and stated they needed a hosp bed asap and they didn't need the wheelchair;she can't bring him in and she told Dr cleaning that yesterday   Thank you, Nori

## 2025-01-08 ENCOUNTER — TELEMEDICINE (OUTPATIENT)
Dept: INTERNAL MEDICINE CLINIC | Facility: CLINIC | Age: 80
End: 2025-01-08

## 2025-01-08 ENCOUNTER — CARE COORDINATION (OUTPATIENT)
Dept: CARE COORDINATION | Facility: CLINIC | Age: 80
End: 2025-01-08

## 2025-01-08 DIAGNOSIS — C32.9 LARYNGEAL CANCER (HCC): ICD-10-CM

## 2025-01-08 DIAGNOSIS — F32.9 REACTIVE DEPRESSION: ICD-10-CM

## 2025-01-08 DIAGNOSIS — Z93.1 GASTROSTOMY STATUS (HCC): ICD-10-CM

## 2025-01-08 DIAGNOSIS — Z85.810 HISTORY OF TONGUE CANCER: ICD-10-CM

## 2025-01-08 DIAGNOSIS — R41.82 ALTERED MENTAL STATUS, UNSPECIFIED ALTERED MENTAL STATUS TYPE: ICD-10-CM

## 2025-01-08 DIAGNOSIS — Z09 HOSPITAL DISCHARGE FOLLOW-UP: Primary | ICD-10-CM

## 2025-01-08 DIAGNOSIS — Z86.12 PERSONAL HISTORY OF POLIOMYELITIS: ICD-10-CM

## 2025-01-08 DIAGNOSIS — R53.81 DEBILITY: ICD-10-CM

## 2025-01-08 RX ORDER — SERTRALINE HYDROCHLORIDE 25 MG/1
25 TABLET, FILM COATED ORAL DAILY
Qty: 90 TABLET | Refills: 2 | Status: SHIPPED | OUTPATIENT
Start: 2025-01-08

## 2025-01-08 RX ORDER — TRAMADOL HYDROCHLORIDE 50 MG/1
50 TABLET ORAL 2 TIMES DAILY PRN
Qty: 60 TABLET | Refills: 0 | Status: SHIPPED | OUTPATIENT
Start: 2025-01-08 | End: 2025-02-07

## 2025-01-08 RX ORDER — SODIUM FLUORIDE 5 MG/G
PASTE, DENTIFRICE ORAL AS NEEDED
COMMUNITY
Start: 2024-11-19

## 2025-01-08 ASSESSMENT — ENCOUNTER SYMPTOMS
COUGH: 1
SORE THROAT: 0
ABDOMINAL PAIN: 0
SHORTNESS OF BREATH: 0
ANAL BLEEDING: 0
BLOOD IN STOOL: 0
DIARRHEA: 1

## 2025-01-08 NOTE — ASSESSMENT & PLAN NOTE
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  Status post partial glossectomy with tonsillectomy, neck dissection and radial forearm free flap to left oral cavity on 8/3/2010  Pathology with negative margins, evidence of LVI, PNI and extracapsular spread  Completed adjuvant radiation therapy in early 2011  Positive PET scan on 8/9/2023  Direct laryngoscopy with biopsy on 8/17/2023 confirmed invasive moderately differentiated squamous cell carcinoma with p16 stain negative for HPV  Flexible laryngoscopy with ENT on 9/5/2023 with large base of tongue/epiglottic tumor that sits above the glottis with slight ball valving, still moving air appropriately  Status postgastrostomy tube placement on 9/8/2023 and tracheostomy on 9/8/2023  Status post chest port placement on 10/19/2023  Status post 12 cycles of carbo/5-FU/Keytruda in July 2024  Since transition to single agent Keytruda as maintenance treatment with last cycle on 12/3/2024  PET CT scan from 12/26/2024 as follows:  -Enlarging hypermetabolic mass involving the floor of the mouth and tongue consistent with tumor progression  -3 small hypermetabolic lymph nodes in the right side of the neck with 2 of the lymph node slightly larger  -No evidence of metastatic disease in the chest, abdomen or pelvis  Per inpatient oncology notes reportedly plans to add back carbo/5-FU with next cycle  PDMP reviewed showing tramadol 50 mg tablets, quantity #56, 14-day supply last filled on 9/20/2023.  New prescription along with Narcan reversal agent sent to pharmacy with patient and wife instructed today on how to use Narcan if needed.  Also informed patient's wife of potential for serotonin syndrome and what to monitor for given we are also starting sertraline    Orders:    Hospital Bed MISC; by Does not apply route Dispense one (1) adjustable hospital bed. Dx: R53.81, Z86.12, C32.9, Z93.1

## 2025-01-08 NOTE — ASSESSMENT & PLAN NOTE
Initial presentation included confusion and visual hallucinations  Blood culture from 12/30/2024 negative at 5 days  CMP from 12/30/2024 with no significant electrolyte derangements (mild hypocalcemia but when corrected for low albumin is within normal range)  Urinalysis from 12/30/2024 negative for infection  Respiratory panel from 12/30/2024 negative  Urine drug screen from 12/30/2024 negative  Ammonia level low on 12/30/2024  Folate level elevated, normal B12, nonreactive syphilis testing on 12/30/2024  Thiamine within normal limits on 12/31/2024  MRI brain without contrast on 12/31/2024 with no evidence of acute ischemic infarction.  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  Patient discharged to complete course of levofloxacin  Per inpatient oncology notes altered mentation does not appear to be related to Keytruda given timing  Per patient's wife essentially at baseline currently

## 2025-01-08 NOTE — PROGRESS NOTES
regularly.  No issues with utilizing G-tube for flushes or feeds.  Currently doing Isosource 3 times a day and high-calorie boost twice a day.  Requesting refill of tramadol to assist with generalized pain with patient states the medicine helps him feel better as a whole.  Specifically also helps with pain in his tongue especially at nighttime.  No headaches, neck pain, sore throat, nausea or vomiting.  Since being home from the hospital highest temperature has been 99.3 °F through a forehead thermometer but wife states when this was taken they were sitting in the living room near the fireplace.    Patient does admit to some depression due to his overall health status.  Wife believes he was previously prescribed tramadol by one of his doctors take at nighttime.  Patient denies current issues with sleep.  Wife states he is very sensitive to medications and that when given some medicines to help calm her relaxing in the hospital it would cause the opposite effect.  Regarding patient's mentation wife states that he is around 95 or 96% back to his baseline although does sometimes forget some of the passwords to his accounts or devices that she has to assist him with.      Review of Systems   Constitutional:  Negative for chills.   HENT:  Positive for mouth sores. Negative for sore throat.         Positive for excess salivation  Denies chest congestion   Respiratory:  Positive for cough (With sputum but without hemoptysis). Negative for shortness of breath.    Cardiovascular:  Negative for chest pain.   Gastrointestinal:  Positive for diarrhea (Improved). Negative for abdominal pain, anal bleeding and blood in stool.   Genitourinary:  Positive for difficulty urinating (Occasional straining with urination). Negative for dysuria and hematuria.   Musculoskeletal:  Negative for neck pain.   Neurological:  Positive for weakness (Generalized). Negative for headaches.   Psychiatric/Behavioral:  Negative for sleep disturbance.

## 2025-01-08 NOTE — ASSESSMENT & PLAN NOTE
Orders:    traMADol (ULTRAM) 50 MG tablet; Take 1 tablet by mouth 2 times daily as needed for Pain for up to 30 days. Do not drink alcohol, drive or operate heavy machinery after use Max Daily Amount: 100 mg    naloxone (NARCAN) 4 MG/0.1ML LIQD nasal spray; Use 1 spray intranasally at onset of opioid overdose.  May repeat dose using alternate nostril every 2 minutes as needed should symptoms persist or recur.    Hospital Bed MISC; by Does not apply route Dispense one (1) adjustable hospital bed. Dx: R53.81, Z86.12, C32.9, Z93.1

## 2025-01-09 ENCOUNTER — OFFICE VISIT (OUTPATIENT)
Dept: ONCOLOGY | Age: 80
End: 2025-01-09
Payer: MEDICARE

## 2025-01-09 ENCOUNTER — OFFICE VISIT (OUTPATIENT)
Dept: ONCOLOGY | Age: 80
End: 2025-01-09

## 2025-01-09 ENCOUNTER — HOSPITAL ENCOUNTER (OUTPATIENT)
Dept: LAB | Age: 80
Discharge: HOME | End: 2025-01-09
Payer: MEDICARE

## 2025-01-09 ENCOUNTER — HOSPITAL ENCOUNTER (OUTPATIENT)
Dept: INFUSION THERAPY | Age: 80
Setting detail: INFUSION SERIES
Discharge: HOME | End: 2025-01-09

## 2025-01-09 VITALS
HEIGHT: 71 IN | HEART RATE: 92 BPM | OXYGEN SATURATION: 97 % | TEMPERATURE: 98.6 F | WEIGHT: 176.6 LBS | DIASTOLIC BLOOD PRESSURE: 78 MMHG | RESPIRATION RATE: 18 BRPM | BODY MASS INDEX: 24.72 KG/M2 | SYSTOLIC BLOOD PRESSURE: 122 MMHG

## 2025-01-09 DIAGNOSIS — Z78.9 ON ENTERAL NUTRITION: ICD-10-CM

## 2025-01-09 DIAGNOSIS — Z79.899 HIGH RISK MEDICATION USE: ICD-10-CM

## 2025-01-09 DIAGNOSIS — C77.0 METASTASIS TO CERVICAL LYMPH NODE (HCC): ICD-10-CM

## 2025-01-09 DIAGNOSIS — Z00.8 NUTRITIONAL ASSESSMENT: Primary | ICD-10-CM

## 2025-01-09 DIAGNOSIS — C32.9 KERATINIZING SQUAMOUS CELL CARCINOMA OF LARYNX (HCC): ICD-10-CM

## 2025-01-09 DIAGNOSIS — R68.89 COPIOUS ORAL SECRETIONS: ICD-10-CM

## 2025-01-09 DIAGNOSIS — R53.83 FATIGUE DUE TO TREATMENT: ICD-10-CM

## 2025-01-09 DIAGNOSIS — K12.30 MUCOSITIS: ICD-10-CM

## 2025-01-09 DIAGNOSIS — C10.9 SQUAMOUS CELL CARCINOMA OF OROPHARYNX (HCC): Primary | ICD-10-CM

## 2025-01-09 DIAGNOSIS — R53.1 WEAKNESS: ICD-10-CM

## 2025-01-09 DIAGNOSIS — C32.9 LARYNGEAL CANCER (HCC): ICD-10-CM

## 2025-01-09 LAB
ALBUMIN SERPL-MCNC: 2.4 G/DL (ref 3.2–4.6)
ALBUMIN/GLOB SERPL: 0.5 (ref 1–1.9)
ALP SERPL-CCNC: 65 U/L (ref 40–129)
ALT SERPL-CCNC: 27 U/L (ref 8–55)
ANION GAP SERPL CALC-SCNC: 13 MMOL/L (ref 7–16)
AST SERPL-CCNC: 36 U/L (ref 15–37)
BASOPHILS # BLD: 0.04 K/UL (ref 0–0.2)
BASOPHILS NFR BLD: 0.4 % (ref 0–2)
BILIRUB SERPL-MCNC: 0.4 MG/DL (ref 0–1.2)
BUN SERPL-MCNC: 25 MG/DL (ref 8–23)
CALCIUM SERPL-MCNC: 9.3 MG/DL (ref 8.8–10.2)
CHLORIDE SERPL-SCNC: 102 MMOL/L (ref 98–107)
CO2 SERPL-SCNC: 26 MMOL/L (ref 20–29)
CREAT SERPL-MCNC: 0.65 MG/DL (ref 0.8–1.3)
DIFFERENTIAL METHOD BLD: ABNORMAL
EOSINOPHIL # BLD: 0.02 K/UL (ref 0–0.8)
EOSINOPHIL NFR BLD: 0.2 % (ref 0.5–7.8)
ERYTHROCYTE [DISTWIDTH] IN BLOOD BY AUTOMATED COUNT: 14.7 % (ref 11.9–14.6)
GLOBULIN SER CALC-MCNC: 4.9 G/DL (ref 2.3–3.5)
GLUCOSE SERPL-MCNC: 148 MG/DL (ref 70–99)
HCT VFR BLD AUTO: 31.7 % (ref 41.1–50.3)
HGB BLD-MCNC: 9.8 G/DL (ref 13.6–17.2)
IMM GRANULOCYTES # BLD AUTO: 0.1 K/UL (ref 0–0.5)
IMM GRANULOCYTES NFR BLD AUTO: 1 % (ref 0–5)
LYMPHOCYTES # BLD: 0.7 K/UL (ref 0.5–4.6)
LYMPHOCYTES NFR BLD: 7 % (ref 13–44)
MAGNESIUM SERPL-MCNC: 2.1 MG/DL (ref 1.8–2.4)
MCH RBC QN AUTO: 26.8 PG (ref 26.1–32.9)
MCHC RBC AUTO-ENTMCNC: 30.9 G/DL (ref 31.4–35)
MCV RBC AUTO: 86.6 FL (ref 82–102)
MONOCYTES # BLD: 1 K/UL (ref 0.1–1.3)
MONOCYTES NFR BLD: 10 % (ref 4–12)
NEUTS SEG # BLD: 8.13 K/UL (ref 1.7–8.2)
NEUTS SEG NFR BLD: 81.4 % (ref 43–78)
NRBC # BLD: 0 K/UL (ref 0–0.2)
PLATELET # BLD AUTO: 378 K/UL (ref 150–450)
PMV BLD AUTO: 9.5 FL (ref 9.4–12.3)
POTASSIUM SERPL-SCNC: 3.7 MMOL/L (ref 3.5–5.1)
PROT SERPL-MCNC: 7.3 G/DL (ref 6.3–8.2)
RBC # BLD AUTO: 3.66 M/UL (ref 4.23–5.6)
SODIUM SERPL-SCNC: 141 MMOL/L (ref 136–145)
TSH, 3RD GENERATION: 1.19 UIU/ML (ref 0.27–4.2)
WBC # BLD AUTO: 10 K/UL (ref 4.3–11.1)

## 2025-01-09 PROCEDURE — G8427 DOCREV CUR MEDS BY ELIG CLIN: HCPCS | Performed by: NURSE PRACTITIONER

## 2025-01-09 PROCEDURE — 80053 COMPREHEN METABOLIC PANEL: CPT

## 2025-01-09 PROCEDURE — G8420 CALC BMI NORM PARAMETERS: HCPCS | Performed by: NURSE PRACTITIONER

## 2025-01-09 PROCEDURE — 1111F DSCHRG MED/CURRENT MED MERGE: CPT | Performed by: NURSE PRACTITIONER

## 2025-01-09 PROCEDURE — 1036F TOBACCO NON-USER: CPT | Performed by: NURSE PRACTITIONER

## 2025-01-09 PROCEDURE — 1160F RVW MEDS BY RX/DR IN RCRD: CPT | Performed by: NURSE PRACTITIONER

## 2025-01-09 PROCEDURE — 1159F MED LIST DOCD IN RCRD: CPT | Performed by: NURSE PRACTITIONER

## 2025-01-09 PROCEDURE — 36591 DRAW BLOOD OFF VENOUS DEVICE: CPT

## 2025-01-09 PROCEDURE — 99214 OFFICE O/P EST MOD 30 MIN: CPT | Performed by: NURSE PRACTITIONER

## 2025-01-09 PROCEDURE — 84443 ASSAY THYROID STIM HORMONE: CPT

## 2025-01-09 PROCEDURE — 2500000003 HC RX 250 WO HCPCS: Performed by: INTERNAL MEDICINE

## 2025-01-09 PROCEDURE — 1126F AMNT PAIN NOTED NONE PRSNT: CPT | Performed by: NURSE PRACTITIONER

## 2025-01-09 PROCEDURE — 1123F ACP DISCUSS/DSCN MKR DOCD: CPT | Performed by: NURSE PRACTITIONER

## 2025-01-09 PROCEDURE — 83735 ASSAY OF MAGNESIUM: CPT

## 2025-01-09 PROCEDURE — 85025 COMPLETE CBC W/AUTO DIFF WBC: CPT

## 2025-01-09 RX ORDER — SCOPOLAMINE 1 MG/3D
1 PATCH, EXTENDED RELEASE TRANSDERMAL
Qty: 10 PATCH | Refills: 5 | Status: SHIPPED | OUTPATIENT
Start: 2025-01-09

## 2025-01-09 RX ORDER — SODIUM CHLORIDE 0.9 % (FLUSH) 0.9 %
5-40 SYRINGE (ML) INJECTION PRN
Status: DISCONTINUED | OUTPATIENT
Start: 2025-01-09 | End: 2025-01-10 | Stop reason: HOSPADM

## 2025-01-09 RX ORDER — CHLORHEXIDINE GLUCONATE ORAL RINSE 1.2 MG/ML
15 SOLUTION DENTAL 2 TIMES DAILY
Qty: 900 ML | Refills: 0 | Status: SHIPPED | OUTPATIENT
Start: 2025-01-09 | End: 2025-02-08

## 2025-01-09 RX ADMIN — SODIUM CHLORIDE, PRESERVATIVE FREE 20 ML: 5 INJECTION INTRAVENOUS at 10:08

## 2025-01-09 ASSESSMENT — PATIENT HEALTH QUESTIONNAIRE - PHQ9
SUM OF ALL RESPONSES TO PHQ QUESTIONS 1-9: 0
SUM OF ALL RESPONSES TO PHQ9 QUESTIONS 1 & 2: 0
SUM OF ALL RESPONSES TO PHQ QUESTIONS 1-9: 0
SUM OF ALL RESPONSES TO PHQ QUESTIONS 1-9: 0
1. LITTLE INTEREST OR PLEASURE IN DOING THINGS: NOT AT ALL
SUM OF ALL RESPONSES TO PHQ QUESTIONS 1-9: 0
2. FEELING DOWN, DEPRESSED OR HOPELESS: NOT AT ALL

## 2025-01-09 NOTE — PROGRESS NOTES
Data Source[facility]    1/9/2025    11:30 AM    Jesus Laguna [id number]    79 y.o.      Patient Encounter: [facility] Visit    Cancer Diagnosis:  Recurrent base of tongue cancer  Stage:  Recurrent  Performance Status:  1  Pain Score (0-10):  1  Pain Medication related Constipation:  addressed  Code Status:  Not discussed  Onc History (Copied from prior):   78-year-old  male patient, stopped smoking about 25 years ago, history of polio as a baby (uses crutches), squamous cell cancer of left lateral tongue status post partial glossectomy with tonsillectomy, neck dissection with radial forearm free flap to left oral cavity 8/3/2010.  Per records margins were close but negative with evidence of LVI as well as PNI and extracapsular spread.  He completed adjuvant radiation therapy and end of 2010 to early 2011.  He followed with Dr. Austen Bowman ENT up till the 5-year carlee in 2015 with no evidence of recurrence.  2 years post therapy he had multiple teeth removed of his left lower jaw followed by HBO therapy x10 sessions due to poor healing.  More recently patient presented with symptoms of congestion and hypersalivation, completed a couple courses of antibiotics without resolution.  Had minimal weight loss.  He was seen by Dr. lCair Bowman ENT with the scope and abnormal CT neck which was abnormal.  He was thereafter referred to Dr. James douglas at ENT surgeons at [facility].  Patient underwent direct laryngoscopy 8/17/2023 as well as PET scan 8/9/2023.  PET scan demonstrated hypermetabolic mass at base of tongue, mildly metabolic nonenlarged right level 3 cervical lymph node, and no evidence of distant metastasis.  Direct laryngoscopy with biopsy with pathology of larynx and epiglottis revealing invasive moderately differentiated squamous cell cancer.  P16 stain for HPV was negative.  Patient now referred to [facility]

## 2025-01-09 NOTE — PATIENT INSTRUCTIONS
Final    Neutrophils Absolute 01/09/2025 8.13  1.70 - 8.20 K/UL Final    Lymphocytes Absolute 01/09/2025 0.70  0.50 - 4.60 K/UL Final    Monocytes Absolute 01/09/2025 1.00  0.10 - 1.30 K/UL Final    Eosinophils Absolute 01/09/2025 0.02  0.00 - 0.80 K/UL Final    Basophils Absolute 01/09/2025 0.04  0.00 - 0.20 K/UL Final    Immature Granulocytes Absolute 01/09/2025 0.10  0.00 - 0.50 K/UL Final    Differential Type 01/09/2025 AUTOMATED    Final               Please refer to After Visit Summary or SocialStay for upcoming appointment information. Please call our office for rescheduling needs at least 24 hours before your scheduled appointment time.If you have any questions regarding your upcoming schedule please reach out to your care team through SocialStay or call [phone].     Please notify your assigned Nurse Navigator of any unplanned hospital admissions or Emergency Room visits within 24 hours of discharge.    -------------------------------------------------------------------------------------------------------------------  Please call our office at [phone] if you have any  of the following symptoms:   Fever of 100.5 or greater  Chills  Shortness of breath  Swelling or pain in one leg    After office hours an answering service is available and will contact a provider for emergencies or if you are experiencing any of the above symptoms.        Loraine Honeycutt RD, , LD  Outpatient Oncology Dietitian  Head and Neck Tumor Navigator  [phone]  [email or web address]

## 2025-01-09 NOTE — PROGRESS NOTES
Patient to port lab for port access and lab draw. Port accessed per protocol; using 20g 0.75\" alegria needle without difficulty. Labs drawn from port and port flushed. Port remains accessed. Patient tolerated well. Discharged ambulatory.

## 2025-01-10 ENCOUNTER — CARE COORDINATION (OUTPATIENT)
Dept: CARE COORDINATION | Facility: CLINIC | Age: 80
End: 2025-01-10

## 2025-01-10 NOTE — CARE COORDINATION
JOEY CM left 2nd VM with pt's spouse.  Will try once more next week if no call back is received today.

## 2025-01-10 NOTE — PROGRESS NOTES
Nutrition F/U:  Assessment:  Pt seen during office visit w/ NP, treatment held today d/t pt's performance status s/p recent hospitalization for AMS.  Pt is weak and having difficulty standing and getting himself in and out of bed, unable to walk with his crutches.  Pt's speech seems more garbled today, has follow up with Dr. Cortez next week.  Pt will plan to resume chemo/immunotherapy in ~ 2 weeks after follow up with Dr. Olvera.  Pt has resumed home health PT since discharge.  Pt remains NPO/TF dependent for estimated nutrition needs, was sipping on some water prior to hospital admission which likely explains pt's PNA.  Current BW: 176#, down 7# over the past 6 weeks.     Intervention:  1. NPO  2. Continue w/ TF as prescribed  3. Hopefully resume treatment after follow up with Dr. Olvera in ~ 2 weeks.     Monitoring/Evaluation:  1. RD to follow up during next office visit - follow up wt status, tolerance/intake of TF, symptom management.      Loraine Honeycutt RD, , LD  [phone]

## 2025-01-14 ENCOUNTER — CARE COORDINATION (OUTPATIENT)
Dept: CARE COORDINATION | Facility: CLINIC | Age: 80
End: 2025-01-14

## 2025-01-14 NOTE — CARE COORDINATION
Care Transitions Note    Follow Up Call     Patient Current Location:  Home: [address] [place] [address]    Care Transition Nurse contacted the spouse/partner  by telephone. Verified name and  as identifiers.    Additional needs identified to be addressed with provider   No needs identified                 Method of communication with provider: none.  Patients top risk factors for readmission: medical condition-AMS, Laryngeal cancer on chemo, G-tube, hypothyroid     Plan of care updates since last contact:  Spouse reports patient is doing a little bit better. Spouse reports patient is still weak but has started home PT. Patient is using wheelchair for mobility. Spouse reports hospital bed was ordered and now awaiting delivery. Spouse reports patient is tolerating his tube feeding and no new or worsening symptoms.   Patient completed follow up with her PCP and Oncologist as scheduled. Spouse endorses compliance with medications. Patient is engaged with home health. Spouse is aware of upcoming appointments and plans to attend. CTN encouraged to reach out if needs arise.   Spouse reports they have family coming in to assists her.    Care Transition Nurse reviewed medical action plan and red flags with patient. The family was given an opportunity to ask questions; all questions answered at this time. The family verbalized understanding.    Medication Review:  Full medication reconciliation completed during previous call. and No changes since last call.     Assessments:  Care Transitions Subsequent and Final Call    Schedule Follow Up Appointment with PCP: Completed  Subsequent and Final Calls  Have your medications changed?: No  Do you have any questions related to your medications?: No  Do you currently have any active services?: Yes  Are you currently active with any services?: Home Health  Do you have any needs or concerns that I can assist you with?: No  Identified Barriers: None  Care Transitions

## 2025-01-15 ENCOUNTER — CARE COORDINATION (OUTPATIENT)
Dept: CARE COORDINATION | Facility: CLINIC | Age: 80
End: 2025-01-15

## 2025-01-15 NOTE — CARE COORDINATION
Final VM left with pt;s spouse.  If no call back is received within 5 business days, referral will be closed.

## 2025-01-16 ENCOUNTER — OFFICE VISIT (OUTPATIENT)
Dept: ENT CLINIC | Age: 80
End: 2025-01-16

## 2025-01-16 VITALS — WEIGHT: 176 LBS | HEIGHT: 71 IN | BODY MASS INDEX: 24.64 KG/M2 | RESPIRATION RATE: 17 BRPM

## 2025-01-16 DIAGNOSIS — C10.9 SQUAMOUS CELL CARCINOMA OF OROPHARYNX (HCC): Primary | ICD-10-CM

## 2025-01-16 DIAGNOSIS — C32.9 LARYNGEAL CANCER (HCC): ICD-10-CM

## 2025-01-16 DIAGNOSIS — R53.81 DEBILITY: ICD-10-CM

## 2025-01-16 DIAGNOSIS — Z93.1 GASTROSTOMY STATUS (HCC): ICD-10-CM

## 2025-01-16 DIAGNOSIS — R13.14 PHARYNGOESOPHAGEAL DYSPHAGIA: ICD-10-CM

## 2025-01-16 DIAGNOSIS — Z86.12 PERSONAL HISTORY OF POLIOMYELITIS: ICD-10-CM

## 2025-01-16 ASSESSMENT — ENCOUNTER SYMPTOMS
DIARRHEA: 0
EYE ITCHING: 0
WHEEZING: 0
CHOKING: 0
APNEA: 0
SHORTNESS OF BREATH: 0
STRIDOR: 0
SINUS PRESSURE: 0
CONSTIPATION: 0
EYE PAIN: 0
COUGH: 0
EYE DISCHARGE: 0
FACIAL SWELLING: 0
SINUS PAIN: 0
NAUSEA: 0

## 2025-01-16 NOTE — TELEPHONE ENCOUNTER
New prescription for hospital bed printed off to be sent to alternative DME company    Orders Placed This Encounter    Hospital Bed MISC     Sig: by Does not apply route Dispense one (1) adjustable hospital bed. Dx: R53.81, Z86.12, C32.9, Z93.1     Dispense:  1 each     Refill:  0

## 2025-01-16 NOTE — PROGRESS NOTES
significant mediastinal adenopathy.     Abdomen: There are no hypermetabolic lesions in the liver or adrenal glands.  There is no significant adenopathy. Percutaneous feeding tube is in place.     Pelvis: There is no significant abnormal uptake in the pelvis.  There is no  significant adenopathy.  There are no bony lesions.     IMPRESSION:  1. Enlarging hypermetabolic mass involving the floor of the mouth and tongue  consistent with tumor progression.  2. 3 small hypermetabolic lymph nodes in the right side of the neck. 2 of the  lymph nodes are slightly larger.  3. No evidence of metastatic disease in the chest, abdomen, or pelvis     ASSESSMENT and PLAN  Tumor progression to involve more of the oral tongue and floor of mouth as well as increased progression along the vallecula, base of tongue, epiglottis, and arytenoids.  There is partial obscuring of the airway but he is still breathing without issue, no stridor or stridor.  He is planning to restart chemo when she has had a good response to in the past.  If he develops any shortness of breath he will call the office and we will plan to have him go to the hospital for tracheostomy placement.  I will otherwise see him back in 1 month.  Quad shot could be considered.  Hospice care would be another option.    ICD-10-CM    1. Squamous cell carcinoma of oropharynx (HCC)  C10.9 LARYNGOSCOPY,DIRECT,DIAGNOSTIC      2. Pharyngoesophageal dysphagia  R13.14 LARYNGOSCOPY,DIRECT,DIAGNOSTIC              Shahid Cortez MD  1/16/2025  Electronically signed    Note dictated using voice recognition software.  Please excuse any typos.

## 2025-01-17 ENCOUNTER — TELEMEDICINE (OUTPATIENT)
Dept: INTERNAL MEDICINE CLINIC | Facility: CLINIC | Age: 80
End: 2025-01-17

## 2025-01-17 DIAGNOSIS — R41.0 CONFUSION: Primary | ICD-10-CM

## 2025-01-17 DIAGNOSIS — R44.1 VISUAL HALLUCINATIONS: ICD-10-CM

## 2025-01-17 ASSESSMENT — ENCOUNTER SYMPTOMS
COUGH: 1
BLOOD IN STOOL: 0
ANAL BLEEDING: 0
SHORTNESS OF BREATH: 0
ABDOMINAL PAIN: 0
WHEEZING: 0

## 2025-01-17 NOTE — PROGRESS NOTES
they are occurring.      Review of Systems   Constitutional:  Positive for fatigue. Negative for fever.   HENT:          Some thick clear to yellow nasal discharge   Respiratory:  Positive for cough (Improved). Negative for shortness of breath and wheezing.    Cardiovascular:  Negative for chest pain.   Gastrointestinal:  Negative for abdominal pain, anal bleeding and blood in stool.   Genitourinary:  Positive for dysuria (Transient at onset of urination but not consistently). Negative for hematuria.   Musculoskeletal:  Negative for neck pain.   Skin:  Negative for wound.   Neurological:  Positive for headaches.   Psychiatric/Behavioral:  Positive for confusion and hallucinations (Visual).           Objective   Patient-Reported Vitals  No data recorded     Physical Exam  [INSTRUCTIONS:  \"[x]\" Indicates a positive item  \"[]\" Indicates a negative item  -- DELETE ALL ITEMS NOT EXAMINED]    Constitutional: [x] Appears well-developed and well-nourished [x] No apparent distress      [x] Abnormal -pleasant, elderly, thin, frail-appearing  male in no acute cardiopulmonary distress    Mental status: [x] Alert and awake  [x] Oriented to person/place/time [x] Able to follow commands    [] Abnormal -     Eyes:   EOM    [x]  Normal    [] Abnormal -   Sclera  [x]  Normal    [] Abnormal -          Discharge []  None visible   [] Abnormal -     HENT: [x] Normocephalic, atraumatic  [x] Abnormal -excess saliva noted  [x] Mouth/Throat: Mucous membranes are moist    External Ears [] Normal  [] Abnormal -    Neck: [] No visualized mass [] Abnormal -     Pulmonary/Chest: [x] Respiratory effort normal   [x] No visualized signs of difficulty breathing or respiratory distress        [] Abnormal -      Musculoskeletal:   [] Normal gait with no signs of ataxia         [x] Normal range of motion of neck        [] Abnormal -     Neurological:        [x] No Facial Asymmetry (Cranial nerve 7 motor function) (limited exam due to video

## 2025-01-20 ENCOUNTER — CARE COORDINATION (OUTPATIENT)
Dept: CARE COORDINATION | Facility: CLINIC | Age: 80
End: 2025-01-20

## 2025-01-20 DIAGNOSIS — R41.82 ALTERED MENTAL STATUS, UNSPECIFIED ALTERED MENTAL STATUS TYPE: Primary | ICD-10-CM

## 2025-01-20 NOTE — PROGRESS NOTES
H&N Navigation:  Pt has appointment with Dr. Olvera on (1/21), had VV w/ PCP on Friday and he has requested UA be completed w/ labs tomorrow.  Pt w/ AMS/confusion again per spouse, continued decline in functional status requiring assistance for chair to bed transfer, and getting in and out of the car.  Pt's wife Rosalinda reports that pt is unable to stand on his own currently.  Follow up as planned tomorrow with Dr. Olvera to determine further POC.     Loraine Honeycutt RD, , LD  [phone]

## 2025-01-21 ENCOUNTER — OFFICE VISIT (OUTPATIENT)
Dept: ONCOLOGY | Age: 80
End: 2025-01-21

## 2025-01-21 ENCOUNTER — OFFICE VISIT (OUTPATIENT)
Dept: ONCOLOGY | Age: 80
End: 2025-01-21
Payer: MEDICARE

## 2025-01-21 ENCOUNTER — HOSPITAL ENCOUNTER (OUTPATIENT)
Dept: INFUSION THERAPY | Age: 80
Setting detail: INFUSION SERIES
Discharge: HOME | End: 2025-01-21

## 2025-01-21 ENCOUNTER — HOSPITAL ENCOUNTER (OUTPATIENT)
Dept: LAB | Age: 80
Discharge: HOME | End: 2025-01-21
Payer: MEDICARE

## 2025-01-21 VITALS
SYSTOLIC BLOOD PRESSURE: 114 MMHG | WEIGHT: 175.6 LBS | TEMPERATURE: 97.8 F | HEIGHT: 71 IN | DIASTOLIC BLOOD PRESSURE: 74 MMHG | HEART RATE: 111 BPM | OXYGEN SATURATION: 98 % | RESPIRATION RATE: 16 BRPM | BODY MASS INDEX: 24.58 KG/M2

## 2025-01-21 DIAGNOSIS — Z00.8 NUTRITIONAL ASSESSMENT: Primary | ICD-10-CM

## 2025-01-21 DIAGNOSIS — Z78.9 ON ENTERAL NUTRITION: ICD-10-CM

## 2025-01-21 DIAGNOSIS — R41.82 ALTERED MENTAL STATUS, UNSPECIFIED ALTERED MENTAL STATUS TYPE: ICD-10-CM

## 2025-01-21 DIAGNOSIS — Z00.8 NUTRITIONAL ASSESSMENT: ICD-10-CM

## 2025-01-21 DIAGNOSIS — E03.2 HYPOTHYROIDISM DUE TO MEDICAMENTS AND OTHER EXOGENOUS SUBSTANCES: ICD-10-CM

## 2025-01-21 DIAGNOSIS — C10.9 SQUAMOUS CELL CARCINOMA OF OROPHARYNX (HCC): Primary | ICD-10-CM

## 2025-01-21 DIAGNOSIS — C77.0 METASTASIS TO CERVICAL LYMPH NODE (HCC): ICD-10-CM

## 2025-01-21 DIAGNOSIS — D61.818 PANCYTOPENIA (HCC): ICD-10-CM

## 2025-01-21 DIAGNOSIS — C32.9 LARYNGEAL CANCER (HCC): ICD-10-CM

## 2025-01-21 DIAGNOSIS — Z79.899 HIGH RISK MEDICATION USE: ICD-10-CM

## 2025-01-21 DIAGNOSIS — E55.9 VITAMIN D DEFICIENCY: ICD-10-CM

## 2025-01-21 LAB
ALBUMIN SERPL-MCNC: 2.5 G/DL (ref 3.2–4.6)
ALBUMIN/GLOB SERPL: 0.5 (ref 1–1.9)
ALP SERPL-CCNC: 80 U/L (ref 40–129)
ALT SERPL-CCNC: 21 U/L (ref 8–55)
ANION GAP SERPL CALC-SCNC: 12 MMOL/L (ref 7–16)
AST SERPL-CCNC: 22 U/L (ref 15–37)
BASOPHILS # BLD: 0.03 K/UL (ref 0–0.2)
BASOPHILS NFR BLD: 0.3 % (ref 0–2)
BILIRUB SERPL-MCNC: 0.3 MG/DL (ref 0–1.2)
BUN SERPL-MCNC: 22 MG/DL (ref 8–23)
CALCIUM SERPL-MCNC: 9.7 MG/DL (ref 8.8–10.2)
CHLORIDE SERPL-SCNC: 98 MMOL/L (ref 98–107)
CO2 SERPL-SCNC: 27 MMOL/L (ref 20–29)
CREAT SERPL-MCNC: 0.6 MG/DL (ref 0.8–1.3)
DIFFERENTIAL METHOD BLD: ABNORMAL
EOSINOPHIL # BLD: 0.06 K/UL (ref 0–0.8)
EOSINOPHIL NFR BLD: 0.5 % (ref 0.5–7.8)
ERYTHROCYTE [DISTWIDTH] IN BLOOD BY AUTOMATED COUNT: 14.6 % (ref 11.9–14.6)
GLOBULIN SER CALC-MCNC: 5.2 G/DL (ref 2.3–3.5)
GLUCOSE SERPL-MCNC: 153 MG/DL (ref 70–99)
HBV SURFACE AB SERPL IA-ACNC: <3.5 MIU/ML
HBV SURFACE AG SER QL: NONREACTIVE
HCT VFR BLD AUTO: 34.3 % (ref 41.1–50.3)
HGB BLD-MCNC: 10.4 G/DL (ref 13.6–17.2)
IMM GRANULOCYTES # BLD AUTO: 0.07 K/UL (ref 0–0.5)
IMM GRANULOCYTES NFR BLD AUTO: 0.6 % (ref 0–5)
LYMPHOCYTES # BLD: 0.58 K/UL (ref 0.5–4.6)
LYMPHOCYTES NFR BLD: 5.3 % (ref 13–44)
MAGNESIUM SERPL-MCNC: 2.3 MG/DL (ref 1.8–2.4)
MCH RBC QN AUTO: 25.9 PG (ref 26.1–32.9)
MCHC RBC AUTO-ENTMCNC: 30.3 G/DL (ref 31.4–35)
MCV RBC AUTO: 85.5 FL (ref 82–102)
MONOCYTES # BLD: 0.94 K/UL (ref 0.1–1.3)
MONOCYTES NFR BLD: 8.6 % (ref 4–12)
NEUTS SEG # BLD: 9.26 K/UL (ref 1.7–8.2)
NEUTS SEG NFR BLD: 84.7 % (ref 43–78)
NRBC # BLD: 0 K/UL (ref 0–0.2)
PLATELET # BLD AUTO: 377 K/UL (ref 150–450)
PMV BLD AUTO: 9.9 FL (ref 9.4–12.3)
POTASSIUM SERPL-SCNC: 3.8 MMOL/L (ref 3.5–5.1)
PROT SERPL-MCNC: 7.7 G/DL (ref 6.3–8.2)
RBC # BLD AUTO: 4.01 M/UL (ref 4.23–5.6)
SODIUM SERPL-SCNC: 137 MMOL/L (ref 136–145)
TSH W FREE THYROID IF ABNORMAL: 1.78 UIU/ML (ref 0.27–4.2)
WBC # BLD AUTO: 10.9 K/UL (ref 4.3–11.1)

## 2025-01-21 PROCEDURE — 1126F AMNT PAIN NOTED NONE PRSNT: CPT | Performed by: INTERNAL MEDICINE

## 2025-01-21 PROCEDURE — 85025 COMPLETE CBC W/AUTO DIFF WBC: CPT

## 2025-01-21 PROCEDURE — 1111F DSCHRG MED/CURRENT MED MERGE: CPT | Performed by: INTERNAL MEDICINE

## 2025-01-21 PROCEDURE — G8420 CALC BMI NORM PARAMETERS: HCPCS | Performed by: INTERNAL MEDICINE

## 2025-01-21 PROCEDURE — 86704 HEP B CORE ANTIBODY TOTAL: CPT

## 2025-01-21 PROCEDURE — 36591 DRAW BLOOD OFF VENOUS DEVICE: CPT

## 2025-01-21 PROCEDURE — G8428 CUR MEDS NOT DOCUMENT: HCPCS | Performed by: INTERNAL MEDICINE

## 2025-01-21 PROCEDURE — 80053 COMPREHEN METABOLIC PANEL: CPT

## 2025-01-21 PROCEDURE — 83735 ASSAY OF MAGNESIUM: CPT

## 2025-01-21 PROCEDURE — 87340 HEPATITIS B SURFACE AG IA: CPT

## 2025-01-21 PROCEDURE — 84443 ASSAY THYROID STIM HORMONE: CPT

## 2025-01-21 PROCEDURE — 1123F ACP DISCUSS/DSCN MKR DOCD: CPT | Performed by: INTERNAL MEDICINE

## 2025-01-21 PROCEDURE — 2500000003 HC RX 250 WO HCPCS: Performed by: INTERNAL MEDICINE

## 2025-01-21 PROCEDURE — 86706 HEP B SURFACE ANTIBODY: CPT

## 2025-01-21 PROCEDURE — 1036F TOBACCO NON-USER: CPT | Performed by: INTERNAL MEDICINE

## 2025-01-21 PROCEDURE — 99214 OFFICE O/P EST MOD 30 MIN: CPT | Performed by: INTERNAL MEDICINE

## 2025-01-21 RX ORDER — HEPARIN SODIUM (PORCINE) LOCK FLUSH IV SOLN 100 UNIT/ML 100 UNIT/ML
500 SOLUTION INTRAVENOUS PRN
OUTPATIENT
Start: 2025-01-22

## 2025-01-21 RX ORDER — DIPHENHYDRAMINE HYDROCHLORIDE 50 MG/ML
50 INJECTION INTRAMUSCULAR; INTRAVENOUS
OUTPATIENT
Start: 2025-01-28

## 2025-01-21 RX ORDER — EPINEPHRINE 1 MG/ML
0.3 INJECTION, SOLUTION, CONCENTRATE INTRAVENOUS PRN
OUTPATIENT
Start: 2025-01-28

## 2025-01-21 RX ORDER — MEPERIDINE HYDROCHLORIDE 50 MG/ML
12.5 INJECTION INTRAMUSCULAR; INTRAVENOUS; SUBCUTANEOUS PRN
OUTPATIENT
Start: 2025-02-04

## 2025-01-21 RX ORDER — SODIUM CHLORIDE 9 MG/ML
INJECTION, SOLUTION INTRAVENOUS CONTINUOUS
OUTPATIENT
Start: 2025-01-28

## 2025-01-21 RX ORDER — FAMOTIDINE 10 MG/ML
20 INJECTION, SOLUTION INTRAVENOUS
OUTPATIENT
Start: 2025-01-22

## 2025-01-21 RX ORDER — SODIUM CHLORIDE 9 MG/ML
5-250 INJECTION, SOLUTION INTRAVENOUS PRN
OUTPATIENT
Start: 2025-01-28

## 2025-01-21 RX ORDER — HYDROCORTISONE SODIUM SUCCINATE 100 MG/2ML
100 INJECTION INTRAMUSCULAR; INTRAVENOUS
OUTPATIENT
Start: 2025-01-22

## 2025-01-21 RX ORDER — HEPARIN SODIUM (PORCINE) LOCK FLUSH IV SOLN 100 UNIT/ML 100 UNIT/ML
500 SOLUTION INTRAVENOUS PRN
OUTPATIENT
Start: 2025-02-04

## 2025-01-21 RX ORDER — SODIUM CHLORIDE 0.9 % (FLUSH) 0.9 %
5-40 SYRINGE (ML) INJECTION PRN
OUTPATIENT
Start: 2025-01-22

## 2025-01-21 RX ORDER — FAMOTIDINE 10 MG/ML
20 INJECTION, SOLUTION INTRAVENOUS ONCE
OUTPATIENT
Start: 2025-02-04 | End: 2025-02-04

## 2025-01-21 RX ORDER — FAMOTIDINE 10 MG/ML
20 INJECTION, SOLUTION INTRAVENOUS
OUTPATIENT
Start: 2025-01-28

## 2025-01-21 RX ORDER — MEPERIDINE HYDROCHLORIDE 50 MG/ML
12.5 INJECTION INTRAMUSCULAR; INTRAVENOUS; SUBCUTANEOUS PRN
OUTPATIENT
Start: 2025-01-22

## 2025-01-21 RX ORDER — SODIUM CHLORIDE 0.9 % (FLUSH) 0.9 %
5-40 SYRINGE (ML) INJECTION PRN
OUTPATIENT
Start: 2025-01-28

## 2025-01-21 RX ORDER — HEPARIN SODIUM (PORCINE) LOCK FLUSH IV SOLN 100 UNIT/ML 100 UNIT/ML
500 SOLUTION INTRAVENOUS PRN
OUTPATIENT
Start: 2025-01-28

## 2025-01-21 RX ORDER — ACETAMINOPHEN 325 MG/1
650 TABLET ORAL
OUTPATIENT
Start: 2025-01-22

## 2025-01-21 RX ORDER — SODIUM CHLORIDE 9 MG/ML
5-250 INJECTION, SOLUTION INTRAVENOUS PRN
OUTPATIENT
Start: 2025-01-22

## 2025-01-21 RX ORDER — SODIUM CHLORIDE 9 MG/ML
INJECTION, SOLUTION INTRAVENOUS CONTINUOUS
OUTPATIENT
Start: 2025-01-22

## 2025-01-21 RX ORDER — MEPERIDINE HYDROCHLORIDE 50 MG/ML
12.5 INJECTION INTRAMUSCULAR; INTRAVENOUS; SUBCUTANEOUS PRN
OUTPATIENT
Start: 2025-01-28

## 2025-01-21 RX ORDER — SODIUM CHLORIDE 9 MG/ML
INJECTION, SOLUTION INTRAVENOUS CONTINUOUS
OUTPATIENT
Start: 2025-02-04

## 2025-01-21 RX ORDER — ALBUTEROL SULFATE 90 UG/1
4 INHALANT RESPIRATORY (INHALATION) PRN
OUTPATIENT
Start: 2025-01-28

## 2025-01-21 RX ORDER — ONDANSETRON 2 MG/ML
8 INJECTION INTRAMUSCULAR; INTRAVENOUS
OUTPATIENT
Start: 2025-01-28

## 2025-01-21 RX ORDER — DIPHENHYDRAMINE HYDROCHLORIDE 50 MG/ML
50 INJECTION INTRAMUSCULAR; INTRAVENOUS
OUTPATIENT
Start: 2025-01-22

## 2025-01-21 RX ORDER — SODIUM CHLORIDE 0.9 % (FLUSH) 0.9 %
5-40 SYRINGE (ML) INJECTION PRN
Status: DISCONTINUED | OUTPATIENT
Start: 2025-01-21 | End: 2025-01-22 | Stop reason: HOSPADM

## 2025-01-21 RX ORDER — ONDANSETRON 2 MG/ML
8 INJECTION INTRAMUSCULAR; INTRAVENOUS
OUTPATIENT
Start: 2025-01-22

## 2025-01-21 RX ORDER — DIPHENHYDRAMINE HYDROCHLORIDE 50 MG/ML
50 INJECTION INTRAMUSCULAR; INTRAVENOUS ONCE
OUTPATIENT
Start: 2025-01-22 | End: 2025-01-21

## 2025-01-21 RX ORDER — ONDANSETRON 2 MG/ML
8 INJECTION INTRAMUSCULAR; INTRAVENOUS ONCE
OUTPATIENT
Start: 2025-01-28 | End: 2025-01-28

## 2025-01-21 RX ORDER — ALBUTEROL SULFATE 90 UG/1
4 INHALANT RESPIRATORY (INHALATION) PRN
OUTPATIENT
Start: 2025-02-04

## 2025-01-21 RX ORDER — SODIUM CHLORIDE 9 MG/ML
5-250 INJECTION, SOLUTION INTRAVENOUS PRN
OUTPATIENT
Start: 2025-02-04

## 2025-01-21 RX ORDER — FAMOTIDINE 10 MG/ML
20 INJECTION, SOLUTION INTRAVENOUS
OUTPATIENT
Start: 2025-02-04

## 2025-01-21 RX ORDER — DIPHENHYDRAMINE HYDROCHLORIDE 50 MG/ML
50 INJECTION INTRAMUSCULAR; INTRAVENOUS ONCE
OUTPATIENT
Start: 2025-02-04 | End: 2025-02-04

## 2025-01-21 RX ORDER — ONDANSETRON 2 MG/ML
8 INJECTION INTRAMUSCULAR; INTRAVENOUS ONCE
OUTPATIENT
Start: 2025-01-22 | End: 2025-01-21

## 2025-01-21 RX ORDER — EPINEPHRINE 1 MG/ML
0.3 INJECTION, SOLUTION, CONCENTRATE INTRAVENOUS PRN
OUTPATIENT
Start: 2025-02-04

## 2025-01-21 RX ORDER — ONDANSETRON 2 MG/ML
8 INJECTION INTRAMUSCULAR; INTRAVENOUS
OUTPATIENT
Start: 2025-02-04

## 2025-01-21 RX ORDER — ACETAMINOPHEN 325 MG/1
650 TABLET ORAL
OUTPATIENT
Start: 2025-01-28

## 2025-01-21 RX ORDER — SODIUM CHLORIDE 0.9 % (FLUSH) 0.9 %
5-40 SYRINGE (ML) INJECTION PRN
OUTPATIENT
Start: 2025-02-04

## 2025-01-21 RX ORDER — ONDANSETRON 2 MG/ML
8 INJECTION INTRAMUSCULAR; INTRAVENOUS ONCE
OUTPATIENT
Start: 2025-02-04 | End: 2025-02-04

## 2025-01-21 RX ORDER — HYDROCORTISONE SODIUM SUCCINATE 100 MG/2ML
100 INJECTION INTRAMUSCULAR; INTRAVENOUS
OUTPATIENT
Start: 2025-01-28

## 2025-01-21 RX ORDER — FAMOTIDINE 10 MG/ML
20 INJECTION, SOLUTION INTRAVENOUS ONCE
OUTPATIENT
Start: 2025-01-22 | End: 2025-01-21

## 2025-01-21 RX ORDER — DIPHENHYDRAMINE HYDROCHLORIDE 50 MG/ML
50 INJECTION INTRAMUSCULAR; INTRAVENOUS
OUTPATIENT
Start: 2025-02-04

## 2025-01-21 RX ORDER — ALBUTEROL SULFATE 90 UG/1
4 INHALANT RESPIRATORY (INHALATION) PRN
OUTPATIENT
Start: 2025-01-22

## 2025-01-21 RX ORDER — HYDROCORTISONE SODIUM SUCCINATE 100 MG/2ML
100 INJECTION INTRAMUSCULAR; INTRAVENOUS
OUTPATIENT
Start: 2025-02-04

## 2025-01-21 RX ORDER — FAMOTIDINE 10 MG/ML
20 INJECTION, SOLUTION INTRAVENOUS ONCE
OUTPATIENT
Start: 2025-01-28 | End: 2025-01-28

## 2025-01-21 RX ORDER — DIPHENHYDRAMINE HYDROCHLORIDE 50 MG/ML
50 INJECTION INTRAMUSCULAR; INTRAVENOUS ONCE
OUTPATIENT
Start: 2025-01-28 | End: 2025-01-28

## 2025-01-21 RX ORDER — EPINEPHRINE 1 MG/ML
0.3 INJECTION, SOLUTION, CONCENTRATE INTRAVENOUS PRN
OUTPATIENT
Start: 2025-01-22

## 2025-01-21 RX ORDER — ACETAMINOPHEN 325 MG/1
650 TABLET ORAL
OUTPATIENT
Start: 2025-02-04

## 2025-01-21 RX ADMIN — SODIUM CHLORIDE, PRESERVATIVE FREE 20 ML: 5 INJECTION INTRAVENOUS at 10:55

## 2025-01-21 ASSESSMENT — PATIENT HEALTH QUESTIONNAIRE - PHQ9
SUM OF ALL RESPONSES TO PHQ QUESTIONS 1-9: 0
SUM OF ALL RESPONSES TO PHQ QUESTIONS 1-9: 0
SUM OF ALL RESPONSES TO PHQ9 QUESTIONS 1 & 2: 0
1. LITTLE INTEREST OR PLEASURE IN DOING THINGS: NOT AT ALL
2. FEELING DOWN, DEPRESSED OR HOPELESS: NOT AT ALL
SUM OF ALL RESPONSES TO PHQ QUESTIONS 1-9: 0
SUM OF ALL RESPONSES TO PHQ QUESTIONS 1-9: 0

## 2025-01-21 NOTE — PROGRESS NOTES
[facility]    1/21/2025    9:41 AM    Jesus Laguna [id number]    79 y.o.      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  Per records margins were close but negative with evidence of LVI as well as PNI and extracapsular spread.  He completed adjuvant radiation therapy and end of 2010 to early 2011.  He followed with Dr. Austen Bowman ENT up till the 5-year carlee in 2015 with no evidence of recurrence.  2 years post therapy he had multiple teeth removed of his left lower jaw followed by HBO therapy x10 sessions due to poor healing.  More recently patient presented with symptoms of congestion and hypersalivation, completed a couple courses of antibiotics without resolution.  Had minimal weight loss.  He was seen by Dr. Clair Bowman ENT with the scope and abnormal CT neck which was abnormal.  He was thereafter referred to Dr. James douglas at ENT surgeons at [facility].  Patient underwent direct laryngoscopy 8/17/2023 as well as PET scan 8/9/2023.  PET scan demonstrated hypermetabolic mass at base of tongue, mildly metabolic nonenlarged right level 3 cervical lymph node, and no evidence of distant metastasis.  Direct laryngoscopy with biopsy with pathology of larynx and epiglottis revealing invasive moderately differentiated squamous cell cancer.  P16 stain for HPV was negative.  Patient now referred to [facility] 
testicular pain

## 2025-01-21 NOTE — PATIENT INSTRUCTIONS
Patient Information from Today's Visit    The members of your Oncology Medical Home are listed below:    Physician Provider: Kasandra Olvera Medical Oncologist  Advanced Practice Clinician: Neelam Schaeffer NP  Registered Nurse: Lucille ASHER RN  Nurse Navigator: Loraine CUELLAR RD  Medical Assistant: Celsa ASHER MA  :Breanna WARNER  Supportive Care Services: Jenny SOTELO LMSW and Loraine CUELLAR, Registered Dietitian    Diagnosis:   Metastatic H&N Cancer - pre-chemo visit     Follow Up Instructions:   We will refer you to radiation oncology to see if radiation is possible to help shrink the tumor down.     We will arrange for you to start a new treatment tomorrow - weekly Paclitaxel (Taxol) for 3 weeks then off for one week.     Your ear is not infected    Treatment Summary has been discussed and given to patient: Yes      Current Labs:   We will draw labs from you today after this visit           Please refer to After Visit Summary or Phizzle for upcoming appointment information. Please call our office for rescheduling needs at least 24 hours before your scheduled appointment time.If you have any questions regarding your upcoming schedule please reach out to your care team through Phizzle or call [phone].     Please notify your assigned Nurse Navigator of any unplanned hospital admissions or Emergency Room visits within 24 hours of discharge.    -------------------------------------------------------------------------------------------------------------------  Please call our office at [phone] if you have any  of the following symptoms:   Fever of 100.5 or greater  Chills  Shortness of breath  Swelling or pain in one leg    After office hours an answering service is available and will contact a provider for emergencies or if you are experiencing any of the above symptoms.        Loraine Honeycutt RD, , LD  Outpatient Oncology Dietitian  Head and Neck Tumor

## 2025-01-21 NOTE — PROGRESS NOTES
Patient to port lab for port access and lab draw. Port accessed per protocol; using 20g 0.75\" alegria needle without difficulty. Labs drawn from port and port flushed. Port de-accessed, dressing applied. Patient tolerated well. Discharged via wheelchair.

## 2025-01-22 ENCOUNTER — CLINICAL DOCUMENTATION (OUTPATIENT)
Dept: ONCOLOGY | Age: 80
End: 2025-01-22

## 2025-01-22 ENCOUNTER — HOSPITAL ENCOUNTER (OUTPATIENT)
Dept: INFUSION THERAPY | Age: 80
Setting detail: INFUSION SERIES
End: 2025-01-22

## 2025-01-22 PROBLEM — J98.8 CHRONIC RESPIRATORY INFECTION: Status: ACTIVE | Noted: 2025-01-22

## 2025-01-22 LAB — HBV CORE AB SERPL QL IA: NEGATIVE

## 2025-01-23 ENCOUNTER — ANESTHESIA EVENT (OUTPATIENT)
Dept: SURGERY | Age: 80
End: 2025-01-23
Payer: MEDICARE

## 2025-01-23 RX ORDER — ACETAMINOPHEN 500 MG
500 TABLET ORAL EVERY 6 HOURS PRN
COMMUNITY

## 2025-01-23 NOTE — DISCHARGE INSTRUCTIONS
Your tracheostomy Your tracheostomy will have a balloon to keep secretions from entering the airway. The balloon will be inflated for about 24 hours after surgery, during that time you will not be able to talk. You can write to communicate during that time. Once the balloon is deflated, some patients can be fit with a speaking valve to allow breathing through the tracheostomy and also allow talking. This is called a “Passy Aurelia” valve (you can read about this at http://www.[employer].CertificationPoint). It is normal to have secretions from the tracheostomy in the post-operative period. For the first few days, the secretions will be bloody, clearing later to look like mucous. This drainage is significant for the first few weeks but decreases with time. A suction machine will be arranged for your home to help manage these secretions. Getting used to having a tracheostomy and taking care of a new tracheostomy takes some time. Education for this starts while you are in the hospital and continues at home if needed. You will learn to change your tracheostomy and to clean, change and remove the inner cannula. You will also have to learn how to swallow with the tracheostomy tube in place because it feels different and takes some getting used to. Medications ? Most patients do not need pain medications once they go home from the hospital. ? No other medications are required. Other things…. ? Increased coughing is normal after a tracheostomy from the tube “tickling” the inner airway. This decreases with time. ? Secretions from the tracheostomy are common and also decrease with time. ? Swallowing feels different after a tracheostomy tube. Your voice box and trachea move up and down during swallowing motions so this feels different at first. Take it slow when re-learning to eat and drink after your tracheostomy tube is placed. ? Tracheostomy tubes can be removed when they are no longer needed for breathing to be safe. Removing the  tracheostomy will leave a small scar once healed. ? Normally, the air you breathe is warmed and moistened by your nose and mouth. With a tracheostomy tube, air must be moistened other ways. It is important to drink fluids and to use a humidifier, unless contraindicated by your doctor. ? If you go outside in the cold weather, it is important to loosely cover the tracheostomy tube with gauze or a lint-free cloth to warm the air you breathe and to prevent cold air from irritating your windpipe. ? When taking a shower, wear a protective cover or shower shield over the tracheostomy and direct the water spray at your chest, not your face and neck. Be careful to keep soap, water, powders, and sprays away from your tracheostomy opening.

## 2025-01-23 NOTE — PERIOP NOTE
Patient verified name and .  Patient gave permission to speak with his wife, Rosalinda  Order for consent  was found in BrandBoardsdoes not match case posting; patient's wife verifies procedure.     Consent:    Tracheostomy [id number]     Posting:  TRACHEOTOMY   Spoke to Sandra in scheduling, she suggested verifying the procedure.  Case message sent to SGerardo Rodriguez to verify procedure    Type 1B surgery, phone assessment complete.  Orders  received.  Labs per surgeon: none ordered  Labs per anesthesia protocol: 25 CBC  and BMP- Hgb 10.4    Patient's wife answered medical/surgical history questions at their best of ability. All prior to admission medications documented in EPIC.    Patient's wife instructed to have the patient continue taking all prescription medications up to the day of surgery but to take only the following medications the day of surgery according to anesthesia guidelines with a small sip of water: tramadol if needed, Robinul.   Also, patient is requested to take 2 Tylenol in the morning and then again before bed on the day before surgery. Regular or extra strength may be used.       Patient's wife informed that all vitamins and supplements should be held 7 days prior to surgery and NSAIDS (preventative aspirin 81 mg) 5 days prior to surgery. Prescription meds to hold:  none    Patient's wife instructed on the following:    > Arrive at main Entrance, time of arrival to be called the day before by 1700  > NPO after midnight- hold tube feedings after midnight, unless otherwise indicated, including gum, mints, and ice chips  > Please drink- per PEG- 32 ounces of non-caffeinated clear liquids 2 hours prior to your arrival to avoid dehydration.    > Responsible adult must drive patient to the hospital, stay during surgery, and patient will need supervision 24 hours after anesthesia  > Use non moisturizing soap in shower the night before surgery and on the morning of surgery  > All piercings must be removed prior

## 2025-01-24 ENCOUNTER — ANESTHESIA (OUTPATIENT)
Dept: SURGERY | Age: 80
End: 2025-01-24
Payer: MEDICARE

## 2025-01-24 ENCOUNTER — HOSPITAL ENCOUNTER (INPATIENT)
Age: 80
LOS: 4 days | Discharge: HOME HEALTH | DRG: 012 | End: 2025-01-28
Attending: STUDENT IN AN ORGANIZED HEALTH CARE EDUCATION/TRAINING PROGRAM | Admitting: STUDENT IN AN ORGANIZED HEALTH CARE EDUCATION/TRAINING PROGRAM
Payer: MEDICARE

## 2025-01-24 DIAGNOSIS — C10.9 SQUAMOUS CELL CARCINOMA OF OROPHARYNX (HCC): ICD-10-CM

## 2025-01-24 DIAGNOSIS — C32.9 LARYNGEAL CANCER (HCC): ICD-10-CM

## 2025-01-24 LAB
GLUCOSE BLD STRIP.AUTO-MCNC: 170 MG/DL (ref 65–100)
SERVICE CMNT-IMP: ABNORMAL

## 2025-01-24 PROCEDURE — 2500000003 HC RX 250 WO HCPCS

## 2025-01-24 PROCEDURE — 2700000000 HC OXYGEN THERAPY PER DAY

## 2025-01-24 PROCEDURE — 2720000010 HC SURG SUPPLY STERILE: Performed by: STUDENT IN AN ORGANIZED HEALTH CARE EDUCATION/TRAINING PROGRAM

## 2025-01-24 PROCEDURE — 2580000003 HC RX 258

## 2025-01-24 PROCEDURE — 3700000001 HC ADD 15 MINUTES (ANESTHESIA): Performed by: STUDENT IN AN ORGANIZED HEALTH CARE EDUCATION/TRAINING PROGRAM

## 2025-01-24 PROCEDURE — 7100000000 HC PACU RECOVERY - FIRST 15 MIN: Performed by: STUDENT IN AN ORGANIZED HEALTH CARE EDUCATION/TRAINING PROGRAM

## 2025-01-24 PROCEDURE — 0BJ08ZZ INSPECTION OF TRACHEOBRONCHIAL TREE, VIA NATURAL OR ARTIFICIAL OPENING ENDOSCOPIC: ICD-10-PCS | Performed by: STUDENT IN AN ORGANIZED HEALTH CARE EDUCATION/TRAINING PROGRAM

## 2025-01-24 PROCEDURE — 0B110F4 BYPASS TRACHEA TO CUTANEOUS WITH TRACHEOSTOMY DEVICE, OPEN APPROACH: ICD-10-PCS | Performed by: STUDENT IN AN ORGANIZED HEALTH CARE EDUCATION/TRAINING PROGRAM

## 2025-01-24 PROCEDURE — 6360000002 HC RX W HCPCS: Performed by: ANESTHESIOLOGY

## 2025-01-24 PROCEDURE — 3700000000 HC ANESTHESIA ATTENDED CARE: Performed by: STUDENT IN AN ORGANIZED HEALTH CARE EDUCATION/TRAINING PROGRAM

## 2025-01-24 PROCEDURE — 82962 GLUCOSE BLOOD TEST: CPT

## 2025-01-24 PROCEDURE — 94640 AIRWAY INHALATION TREATMENT: CPT

## 2025-01-24 PROCEDURE — 2500000003 HC RX 250 WO HCPCS: Performed by: STUDENT IN AN ORGANIZED HEALTH CARE EDUCATION/TRAINING PROGRAM

## 2025-01-24 PROCEDURE — 93005 ELECTROCARDIOGRAM TRACING: CPT | Performed by: FAMILY MEDICINE

## 2025-01-24 PROCEDURE — 31603 EMER TRACHEOSTOMY TTRACH: CPT | Performed by: STUDENT IN AN ORGANIZED HEALTH CARE EDUCATION/TRAINING PROGRAM

## 2025-01-24 PROCEDURE — 2709999900 HC NON-CHARGEABLE SUPPLY: Performed by: STUDENT IN AN ORGANIZED HEALTH CARE EDUCATION/TRAINING PROGRAM

## 2025-01-24 PROCEDURE — 7100000001 HC PACU RECOVERY - ADDTL 15 MIN: Performed by: STUDENT IN AN ORGANIZED HEALTH CARE EDUCATION/TRAINING PROGRAM

## 2025-01-24 PROCEDURE — 6360000002 HC RX W HCPCS: Performed by: STUDENT IN AN ORGANIZED HEALTH CARE EDUCATION/TRAINING PROGRAM

## 2025-01-24 PROCEDURE — 2060000000 HC ICU INTERMEDIATE R&B

## 2025-01-24 PROCEDURE — 6360000002 HC RX W HCPCS

## 2025-01-24 PROCEDURE — 31575 DIAGNOSTIC LARYNGOSCOPY: CPT | Performed by: STUDENT IN AN ORGANIZED HEALTH CARE EDUCATION/TRAINING PROGRAM

## 2025-01-24 PROCEDURE — 0CJS8ZZ INSPECTION OF LARYNX, VIA NATURAL OR ARTIFICIAL OPENING ENDOSCOPIC: ICD-10-PCS | Performed by: STUDENT IN AN ORGANIZED HEALTH CARE EDUCATION/TRAINING PROGRAM

## 2025-01-24 PROCEDURE — 31615 TRCHEOBRNCHSC EST TRACHS INC: CPT | Performed by: STUDENT IN AN ORGANIZED HEALTH CARE EDUCATION/TRAINING PROGRAM

## 2025-01-24 PROCEDURE — 3600000012 HC SURGERY LEVEL 2 ADDTL 15MIN: Performed by: STUDENT IN AN ORGANIZED HEALTH CARE EDUCATION/TRAINING PROGRAM

## 2025-01-24 PROCEDURE — 3600000002 HC SURGERY LEVEL 2 BASE: Performed by: STUDENT IN AN ORGANIZED HEALTH CARE EDUCATION/TRAINING PROGRAM

## 2025-01-24 RX ORDER — HYDROMORPHONE HYDROCHLORIDE 1 MG/ML
0.5 INJECTION, SOLUTION INTRAMUSCULAR; INTRAVENOUS; SUBCUTANEOUS
Status: DISCONTINUED | OUTPATIENT
Start: 2025-01-24 | End: 2025-01-28 | Stop reason: HOSPADM

## 2025-01-24 RX ORDER — SODIUM CHLORIDE 0.9 % (FLUSH) 0.9 %
5-40 SYRINGE (ML) INJECTION PRN
Status: DISCONTINUED | OUTPATIENT
Start: 2025-01-24 | End: 2025-01-24 | Stop reason: HOSPADM

## 2025-01-24 RX ORDER — SODIUM CHLORIDE 0.9 % (FLUSH) 0.9 %
5-40 SYRINGE (ML) INJECTION EVERY 12 HOURS SCHEDULED
Status: DISCONTINUED | OUTPATIENT
Start: 2025-01-24 | End: 2025-01-24 | Stop reason: HOSPADM

## 2025-01-24 RX ORDER — GLYCOPYRROLATE 0.2 MG/ML
0.2 INJECTION INTRAMUSCULAR; INTRAVENOUS ONCE
Status: COMPLETED | OUTPATIENT
Start: 2025-01-24 | End: 2025-01-24

## 2025-01-24 RX ORDER — SODIUM CHLORIDE, SODIUM LACTATE, POTASSIUM CHLORIDE, CALCIUM CHLORIDE 600; 310; 30; 20 MG/100ML; MG/100ML; MG/100ML; MG/100ML
INJECTION, SOLUTION INTRAVENOUS
Status: DISCONTINUED | OUTPATIENT
Start: 2025-01-24 | End: 2025-01-24 | Stop reason: SDUPTHER

## 2025-01-24 RX ORDER — LIDOCAINE HYDROCHLORIDE AND EPINEPHRINE 10; 10 MG/ML; UG/ML
INJECTION, SOLUTION INFILTRATION; PERINEURAL PRN
Status: DISCONTINUED | OUTPATIENT
Start: 2025-01-24 | End: 2025-01-24 | Stop reason: HOSPADM

## 2025-01-24 RX ORDER — DEXMEDETOMIDINE HYDROCHLORIDE 4 UG/ML
INJECTION, SOLUTION INTRAVENOUS
Status: DISCONTINUED | OUTPATIENT
Start: 2025-01-24 | End: 2025-01-24 | Stop reason: SDUPTHER

## 2025-01-24 RX ORDER — DEXMEDETOMIDINE HYDROCHLORIDE 100 UG/ML
INJECTION, SOLUTION INTRAVENOUS
Status: DISCONTINUED | OUTPATIENT
Start: 2025-01-24 | End: 2025-01-24 | Stop reason: SDUPTHER

## 2025-01-24 RX ORDER — SODIUM CHLORIDE 9 MG/ML
INJECTION, SOLUTION INTRAVENOUS PRN
Status: DISCONTINUED | OUTPATIENT
Start: 2025-01-24 | End: 2025-01-24 | Stop reason: HOSPADM

## 2025-01-24 RX ORDER — SODIUM CHLORIDE, SODIUM LACTATE, POTASSIUM CHLORIDE, CALCIUM CHLORIDE 600; 310; 30; 20 MG/100ML; MG/100ML; MG/100ML; MG/100ML
INJECTION, SOLUTION INTRAVENOUS CONTINUOUS
Status: DISCONTINUED | OUTPATIENT
Start: 2025-01-24 | End: 2025-01-28 | Stop reason: HOSPADM

## 2025-01-24 RX ORDER — OXYMETAZOLINE HYDROCHLORIDE 0.05 G/100ML
2 SPRAY NASAL ONCE
Status: DISCONTINUED | OUTPATIENT
Start: 2025-01-24 | End: 2025-01-24 | Stop reason: HOSPADM

## 2025-01-24 RX ORDER — PROPOFOL 10 MG/ML
INJECTION, EMULSION INTRAVENOUS
Status: DISCONTINUED | OUTPATIENT
Start: 2025-01-24 | End: 2025-01-24 | Stop reason: SDUPTHER

## 2025-01-24 RX ORDER — DEXAMETHASONE SODIUM PHOSPHATE 10 MG/ML
INJECTION INTRAMUSCULAR; INTRAVENOUS
Status: DISCONTINUED | OUTPATIENT
Start: 2025-01-24 | End: 2025-01-24 | Stop reason: SDUPTHER

## 2025-01-24 RX ORDER — ASPIRIN 81 MG/1
81 TABLET, CHEWABLE ORAL DAILY
Status: DISCONTINUED | OUTPATIENT
Start: 2025-01-25 | End: 2025-01-28 | Stop reason: HOSPADM

## 2025-01-24 RX ORDER — SODIUM CHLORIDE 9 MG/ML
INJECTION, SOLUTION INTRAVENOUS PRN
Status: DISCONTINUED | OUTPATIENT
Start: 2025-01-24 | End: 2025-01-28 | Stop reason: HOSPADM

## 2025-01-24 RX ORDER — FENTANYL CITRATE 50 UG/ML
25 INJECTION, SOLUTION INTRAMUSCULAR; INTRAVENOUS EVERY 5 MIN PRN
Status: DISCONTINUED | OUTPATIENT
Start: 2025-01-24 | End: 2025-01-24 | Stop reason: HOSPADM

## 2025-01-24 RX ORDER — ONDANSETRON 2 MG/ML
INJECTION INTRAMUSCULAR; INTRAVENOUS
Status: DISCONTINUED | OUTPATIENT
Start: 2025-01-24 | End: 2025-01-24 | Stop reason: SDUPTHER

## 2025-01-24 RX ORDER — MIDAZOLAM HYDROCHLORIDE 1 MG/ML
INJECTION, SOLUTION INTRAMUSCULAR; INTRAVENOUS
Status: DISCONTINUED | OUTPATIENT
Start: 2025-01-24 | End: 2025-01-24 | Stop reason: SDUPTHER

## 2025-01-24 RX ORDER — ENOXAPARIN SODIUM 100 MG/ML
40 INJECTION SUBCUTANEOUS DAILY
Status: DISCONTINUED | OUTPATIENT
Start: 2025-01-25 | End: 2025-01-28 | Stop reason: HOSPADM

## 2025-01-24 RX ORDER — SODIUM CHLORIDE, SODIUM LACTATE, POTASSIUM CHLORIDE, CALCIUM CHLORIDE 600; 310; 30; 20 MG/100ML; MG/100ML; MG/100ML; MG/100ML
INJECTION, SOLUTION INTRAVENOUS CONTINUOUS
Status: DISCONTINUED | OUTPATIENT
Start: 2025-01-24 | End: 2025-01-24 | Stop reason: HOSPADM

## 2025-01-24 RX ORDER — SODIUM CHLORIDE 0.9 % (FLUSH) 0.9 %
5-40 SYRINGE (ML) INJECTION PRN
Status: DISCONTINUED | OUTPATIENT
Start: 2025-01-24 | End: 2025-01-28 | Stop reason: HOSPADM

## 2025-01-24 RX ORDER — ONDANSETRON 2 MG/ML
4 INJECTION INTRAMUSCULAR; INTRAVENOUS EVERY 6 HOURS PRN
Status: DISCONTINUED | OUTPATIENT
Start: 2025-01-24 | End: 2025-01-28 | Stop reason: HOSPADM

## 2025-01-24 RX ORDER — MEPERIDINE HYDROCHLORIDE 25 MG/ML
12.5 INJECTION INTRAMUSCULAR; INTRAVENOUS; SUBCUTANEOUS EVERY 5 MIN PRN
Status: DISCONTINUED | OUTPATIENT
Start: 2025-01-24 | End: 2025-01-24 | Stop reason: HOSPADM

## 2025-01-24 RX ORDER — IBUPROFEN 600 MG/1
1 TABLET ORAL PRN
Status: DISCONTINUED | OUTPATIENT
Start: 2025-01-24 | End: 2025-01-24 | Stop reason: HOSPADM

## 2025-01-24 RX ORDER — GLYCOPYRROLATE 0.2 MG/ML
INJECTION INTRAMUSCULAR; INTRAVENOUS
Status: DISCONTINUED | OUTPATIENT
Start: 2025-01-24 | End: 2025-01-24 | Stop reason: SDUPTHER

## 2025-01-24 RX ORDER — LIDOCAINE HYDROCHLORIDE 10 MG/ML
1 INJECTION, SOLUTION INFILTRATION; PERINEURAL
Status: DISCONTINUED | OUTPATIENT
Start: 2025-01-24 | End: 2025-01-24 | Stop reason: HOSPADM

## 2025-01-24 RX ORDER — ONDANSETRON 2 MG/ML
4 INJECTION INTRAMUSCULAR; INTRAVENOUS
Status: DISCONTINUED | OUTPATIENT
Start: 2025-01-24 | End: 2025-01-24 | Stop reason: HOSPADM

## 2025-01-24 RX ORDER — KETAMINE HCL IN NACL, ISO-OSM 20 MG/2 ML
SYRINGE (ML) INJECTION
Status: DISCONTINUED | OUTPATIENT
Start: 2025-01-24 | End: 2025-01-24 | Stop reason: SDUPTHER

## 2025-01-24 RX ORDER — POLYETHYLENE GLYCOL 3350 17 G/17G
17 POWDER, FOR SOLUTION ORAL 2 TIMES DAILY
Status: DISCONTINUED | OUTPATIENT
Start: 2025-01-24 | End: 2025-01-28 | Stop reason: HOSPADM

## 2025-01-24 RX ORDER — OXYCODONE HYDROCHLORIDE 5 MG/1
5 TABLET ORAL
Status: DISCONTINUED | OUTPATIENT
Start: 2025-01-24 | End: 2025-01-24 | Stop reason: HOSPADM

## 2025-01-24 RX ORDER — PROCHLORPERAZINE EDISYLATE 5 MG/ML
5 INJECTION INTRAMUSCULAR; INTRAVENOUS
Status: DISCONTINUED | OUTPATIENT
Start: 2025-01-24 | End: 2025-01-24 | Stop reason: HOSPADM

## 2025-01-24 RX ORDER — MULTIVIT WITH MINERALS/LUTEIN
250 TABLET ORAL
Status: DISCONTINUED | OUTPATIENT
Start: 2025-01-25 | End: 2025-01-28 | Stop reason: HOSPADM

## 2025-01-24 RX ORDER — LIDOCAINE HYDROCHLORIDE 40 MG/ML
4 INJECTION, SOLUTION RETROBULBAR ONCE
Status: COMPLETED | OUTPATIENT
Start: 2025-01-24 | End: 2025-01-24

## 2025-01-24 RX ORDER — NALOXONE HYDROCHLORIDE 0.4 MG/ML
INJECTION, SOLUTION INTRAMUSCULAR; INTRAVENOUS; SUBCUTANEOUS PRN
Status: DISCONTINUED | OUTPATIENT
Start: 2025-01-24 | End: 2025-01-24 | Stop reason: HOSPADM

## 2025-01-24 RX ORDER — HYDROMORPHONE HYDROCHLORIDE 1 MG/ML
0.25 INJECTION, SOLUTION INTRAMUSCULAR; INTRAVENOUS; SUBCUTANEOUS
Status: DISCONTINUED | OUTPATIENT
Start: 2025-01-24 | End: 2025-01-28 | Stop reason: HOSPADM

## 2025-01-24 RX ORDER — GINSENG 100 MG
CAPSULE ORAL EVERY 8 HOURS
Status: DISCONTINUED | OUTPATIENT
Start: 2025-01-24 | End: 2025-01-28 | Stop reason: HOSPADM

## 2025-01-24 RX ORDER — SODIUM CHLORIDE 0.9 % (FLUSH) 0.9 %
5-40 SYRINGE (ML) INJECTION EVERY 12 HOURS SCHEDULED
Status: DISCONTINUED | OUTPATIENT
Start: 2025-01-24 | End: 2025-01-28 | Stop reason: HOSPADM

## 2025-01-24 RX ORDER — DEXTROSE MONOHYDRATE 100 MG/ML
INJECTION, SOLUTION INTRAVENOUS CONTINUOUS PRN
Status: DISCONTINUED | OUTPATIENT
Start: 2025-01-24 | End: 2025-01-24 | Stop reason: HOSPADM

## 2025-01-24 RX ORDER — ONDANSETRON 4 MG/1
4 TABLET, ORALLY DISINTEGRATING ORAL EVERY 8 HOURS PRN
Status: DISCONTINUED | OUTPATIENT
Start: 2025-01-24 | End: 2025-01-28 | Stop reason: HOSPADM

## 2025-01-24 RX ORDER — LIDOCAINE HYDROCHLORIDE 20 MG/ML
INJECTION, SOLUTION EPIDURAL; INFILTRATION; INTRACAUDAL; PERINEURAL
Status: DISCONTINUED | OUTPATIENT
Start: 2025-01-24 | End: 2025-01-24 | Stop reason: SDUPTHER

## 2025-01-24 RX ORDER — DIPHENHYDRAMINE HYDROCHLORIDE 50 MG/ML
12.5 INJECTION INTRAMUSCULAR; INTRAVENOUS
Status: DISCONTINUED | OUTPATIENT
Start: 2025-01-24 | End: 2025-01-24 | Stop reason: HOSPADM

## 2025-01-24 RX ADMIN — CEFAZOLIN 2000 MG: 2 INJECTION, POWDER, FOR SOLUTION INTRAMUSCULAR; INTRAVENOUS at 15:03

## 2025-01-24 RX ADMIN — MIDAZOLAM 1 MG: 1 INJECTION INTRAMUSCULAR; INTRAVENOUS at 15:06

## 2025-01-24 RX ADMIN — GLYCOPYRROLATE 0.2 MG: 0.2 INJECTION INTRAMUSCULAR; INTRAVENOUS at 14:56

## 2025-01-24 RX ADMIN — DEXMEDETOMIDINE 8 MCG: 100 INJECTION, SOLUTION, CONCENTRATE INTRAVENOUS at 14:52

## 2025-01-24 RX ADMIN — PHENYLEPHRINE HYDROCHLORIDE 150 MCG: 10 INJECTION INTRAVENOUS at 15:35

## 2025-01-24 RX ADMIN — PHENYLEPHRINE HYDROCHLORIDE 150 MCG: 10 INJECTION INTRAVENOUS at 15:24

## 2025-01-24 RX ADMIN — Medication 10 MG: at 14:53

## 2025-01-24 RX ADMIN — MIDAZOLAM 1 MG: 1 INJECTION INTRAMUSCULAR; INTRAVENOUS at 15:01

## 2025-01-24 RX ADMIN — Medication 10 MG: at 14:56

## 2025-01-24 RX ADMIN — Medication 10 MG: at 15:06

## 2025-01-24 RX ADMIN — SODIUM CHLORIDE, SODIUM LACTATE, POTASSIUM CHLORIDE, AND CALCIUM CHLORIDE: 600; 310; 30; 20 INJECTION, SOLUTION INTRAVENOUS at 14:45

## 2025-01-24 RX ADMIN — GLYCOPYRROLATE 0.2 MG: 0.2 INJECTION INTRAMUSCULAR; INTRAVENOUS at 14:23

## 2025-01-24 RX ADMIN — DEXMEDETOMIDINE HYDROCHLORIDE 0.5 MCG/KG/HR: 4 INJECTION, SOLUTION INTRAVENOUS at 15:08

## 2025-01-24 RX ADMIN — DEXAMETHASONE SODIUM PHOSPHATE 10 MG: 10 INJECTION INTRAMUSCULAR; INTRAVENOUS at 15:06

## 2025-01-24 RX ADMIN — DEXMEDETOMIDINE 12 MCG: 100 INJECTION, SOLUTION, CONCENTRATE INTRAVENOUS at 15:01

## 2025-01-24 RX ADMIN — LIDOCAINE HYDROCHLORIDE 4 ML: 40 INJECTION, SOLUTION RETROBULBAR; TOPICAL at 13:12

## 2025-01-24 RX ADMIN — LIDOCAINE HYDROCHLORIDE 10 ML: 20 INJECTION, SOLUTION EPIDURAL; INFILTRATION; INTRACAUDAL; PERINEURAL at 14:56

## 2025-01-24 RX ADMIN — PHENYLEPHRINE HYDROCHLORIDE 100 MCG: 10 INJECTION INTRAVENOUS at 15:26

## 2025-01-24 RX ADMIN — PROPOFOL 100 MG: 10 INJECTION, EMULSION INTRAVENOUS at 15:24

## 2025-01-24 RX ADMIN — ONDANSETRON 4 MG: 2 INJECTION INTRAMUSCULAR; INTRAVENOUS at 15:26

## 2025-01-24 RX ADMIN — DEXMEDETOMIDINE 12 MCG: 100 INJECTION, SOLUTION, CONCENTRATE INTRAVENOUS at 14:54

## 2025-01-24 RX ADMIN — DEXMEDETOMIDINE 8 MCG: 100 INJECTION, SOLUTION, CONCENTRATE INTRAVENOUS at 15:13

## 2025-01-24 ASSESSMENT — PAIN - FUNCTIONAL ASSESSMENT: PAIN_FUNCTIONAL_ASSESSMENT: 0-10

## 2025-01-24 ASSESSMENT — PAIN SCALES - GENERAL: PAINLEVEL_OUTOF10: 0

## 2025-01-24 NOTE — OP NOTE
Operative Note      Patient: Jesus Laguna  YOB: 1945  MRN: [id number]    Date of Procedure: 1/24/2025    Procedure: Supraglottic squamous cell carcinoma    Post-Op Diagnosis: Same       Procedure: Awake tracheostomy, flexible laryngoscopy, tracheobronchoscopy    Surgeon(s):  Shahid Cortez MD    Assistant:   * No surgical staff found *    Anesthesia: General    Estimated Blood Loss (mL): Minimal    Complications: None    Specimens:   * No specimens in log *    Implants:  * No implants in log *      Drains:   Gastrostomy/Enterostomy/Jejunostomy Tube Gastrostomy LUQ 1 8 fr (Active)   $ Gastrostomy insertion $ Yes 12/31/24 0945   Drainage Appearance Cloudy;Yellow 01/02/25 2027   Site Description Reddened 01/03/25 0700   J Port Status Clamped 01/02/25 2027   G Port Status Clamped 01/03/25 0700   Surrounding Skin Clean, dry & intact 01/03/25 1036   Dressing Status Clean, dry & intact 01/03/25 1036   Dressing Type Split gauze 01/03/25 1036   G-Tube Care Completed Yes 01/01/25 1936   Tube Feeding Standard with Fiber 01/03/25 1036   Tube feeding/verify rate (mL/hr) 60 mL/hr 01/01/25 1936   Tube Feeding Intake (mL) 360 ml 01/03/25 1036   Free Water/Flush (mL) 120 mL 01/03/25 1036   Action Taken Feed set changed 01/01/25 1530       [REMOVED] Gastrostomy/Enterostomy/Jejunostomy Tube LUQ 1 18 fr (Removed)       [REMOVED] External Urinary Catheter (Removed)   Site Assessment Clean,dry & intact 01/02/25 0830   Placement Replaced 01/02/25 1541   Securement Method Securing device (Describe) 01/02/25 1541   Catheter Care Catheter/Wick replaced 01/02/25 1541   Perineal Care Yes 01/02/25 1541   Suction 40 mmgHg continuous 01/02/25 1541   Urine Color Yellow 01/02/25 1541   Urine Appearance Clear 01/02/25 1541   Urine Odor Malodorous 01/02/25 0520   Output (mL) 350 mL 01/02/25 1541       Findings: Bulky exophytic tumor involving the entirety of the base of tongue and involving the arytenoids and hooding over

## 2025-01-24 NOTE — PERIOP NOTE
TRANSFER - OUT REPORT:    Verbal report given to Riya ARECHIGA on Jesus Laguna  being transferred to [facility] for routine progression of patient care       Report consisted of patient’s Situation, Background, Assessment and   Recommendations(SBAR).     Information from the following report(s) Surgery Report, MAR, Cardiac Rhythm SR, and Neuro Assessment was reviewed with the receiving nurse.    Lines:   Implantable Port 12/31/24 Right Subclavian (Active)       Peripheral IV 01/24/25 Distal;Right Forearm (Active)   Site Assessment Clean, dry & intact 01/24/25 1810   Line Status Blood return noted;Flushed 01/24/25 1620   Line Care Connections checked and tightened 01/24/25 1810   Phlebitis Assessment No symptoms 01/24/25 1810   Infiltration Assessment 0 01/24/25 1810   Alcohol Cap Used No 01/24/25 1810   Dressing Status Clean, dry & intact 01/24/25 1810   Dressing Type Transparent 01/24/25 1810        Opportunity for questions and clarification was provided.      Patient transported with:   Monitor  O2 @ 10 liters  Registered Nurse    VTE prophylaxis orders have not been written for Jesus Laguna.    Patient and family given floor number and nurses name.  Family updated re: pt status after security code verified.

## 2025-01-24 NOTE — ANESTHESIA PRE PROCEDURE
Department of Anesthesiology  Preprocedure Note       Name:  Jesus Laguna   Age:  79 y.o.  :  1945                                          MRN:  [id number]         Date:  2025      Surgeon: Surgeon(s):  Shahid Cortez MD    Procedure: Procedure(s):  TRACHEOSTOMY    Medications prior to admission:   Prior to Admission medications    Medication Sig Start Date End Date Taking? Authorizing Provider   acetaminophen (TYLENOL) 500 MG tablet Take 1 tablet by mouth every 6 hours as needed for Pain   Yes Aysha Cotton MD   Cholecalciferol (VITAMIN D3 PO) by Gastric Tube route   Yes Aysha Cotton MD   Hospital Bed MIS by Does not apply route Dispense one (1) adjustable hospital bed. Dx: R53.81, Z86.12, C32.9, Z93.1 25   Quinten Hartmann DO   chlorhexidine (PERIDEX) 0.12 % solution Swish and spit 15 mLs 2 times daily 25  Neelam Schaeffer NP-C   sertraline (ZOLOFT) 25 MG tablet 1 tablet by Per G Tube route daily  Patient taking differently: 1 tablet by Per G Tube route nightly Taking 1/2 tab 25   Quinten Hartmann DO   CLINPRO 5000 1.1 % PSTE as needed 24   ProviderAysha MD   traMADol (ULTRAM) 50 MG tablet Take 1 tablet by mouth 2 times daily as needed for Pain for up to 30 days. Do not drink alcohol, drive or operate heavy machinery after use Max Daily Amount: 100 mg 25  Quinten Hartmann DO   naloxone (NARCAN) 4 MG/0.1ML LIQD nasal spray Use 1 spray intranasally at onset of opioid overdose.  May repeat dose using alternate nostril every 2 minutes as needed should symptoms persist or recur.  Patient not taking: Reported on 2025   Quinten Hartmann DO   glycopyrrolate (ROBINUL) 1 MG tablet Take 1 tablet by mouth 3 times daily 12/3/24   Neelam Schaeffer, NP-C   lidocaine-prilocaine (EMLA) 2.5-2.5 % cream Apply dime size amount to port site ~30 min prior to accessing, DO NOT RUB IN, cover in plastic wrap to protect clothes

## 2025-01-24 NOTE — PROGRESS NOTES
TRANSFER - IN REPORT:    Verbal report received from GENI Price on Jesus Laguna  being received from PACU for routine progression of patient care      Report consisted of patient's Situation, Background, Assessment and   Recommendations(SBAR).     Information from the following report(s) Nurse Handoff Report, Adult Overview, and Surgery Report was reviewed with the receiving nurse.    Opportunity for questions and clarification was provided.      Assessment completed upon patient's arrival to unit and care assumed.

## 2025-01-24 NOTE — H&P
[facility] Office Note     Patient: Jesus Laguna  MRN: [id number]  : 1945  Gender:  male  Vital Signs: Resp 17   Ht 1.803 m (5' 11\")   Wt 79.8 kg (176 lb)   BMI 24.55 kg/m²   Date: 2025     CC:        Chief Complaint   Patient presents with    Follow-up       2 month follow up SCC (throat). Pt has been in the hospital sine last visit and had a PET scan that showed the tumor has grown.          HPI:  Jesus Laguna is a 79 y.o. 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   Underwent a partial glossectomy with tonsillectomy, neck dissection with a radial forearm free flap to left oral cavity on 8/3/2010. According to the referral the margins were close but negative with evidence of lymphovascular invasion as well as perineural invasion and extracapsular spread. He underwent adjuvant radiation treatment which was completed at the end of /early . Patient followed up with Dr. Boyce for oncologic surveillance. Patient developed osteoradionecrosis of the left jaw and was treated by Dr. Amaury Omalley. He underwent HBO after oral surgery intervention. The last PET scan was 2012 which demonstrated some uptake in the left jaw which was felt to be inflammatory. Patient was released from Dr. Boyce clinic at the 5 year carlee in  with no signs of recurrence. Two years after his treatment he had several teeth removed on the left lower jaw. He underwent 10 treatments of HBO due to nonhealing wound at the surgical site where the teeth were removed. The mucosa healed after treatment.   He saw his PCP and was started on a round of antibiotics for what was thought to be a sinus infection. No improvement with antibiotics. Symptoms continued and he was put on a 2nd course of antibiotics with no improvement in symptoms. On  patient woke up with left facial swelling and went to his dentist who thought he had a tooth abscess and was put on Flagyl  right level 3 lymph node is slightly larger, now  9 mm. 8 mm right subclavian lymph node is also slightly more prominent and  demonstrates low-level PET uptake.     Chest: There are chronic changes in both lungs. No acute infiltrates or  effusions. No pulmonary masses. No significant mediastinal adenopathy.     Abdomen: There are no hypermetabolic lesions in the liver or adrenal glands.  There is no significant adenopathy. Percutaneous feeding tube is in place.     Pelvis: There is no significant abnormal uptake in the pelvis.  There is no  significant adenopathy.  There are no bony lesions.     IMPRESSION:  1. Enlarging hypermetabolic mass involving the floor of the mouth and tongue  consistent with tumor progression.  2. 3 small hypermetabolic lymph nodes in the right side of the neck. 2 of the  lymph nodes are slightly larger.  3. No evidence of metastatic disease in the chest, abdomen, or pelvis      ASSESSMENT and PLAN  Tumor progression to involve more of the oral tongue and floor of mouth as well as increased progression along the vallecula, base of tongue, epiglottis, and arytenoids.  There is partial obscuring of the airway but he is still breathing without issue, no stridor or stridor.  He is planning to restart chemo when she has had a good response to in the past.  If he develops any shortness of breath he will call the office and we will plan to have him go to the hospital for tracheostomy placement.  I will otherwise see him back in 1 month.  Quad shot could be considered.  Hospice care would be another option.      ICD-10-CM     1. Squamous cell carcinoma of oropharynx (HCC)  C10.9 LARYNGOSCOPY,DIRECT,DIAGNOSTIC       2. Pharyngoesophageal dysphagia  R13.14 LARYNGOSCOPY,DIRECT,DIAGNOSTIC          Will plan for tracheostomy.  Recommend awake nasal fiberoptic intubation.  All risk benefits and alternatives discussed and patient agrees to proceed.        Shahid Cortez MD

## 2025-01-24 NOTE — ANESTHESIA PROCEDURE NOTES
Airway  Date/Time: 1/24/2025 3:40 PM  Urgency: elective      General Information and Staff    Patient location during procedure: OR  Anesthesiologist: Leydi Hoover MD  Performed: anesthesiologist   Performed by: Leydi Hoover MD  Authorized by: Leydi Hoover MD      Final Airway Details  Final airway type: surgical airway  Successful airway: tracheostomy        Additional Comments  Attempted awake fiberoptic intubation via right nare after topicalization of right nare and airway, light sedation and anxiolysis.  Unable to access airway safely and comfortably. Dr. Cortez attempted right nasal intubation as well unsuccessful. Proceeded to awake tracheostomy placement by Dr. Cortez. Placed successfully without complications, + ETCO2. BBS.

## 2025-01-24 NOTE — ANESTHESIA POSTPROCEDURE EVALUATION
Department of Anesthesiology  Postprocedure Note    Patient: Jesus Laguna  MRN: [id number]  YOB: 1945  Date of evaluation: 1/24/2025    Procedure Summary       Date: 01/24/25 Room / Location: [facility] MAIN OR 03 / [facility] MAIN OR    Anesthesia Start: 1445 Anesthesia Stop: 1549    Procedure: TRACHEOSTOMY (Neck) Diagnosis:       Laryngeal cancer (HCC)      Chronic respiratory infection      (Laryngeal cancer (HCC) [C32.9])      (Chronic respiratory infection [J98.8])    Providers: Shahid Cortez MD Responsible Provider: Leydi Hoover MD    Anesthesia Type: general ASA Status: 4            Anesthesia Type: No value filed.    Elida Phase I: Elida Score: 8    Elida Phase II:      Anesthesia Post Evaluation    Patient location during evaluation: PACU  Patient participation: complete - patient participated  Level of consciousness: awake and alert  Pain scale: pain adequately controlled.  Airway patency: patent  Nausea & Vomiting: no nausea and no vomiting  Cardiovascular status: blood pressure returned to baseline  Respiratory status: acceptable  Hydration status: euvolemic  Multimodal analgesia pain management approach  Pain management: adequate    No notable events documented.

## 2025-01-25 LAB
ANION GAP SERPL CALC-SCNC: 13 MMOL/L (ref 7–16)
BASOPHILS # BLD: 0.02 K/UL (ref 0–0.2)
BASOPHILS NFR BLD: 0.1 % (ref 0–2)
BUN SERPL-MCNC: 21 MG/DL (ref 8–23)
CALCIUM SERPL-MCNC: 9.2 MG/DL (ref 8.8–10.2)
CHLORIDE SERPL-SCNC: 99 MMOL/L (ref 98–107)
CO2 SERPL-SCNC: 25 MMOL/L (ref 20–29)
CREAT SERPL-MCNC: 0.62 MG/DL (ref 0.8–1.3)
DIFFERENTIAL METHOD BLD: ABNORMAL
EKG ATRIAL RATE: 119 BPM
EKG DIAGNOSIS: NORMAL
EKG P AXIS: 26 DEGREES
EKG P-R INTERVAL: 148 MS
EKG Q-T INTERVAL: 338 MS
EKG QRS DURATION: 74 MS
EKG QTC CALCULATION (BAZETT): 475 MS
EKG R AXIS: -12 DEGREES
EKG T AXIS: 153 DEGREES
EKG VENTRICULAR RATE: 119 BPM
EOSINOPHIL # BLD: 0 K/UL (ref 0–0.8)
EOSINOPHIL NFR BLD: 0 % (ref 0.5–7.8)
ERYTHROCYTE [DISTWIDTH] IN BLOOD BY AUTOMATED COUNT: 14.6 % (ref 11.9–14.6)
GLUCOSE BLD STRIP.AUTO-MCNC: 134 MG/DL (ref 65–100)
GLUCOSE BLD STRIP.AUTO-MCNC: 151 MG/DL (ref 65–100)
GLUCOSE BLD STRIP.AUTO-MCNC: 154 MG/DL (ref 65–100)
GLUCOSE BLD STRIP.AUTO-MCNC: 184 MG/DL (ref 65–100)
GLUCOSE SERPL-MCNC: 140 MG/DL (ref 70–99)
HCT VFR BLD AUTO: 32.4 % (ref 41.1–50.3)
HGB BLD-MCNC: 9.9 G/DL (ref 13.6–17.2)
IMM GRANULOCYTES # BLD AUTO: 0.09 K/UL (ref 0–0.5)
IMM GRANULOCYTES NFR BLD AUTO: 0.6 % (ref 0–5)
LYMPHOCYTES # BLD: 0.46 K/UL (ref 0.5–4.6)
LYMPHOCYTES NFR BLD: 3 % (ref 13–44)
MAGNESIUM SERPL-MCNC: 1.9 MG/DL (ref 1.8–2.4)
MCH RBC QN AUTO: 25.8 PG (ref 26.1–32.9)
MCHC RBC AUTO-ENTMCNC: 30.6 G/DL (ref 31.4–35)
MCV RBC AUTO: 84.6 FL (ref 82–102)
MONOCYTES # BLD: 0.7 K/UL (ref 0.1–1.3)
MONOCYTES NFR BLD: 4.5 % (ref 4–12)
NEUTS SEG # BLD: 14.14 K/UL (ref 1.7–8.2)
NEUTS SEG NFR BLD: 91.8 % (ref 43–78)
NRBC # BLD: 0 K/UL (ref 0–0.2)
PHOSPHATE SERPL-MCNC: 4.1 MG/DL (ref 2.5–4.5)
PLATELET # BLD AUTO: 329 K/UL (ref 150–450)
PMV BLD AUTO: 10.3 FL (ref 9.4–12.3)
POTASSIUM SERPL-SCNC: 3.7 MMOL/L (ref 3.5–5.1)
RBC # BLD AUTO: 3.83 M/UL (ref 4.23–5.6)
SERVICE CMNT-IMP: ABNORMAL
SODIUM SERPL-SCNC: 138 MMOL/L (ref 136–145)
WBC # BLD AUTO: 15.4 K/UL (ref 4.3–11.1)

## 2025-01-25 PROCEDURE — 84100 ASSAY OF PHOSPHORUS: CPT

## 2025-01-25 PROCEDURE — 6370000000 HC RX 637 (ALT 250 FOR IP)

## 2025-01-25 PROCEDURE — 6370000000 HC RX 637 (ALT 250 FOR IP): Performed by: STUDENT IN AN ORGANIZED HEALTH CARE EDUCATION/TRAINING PROGRAM

## 2025-01-25 PROCEDURE — 2400000000

## 2025-01-25 PROCEDURE — 6360000002 HC RX W HCPCS: Performed by: STUDENT IN AN ORGANIZED HEALTH CARE EDUCATION/TRAINING PROGRAM

## 2025-01-25 PROCEDURE — 2580000003 HC RX 258: Performed by: STUDENT IN AN ORGANIZED HEALTH CARE EDUCATION/TRAINING PROGRAM

## 2025-01-25 PROCEDURE — 82962 GLUCOSE BLOOD TEST: CPT

## 2025-01-25 PROCEDURE — 2500000003 HC RX 250 WO HCPCS: Performed by: STUDENT IN AN ORGANIZED HEALTH CARE EDUCATION/TRAINING PROGRAM

## 2025-01-25 PROCEDURE — 80048 BASIC METABOLIC PNL TOTAL CA: CPT

## 2025-01-25 PROCEDURE — 85025 COMPLETE CBC W/AUTO DIFF WBC: CPT

## 2025-01-25 PROCEDURE — 99233 SBSQ HOSP IP/OBS HIGH 50: CPT | Performed by: STUDENT IN AN ORGANIZED HEALTH CARE EDUCATION/TRAINING PROGRAM

## 2025-01-25 PROCEDURE — 92523 SPEECH SOUND LANG COMPREHEN: CPT

## 2025-01-25 PROCEDURE — 83735 ASSAY OF MAGNESIUM: CPT

## 2025-01-25 PROCEDURE — 2060000000 HC ICU INTERMEDIATE R&B

## 2025-01-25 PROCEDURE — 94761 N-INVAS EAR/PLS OXIMETRY MLT: CPT

## 2025-01-25 PROCEDURE — 36415 COLL VENOUS BLD VENIPUNCTURE: CPT

## 2025-01-25 PROCEDURE — 93010 ELECTROCARDIOGRAM REPORT: CPT | Performed by: INTERNAL MEDICINE

## 2025-01-25 PROCEDURE — 2700000000 HC OXYGEN THERAPY PER DAY

## 2025-01-25 RX ORDER — MAGNESIUM SULFATE IN WATER 40 MG/ML
2000 INJECTION, SOLUTION INTRAVENOUS PRN
Status: DISCONTINUED | OUTPATIENT
Start: 2025-01-25 | End: 2025-01-28 | Stop reason: HOSPADM

## 2025-01-25 RX ORDER — METOPROLOL TARTRATE 25 MG/1
25 TABLET, FILM COATED ORAL 2 TIMES DAILY
Status: DISCONTINUED | OUTPATIENT
Start: 2025-01-25 | End: 2025-01-28 | Stop reason: HOSPADM

## 2025-01-25 RX ORDER — POTASSIUM CHLORIDE 7.45 MG/ML
10 INJECTION INTRAVENOUS PRN
Status: DISCONTINUED | OUTPATIENT
Start: 2025-01-25 | End: 2025-01-28 | Stop reason: HOSPADM

## 2025-01-25 RX ORDER — GLYCOPYRROLATE 0.2 MG/ML
0.2 INJECTION INTRAMUSCULAR; INTRAVENOUS ONCE
Status: COMPLETED | OUTPATIENT
Start: 2025-01-25 | End: 2025-01-25

## 2025-01-25 RX ORDER — CHLORDIAZEPOXIDE HYDROCHLORIDE 5 MG/1
5 CAPSULE, GELATIN COATED ORAL 3 TIMES DAILY
Status: DISCONTINUED | OUTPATIENT
Start: 2025-01-25 | End: 2025-01-27

## 2025-01-25 RX ORDER — SODIUM CHLORIDE FOR INHALATION 3 %
4 VIAL, NEBULIZER (ML) INHALATION PRN
Status: DISCONTINUED | OUTPATIENT
Start: 2025-01-25 | End: 2025-01-28 | Stop reason: HOSPADM

## 2025-01-25 RX ADMIN — WATER 2000 MG: 1 INJECTION INTRAMUSCULAR; INTRAVENOUS; SUBCUTANEOUS at 11:13

## 2025-01-25 RX ADMIN — BACITRACIN: 500 OINTMENT TOPICAL at 11:14

## 2025-01-25 RX ADMIN — ONDANSETRON 4 MG: 2 INJECTION, SOLUTION INTRAMUSCULAR; INTRAVENOUS at 04:26

## 2025-01-25 RX ADMIN — SERTRALINE HYDROCHLORIDE 25 MG: 50 TABLET ORAL at 21:44

## 2025-01-25 RX ADMIN — BACITRACIN: 500 OINTMENT TOPICAL at 21:58

## 2025-01-25 RX ADMIN — METOPROLOL TARTRATE 25 MG: 25 TABLET, FILM COATED ORAL at 21:44

## 2025-01-25 RX ADMIN — HYDROMORPHONE HYDROCHLORIDE 0.5 MG: 1 INJECTION, SOLUTION INTRAMUSCULAR; INTRAVENOUS; SUBCUTANEOUS at 15:05

## 2025-01-25 RX ADMIN — SODIUM CHLORIDE, PRESERVATIVE FREE 10 ML: 5 INJECTION INTRAVENOUS at 00:15

## 2025-01-25 RX ADMIN — SODIUM CHLORIDE, PRESERVATIVE FREE 10 ML: 5 INJECTION INTRAVENOUS at 21:57

## 2025-01-25 RX ADMIN — GLYCOPYRROLATE 0.2 MG: 0.2 INJECTION INTRAMUSCULAR; INTRAVENOUS at 11:13

## 2025-01-25 RX ADMIN — CHLORDIAZEPOXIDE HYDROCHLORIDE 5 MG: 5 CAPSULE ORAL at 21:44

## 2025-01-25 RX ADMIN — CHLORDIAZEPOXIDE HYDROCHLORIDE 5 MG: 5 CAPSULE ORAL at 15:06

## 2025-01-25 RX ADMIN — BACITRACIN: 500 OINTMENT TOPICAL at 06:46

## 2025-01-25 RX ADMIN — CHLORDIAZEPOXIDE HYDROCHLORIDE 5 MG: 5 CAPSULE ORAL at 11:09

## 2025-01-25 RX ADMIN — SODIUM CHLORIDE, POTASSIUM CHLORIDE, SODIUM LACTATE AND CALCIUM CHLORIDE: 600; 310; 30; 20 INJECTION, SOLUTION INTRAVENOUS at 00:23

## 2025-01-25 RX ADMIN — SODIUM CHLORIDE, POTASSIUM CHLORIDE, SODIUM LACTATE AND CALCIUM CHLORIDE: 600; 310; 30; 20 INJECTION, SOLUTION INTRAVENOUS at 20:34

## 2025-01-25 RX ADMIN — WATER 2000 MG: 1 INJECTION INTRAMUSCULAR; INTRAVENOUS; SUBCUTANEOUS at 04:33

## 2025-01-25 RX ADMIN — POLYETHYLENE GLYCOL 3350 17 G: 17 POWDER, FOR SOLUTION ORAL at 11:09

## 2025-01-25 RX ADMIN — WATER 2000 MG: 1 INJECTION INTRAMUSCULAR; INTRAVENOUS; SUBCUTANEOUS at 00:15

## 2025-01-25 ASSESSMENT — PAIN SCALES - GENERAL
PAINLEVEL_OUTOF10: 0
PAINLEVEL_OUTOF10: 7

## 2025-01-25 NOTE — ICUWATCH
RRT Clinical Rounding Nurse Update    Vitals:    01/25/25 0315 01/25/25 0343 01/25/25 0752 01/25/25 0826   BP:  136/78  107/71   Pulse: 100 (!) 104 (!) 115 (!) 107   Resp: 20 20 15 20   Temp:  97.3 °F (36.3 °C)  98.8 °F (37.1 °C)   TempSrc:  Axillary  Oral   SpO2: 100% 98% 95% 98%   Weight:       Height:          ASSESSMENT:  Previous outreach assessment was reviewed.  Patient awake and alert. Oriented x2-3. Respirations unlabored on trach collar. Communicating with spouse and staff via writing. Patient does not remember events from yesterday or last night. VS, labs, and progress notes reviewed. Remote telemetry and continuous pulse oximetry in place.     PLAN:  Will follow per RRT Clinical Rounding Program protocol.    Reyes De Dios RN  [facility]: [phone]  [facility]: [phone]

## 2025-01-25 NOTE — PROGRESS NOTES
Hospitalist Rapid Response or Critical Care Event   Admit Date:  2025 11:10 AM   Name:  Jesus Laguna   Age:  79 y.o.  Sex:  male  :  1945   MRN:  [id number]   Room:  [facility]    Presenting Complaint: No chief complaint on file.    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  Primary RN and Charge RN notice that patient was severely dyspneic.  He was found to be hypoxic with O2 sat of 32%.  Rapid Response was called.  Immediately thereafter, patient lost consciousness and RN could not find a pulse.  CODE BLUE was called.  ROSC was obtained after just a few chest compressions.  No medications were required.  Upon my evaluation at bedside, patient was again alert.  RT suctioned very thick secretions out of trach site.  EKG ordered.  Vitals and O2 sat stabilized.  Wife was notified of event.  Patient was added to ICU outreach list and cardiac monitor was ordered.    Total critical care time spent: 27 minutes.      Objective:   Patient Vitals for the past 24 hrs:   Temp Pulse Resp BP SpO2   25 2351 98.2 °F (36.8 °C) (!) 110 20 111/74 100 %   25 -- (!) 110 20 -- 97 %   253 -- (!) 111 -- -- --   25 -- (!) 119 -- (!) 143/91 99 %   25 -- (!) 112 22 (!) 146/82 98 %   25 -- 100 16 136/74 98 %   25 -- 100 16 135/69 98 %   25 -- (!) 101 18 125/64 --   25 -- 99 22 136/63 96 %   25 -- 100 17 (!) 173/109 96 %   25 -- (!) 101 22 (!) 171/79 --   25 183 -- (!) 102 25 (!) 165/79 100 %   25 -- 97 20 138/64 98 %   25 -- 91 21 (!) 151/56 97 %   25 -- 90 27 131/70 98 %   25 -- 90 23 123/65 97 %   25 -- 91 19 139/69 96 %   25 -- 92 24 129/71 99 %

## 2025-01-25 NOTE — ICUWATCH
RRT Clinical Rounding Nurse Update    Vitals:    01/25/25 0343 01/25/25 0752 01/25/25 0826 01/25/25 1138   BP: 136/78  107/71 109/80   Pulse: (!) 104 (!) 115 (!) 107 (!) 118   Resp: 20 15 20 22   Temp: 97.3 °F (36.3 °C)  98.8 °F (37.1 °C) 97.5 °F (36.4 °C)   TempSrc: Axillary  Oral Oral   SpO2: 98% 95% 98% 98%   Weight:       Height:          ASSESSMENT:  Previous outreach assessment was reviewed. There have been no significant changes since previous assessment.    PLAN:  Will follow per RRT Clinical Rounding Program protocol.    Reyes De Dios RN  [facility]: [phone]  [facility]: [phone]

## 2025-01-25 NOTE — PROGRESS NOTES
Spiritual Health History and Assessment/Progress Note  [facility]    Spiritual/Emotional Needs,  ,  ,      Name: Jesus Laguna MRN: [id number]    Age: 79 y.o.     Sex: male   Language: English   Adventist: Religious   Chronic respiratory infection     Date: 1/25/2025            Total Time Calculated: 15 min              Spiritual Assessment began in [facility] 8 MED SURG        Referral/Consult From: Multi-disciplinary team   Encounter Overview/Reason: Spiritual/Emotional Needs  Service Provided For: Patient and family together    Aleksandra, Belief, Meaning:   Patient identifies as spiritual, is connected with a aleksandra tradition or spiritual practice, and has beliefs or practices that help with coping during difficult times  Family/Friends identify as spiritual, are connected with a aleksandra tradition or spiritual practice, and have beliefs or practices that help with coping during difficult times      Importance and Influence:  Patient has spiritual/personal beliefs that influence decisions regarding their health  Family/Friends have spiritual/personal beliefs that influence decisions regarding the patient's health    Community:  Patient is connected with a spiritual community  Family/Friends are connected with a spiritual community:    Assessment and Plan of Care:     Patient Interventions include: Facilitated expression of thoughts and feelings, Affirmed coping skills/support systems, and Other: Prayer  Family/Friends Interventions include: Other: Prayer    Patient Plan of Care: Spiritual Care available upon further referral  Family/Friends Plan of Care: Spiritual Care available upon further referral    Electronically signed by Chaplain BRIAN on 1/25/2025 at 12:49 PM

## 2025-01-25 NOTE — PROGRESS NOTES
4 Eyes Skin Assessment     NAME:  Jesus Laguna  YOB: 1945  MEDICAL RECORD NUMBER:  [id number]    The patient is being assessed for  Post-Op Surgical    I agree that at least one RN has performed a thorough Head to Toe Skin Assessment on the patient. ALL assessment sites listed below have been assessed.      Areas assessed by both nurses:    Head, Face, Ears, Shoulders, Back, Chest, Arms, Elbows, Hands, Sacrum. Buttock, Coccyx, Ischium, and Legs. Feet and Heels        Does the Patient have a Wound? yes       Torres Prevention initiated by RN: Yes  Wound Care Orders initiated by RN: yes    Pressure Injury (Stage 3,4, Unstageable, DTI, NWPT, and Complex wounds) if present, place Wound referral order by RN under : Yes    New Ostomies, if present place, Ostomy referral order under : No     Nurse 1 eSignature: Electronically signed by Trina Soto RN on 1/25/25 at 5:14 AM EST    **SHARE this note so that the co-signing nurse can place an eSignature**    Nurse 2 eSignature: Electronically signed by Keke Naidu RN on 1/25/25 at 5:20 AM EST

## 2025-01-25 NOTE — PROGRESS NOTES
[facility] Office Note    Patient: Jesus Laguna  MRN: [id number]  : 1945  Gender:  male  Vital Signs: /71   Pulse (!) 107   Temp 98.8 °F (37.1 °C) (Oral)   Resp 20   Ht 1.829 m (6')   Wt 80.7 kg (178 lb)   SpO2 98%   BMI 24.14 kg/m²   Date: 2025    CC: Supraglottic squamous of carcinoma, respiratory failure    HPI:  Jesus Laguna is a 79 y.o. 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  Tracheostomy placed on 2025.  Patient had code event last night:  O2 saturation 32% on vital sign machine. Rapid response activated at 2220. RN promptly began bagging patient. Patient then lost a pulse. Code blue activated at 2221. ROSC obtained after several compressions, prior to code team arriving. Copious thick secretions suctioned from trach.  He is anxious this morning and still has thick secretions from around the trach.    Past Medical History, Past Surgical History, Family history, Social History, and Medications were all reviewed with the patient today and updated as necessary.     Allergies   Allergen Reactions    Zolpidem Other (See Comments) and Hallucinations     Hallucinations     Ambien    Other Reaction(s): HALLUNATIONS    Ativan [Lorazepam] Other (See Comments) and Hallucinations     Increased agitation    Metronidazole      Other Reaction(s): CONSTIPATION, NO APPETITE    Sulfamethoxazole-Trimethoprim Other (See Comments)     Other Reaction(s): Unknown     Patient Active Problem List   Diagnosis    Spinal stenosis, lumbar region, with neurogenic claudication    Lumbar radiculopathy    DDD (degenerative disc disease), lumbar    Laryngeal cancer (HCC)    Squamous cell carcinoma of oropharynx (HCC)    Dehydration    Moderate protein-calorie malnutrition (HCC)    Malignant neoplasm of esophagus (HCC)    Sinus tachycardia    Appetite loss    Hypersalivation    Tracheostomy care (HCC)    Ataxia    Increased tracheal secretions    Immunocompromised  (HCC)    Complication of tracheostomy tube (HCC)    Primary squamous cell carcinoma of supraglottis (HCC)    Encounter for rehabilitation    Impaired functional mobility, balance, gait, and endurance    Decreased independence with activities of daily living    Left sided lacunar infarction (HCC)    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    Pneumonia of right lung due to infectious organism    Pancytopenia (HCC)    Sepsis (HCC)    Vitamin D deficiency    High risk medication use    Aspiration pneumonia, unspecified aspiration pneumonia type, unspecified laterality, unspecified part of lung (HCC)    Dyslipidemia    Fever    Thrombocytopenia, unspecified (HCC)    Personal history of malignant neoplasm of larynx    Pure hypercholesterolemia    AMS (altered mental status)    Acute encephalopathy    Gastrostomy status (HCC)    Chronic respiratory infection     Current Facility-Administered Medications   Medication Dose Route Frequency    chlordiazePOXIDE (LIBRIUM) capsule 5 mg  5 mg Per G Tube TID    aspirin chewable tablet 81 mg  81 mg Oral Daily    sertraline (ZOLOFT) tablet 25 mg  25 mg Per G Tube Nightly    vitamin C tablet 250 mg  250 mg Oral Daily with breakfast    sodium chloride flush 0.9 % injection 5-40 mL  5-40 mL IntraVENous 2 times per day    sodium chloride flush 0.9 % injection 5-40 mL  5-40 mL IntraVENous PRN    0.9 % sodium chloride infusion   IntraVENous PRN    polyethylene glycol (GLYCOLAX) packet 17 g  17 g Per G Tube BID    ondansetron (ZOFRAN-ODT) disintegrating tablet 4 mg  4 mg Oral Q8H PRN    Or    ondansetron (ZOFRAN) injection 4 mg  4 mg IntraVENous Q6H PRN    enoxaparin (LOVENOX) injection 40 mg  40 mg SubCUTAneous Daily    bacitracin ointment   Topical Q8H    ceFAZolin (ANCEF) 2,000 mg in sterile water 20 mL IV syringe  2,000 mg IntraVENous Q8H    HYDROmorphone HCl PF (DILAUDID) injection 0.25 mg  0.25 mg IntraVENous Q3H PRN    Or    HYDROmorphone HCl PF (DILAUDID)  Negative for agitation, behavioral problems and decreased concentration.           PHYSICAL EXAM:    /71   Pulse (!) 107   Temp 98.8 °F (37.1 °C) (Oral)   Resp 20   Ht 1.829 m (6')   Wt 80.7 kg (178 lb)   SpO2 98%   BMI 24.14 kg/m²     General: NAD, well-appearing  Neuro: No gross neuro deficits. CN's II-XII intact. No facial weakness.  Eyes: EOMI. Pupils reactive. No periorbital edema/ecchymosis.   Skin: No facial erythema, rashes or concerning lesions.  Nose: No external deviations or saddling. Intranasally, septum is midline without perforations, nasal mucosa appears healthy with no erythema, mucopurulence, or polyps.  Mouth: Moist mucus membranes, normal tongue/palate mobility, no concerning mucosal lesions.   Oropharynx: clear with no erythema/exudate, no tonsillar hypertrophy.  Ears: Normal appearing auricles, no hematomas. EACs clear with no cerumen impaction, healthy canal skin, TM's intact with no perforations or retraction pockets. No middle ear effusions.  Neck: #6 distal Shiley XLT trach in place, cuff let down, copious secretions suctioned from trach, trach sutures and local collar in place, inner cannula cleaned and replaced.  Humidified trach collar  Lymphatics: No palpable cervical LAD.  Resp/Lungs: No audible stridor or wheezing, CTAB  Heart: RRR  Extremities: No clubbing or cyanosis.    Lab Results   Component Value Date    WBC 15.4 (H) 01/25/2025    HGB 9.9 (L) 01/25/2025    HCT 32.4 (L) 01/25/2025    MCV 84.6 01/25/2025     01/25/2025     Lab Results   Component Value Date    WBC 15.4 (H) 01/25/2025    HGB 9.9 (L) 01/25/2025    HCT 32.4 (L) 01/25/2025    MCV 84.6 01/25/2025     01/25/2025      Latest Reference Range & Units 01/21/25 10:54   Albumin 3.2 - 4.6 g/dL 2.5 (L)   Globulin 2.3 - 3.5 g/dL 5.2 (H)   Albumin/Globulin Ratio 1.0 - 1.9   0.5 (L)   Alkaline Phosphatase 40 - 129 U/L 80   ALT 8 - 55 U/L 21   AST 15 - 37 U/L 22   Total Bilirubin 0.0 - 1.2 MG/DL 0.3

## 2025-01-25 NOTE — PROGRESS NOTES
Assisted pt to phone call to wife Rosalinda, patient calm after speaking with wife, care of plan ongoing.

## 2025-01-25 NOTE — PROGRESS NOTES
Primary RN, Trina, called this RN to assist with patient. This RN immediately responded to patient's room and noted patient to be in acute respiratory distress. O2 saturation 32% on vital sign machine. Rapid response activated at 2220. RN promptly began bagging patient. Patient then lost a pulse. Code blue activated at 2221. ROSC obtained after several compressions, prior to code team arriving. Copious thick secretions suctioned from trach. Patient stabilized at this time, per Maciel Gomez and Alannah. Trach care completed by Issac TELLES. Patient to remain on the floor for continued care.

## 2025-01-25 NOTE — CONSULTS
[facility] Hospitalist Consult   Admit Date:  2025 11:10 AM   Name:  Jesus Laguna   Age:  79 y.o.  Sex:  male  :  1945   MRN:  [id number]   Room:  [facility]    Presenting/Chief Complaint: No chief complaint on file.    Reason(s) for Admission: Laryngeal cancer (HCC) [C32.9]  Chronic respiratory infection [J98.8]  Primary squamous cell carcinoma of supraglottis (HCC) [C32.1]     Hospitalists consulted by Shahid Cortez MD for: Anxiety.  Code event last night, general medical care    History of Presenting Illness:   Jesus Laguna is a 79 y.o. 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  Medicine was consulted for anxiety, code event from last night 2025 and general medical care.    Patient was seen sitting in bed resting comfortably.  Occasional cough brought up white sputum patient was able to suction tracheostomy appropriately.  Overnight, patient was severely dyspneic.  His oxygen saturation dropped to 32%.  Then patient lost consciousness and CODE BLUE was called.  ROSC was obtained after a few chest compressions, no medication was administered.  Respiratory therapy suctioned very thick secretions out of tracheostomy site EKG was ordered and normal.    Patient was able to speak somewhat but would become dyspneic at times.    Medicine will follow.    Assessment & Plan:   Laryngeal cancer  Primary squamous cell carcinoma of supraglottis  Chronic respiratory infection  - Management per primary team    Moderate protein calorie malnutrition  S/p percutaneous endoscopic gastrotomy tube placement  - Etiology likely secondary to primary disease  - Tube feeds per dietary recommendation.    Hypersalivation  - Inhaled  encounter: 80.7 kg (178 lb).    Intake/Output Summary (Last 24 hours) at 1/25/2025 1243  Last data filed at 1/25/2025 0701  Gross per 24 hour   Intake 700 ml   Output 535 ml   Net 165 ml         Physical Exam:  Physical Exam  Constitutional:       Appearance: He is obese. He is not ill-appearing, toxic-appearing or diaphoretic.   HENT:      Head: Normocephalic and atraumatic.      Right Ear: External ear normal.      Left Ear: External ear normal.      Nose: Nose normal.      Mouth/Throat:      Mouth: Mucous membranes are moist.   Eyes:      General: No scleral icterus.     Extraocular Movements: Extraocular movements intact.   Neck:      Comments: Tracheostomy site inspected, no obvious signs of infection  Cardiovascular:      Rate and Rhythm: Regular rhythm. Tachycardia present.      Pulses: Normal pulses.      Heart sounds: No murmur heard.  Pulmonary:      Effort: No respiratory distress.      Breath sounds: No wheezing.   Abdominal:      General: Abdomen is flat. There is no distension.      Palpations: Abdomen is soft.      Tenderness: There is no abdominal tenderness. There is no guarding.   Musculoskeletal:      Right lower leg: No edema.      Left lower leg: No edema.   Skin:     General: Skin is warm and dry.      Capillary Refill: Capillary refill takes less than 2 seconds.   Neurological:      General: No focal deficit present.      Mental Status: He is alert.   Psychiatric:         Mood and Affect: Mood normal.         Thought Content: Thought content normal.          I have personally reviewed labs and tests showing:  Recent Labs:  Recent Results (from the past 24 hour(s))   POCT Glucose    Collection Time: 01/24/25  8:56 PM   Result Value Ref Range    POC Glucose 170 (H) 65 - 100 mg/dL    Performed by: Manoj    EKG 12 Lead    Collection Time: 01/24/25 10:32 PM   Result Value Ref Range    Ventricular Rate 119 BPM    Atrial Rate 119 BPM    P-R Interval 148 ms    QRS Duration 74 ms    Q-T  Collection Time: 01/25/25  5:35 AM   Result Value Ref Range    Magnesium 1.9 1.8 - 2.4 mg/dL   Phosphorus    Collection Time: 01/25/25  5:35 AM   Result Value Ref Range    Phosphorus 4.1 2.5 - 4.5 MG/DL   POCT Glucose    Collection Time: 01/25/25  8:27 AM   Result Value Ref Range    POC Glucose 151 (H) 65 - 100 mg/dL    Performed by: JenniferSUSAN    POCT Glucose    Collection Time: 01/25/25 11:38 AM   Result Value Ref Range    POC Glucose 154 (H) 65 - 100 mg/dL    Performed by: JenniferSUSAN        No results for input(s): \"COVID19\" in the last 72 hours.    I have personally reviewed imaging studies showing:  No results found.      Echocardiogram:  09/05/23    ECHO (TTE) COMPLETE (CONTRAST/BUBBLE/3D PRN) 09/06/2023  4:37 PM (Final)    Interpretation Summary    Left Ventricle: Normal left ventricular systolic function with a visually estimated EF of 65 - 70%. Left ventricle size is normal. Normal wall thickness. Normal wall motion. Normal diastolic function. Average E/e' ratio is 7.49.    Pericardium: No pericardial effusion.    Signed by: Shahid vIerson MD on 9/6/2023  4:37 PM      Current Facility-Administered Medications   Medication Dose Route Frequency    chlordiazePOXIDE (LIBRIUM) capsule 5 mg  5 mg Per G Tube TID    sodium chloride (Inhalant) 3 % nebulizer solution 4 mL  4 mL Nebulization PRN    aspirin chewable tablet 81 mg  81 mg Oral Daily    sertraline (ZOLOFT) tablet 25 mg  25 mg Per G Tube Nightly    vitamin C tablet 250 mg  250 mg Oral Daily with breakfast    sodium chloride flush 0.9 % injection 5-40 mL  5-40 mL IntraVENous 2 times per day    sodium chloride flush 0.9 % injection 5-40 mL  5-40 mL IntraVENous PRN    0.9 % sodium chloride infusion   IntraVENous PRN    polyethylene glycol (GLYCOLAX) packet 17 g  17 g Per G Tube BID    ondansetron (ZOFRAN-ODT) disintegrating tablet 4 mg  4 mg Oral Q8H PRN    Or    ondansetron (ZOFRAN) injection 4 mg  4 mg IntraVENous Q6H PRN    enoxaparin  (LOVENOX) injection 40 mg  40 mg SubCUTAneous Daily    bacitracin ointment   Topical Q8H    HYDROmorphone HCl PF (DILAUDID) injection 0.25 mg  0.25 mg IntraVENous Q3H PRN    Or    HYDROmorphone HCl PF (DILAUDID) injection 0.5 mg  0.5 mg IntraVENous Q3H PRN    lactated ringers infusion   IntraVENous Continuous       Signed:  Fredy Cazares DO    Part of this note may have been written by using a voice dictation software.  The note has been proof read but may still contain some grammatical/other typographical errors.

## 2025-01-25 NOTE — PROGRESS NOTES
Patient arrived to floor, nonverbal at baseline, no pain or distress, pt anxious to find mobile cell phone and call wife noted respirations are even and unlabored on 8L via trach, intermittent coughing, Skin is WDL, pt wants to speak to wife, no other needs stated, call light is within reach.

## 2025-01-25 NOTE — ICUWATCH
Rapid Response Team Note      Subjective: Responded to Rapid Response secondary to respiratory distress.    Summary: Pt's trach plugged off with desat to 32%. RN was bagging pt and pt briefly lost pulse- RR changed to code blue. On my arrival, pt was alert and trying to sit up in bed. Copious thick secretions suctioned from trach and pt placed on trach collar with O2 sat 100%. EKG- 's. Pt placed on remote tele. DIS 40.      See Rapid Response/Code Blue Narrator for full documentation    Outcome: Patient to remain in current location. Will follow-up per Rapid Response Team Clinical Rounding protocol.      Almaz Hawkins RN  [facility]: [phone]  [facility]: [phone]

## 2025-01-25 NOTE — CONSULTS
Nutrition Assessment  Assessment Type: Initial, Consult  Reason for visit:  Tube Feeding Management (otolaryngology)  Malnutrition Screening Tool Score: 1    Nutrition Intervention:   Food and/or Nutrient Delivery:   Enteral Nutrition:   Enteral Access: PEG  Initiate  Formula: Other Tube Feeding (isosource 1.5 and boost VHC)  Goal Rate: Bolus   Initiate  Water flush  120 ml before and after each bolus  Modulars: None not indicated at this time   Enteral regimen at above goal to provide per 24 hours:  1998 calories, 87 grams protein and 1516ml free fluid.    Above regimen: Intended to meet macronutrient goals  Labs:   Basic Metabolic Panel, Magnesium and Phosphorus active per nutrition parameters  POC Glucoses/SSI Active   Nutrition Related Medication Management:  Electrolyte Replacement:   Initiate prn protocol Magnesium, Potassium, and Phosphorus   Intravenous fluids:  Continue per provider order  Thiamine Not indicated  Bowel Regimen Per MD  Meals and Snacks:  Diet:  Continue NPO     Malnutrition Assessment:  Academy/A.S.P.E.N Clinical Malnutrition Criteria  Malnutrition Status: At risk for malnutrition    Nutrition Focused Physical Exam: Only remarkable for clavicle wasting    Nutrition Assessment:  Food/Nutrition Related History: Hx provided by wife over the phone. Pt is TF dependent at baseline. Wife reports home TF regimen is 1 bottle Boost VHC and 1/2 bottle isosource 1.5 with 120ml FWF before and after bolus at breakfast and lunch. She stated at supper he receives 1 1/2 bottle of isosource 1.5 with 120ml FWF before and after. Wife reports she tries to keep his TF intake around 2000 calories per day. Home TF regimen provides 1998 kcal, 87g pro, and 1516 ml water.      Do You Have Any Cultural, Catholic, or Ethnic Food Preferences?: Yes (Comment)   Weight History: Pt reports his UBW is ~185lb. Per EMR wt hx review of oncology office visits 2/1 167lb, 7/8 180lb, 10/22 185lb, 12/3 178lb, 1/9 177lb, 1/21 176lb.

## 2025-01-25 NOTE — PROGRESS NOTES
GOALS:  LTG: Patient will increase receptive/expressive language skills demonstrated by the ability to communicate basic wants/needs across environments.   STG:  Patient will utilize written communication, gestures, picture communication to express wants and needs with min cues and 80% accuracy as he continues to work to tolerate the speaking valve for verbal expression .     LTG: Patient will tolerate passy jason speaking valve to communicate wants and needs and improve swallow safety  STG:  Patient will tolerate 5 minutes continual wear of speaking valve without respiratory decline.  Patient will verbalize safety precautions related to use of passy jason speaking valve with 100% accuracy.  Patient and/or family member/caregiver/staff will demonstrate independence with donning and doffing speaking valve.  Patient will participate in dysphagia assessment in efforts to identify safest, least restrictive oral diet.    SPEECH LANGUAGE PATHOLOGY: Passy Gatzke Speaking Valve  Initial Assessment    Acknowledge Order  I  Therapy Time  I   Charges     I  Rehab Caseload Tracker    NAME: Jesus Laguna  : 1945  MRN: [id number]    ADMISSION DATE: 2025  PRIMARY DIAGNOSIS: Chronic respiratory infection    ICD-10: Treatment Diagnosis: R13.12 Dysphagia, Oropharyngeal Phase  R41.841 Cognitive-Communication Deficit  R49.1 Aphonia; Loss of voice    RECOMMENDATIONS   Recommendations:  Patient to remain NPO and continue PMV trials with speech therapy only until patient is more consistently able to tolerate without signs and symptoms of respiratory distress.      Therapeutic Intervention: Patient/family education  Language treatment  Cognitive-linguistic treatment     Patient continues to require skilled intervention:  Yes. Recommend ongoing speech therapy services during this hospitalization.   Anticipated Discharge Needs: Ongoing speech therapy is recommended at next level of care.      ASSESSMENT    Patient presents with  (Bilateral); heent; heent; Mouth surgery (08/03/2010); IR FLUORO GUIDED GASTROSTOMY TUBE INSERTION PERC W CONTRAST (9/8/2023); tracheostomy (N/A, 9/8/2023); and IR PORT PLACEMENT > 5 YEARS (10/19/2023).  Precautions/Allergies: Zolpidem, Ativan [lorazepam], Metronidazole, and Sulfamethoxazole-trimethoprim     Observations:  Alertness: Alert  Speech: appears to have good articulation but unintelligible due to trach  Expressive Language: Fluent, Able to communicate wants and needs, and needs mod cues to utilize his notebook with poor awareness that others cannot discern what he is verbalizing.   Receptive Language: Answers yes/no questions and Follows basic commands    Prior Dysphagia History: last seen at this facility in May 2024 with recommendations for NPO with PEG with ice chips and small sips of water following oral care for comfort.   Prior Instrumental Assessment: 5/21/24    Current Diet:  strict NPO       Respiratory Status: Trach collar    Pain:     Pain intervention: None- No pain observed  Pain response: Patient satisfied    OBJECTIVE   Patient seen upright in bed with this son present and with respiratory therapy present providing background information regarding patient's code from the previous night and need for frequent suctioning today with infection and copious secretions.   Patient used gesture and verbalized largely unintelligible responses. Cued by his son to use his notebook to communicate with what appeared to be poor awareness that is verbalizations were difficult to understand. Patient needed mod cues and some hand over hand assist to get around the mask to use finger occlusion. Patient was able to produce strong, clear voicing with finger occlusion. Patient's son reporting that patient has experience with PMV but has experienced trouble with it in the past due to chronic, increased secretions. Patient declined to try the PMV this date but is agreeable to trying it tomorrow or Monday.          PLAN    Duration/Frequency: Continue to follow patient 4x/week for duration of hospitalization and/or until goals met    Rehabilitation Potential For Stated Goals: Good    Interdisciplinary Collaboration: RN/ PCT and RT    Medical Necessity    Patient is expected to demonstrate progress in phonation/respiration coordination and tolerance to speaking valve to improve functional communication.  Patient is expected to demonstrate progress in expressive communication through use of alternative means of communication such as writing, use of gestures, and/ or use of communication board to decrease assistance required communication, increase communication with family/caregivers.     Education:   Patient educated on Results of evaluation, Role of speech therapy, SLP recommendations, and SLP plan  Education provided to Patient, Family/Caregiver, and RN  Education response: Demonstrates understanding and Needs reinforcement    Safety:   Call light within reach  In Bed  RN notified   Family/visitors at bedside    Therapy Time:  Time In: 1517  Time Out: 1530  Minutes: 13    AISHA LANDRY  1/25/2025 5:22 PM

## 2025-01-26 LAB
ANION GAP SERPL CALC-SCNC: 13 MMOL/L (ref 7–16)
BUN SERPL-MCNC: 19 MG/DL (ref 8–23)
CALCIUM SERPL-MCNC: 9.2 MG/DL (ref 8.8–10.2)
CHLORIDE SERPL-SCNC: 100 MMOL/L (ref 98–107)
CO2 SERPL-SCNC: 27 MMOL/L (ref 20–29)
CREAT SERPL-MCNC: 0.64 MG/DL (ref 0.8–1.3)
GLUCOSE SERPL-MCNC: 138 MG/DL (ref 70–99)
MAGNESIUM SERPL-MCNC: 1.8 MG/DL (ref 1.8–2.4)
PHOSPHATE SERPL-MCNC: 2.7 MG/DL (ref 2.5–4.5)
POTASSIUM SERPL-SCNC: 3.5 MMOL/L (ref 3.5–5.1)
SODIUM SERPL-SCNC: 139 MMOL/L (ref 136–145)

## 2025-01-26 PROCEDURE — 2580000003 HC RX 258: Performed by: STUDENT IN AN ORGANIZED HEALTH CARE EDUCATION/TRAINING PROGRAM

## 2025-01-26 PROCEDURE — 6360000002 HC RX W HCPCS: Performed by: INTERNAL MEDICINE

## 2025-01-26 PROCEDURE — 94761 N-INVAS EAR/PLS OXIMETRY MLT: CPT

## 2025-01-26 PROCEDURE — 87205 SMEAR GRAM STAIN: CPT

## 2025-01-26 PROCEDURE — 6360000002 HC RX W HCPCS: Performed by: FAMILY MEDICINE

## 2025-01-26 PROCEDURE — 6360000002 HC RX W HCPCS: Performed by: STUDENT IN AN ORGANIZED HEALTH CARE EDUCATION/TRAINING PROGRAM

## 2025-01-26 PROCEDURE — 80048 BASIC METABOLIC PNL TOTAL CA: CPT

## 2025-01-26 PROCEDURE — 87070 CULTURE OTHR SPECIMN AEROBIC: CPT

## 2025-01-26 PROCEDURE — 36415 COLL VENOUS BLD VENIPUNCTURE: CPT

## 2025-01-26 PROCEDURE — 99233 SBSQ HOSP IP/OBS HIGH 50: CPT | Performed by: STUDENT IN AN ORGANIZED HEALTH CARE EDUCATION/TRAINING PROGRAM

## 2025-01-26 PROCEDURE — 2500000003 HC RX 250 WO HCPCS: Performed by: STUDENT IN AN ORGANIZED HEALTH CARE EDUCATION/TRAINING PROGRAM

## 2025-01-26 PROCEDURE — 87186 SC STD MICRODIL/AGAR DIL: CPT

## 2025-01-26 PROCEDURE — 2060000000 HC ICU INTERMEDIATE R&B

## 2025-01-26 PROCEDURE — 83735 ASSAY OF MAGNESIUM: CPT

## 2025-01-26 PROCEDURE — 87077 CULTURE AEROBIC IDENTIFY: CPT

## 2025-01-26 PROCEDURE — 2500000003 HC RX 250 WO HCPCS: Performed by: FAMILY MEDICINE

## 2025-01-26 PROCEDURE — 6370000000 HC RX 637 (ALT 250 FOR IP)

## 2025-01-26 PROCEDURE — 6370000000 HC RX 637 (ALT 250 FOR IP): Performed by: STUDENT IN AN ORGANIZED HEALTH CARE EDUCATION/TRAINING PROGRAM

## 2025-01-26 PROCEDURE — 2700000000 HC OXYGEN THERAPY PER DAY

## 2025-01-26 PROCEDURE — 6370000000 HC RX 637 (ALT 250 FOR IP): Performed by: INTERNAL MEDICINE

## 2025-01-26 PROCEDURE — 2500000003 HC RX 250 WO HCPCS: Performed by: INTERNAL MEDICINE

## 2025-01-26 PROCEDURE — 84100 ASSAY OF PHOSPHORUS: CPT

## 2025-01-26 RX ORDER — QUETIAPINE FUMARATE 25 MG/1
25 TABLET, FILM COATED ORAL 2 TIMES DAILY
Status: DISCONTINUED | OUTPATIENT
Start: 2025-01-26 | End: 2025-01-26

## 2025-01-26 RX ORDER — QUETIAPINE FUMARATE 25 MG/1
25 TABLET, FILM COATED ORAL 2 TIMES DAILY PRN
Status: DISCONTINUED | OUTPATIENT
Start: 2025-01-26 | End: 2025-01-26

## 2025-01-26 RX ORDER — QUETIAPINE FUMARATE 25 MG/1
25 TABLET, FILM COATED ORAL 2 TIMES DAILY
Status: DISCONTINUED | OUTPATIENT
Start: 2025-01-26 | End: 2025-01-28 | Stop reason: HOSPADM

## 2025-01-26 RX ADMIN — BACITRACIN: 500 OINTMENT TOPICAL at 20:19

## 2025-01-26 RX ADMIN — SODIUM CHLORIDE, PRESERVATIVE FREE 5 ML: 5 INJECTION INTRAVENOUS at 20:17

## 2025-01-26 RX ADMIN — SODIUM CHLORIDE, PRESERVATIVE FREE 10 ML: 5 INJECTION INTRAVENOUS at 08:08

## 2025-01-26 RX ADMIN — CHLORDIAZEPOXIDE HYDROCHLORIDE 5 MG: 5 CAPSULE ORAL at 08:08

## 2025-01-26 RX ADMIN — CHLORDIAZEPOXIDE HYDROCHLORIDE 5 MG: 5 CAPSULE ORAL at 20:17

## 2025-01-26 RX ADMIN — BACITRACIN: 500 OINTMENT TOPICAL at 06:31

## 2025-01-26 RX ADMIN — POLYETHYLENE GLYCOL 3350 17 G: 17 POWDER, FOR SOLUTION ORAL at 20:17

## 2025-01-26 RX ADMIN — METOPROLOL TARTRATE 25 MG: 25 TABLET, FILM COATED ORAL at 08:08

## 2025-01-26 RX ADMIN — POLYETHYLENE GLYCOL 3350 17 G: 17 POWDER, FOR SOLUTION ORAL at 08:08

## 2025-01-26 RX ADMIN — QUETIAPINE FUMARATE 25 MG: 25 TABLET ORAL at 16:52

## 2025-01-26 RX ADMIN — SERTRALINE HYDROCHLORIDE 25 MG: 50 TABLET ORAL at 20:16

## 2025-01-26 RX ADMIN — OLANZAPINE 10 MG: 10 INJECTION, POWDER, FOR SOLUTION INTRAMUSCULAR at 02:36

## 2025-01-26 RX ADMIN — ENOXAPARIN SODIUM 40 MG: 100 INJECTION SUBCUTANEOUS at 09:10

## 2025-01-26 RX ADMIN — ASPIRIN 81 MG: 81 TABLET, CHEWABLE ORAL at 08:08

## 2025-01-26 RX ADMIN — SODIUM CHLORIDE, POTASSIUM CHLORIDE, SODIUM LACTATE AND CALCIUM CHLORIDE: 600; 310; 30; 20 INJECTION, SOLUTION INTRAVENOUS at 06:50

## 2025-01-26 RX ADMIN — BACITRACIN: 500 OINTMENT TOPICAL at 13:11

## 2025-01-26 RX ADMIN — SODIUM CHLORIDE, POTASSIUM CHLORIDE, SODIUM LACTATE AND CALCIUM CHLORIDE: 600; 310; 30; 20 INJECTION, SOLUTION INTRAVENOUS at 17:01

## 2025-01-26 RX ADMIN — CHLORDIAZEPOXIDE HYDROCHLORIDE 5 MG: 5 CAPSULE ORAL at 13:16

## 2025-01-26 RX ADMIN — WATER 10 MG: 1 INJECTION INTRAMUSCULAR; INTRAVENOUS; SUBCUTANEOUS at 05:03

## 2025-01-26 RX ADMIN — METOPROLOL TARTRATE 25 MG: 25 TABLET, FILM COATED ORAL at 20:17

## 2025-01-26 RX ADMIN — Medication 250 MG: at 08:08

## 2025-01-26 RX ADMIN — WATER 5 MG: 1 INJECTION INTRAMUSCULAR; INTRAVENOUS; SUBCUTANEOUS at 14:47

## 2025-01-26 ASSESSMENT — PAIN SCALES - GENERAL: PAINLEVEL_OUTOF10: 0

## 2025-01-26 NOTE — PROGRESS NOTES
Pt becoming increasingly agitated and combative. Resisting trach suctioning/cleaning. Will not allow staff to change soiled clothes. Pulling at monitors and lines. Dr Sandhu notified via [employer] [employer].

## 2025-01-26 NOTE — ICUWATCH
RRT Clinical Rounding Nurse Update    Vitals:    01/25/25 1138 01/25/25 1656 01/25/25 1923 01/25/25 2144   BP: 109/80 139/77 135/77 (!) 172/89   Pulse: (!) 118 97 100 (!) 112   Resp: 22 20     Temp: 97.5 °F (36.4 °C) 98.6 °F (37 °C) 97.9 °F (36.6 °C)    TempSrc: Oral Oral     SpO2: 98% 98% 100%    Weight:       Height:            ASSESSMENT:  Previous outreach assessment was reviewed. There have been no significant clinical changes since the completion of the last dated Outreach assessment.    PLAN:  Will follow per RRT Clinical Rounding Program protocol.    Scott Draper RN  [place]: [phone]

## 2025-01-26 NOTE — CARE COORDINATION
MSN, CM:  patient and family with discussion of care; Hospice vs palliative care.  Patient lives with  in own home with a ramp for entrance.  Patient is dependent with all ADL's and patient is bed rest at this time.  Case Management will continue to follow.       01/26/25 1141   Service Assessment   Patient Orientation Alert and Oriented;Self   Cognition Alert   History Provided By Spouse;Medical Record   Primary Caregiver Spouse   Support Systems Spouse/Significant Other;Family Members   Patient's Healthcare Decision Maker is: Legal Next of Kin   PCP Verified by CM Yes   Last Visit to PCP Within last 6 months   Prior Functional Level Assistance with the following:;Bathing;Dressing;Toileting;Feeding;Cooking;Housework;Shopping;Mobility   Current Functional Level Assistance with the following:;Bathing;Dressing;Toileting;Feeding;Cooking;Housework;Shopping;Mobility   Can patient return to prior living arrangement Yes   Ability to make needs known: Fair   Family able to assist with home care needs: Yes   Would you like for me to discuss the discharge plan with any other family members/significant others, and if so, who? Yes  (family)   Financial Resources Medicare;Other (Comment)  (BCBS supp)   Community Resources None   Social/Functional History   Lives With Spouse   Type of Home House   Home Layout One level   Home Access Ramped entrance   Bathroom Toilet Bedside commode   Bathroom Equipment 3-in-1 Commode   Bathroom Accessibility Accessible   Home Equipment Walker - Rolling;Wheelchair - Manual   Receives Help From Family   Prior Level of Assist for ADLs Needs assistance   Prior Level of Assist for Homemaking Needs assistance   Homemaking Responsibilities Yes   Ambulation Assistance Needs assistance   Prior Level of Assist for Transfers Needs assistance   Active  No   Patient's  Info family   Occupation Retired   Discharge Planning   Type of Residence House   Living Arrangements Spouse/Significant  Other   Current Services Prior To Admission Durable Medical Equipment   Current DME Prior to Arrival Bedside Commode;Wheelchair;Walker;Shower Chair   DME Ordered? No   Potential Assistance Purchasing Medications No   Type of Home Care Services None   Patient expects to be discharged to: House   One/Two Story Residence One story   History of falls? 1

## 2025-01-26 NOTE — PROGRESS NOTES
May ENT Office Note    Patient: Jesus Laguna  MRN: [id number]  : 1945  Gender:  male  Vital Signs: /67   Pulse (!) 123   Temp 99 °F (37.2 °C) (Axillary)   Resp 22   Ht 1.829 m (6')   Wt 79.1 kg (174 lb 6.4 oz)   SpO2 99%   BMI 23.65 kg/m²   Date: 2025    CC: Supraglottic squamous of carcinoma, respiratory failure     HPI:  Jesus Laguna is a 79 y.o. 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  Tracheostomy placed on 2025.  Patient had a code event in the early morning hours of . ROSC obtained after several compressions.  Code event thought to be brought about by mucous plugging.  Patient given Librium for anxiety.  No acute events last night.      Past Medical History, Past Surgical History, Family history, Social History, and Medications were all reviewed with the patient today and updated as necessary.     Allergies   Allergen Reactions    Zolpidem Other (See Comments) and Hallucinations     Hallucinations     Ambien    Other Reaction(s): HALLUNATIONS    Ativan [Lorazepam] Other (See Comments) and Hallucinations     Increased agitation    Metronidazole      Other Reaction(s): CONSTIPATION, NO APPETITE    Sulfamethoxazole-Trimethoprim Other (See Comments)     Other Reaction(s): Unknown     Patient Active Problem List   Diagnosis    Spinal stenosis, lumbar region, with neurogenic claudication    Lumbar radiculopathy    DDD (degenerative disc disease), lumbar    Laryngeal cancer (HCC)    Squamous cell carcinoma of oropharynx (HCC)    Dehydration    Moderate protein-calorie malnutrition (HCC)    Malignant neoplasm of esophagus (HCC)    Sinus tachycardia    Appetite loss    Hypersalivation    Tracheostomy care (HCC)    Ataxia    Increased tracheal secretions    Immunocompromised (HCC)    Complication of tracheostomy tube (HCC)    Primary squamous cell carcinoma of supraglottis (HCC)    Encounter for rehabilitation    Impaired

## 2025-01-26 NOTE — ICUWATCH
RRT Clinical Rounding Nurse Update    Vitals:    01/25/25 2144 01/25/25 2316 01/25/25 2326 01/26/25 0351   BP: (!) 172/89  126/72 118/71   Pulse: (!) 112 87 87 (!) 121   Resp:  18     Temp:   98.8 °F (37.1 °C) 98.1 °F (36.7 °C)   TempSrc:       SpO2:  94% 100% 94%   Weight:       Height:            ASSESSMENT:  Previous outreach assessment was reviewed.  Patient agitated this morning requesting to leave. Patient has received 10mg IM zyprexa, and received a second dose on my exam. Respiratory status unchanged.    PLAN:  Will follow per RRT Clinical Rounding Program protocol.    Scott Draper RN  [place]: [phone]

## 2025-01-26 NOTE — PROGRESS NOTES
Patient is bed, nonverbal at baseline, no pain or distress, respirations are even and unlabored on 9L via trach, intermittent coughing, other needs stated, call light is within reach. Family at bedside

## 2025-01-26 NOTE — PROGRESS NOTES
Pt continues to cough up copious thick secretions from trach requiring frequent site cleansing/suction Foam protection around trach. Skin barrier cream applied to chest.

## 2025-01-27 DIAGNOSIS — F41.9 ANXIETY: Primary | ICD-10-CM

## 2025-01-27 LAB
ANION GAP SERPL CALC-SCNC: 12 MMOL/L (ref 7–16)
BUN SERPL-MCNC: 15 MG/DL (ref 8–23)
CALCIUM SERPL-MCNC: 8.5 MG/DL (ref 8.8–10.2)
CHLORIDE SERPL-SCNC: 102 MMOL/L (ref 98–107)
CO2 SERPL-SCNC: 25 MMOL/L (ref 20–29)
CREAT SERPL-MCNC: 0.47 MG/DL (ref 0.8–1.3)
GLUCOSE SERPL-MCNC: 116 MG/DL (ref 70–99)
MAGNESIUM SERPL-MCNC: 1.7 MG/DL (ref 1.8–2.4)
PHOSPHATE SERPL-MCNC: 2.1 MG/DL (ref 2.5–4.5)
POTASSIUM SERPL-SCNC: 3.5 MMOL/L (ref 3.5–5.1)
SODIUM SERPL-SCNC: 139 MMOL/L (ref 136–145)

## 2025-01-27 PROCEDURE — 84100 ASSAY OF PHOSPHORUS: CPT

## 2025-01-27 PROCEDURE — 83735 ASSAY OF MAGNESIUM: CPT

## 2025-01-27 PROCEDURE — 2580000003 HC RX 258: Performed by: STUDENT IN AN ORGANIZED HEALTH CARE EDUCATION/TRAINING PROGRAM

## 2025-01-27 PROCEDURE — 31502 CHANGE OF WINDPIPE AIRWAY: CPT | Performed by: STUDENT IN AN ORGANIZED HEALTH CARE EDUCATION/TRAINING PROGRAM

## 2025-01-27 PROCEDURE — 2700000000 HC OXYGEN THERAPY PER DAY

## 2025-01-27 PROCEDURE — 94761 N-INVAS EAR/PLS OXIMETRY MLT: CPT

## 2025-01-27 PROCEDURE — 6370000000 HC RX 637 (ALT 250 FOR IP)

## 2025-01-27 PROCEDURE — 36415 COLL VENOUS BLD VENIPUNCTURE: CPT

## 2025-01-27 PROCEDURE — 6370000000 HC RX 637 (ALT 250 FOR IP): Performed by: STUDENT IN AN ORGANIZED HEALTH CARE EDUCATION/TRAINING PROGRAM

## 2025-01-27 PROCEDURE — 6370000000 HC RX 637 (ALT 250 FOR IP): Performed by: INTERNAL MEDICINE

## 2025-01-27 PROCEDURE — 6360000002 HC RX W HCPCS: Performed by: STUDENT IN AN ORGANIZED HEALTH CARE EDUCATION/TRAINING PROGRAM

## 2025-01-27 PROCEDURE — 99233 SBSQ HOSP IP/OBS HIGH 50: CPT | Performed by: STUDENT IN AN ORGANIZED HEALTH CARE EDUCATION/TRAINING PROGRAM

## 2025-01-27 PROCEDURE — 2500000003 HC RX 250 WO HCPCS: Performed by: STUDENT IN AN ORGANIZED HEALTH CARE EDUCATION/TRAINING PROGRAM

## 2025-01-27 PROCEDURE — 2060000000 HC ICU INTERMEDIATE R&B

## 2025-01-27 PROCEDURE — 80048 BASIC METABOLIC PNL TOTAL CA: CPT

## 2025-01-27 RX ORDER — LEVOFLOXACIN 500 MG/1
500 TABLET, FILM COATED ORAL DAILY
Qty: 7 TABLET | Refills: 0 | Status: SHIPPED | OUTPATIENT
Start: 2025-01-27 | End: 2025-01-28

## 2025-01-27 RX ORDER — CHLORDIAZEPOXIDE HYDROCHLORIDE 5 MG/1
5 CAPSULE, GELATIN COATED ORAL 2 TIMES DAILY
Status: DISCONTINUED | OUTPATIENT
Start: 2025-01-27 | End: 2025-01-28 | Stop reason: HOSPADM

## 2025-01-27 RX ORDER — LEVOFLOXACIN 500 MG/1
500 TABLET, FILM COATED ORAL DAILY
Qty: 7 TABLET | Refills: 0 | Status: SHIPPED | OUTPATIENT
Start: 2025-01-27 | End: 2025-01-27 | Stop reason: SDUPTHER

## 2025-01-27 RX ORDER — CLONAZEPAM 1 MG/1
1 TABLET ORAL 3 TIMES DAILY PRN
Qty: 90 TABLET | Refills: 0 | Status: SHIPPED | OUTPATIENT
Start: 2025-01-27 | End: 2025-02-26

## 2025-01-27 RX ORDER — LEVOFLOXACIN 5 MG/ML
750 INJECTION, SOLUTION INTRAVENOUS EVERY 24 HOURS
Status: DISCONTINUED | OUTPATIENT
Start: 2025-01-27 | End: 2025-01-28 | Stop reason: HOSPADM

## 2025-01-27 RX ADMIN — BACITRACIN: 500 OINTMENT TOPICAL at 22:20

## 2025-01-27 RX ADMIN — LEVOFLOXACIN 750 MG: 750 INJECTION, SOLUTION INTRAVENOUS at 18:46

## 2025-01-27 RX ADMIN — BACITRACIN: 500 OINTMENT TOPICAL at 13:53

## 2025-01-27 RX ADMIN — METOPROLOL TARTRATE 25 MG: 25 TABLET, FILM COATED ORAL at 20:38

## 2025-01-27 RX ADMIN — ASPIRIN 81 MG: 81 TABLET, CHEWABLE ORAL at 08:34

## 2025-01-27 RX ADMIN — QUETIAPINE FUMARATE 25 MG: 25 TABLET ORAL at 20:33

## 2025-01-27 RX ADMIN — POLYETHYLENE GLYCOL 3350 17 G: 17 POWDER, FOR SOLUTION ORAL at 20:34

## 2025-01-27 RX ADMIN — Medication 250 MG: at 08:34

## 2025-01-27 RX ADMIN — BACITRACIN: 500 OINTMENT TOPICAL at 05:33

## 2025-01-27 RX ADMIN — QUETIAPINE FUMARATE 25 MG: 25 TABLET ORAL at 08:34

## 2025-01-27 RX ADMIN — SERTRALINE HYDROCHLORIDE 25 MG: 50 TABLET ORAL at 20:33

## 2025-01-27 RX ADMIN — POLYETHYLENE GLYCOL 3350 17 G: 17 POWDER, FOR SOLUTION ORAL at 08:34

## 2025-01-27 RX ADMIN — ENOXAPARIN SODIUM 40 MG: 100 INJECTION SUBCUTANEOUS at 08:34

## 2025-01-27 RX ADMIN — CHLORDIAZEPOXIDE HYDROCHLORIDE 5 MG: 5 CAPSULE ORAL at 08:34

## 2025-01-27 RX ADMIN — SODIUM CHLORIDE, PRESERVATIVE FREE 5 ML: 5 INJECTION INTRAVENOUS at 08:34

## 2025-01-27 RX ADMIN — SODIUM CHLORIDE, PRESERVATIVE FREE 10 ML: 5 INJECTION INTRAVENOUS at 20:34

## 2025-01-27 RX ADMIN — CHLORDIAZEPOXIDE HYDROCHLORIDE 5 MG: 5 CAPSULE ORAL at 20:33

## 2025-01-27 RX ADMIN — SODIUM CHLORIDE, POTASSIUM CHLORIDE, SODIUM LACTATE AND CALCIUM CHLORIDE: 600; 310; 30; 20 INJECTION, SOLUTION INTRAVENOUS at 03:06

## 2025-01-27 RX ADMIN — METOPROLOL TARTRATE 25 MG: 25 TABLET, FILM COATED ORAL at 08:34

## 2025-01-27 ASSESSMENT — ENCOUNTER SYMPTOMS
ABDOMINAL DISTENTION: 0
EYE REDNESS: 0
ABDOMINAL PAIN: 0
EYE PAIN: 0
SINUS PAIN: 0
EYE DISCHARGE: 0
STRIDOR: 0
CHOKING: 0
COLOR CHANGE: 0
COUGH: 0
CONSTIPATION: 0

## 2025-01-27 NOTE — PROGRESS NOTES
Hospitalist Consult Progress Note   Admit Date:  2025 11:10 AM   Name:  Jesus Laguna   Age:  79 y.o.  Sex:  male  :  1945   MRN:  [id number]   Room:  [facility]/    Presenting/Chief Complaint: No chief complaint on file.     Reason(s) for Admission: Laryngeal cancer (HCC) [C32.9]  Chronic respiratory infection [J98.8]  Primary squamous cell carcinoma of supraglottis (HCC) [C32.1]     Hospital Course:   Jesus Laguna is a 79 y.o. 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  Medicine was consulted for anxiety, code event from last night 2025 and general medical care.     Patient was seen sitting in bed resting comfortably.  Occasional cough brought up white sputum patient was able to suction tracheostomy appropriately.  Overnight, patient was severely dyspneic.  His oxygen saturation dropped to 32%.  Then patient lost consciousness and CODE BLUE was called.  ROSC was obtained after a few chest compressions, no medication was administered.  Respiratory therapy suctioned very thick secretions out of tracheostomy site EKG was ordered and normal.     Patient was able to speak somewhat but would become dyspneic at times.  Hospitalist consulted for medical management.      Subjective & 24hr Events:   No acute overnight events    AxO x3. No particular issues. Less anxious.      Assessment & Plan:     Laryngeal cancer  Primary squamous cell carcinoma of supraglottis  Chronic respiratory infection  On tracheostomy  - Management per primary team     Moderate protein calorie malnutrition  S/p percutaneous endoscopic gastrotomy tube placement  Currently receiving tube feeds at goal.  Will need to continue for now.  -Tube feeds per  tests:  Recent Labs:  Recent Results (from the past 48 hour(s))   POCT Glucose    Collection Time: 01/25/25  4:57 PM   Result Value Ref Range    POC Glucose 134 (H) 65 - 100 mg/dL    Performed by: Shannon    POCT Glucose    Collection Time: 01/25/25  8:55 PM   Result Value Ref Range    POC Glucose 184 (H) 65 - 100 mg/dL    Performed by: Manoj    Basic Metabolic Panel    Collection Time: 01/26/25  4:46 AM   Result Value Ref Range    Sodium 139 136 - 145 mmol/L    Potassium 3.5 3.5 - 5.1 mmol/L    Chloride 100 98 - 107 mmol/L    CO2 27 20 - 29 mmol/L    Anion Gap 13 7 - 16 mmol/L    Glucose 138 (H) 70 - 99 mg/dL    BUN 19 8 - 23 MG/DL    Creatinine 0.64 (L) 0.80 - 1.30 MG/DL    Est, Glom Filt Rate >90 >60 ml/min/1.73m2    Calcium 9.2 8.8 - 10.2 MG/DL   Magnesium    Collection Time: 01/26/25  4:46 AM   Result Value Ref Range    Magnesium 1.8 1.8 - 2.4 mg/dL   Phosphorus    Collection Time: 01/26/25  4:46 AM   Result Value Ref Range    Phosphorus 2.7 2.5 - 4.5 MG/DL   Culture, Respiratory    Collection Time: 01/26/25 10:48 AM    Specimen: Tracheal Aspirate   Result Value Ref Range    Special Requests NO SPECIAL REQUESTS      Gram Stain PENDING     Culture HEAVY Gram negative rods SUBCULTURE IN PROGRESS (A)      Culture CULTURE IN PROGRESS,FURTHER UPDATES TO FOLLOW     Basic Metabolic Panel    Collection Time: 01/27/25  4:12 AM   Result Value Ref Range    Sodium 139 136 - 145 mmol/L    Potassium 3.5 3.5 - 5.1 mmol/L    Chloride 102 98 - 107 mmol/L    CO2 25 20 - 29 mmol/L    Anion Gap 12 7 - 16 mmol/L    Glucose 116 (H) 70 - 99 mg/dL    BUN 15 8 - 23 MG/DL    Creatinine 0.47 (L) 0.80 - 1.30 MG/DL    Est, Glom Filt Rate >90 >60 ml/min/1.73m2    Calcium 8.5 (L) 8.8 - 10.2 MG/DL   Magnesium    Collection Time: 01/27/25  4:12 AM   Result Value Ref Range    Magnesium 1.7 (L) 1.8 - 2.4 mg/dL   Phosphorus    Collection Time: 01/27/25  4:12 AM   Result Value Ref Range    Phosphorus 2.1 (L) 2.5 - 4.5 MG/DL        No results for input(s): \"COVID19\" in the last 72 hours.    Current Meds:  Current Facility-Administered Medications   Medication Dose Route Frequency    QUEtiapine (SEROQUEL) tablet 25 mg  25 mg Oral BID    chlordiazePOXIDE (LIBRIUM) capsule 5 mg  5 mg Per G Tube TID    sodium chloride (Inhalant) 3 % nebulizer solution 4 mL  4 mL Nebulization PRN    magnesium sulfate 2000 mg in 50 mL IVPB premix  2,000 mg IntraVENous PRN    sodium phosphate 15 mmol in sodium chloride 0.9 % 250 mL IVPB ([id number])  15 mmol IntraVENous PRN    potassium bicarb-citric acid (EFFER-K) effervescent tablet 40 mEq  40 mEq Per G Tube PRN    Or    potassium chloride 10 mEq/100 mL IVPB (Peripheral Line)  10 mEq IntraVENous PRN    metoprolol tartrate (LOPRESSOR) tablet 25 mg  25 mg Oral BID    aspirin chewable tablet 81 mg  81 mg Oral Daily    sertraline (ZOLOFT) tablet 25 mg  25 mg Per G Tube Nightly    vitamin C tablet 250 mg  250 mg Oral Daily with breakfast    sodium chloride flush 0.9 % injection 5-40 mL  5-40 mL IntraVENous 2 times per day    sodium chloride flush 0.9 % injection 5-40 mL  5-40 mL IntraVENous PRN    0.9 % sodium chloride infusion   IntraVENous PRN    polyethylene glycol (GLYCOLAX) packet 17 g  17 g Per G Tube BID    ondansetron (ZOFRAN-ODT) disintegrating tablet 4 mg  4 mg Oral Q8H PRN    Or    ondansetron (ZOFRAN) injection 4 mg  4 mg IntraVENous Q6H PRN    enoxaparin (LOVENOX) injection 40 mg  40 mg SubCUTAneous Daily    bacitracin ointment   Topical Q8H    HYDROmorphone HCl PF (DILAUDID) injection 0.25 mg  0.25 mg IntraVENous Q3H PRN    Or    HYDROmorphone HCl PF (DILAUDID) injection 0.5 mg  0.5 mg IntraVENous Q3H PRN    lactated ringers infusion   IntraVENous Continuous       Signed:  Albertina Sandhu MD    Part of this note may have been written by using a voice dictation software.  The note has been proof read but may still contain some grammatical/other typographical errors.

## 2025-01-27 NOTE — WOUND CARE
Consulted for Chronic lower back scar. Visitor at bedside states is surgical scar s/p back surgery.    Lower back scar slightly open 0.5x0.2x0.1cm, pink/red, no drainage/odor. Bony prominence of spine protruding out. Recommend normal bathing, pink silicone border foam every other day&PRN.

## 2025-01-27 NOTE — PROGRESS NOTES
SPEECH LANGUAGE PATHOLOGY: ATTEMPT     NAME: Jesus Laguna  : 1945  MRN: [id number]    ADMISSION DATE: 2025  PRIMARY DIAGNOSIS: Chronic respiratory infection    Staff reports patient requiring frequent suction and patient also resting during AM. Discussed with staff and family who are agreeable for speech therapy to defer to allow patient time to rest. Family reports possible trach change to cuffless later today; Will follow up and perform assessment for [place] Speaking Valve, as patient is medically appropriate.      TRAVIS CACERES, SLP  2025 11:19 AM

## 2025-01-27 NOTE — PROGRESS NOTES
[facility] Office Note    Patient: Jesus Laguna  MRN: [id number]  : 1945  Gender:  male  Vital Signs: BP (!) 100/59   Pulse 93   Temp 99 °F (37.2 °C) (Axillary)   Resp 16   Ht 1.829 m (6')   Wt 78.2 kg (172 lb 4.8 oz)   SpO2 100%   BMI 23.37 kg/m²   Date: 2025    CC: Supraglottic squamous of carcinoma, respiratory failure     HPI:  Jesus Laguna is a 79 y.o. 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  Tracheostomy placed on 2025.  Patient had a code event in the early morning hours of . ROSC obtained after several compressions.  Code event thought to be brought about by mucous plugging.  Patient given Librium for anxiety.  No acute events last night.    Past Medical History, Past Surgical History, Family history, Social History, and Medications were all reviewed with the patient today and updated as necessary.     Allergies   Allergen Reactions    Zolpidem Other (See Comments) and Hallucinations     Hallucinations     Ambien    Other Reaction(s): HALLUNATIONS    Ativan [Lorazepam] Other (See Comments) and Hallucinations     Increased agitation    Metronidazole      Other Reaction(s): CONSTIPATION, NO APPETITE    Sulfamethoxazole-Trimethoprim Other (See Comments)     Other Reaction(s): Unknown     Patient Active Problem List   Diagnosis    Spinal stenosis, lumbar region, with neurogenic claudication    Lumbar radiculopathy    DDD (degenerative disc disease), lumbar    Laryngeal cancer (HCC)    Squamous cell carcinoma of oropharynx (HCC)    Dehydration    Moderate protein-calorie malnutrition (HCC)    Malignant neoplasm of esophagus (HCC)    Sinus tachycardia    Appetite loss    Hypersalivation    Tracheostomy care (HCC)    Ataxia    Increased tracheal secretions    Immunocompromised (HCC)    Complication of tracheostomy tube (HCC)    Primary squamous cell carcinoma of supraglottis (HCC)    Encounter for rehabilitation    Impaired  difficulty urinating, dysuria and frequency.   Musculoskeletal:  Negative for neck pain and neck stiffness.   Skin:  Negative for color change.   Allergic/Immunologic: Negative for environmental allergies, food allergies and immunocompromised state.   Neurological:  Negative for dizziness, facial asymmetry and numbness.   Hematological:  Negative for adenopathy. Does not bruise/bleed easily.   Psychiatric/Behavioral:  Negative for agitation, behavioral problems and decreased concentration.           PHYSICAL EXAM:    BP (!) 100/59   Pulse 93   Temp 99 °F (37.2 °C) (Axillary)   Resp 16   Ht 1.829 m (6')   Wt 78.2 kg (172 lb 4.8 oz)   SpO2 100%   BMI 23.37 kg/m²     General: NAD, well-appearing  Neuro: No gross neuro deficits. CN's II-XII intact. No facial weakness.  Eyes: EOMI. Pupils reactive. No periorbital edema/ecchymosis.   Skin: No facial erythema, rashes or concerning lesions.  Nose: No external deviations or saddling. Intranasally, septum is midline without perforations, nasal mucosa appears healthy with no erythema, mucopurulence, or polyps.  Mouth: Moist mucus membranes, normal tongue/palate mobility, no concerning mucosal lesions.   Oropharynx: clear with no erythema/exudate, no tonsillar hypertrophy.  Ears: Normal appearing auricles, no hematomas. EACs clear with no cerumen impaction, healthy canal skin, TM's intact with no perforations or retraction pockets. No middle ear effusions.  Neck: #6 distal Shiley XLT trach in place, cuff down, secretions suctioned from trach, trach sutures and local collar in place, inner cannula cleaned. Humidified trach collar   Lymphatics: No palpable cervical LAD.  Resp/Lungs: No audible stridor or wheezing, CTAB  Heart: RRR  Extremities: No clubbing or cyanosis.    Procedure: Trach change  Indications: Fresh tracheostomy  Description: Informed consent obtained.  The patient was laid supine.  The current 6 Shiley distal XLT cuffed trach was removed after sutures have  been cut and Velcro collar removed.  The tract was suctioned out.  The area around the tract was cleaned.  There was a small pressure ulcer to the left inferior aspect.  A 6 Shiley distal XLT uncuffed trach was then inserted and the obturator was removed.  An inner cannula was placed.  A Velcro collar was placed.  The patient tolerated this well.    Lab Results   Component Value Date    WBC 15.4 (H) 01/25/2025    HGB 9.9 (L) 01/25/2025    HCT 32.4 (L) 01/25/2025    MCV 84.6 01/25/2025     01/25/2025     Lab Results   Component Value Date/Time     01/27/2025 04:12 AM    K 3.5 01/27/2025 04:12 AM     01/27/2025 04:12 AM    CO2 25 01/27/2025 04:12 AM    BUN 15 01/27/2025 04:12 AM    CREATININE 0.47 01/27/2025 04:12 AM    GLUCOSE 116 01/27/2025 04:12 AM    CALCIUM 8.5 01/27/2025 04:12 AM    LABGLOM >90 01/27/2025 04:12 AM    LABGLOM >90 04/24/2024 08:16 AM      Culture, Respiratory  Order: [id number]  Status: Preliminary result       Visible to patient: No (not released)       Next appt: 01/29/2025 at 08:00 AM in Lab (PORT)    Specimen Information: Tracheal Aspirate   0 Result Notes      Component  Ref Range & Units    Special Requests    NO SPECIAL REQUESTS P   Gram Stain    MANY WBCS SEEN PER LPF P   Gram Stain    OCCASIONAL EPITHELIAL CELLS SEEN P   Gram Stain    MANY Gram positive cocci P   Gram Stain    MODERATE Gram negative rods P   Gram Stain    FEW GRAM POSITIVE RODS P   Gram Stain    2+ MUCUS PRESENT P   Culture    HEAVY Gram negative rods SUBCULTURE IN PROGRESS Abnormal  P   Culture    CULTURE IN PROGRESS,FURTHER UPDATES TO FOLLOW P        ASSESSMENT and PLAN    -Trach care with suctioning every 2 hours and additionally as needed  -Replace inner cannula daily  -RT and SLP care  -Hospitalist and palliative care following   -SLP can begin trialing Passy-[place] valve with trach cuff down  -Librium and Seroquel ordered for anxiety  -Nutrition for tube feeds  -levaquin ordered for cx result

## 2025-01-27 NOTE — CARE COORDINATION
JAZMÍN CM:  patient currently on 10L NC.  Patient to go home with [facility] tomorrow.  Case Management will continue to follow.

## 2025-01-28 VITALS
OXYGEN SATURATION: 98 % | SYSTOLIC BLOOD PRESSURE: 122 MMHG | HEART RATE: 95 BPM | WEIGHT: 175.5 LBS | HEIGHT: 72 IN | RESPIRATION RATE: 18 BRPM | BODY MASS INDEX: 23.77 KG/M2 | DIASTOLIC BLOOD PRESSURE: 68 MMHG | TEMPERATURE: 97.5 F

## 2025-01-28 LAB
ANION GAP SERPL CALC-SCNC: 11 MMOL/L (ref 7–16)
BUN SERPL-MCNC: 12 MG/DL (ref 8–23)
CALCIUM SERPL-MCNC: 8.5 MG/DL (ref 8.8–10.2)
CHLORIDE SERPL-SCNC: 102 MMOL/L (ref 98–107)
CO2 SERPL-SCNC: 25 MMOL/L (ref 20–29)
CREAT SERPL-MCNC: 0.45 MG/DL (ref 0.8–1.3)
GLUCOSE SERPL-MCNC: 114 MG/DL (ref 70–99)
MAGNESIUM SERPL-MCNC: 1.8 MG/DL (ref 1.8–2.4)
PHOSPHATE SERPL-MCNC: 2.1 MG/DL (ref 2.5–4.5)
POTASSIUM SERPL-SCNC: 3.4 MMOL/L (ref 3.5–5.1)
SODIUM SERPL-SCNC: 138 MMOL/L (ref 136–145)

## 2025-01-28 PROCEDURE — 94761 N-INVAS EAR/PLS OXIMETRY MLT: CPT

## 2025-01-28 PROCEDURE — 6370000000 HC RX 637 (ALT 250 FOR IP): Performed by: INTERNAL MEDICINE

## 2025-01-28 PROCEDURE — 36415 COLL VENOUS BLD VENIPUNCTURE: CPT

## 2025-01-28 PROCEDURE — 80048 BASIC METABOLIC PNL TOTAL CA: CPT

## 2025-01-28 PROCEDURE — 6360000002 HC RX W HCPCS: Performed by: STUDENT IN AN ORGANIZED HEALTH CARE EDUCATION/TRAINING PROGRAM

## 2025-01-28 PROCEDURE — 6370000000 HC RX 637 (ALT 250 FOR IP): Performed by: STUDENT IN AN ORGANIZED HEALTH CARE EDUCATION/TRAINING PROGRAM

## 2025-01-28 PROCEDURE — 2700000000 HC OXYGEN THERAPY PER DAY

## 2025-01-28 PROCEDURE — 92597 ORAL SPEECH DEVICE EVAL: CPT

## 2025-01-28 PROCEDURE — 84100 ASSAY OF PHOSPHORUS: CPT

## 2025-01-28 PROCEDURE — 6370000000 HC RX 637 (ALT 250 FOR IP)

## 2025-01-28 PROCEDURE — 83735 ASSAY OF MAGNESIUM: CPT

## 2025-01-28 PROCEDURE — 2500000003 HC RX 250 WO HCPCS: Performed by: STUDENT IN AN ORGANIZED HEALTH CARE EDUCATION/TRAINING PROGRAM

## 2025-01-28 RX ORDER — LEVOFLOXACIN 500 MG/1
500 TABLET, FILM COATED ORAL DAILY
Qty: 7 TABLET | Refills: 0 | Status: SHIPPED | OUTPATIENT
Start: 2025-01-28 | End: 2025-02-04

## 2025-01-28 RX ADMIN — POLYETHYLENE GLYCOL 3350 17 G: 17 POWDER, FOR SOLUTION ORAL at 08:29

## 2025-01-28 RX ADMIN — METOPROLOL TARTRATE 25 MG: 25 TABLET, FILM COATED ORAL at 08:29

## 2025-01-28 RX ADMIN — CHLORDIAZEPOXIDE HYDROCHLORIDE 5 MG: 5 CAPSULE ORAL at 08:29

## 2025-01-28 RX ADMIN — ENOXAPARIN SODIUM 40 MG: 100 INJECTION SUBCUTANEOUS at 08:29

## 2025-01-28 RX ADMIN — SODIUM CHLORIDE, PRESERVATIVE FREE 10 ML: 5 INJECTION INTRAVENOUS at 08:30

## 2025-01-28 RX ADMIN — Medication 250 MG: at 08:29

## 2025-01-28 RX ADMIN — QUETIAPINE FUMARATE 25 MG: 25 TABLET ORAL at 08:29

## 2025-01-28 RX ADMIN — ASPIRIN 81 MG: 81 TABLET, CHEWABLE ORAL at 08:29

## 2025-01-28 NOTE — PROGRESS NOTES
Hospitalist Progress Note   Admit Date:  2025 11:10 AM   Name:  Jesus Laguna   Age:  79 y.o.  Sex:  male  :  1945   MRN:  [id number]   Room:  [facility]/    Presenting/Chief Complaint: No chief complaint on file.     Reason(s) for Admission: Laryngeal cancer (HCC) [C32.9]  Chronic respiratory infection [J98.8]  Primary squamous cell carcinoma of supraglottis (HCC) [C32.1]     Hospital Course:   Jesus Laguna is a 79 y.o. 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  Medicine was consulted for anxiety, code event from last night 2025 and general medical care.     Patient was seen sitting in bed resting comfortably.  Occasional cough brought up white sputum patient was able to suction tracheostomy appropriately.  Overnight, patient was severely dyspneic.  His oxygen saturation dropped to 32%.  Then patient lost consciousness and CODE BLUE was called.  ROSC was obtained after a few chest compressions, no medication was administered.  Respiratory therapy suctioned very thick secretions out of tracheostomy site EKG was ordered and normal.     Patient was able to speak somewhat but would become dyspneic at times.  Hospitalist consulted for medical management.      Subjective & 24hr Events:   Patient was seen and evaluated at this morning.  Patient able to express that he feels generally well.  Denies any other complaints.    Patient being discharged today with home hospice as per primary team    Assessment & Plan:     Laryngeal cancer  Primary squamous cell carcinoma of supraglottis  Chronic respiratory infection  Cultures are growing multiple gram-negative rods as well as Staph aureus.  As per recent history, has known  clubbing. LLE in brace, muscle atrophy appreciated  Skin:     No rashes.  Normal coloration.   Warm and dry.    Neuro:  CN II-XII grossly intact.    Psych:  Normal mood and affect.      I have personally reviewed labs and tests:  Recent Labs:  Recent Results (from the past 48 hour(s))   Basic Metabolic Panel    Collection Time: 01/27/25  4:12 AM   Result Value Ref Range    Sodium 139 136 - 145 mmol/L    Potassium 3.5 3.5 - 5.1 mmol/L    Chloride 102 98 - 107 mmol/L    CO2 25 20 - 29 mmol/L    Anion Gap 12 7 - 16 mmol/L    Glucose 116 (H) 70 - 99 mg/dL    BUN 15 8 - 23 MG/DL    Creatinine 0.47 (L) 0.80 - 1.30 MG/DL    Est, Glom Filt Rate >90 >60 ml/min/1.73m2    Calcium 8.5 (L) 8.8 - 10.2 MG/DL   Magnesium    Collection Time: 01/27/25  4:12 AM   Result Value Ref Range    Magnesium 1.7 (L) 1.8 - 2.4 mg/dL   Phosphorus    Collection Time: 01/27/25  4:12 AM   Result Value Ref Range    Phosphorus 2.1 (L) 2.5 - 4.5 MG/DL   Basic Metabolic Panel    Collection Time: 01/28/25  4:22 AM   Result Value Ref Range    Sodium 138 136 - 145 mmol/L    Potassium 3.4 (L) 3.5 - 5.1 mmol/L    Chloride 102 98 - 107 mmol/L    CO2 25 20 - 29 mmol/L    Anion Gap 11 7 - 16 mmol/L    Glucose 114 (H) 70 - 99 mg/dL    BUN 12 8 - 23 MG/DL    Creatinine 0.45 (L) 0.80 - 1.30 MG/DL    Est, Glom Filt Rate >90 >60 ml/min/1.73m2    Calcium 8.5 (L) 8.8 - 10.2 MG/DL   Magnesium    Collection Time: 01/28/25  4:22 AM   Result Value Ref Range    Magnesium 1.8 1.8 - 2.4 mg/dL   Phosphorus    Collection Time: 01/28/25  4:22 AM   Result Value Ref Range    Phosphorus 2.1 (L) 2.5 - 4.5 MG/DL       No results for input(s): \"COVID19\" in the last 72 hours.    Current Meds:  Current Facility-Administered Medications   Medication Dose Route Frequency    chlordiazePOXIDE (LIBRIUM) capsule 5 mg  5 mg Per G Tube BID    levoFLOXacin (LEVAQUIN) 750 MG/150ML infusion 750 mg  750 mg IntraVENous Q24H    QUEtiapine (SEROQUEL) tablet 25 mg  25 mg Oral BID    sodium chloride

## 2025-01-28 NOTE — PROGRESS NOTES
Patient discharged with EMS. Patient AVS reviewed with wife. New Levaquin order discussed with patient. Trach education provided to wife and patient. Education provided on speaking valve. Discharged patient home via EMS with [facility] to follow.

## 2025-01-28 NOTE — CARE COORDINATION
MSN, Cm:  patient to be discharged home with [facility].  Patient and family agree with this discharge plan.  Patient has met all milestones for this admission.  [facility] to transport patient home.    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]  Date/Time of Admission: 1/24/2025 11:10 AM  Problem List:  Patient Active Problem List   Diagnosis    Spinal stenosis, lumbar region, with neurogenic claudication    Lumbar radiculopathy    DDD (degenerative disc disease), lumbar    Laryngeal cancer (HCC)    Squamous cell carcinoma of oropharynx (HCC)    Dehydration    Moderate protein-calorie malnutrition (HCC)    Malignant neoplasm of esophagus (HCC)    Sinus tachycardia    Appetite loss    Hypersalivation    Tracheostomy care (HCC)    Ataxia    Increased tracheal secretions    Immunocompromised (HCC)    Complication of tracheostomy tube (HCC)    Primary squamous cell carcinoma of supraglottis (HCC)    Encounter for rehabilitation    Impaired functional mobility, balance, gait, and endurance    Decreased independence with activities of daily living    Left sided lacunar infarction (HCC)    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    Pneumonia of right lung due to infectious organism    Pancytopenia (HCC)    Sepsis (HCC)    Vitamin D deficiency    High risk medication use    Aspiration pneumonia, unspecified aspiration pneumonia type, unspecified laterality, unspecified part of lung (HCC)    Dyslipidemia    Fever    Thrombocytopenia, unspecified (HCC)    Personal history of malignant neoplasm of larynx    Pure hypercholesterolemia    AMS (altered mental status)    Acute encephalopathy    Gastrostomy status (HCC)    Chronic respiratory infection       Service Assessment  Patient Orientation Alert and Oriented, Self   Cognition  Assistance Needs assistance   Active  No   Patient's  Info family   Mode of Transportation     Education     Occupation Retired   Type of Occupation       Discharge Planning   Type of Residence House   Living Arrangements Spouse/Significant Other   Support Systems Spouse/Significant Other, Family Members   Current Services Prior To Admission Durable Medical Equipment   Potential Assistance Needed Home Care   DME Bedside Commode, Wheelchair, Walker, Shower Chair   DME     DME Ordered? No   Potential Assistance Purchasing Medications No   Meds-to-Beds: Does the patient want to have any new prescriptions delivered to bedside prior to discharge? Yes   Type of Home Care Services None   Patient expects to be discharged to: House   Follow Up Appointment: Best Day/Time     One/Two Story Residence: One story   # of Interior Steps     Height of Each Step (in)     Interior Rails     Lift Chair Available     History of Falls? Yes     Services At/After Discharge  Transition of Care Consult (CM Consult): Hospice ([facility])   Internal Home Health     Internal Hospice No   Reason Outside Agency Chosen Script used patient chose alternate agency   Partner SNF     Reason Why Partner SNF Not Chosen     Internal Comfort Care     Reason Outside Comfort Care Chosen     Services At/After Discharge Hospice   [place] Resource Information Provided?     Mode of Transport at Discharge [facility] Transport Time of Discharge 1030   Confirm Follow Up Transport Other (see comment) (hospice)     Condition of Participation: Discharge Planning  The plan for Transition of Care is related to the following treatment goals: Hospice   The Patient and/or Patient Representative was provided with a Choice of Provider? Patient, Patient Representative   Name of the Patient Representative who was provided with the Choice of Provider and agrees with the Discharge Plan? Wife   The Patient and/or Patient Representative Agree with the Discharge

## 2025-01-28 NOTE — DISCHARGE SUMMARY
May ENT Note    Patient: Jesus Laguna  MRN: [id number]  : 1945  Gender:  male  Vital Signs: /68   Pulse 95   Temp 97.5 °F (36.4 °C) (Axillary)   Resp 18   Ht 1.829 m (6')   Wt 79.6 kg (175 lb 8 oz)   SpO2 98%   BMI 23.80 kg/m²   Date: 2025    CC: Supraglottic squamous of carcinoma, respiratory failure     HPI:  Jesus Laguna is a 79 y.o. 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  Tracheostomy placed on 2025.  Patient had a code event in the early morning hours of . ROSC obtained after several compressions.  Code event thought to be brought about by mucous plugging.  Patient given Librium for anxiety.  No acute events last night.  Past Medical History, Past Surgical History, Family history, Social History, and Medications were all reviewed with the patient today and updated as necessary.     Allergies   Allergen Reactions    Zolpidem Other (See Comments) and Hallucinations     Hallucinations     Ambien    Other Reaction(s): HALLUNATIONS    Ativan [Lorazepam] Other (See Comments) and Hallucinations     Increased agitation    Metronidazole      Other Reaction(s): CONSTIPATION, NO APPETITE    Sulfamethoxazole-Trimethoprim Other (See Comments)     Other Reaction(s): Unknown     Patient Active Problem List   Diagnosis    Spinal stenosis, lumbar region, with neurogenic claudication    Lumbar radiculopathy    DDD (degenerative disc disease), lumbar    Laryngeal cancer (HCC)    Squamous cell carcinoma of oropharynx (HCC)    Dehydration    Moderate protein-calorie malnutrition (HCC)    Malignant neoplasm of esophagus (HCC)    Sinus tachycardia    Appetite loss    Hypersalivation    Tracheostomy care (HCC)    Ataxia    Increased tracheal secretions    Immunocompromised (HCC)    Complication of tracheostomy tube (HCC)    Primary squamous cell carcinoma of supraglottis (HCC)    Encounter for rehabilitation    Impaired functional mobility,  Hypersalivation     Hypertension     off medication    Hypothyroid     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 10/05/2023    Immunocompromised (HCC)     Malignant neoplasm of esophagus (HCC)     Polio     as a child, affected left leg/ wears brace    S/P percutaneous endoscopic gastrostomy (PEG) tube placement (HCC)     Tongue cancer (HCC)     squamous cell cancer of oropharynx,  glossectomy and radiation,  chemo and immunotherapy     Social History     Tobacco Use    Smoking status: Former     Current packs/day: 0.00     Average packs/day: 1 pack/day for 15.0 years (15.0 ttl pk-yrs)     Types: Cigarettes     Start date: 1975     Quit date: 1990     Years since quittin.0    Smokeless tobacco: Former    Tobacco comments:     Quit smoking: age 19 to 41   Substance Use Topics    Alcohol use: Not Currently     Past Surgical History:   Procedure Laterality Date    CATARACT REMOVAL Bilateral     COLONOSCOPY      [facility]     HEENT       tongue removed in 2010 and arterial graft from right wrist to tongue and second graft from right thigh to right wrist.    HEENT      precautionary trach placed in 2010 when having tongue removal surgery and closed one week later.    HERNIA REPAIR      IR FLUORO GUIDED GASTROSTOMY TUBE INSERTION PERC W CONTRAST  2023    IR GASTROSTOMY TUBE PLACEMENT PERCUTANEOUS 2023 [facility] RADIOLOGY SPECIALS    IR PORT PLACEMENT > 5 YEARS  10/19/2023    IR PORT PLACEMENT EQUAL OR GREATER THAN 5 YEARS 10/19/2023 [facility] RADIOLOGY SPECIALS    MOUTH SURGERY  2010    Partial glossectomy with tonsillectomy, neck dissection with a radial forearm free flat to left oral cavity    ORTHOPEDIC SURGERY Right     pins in right leg to prevent asymmetric growth    TRACHEOSTOMY N/A 2023    TRACHEOTOMY performed by Shahid Cortez MD at [facility] OR    TRACHEOSTOMY N/A 2025    TRACHEOSTOMY performed by Shahid Cortez MD at [facility] OR

## 2025-01-28 NOTE — PROGRESS NOTES
GOALS:  LTG: Patient will increase receptive/expressive language skills demonstrated by the ability to communicate basic wants/needs across environments. Goal Met 2025  STG:  Patient will utilize written communication, gestures, picture communication to express wants and needs with min cues and 80% accuracy as he continues to work to tolerate the speaking valve for verbal expression .     LTG: Patient will tolerate passy aurelia speaking valve to communicate wants and needs and improve swallow safety - Goals Met 2025  STG:  Patient will tolerate 5 minutes continual wear of speaking valve without respiratory decline.  Patient will verbalize safety precautions related to use of passy aurelia speaking valve with 100% accuracy.  Patient and/or family member/caregiver/staff will demonstrate independence with donning and doffing speaking valve.  Patient will participate in dysphagia assessment in efforts to identify safest, least restrictive oral diet.    SPEECH LANGUAGE PATHOLOGY: Passy Troup Speaking Valve  Daily Note #1 and Discharge    Acknowledge Order  I  Therapy Time  I   Charges     I  Rehab Caseload Tracker    NAME: Jesus Laguna  : 1945  MRN: [id number]    ADMISSION DATE: 2025  PRIMARY DIAGNOSIS: Chronic respiratory infection    ICD-10: Treatment Diagnosis: R13.12 Dysphagia, Oropharyngeal Phase  R41.841 Cognitive-Communication Deficit  R49.1 Aphonia; Loss of voice    RECOMMENDATIONS   Recommendations:  Passy-Aurelia Speaking Valve appropriate to be utilized with distant supervision. Patient familiar with device and reports being able to remove/place without assistance (declined to perform during session). Recommend the following precautions with Passy-Troup Speaking Valve utilization at this time.     Use only when awake/alert.   Patient with limited ability to clear secretions orally thus anticipate patient will require Passy-Troup Speaking Valve to be removed frequently to allow clearance of

## 2025-01-29 LAB
BACTERIA SPEC CULT: ABNORMAL
GRAM STN SPEC: ABNORMAL
SERVICE CMNT-IMP: ABNORMAL

## 2025-01-31 NOTE — PROGRESS NOTES
Nutrition F/U:  Assessment:  Pt seen during office visit w/ Dr. Olvera, here today to discuss resuming chemo/immunotherapy - pt wishes to delay infusion from today to tomorrow; plan for Carbo + Taxol weekly x 3 weeks, off week 4, and Keytruda q 3 weeks.  Pt followed up with Dr. Cortez last week and BOT tumor is growing quickly - discussed replacing trach to preserve airway, will send message to Dr. Cortez letting him know that pt and wife are on board for replacing trach sooner rather than later.  Pt w/ decline in functional status, speech is more slurred and difficult to understand, increased secretions noted as well.  Pt remains NPO/TF dependent for estimated nutrition needs.  Current BW: 175#, stable.     Intervention:  1. NPO, continue w/ TF as prescribed  2. Staff message sent to Dr. Cortez requesting set up of trach replacement - will be completed at [facility] on (1/24)  3. Infusion moved to tomorrow per pt request     Monitoring/Evaluation:  1. RD to follow up during next office visit - follow up wt status, tolerance/intake of TF, symptom management.    Loraine Honeycutt RD, , LD  [phone]    Addendum (1/30):   Trach placement scheduled (1/24), discharged home (1/28) w/ hospice services.  Called and spoke with pt's wife (1/30) and she reports that pt has continued to decline, trach in place, still w/ copious secretions and using his home suction machine and scopolamine patches.  Pt has home hospice through [facility] hospice.  All future appointments cancelled here at the cancer center, tentatively plans to follow up with Dr. Cortez on (2/6) as scheduled.

## 2025-03-06 NOTE — TELEPHONE ENCOUNTER
Returned call to wife.  Instructed to present patient to ER.  Recommended [facility].  Verbalized understanding.   show

## 2025-04-11 NOTE — PROGRESS NOTES
"-Late entry-  NN received a call from patient. She received a letter for jury duty so she is requesting for a letter to be sent. Per KAI Davidson-it is okay to send, patient is on remodulin, and was recently hospitalized. Patient also asked about how to apply or change kita bag. Patient should have received education prior to discharge. Patient states, "I just need to know how much air I need to inject." NN reiterated to patient that NN is here to help with PH only and directed patient to call nurse on call or she can also call surgery clinic (she has a follow up with them on 5/1/25). NN provided her the number to surgery clinic and patient verbalized understanding.   " GOALS: LTG: patient will tolerate safest, least restrictive oral diet without s/sx airway compromise  STG: Patient will participate in passey jason valve tolerance assessment. Met 9/11/23  STG: Patient will tolerate ongoing po trials in efforts to advance diet  STG: Patient will participate in instrumental swallowing assessment to objectively assess oropharyngeal swallow if clinically indicated. Met 9/12/23  STG: Patient will perform laryngeal exercises x10 each with 80% accuracy. Added 9/12/23  STG: Patient will tolerate a PMV for a minimum of 60 minutes without changes in vitals. MET 9/13/23  STG: Patient will participate in breath support exercises and strategies to improve functional communication at the sentence level. ADDED 9/13/23  SPEECH LANGUAGE PATHOLOGY: COMBINED DYSPHAGIA AND VOICE Daily Note #3    MRN: [id number]    ADMISSION DATE: 9/5/2023  PRIMARY DIAGNOSIS: Sepsis ([address])  Syncope and collapse [R55]  Dehydration [E86.0]  Elevated troponin [R77.8]  Sepsis ([address]) [A41.9]  Malignant neoplasm of esophagus, unspecified location ([address]) [C15.9]    ICD-10: Treatment Diagnosis: R13.12 Dysphagia, Oropharyngeal Phase  R49.0 Dysphonia; Hoarseness    RECOMMENDATIONS   Diet Solids Recommendation: NPO  Liquid Consistency Recommendation: NPO  Recommended Form of Meds: Via alternative means of nutrition  Patient has PEG       PMV Placement Recommendation  - As tolerated during waking hours   - Remove if increased secretions, decline in oxygen, or shortness of breath  - Do not wear speaking valve while sleeping     Additional Recommendations: oral care     Therapeutic Interventions: Pharyngeal exercises; Laryngeal exercises     Patient continues to require skilled intervention: Yes. Recommend ongoing speech therapy services during this hospitalization and next level of care      ASSESSMENT      Dysphagia: patient demonstrates severe oropharyngeal dysphagia per MBSS completed 9/12.  He completed effortful swallows with ice chips to improve swallow function/strength. Continue NPO with PEG. Communication: patient continues to tolerate passy jason speaking valve during waking hours. He demonstrates decreased breath support with phonation/respiration incoordination. Air escape around trach also contributing factor in reduced intelligibility. He benefits from cues for breath support, decrease rate, over articulate and increase volume. Improved carryover as session progressed requiring minimal cueing. GENERAL   Subjective: upright in bed, alert and pleasant. wife at bedside  Patient's goal is to be able to eat again. He joked about wanting a steak and martini. 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 \" \"Patient has done well and has been asymptomatic until about 7 weeks ago when he started experiencing increased congestion and hypersalivation. He saw his PCP and was started on a round of antibiotics for what was thought to be a sinus infection. No improvement with antibiotics. Symptoms continued and he was put on a 2nd course of antibiotics with no improvement in symptoms. On July 12th patient woke up with left facial swelling and went to his dentist who thought he had a tooth abscess and was put on Flagyl improvement in facial swelling. Patient reports a sore throat with swallowing that has continue to progress and now is eating only soft foods. He reports a 5-6 lb weight loss over the past month. Patient also endorses dysphonia. Patient saw Dr. Scott Contreras at [facility] who scoped the patient and ordered a CT neck with contrast to Dr. Lotus North with concerns of a new of a base of tongue mass verses ORN of the left mandible. 8/17/23: Underwent a direct laryngoscopy with biopsy by Dr. Lotus North.  Pathology

## (undated) DEVICE — NEEDLE HYPO 25GA L1.5IN BLU POLYPR HUB S STL REG BVL STR

## (undated) DEVICE — GLOVE ORANGE PI 7 1/2   MSG9075

## (undated) DEVICE — Device

## (undated) DEVICE — DISPOSABLE STANDARD BIPOLAR FORCEPS, NON-STICK,: Brand: SPETZLER-MALIS

## (undated) DEVICE — KIT PROCEDURE SURG HEAD AND NECK TOTE

## (undated) DEVICE — INTENDED FOR TISSUE SEPARATION, AND OTHER PROCEDURES THAT REQUIRE A SHARP SURGICAL BLADE TO PUNCTURE OR CUT.: Brand: BARD-PARKER ® STAINLESS STEEL BLADES

## (undated) DEVICE — TUBE TRACH AD L95MM OD11MM ID6MM DST EXTN CUF HI VOL LO

## (undated) DEVICE — SYRINGE MED 10ML LUERLOCK TIP W/O SFTY DISP

## (undated) DEVICE — DRESSING,GAUZE,XEROFORM,CURAD,5"X9",ST: Brand: CURAD

## (undated) DEVICE — TUBE TRACH AD SZ 6 L77MM INNR CANN ID65MM OUTER CANN

## (undated) DEVICE — SOLUTION IRRIG 1000ML 0.9% SOD CHL USP POUR PLAS BTL

## (undated) DEVICE — JELLY LUBRICATING 10GM PREFIL SYR LUBE

## (undated) DEVICE — BANDAGE COMPR XL KNEE ELBW TAN TUBIGRIP ARTHRO-PAD LTX